# Patient Record
Sex: MALE | Race: OTHER | NOT HISPANIC OR LATINO | URBAN - METROPOLITAN AREA
[De-identification: names, ages, dates, MRNs, and addresses within clinical notes are randomized per-mention and may not be internally consistent; named-entity substitution may affect disease eponyms.]

---

## 2021-10-05 ENCOUNTER — INPATIENT (INPATIENT)
Facility: HOSPITAL | Age: 61
LOS: 33 days | Discharge: NURSING, REHAB OR LONG-TERM CARE | DRG: 216 | End: 2021-11-08
Attending: THORACIC SURGERY (CARDIOTHORACIC VASCULAR SURGERY) | Admitting: STUDENT IN AN ORGANIZED HEALTH CARE EDUCATION/TRAINING PROGRAM
Payer: MEDICAID

## 2021-10-05 VITALS
SYSTOLIC BLOOD PRESSURE: 200 MMHG | HEART RATE: 86 BPM | RESPIRATION RATE: 16 BRPM | OXYGEN SATURATION: 100 % | DIASTOLIC BLOOD PRESSURE: 105 MMHG

## 2021-10-05 DIAGNOSIS — R07.9 CHEST PAIN, UNSPECIFIED: ICD-10-CM

## 2021-10-05 LAB
ALBUMIN SERPL ELPH-MCNC: 2.9 G/DL — LOW (ref 3.3–5)
ALP SERPL-CCNC: 140 U/L — HIGH (ref 40–120)
ALT FLD-CCNC: 34 U/L — SIGNIFICANT CHANGE UP (ref 10–45)
ANION GAP SERPL CALC-SCNC: 13 MMOL/L — SIGNIFICANT CHANGE UP (ref 5–17)
ANION GAP SERPL CALC-SCNC: 17 MMOL/L — SIGNIFICANT CHANGE UP (ref 5–17)
APTT BLD: 28.5 SEC — SIGNIFICANT CHANGE UP (ref 27.5–35.5)
AST SERPL-CCNC: 159 U/L — HIGH (ref 10–40)
BASE EXCESS BLDV CALC-SCNC: -9.4 MMOL/L — LOW (ref -2–2)
BASE EXCESS BLDV CALC-SCNC: -9.7 MMOL/L — LOW (ref -2–2)
BASOPHILS # BLD AUTO: 0.06 K/UL — SIGNIFICANT CHANGE UP (ref 0–0.2)
BASOPHILS NFR BLD AUTO: 0.5 % — SIGNIFICANT CHANGE UP (ref 0–2)
BILIRUB SERPL-MCNC: 0.3 MG/DL — SIGNIFICANT CHANGE UP (ref 0.2–1.2)
BUN SERPL-MCNC: 66 MG/DL — HIGH (ref 7–23)
BUN SERPL-MCNC: 67 MG/DL — HIGH (ref 7–23)
CA-I SERPL-SCNC: 1.16 MMOL/L — SIGNIFICANT CHANGE UP (ref 1.15–1.33)
CA-I SERPL-SCNC: 1.23 MMOL/L — SIGNIFICANT CHANGE UP (ref 1.15–1.33)
CALCIUM SERPL-MCNC: 9.3 MG/DL — SIGNIFICANT CHANGE UP (ref 8.4–10.5)
CALCIUM SERPL-MCNC: 9.9 MG/DL — SIGNIFICANT CHANGE UP (ref 8.4–10.5)
CHLORIDE BLDV-SCNC: 110 MMOL/L — HIGH (ref 96–108)
CHLORIDE BLDV-SCNC: 110 MMOL/L — HIGH (ref 96–108)
CHLORIDE SERPL-SCNC: 107 MMOL/L — SIGNIFICANT CHANGE UP (ref 96–108)
CHLORIDE SERPL-SCNC: 108 MMOL/L — SIGNIFICANT CHANGE UP (ref 96–108)
CK MB BLD-MCNC: 10.9 % — HIGH (ref 0–3.5)
CK MB CFR SERPL CALC: 144.1 NG/ML — HIGH (ref 0–6.7)
CK SERPL-CCNC: 1326 U/L — HIGH (ref 30–200)
CO2 BLDV-SCNC: 18 MMOL/L — LOW (ref 22–26)
CO2 BLDV-SCNC: 20 MMOL/L — LOW (ref 22–26)
CO2 SERPL-SCNC: 14 MMOL/L — LOW (ref 22–31)
CO2 SERPL-SCNC: 15 MMOL/L — LOW (ref 22–31)
CREAT SERPL-MCNC: 4.05 MG/DL — HIGH (ref 0.5–1.3)
CREAT SERPL-MCNC: 4.08 MG/DL — HIGH (ref 0.5–1.3)
EOSINOPHIL # BLD AUTO: 0.07 K/UL — SIGNIFICANT CHANGE UP (ref 0–0.5)
EOSINOPHIL NFR BLD AUTO: 0.6 % — SIGNIFICANT CHANGE UP (ref 0–6)
GAS PNL BLDV: 133 MMOL/L — LOW (ref 136–145)
GAS PNL BLDV: 137 MMOL/L — SIGNIFICANT CHANGE UP (ref 136–145)
GAS PNL BLDV: SIGNIFICANT CHANGE UP
GLUCOSE BLDV-MCNC: 196 MG/DL — HIGH (ref 70–99)
GLUCOSE BLDV-MCNC: 203 MG/DL — HIGH (ref 70–99)
GLUCOSE SERPL-MCNC: 201 MG/DL — HIGH (ref 70–99)
GLUCOSE SERPL-MCNC: 251 MG/DL — HIGH (ref 70–99)
HCO3 BLDV-SCNC: 17 MMOL/L — LOW (ref 22–29)
HCO3 BLDV-SCNC: 18 MMOL/L — LOW (ref 22–29)
HCT VFR BLD CALC: 26 % — LOW (ref 39–50)
HCT VFR BLDA CALC: 26 % — LOW (ref 39–51)
HCT VFR BLDA CALC: 26 % — LOW (ref 39–51)
HGB BLD CALC-MCNC: 8.6 G/DL — LOW (ref 12.6–17.4)
HGB BLD CALC-MCNC: 8.8 G/DL — LOW (ref 12.6–17.4)
HGB BLD-MCNC: 8.3 G/DL — LOW (ref 13–17)
IMM GRANULOCYTES NFR BLD AUTO: 0.3 % — SIGNIFICANT CHANGE UP (ref 0–1.5)
INR BLD: 1.06 RATIO — SIGNIFICANT CHANGE UP (ref 0.88–1.16)
LACTATE BLDV-MCNC: 0.9 MMOL/L — SIGNIFICANT CHANGE UP (ref 0.7–2)
LACTATE BLDV-MCNC: 1.2 MMOL/L — SIGNIFICANT CHANGE UP (ref 0.7–2)
LIDOCAIN IGE QN: 53 U/L — SIGNIFICANT CHANGE UP (ref 7–60)
LYMPHOCYTES # BLD AUTO: 0.85 K/UL — LOW (ref 1–3.3)
LYMPHOCYTES # BLD AUTO: 7.7 % — LOW (ref 13–44)
MAGNESIUM SERPL-MCNC: 1.7 MG/DL — SIGNIFICANT CHANGE UP (ref 1.6–2.6)
MCHC RBC-ENTMCNC: 31.9 GM/DL — LOW (ref 32–36)
MCHC RBC-ENTMCNC: 32 PG — SIGNIFICANT CHANGE UP (ref 27–34)
MCV RBC AUTO: 100.4 FL — HIGH (ref 80–100)
MONOCYTES # BLD AUTO: 0.29 K/UL — SIGNIFICANT CHANGE UP (ref 0–0.9)
MONOCYTES NFR BLD AUTO: 2.6 % — SIGNIFICANT CHANGE UP (ref 2–14)
NEUTROPHILS # BLD AUTO: 9.75 K/UL — HIGH (ref 1.8–7.4)
NEUTROPHILS NFR BLD AUTO: 88.3 % — HIGH (ref 43–77)
NRBC # BLD: 0 /100 WBCS — SIGNIFICANT CHANGE UP (ref 0–0)
NT-PROBNP SERPL-SCNC: HIGH PG/ML (ref 0–300)
PCO2 BLDV: 41 MMHG — LOW (ref 42–55)
PCO2 BLDV: 48 MMHG — SIGNIFICANT CHANGE UP (ref 42–55)
PH BLDV: 7.19 — CRITICAL LOW (ref 7.32–7.43)
PH BLDV: 7.23 — LOW (ref 7.32–7.43)
PLATELET # BLD AUTO: 231 K/UL — SIGNIFICANT CHANGE UP (ref 150–400)
PO2 BLDV: 22 MMHG — LOW (ref 25–45)
PO2 BLDV: 23 MMHG — LOW (ref 25–45)
POTASSIUM BLDV-SCNC: 6.8 MMOL/L — CRITICAL HIGH (ref 3.5–5.1)
POTASSIUM BLDV-SCNC: 9.7 MMOL/L — CRITICAL HIGH (ref 3.5–5.1)
POTASSIUM SERPL-MCNC: 5.9 MMOL/L — HIGH (ref 3.5–5.3)
POTASSIUM SERPL-MCNC: 6.2 MMOL/L — CRITICAL HIGH (ref 3.5–5.3)
POTASSIUM SERPL-SCNC: 5.9 MMOL/L — HIGH (ref 3.5–5.3)
POTASSIUM SERPL-SCNC: 6.2 MMOL/L — CRITICAL HIGH (ref 3.5–5.3)
PROT SERPL-MCNC: 7 G/DL — SIGNIFICANT CHANGE UP (ref 6–8.3)
PROTHROM AB SERPL-ACNC: 12.7 SEC — SIGNIFICANT CHANGE UP (ref 10.6–13.6)
RBC # BLD: 2.59 M/UL — LOW (ref 4.2–5.8)
RBC # FLD: 13.2 % — SIGNIFICANT CHANGE UP (ref 10.3–14.5)
SAO2 % BLDV: 32.2 % — LOW (ref 67–88)
SAO2 % BLDV: 35 % — LOW (ref 67–88)
SARS-COV-2 RNA SPEC QL NAA+PROBE: SIGNIFICANT CHANGE UP
SODIUM SERPL-SCNC: 136 MMOL/L — SIGNIFICANT CHANGE UP (ref 135–145)
SODIUM SERPL-SCNC: 138 MMOL/L — SIGNIFICANT CHANGE UP (ref 135–145)
TROPONIN T, HIGH SENSITIVITY RESULT: 8021 NG/L — HIGH (ref 0–51)
WBC # BLD: 11.05 K/UL — HIGH (ref 3.8–10.5)
WBC # FLD AUTO: 11.05 K/UL — HIGH (ref 3.8–10.5)

## 2021-10-05 PROCEDURE — 99291 CRITICAL CARE FIRST HOUR: CPT | Mod: CS

## 2021-10-05 PROCEDURE — 93010 ELECTROCARDIOGRAM REPORT: CPT

## 2021-10-05 PROCEDURE — 93308 TTE F-UP OR LMTD: CPT | Mod: 26

## 2021-10-05 PROCEDURE — 71045 X-RAY EXAM CHEST 1 VIEW: CPT | Mod: 26

## 2021-10-05 PROCEDURE — 99223 1ST HOSP IP/OBS HIGH 75: CPT | Mod: GC

## 2021-10-05 RX ORDER — HEPARIN SODIUM 5000 [USP'U]/ML
5300 INJECTION INTRAVENOUS; SUBCUTANEOUS EVERY 6 HOURS
Refills: 0 | Status: DISCONTINUED | OUTPATIENT
Start: 2021-10-05 | End: 2021-10-07

## 2021-10-05 RX ORDER — SODIUM ZIRCONIUM CYCLOSILICATE 10 G/10G
5 POWDER, FOR SUSPENSION ORAL ONCE
Refills: 0 | Status: COMPLETED | OUTPATIENT
Start: 2021-10-05 | End: 2021-10-05

## 2021-10-05 RX ORDER — HEPARIN SODIUM 5000 [USP'U]/ML
INJECTION INTRAVENOUS; SUBCUTANEOUS
Qty: 25000 | Refills: 0 | Status: DISCONTINUED | OUTPATIENT
Start: 2021-10-05 | End: 2021-10-07

## 2021-10-05 RX ORDER — NITROGLYCERIN 6.5 MG
0.4 CAPSULE, EXTENDED RELEASE ORAL ONCE
Refills: 0 | Status: COMPLETED | OUTPATIENT
Start: 2021-10-05 | End: 2021-10-05

## 2021-10-05 RX ORDER — INSULIN HUMAN 100 [IU]/ML
5 INJECTION, SOLUTION SUBCUTANEOUS ONCE
Refills: 0 | Status: COMPLETED | OUTPATIENT
Start: 2021-10-05 | End: 2021-10-06

## 2021-10-05 RX ORDER — CALCIUM GLUCONATE 100 MG/ML
1 VIAL (ML) INTRAVENOUS ONCE
Refills: 0 | Status: COMPLETED | OUTPATIENT
Start: 2021-10-05 | End: 2021-10-05

## 2021-10-05 RX ORDER — HEPARIN SODIUM 5000 [USP'U]/ML
3500 INJECTION INTRAVENOUS; SUBCUTANEOUS EVERY 6 HOURS
Refills: 0 | Status: DISCONTINUED | OUTPATIENT
Start: 2021-10-05 | End: 2021-10-05

## 2021-10-05 RX ORDER — NIFEDIPINE 30 MG
90 TABLET, EXTENDED RELEASE 24 HR ORAL ONCE
Refills: 0 | Status: COMPLETED | OUTPATIENT
Start: 2021-10-05 | End: 2021-10-05

## 2021-10-05 RX ORDER — LOSARTAN POTASSIUM 100 MG/1
25 TABLET, FILM COATED ORAL DAILY
Refills: 0 | Status: DISCONTINUED | OUTPATIENT
Start: 2021-10-05 | End: 2021-10-06

## 2021-10-05 RX ORDER — FUROSEMIDE 40 MG
40 TABLET ORAL ONCE
Refills: 0 | Status: COMPLETED | OUTPATIENT
Start: 2021-10-05 | End: 2021-10-05

## 2021-10-05 RX ORDER — HEPARIN SODIUM 5000 [USP'U]/ML
7000 INJECTION INTRAVENOUS; SUBCUTANEOUS EVERY 6 HOURS
Refills: 0 | Status: DISCONTINUED | OUTPATIENT
Start: 2021-10-05 | End: 2021-10-05

## 2021-10-05 RX ORDER — TICAGRELOR 90 MG/1
180 TABLET ORAL ONCE
Refills: 0 | Status: COMPLETED | OUTPATIENT
Start: 2021-10-05 | End: 2021-10-05

## 2021-10-05 RX ORDER — LABETALOL HCL 100 MG
200 TABLET ORAL ONCE
Refills: 0 | Status: COMPLETED | OUTPATIENT
Start: 2021-10-05 | End: 2021-10-05

## 2021-10-05 RX ORDER — HEPARIN SODIUM 5000 [USP'U]/ML
INJECTION INTRAVENOUS; SUBCUTANEOUS
Qty: 25000 | Refills: 0 | Status: DISCONTINUED | OUTPATIENT
Start: 2021-10-05 | End: 2021-10-05

## 2021-10-05 RX ORDER — LABETALOL HCL 100 MG
10 TABLET ORAL ONCE
Refills: 0 | Status: COMPLETED | OUTPATIENT
Start: 2021-10-05 | End: 2021-10-05

## 2021-10-05 RX ADMIN — Medication 100 GRAM(S): at 19:27

## 2021-10-05 RX ADMIN — Medication 90 MILLIGRAM(S): at 20:19

## 2021-10-05 RX ADMIN — LOSARTAN POTASSIUM 25 MILLIGRAM(S): 100 TABLET, FILM COATED ORAL at 20:19

## 2021-10-05 RX ADMIN — TICAGRELOR 180 MILLIGRAM(S): 90 TABLET ORAL at 22:18

## 2021-10-05 RX ADMIN — SODIUM ZIRCONIUM CYCLOSILICATE 5 GRAM(S): 10 POWDER, FOR SUSPENSION ORAL at 20:18

## 2021-10-05 RX ADMIN — Medication 200 MILLIGRAM(S): at 20:19

## 2021-10-05 RX ADMIN — Medication 40 MILLIGRAM(S): at 19:27

## 2021-10-05 RX ADMIN — HEPARIN SODIUM 1000 UNIT(S)/HR: 5000 INJECTION INTRAVENOUS; SUBCUTANEOUS at 22:21

## 2021-10-05 RX ADMIN — Medication 0.4 MILLIGRAM(S): at 19:45

## 2021-10-05 NOTE — CONSULT NOTE ADULT - ASSESSMENT
59 yo M with PMH of HTN & DM.  Presented to [facility] with CP and dyspnea;  transferred for NSTEMI/CHF  On arrival, found to be in sherie nonoliguric renal failure with increased work of breathing and evidence of volume overload c/f ADHF vs. renal failure. Elevated cardiac enzymes at OSH c/f NSTEMI, however patient chest pain free after SL NTG x 1.  Appears HDS, electrically quiescent.   Will benefit from possible coronary evaluation in future once optimized from volume and renal standpoint.       REC:    1.  Patient with CP/dyspnea and elevated troponin concerning for N-STEMI, in setting of renal failure and significant HTN; EKG unremarkable.   NALLELY 4 (Asa use, chest pain, CAD risk factors, + Cardiac marker); HANK 111  --Will benefit from possible coronary evaluation in future once optimized from volume and renal standpoint.  --Diuresis for volume overload  --Please control blood pressure to target <140/90  --Please ensure patient loaded with  mg.  --Please ensure patient loaded with 180 mg. of Brilinta, then 90 mg BID to start 24 hours later (Can do 600 mg. of Plavix alternatively)  --Please start heparin gtt, please bolus to achieve ptt per ACS protcol  --Please trend troponin until downtrending, please repeat EKG with every troponin  --Please order a transthoracic echo stat  --Please monitor on telemetry  --K>4, Mg>2  --If patient develops hemodynamic instability, please call    2.  Renal failure  --Renal consult    3.  HTN  - diuresis as above  - renal consult    Please call with questions or concerns    Júnior Torres MD  Cardiology Fellow  [phone]    Plan discussed with cardiology fellow; patient seen and examined.       I was physically present for the key portions of the evaluation and management (E/M) service provided.    I agree with the above history, physical, and plan which I have reviewed and edited where appropriate.   Oc Davis M.D.  Cardiology Attending, Consult Service    For Cardiology consults and questions, all Cardiology service information can be found 24/7 on [email or web address], password: [email or web address]  61 yo M with PMH of HTN & DM.  Presented to [facility] with CP and dyspnea and marked HTN.  Transferred for NSTEMI/CHF and ARF.  On arrival here , found to be in sherie nonoliguric renal failure with increased work of breathing and evidence of volume overload due to ?ADHF vs. renal failure. Elevated cardiac enzymes at OSH c/f N-STEMI, however patient chest pain free after NTG.  Appears HDS, electrically quiescent.       REC:    1.  Patient with CP/dyspnea and elevated troponin concerning for N-STEMI, in setting of renal failure and significant HTN; EKG unremarkable.   NALLELY 4 (Asa use, chest pain, CAD risk factors, + Cardiac marker); HANK 111  --Will benefit from possible coronary evaluation in future once optimized from volume and renal standpoint.  --Please ensure patient loaded with  mg.  --Please ensure patient loaded with 180 mg. of Brilinta, then 90 mg BID to start 24 hours later (Can do 600 mg. of Plavix alternatively)  --Please start heparin gtt, please bolus to achieve ptt per ACS protcol  --Please trend troponin until downtrending, please repeat EKG with every troponin  --Please order a transthoracic echo stat  --Please monitor on telemetry  --If patient develops hemodynamic instability, please call    2.  HTN emergency  - continue diuresis for volume overload  --renal consult  --Please control blood pressure to target <140/90  --K>4, Mg>2  --continue nifedipine and labetalol    3.  Volume overload; CHF  -- continue diuresis  -- TTE to assess cardiac structure infection    4.  Renal failure, apparently acute  --Renal consult    Please call with questions or concerns    Júnior Torres MD  Cardiology Fellow  [phone]    Plan discussed with cardiology fellow; patient seen and examined.       I was physically present for the key portions of the evaluation and management (E/M) service provided.    I agree with the above history, physical, and plan which I have reviewed and edited where appropriate.   Oc Davis M.D.  Cardiology Attending, Consult Service    For Cardiology consults and questions, all Cardiology service information can be found 24/7 on [email or web address], password: [email or web address]

## 2021-10-05 NOTE — ED PROVIDER NOTE - CLINICAL SUMMARY MEDICAL DECISION MAKING FREE TEXT BOX
Joseph Frankel PGY3: 61 yo M with DM and HTN presenting for cp and sob from [facility] on BIPAP and nitro drip. Hypertensive, otherwise VSS. Patient looks well and is non toxic appearing. PE as above. ECG NSR. Patient taken off of bipap and nitro drip and is is breathing easy with no SOB. No current chest pain. Probable HTN emergency. Possible CHF but patient POCUS with grossly well appearing heart. Will get labs, cxr, and reassess.

## 2021-10-05 NOTE — ED PROVIDER NOTE - PROGRESS NOTE DETAILS
angela attending- patient stable off nitro and bipap, 100% ra nad, feeling well, pending remaining results, admission angela attending- patient with mild increased wob, elevated bps now 200/110, dosing home meds, giving 1 sublingual nitro with good response now 177 sbp, will cont to reassess Joseph Frankel PGY3: Troponin back and 8000s. Spoke with cards again and as patient chest pain free still recommending floor. Patient was loaded with Brilinta and heparin GTT started. Will admit on teli. Patient stable at this time.

## 2021-10-05 NOTE — CONSULT NOTE ADULT - SUBJECTIVE AND OBJECTIVE BOX
Patient seen and evaluated at bedside    Chief Complaint:    HPI:  59 yo M PMH HTN, DM, presenting to [facility] transferred for NSTEMI/CHF.      At baseline,    History of the present illness begins earlier this morning when the patient had the onset of substernal burning type chest pain nonradiating.    ED course: Arrived VS Afebrile HR 86 /105 RR 16 satting 100% on Bipap. Initial labs notable for Creatinine of 4.05 (unclear baseline) and K of 5.9. ProBNP 18k.    HPI:      PMHx:       PSHx:       FAMILY HISTORY  FAMILY HISTORY:      Allergies:  No Known Allergies      Home Meds:    Current Medications:       FAMILY HISTORY:        REVIEW OF SYSTEMS Negative unless otherwise mentioned      Physical Exam:  T(F): --  HR: 90 (10-05) (86 - 93)  BP: 171/67 (10-05) (171/67 - 203/126)  RR: 20 (10-05)  SpO2: 99% (10-05)  GENERAL: No acute distress, well-developed  HEAD:  Atraumatic, Normocephalic  ENT: EOMI, PERRLA, conjunctiva and sclera clear, Neck supple, No JVD, moist mucosa  CHEST/LUNG: Clear to auscultation bilaterally; No wheeze, equal breath sounds bilaterally   BACK: No spinal tenderness  HEART: Regular rate and rhythm; No murmurs, rubs, or gallops  ABDOMEN: Soft, Nontender, Nondistended; Bowel sounds present  EXTREMITIES:  No clubbing, cyanosis, or edema  PSYCH: Nl behavior, nl affect  NEUROLOGY: AAOx3, non-focal, cranial nerves intact  SKIN: Normal color, No rashes or lesions  LINES:    Cardiovascular Diagnostic Testing:    ECG: Personally reviewed:    Echo: Personally reviewed:    Stress Testing:    Cath:    Imaging:    CXR: Personally reviewed    Labs: Personally reviewed                        8.3    11.05 )-----------( 231      ( 05 Oct 2021 17:02 )             26.0     10-05    136  |  108  |  67<H>  ----------------------------<  201<H>  5.9<H>   |  15<L>  |  4.05<H>    Ca    9.3      05 Oct 2021 17:02  Mg     1.7     10-05    TPro  7.0  /  Alb  2.9<L>  /  TBili  0.3  /  DBili  x   /  AST  159<H>  /  ALT  34  /  AlkPhos  140<H>  10-05    PT/INR - ( 05 Oct 2021 17:02 )   PT: 12.7 sec;   INR: 1.06 ratio         PTT - ( 05 Oct 2021 17:02 )  PTT:28.5 sec  Serum Pro-Brain Natriuretic Peptide: 31547 pg/mL (10-05 @ 17:02)            Assessment & Plan:    Júnior Torres PGY4  Cardiology Fellow Patient seen and evaluated at bedside    Chief Complaint:    HPI:  61 yo M PMH HTN, DM, presenting to [facility] transferred for NSTEMI/CHF.      At baseline, the patient is fully ambulatory,     History of the present illness begins earlier this morning when the patient had the onset of substernal burning type chest pain nonradiating.    ED course: Arrived VS Afebrile HR 86 /105 RR 16 satting 100% on Bipap. Initial labs notable for Creatinine of 4.05 (unclear baseline) and K of 5.9. ProBNP 18k.    HPI:      PMHx:       PSHx:       FAMILY HISTORY  FAMILY HISTORY:      Allergies:  No Known Allergies      Home Meds:    Current Medications:       FAMILY HISTORY:        REVIEW OF SYSTEMS Negative unless otherwise mentioned      Physical Exam:  T(F): --  HR: 90 (10-05) (86 - 93)  BP: 171/67 (10-05) (171/67 - 203/126)  RR: 20 (10-05)  SpO2: 99% (10-05)  GENERAL: No acute distress, well-developed  HEAD:  Atraumatic, Normocephalic  ENT: EOMI, PERRLA, conjunctiva and sclera clear, Neck supple, No JVD, moist mucosa  CHEST/LUNG: Clear to auscultation bilaterally; No wheeze, equal breath sounds bilaterally   BACK: No spinal tenderness  HEART: Regular rate and rhythm; No murmurs, rubs, or gallops  ABDOMEN: Soft, Nontender, Nondistended; Bowel sounds present  EXTREMITIES:  No clubbing, cyanosis, or edema  PSYCH: Nl behavior, nl affect  NEUROLOGY: AAOx3, non-focal, cranial nerves intact  SKIN: Normal color, No rashes or lesions  LINES:    Cardiovascular Diagnostic Testing:    ECG: Personally reviewed:    Echo: Personally reviewed:    Stress Testing:    Cath:    Imaging:    CXR: Personally reviewed    Labs: Personally reviewed                        8.3    11.05 )-----------( 231      ( 05 Oct 2021 17:02 )             26.0     10-05    136  |  108  |  67<H>  ----------------------------<  201<H>  5.9<H>   |  15<L>  |  4.05<H>    Ca    9.3      05 Oct 2021 17:02  Mg     1.7     10-05    TPro  7.0  /  Alb  2.9<L>  /  TBili  0.3  /  DBili  x   /  AST  159<H>  /  ALT  34  /  AlkPhos  140<H>  10-05    PT/INR - ( 05 Oct 2021 17:02 )   PT: 12.7 sec;   INR: 1.06 ratio         PTT - ( 05 Oct 2021 17:02 )  PTT:28.5 sec  Serum Pro-Brain Natriuretic Peptide: 76601 pg/mL (10-05 @ 17:02)            Assessment & Plan:    Júnior Torres PGY4  Cardiology Fellow CARDIOLOGY CONSULT NOTE    Patient seen and evaluated at bedside    Chief Complaint: SOB    HPI:  59 yo M PMH HTN, DM, presenting to [facility] transferred for NSTEMI/CHF.    At baseline, the patient is fully ambulatory, has a medically unlimited exercise tolerance. He lives with his wife at home and is independent. Originally from [facility]. He is a remote smoker. He does not follow with a cardiologist and never has. He denies any cardiac history in the past. He reports that he takes blood pressure medications and a "water pill" but cannot tell me the name.    History of the present illness begins earlier this morning when the patient woke from sleep and started ambulating to the bathroom. After brushing his teeth he started to have the onset of substernal burning type chest pain and acute shortness of breath. He had never had chest pain before, and had never felt like this before. He took an aspirin at this time and presented to [facility] where he was found to have an elevated troponin and was started on bipap for work of breathing per ED staff. Per patient he was given additional aspirin and sub lingual nitroglycerin which relieved his chest pain. He would remain chest pain free. He was transferred to [facility] at this time. He denies orthopnea, palpitations, weakness, nausea, vomiting, fever, cough. Of note, the patient has baseline lower extremity edema for which he takes a water pill -> he does not feel that this lower extremity weakness has worsened on my interview.    ED course: Arrived VS Afebrile HR 86 /105 RR 16 satting 100% on room air. Initial labs notable for Creatinine of 4.05 (unclear baseline) and K of 5.9. ProBNP 18k. CKMB 144. Troponin pending. EKG obtained which was unremarkable for ischemia. CXR showing bibasilar opacities c/f parenchymal edema vs. effusion. Cephalization present. A bedside echocardiogram was performed by the ED which showed no segmental wall motion abnormalities. He was given 40 of IV lasix, labetalol, losartan, nitroglycerin SL, and hyperK cocktail. On my interview, the patients blood pressure had decreased to 176/82. He reported being chest pain free and was making urine.    PMHx:   As stated    PSHx:   Nopne    FAMILY HISTORY:  Denies coronary events    Allergies:  No Known Allergies    Home Meds:  Unsure - states water pill, antiglycemic pills, antihypertensives    Current Medications:     REVIEW OF SYSTEMS Negative unless otherwise mentioned      Physical Exam:  T(F): --  HR: 90 (10-05) (86 - 93)  BP: 171/67 (10-05) (171/67 - 203/126)  RR: 20 (10-05)  SpO2: 99% (10-05)    Uncomfortable, accessory muscle use  JVD unassessable 2/2 to habitus  Bibasilar decreased breath sounds, gravity dependent rales at bases, expiratory wheezes diffusely  Distant heart sounds, RRR  Soft, NTND  2+ pitting edema of legs, warm on exam  AOx3    Cardiovascular Diagnostic Testing:    ECG: Personally reviewed: Normal sinus rhythm    Echo: Pending    Imaging:    CXR: Personally reviewed    Labs: Personally reviewed                        8.3    11.05 )-----------( 231      ( 05 Oct 2021 17:02 )             26.0     10-05    136  |  108  |  67<H>  ----------------------------<  201<H>  5.9<H>   |  15<L>  |  4.05<H>    Ca    9.3      05 Oct 2021 17:02  Mg     1.7     10-05    TPro  7.0  /  Alb  2.9<L>  /  TBili  0.3  /  DBili  x   /  AST  159<H>  /  ALT  34  /  AlkPhos  140<H>  10-05    PT/INR - ( 05 Oct 2021 17:02 )   PT: 12.7 sec;   INR: 1.06 ratio         PTT - ( 05 Oct 2021 17:02 )  PTT:28.5 sec  Serum Pro-Brain Natriuretic Peptide: 02407 pg/mL (10-05 @ 17:02)            Assessment & Plan:  59 yo M PMH HTN, DM, presenting to [facility] transferred for NSTEMI/CHF, on arrival found to be in sherie nonoliguric renal failure and increased work of breathing with VOL c/f ADHF vs. Renal failure. Elevated cardiac enzymes at OSH c/f NSTEMI, however patient chest pain free, HDS, electrically quiescent. Will benefit from possible coronary evaluation in future once optimized from volume and renal standpoint.     Impression: Patient with elevated troponin concerning for NSTEMI  NALLELY 4 (Asa use, chest pain, CAD risk factors, + Cardiac marker)  HANK 111    --Will benefit from possible coronary evaluation in future once optimized from volume and renal standpoint. Would consider renal consult  --Diuresis for VOL  --Please control blood pressure to target <140/90  --Please ensure patient loaded with   --Please ensure patient loaded with 180 of Brillinta, then 90 BID to start 24 hours later (Can do 600 of Plavix alternatively)  --Please start heparin gtt, please bolus to achieve ptt per ACS protcol  --Please trend troponin until downtrending, please repeat EKG with every troponin  --Please order a transthoracic echo stat  --Please monitor on telemetry  --K>4, Mg>2  --If patient develops hemodynamic instability, please call      Please call with questions or concerns    Thank you for this interesting consult    Júnior Torres MD  Cardiology Fellow  [phone] CARDIOLOGY CONSULT NOTE    61 yo M PMH HTN & DM, presented to [facility] and then transferred to [facility] for NSTEMI/CHF.    At baseline, the patient is fully ambulatory and has had a medically unlimited exercise tolerance; originally from [facility]. He has a remote hx. of cigarette use.  He has never needed to see a cardiologist in the past and denies any cardiac history in the past. He reports that he takes blood pressure medications and a "water pill," but cannot tell me the name.    This morning, patient aoke from sleep and started ambulating to the bathroom. After brushing his teeth, he noted the onset of substernal burning-type chest pain and acute shortness of breath. He had never had chest pain before. He took an aspirin at that time and presented to [facility] where he was found to have an elevated troponin and was started on bipap for work of breathing, per ED staff. Per patient, he was given additional aspirin and sub-lingual nitroglycerin, which relieved his chest pain; he remained free of chest pain thereafter.     He was transferred to [facility]. He denies orthopnea, palpitations, weakness, nausea, vomiting, fever, cough.  Patient reports baseline lower extremity edema, for which he takes a water pill -> he does not feel that this lower extremity weakness has worsened on my interview.    ED course:   Arrived VS Afebrile HR 86 /105 RR 16 O2 sat 100% on room air.   Initial labs notable for Creatinine of 4.05 (unclear baseline) and K of 5.9. ProBNP 18k. CKMB 144. Troponin pending.   EKG obtained which was unremarkable for ischemia.   CXR showed bibasilar opacities c/f parenchymal edema vs. effusion; cephalization present.   Bedside echocardiogram/POCUS performed by the ED which showed no segmental wall motion abnormalities.   He was given 40 of IV Lasix, labetalol, losartan, nitroglycerin SL, and hyperK cocktail.   On my evaluation, the patients blood pressure had decreased to 176/82. He reported being chest pain free and was making urine.    PMHx:   As stated above    PSHx:   None    FAMILY HISTORY:  Denies family hx. of heart dz.    Allergies:  No Known Allergies    Home Meds:  Unsure - states water pill, anti-glycemic pills, antihypertensives        ROS:  General: no fatigue/malaise, weight loss/gain.  Skin: no rashes.  Eyes: no blurred vision, no loss of vision. 	  ENT: no sore throat, rhinorrhea, sinus congestion.  Gastrointestinal:  no N/V/D, no melena/hematemesis/hematochezia.  Genitourinary: no dysuria/hesitancy or hematuria.  Musculoskeletal: no myalgias or arthralgias.  Neurological: no changes in vision or hearing, no lightheadedness/dizziness, no syncope/near syncope	  Psychiatric: no unusual stress/anxiety.   Hematology/Lymphatics: no unusual bleeding, bruising and no lymphadenopathy.  All others negative except as stated above and in HPI.       Physical Exam:  T(F): Afeb  HR: 90 (10-05) (86 - 93)  BP: 171/67 (10-05) (171/67 - 203/126)  RR: 20 (10-05)  SpO2: 99% (10-05)    GEN:  appears uncomfortable, using accessory muscle of breathing  NECK:  JVD unassessable 2/2 to habitus, normal carotid without bruit.  CHEST:  Bibasilar decreased breath sounds, gravity dependent rales at bases, expiratory wheezes diffusely  HEART:  Distant heart sounds, RRR; no murmur or gallop apprecaited  ABD:  Soft, NTND  EXT:  2+ pitting edema of legs, warm on exam  NEURO:  A&Ox3, non -focal  SKIN:  No rash      ECG:  Normal sinus rhythm, normal tracing    Echo: Pending    Xray Chest 1 View- PORTABLE-Urgent (10.05.21 @ 17:18)   COMPARISON: None available.    FINDINGS:  The heart size is magnified in the projection.  Low lung volumes. Otherwise, the lungs are clear. No pleural effusions or pneumothorax.  Mild degenerative changes of the bones.    IMPRESSION:  Clear lungs.    LAWSON SANDOVAL MD; Resident Radiology  This document has been electronically signed.  PHYLLIS NEWMAN MD; Attending Radiologist  This document has been electronically signed. Oct  6 2021 10:13AM      Labs:                      8.3    11.05 )-----------( 231      ( 05 Oct 2021 17:02 )             26.0     10-05  136  |  108  |  67<H>  ----------------------------<  201<H>  5.9<H>   |  15<L>  |  4.05<H>    Ca    9.3      05 Oct 2021 17:02  Mg     1.7     10-05    TPro  7.0  /  Alb  2.9<L>  /  TBili  0.3  /  DBili  x   /  AST  159<H>  /  ALT  34  /  AlkPhos  140<H>  10-05    PT/INR - ( 05 Oct 2021 17:02 )   PT: 12.7 sec;   INR: 1.06 ratio    PTT - ( 05 Oct 2021 17:02 )  PTT:28.5 sec    Serum Pro-Brain Natriuretic Peptide: 85589 pg/mL (10-05 @ 17:02) CARDIOLOGY CONSULT NOTE    61 yo M PMH HTN & DM, presented to [facility] and then transferred to [facility] for NSTEMI/CHF.    At baseline, the patient is fully ambulatory and has had a medically unlimited exercise tolerance; originally from [facility]. He has a remote hx. of cigarette use.  He has never needed to see a cardiologist in the past and denies any cardiac history in the past. He reports that he takes blood pressure medications and a "water pill," but cannot tell me the name.    This morning, patient aoke from sleep and started ambulating to the bathroom. After brushing his teeth, he noted the onset of substernal burning-type chest pain and acute shortness of breath. He had never had chest pain before. He took an aspirin at that time and presented to [facility] where he was found to have an elevated troponin and was started on bipap for work of breathing, per ED staff. Per patient, he was given additional aspirin and sub-lingual nitroglycerin, which relieved his chest pain; he remained free of chest pain thereafter.     He was transferred to [facility]. He denies orthopnea, palpitations, weakness, nausea, vomiting, fever, cough.  Patient reports baseline lower extremity edema, for which he takes a water pill -> he does not feel that this lower extremity weakness has worsened on my interview.    ED course:   Arrived VS Afebrile HR 86 /105 RR 16 O2 sat 100% on room air.   Initial labs notable for Creatinine of 4.05 (unclear baseline) and K of 5.9. ProBNP 18k. CKMB 144. Troponin pending.   EKG obtained which was unremarkable for ischemia.   CXR showed bibasilar opacities c/f parenchymal edema vs. effusion; cephalization present.   Bedside echocardiogram/POCUS performed by the ED which showed no segmental wall motion abnormalities.   He was given 40 of IV Lasix, labetalol, losartan, nitroglycerin SL, and hyperK cocktail.   On my evaluation, the patients blood pressure had decreased to 176/82. He reported being chest pain free and was making urine.    PMHx:   As stated above    PSHx:   None    FAMILY HISTORY:  Denies family hx. of heart dz.    Allergies:  No Known Allergies    Home Meds:  Unsure - states water pill, anti-glycemic pills, antihypertensives        ROS:  General: no fatigue/malaise, weight loss/gain.  Skin: no rashes.  Eyes: no blurred vision, no loss of vision. 	  ENT: no sore throat, rhinorrhea, sinus congestion.  Gastrointestinal:  no N/V/D, no melena/hematemesis/hematochezia.  Genitourinary: no dysuria/hesitancy or hematuria.  Musculoskeletal: no myalgias or arthralgias.  Neurological: no changes in vision or hearing, no lightheadedness/dizziness, no syncope/near syncope	  Psychiatric: no unusual stress/anxiety.   Hematology/Lymphatics: no unusual bleeding, bruising and no lymphadenopathy.  All others negative except as stated above and in HPI.     Social hx.  Stopped cig. 2 mos. ago; smoked 1 pack per 3d since age 20    No ETOH      Physical Exam:  T(F): Afeb  HR: 90 (10-05) (86 - 93)  BP: 171/67 (10-05) (171/67 - 203/126)  RR: 20 (10-05)  SpO2: 99% (10-05)    GEN:  appears uncomfortable, using accessory muscle of breathing  NECK:  JVD cannot be assessed 2/2 to habitus, normal carotid without bruit.  CHEST:  Bibasilar decreased breath sounds, gravity dependent rales at bases, expiratory wheezes diffusely  HEART:  Distant heart sounds, RRR; no murmur or gallop apprecaited  ABD:  Soft, NTND  EXT:  2+ pitting edema of legs, warm on exam  NEURO:  A&Ox3, non -focal  SKIN:  No rash      ECG:  Normal sinus rhythm, normal tracing    Echo: Pending    Xray Chest 1 View- PORTABLE-Urgent (10.05.21 @ 17:18)   COMPARISON: None available.    FINDINGS:  The heart size is magnified in the projection.  Low lung volumes. Otherwise, the lungs are clear. No pleural effusions or pneumothorax.  Mild degenerative changes of the bones.    IMPRESSION:  Clear lungs.    LAWSON SANDOVAL MD; Resident Radiology  This document has been electronically signed.  PHYLLIS NEWMAN MD; Attending Radiologist  This document has been electronically signed. Oct  6 2021 10:13AM      Labs:                      8.3    11.05 )-----------( 231      ( 05 Oct 2021 17:02 )             26.0     10-05  136  |  108  |  67<H>  ----------------------------<  201<H>  5.9<H>   |  15<L>  |  4.05<H>    Ca    9.3      05 Oct 2021 17:02  Mg     1.7     10-05    TPro  7.0  /  Alb  2.9<L>  /  TBili  0.3  /  DBili  x   /  AST  159<H>  /  ALT  34  /  AlkPhos  140<H>  10-05    PT/INR - ( 05 Oct 2021 17:02 )   PT: 12.7 sec;   INR: 1.06 ratio    PTT - ( 05 Oct 2021 17:02 )  PTT:28.5 sec    Serum Pro-Brain Natriuretic Peptide: 91390 pg/mL (10-05 @ 17:02)

## 2021-10-05 NOTE — ED PROVIDER NOTE - ATTENDING CONTRIBUTION TO CARE
I, Fran Arnold, performed a history and physical exam of the patient and discussed their management with the resident and/or advanced care provider. I reviewed the resident and/or advanced care provider's note and agree with the documented findings and plan of care. I was present and available for all procedures.    61 yo M with pmh of HTN, DM presenting as transfer from [facility] for possible CHF vs NSTEMI. Patient had chest pain this morning. Substernal. Burning. Non radiating. No alleviating factors. Then began to have some sob so presented to [facility]. At [facility] troponin and bnp elevated. Patient placed on bipap, placed on nitro drip, sp lasix, aspirin. Patient currently with no chest pain and is not SOB off of BIPAP.    Well appearing and in NAD, head normal appearing atraumatic, trachea midline, mild respiratory distress, lungs coarse bs bilaterally, rrr no murmurs, soft NT ND abdomen, no visible extremity deformities, Alert and oriented, non focal neuro exam, skin warm and dry, normal affect and mood, mild bilateral pitting edema to pretibial regions bilateral no calf ttp     mild resp distress on nitro bipap, pocus without evidence of chf or adhf will dc nitro and bipap likely hypertensive emergency bp trending in 200's sbp now 170s will need rpt labs, ekg without acute ischemic findings, will need tele admit for further mgmt

## 2021-10-05 NOTE — ED PROVIDER NOTE - OBJECTIVE STATEMENT
61 yo M with pmh of HTN, DM presenting as transfer from [facility] for possible CHF vs NSTEMI. Patient had chest pain this morning. Substernal. Burning. Non radiating. No alleviating factors. Then began to have some sob so presented to [facility]. At [facility] troponin and bnp elevated. Patient placed on bipap, placed on nitro drip, sp lasix, aspirin. Patient currently with no chest pain and is not SOB off of BIPAP.

## 2021-10-05 NOTE — ED ADULT NURSE NOTE - OBJECTIVE STATEMENT
60y Male PMHx HTN, DM and HLD presenting from [facility] for elevated trop levels. Pt states he felt chest pain this morning, burning, non-radiating, and unrelieved by rest and tums, presented to [facility] ED where he began to be SOB. Initially they were evaluating for CHF but trop and BNP levels were elevated so sent here for r/o CHF vs NSTEMI. At [place], Pt placed on BIPAP, given Solumedrol 120, Nitro SL x1, duoneb x2, ASA 81mg, Lasix 40cc w/ 200 cc output, and started at 10mL/hr Nitro drip then increased to 30mL/hr nitro drip was d/c'd upon arrival to ED. 60y Male PMHx HTN, DM and HLD presenting from [facility] for elevated trop levels. Pt states he felt chest pain this morning, burning, non-radiating, and unrelieved by rest and tums, presented to [facility] ED where he began to be SOB. Initially they were evaluating for CHF but trop and BNP levels were elevated so sent here for r/o CHF vs NSTEMI. At [place], Pt placed on BIPAP, given Solumedrol 120, Nitro SL x1, duoneb x2, ASA 81mg, Lasix 40cc w/ 200 cc output, and started at 10mL/hr Nitro drip then increased to 30mL/hr nitro drip was d/c'd upon arrival to ED. US guided #18g placed in R AC, EKG done, labs drawn and sent, seen and evaluated by MD. 60y Male PMHx HTN, DM and HLD presenting from [facility] for elevated trop levels. Pt states he felt chest pain this morning, burning, non-radiating, and unrelieved by rest and tums, presented to [facility] ED where he began to be SOB. Initially they were evaluating for CHF but trop and BNP levels were elevated so sent here for r/o CHF vs NSTEMI. At [place], Pt placed on BIPAP, given Solumedrol 120, Nitro SL x1, duoneb x2, ASA 81mg, Lasix 40cc w/ 200 cc output, and started at 10mL/hr Nitro drip then increased to 30mL/hr nitro drip was d/c'd upon arrival to ED. Pt not on BIPAP now, denies SOB and chest pain. US guided #18g placed in R AC, EKG done, labs drawn and sent, seen and evaluated by MD. 60y Male PMHx HTN, DM and HLD presenting from [facility] for elevated trop levels. Pt states he felt chest pain this morning, burning, non-radiating, and unrelieved by rest and tums, presented to [facility] ED where he began to be SOB. Initially they were evaluating for CHF but trop and BNP levels were elevated so sent here for r/o CHF vs NSTEMI. At [place], Pt placed on BIPAP, given Solumedrol 120, Nitro SL x1, duoneb x2, ASA 81mg, Lasix 40cc w/ 200 cc output, and started at 10mL/hr Nitro drip then increased to 30mL/hr, initial /126 retake was 171/67 nitro drip was d/c'd upon arrival to ED. Pt not on BIPAP now, denies SOB and chest pain. US guided #18g placed in R AC, EKG done, labs drawn and sent, seen and evaluated by MD.

## 2021-10-05 NOTE — ED PROVIDER NOTE - PHYSICAL EXAMINATION
Gen: Alert and oriented. Lying comfortably in bed. Answering questions appropriately  HEENT: extra occular movements intact, no nasal discharge, mucous membranes moist  CV: Regular rate and rhythm, +S1/S2, no murmurs/rubs/gallops, pulses equal and intact in LE/UE bl.  Resp: no increased work of breathing, rhonchi bl at bases  GI: Abdomen soft non-distended, non tender to palpation, no masses  MSK: No open wounds, no bruising, 2+ LE edema bl, Homans sign negative bl  Neuro: A&Ox4, following commands, moving all four extremities spontaneously  Psych: appropriate mood

## 2021-10-06 DIAGNOSIS — D64.9 ANEMIA, UNSPECIFIED: ICD-10-CM

## 2021-10-06 DIAGNOSIS — Z78.9 OTHER SPECIFIED HEALTH STATUS: Chronic | ICD-10-CM

## 2021-10-06 DIAGNOSIS — I21.4 NON-ST ELEVATION (NSTEMI) MYOCARDIAL INFARCTION: ICD-10-CM

## 2021-10-06 DIAGNOSIS — E87.5 HYPERKALEMIA: ICD-10-CM

## 2021-10-06 DIAGNOSIS — I16.1 HYPERTENSIVE EMERGENCY: ICD-10-CM

## 2021-10-06 DIAGNOSIS — Z29.9 ENCOUNTER FOR PROPHYLACTIC MEASURES, UNSPECIFIED: ICD-10-CM

## 2021-10-06 DIAGNOSIS — I50.9 HEART FAILURE, UNSPECIFIED: ICD-10-CM

## 2021-10-06 DIAGNOSIS — E78.5 HYPERLIPIDEMIA, UNSPECIFIED: ICD-10-CM

## 2021-10-06 DIAGNOSIS — E11.9 TYPE 2 DIABETES MELLITUS WITHOUT COMPLICATIONS: ICD-10-CM

## 2021-10-06 DIAGNOSIS — N17.9 ACUTE KIDNEY FAILURE, UNSPECIFIED: ICD-10-CM

## 2021-10-06 LAB
ANION GAP SERPL CALC-SCNC: 16 MMOL/L — SIGNIFICANT CHANGE UP (ref 5–17)
ANION GAP SERPL CALC-SCNC: 16 MMOL/L — SIGNIFICANT CHANGE UP (ref 5–17)
APPEARANCE UR: CLEAR — SIGNIFICANT CHANGE UP
APTT BLD: 36.9 SEC — HIGH (ref 27.5–35.5)
APTT BLD: 48.3 SEC — HIGH (ref 27.5–35.5)
APTT BLD: 50.2 SEC — HIGH (ref 27.5–35.5)
BACTERIA # UR AUTO: NEGATIVE — SIGNIFICANT CHANGE UP
BASE EXCESS BLDV CALC-SCNC: -8.6 MMOL/L — LOW (ref -2–2)
BILIRUB UR-MCNC: NEGATIVE — SIGNIFICANT CHANGE UP
BUN SERPL-MCNC: 68 MG/DL — HIGH (ref 7–23)
BUN SERPL-MCNC: 73 MG/DL — HIGH (ref 7–23)
CA-I SERPL-SCNC: 1.29 MMOL/L — SIGNIFICANT CHANGE UP (ref 1.15–1.33)
CALCIUM SERPL-MCNC: 9.1 MG/DL — SIGNIFICANT CHANGE UP (ref 8.4–10.5)
CALCIUM SERPL-MCNC: 9.8 MG/DL — SIGNIFICANT CHANGE UP (ref 8.4–10.5)
CHLORIDE BLDV-SCNC: 109 MMOL/L — HIGH (ref 96–108)
CHLORIDE SERPL-SCNC: 106 MMOL/L — SIGNIFICANT CHANGE UP (ref 96–108)
CHLORIDE SERPL-SCNC: 108 MMOL/L — SIGNIFICANT CHANGE UP (ref 96–108)
CK MB BLD-MCNC: 10.3 % — HIGH (ref 0–3.5)
CK MB CFR SERPL CALC: 92 NG/ML — HIGH (ref 0–6.7)
CK SERPL-CCNC: 893 U/L — HIGH (ref 30–200)
CO2 BLDV-SCNC: 19 MMOL/L — LOW (ref 22–26)
CO2 SERPL-SCNC: 13 MMOL/L — LOW (ref 22–31)
CO2 SERPL-SCNC: 16 MMOL/L — LOW (ref 22–31)
COLOR SPEC: SIGNIFICANT CHANGE UP
CREAT SERPL-MCNC: 4.15 MG/DL — HIGH (ref 0.5–1.3)
CREAT SERPL-MCNC: 4.15 MG/DL — HIGH (ref 0.5–1.3)
DIFF PNL FLD: ABNORMAL
EPI CELLS # UR: 3 /HPF — SIGNIFICANT CHANGE UP
GAS PNL BLDV: 138 MMOL/L — SIGNIFICANT CHANGE UP (ref 136–145)
GAS PNL BLDV: SIGNIFICANT CHANGE UP
GAS PNL BLDV: SIGNIFICANT CHANGE UP
GLUCOSE BLDC GLUCOMTR-MCNC: 255 MG/DL — HIGH (ref 70–99)
GLUCOSE BLDC GLUCOMTR-MCNC: 258 MG/DL — HIGH (ref 70–99)
GLUCOSE BLDC GLUCOMTR-MCNC: 271 MG/DL — HIGH (ref 70–99)
GLUCOSE BLDC GLUCOMTR-MCNC: 279 MG/DL — HIGH (ref 70–99)
GLUCOSE BLDC GLUCOMTR-MCNC: 279 MG/DL — HIGH (ref 70–99)
GLUCOSE BLDC GLUCOMTR-MCNC: 329 MG/DL — HIGH (ref 70–99)
GLUCOSE BLDV-MCNC: 359 MG/DL — HIGH (ref 70–99)
GLUCOSE SERPL-MCNC: 250 MG/DL — HIGH (ref 70–99)
GLUCOSE SERPL-MCNC: 422 MG/DL — HIGH (ref 70–99)
GLUCOSE UR QL: ABNORMAL
GRAN CASTS # UR COMP ASSIST: ABNORMAL /LPF
HCO3 BLDV-SCNC: 18 MMOL/L — LOW (ref 22–29)
HCT VFR BLD CALC: 24.6 % — LOW (ref 39–50)
HCT VFR BLDA CALC: 26 % — LOW (ref 39–51)
HGB BLD CALC-MCNC: 8.6 G/DL — LOW (ref 12.6–17.4)
HGB BLD-MCNC: 8 G/DL — LOW (ref 13–17)
HYALINE CASTS # UR AUTO: 7 /LPF — HIGH (ref 0–2)
KETONES UR-MCNC: NEGATIVE — SIGNIFICANT CHANGE UP
LACTATE BLDV-MCNC: 2.7 MMOL/L — HIGH (ref 0.7–2)
LEUKOCYTE ESTERASE UR-ACNC: NEGATIVE — SIGNIFICANT CHANGE UP
MAGNESIUM SERPL-MCNC: 1.6 MG/DL — SIGNIFICANT CHANGE UP (ref 1.6–2.6)
MCHC RBC-ENTMCNC: 32 PG — SIGNIFICANT CHANGE UP (ref 27–34)
MCHC RBC-ENTMCNC: 32.5 GM/DL — SIGNIFICANT CHANGE UP (ref 32–36)
MCV RBC AUTO: 98.4 FL — SIGNIFICANT CHANGE UP (ref 80–100)
NITRITE UR-MCNC: NEGATIVE — SIGNIFICANT CHANGE UP
NRBC # BLD: 0 /100 WBCS — SIGNIFICANT CHANGE UP (ref 0–0)
PCO2 BLDV: 41 MMHG — LOW (ref 42–55)
PH BLDV: 7.25 — LOW (ref 7.32–7.43)
PH UR: 6 — SIGNIFICANT CHANGE UP (ref 5–8)
PLATELET # BLD AUTO: 209 K/UL — SIGNIFICANT CHANGE UP (ref 150–400)
PO2 BLDV: 22 MMHG — LOW (ref 25–45)
POTASSIUM BLDV-SCNC: 5.5 MMOL/L — HIGH (ref 3.5–5.1)
POTASSIUM SERPL-MCNC: 5 MMOL/L — SIGNIFICANT CHANGE UP (ref 3.5–5.3)
POTASSIUM SERPL-MCNC: 5 MMOL/L — SIGNIFICANT CHANGE UP (ref 3.5–5.3)
POTASSIUM SERPL-SCNC: 5 MMOL/L — SIGNIFICANT CHANGE UP (ref 3.5–5.3)
POTASSIUM SERPL-SCNC: 5 MMOL/L — SIGNIFICANT CHANGE UP (ref 3.5–5.3)
PROT UR-MCNC: ABNORMAL
RBC # BLD: 2.5 M/UL — LOW (ref 4.2–5.8)
RBC # FLD: 13.1 % — SIGNIFICANT CHANGE UP (ref 10.3–14.5)
RBC CASTS # UR COMP ASSIST: 1 /HPF — SIGNIFICANT CHANGE UP (ref 0–4)
SAO2 % BLDV: 34.5 % — LOW (ref 67–88)
SODIUM SERPL-SCNC: 135 MMOL/L — SIGNIFICANT CHANGE UP (ref 135–145)
SODIUM SERPL-SCNC: 140 MMOL/L — SIGNIFICANT CHANGE UP (ref 135–145)
SODIUM UR-SCNC: 68 MMOL/L — SIGNIFICANT CHANGE UP
SP GR SPEC: 1.02 — SIGNIFICANT CHANGE UP (ref 1.01–1.02)
TROPONIN T, HIGH SENSITIVITY RESULT: 6029 NG/L — HIGH (ref 0–51)
TROPONIN T, HIGH SENSITIVITY RESULT: 7177 NG/L — HIGH (ref 0–51)
TROPONIN T, HIGH SENSITIVITY RESULT: HIGH NG/L (ref 0–51)
UROBILINOGEN FLD QL: NEGATIVE — SIGNIFICANT CHANGE UP
WBC # BLD: 9.02 K/UL — SIGNIFICANT CHANGE UP (ref 3.8–10.5)
WBC # FLD AUTO: 9.02 K/UL — SIGNIFICANT CHANGE UP (ref 3.8–10.5)
WBC UR QL: 5 /HPF — SIGNIFICANT CHANGE UP (ref 0–5)

## 2021-10-06 PROCEDURE — 99223 1ST HOSP IP/OBS HIGH 75: CPT

## 2021-10-06 PROCEDURE — 93306 TTE W/DOPPLER COMPLETE: CPT | Mod: 26

## 2021-10-06 PROCEDURE — 76770 US EXAM ABDO BACK WALL COMP: CPT | Mod: 26

## 2021-10-06 PROCEDURE — 99233 SBSQ HOSP IP/OBS HIGH 50: CPT

## 2021-10-06 PROCEDURE — 99233 SBSQ HOSP IP/OBS HIGH 50: CPT | Mod: GC

## 2021-10-06 PROCEDURE — 93010 ELECTROCARDIOGRAM REPORT: CPT

## 2021-10-06 RX ORDER — SODIUM CHLORIDE 9 MG/ML
1000 INJECTION, SOLUTION INTRAVENOUS
Refills: 0 | Status: DISCONTINUED | OUTPATIENT
Start: 2021-10-06 | End: 2021-10-21

## 2021-10-06 RX ORDER — INSULIN LISPRO 100/ML
VIAL (ML) SUBCUTANEOUS AT BEDTIME
Refills: 0 | Status: DISCONTINUED | OUTPATIENT
Start: 2021-10-06 | End: 2021-10-21

## 2021-10-06 RX ORDER — GLUCAGON INJECTION, SOLUTION 0.5 MG/.1ML
1 INJECTION, SOLUTION SUBCUTANEOUS ONCE
Refills: 0 | Status: DISCONTINUED | OUTPATIENT
Start: 2021-10-06 | End: 2021-10-21

## 2021-10-06 RX ORDER — INSULIN GLARGINE 100 [IU]/ML
25 INJECTION, SOLUTION SUBCUTANEOUS AT BEDTIME
Refills: 0 | Status: DISCONTINUED | OUTPATIENT
Start: 2021-10-06 | End: 2021-10-08

## 2021-10-06 RX ORDER — SODIUM BICARBONATE 1 MEQ/ML
650 SYRINGE (ML) INTRAVENOUS
Refills: 0 | Status: DISCONTINUED | OUTPATIENT
Start: 2021-10-06 | End: 2021-10-06

## 2021-10-06 RX ORDER — DEXTROSE 50 % IN WATER 50 %
25 SYRINGE (ML) INTRAVENOUS ONCE
Refills: 0 | Status: DISCONTINUED | OUTPATIENT
Start: 2021-10-06 | End: 2021-10-21

## 2021-10-06 RX ORDER — INSULIN LISPRO 100/ML
1 VIAL (ML) SUBCUTANEOUS ONCE
Refills: 0 | Status: COMPLETED | OUTPATIENT
Start: 2021-10-06 | End: 2021-10-06

## 2021-10-06 RX ORDER — INSULIN LISPRO 100/ML
VIAL (ML) SUBCUTANEOUS
Refills: 0 | Status: DISCONTINUED | OUTPATIENT
Start: 2021-10-06 | End: 2021-10-21

## 2021-10-06 RX ORDER — NIFEDIPINE 30 MG
90 TABLET, EXTENDED RELEASE 24 HR ORAL DAILY
Refills: 0 | Status: DISCONTINUED | OUTPATIENT
Start: 2021-10-06 | End: 2021-10-07

## 2021-10-06 RX ORDER — LABETALOL HCL 100 MG
200 TABLET ORAL
Refills: 0 | Status: DISCONTINUED | OUTPATIENT
Start: 2021-10-06 | End: 2021-10-07

## 2021-10-06 RX ORDER — INSULIN GLARGINE 100 [IU]/ML
25 INJECTION, SOLUTION SUBCUTANEOUS ONCE
Refills: 0 | Status: COMPLETED | OUTPATIENT
Start: 2021-10-06 | End: 2021-10-06

## 2021-10-06 RX ORDER — TICAGRELOR 90 MG/1
90 TABLET ORAL EVERY 12 HOURS
Refills: 0 | Status: DISCONTINUED | OUTPATIENT
Start: 2021-10-06 | End: 2021-10-14

## 2021-10-06 RX ORDER — DEXTROSE 50 % IN WATER 50 %
12.5 SYRINGE (ML) INTRAVENOUS ONCE
Refills: 0 | Status: DISCONTINUED | OUTPATIENT
Start: 2021-10-06 | End: 2021-10-21

## 2021-10-06 RX ORDER — BUMETANIDE 0.25 MG/ML
1 INJECTION INTRAMUSCULAR; INTRAVENOUS DAILY
Refills: 0 | Status: DISCONTINUED | OUTPATIENT
Start: 2021-10-06 | End: 2021-10-07

## 2021-10-06 RX ORDER — DEXTROSE 50 % IN WATER 50 %
15 SYRINGE (ML) INTRAVENOUS ONCE
Refills: 0 | Status: DISCONTINUED | OUTPATIENT
Start: 2021-10-06 | End: 2021-10-21

## 2021-10-06 RX ORDER — SODIUM BICARBONATE 1 MEQ/ML
1300 SYRINGE (ML) INTRAVENOUS THREE TIMES A DAY
Refills: 0 | Status: DISCONTINUED | OUTPATIENT
Start: 2021-10-06 | End: 2021-10-21

## 2021-10-06 RX ORDER — FUROSEMIDE 40 MG
40 TABLET ORAL DAILY
Refills: 0 | Status: DISCONTINUED | OUTPATIENT
Start: 2021-10-06 | End: 2021-10-06

## 2021-10-06 RX ORDER — INSULIN LISPRO 100/ML
3 VIAL (ML) SUBCUTANEOUS
Refills: 0 | Status: DISCONTINUED | OUTPATIENT
Start: 2021-10-06 | End: 2021-10-08

## 2021-10-06 RX ORDER — ATORVASTATIN CALCIUM 80 MG/1
80 TABLET, FILM COATED ORAL AT BEDTIME
Refills: 0 | Status: DISCONTINUED | OUTPATIENT
Start: 2021-10-06 | End: 2021-10-21

## 2021-10-06 RX ORDER — ASPIRIN/CALCIUM CARB/MAGNESIUM 324 MG
81 TABLET ORAL DAILY
Refills: 0 | Status: DISCONTINUED | OUTPATIENT
Start: 2021-10-06 | End: 2021-10-21

## 2021-10-06 RX ADMIN — Medication 3 UNIT(S): at 16:55

## 2021-10-06 RX ADMIN — Medication 90 MILLIGRAM(S): at 05:59

## 2021-10-06 RX ADMIN — Medication 81 MILLIGRAM(S): at 12:22

## 2021-10-06 RX ADMIN — Medication 3: at 12:21

## 2021-10-06 RX ADMIN — Medication 1300 MILLIGRAM(S): at 21:08

## 2021-10-06 RX ADMIN — BUMETANIDE 1 MILLIGRAM(S): 0.25 INJECTION INTRAMUSCULAR; INTRAVENOUS at 14:17

## 2021-10-06 RX ADMIN — ATORVASTATIN CALCIUM 80 MILLIGRAM(S): 80 TABLET, FILM COATED ORAL at 22:03

## 2021-10-06 RX ADMIN — INSULIN HUMAN 5 UNIT(S): 100 INJECTION, SOLUTION SUBCUTANEOUS at 00:12

## 2021-10-06 RX ADMIN — Medication 650 MILLIGRAM(S): at 02:12

## 2021-10-06 RX ADMIN — HEPARIN SODIUM 1250 UNIT(S)/HR: 5000 INJECTION INTRAVENOUS; SUBCUTANEOUS at 05:58

## 2021-10-06 RX ADMIN — Medication 3: at 16:54

## 2021-10-06 RX ADMIN — Medication 1300 MILLIGRAM(S): at 14:18

## 2021-10-06 RX ADMIN — INSULIN GLARGINE 25 UNIT(S): 100 INJECTION, SOLUTION SUBCUTANEOUS at 01:53

## 2021-10-06 RX ADMIN — TICAGRELOR 90 MILLIGRAM(S): 90 TABLET ORAL at 22:03

## 2021-10-06 RX ADMIN — Medication 4: at 07:54

## 2021-10-06 RX ADMIN — Medication 200 MILLIGRAM(S): at 17:06

## 2021-10-06 RX ADMIN — Medication 200 MILLIGRAM(S): at 05:59

## 2021-10-06 RX ADMIN — HEPARIN SODIUM 1450 UNIT(S)/HR: 5000 INJECTION INTRAVENOUS; SUBCUTANEOUS at 12:41

## 2021-10-06 RX ADMIN — Medication 3 UNIT(S): at 12:22

## 2021-10-06 RX ADMIN — INSULIN GLARGINE 25 UNIT(S): 100 INJECTION, SOLUTION SUBCUTANEOUS at 22:01

## 2021-10-06 RX ADMIN — HEPARIN SODIUM 1650 UNIT(S)/HR: 5000 INJECTION INTRAVENOUS; SUBCUTANEOUS at 21:06

## 2021-10-06 RX ADMIN — Medication 1 UNIT(S): at 01:53

## 2021-10-06 RX ADMIN — Medication 40 MILLIGRAM(S): at 05:59

## 2021-10-06 RX ADMIN — Medication 1: at 22:02

## 2021-10-06 NOTE — H&P ADULT - PROBLEM SELECTOR PLAN 1
Trops elevated to >1000 --> 8000; EKG nonischemic; currently w/o active chest pain or dyspnea. s/p brilinta load and heparin bolus; received aspirin at outside hospital  - c/w heparin gtt  - c/w brilinta 90 mg BID and ASA 81 mg qd  - f/up lipid panel and A1C  - c/w atorvastatin 80 mg  - will need cardiac cath but patient was/ acute renal failure; nephro to be consulted  - cardio following, appreciate recs  - trending cardiac enzymes  - monitor on ekg  - if repeat chest pain, obtain cardiac enzymes, ekg and can try SL nitro

## 2021-10-06 NOTE — H&P ADULT - HISTORY OF PRESENT ILLNESS
Wife can be reached at [phone]. 60 yr old M w/ hx of DM2, HTN and HLD presents as transfer from [facility] for chest pain concerning for NSTEMI and possible CHF.  Pt states that after he woke up this morning he felt midsternal chest pain this morning, that felt like a burning sensation. Pain was non radiating. Associated with shortness of breath. He initially took TUMS and drank some milk which alleviated some of the pain but not completely. Later he also started feeling very dizzy so he went to the ED. Denies associated palpitations, diaphoresis, N/V.  When he presented to [facility] he was noted to be SOB. Initially, they evaluated patient for CHF but Troponin and BNP levels were elevated so he was transferred here for NSTEMI and r/o CHF.    Per nursing notes, while at [facility], he was placed on BIPAP, given Solumedrol 120, Nitro SL x1, duoneb x2, ASA 81mg, Lasix 40mg w/ 200 cc output, and started at 10mL/hr Nitro drip then increased to 30mL/hr. There, initial /126 retake was 171/67. Nitro drip was d/c'd upon arrival to [facility] ED.     During my visit, patient was sitting up eating a sandwich. Appears comfortable on room air. Denies repeat of chest pain after this morning. Denies shortness of breath. Denies lower extremity edema, orthopnea or PND.     Labs also remarkable for JOSHUA, metabolic acidosis and hyperkalemia. Patient denies prior history of renal injury.       Wife can be reached at [phone]. Medication list confirmed w/ wife. Of note, patient w/ elevated BP to SBP 200s often at home; nifedipine used on a "as needed" basis for SBP > 200.

## 2021-10-06 NOTE — PROGRESS NOTE ADULT - PROBLEM SELECTOR PLAN 1
-no further chest pain  -troponin downtrending 6000K from 72270R, no need to further trend unless CP or change in HD status, etc  - c/w heparin gtt  - c/w brilinta 90 mg BID and ASA 81 mg qd  - c/w atorvastatin 80 mg  - eventual ischemic eval pending improvement in kidney function  - cardio following, appreciate recs  - if repeat chest pain, obtain cardiac enzymes, ekg and can try SL nitro  - monitor on tele  - TTE pending

## 2021-10-06 NOTE — H&P ADULT - NSHPSOCIALHISTORY_GEN_ALL_CORE
Former smoker, quit 2 months ago. Previously, was smoking about 1 pack / 3 days since age ~20.  Denies etoh  lives w/ wife

## 2021-10-06 NOTE — H&P ADULT - NSHPLABSRESULTS_GEN_ALL_CORE
Personally reviewed labs.   Personally reviewed imaging.   Personally reviewed EKG.                           8.3    11.05 )-----------( 231      ( 05 Oct 2021 17:02 )             26.0       10-06    140  |  108  |  68<H>  ----------------------------<  250<H>  5.0   |  16<L>  |  4.15<H>    Ca    9.8      06 Oct 2021 01:05  Mg     1.6     10-06    TPro  7.0  /  Alb  2.9<L>  /  TBili  0.3  /  DBili  x   /  AST  159<H>  /  ALT  34  /  AlkPhos  140<H>  10-05      CARDIAC MARKERS ( 06 Oct 2021 01:05 )  x     / x     / 893 U/L / x     / x      CARDIAC MARKERS ( 05 Oct 2021 17:02 )  x     / x     / 1326 U/L / x     / 144.1 ng/mL        LIVER FUNCTIONS - ( 05 Oct 2021 17:02 )  Alb: 2.9 g/dL / Pro: 7.0 g/dL / ALK PHOS: 140 U/L / ALT: 34 U/L / AST: 159 U/L / GGT: x             PT/INR - ( 05 Oct 2021 17:02 )   PT: 12.7 sec;   INR: 1.06 ratio    PTT - ( 05 Oct 2021 17:02 )  PTT:28.5 sec    EKG non ischemic, no acute ST changes  Bedside cardiac echo:   No pericardial effusion was present.  No global wall motion abnormality was identified  Bilateral pleural effusions seen  IVC with respiratory variation  Scattered anterior B lines seen bilaterally    IMPRESSION:  No Pericardial Effusion.  Preserved cardiac contractility  CXR w/ some pulm venous congestion on my read

## 2021-10-06 NOTE — CONSULT NOTE ADULT - ATTENDING COMMENTS
transferred from outside hospital for further cardiac evaluation, noncompliant with dm, htn  #vicki vs vicki on ckd vs ckd stage V  thus far cr stable  suspect ckd  check  ua, prot/cr, serologic workup   check renal sono for kidney size  #understands risk of CARY   #volume overload/edema- cont diuretics  #metabolic acidosis- cont bicarb tabs; increase to 1300mg po tid  #hyperkalemia- low k renal diet; monitor trend  #anemia- workup; check iron studies/ferritin  #check serum phos  #check hbsag transferred from outside hospital for further cardiac evaluation, noncompliant with dm, htn  #vicki vs vicki on ckd vs ckd stage V  thus far cr stable  suspect ckd  check  ua, prot/cr, serologic workup   check renal sono for kidney size  #understands risk of CARY with cardiac cath  #volume overload/edema- cont diuretics  #metabolic acidosis- cont bicarb tabs; increase to 1300mg po tid  #hyperkalemia- low k renal diet; monitor trend  #anemia- workup; check iron studies/ferritin  #BP control  #check serum phos  #check hbsag

## 2021-10-06 NOTE — PROGRESS NOTE ADULT - ASSESSMENT
· Assessment	  61 yo M with PMH of HTN & DM.  Presented to [facility] with CP and dyspnea and marked HTN.  Transferred for NSTEMI/CHF and ARF.  On arrival here , found to be in sherie nonoliguric renal failure with increased work of breathing and evidence of volume overload due to ?ADHF vs. renal failure. Elevated cardiac enzymes at OSH c/f N-STEMI, however patient chest pain free after NTG.  Appears HDS, electrically quiescent.       REC:    1.  Patient with CP/dyspnea and elevated troponin concerning for N-STEMI, in setting of renal failure and significant HTN; EKG unremarkable.   NALLELY 4 (Asa use, chest pain, CAD risk factors, + Cardiac marker); HANK 111  --Will benefit from possible coronary evaluation in future once optimized from volume and renal standpoint.  --Please ensure patient loaded with  mg.  --Please ensure patient loaded with 180 mg. of Brilinta, then 90 mg BID to start 24 hours later (Can do 600 mg. of Plavix alternatively)  --Please start heparin gtt, please bolus to achieve ptt per ACS protcol  --Please trend troponin until downtrending, please repeat EKG with every troponin  --Please order a transthoracic echo stat  --Please monitor on telemetry  --If patient develops hemodynamic instability, please call    2.  HTN emergency  - continue diuresis for volume overload  --renal consult  --Please control blood pressure to target <140/90  --K>4, Mg>2  --continue nifedipine and labetalol    3.  Volume overload; CHF  -- continue diuresis  -- TTE to assess cardiac structure infection    4.  Renal failure, apparently acute  --Renal consult 61 yo M with PMH of HTN & DM.  Presented to [facility] with CP and dyspnea and marked HTN.  Transferred for N-STEMI/CHF, hypertensive emergency and ARF.  On arrival here , found to be in sherie nonoliguric renal failure with increased work of breathing and evidence of volume overload due to ?ADHF vs. renal failure. Elevated cardiac enzymes at OSH c/f N-STEMI, however patient chest pain free after NTG.    Clinically Improved at present with resolution of CP and dyspnea.      REC:    1.  Elevated troponin and CK/MB, N-STEMI , in setting of renal failure and significant HTN emergency; EKG was unremarkable on admission.  - continue ASA & Brilinta  - continue IV heparin  - repeat EKG in AM  - await formal TTE in cardiology dept. (POCUS showed preserved LV function0  - continue telemetry  - Will benefit from ischemic evaluation in future when optimized from volume and renal standpoint.    2.  HTN emergency  - appreciate renal input  - continue nifedipine and labetalol  - continue diuresis    3.  Volume overload  - continue IV Lasix  - strict I and O    4.  HLD  - continue statin      Plan discussed with cardiology fellow; patient seen and examined.       I was physically present for the key portions of the evaluation and management (E/M) service provided.    I agree with the above history, physical, and plan which I have reviewed and edited where appropriate.   Oc Davis M.D.  Cardiology Attending, Consult Service    For Cardiology consults and questions, all Cardiology service information can be found 24/7 on [email or web address], password: [email or web address]

## 2021-10-06 NOTE — H&P ADULT - NSHPPHYSICALEXAM_GEN_ALL_CORE
PHYSICAL EXAM:   GENERAL: Alert. Not confused. No acute distress. Not thin. Not cachectic. Not obese.  HEAD:  Atraumatic. Normocephalic.  EYES: EOMI. Normal conjunctiva/sclera.  ENT: Neck supple. No JVD. Moist oral mucosa. Not edentulous. No thrush.  LYMPH: Normal supraclavicular/cervical lymph nodes.   CARDIAC: No tachy, Not blaine. Regular rhythm. Not irregularly irregular. S1. S2. No murmur. No rub. No distant heart sounds.  LUNG/CHEST: CTAB. BS equal bilaterally. No wheezes. No rales. No rhonchi.  ABDOMEN: Soft. No tenderness. No distension. No fluid wave. Normal bowel sounds.  BACK: No midline/vertebral tenderness. No flank tenderness.  VASCULAR: +2 b/l radial  pulses. Palpable DP pulses.  EXTREMITIES:  No clubbing. No cyanosis. No edema. Moving all 4.  NEUROLOGY: A&Ox3. Non-focal exam. Cranial nerves intact. Normal speech. Sensation intact.  PSYCH: Normal behavior. Normal affect.  SKIN: No jaundice. No erythema. No rash/lesion.  Vascular Access:     ICU Vital Signs Last 24 Hrs  T(C): 36.7 (06 Oct 2021 01:00), Max: 36.9 (05 Oct 2021 18:22)  T(F): 98.1 (06 Oct 2021 01:00), Max: 98.5 (05 Oct 2021 18:22)  HR: 91 (06 Oct 2021 01:00) (84 - 107)  BP: 152/98 (06 Oct 2021 01:00) (135/73 - 206/108)  BP(mean): 116 (05 Oct 2021 19:54) (116 - 131)  ABP: --  ABP(mean): --  RR: 18 (06 Oct 2021 01:00) (16 - 24)  SpO2: 100% (06 Oct 2021 01:00) (88% - 100%)      I&O's Summary

## 2021-10-06 NOTE — CONSULT NOTE ADULT - ASSESSMENT
Patient is a 60 year old male with PMH od DM and HTN who presented to the hospital as a transfer from OSH for NSTEMI. The nephrology team was consulted due to elevated creatinine of 4.05 upon presentation. The patient denies any history of kidney disease and denied noticing any changes in his urination.     JOSHUA on CKD?  Hyperkalemia   - patient presenting to hospital with creatinine of 4.05 mg/dL; denied any history of kidney disease   --- difficult to determine if there is any chronic kidney disease present; creatinine in 9/2019 noted to be 1.11   - there could be underlying chronic disease secondary to DM and HTN   -- as per the chart the patient is nonadherent with medications and SBP has been known to be >200 at home   - agree with ordering renal ultrasound   - would also recommend UA, urine lytes including Cr, Na,; also send urine protein/creatinine ratio   - trend with daily BMP  - please monitor urine output   - avoid any nephrotoxic agents at this time   - please adjust medication doses as per eGFR     *** Patient is a 60 year old male with PMH od DM and HTN who presented to the hospital as a transfer from [facility] for NSTEMI. The nephrology team was consulted due to elevated creatinine of 4.05 upon presentation. The patient denies any history of kidney disease and denied noticing any changes in his urination.     JOSHUA on CKD?  Hyperkalemia   - patient presenting to hospital with creatinine of 4.05 mg/dL; denied any history of kidney disease   --- difficult to determine if there is any chronic kidney disease present; creatinine in 9/2019 noted to be 1.11   - there could be underlying chronic disease secondary to uncontrolled DM and HTN   -- as per the chart the patient is nonadherent with medications and SBP has been known to be >200 at home   - agree with ordering renal ultrasound   - would also recommend UA, urine lytes including Cr, Na,; also send urine protein/creatinine ratio   - please also send for serological work up including BEULAH, C-ANCA, P-ANCA, C3, C4, SPEP, UPEP, serum free light chains, serum immunofixation, Hep B+C, HIV, Anti-GBM  - Patient is in need of Cardiac catheterization  -- as per the Shaggy score it places a patient at a 26.1% chance of developing CARY and a 1.09% chance of developing CARY requiring RRT   - trend with daily BMP  - please monitor urine output   - please adjust medication doses as per eGFR     If any questions, please feel free to contact me     Saul Crews  Nephrology Fellow  [facility] Pager: [phone]   Patient is a 60 year old male with PMH od DM and HTN who presented to the hospital as a transfer from [facility] for NSTEMI. The nephrology team was consulted due to elevated creatinine of 4.05 upon presentation. The patient denies any history of kidney disease and denied noticing any changes in his urination.     JOSHUA on CKD?  Hyperkalemia   - patient presenting to hospital with creatinine of 4.05 mg/dL; denied any history of kidney disease   --- difficult to determine if there is any chronic kidney disease present; creatinine in 9/2019 noted to be 1.11   - there could be underlying chronic disease secondary to uncontrolled DM and HTN   -- as per the chart the patient is nonadherent with medications and SBP has been known to be >200 at home   - agree with ordering renal ultrasound   - would also recommend UA, urine lytes including Cr, Na,; also send urine protein/creatinine ratio   - please also send for serological work up including BEULAH, C-ANCA, P-ANCA, C3, C4, SPEP,   serum free light chains, serum immunofixation, Hep B+C, HIV, Anti-GBM, PLA2r, rpr, a1c  - Patient is in need of Cardiac catheterization  -- as per the Shaggy score it places a patient at a 26.1% chance of developing CARY and a 1.09% chance of developing CARY requiring RRT   - trend with daily BMP  - please monitor urine output   - please adjust medication doses as per eGFR     If any questions, please feel free to contact me     Saul Crews  Nephrology Fellow  [facility] Pager: [phone]

## 2021-10-06 NOTE — PROGRESS NOTE ADULT - SUBJECTIVE AND OBJECTIVE BOX
Medications:  aspirin  chewable 81 milliGRAM(s) Oral daily  atorvastatin 80 milliGRAM(s) Oral at bedtime  dextrose 40% Gel 15 Gram(s) Oral once  dextrose 5%. 1000 milliLiter(s) IV Continuous <Continuous>  dextrose 5%. 1000 milliLiter(s) IV Continuous <Continuous>  dextrose 50% Injectable 25 Gram(s) IV Push once  dextrose 50% Injectable 12.5 Gram(s) IV Push once  dextrose 50% Injectable 25 Gram(s) IV Push once  furosemide   Injectable 40 milliGRAM(s) IV Push daily  glucagon  Injectable 1 milliGRAM(s) IntraMuscular once  heparin   Injectable 5300 Unit(s) IV Push every 6 hours PRN  heparin  Infusion.  Unit(s)/Hr IV Continuous <Continuous>  insulin glargine Injectable (LANTUS) 25 Unit(s) SubCutaneous at bedtime  insulin lispro (ADMELOG) corrective regimen sliding scale   SubCutaneous three times a day before meals  insulin lispro (ADMELOG) corrective regimen sliding scale   SubCutaneous at bedtime  insulin lispro Injectable (ADMELOG) 3 Unit(s) SubCutaneous three times a day before meals  labetalol 200 milliGRAM(s) Oral two times a day  NIFEdipine XL 90 milliGRAM(s) Oral daily  sodium bicarbonate 650 milliGRAM(s) Oral two times a day  ticagrelor 90 milliGRAM(s) Oral every 12 hours    PMH/PSH/FH/SH:  Unchanged    ROS:  Unchanged    Vitals:  T(C): 36.7 (10-06-21 @ 08:30), Max: 36.9 (10-05-21 @ 18:22)  HR: 95 (10-06-21 @ 08:30) (84 - 107)  BP: 154/74 (10-06-21 @ 08:30) (135/73 - 206/108)  BP(mean): 116 (10-05-21 @ 19:54) (116 - 131)  RR: 18 (10-06-21 @ 08:30) (16 - 24)  SpO2: 98% (10-06-21 @ 08:30) (88% - 100%)  Daily Weight in k.6 (06 Oct 2021 08:30)      I&O's Summary  05 Oct 2021 07:01  -  06 Oct 2021 07:00  --------------------------------------------------------  IN: 0 mL / OUT: 200 mL / NET: -200 mL    06 Oct 2021 07:01  -  06 Oct 2021 10:57  --------------------------------------------------------  IN: 310 mL / OUT: 0 mL / NET: 310 mL      LABS:                        8.0    9.02  )-----------( 209      ( 06 Oct 2021 04:48 )             24.6     10  135  |  106  |  73<H>  ----------------------------<  422<H>  5.0   |  13<L>  |  4.15<H>    Ca    9.1      06 Oct 2021 04:48  Mg     1.6     10    TPro  7.0  /  Alb  2.9<L>  /  TBili  0.3  /  DBili  x   /  AST  159<H>  /  ALT  34  /  AlkPhos  140<H>  1005    PT/INR - ( 05 Oct 2021 17:02 )   PT: 12.7 sec;   INR: 1.06 ratio    PTT - ( 06 Oct 2021 04:48 )  PTT:36.9 sec    CARDIAC MARKERS ( 06 Oct 2021 01:05 )  x     / x     / 893 U/L / x     / 92.0 ng/mL  CARDIAC MARKERS ( 05 Oct 2021 17:02 )  x     / x     / 1326 U/L / x     / 144.1 ng/mL    Serum Pro-Brain Natriuretic Peptide: 60222 pg/mL (10-05-21 @ 17:02)      US TTE 2D F/U, Limited w/o Contrast (ED) (10.05.21 @ 16:43)   Procedure was performed in the Emergency Department by a credentialed Emergency Medicine Attending Physician    EXAM:  ER TTE LIMITED      ORDER COMMENTS:      PROCEDURE DATE:  10/05/2021    FOCUSED ED ULTRASOUND REPORT    INTERPRETATION:  A focused transthoracic cardiac ultrasound examination was performed.  No pericardial effusion was present.  No global wall motion abnormality was identified  Bilateral pleural effusions seen  IVC with respiratory variation  Scattered anterior B lines seen bilaterally    IMPRESSION:  No Pericardial Effusion.  Preserved cardiac contractility    MARIELLA LAURA MD; Attending Emergency Medicine  This document has been electronically signed. Oct  5 2021  4:49PM Alert, no distress.  No cardiac sxs; no CP, palps or dyspnea     Medications:  aspirin  chewable 81 milliGRAM(s) Oral daily  atorvastatin 80 milliGRAM(s) Oral at bedtime  dextrose 40% Gel 15 Gram(s) Oral once  dextrose 5%. 1000 milliLiter(s) IV Continuous <Continuous>  dextrose 5%. 1000 milliLiter(s) IV Continuous <Continuous>  dextrose 50% Injectable 25 Gram(s) IV Push once  dextrose 50% Injectable 12.5 Gram(s) IV Push once  dextrose 50% Injectable 25 Gram(s) IV Push once  furosemide   Injectable 40 milliGRAM(s) IV Push daily  glucagon  Injectable 1 milliGRAM(s) IntraMuscular once  heparin   Injectable 5300 Unit(s) IV Push every 6 hours PRN  heparin  Infusion.  Unit(s)/Hr IV Continuous <Continuous>  insulin glargine Injectable (LANTUS) 25 Unit(s) SubCutaneous at bedtime  insulin lispro (ADMELOG) corrective regimen sliding scale   SubCutaneous three times a day before meals  insulin lispro (ADMELOG) corrective regimen sliding scale   SubCutaneous at bedtime  insulin lispro Injectable (ADMELOG) 3 Unit(s) SubCutaneous three times a day before meals  labetalol 200 milliGRAM(s) Oral two times a day  NIFEdipine XL 90 milliGRAM(s) Oral daily  sodium bicarbonate 650 milliGRAM(s) Oral two times a day  ticagrelor 90 milliGRAM(s) Oral every 12 hours    PMH/PSH/FH/SH:  Unchanged    ROS:  Unchanged    Vitals:  T(C): 36.7 (10-06-21 @ 08:30), Max: 36.9 (10-05-21 @ 18:22)  HR: 95 (10-06-21 @ 08:30) (84 - 107)  BP: 154/74 (10-06-21 @ 08:30) (135/73 - 206/108)  BP(mean): 116 (10-05-21 @ 19:54) (116 - 131)  RR: 18 (10-06-21 @ 08:30) (16 - 24)  SpO2: 98% (10-06-21 @ 08:30) (88% - 100%)  Daily Weight in k.6 (06 Oct 2021 08:30)    EXAM:  GENERAL:  Alert, no distress  HEENT: Normocephalic, EOM intact, PERRLA  NECK: Normal carotid upstrokes, no bruit; No JVD  CHEST:  Clear to auscultation  HEART: PMI not displaced. Soft S1 and S2.  No murmur, rub or gallop appreciated.  ABDOMEN: Normal bowel sounds, no bruit. Soft, non-tender.  Liver and spleen not palpable; aorta not palpable.  EXT:  Trace - 1+ pre-tib and pedal edema.  PSYCH:  A&Ox3,   NEURO: Non-focal  SKIN:  No rash       TELE:  SR, 80s-90s      I&O's Summary  05 Oct 2021 07:01  -  06 Oct 2021 07:00  --------------------------------------------------------  IN: 0 mL / OUT: 200 mL / NET: -200 mL    06 Oct 2021 07:01  -  06 Oct 2021 10:57  --------------------------------------------------------  IN: 310 mL / OUT: 0 mL / NET: 310 mL      LABS:                        8.0    9.02  )-----------( 209      ( 06 Oct 2021 04:48 )             24.6     10-06  135  |  106  |  73<H>  ----------------------------<  422<H>  5.0   |  13<L>  |  4.15<H>    Ca    9.1      06 Oct 2021 04:48  Mg     1.6     10-06    TPro  7.0  /  Alb  2.9<L>  /  TBili  0.3  /  DBili  x   /  AST  159<H>  /  ALT  34  /  AlkPhos  140<H>  10-05    PT/INR - ( 05 Oct 2021 17:02 )   PT: 12.7 sec;   INR: 1.06 ratio    PTT - ( 06 Oct 2021 04:48 )  PTT:36.9 sec    CARDIAC MARKERS ( 06 Oct 2021 01:05 )  x     / x     / 893 U/L / x     / 92.0 ng/mL  CARDIAC MARKERS ( 05 Oct 2021 17:02 )  x     / x     / 1326 U/L / x     / 144.1 ng/mL    Serum Pro-Brain Natriuretic Peptide: 10097 pg/mL (10-05-21 @ 17:02)    CARDIAC MARKERS ( 06 Oct 2021 01:05 )  x     / x     / 893 U/L / x     / 92.0 ng/mL  CARDIAC MARKERS ( 05 Oct 2021 17:02 )  x     / x     / 1326 U/L / x     / 144.1 ng/mL    Trop >10,000 -> 8021 -> 7177 -> 6029      US TTE 2D F/U, Limited w/o Contrast (ED) (10.05.21 @ 16:43)   Procedure was performed in the Emergency Department by a credentialed Emergency Medicine Attending Physician    EXAM:  ER TTE LIMITED      ORDER COMMENTS:      PROCEDURE DATE:  10/05/2021    FOCUSED ED ULTRASOUND REPORT    INTERPRETATION:  A focused transthoracic cardiac ultrasound examination was performed.  No pericardial effusion was present.  No global wall motion abnormality was identified  Bilateral pleural effusions seen  IVC with respiratory variation  Scattered anterior B lines seen bilaterally    IMPRESSION:  No Pericardial Effusion.  Preserved cardiac contractility    MARIELLA LAURA MD; Attending Emergency Medicine  This document has been electronically signed. Oct  5 2021  4:49PM

## 2021-10-06 NOTE — H&P ADULT - PROBLEM SELECTOR PLAN 3
Concern for acute CHF, setting of dyspnea at initial presentation at OSH and here.  Elevated BNP 25710  - TTE in am  - lasix 40 mg IV QD  - cardio following

## 2021-10-06 NOTE — PROGRESS NOTE ADULT - PROBLEM SELECTOR PLAN 2
Blood pressure initially 200/105 at arrival; s/p nitro ggt at [place], noncompliant with meds ah ome  - c/w home meds: labetalol 200 mg BID and nifedipine 90 mg QD  - hold HCTZ-losartan given Elvi on cKD  - monitor BP

## 2021-10-06 NOTE — H&P ADULT - PROBLEM SELECTOR PLAN 4
sCr 4.05 -> 4.15; unclear etiology but pt w/ underlying longstanding HTN and DM  # also w/ metabolic acidosis  - f/up urine studies  - monitor UOP closely  - start sodium bicarb 650 mg BID sCr 4.05 -> 4.15; unclear etiology but pt w/ underlying longstanding HTN and DM  # also w/ metabolic acidosis  - f/up urine studies  - monitor UOP closely  - obtain Renal US  - start sodium bicarb 650 mg BID  - nephro to be consulted in am sCr 4.05 -> 4.15; unclear etiology but pt w/ underlying longstanding HTN and DM  # also w/ metabolic acidosis, improving on repeat labs  - f/up urine studies  - monitor UOP closely  - obtain Renal US  - start sodium bicarb tabs 650 mg BID  - nephro to be consulted in am sCr 4.05 -> 4.15; unclear etiology but pt w/ underlying longstanding HTN and DM  # also w/ metabolic acidosis, improving on repeat labs  - f/up urine studies  - monitor UOP closely  - obtain Renal US  - w/ concern for acute chf initially requiring bipap support, will hold fluids, start sodium bicarb tabs 650 mg BID  - - Nephro eval in am, consult placed, discussed w/ fellow

## 2021-10-06 NOTE — CONSULT NOTE ADULT - SUBJECTIVE AND OBJECTIVE BOX
[facility] DIVISION OF KIDNEY DISEASES AND HYPERTENSION -- [phone]  -- INITIAL CONSULT NOTE  --------------------------------------------------------------------------------  HPI:    Patient is a 60 year old male with PMH od DM and HTN who presented to the hospital as a transfer from [facility] for NSTEMI. The patient reports yesterday he woke up with some chest pain which he described as a burning sensation. He tried to drink some milk and tea however the pain did not improve much. He then started to feel short of breath and then presented to the hospital. As per charting "while at [facility], he was placed on BIPAP, given Solumedrol 120, Nitro SL x1, duoneb x2, ASA 81mg, Lasix 40mg w/ 200 cc output, and started at 10mL/hr Nitro drip then increased to 30mL/hr. There, initial /126 retake was 171/67. Nitro drip was d/c'd upon arrival to [facility] ED." The nephrology team was consulted due to elevated creatinine of 4.05 upon presentation. The patient denies any history of kidney disease and denied noticing any changes in his urination. He does admit that over the past few days he was feeling like his legs were getting more swollen but did not have any SOB until the episode of chest pain. During my encounter the patient was laying in bed comfortable in no acute distress. He denied any active chest pain or shortness of breath.   PAST HISTORY  --------------------------------------------------------------------------------  PAST MEDICAL & SURGICAL HISTORY:  Hypertension    Hyperlipidemia    Diabetes mellitus    No pertinent past surgical history      FAMILY HISTORY:  Family history of CVA (Father)      PAST SOCIAL HISTORY:    ALLERGIES & MEDICATIONS  --------------------------------------------------------------------------------  Allergies    No Known Allergies    Intolerances      Standing Inpatient Medications  aspirin  chewable 81 milliGRAM(s) Oral daily  atorvastatin 80 milliGRAM(s) Oral at bedtime  dextrose 40% Gel 15 Gram(s) Oral once  dextrose 5%. 1000 milliLiter(s) IV Continuous <Continuous>  dextrose 5%. 1000 milliLiter(s) IV Continuous <Continuous>  dextrose 50% Injectable 25 Gram(s) IV Push once  dextrose 50% Injectable 12.5 Gram(s) IV Push once  dextrose 50% Injectable 25 Gram(s) IV Push once  furosemide   Injectable 40 milliGRAM(s) IV Push daily  glucagon  Injectable 1 milliGRAM(s) IntraMuscular once  heparin  Infusion.  Unit(s)/Hr IV Continuous <Continuous>  insulin glargine Injectable (LANTUS) 25 Unit(s) SubCutaneous at bedtime  insulin lispro (ADMELOG) corrective regimen sliding scale   SubCutaneous three times a day before meals  insulin lispro (ADMELOG) corrective regimen sliding scale   SubCutaneous at bedtime  labetalol 200 milliGRAM(s) Oral two times a day  NIFEdipine XL 90 milliGRAM(s) Oral daily  sodium bicarbonate 650 milliGRAM(s) Oral two times a day  ticagrelor 90 milliGRAM(s) Oral every 12 hours    PRN Inpatient Medications  heparin   Injectable 5300 Unit(s) IV Push every 6 hours PRN      REVIEW OF SYSTEMS  All other systems were reviewed and are negative, except as noted.    VITALS/PHYSICAL EXAM  --------------------------------------------------------------------------------  T(C): 36.7 (10-06-21 @ 04:18), Max: 36.9 (10-05-21 @ 18:22)  HR: 92 (10-06-21 @ 04:18) (84 - 107)  BP: 149/77 (10-06-21 @ 04:18) (135/73 - 206/108)  RR: 18 (10-06-21 @ 04:18) (16 - 24)  SpO2: 96% (10-06-21 @ 04:18) (88% - 100%)  Wt(kg): --    Weight (kg): 89.1 (10-05-21 @ 21:26)      10-05-21 @ 07:01  -  10-06-21 @ 07:00  --------------------------------------------------------  IN: 0 mL / OUT: 200 mL / NET: -200 mL      Physical Exam:  	Gen: NAD  	HEENT: MMM  	Pulm: CTA B/L  	CV: S1S2  	Abd: Soft, +BS   	Ext: + LE edema B/L  	Neuro: Awake and alert  	Skin: Warm and dry  	Vascular access: N/A    LABS/STUDIES  --------------------------------------------------------------------------------              8.0    9.02  >-----------<  209      [10-06-21 @ 04:48]              24.6     135  |  106  |  73  ----------------------------<  422      [10-06-21 @ 04:48]  5.0   |  13  |  4.15        Ca     9.1     [10-06-21 @ 04:48]      Mg     1.6     [10-06-21 @ 01:05]    TPro  7.0  /  Alb  2.9  /  TBili  0.3  /  DBili  x   /  AST  159  /  ALT  34  /  AlkPhos  140  [10-05-21 @ 17:02]    PT/INR: PT 12.7 , INR 1.06       [10-05-21 @ 17:02]  PTT: 36.9       [10-06-21 @ 04:48]          [10-06-21 @ 01:05]    Creatinine Trend:  SCr 4.15 [10-06 @ 04:48]  SCr 4.15 [10-06 @ 01:05]  SCr 4.08 [10-05 @ 19:54]  SCr 4.05 [10-05 @ 17:02]

## 2021-10-06 NOTE — PROGRESS NOTE ADULT - PROBLEM SELECTOR PLAN 3
acute CHF with dyspnea likely related to NSTEMI, Elevated BNP 70911, fluid overloaded on exam  - TTE pending  - discussed with cards, rec changing to IV bumex 1gram daily for now  - strict I/O, daily weights  - monitor bmp, replete lytes as needed

## 2021-10-06 NOTE — H&P ADULT - PROBLEM SELECTOR PLAN 6
Elevated BG at admission  home regimen: Insulin Semglee 30U at bedtime, metformin 1000 mg BID  - hold metformin  - start w/ insulin lantus 25U at bedtime and sliding scale insulin q meals and qHS  - hypoglycemia protocol

## 2021-10-06 NOTE — H&P ADULT - PROBLEM SELECTOR PLAN 2
Blood pressure initially 200/105 at arrival; also noted to be w/ similar BP at [facility] where he was started on  Nitro gtt. Currently off Nitro gtt; S/p lasix 40 mg IV, losartan 25 mg po, labetalol 200 mg po, and labetalol 10 mg x1 in ED, most recent repeat /78  Concern pt runs w/ BP in 200s frequently at home; would reduce BP carefully, goal ~25% first 24hrs     - c/w home meds: labetalol 200 mg BID and nifedipine 90 mg QD  - hold HCTZ-losartan

## 2021-10-06 NOTE — H&P ADULT - NSHPREVIEWOFSYSTEMS_GEN_ALL_CORE
REVIEW OF SYSTEMS:  CONSTITUTIONAL: No weakness. No fevers. No chills. No rigors. No weight loss. No night sweats. No poor appetite.  EYES: No blurry or double vision. No eye pain.  ENT: No hearing difficulty. No vertigo. No dysphagia. No sore throat. No Sinusitis/rhinorrhea.   NECK: No pain. No stiffness/rigidity.  CARDIAC: See HPI + chest pain. No palpitations. No lightheadedness. No syncope.  RESPIRATORY: No cough. No SOB. No hemoptysis.  GASTROINTESTINAL: No abdominal pain. No nausea. No vomiting. No hematemesis. No diarrhea. No constipation. No melena. No hematochezia.  GENITOURINARY: No dysuria. No frequency. No hesitancy. No hematuria. No oliguria.  NEUROLOGICAL: No numbness/tingling. No focal weakness. No urinary or fecal incontinence. No headache. No unsteady gait.  BACK: No back pain. No flank pain.  EXTREMITIES: No lower extremity edema. Full ROM. No joint pain.  SKIN: No rashes. No itching. No other lesions.  PSYCHIATRIC: No depression. No anxiety. No SI/HI.  ALLERGIC: No lip swelling. No hives.  All other review of systems is negative unless indicated above.  Unless indicated above, unable to assess ROS 2/2

## 2021-10-06 NOTE — PROGRESS NOTE ADULT - SUBJECTIVE AND OBJECTIVE BOX
Patient is a 60y old  Male who presents with a chief complaint of NSTEMI (06 Oct 2021 08:35)      SUBJECTIVE / OVERNIGHT EVENTS: No ON events. Denies cp, sob, barrientos, dizziness, n/v. Reports leg swelling. Noncompliant with meds and insulin at home    Tele reviewed: sinus       ADDITIONAL REVIEW OF SYSTEMS: Negative except for above    MEDICATIONS  (STANDING):  aspirin  chewable 81 milliGRAM(s) Oral daily  atorvastatin 80 milliGRAM(s) Oral at bedtime  dextrose 40% Gel 15 Gram(s) Oral once  dextrose 5%. 1000 milliLiter(s) (50 mL/Hr) IV Continuous <Continuous>  dextrose 5%. 1000 milliLiter(s) (100 mL/Hr) IV Continuous <Continuous>  dextrose 50% Injectable 25 Gram(s) IV Push once  dextrose 50% Injectable 12.5 Gram(s) IV Push once  dextrose 50% Injectable 25 Gram(s) IV Push once  furosemide   Injectable 40 milliGRAM(s) IV Push daily  glucagon  Injectable 1 milliGRAM(s) IntraMuscular once  heparin  Infusion.  Unit(s)/Hr (10 mL/Hr) IV Continuous <Continuous>  insulin glargine Injectable (LANTUS) 25 Unit(s) SubCutaneous at bedtime  insulin lispro (ADMELOG) corrective regimen sliding scale   SubCutaneous three times a day before meals  insulin lispro (ADMELOG) corrective regimen sliding scale   SubCutaneous at bedtime  insulin lispro Injectable (ADMELOG) 3 Unit(s) SubCutaneous three times a day before meals  labetalol 200 milliGRAM(s) Oral two times a day  NIFEdipine XL 90 milliGRAM(s) Oral daily  sodium bicarbonate 650 milliGRAM(s) Oral two times a day  ticagrelor 90 milliGRAM(s) Oral every 12 hours    MEDICATIONS  (PRN):  heparin   Injectable 5300 Unit(s) IV Push every 6 hours PRN For aPTT less than 40      CAPILLARY BLOOD GLUCOSE  271 (05 Oct 2021 23:40)      POCT Blood Glucose.: 255 mg/dL (06 Oct 2021 11:27)  POCT Blood Glucose.: 329 mg/dL (06 Oct 2021 07:34)  POCT Blood Glucose.: 258 mg/dL (06 Oct 2021 00:58)  POCT Blood Glucose.: 271 mg/dL (05 Oct 2021 23:38)    I&O's Summary    05 Oct 2021 07:01  -  06 Oct 2021 07:00  --------------------------------------------------------  IN: 0 mL / OUT: 200 mL / NET: -200 mL    06 Oct 2021 07:01  -  06 Oct 2021 12:45  --------------------------------------------------------  IN: 310 mL / OUT: 0 mL / NET: 310 mL        PHYSICAL EXAM:  Vital Signs Last 24 Hrs  T(C): 36.6 (06 Oct 2021 11:15), Max: 36.9 (05 Oct 2021 18:22)  T(F): 97.9 (06 Oct 2021 11:15), Max: 98.5 (05 Oct 2021 18:22)  HR: 74 (06 Oct 2021 11:15) (74 - 107)  BP: 127/76 (06 Oct 2021 11:15) (127/76 - 206/108)  BP(mean): 116 (05 Oct 2021 19:54) (116 - 131)  RR: 18 (06 Oct 2021 11:15) (16 - 24)  SpO2: 97% (06 Oct 2021 11:15) (88% - 100%)    PHYSICAL EXAM:  GENERAL: NAD, well-developed  HEAD:  Atraumatic, Normocephalic  NECK: Supple, No JVD  CHEST/LUNG: Clear to auscultation bilaterally;   HEART: Regular rate and rhythm; No murmurs, rubs, or gallops  ABDOMEN: Soft, Nontender, Nondistended; Bowel sounds present  EXTREMITIES:  2+ Peripheral Pulses, 2+ pitting edema b/l LE  PSYCH: AAOx3  NEUROLOGY: non-focal  SKIN: No rashes or lesions      LABS:                        8.0    9.02  )-----------( 209      ( 06 Oct 2021 04:48 )             24.6     10-06    135  |  106  |  73<H>  ----------------------------<  422<H>  5.0   |  13<L>  |  4.15<H>    Ca    9.1      06 Oct 2021 04:48  Mg     1.6     10-06    TPro  7.0  /  Alb  2.9<L>  /  TBili  0.3  /  DBili  x   /  AST  159<H>  /  ALT  34  /  AlkPhos  140<H>  10-05    PT/INR - ( 05 Oct 2021 17:02 )   PT: 12.7 sec;   INR: 1.06 ratio         PTT - ( 06 Oct 2021 11:55 )  PTT:48.3 sec  CARDIAC MARKERS ( 06 Oct 2021 01:05 )  x     / x     / 893 U/L / x     / 92.0 ng/mL  CARDIAC MARKERS ( 05 Oct 2021 17:02 )  x     / x     / 1326 U/L / x     / 144.1 ng/mL            RADIOLOGY & ADDITIONAL TESTS:    Imaging Personally Reviewed:    Electrocardiogram Personally Reviewed:    COORDINATION OF CARE:  Care Discussed with Consultants/Other Providers [Y/N]:  Prior or Outpatient Records Reviewed [Y/N]:

## 2021-10-07 DIAGNOSIS — R41.82 ALTERED MENTAL STATUS, UNSPECIFIED: ICD-10-CM

## 2021-10-07 LAB
A1C WITH ESTIMATED AVERAGE GLUCOSE RESULT: 7 % — HIGH (ref 4–5.6)
ALBUMIN SERPL ELPH-MCNC: 2.6 G/DL — LOW (ref 3.3–5)
ALP SERPL-CCNC: 109 U/L — SIGNIFICANT CHANGE UP (ref 40–120)
ALT FLD-CCNC: 24 U/L — SIGNIFICANT CHANGE UP (ref 10–45)
ANION GAP SERPL CALC-SCNC: 17 MMOL/L — SIGNIFICANT CHANGE UP (ref 5–17)
ANION GAP SERPL CALC-SCNC: 21 MMOL/L — HIGH (ref 5–17)
ANION GAP SERPL CALC-SCNC: 22 MMOL/L — HIGH (ref 5–17)
APPEARANCE UR: CLEAR — SIGNIFICANT CHANGE UP
APTT BLD: 71.6 SEC — HIGH (ref 27.5–35.5)
APTT BLD: 78.6 SEC — HIGH (ref 27.5–35.5)
AST SERPL-CCNC: 32 U/L — SIGNIFICANT CHANGE UP (ref 10–40)
BACTERIA # UR AUTO: NEGATIVE — SIGNIFICANT CHANGE UP
BASOPHILS # BLD AUTO: 0.07 K/UL — SIGNIFICANT CHANGE UP (ref 0–0.2)
BASOPHILS NFR BLD AUTO: 0.5 % — SIGNIFICANT CHANGE UP (ref 0–2)
BILIRUB SERPL-MCNC: 0.3 MG/DL — SIGNIFICANT CHANGE UP (ref 0.2–1.2)
BILIRUB UR-MCNC: NEGATIVE — SIGNIFICANT CHANGE UP
BLD GP AB SCN SERPL QL: NEGATIVE — SIGNIFICANT CHANGE UP
BUN SERPL-MCNC: 100 MG/DL — HIGH (ref 7–23)
BUN SERPL-MCNC: 102 MG/DL — HIGH (ref 7–23)
BUN SERPL-MCNC: 98 MG/DL — HIGH (ref 7–23)
CALCIUM SERPL-MCNC: 8.5 MG/DL — SIGNIFICANT CHANGE UP (ref 8.4–10.5)
CALCIUM SERPL-MCNC: 8.7 MG/DL — SIGNIFICANT CHANGE UP (ref 8.4–10.5)
CALCIUM SERPL-MCNC: 8.8 MG/DL — SIGNIFICANT CHANGE UP (ref 8.4–10.5)
CHLORIDE SERPL-SCNC: 101 MMOL/L — SIGNIFICANT CHANGE UP (ref 96–108)
CHLORIDE SERPL-SCNC: 103 MMOL/L — SIGNIFICANT CHANGE UP (ref 96–108)
CHLORIDE SERPL-SCNC: 103 MMOL/L — SIGNIFICANT CHANGE UP (ref 96–108)
CK MB BLD-MCNC: 8.7 % — HIGH (ref 0–3.5)
CK MB CFR SERPL CALC: 26.8 NG/ML — HIGH (ref 0–6.7)
CK SERPL-CCNC: 309 U/L — HIGH (ref 30–200)
CO2 SERPL-SCNC: 11 MMOL/L — LOW (ref 22–31)
CO2 SERPL-SCNC: 14 MMOL/L — LOW (ref 22–31)
CO2 SERPL-SCNC: 17 MMOL/L — LOW (ref 22–31)
COLOR SPEC: SIGNIFICANT CHANGE UP
COVID-19 SPIKE DOMAIN AB INTERP: POSITIVE
COVID-19 SPIKE DOMAIN ANTIBODY RESULT: 84.8 U/ML — HIGH
CREAT ?TM UR-MCNC: 63 MG/DL — SIGNIFICANT CHANGE UP
CREAT ?TM UR-MCNC: 65 MG/DL — SIGNIFICANT CHANGE UP
CREAT SERPL-MCNC: 5.18 MG/DL — HIGH (ref 0.5–1.3)
CREAT SERPL-MCNC: 5.2 MG/DL — HIGH (ref 0.5–1.3)
CREAT SERPL-MCNC: 5.46 MG/DL — HIGH (ref 0.5–1.3)
DIFF PNL FLD: ABNORMAL
EOSINOPHIL # BLD AUTO: 0.23 K/UL — SIGNIFICANT CHANGE UP (ref 0–0.5)
EOSINOPHIL NFR BLD AUTO: 1.8 % — SIGNIFICANT CHANGE UP (ref 0–6)
EPI CELLS # UR: 2 /HPF — SIGNIFICANT CHANGE UP
ESTIMATED AVERAGE GLUCOSE: 154 MG/DL — HIGH (ref 68–114)
GLUCOSE BLDC GLUCOMTR-MCNC: 198 MG/DL — HIGH (ref 70–99)
GLUCOSE BLDC GLUCOMTR-MCNC: 201 MG/DL — HIGH (ref 70–99)
GLUCOSE BLDC GLUCOMTR-MCNC: 240 MG/DL — HIGH (ref 70–99)
GLUCOSE BLDC GLUCOMTR-MCNC: 243 MG/DL — HIGH (ref 70–99)
GLUCOSE SERPL-MCNC: 201 MG/DL — HIGH (ref 70–99)
GLUCOSE SERPL-MCNC: 207 MG/DL — HIGH (ref 70–99)
GLUCOSE SERPL-MCNC: 265 MG/DL — HIGH (ref 70–99)
GLUCOSE UR QL: ABNORMAL
HCT VFR BLD CALC: 21.1 % — LOW (ref 39–50)
HCT VFR BLD CALC: 22.6 % — LOW (ref 39–50)
HCT VFR BLD CALC: 27.5 % — LOW (ref 39–50)
HCV AB S/CO SERPL IA: 0.23 S/CO — SIGNIFICANT CHANGE UP (ref 0–0.99)
HCV AB SERPL-IMP: SIGNIFICANT CHANGE UP
HGB BLD-MCNC: 6.7 G/DL — CRITICAL LOW (ref 13–17)
HGB BLD-MCNC: 7.2 G/DL — LOW (ref 13–17)
HGB BLD-MCNC: 8.6 G/DL — LOW (ref 13–17)
HYALINE CASTS # UR AUTO: 6 /LPF — HIGH (ref 0–2)
IMM GRANULOCYTES NFR BLD AUTO: 0.5 % — SIGNIFICANT CHANGE UP (ref 0–1.5)
INR BLD: 1.11 RATIO — SIGNIFICANT CHANGE UP (ref 0.88–1.16)
KETONES UR-MCNC: NEGATIVE — SIGNIFICANT CHANGE UP
LEUKOCYTE ESTERASE UR-ACNC: NEGATIVE — SIGNIFICANT CHANGE UP
LYMPHOCYTES # BLD AUTO: 23.5 % — SIGNIFICANT CHANGE UP (ref 13–44)
LYMPHOCYTES # BLD AUTO: 3.02 K/UL — SIGNIFICANT CHANGE UP (ref 1–3.3)
MAGNESIUM SERPL-MCNC: 1.9 MG/DL — SIGNIFICANT CHANGE UP (ref 1.6–2.6)
MAGNESIUM SERPL-MCNC: 2 MG/DL — SIGNIFICANT CHANGE UP (ref 1.6–2.6)
MCHC RBC-ENTMCNC: 30.9 PG — SIGNIFICANT CHANGE UP (ref 27–34)
MCHC RBC-ENTMCNC: 31.3 GM/DL — LOW (ref 32–36)
MCHC RBC-ENTMCNC: 31.6 PG — SIGNIFICANT CHANGE UP (ref 27–34)
MCHC RBC-ENTMCNC: 31.7 PG — SIGNIFICANT CHANGE UP (ref 27–34)
MCHC RBC-ENTMCNC: 31.8 GM/DL — LOW (ref 32–36)
MCHC RBC-ENTMCNC: 31.9 GM/DL — LOW (ref 32–36)
MCV RBC AUTO: 98.9 FL — SIGNIFICANT CHANGE UP (ref 80–100)
MCV RBC AUTO: 99.5 FL — SIGNIFICANT CHANGE UP (ref 80–100)
MCV RBC AUTO: 99.6 FL — SIGNIFICANT CHANGE UP (ref 80–100)
MONOCYTES # BLD AUTO: 1.14 K/UL — HIGH (ref 0–0.9)
MONOCYTES NFR BLD AUTO: 8.9 % — SIGNIFICANT CHANGE UP (ref 2–14)
NEUTROPHILS # BLD AUTO: 8.31 K/UL — HIGH (ref 1.8–7.4)
NEUTROPHILS NFR BLD AUTO: 64.8 % — SIGNIFICANT CHANGE UP (ref 43–77)
NITRITE UR-MCNC: NEGATIVE — SIGNIFICANT CHANGE UP
NRBC # BLD: 0 /100 WBCS — SIGNIFICANT CHANGE UP (ref 0–0)
OSMOLALITY UR: 379 MOS/KG — SIGNIFICANT CHANGE UP (ref 300–900)
PH UR: 6 — SIGNIFICANT CHANGE UP (ref 5–8)
PHOSPHATE SERPL-MCNC: 6.1 MG/DL — HIGH (ref 2.5–4.5)
PHOSPHATE SERPL-MCNC: 6.6 MG/DL — HIGH (ref 2.5–4.5)
PLATELET # BLD AUTO: 194 K/UL — SIGNIFICANT CHANGE UP (ref 150–400)
PLATELET # BLD AUTO: 223 K/UL — SIGNIFICANT CHANGE UP (ref 150–400)
PLATELET # BLD AUTO: 226 K/UL — SIGNIFICANT CHANGE UP (ref 150–400)
POTASSIUM SERPL-MCNC: 4.5 MMOL/L — SIGNIFICANT CHANGE UP (ref 3.5–5.3)
POTASSIUM SERPL-MCNC: 4.6 MMOL/L — SIGNIFICANT CHANGE UP (ref 3.5–5.3)
POTASSIUM SERPL-MCNC: 5 MMOL/L — SIGNIFICANT CHANGE UP (ref 3.5–5.3)
POTASSIUM SERPL-SCNC: 4.5 MMOL/L — SIGNIFICANT CHANGE UP (ref 3.5–5.3)
POTASSIUM SERPL-SCNC: 4.6 MMOL/L — SIGNIFICANT CHANGE UP (ref 3.5–5.3)
POTASSIUM SERPL-SCNC: 5 MMOL/L — SIGNIFICANT CHANGE UP (ref 3.5–5.3)
PROT ?TM UR-MCNC: 548 MG/DL — HIGH (ref 0–12)
PROT ?TM UR-MCNC: 758 MG/DL — HIGH (ref 0–12)
PROT SERPL-MCNC: 5.9 G/DL — LOW (ref 6–8.3)
PROT UR-MCNC: ABNORMAL
PROT/CREAT UR-RTO: 8.7 RATIO — HIGH (ref 0–0.2)
PROTHROM AB SERPL-ACNC: 13.3 SEC — SIGNIFICANT CHANGE UP (ref 10.6–13.6)
RBC # BLD: 2.12 M/UL — LOW (ref 4.2–5.8)
RBC # BLD: 2.27 M/UL — LOW (ref 4.2–5.8)
RBC # BLD: 2.78 M/UL — LOW (ref 4.2–5.8)
RBC # FLD: 13.2 % — SIGNIFICANT CHANGE UP (ref 10.3–14.5)
RBC # FLD: 13.4 % — SIGNIFICANT CHANGE UP (ref 10.3–14.5)
RBC # FLD: 14.3 % — SIGNIFICANT CHANGE UP (ref 10.3–14.5)
RBC CASTS # UR COMP ASSIST: 2 /HPF — SIGNIFICANT CHANGE UP (ref 0–4)
RH IG SCN BLD-IMP: POSITIVE — SIGNIFICANT CHANGE UP
SARS-COV-2 IGG+IGM SERPL QL IA: 84.8 U/ML — HIGH
SARS-COV-2 IGG+IGM SERPL QL IA: POSITIVE
SODIUM SERPL-SCNC: 134 MMOL/L — LOW (ref 135–145)
SODIUM SERPL-SCNC: 137 MMOL/L — SIGNIFICANT CHANGE UP (ref 135–145)
SODIUM SERPL-SCNC: 138 MMOL/L — SIGNIFICANT CHANGE UP (ref 135–145)
SODIUM UR-SCNC: 71 MMOL/L — SIGNIFICANT CHANGE UP
SP GR SPEC: 1.01 — SIGNIFICANT CHANGE UP (ref 1.01–1.02)
T PALLIDUM AB TITR SER: NEGATIVE — SIGNIFICANT CHANGE UP
TROPONIN T, HIGH SENSITIVITY RESULT: 5983 NG/L — HIGH (ref 0–51)
UROBILINOGEN FLD QL: NEGATIVE — SIGNIFICANT CHANGE UP
WBC # BLD: 12.83 K/UL — HIGH (ref 3.8–10.5)
WBC # BLD: 9.17 K/UL — SIGNIFICANT CHANGE UP (ref 3.8–10.5)
WBC # BLD: 9.43 K/UL — SIGNIFICANT CHANGE UP (ref 3.8–10.5)
WBC # FLD AUTO: 12.83 K/UL — HIGH (ref 3.8–10.5)
WBC # FLD AUTO: 9.17 K/UL — SIGNIFICANT CHANGE UP (ref 3.8–10.5)
WBC # FLD AUTO: 9.43 K/UL — SIGNIFICANT CHANGE UP (ref 3.8–10.5)
WBC UR QL: 7 /HPF — HIGH (ref 0–5)

## 2021-10-07 PROCEDURE — 70450 CT HEAD/BRAIN W/O DYE: CPT | Mod: 26,59

## 2021-10-07 PROCEDURE — 99233 SBSQ HOSP IP/OBS HIGH 50: CPT | Mod: GC

## 2021-10-07 PROCEDURE — 93010 ELECTROCARDIOGRAM REPORT: CPT

## 2021-10-07 PROCEDURE — 70498 CT ANGIOGRAPHY NECK: CPT | Mod: 26

## 2021-10-07 PROCEDURE — 99221 1ST HOSP IP/OBS SF/LOW 40: CPT

## 2021-10-07 PROCEDURE — 99233 SBSQ HOSP IP/OBS HIGH 50: CPT

## 2021-10-07 PROCEDURE — 70496 CT ANGIOGRAPHY HEAD: CPT | Mod: 26

## 2021-10-07 RX ORDER — PANTOPRAZOLE SODIUM 20 MG/1
40 TABLET, DELAYED RELEASE ORAL EVERY 12 HOURS
Refills: 0 | Status: DISCONTINUED | OUTPATIENT
Start: 2021-10-07 | End: 2021-10-18

## 2021-10-07 RX ORDER — BUMETANIDE 0.25 MG/ML
4 INJECTION INTRAMUSCULAR; INTRAVENOUS EVERY 12 HOURS
Refills: 0 | Status: DISCONTINUED | OUTPATIENT
Start: 2021-10-07 | End: 2021-10-07

## 2021-10-07 RX ORDER — SODIUM CHLORIDE 9 MG/ML
250 INJECTION INTRAMUSCULAR; INTRAVENOUS; SUBCUTANEOUS ONCE
Refills: 0 | Status: DISCONTINUED | OUTPATIENT
Start: 2021-10-07 | End: 2021-10-08

## 2021-10-07 RX ADMIN — Medication 2: at 11:57

## 2021-10-07 RX ADMIN — Medication 90 MILLIGRAM(S): at 05:48

## 2021-10-07 RX ADMIN — Medication 2: at 16:46

## 2021-10-07 RX ADMIN — INSULIN GLARGINE 25 UNIT(S): 100 INJECTION, SOLUTION SUBCUTANEOUS at 21:29

## 2021-10-07 RX ADMIN — BUMETANIDE 1 MILLIGRAM(S): 0.25 INJECTION INTRAMUSCULAR; INTRAVENOUS at 05:48

## 2021-10-07 RX ADMIN — TICAGRELOR 90 MILLIGRAM(S): 90 TABLET ORAL at 10:21

## 2021-10-07 RX ADMIN — Medication 81 MILLIGRAM(S): at 12:36

## 2021-10-07 RX ADMIN — Medication 3 UNIT(S): at 11:57

## 2021-10-07 RX ADMIN — HEPARIN SODIUM 1550 UNIT(S)/HR: 5000 INJECTION INTRAVENOUS; SUBCUTANEOUS at 03:45

## 2021-10-07 RX ADMIN — Medication 1: at 07:40

## 2021-10-07 RX ADMIN — Medication 3 UNIT(S): at 16:46

## 2021-10-07 RX ADMIN — Medication 200 MILLIGRAM(S): at 05:23

## 2021-10-07 RX ADMIN — Medication 3 UNIT(S): at 07:40

## 2021-10-07 RX ADMIN — Medication 1300 MILLIGRAM(S): at 05:23

## 2021-10-07 RX ADMIN — PANTOPRAZOLE SODIUM 40 MILLIGRAM(S): 20 TABLET, DELAYED RELEASE ORAL at 16:46

## 2021-10-07 RX ADMIN — ATORVASTATIN CALCIUM 80 MILLIGRAM(S): 80 TABLET, FILM COATED ORAL at 21:29

## 2021-10-07 RX ADMIN — TICAGRELOR 90 MILLIGRAM(S): 90 TABLET ORAL at 21:29

## 2021-10-07 RX ADMIN — Medication 1300 MILLIGRAM(S): at 14:46

## 2021-10-07 RX ADMIN — Medication 1300 MILLIGRAM(S): at 21:29

## 2021-10-07 RX ADMIN — HEPARIN SODIUM 1550 UNIT(S)/HR: 5000 INJECTION INTRAVENOUS; SUBCUTANEOUS at 12:19

## 2021-10-07 NOTE — SWALLOW BEDSIDE ASSESSMENT ADULT - PHARYNGEAL PHASE
pt with c/o shortness of breath after ~3 tsp applesauce; RN Anish informed and came to bedside; SpO2 93%; RN increase O2/NC; pt left in care of RN in NAD

## 2021-10-07 NOTE — RAPID RESPONSE TEAM SUMMARY - NSSITUATIONBACKGROUNDRRT_GEN_ALL_CORE
59 yo M with PMH of HTN & DM.  Presented to [facility] with CP and dyspnea and marked HTN.  Transferred for N-STEMI/CHF, hypertensive emergency and ARF. RRT called for acute AMS was last normal at 11am. Pt assessed at bedside @1110am during rrt, tele notable for episode of bradycardia to 30s prior to RRT.

## 2021-10-07 NOTE — PROGRESS NOTE ADULT - SUBJECTIVE AND OBJECTIVE BOX
[facility] DIVISION OF KIDNEY DISEASES AND HYPERTENSION -- FOLLOW UP NOTE  --------------------------------------------------------------------------------  Chief Complaint:    24 hour events/subjective:    seen and examined this morning   reported feeling some SOB   denied any chest pain   no N/V    PAST HISTORY  --------------------------------------------------------------------------------  No significant changes to PMH, PSH, FHx, SHx, unless otherwise noted    ALLERGIES & MEDICATIONS  --------------------------------------------------------------------------------  Allergies    No Known Allergies    Intolerances      Standing Inpatient Medications  aspirin  chewable 81 milliGRAM(s) Oral daily  atorvastatin 80 milliGRAM(s) Oral at bedtime  buMETAnide Injectable 1 milliGRAM(s) IV Push daily  dextrose 40% Gel 15 Gram(s) Oral once  dextrose 5%. 1000 milliLiter(s) IV Continuous <Continuous>  dextrose 5%. 1000 milliLiter(s) IV Continuous <Continuous>  dextrose 50% Injectable 25 Gram(s) IV Push once  dextrose 50% Injectable 12.5 Gram(s) IV Push once  dextrose 50% Injectable 25 Gram(s) IV Push once  glucagon  Injectable 1 milliGRAM(s) IntraMuscular once  heparin  Infusion.  Unit(s)/Hr IV Continuous <Continuous>  insulin glargine Injectable (LANTUS) 25 Unit(s) SubCutaneous at bedtime  insulin lispro (ADMELOG) corrective regimen sliding scale   SubCutaneous three times a day before meals  insulin lispro (ADMELOG) corrective regimen sliding scale   SubCutaneous at bedtime  insulin lispro Injectable (ADMELOG) 3 Unit(s) SubCutaneous three times a day before meals  labetalol 200 milliGRAM(s) Oral two times a day  NIFEdipine XL 90 milliGRAM(s) Oral daily  sodium bicarbonate 1300 milliGRAM(s) Oral three times a day  ticagrelor 90 milliGRAM(s) Oral every 12 hours    PRN Inpatient Medications  heparin   Injectable 5300 Unit(s) IV Push every 6 hours PRN      REVIEW OF SYSTEMS  All other systems were reviewed and are negative, except as noted.    VITALS/PHYSICAL EXAM  --------------------------------------------------------------------------------  T(C): 36.4 (10-07-21 @ 04:00), Max: 36.7 (10-06-21 @ 16:01)  HR: 72 (10-07-21 @ 04:00) (70 - 77)  BP: 132/79 (10-07-21 @ 04:00) (108/65 - 134/80)  RR: 18 (10-07-21 @ 04:00) (18 - 18)  SpO2: 94% (10-07-21 @ 04:00) (94% - 97%)  Wt(kg): --    Weight (kg): 89.1 (10-05-21 @ 21:26)      10-06-21 @ 07:01  -  10-07-21 @ 07:00  --------------------------------------------------------  IN: 970 mL / OUT: 300 mL / NET: 670 mL        Physical Exam:  	Gen: NAD  	HEENT: MMM  	Pulm: decreased breath sounds  	CV: S1S2  	Abd: Soft, +BS   	Ext: + LE edema B/L  	Neuro: Awake  	Skin: Warm and dry  	Vascular access: N/A      LABS/STUDIES  --------------------------------------------------------------------------------              8.0    9.02  >-----------<  209      [10-06-21 @ 04:48]              24.6     135  |  106  |  73  ----------------------------<  422      [10-06-21 @ 04:48]  5.0   |  13  |  4.15        Ca     9.1     [10-06-21 @ 04:48]      Mg     1.6     [10-06-21 @ 01:05]    TPro  7.0  /  Alb  2.9  /  TBili  0.3  /  DBili  x   /  AST  159  /  ALT  34  /  AlkPhos  140  [10-05-21 @ 17:02]    PT/INR: PT 12.7 , INR 1.06       [10-05-21 @ 17:02]  PTT: 78.6       [10-07-21 @ 03:02]          [10-06-21 @ 01:05]    Creatinine Trend:  SCr 4.15 [10-06 @ 04:48]  SCr 4.15 [10-06 @ 01:05]  SCr 4.08 [10-05 @ 19:54]  SCr 4.05 [10-05 @ 17:02]    Urinalysis - [10-06-21 @ 17:16]      Color Light Yellow / Appearance Clear / SG 1.016 / pH 6.0      Gluc 500 mg/dL / Ketone Negative  / Bili Negative / Urobili Negative       Blood Moderate / Protein 300 mg/dL / Leuk Est Negative / Nitrite Negative      RBC 1 / WBC 5 / Hyaline 7 / Gran 0-2 / Sq Epi  / Non Sq Epi 3 / Bacteria Negative    Urine Sodium 68      [10-06-21 @ 17:16]

## 2021-10-07 NOTE — PROGRESS NOTE ADULT - PROBLEM SELECTOR PLAN 2
Blood pressure initially 200/105 at arrival; s/p nitro ggt at [place], noncompliant with meds  - given dropping hgb and sbp, hold labetalol 2 and nifedipine   - hold HCTZ-losartan given Elvi on cKD  - monitor BP

## 2021-10-07 NOTE — SWALLOW BEDSIDE ASSESSMENT ADULT - SWALLOW EVAL: RECOMMENDED DIET
Unable to make formal dietary recommendation given limited assessment, however, given c/o shortness of breath s/p PO intake of conservative PO texture would consider NPO, with non-oral nutrition/hydration/medications pending formal/complete assessment.

## 2021-10-07 NOTE — STROKE CODE NOTE - DISPOSITION
The management and treatment decisions which include alteplase and mechanical thrombectomy were discussed with stroke fellow Dr. Rosales under the supervision of neurovascular attending Dr. Libman./Other

## 2021-10-07 NOTE — PROGRESS NOTE ADULT - PROBLEM SELECTOR PLAN 10
DVT ppx: stop heparin ggt per given above      Discussed with NP Cheryl, RRT team, wife    prognosis guarded  low threshold for MICU or CCU consult if change in clinical status

## 2021-10-07 NOTE — PROVIDER CONTACT NOTE (CRITICAL VALUE NOTIFICATION) - BACKGROUND
Pt admitted with dx. NSTEMI, Acute CHF, HTN emergency.
Pt admitted with dx. NSTEMI, pending ischemic eval when JOSHUA improves.

## 2021-10-07 NOTE — PROVIDER CONTACT NOTE (CRITICAL VALUE NOTIFICATION) - ASSESSMENT
Pt s/p RRT & code stroke for AMS. Currently in CT scan. Pt's troponin downtrending [phone])
Pt s/p RRT for AMS/bradycardia & code stroke. Currently in CT scan for CT Brain/CTA head & neck.

## 2021-10-07 NOTE — SWALLOW BEDSIDE ASSESSMENT ADULT - SLP PERTINENT HISTORY OF CURRENT PROBLEM
60 yr old M w/ hx of DM2, HTN and HLD presents as transfer from [facility] for chest pain concerning for NSTEMI and possible CHF. Pt states that after he woke up this morning he felt midsternal chest pain this morning, that felt like a burning sensation. Pain was non radiating. Associated with shortness of breath. He initially took TUMS and drank some milk which alleviated some of the pain but not completely. Later he also started feeling very dizzy so he went to the ED. Denies associated palpitations, diaphoresis, N/V.  When he presented to [facility] he was noted to be SOB. Initially, they evaluated patient for CHF but Troponin and BNP levels were elevated so he was transferred here for NSTEMI and r/o CHF.

## 2021-10-07 NOTE — CONSULT NOTE ADULT - ASSESSMENT
Assessment: 61 yo male with a PMHx of DM2, HTN, and HLD who initially presented to [facility] on 10/05 as a transfer from [facility] for NSTEMI and possible CHF. Patient on Heparin drip for NSTEMI. LKW 10:45AM. Patient had episode of bradycardia (HR 30s), then became altered. RRT called. BP during RRT 70s/40s. Code stroke called for L facial droop. On exam, patient alert and oriented, following commands. Mild L nasolabial flattening and dysarthria, ?decreased eye closure strength on the L. CTH negative for acute pathology, old L frontal cortical infarct seen. CTA H unremarkable. CTA N shows high-grade stenosis left internal carotid artery at the carotid bifurcation in the neck.    Impression: Mild L nasolabial flattening and dysarthria, ?decreased eye closure strength on the L. Possibly secondary to R brain dysfunction due to acute stroke of unknown mechanism vs peripheral etiology (isolated L CN7 palsy). Patient with old clinically silent infarct on CTH, also with high-grade stenosis of L ICA at the carotid bifurcation.    Recommendations:  [] No neurologic contraindication to c/w Heparin drip if needed from cardiology standpoint. Patient currently on ASA 81MG PO QD + Brilinta 90MG PO Q12HR.   [] NPO unless passes bedside dysphagia screen. Formal swallow eval if fails.   [] Keep BP permissive up to 220/110 for 48 hours followed by gradual normotension over 2-3 days. Avoid hypotension.  [] MRI brain without contrast.  [x] 10/06 TTE: EF 45%, moderate-severe MR, mild-moderate L ventricular systolic dysfunction.   [] c/w Atorvastatin 80MG PO QHS (titrate to goal LDL < 70).  [] Send Lipid panel, HbA1c, TSH, B12, Folate.  [] PT/OT/ST evaluations.   [] Rest of care per primary team.    Patient discussed with stroke fellow Dr. Rosales. Final recommendations regarding management to be confirmed upon review by stroke attending Dr. Patel.

## 2021-10-07 NOTE — PROGRESS NOTE ADULT - ASSESSMENT
Patient is a 60 year old male with PMH od DM and HTN who presented to the hospital as a transfer from [facility] for NSTEMI. The nephrology team was consulted due to elevated creatinine of 4.05 upon presentation. The patient denies any history of kidney disease and denied noticing any changes in his urination.     JOSHUA on CKD?  Hyperkalemia   - patient presenting to hospital with creatinine of 4.05 mg/dL; denied any history of kidney disease   --- difficult to determine if there is any chronic kidney disease present; creatinine in 9/2019 noted to be 1.11   - there could be underlying chronic disease secondary to uncontrolled DM and HTN   - normal renal ultrasound without hydro  - UA with proteinuria and blood; pending urine protein/creatinine ratio   - pending serological work up including BEULAH, C-ANCA, P-ANCA, C3, C4, SPEP, serum free light chains, serum immunofixation, Hep B+C, HIV, Anti-GBM, PLA2r, rpr, a1c  - Patient is in need of Cardiac catheterization  -- as per the Shaggy score it places a patient at a 26.1% chance of developing CARY and a 1.09% chance of developing CARY requiring RRT   - he is pending ischemic work up given current renal function; pending labs from this morning   - trend with daily BMP  - please monitor urine output   - please adjust medication doses as per eGFR     *** incomplete note pending labs this morning

## 2021-10-07 NOTE — SWALLOW BEDSIDE ASSESSMENT ADULT - SLP GENERAL OBSERVATIONS
Patient encountered awake and alert, upright in bed, +3L/NC, A&Ox3. Able to follow simple commands. +Increased processing time for simple questions with occasional inaccurate responses - appeared somewhat confused. Episode of spilling applesauce on chest during attempt to self feed.

## 2021-10-07 NOTE — PROGRESS NOTE ADULT - PROBLEM SELECTOR PLAN 5
stroke code for AMS with facial droop this AM, unclear if TIA vs relative hypotension  -facial droops and AMS now resolved  -urgent head ct negative for acute CVA: small vessel white matter ischemic changes and old left frontal cortical infarct. High-grade stenosis left internal carotid artery at the carotid bifurcation in the neck.  -neuro consulted:  -PT/OT/speech eval, dysphagia screen  -dysphagia 2 diet until speech eval   -vascular surgery consulted  -c/w asa/brillanta, statin  -IVF prn to keep SBP>90, hold nifedipine and labetalol

## 2021-10-07 NOTE — PROGRESS NOTE ADULT - ASSESSMENT
60 yr old M w/ hx of DM2, HTN and HLD presents as transfer from [facility] for chest pain concerning for NSTEMI and possible new CHF c/b JOSHUA likely on CKD, s/p RRT for CVA/TIA ruled out and worsening anemia.

## 2021-10-07 NOTE — PROGRESS NOTE ADULT - ATTENDING COMMENTS
transferred from outside hospital for further cardiac evaluation, noncompliant with dm, htn  #  vicki on ckd vs ckd stage V  thus far cr stable  suspect ckd   prot/cr 8.7, low alb 2.6, serologic workup pending    renal sono for kidney size radha camara dm nephropathy however need rto rule out etiology of ckd  #understands risk of CARY with cardiac cath, including dialysis  #volume overload/edema- cont diuretics- bumex;   #metabolic acidosis- cont bicarb tabs; increase to 1300mg po tid  #hyperkalemia- low k renal diet; monitor trend  #anemia- workup; check iron studies/ferritin  #BP control  #check serum phos  #check hbsag

## 2021-10-07 NOTE — PROGRESS NOTE ADULT - PROBLEM SELECTOR PLAN 1
-no further chest pain  -troponin downtrending 5000K from 52862W, no need to further trend unless CP or change in HD status, etc  - discussed with cards, given downtrending bp and hgb drop, rec stopping heparin ggt  - c/w brilinta 90 mg BID and ASA 81 mg qd per cards as sign of overt bleeding.   - c/w atorvastatin 80 mg qhs  - eventual ischemic eval pending improvement in kidney function  - cardio following, appreciate recs  - if repeat chest pain, obtain cardiac enzymes, ekg   - monitor on tele  - TTE EF 45%: mild-moderate LVSF

## 2021-10-07 NOTE — CONSULT NOTE ADULT - SUBJECTIVE AND OBJECTIVE BOX
TOM PLEITEZ  Male  MRN-[id number]    HPI: Per primary team-  "60 yr old M w/ hx of DM2, HTN and HLD presents as transfer from [facility] for chest pain concerning for NSTEMI and possible CHF.  Pt states that after he woke up this morning he felt midsternal chest pain this morning, that felt like a burning sensation. Pain was non radiating. Associated with shortness of breath. He initially took TUMS and drank some milk which alleviated some of the pain but not completely. Later he also started feeling very dizzy so he went to the ED. Denies associated palpitations, diaphoresis, N/V.  When he presented to [facility] he was noted to be SOB. Initially, they evaluated patient for CHF but Troponin and BNP levels were elevated so he was transferred here for NSTEMI and r/o CHF.    Per nursing notes, while at [facility], he was placed on BIPAP, given Solumedrol 120, Nitro SL x1, duoneb x2, ASA 81mg, Lasix 40mg w/ 200 cc output, and started at 10mL/hr Nitro drip then increased to 30mL/hr. There, initial /126 retake was 171/67. Nitro drip was d/c'd upon arrival to [facility] ED.     During my visit, patient was sitting up eating a sandwich. Appears comfortable on room air. Denies repeat of chest pain after this morning. Denies shortness of breath. Denies lower extremity edema, orthopnea or PND.     Labs also remarkable for JOSHUA, metabolic acidosis and hyperkalemia. Patient denies prior history of renal injury.     Wife can be reached at [phone]. Medication list confirmed w/ wife. Of note, patient w/ elevated BP to SBP 200s often at home; nifedipine used on a "as needed" basis for SBP > 200."    Neurology consulted for code stroke. Patient transferred for NSTEMI, on Heparin drip. LKW 10:45AM. Patient had episode of bradycardia (HR 30s), then became altered. RRT called. BP during RRT 70s/40s. Code stroke called for L facial droop. On exam, patient alert and oriented, following commands. Mild L nasolabial flattening and dysarthria, ?decreased eye closure strength on the L. CTH negative for acute pathology. CTA H unremarkable. CTA N shows high-grade stenosis left internal carotid artery at the carotid bifurcation in the neck. Not a candidate for tPA as patient is on Heparin drip. Not a candidate for MT as no LVO on imaging.     NIHSS: 2  MRS: 0    ROS: All negative except as mentioned in HPI.    PAST MEDICAL & SURGICAL HISTORY:  Hypertension    Hyperlipidemia    Diabetes mellitus    No pertinent past surgical history    MEDICATIONS  (STANDING):  aspirin  chewable 81 milliGRAM(s) Oral daily  atorvastatin 80 milliGRAM(s) Oral at bedtime  dextrose 40% Gel 15 Gram(s) Oral once  dextrose 5%. 1000 milliLiter(s) (50 mL/Hr) IV Continuous <Continuous>  dextrose 5%. 1000 milliLiter(s) (100 mL/Hr) IV Continuous <Continuous>  dextrose 50% Injectable 25 Gram(s) IV Push once  dextrose 50% Injectable 12.5 Gram(s) IV Push once  dextrose 50% Injectable 25 Gram(s) IV Push once  glucagon  Injectable 1 milliGRAM(s) IntraMuscular once  insulin glargine Injectable (LANTUS) 25 Unit(s) SubCutaneous at bedtime  insulin lispro (ADMELOG) corrective regimen sliding scale   SubCutaneous three times a day before meals  insulin lispro (ADMELOG) corrective regimen sliding scale   SubCutaneous at bedtime  insulin lispro Injectable (ADMELOG) 3 Unit(s) SubCutaneous three times a day before meals  sodium bicarbonate 1300 milliGRAM(s) Oral three times a day  sodium chloride 0.9% Bolus 250 milliLiter(s) IV Bolus once  ticagrelor 90 milliGRAM(s) Oral every 12 hours    Allergies    No Known Allergies    Vital Signs Last 24 Hrs  T(C): 36.6 (07 Oct 2021 11:00), Max: 36.7 (06 Oct 2021 16:01)  T(F): 97.9 (07 Oct 2021 11:00), Max: 98 (06 Oct 2021 16:01)  HR: 67 (07 Oct 2021 12:20) (67 - 77)  BP: 90/59 (07 Oct 2021 12:20) (90/59 - 134/80)  RR: 18 (07 Oct 2021 12:20) (18 - 18)  SpO2: 99% (07 Oct 2021 12:20) (94% - 99%)    General Exam:  Constitutional: Lying in bed. Hypotensive.  Head: Normocephalic, atraumatic.  Extremities: No edema.    Neuro Exam:   MS: Alert, eyes open spontaneously. Oriented to self, age, location, month, year. Speech is fluent, mildly slurred. Able to name objects and repeat. Follows commands.  CN: PERRL. VFF. EOMI. V1-V3 intact. Mild nasolabial flattening. ?decreased eye closure strength on the L. Tongue midline.   Motor: All extremities antigravity without drift.   Sensory: Intact to LT throughout. No extinction.  Coordination: No dysmetria on FNF or on HTS bilaterally.  Gait: Deferred.    LABS:               6.7    9.43  )-----------( 194      ( 07 Oct 2021 11:40 )             21.1     10-07    137  |  103  |  98<H>  ----------------------------<  207<H>  4.5   |  17<L>  |  5.20<H>    Ca    8.8      07 Oct 2021 11:40  Phos  6.1     10-07  Mg     2.0     10-07    TPro  5.9<L>  /  Alb  2.6<L>  /  TBili  0.3  /  DBili  x   /  AST  32  /  ALT  24  /  AlkPhos  109  10-07    PT/INR - ( 07 Oct 2021 11:40 )   PT: 13.3 sec;   INR: 1.11 ratio       PTT - ( 07 Oct 2021 11:38 )  PTT:71.6 sec    RADIOLOGY:  -10/07 CTH: No hemorrhage. 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   -10/07 CTA N: High-grade stenosis left internal carotid artery at the carotid bifurcation in the neck.   -10/07 CTA H: Normal intracranial circulation.

## 2021-10-07 NOTE — CONSULT NOTE ADULT - ATTENDING COMMENTS
code stroke called and neurology emergently assessed patient.   Briefly,  61 yo male with DM2, HTN, and HLD who initially presented to [facility] on 10/05 as a transfer from [facility] for NSTEMI and possible CHF. Patient on Heparin drip for NSTEMI. LKW 10:45AM. Patient had episode of bradycardia (HR 30s), then became altered. RRT called. BP during RRT 70s/40s. Code stroke called for L facial droop.   CTH negative for acute pathology, old L frontal cortical infarct seen.   CTA H unremarkable. CTA N shows high-grade stenosis left internal carotid artery at the carotid bifurcation in the neck.  10/06 TTE: EF 45%, moderate-severe MR, mild-moderate L ventricular systolic dysfunction.   A1c 7    Impression: Mild L nasolabial flattening and dysarthria, ?decreased eye closure strength on the L. Possibly secondary to R brain dysfunction due to acute stroke of unknown mechanism vs peripheral etiology. Patient with old L frontal infarct on CTH, also with high-grade stenosis of L ICA at the carotid bifurcation which likely cause of old stroke     Recommendations:  - No neurologic contraindication to c/w Heparin drip if needed from cardiology standpoint. Patient currently on ASA 81MG PO QD + Brilinta 90MG PO Q12HR.   - NPO unless passes bedside dysphagia screen. Formal swallow eval if fails.   - Keep BP permissive up to 220/110 for 48 hours followed by gradual normotension over 2-3 days. Avoid hypotension.  - MRI brain without contrast.  - High dose statin therapy - atorvastatin 40mg PO daily. LDL goal <70mg/dL.  - consider CD and vascular for ICA revascularization   - lipid panel  - telemetry  - PT/OT/SS/SLP, OOBC  - permissive HTN, -180mmHg.  - check FS, glucose control <180  - GI/DVT ppx  - Counseling on diet, exercise, and medication adherence was done  - Counseling on smoking cessation and alcohol consumption offered when appropriate.  - Pain assessed and judicious use of narcotics when appropriate was discussed.    - Stroke education given when appropriate.  - Importance of fall prevention discussed.   - Differential diagnosis and plan of care discussed with patient and/or family and primary team  - Thank you for allowing me to participate in the care of this patient. Call with questions.   Marcelino Patel MD  Vascular Neurology

## 2021-10-07 NOTE — RAPID RESPONSE TEAM SUMMARY - NSADDTLFINDINGSRRT_GEN_ALL_CORE
Pt HDS, SBP 90s. New L facial droop. New dysarthria and confusion. AOxperson place month (previously AOx4 and conversive per primary team and RN). Neuro exam otherwise nonfocal in extremities. PERRLA. 1+ pitting edema of extremities and +JVD. CV exam notable for apical sys murmur. LCTAB. Satting 90s ORA. ECG shows NSR, no repolarization abnormalities.

## 2021-10-07 NOTE — CONSULT NOTE ADULT - SUBJECTIVE AND OBJECTIVE BOX
HPI:  Chriss Mattson is a very pleasant 60 year old gentleman with history of DM type 2, HTN, HLD presenting as transfer from [facility] for chest pain concerning for NSTEMI and possible CHF. Pt states that he woke up this morning with non radiating midsternal chest pain, burning sensation and shortness of breath. Later he also started feeling dizzy so he went to the ED. Denies associated palpitations, diaphoresis, N/V. While at [facility], he was placed on BIPAP, given Solumedrol 120, Nitro SL x1, duoneb x2, ASA 81mg, Lasix 40mg w/ 200 cc output, and started at 10mL/hr Nitro drip then increased to 30mL/hr. There, initial /126 retake was 171/67. Nitro drip was d/c'd upon arrival to [facility] ED.   Subsequently had an episode of bradycardia (HR 30s), then became altered. RRT and code stroke also called for L facial droop. No new stroke findings on CT but had an old frontal infarct, CTA significant for ipsilateral high-grade stenosis of the left internal carotid artery at the carotid bifurcation for which vascular surgery was consulted.       PMHx: Hypertension    Hyperlipidemia    Diabetes mellitus      PSHx: No pertinent past surgical history      Medications (inpatient): aspirin  chewable 81 milliGRAM(s) Oral daily  atorvastatin 80 milliGRAM(s) Oral at bedtime  dextrose 40% Gel 15 Gram(s) Oral once  dextrose 5%. 1000 milliLiter(s) IV Continuous <Continuous>  dextrose 5%. 1000 milliLiter(s) IV Continuous <Continuous>  dextrose 50% Injectable 25 Gram(s) IV Push once  dextrose 50% Injectable 12.5 Gram(s) IV Push once  dextrose 50% Injectable 25 Gram(s) IV Push once  glucagon  Injectable 1 milliGRAM(s) IntraMuscular once  insulin glargine Injectable (LANTUS) 25 Unit(s) SubCutaneous at bedtime  insulin lispro (ADMELOG) corrective regimen sliding scale   SubCutaneous three times a day before meals  insulin lispro (ADMELOG) corrective regimen sliding scale   SubCutaneous at bedtime  insulin lispro Injectable (ADMELOG) 3 Unit(s) SubCutaneous three times a day before meals  pantoprazole  Injectable 40 milliGRAM(s) IV Push every 12 hours  sodium bicarbonate 1300 milliGRAM(s) Oral three times a day  sodium chloride 0.9% Bolus 250 milliLiter(s) IV Bolus once  ticagrelor 90 milliGRAM(s) Oral every 12 hours    Medications (PRN):  Allergies: No Known Allergies  (Intolerances: )  Social Hx:   Family Hx: Family history of CVA (Father)        T(C): 36.9  HR: 89 (65 - 90)  BP: 156/73 (90/59 - 156/73)  RR: 18 (18 - 18)  SpO2: 100% (95% - 100%)  Tmax: T(C): , Max: 36.9 (10-08-21 @ 04:23)    10-07-21  -  10-08-21  --------------------------------------------------------  IN:    Oral Fluid: 260 mL  Total IN: 260 mL    OUT:    Stool (mL): 1 mL    Voided (mL): 100 mL  Total OUT: 101 mL    Total NET: 159 mL          Physical Exam:  General: Well-developed, in no acute distress   Neurologic: Awake, alert, GCS 15. Mild L facial droop noted  Respiratory: Normal respiratory effort  CVS: RRR, perfusing adequately  Abdomen: Abdomen soft, NT/ND, no rebound or guarding   Ext: Grossly symmetric, Moving all extremities without drift  Vascular: 2+ peripheral pulses bilaterally throughout; no edema appreciated  Skin: Warm, dry, intact, no erythema     Labs:                        8.2    10.73 )-----------( 221      ( 08 Oct 2021 03:43 )             25.9     PT/INR - ( 07 Oct 2021 11:40 )   PT: 13.3 sec;   INR: 1.11 ratio         PTT - ( 07 Oct 2021 11:38 )  PTT:71.6 sec  10-08    138  |  104  |  100<H>  ----------------------------<  109<H>  4.6   |  16<L>  |  5.42<H>    Ca    8.5      08 Oct 2021 05:08  Phos  6.6     10-  Mg     1.9     10-07    TPro  5.9<L>  /  Alb  2.6<L>  /  TBili  0.3  /  DBili  x   /  AST  32  /  ALT  24  /  AlkPhos  109  10-07    Urinalysis Basic - ( 07 Oct 2021 10:09 )    Color: Light Yellow / Appearance: Clear / S.015 / pH: x  Gluc: x / Ketone: Negative  / Bili: Negative / Urobili: Negative   Blood: x / Protein: 300 mg/dL / Nitrite: Negative   Leuk Esterase: Negative / RBC: 2 /hpf / WBC 7 /HPF   Sq Epi: x / Non Sq Epi: 2 /hpf / Bacteria: Negative            Imaging and other studies:  < from: CT Angio Neck Stroke Protocol IV Cont (10.07.21 @ 12:02) >    EXAM:  CT ANGIO BRAIN (W)AW IC                          EXAM:  CT ANGIO NECK STROKE PROTCL IC                          EXAM:  CT BRAIN STROKE PROTOCOL                            PROCEDURE DATE:  10/07/2021            INTERPRETATION:  CLINICAL INDICATION: Facial droop, on heparin drip, NSTEMI    5mm axial sections of the brain were obtained from base to vertex, without the intravenous administration of contrast material.  Coronal and sagittal computer generated reconstructed views are available.    No prior brain imaging is available for comparison.      The fourth, third and lateral ventricles are normal size and position. There is no hemorrhage, mass or shift of the midline structures. Small vessel white matter ischemic changes are noted. There is an old left frontal cortical infarct.      After the intravenous power injection of 70 cc of Omnipaque 300 using a bolus sharon timing run  serial thin sections were obtained through the neck from the thoracic inlet through the intracranial circulation centered at the [id number] on a  multislice CT scanner reformatted with coronal and sagittal 2 D-MIP projections, including 3 D reconstructions using a separate 3D Vit[employer] software workstation. A total of 70 cc of Omnipaque were intravenously injected. 30 cc were discarded.    The origins of the carotid and vertebral arteries are normal. The carotid bifurcations the right vertebral artery is dominant. There is high-grade stenosis of the left internal carotid artery at the carotid bifurcation. There is mild right internal carotid artery stenosis.    The distal vertebral arteries are well identified as are the posterior-inferior cerebellar arteries bilaterally. The region of the vertebral basilar junction is normal. The basilar artery is normal. The posterior cerebral and superior cerebellar arteries are normal.    Evaluation of the carotid arteries demonstrate normal appearance to the distal cervical, petrous cavernous and supraclinoid internal carotid arteries. The anterior cerebral arteries, anterior communicating artery and middle cerebral arteries are normal.    There is no evidence of aneurysm, stenosis, or vessel occlusion.      The normal intracranial venous circulation is identified. The right transverse sinus is dominant. The superior sagittal sinus, internal cerebral veins, vein of Fredo, straight sinus, transverse sinuses, sigmoid sinuses and internal jugular veins are normal. Cortical veins are normal.      IMPRESSION: No hemorrhage. 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 High-grade stenosis left internal carotid artery at the carotid bifurcation in the neck. Normal intracranial circulation.        Dr. Correa discussed these findings with neurology resident on 10/7/2021 12:19 PM with read back.    --- End of Report ---                MICHELLE CORREA MD; Attending Radiologist  This document has been electronically signed. Oct  7 2021 12:20PM    < end of copied text >   HPI:  Chriss Mattson is a very pleasant 60 year old gentleman with history of DM type 2, HTN, HLD presenting as transfer from [facility] for chest pain concerning for NSTEMI and possible CHF. Pt states that he woke up this morning with non radiating midsternal chest pain, burning sensation and shortness of breath. Later he also started feeling dizzy so he went to the ED. Denies associated palpitations, diaphoresis, N/V. While at [facility], he was placed on BIPAP, given Solumedrol 120, Nitro SL x1, duoneb x2, ASA 81mg, Lasix 40mg w/ 200 cc output, and started at 10mL/hr Nitro drip then increased to 30mL/hr. There, initial /126 retake was 171/67. Nitro drip was discontinued upon arrival to [facility] ED.   Subsequently had an episode of bradycardia (HR 30s), then became altered. RRT and code stroke also called for L facial droop. No new stroke findings on CT but had an old frontal infarct, CTA significant for ipsilateral high-grade stenosis of the left internal carotid artery at the carotid bifurcation for which vascular surgery was consulted.       PMHx: Hypertension    Hyperlipidemia    Diabetes mellitus      PSHx: No pertinent past surgical history      Medications (inpatient): aspirin  chewable 81 milliGRAM(s) Oral daily  atorvastatin 80 milliGRAM(s) Oral at bedtime  dextrose 40% Gel 15 Gram(s) Oral once  dextrose 5%. 1000 milliLiter(s) IV Continuous <Continuous>  dextrose 5%. 1000 milliLiter(s) IV Continuous <Continuous>  dextrose 50% Injectable 25 Gram(s) IV Push once  dextrose 50% Injectable 12.5 Gram(s) IV Push once  dextrose 50% Injectable 25 Gram(s) IV Push once  glucagon  Injectable 1 milliGRAM(s) IntraMuscular once  insulin glargine Injectable (LANTUS) 25 Unit(s) SubCutaneous at bedtime  insulin lispro (ADMELOG) corrective regimen sliding scale   SubCutaneous three times a day before meals  insulin lispro (ADMELOG) corrective regimen sliding scale   SubCutaneous at bedtime  insulin lispro Injectable (ADMELOG) 3 Unit(s) SubCutaneous three times a day before meals  pantoprazole  Injectable 40 milliGRAM(s) IV Push every 12 hours  sodium bicarbonate 1300 milliGRAM(s) Oral three times a day  sodium chloride 0.9% Bolus 250 milliLiter(s) IV Bolus once  ticagrelor 90 milliGRAM(s) Oral every 12 hours    Medications (PRN):  Allergies: No Known Allergies  (Intolerances: )  Social Hx:   Family Hx: Family history of CVA (Father)        T(C): 36.9  HR: 89 (65 - 90)  BP: 156/73 (90/59 - 156/73)  RR: 18 (18 - 18)  SpO2: 100% (95% - 100%)  Tmax: T(C): , Max: 36.9 (10-08-21 @ 04:23)    10-07-21  -  10-08-21  --------------------------------------------------------  IN:    Oral Fluid: 260 mL  Total IN: 260 mL    OUT:    Stool (mL): 1 mL    Voided (mL): 100 mL  Total OUT: 101 mL    Total NET: 159 mL          Physical Exam:  General: Well-developed, in no acute distress   Neurologic: Awake, alert, GCS 15. Mild L facial droop noted  Respiratory: Normal respiratory effort  CVS: RRR, perfusing adequately  Abdomen: Abdomen soft, NT/ND, no rebound or guarding   Ext: Grossly symmetric, Moving all extremities without drift  Vascular: 2+ peripheral pulses bilaterally throughout; no edema appreciated  Skin: Warm, dry, intact, no erythema     Labs:                        8.2    10.73 )-----------( 221      ( 08 Oct 2021 03:43 )             25.9     PT/INR - ( 07 Oct 2021 11:40 )   PT: 13.3 sec;   INR: 1.11 ratio         PTT - ( 07 Oct 2021 11:38 )  PTT:71.6 sec  10-08    138  |  104  |  100<H>  ----------------------------<  109<H>  4.6   |  16<L>  |  5.42<H>    Ca    8.5      08 Oct 2021 05:08  Phos  6.6     10-07  Mg     1.9     10-    TPro  5.9<L>  /  Alb  2.6<L>  /  TBili  0.3  /  DBili  x   /  AST  32  /  ALT  24  /  AlkPhos  109  10-07    Urinalysis Basic - ( 07 Oct 2021 10:09 )    Color: Light Yellow / Appearance: Clear / S.015 / pH: x  Gluc: x / Ketone: Negative  / Bili: Negative / Urobili: Negative   Blood: x / Protein: 300 mg/dL / Nitrite: Negative   Leuk Esterase: Negative / RBC: 2 /hpf / WBC 7 /HPF   Sq Epi: x / Non Sq Epi: 2 /hpf / Bacteria: Negative            Imaging and other studies:  < from: CT Angio Neck Stroke Protocol IV Cont (10.07.21 @ 12:02) >    EXAM:  CT ANGIO BRAIN (W)AW IC                          EXAM:  CT ANGIO NECK STROKE PROTCL IC                          EXAM:  CT BRAIN STROKE PROTOCOL                            PROCEDURE DATE:  10/07/2021            INTERPRETATION:  CLINICAL INDICATION: Facial droop, on heparin drip, NSTEMI    5mm axial sections of the brain were obtained from base to vertex, without the intravenous administration of contrast material.  Coronal and sagittal computer generated reconstructed views are available.    No prior brain imaging is available for comparison.      The fourth, third and lateral ventricles are normal size and position. There is no hemorrhage, mass or shift of the midline structures. Small vessel white matter ischemic changes are noted. There is an old left frontal cortical infarct.      After the intravenous power injection of 70 cc of Omnipaque 300 using a bolus sharon timing run  serial thin sections were obtained through the neck from the thoracic inlet through the intracranial circulation centered at the [id number] on a  multislice CT scanner reformatted with coronal and sagittal 2 D-MIP projections, including 3 D reconstructions using a separate 3D Vitrea software workstation. A total of 70 cc of Omnipaque were intravenously injected. 30 cc were discarded.    The origins of the carotid and vertebral arteries are normal. The carotid bifurcations the right vertebral artery is dominant. There is high-grade stenosis of the left internal carotid artery at the carotid bifurcation. There is mild right internal carotid artery stenosis.    The distal vertebral arteries are well identified as are the posterior-inferior cerebellar arteries bilaterally. The region of the vertebral basilar junction is normal. The basilar artery is normal. The posterior cerebral and superior cerebellar arteries are normal.    Evaluation of the carotid arteries demonstrate normal appearance to the distal cervical, petrous cavernous and supraclinoid internal carotid arteries. The anterior cerebral arteries, anterior communicating artery and middle cerebral arteries are normal.    There is no evidence of aneurysm, stenosis, or vessel occlusion.      The normal intracranial venous circulation is identified. The right transverse sinus is dominant. The superior sagittal sinus, internal cerebral veins, vein of Fredo, straight sinus, transverse sinuses, sigmoid sinuses and internal jugular veins are normal. Cortical veins are normal.      IMPRESSION: No hemorrhage. 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 High-grade stenosis left internal carotid artery at the carotid bifurcation in the neck. Normal intracranial circulation.        Dr. Correa discussed these findings with neurology resident on 10/7/2021 12:19 PM with read back.    --- End of Report ---                MICHELLE CORREA MD; Attending Radiologist  This document has been electronically signed. Oct  7 2021 12:20PM    < end of copied text >

## 2021-10-07 NOTE — PROGRESS NOTE ADULT - SUBJECTIVE AND OBJECTIVE BOX
Patient seen and examined at bedside.    Overnight Events: patient in respiratory distress this am    Review Of Systems: No chest pain, shortness of breath, or palpitations            Current Meds:  aspirin  chewable 81 milliGRAM(s) Oral daily  atorvastatin 80 milliGRAM(s) Oral at bedtime  buMETAnide Injectable 1 milliGRAM(s) IV Push daily  dextrose 40% Gel 15 Gram(s) Oral once  dextrose 5%. 1000 milliLiter(s) IV Continuous <Continuous>  dextrose 5%. 1000 milliLiter(s) IV Continuous <Continuous>  dextrose 50% Injectable 25 Gram(s) IV Push once  dextrose 50% Injectable 12.5 Gram(s) IV Push once  dextrose 50% Injectable 25 Gram(s) IV Push once  glucagon  Injectable 1 milliGRAM(s) IntraMuscular once  heparin   Injectable 5300 Unit(s) IV Push every 6 hours PRN  heparin  Infusion.  Unit(s)/Hr IV Continuous <Continuous>  insulin glargine Injectable (LANTUS) 25 Unit(s) SubCutaneous at bedtime  insulin lispro (ADMELOG) corrective regimen sliding scale   SubCutaneous three times a day before meals  insulin lispro (ADMELOG) corrective regimen sliding scale   SubCutaneous at bedtime  insulin lispro Injectable (ADMELOG) 3 Unit(s) SubCutaneous three times a day before meals  labetalol 200 milliGRAM(s) Oral two times a day  NIFEdipine XL 90 milliGRAM(s) Oral daily  sodium bicarbonate 1300 milliGRAM(s) Oral three times a day  ticagrelor 90 milliGRAM(s) Oral every 12 hours      Vitals:  T(F): 97.5 (10-07), Max: 98 (10-06)  HR: 72 (10-07) (70 - 77)  BP: 132/79 (10-07) (108/65 - 134/80)  RR: 18 (10-07)  SpO2: 94% (10-07)  I&O's Summary    06 Oct 2021 07:01  -  07 Oct 2021 07:00  --------------------------------------------------------  IN: 970 mL / OUT: 300 mL / NET: 670 mL        Physical Exam:  GENERAL:  Alert, no distress  HEENT: Normocephalic, EOM intact, PERRLA  NECK: Normal carotid upstrokes, no bruit; No JVD  CHEST:  significant wheezing diffusely  HEART: PMI not displaced. Soft S1 and S2.  No murmur, rub or gallop appreciated.  ABDOMEN: Normal bowel sounds, no bruit. Soft, non-tender.  Liver and spleen not palpable; aorta not palpable.  EXT:  Trace - 1+ pre-tib and pedal edema.  PSYCH:  A&Ox3,   NEURO: Non-focal  SKIN:  No rash                           8.0    9.02  )-----------( 209      ( 06 Oct 2021 04:48 )             24.6     10-06    135  |  106  |  73<H>  ----------------------------<  422<H>  5.0   |  13<L>  |  4.15<H>    Ca    9.1      06 Oct 2021 04:48  Mg     1.6     10-06    TPro  7.0  /  Alb  2.9<L>  /  TBili  0.3  /  DBili  x   /  AST  159<H>  /  ALT  34  /  AlkPhos  140<H>  10-05    PT/INR - ( 05 Oct 2021 17:02 )   PT: 12.7 sec;   INR: 1.06 ratio         PTT - ( 07 Oct 2021 03:02 )  PTT:78.6 sec  CARDIAC MARKERS ( 06 Oct 2021 04:48 )  6029 ng/L / x     / x     / x     / x     / x      CARDIAC MARKERS ( 06 Oct 2021 01:05 )  7177 ng/L / x     / x     / 893 U/L / x     / 92.0 ng/mL  CARDIAC MARKERS ( 05 Oct 2021 19:54 )  8021 ng/L / x     / x     / x     / x     / x      CARDIAC MARKERS ( 05 Oct 2021 17:02 )  >48288 ng/L / x     / x     / 1326 U/L / x     / 144.1 ng/mL      Serum Pro-Brain Natriuretic Peptide: 23329 pg/mL (10-05 @ 17:02)          New ECG(s): Personally reviewed    < from: TTE with Doppler (w/Cont) (10.06.21 @ 09:05) >  1. Mitral annular calcification, otherwise normal mitral  valve. Moderate-severe mitral regurgitation. /77  2. Mild -moderate segmental left ventricular systolic  dysfunction. Endocardial visualization enhanced with  intravenous injection of Ultrasonic Enhancing Agent  (Definity).  Limited evaluation. The inferior wall is  hypokinetic. The apical cap is akinetic.  The anterior wall  was not well seen despite Definity. The anterolateral wal  is also not well seen and may be hypokinetic.  3. The right ventricle is not well visualized; grossly  normal right ventricular systolic function.    < end of copied text >   Patient seen and examined at bedside.    Overnight Events: patient in respiratory distress this am    Review Of Systems: No chest pain, shortness of breath, or palpitations            Current Meds:  aspirin  chewable 81 milliGRAM(s) Oral daily  atorvastatin 80 milliGRAM(s) Oral at bedtime  buMETAnide Injectable 1 milliGRAM(s) IV Push daily  dextrose 40% Gel 15 Gram(s) Oral once  dextrose 5%. 1000 milliLiter(s) IV Continuous <Continuous>  dextrose 5%. 1000 milliLiter(s) IV Continuous <Continuous>  dextrose 50% Injectable 25 Gram(s) IV Push once  dextrose 50% Injectable 12.5 Gram(s) IV Push once  dextrose 50% Injectable 25 Gram(s) IV Push once  glucagon  Injectable 1 milliGRAM(s) IntraMuscular once  heparin   Injectable 5300 Unit(s) IV Push every 6 hours PRN  heparin  Infusion.  Unit(s)/Hr IV Continuous <Continuous>  insulin glargine Injectable (LANTUS) 25 Unit(s) SubCutaneous at bedtime  insulin lispro (ADMELOG) corrective regimen sliding scale   SubCutaneous three times a day before meals  insulin lispro (ADMELOG) corrective regimen sliding scale   SubCutaneous at bedtime  insulin lispro Injectable (ADMELOG) 3 Unit(s) SubCutaneous three times a day before meals  labetalol 200 milliGRAM(s) Oral two times a day  NIFEdipine XL 90 milliGRAM(s) Oral daily  sodium bicarbonate 1300 milliGRAM(s) Oral three times a day  ticagrelor 90 milliGRAM(s) Oral every 12 hours      Vitals:  T(F): 97.5 (10-07), Max: 98 (10-06)  HR: 72 (10-07) (70 - 77)  BP: 132/79 (10-07) (108/65 - 134/80)  RR: 18 (10-07)  SpO2: 94% (10-07)    Physical Exam:  GENERAL:  Alert, no distress  HEENT: Normocephalic, EOM intact, PERRLA  NECK: Normal carotid upstrokes, no bruit; No JVD  CHEST:  significant wheezing diffusely  HEART: PMI not displaced. Soft S1 and S2.  No murmur, rub or gallop appreciated.  ABDOMEN: Normal bowel sounds, no bruit. Soft, non-tender.  Liver and spleen not palpable; aorta not palpable.  EXT:  Trace - 1+ pre-tib and pedal edema.  PSYCH:  A&Ox3,   NEURO: Non-focal  SKIN:  No rash                I&O's Summary  06 Oct 2021 07:01  -  07 Oct 2021 07:00  --------------------------------------------------------  IN: 970 mL / OUT: 300 mL / NET: 670 mL      LABS:             8.0    9.02  )-----------( 209      ( 06 Oct 2021 04:48 )             24.6     10-06  135  |  106  |  73<H>  ----------------------------<  422<H>  5.0   |  13<L>  |  4.15<H>    Ca    9.1      06 Oct 2021 04:48  Mg     1.6     10-06    TPro  7.0  /  Alb  2.9<L>  /  TBili  0.3  /  DBili  x   /  AST  159<H>  /  ALT  34  /  AlkPhos  140<H>  10-05    PT/INR - ( 05 Oct 2021 17:02 )   PT: 12.7 sec;   INR: 1.06 ratio    PTT - ( 07 Oct 2021 03:02 )  PTT:78.6 sec    CARDIAC MARKERS ( 06 Oct 2021 04:48 )  6029 ng/L / x     / x     / x     / x     / x      CARDIAC MARKERS ( 06 Oct 2021 01:05 )  7177 ng/L / x     / x     / 893 U/L / x     / 92.0 ng/mL  CARDIAC MARKERS ( 05 Oct 2021 19:54 )  8021 ng/L / x     / x     / x     / x     / x      CARDIAC MARKERS ( 05 Oct 2021 17:02 )  >16098 ng/L / x     / x     / 1326 U/L / x     / 144.1 ng/mL    Serum Pro-Brain Natriuretic Peptide: 00378 pg/mL (10-05 @ 17:02)      TTE with Doppler (w/Cont) (10.06.21 @ 09:05)   1. Mitral annular calcification, otherwise normal mitral valve. Moderate-severe mitral regurgitation. /77  2. Mild -moderate segmental left ventricular systolic dysfunction. Endocardial visualization enhanced with intravenous injection of Ultrasonic Enhancing Agent (Definity).  Limited evaluation. The inferior wall is hypokinetic. The apical cap is akinetic.  The anterior wall was not well seen despite Definity. The anterolateral wall is also not well seen and may be hypokinetic.   3. The right ventricle is not well visualized; grossly normal right ventricular systolic function.

## 2021-10-07 NOTE — RAPID RESPONSE TEAM SUMMARY - NSOTHERINTERVENTIONSRRT_GEN_ALL_CORE
Assessment:   sx concerning for acute CVA given acuity and new facial droop  Plan:   - Heparin stopped.   - Pt already on ASA and brillanta.  - CTA head and neck performed   - f/u MRI brain if continued AMS  - not TPA candidate given pt on AC   - restart heparin if ok per neuro   - sp Code stroke: f/u neuro recs  - obtain speech and swallow   - aspiration precautions   cdw primary team

## 2021-10-07 NOTE — PROGRESS NOTE ADULT - SUBJECTIVE AND OBJECTIVE BOX
Patient is a 60y old  Male who presents with a chief complaint of NSTEMI (07 Oct 2021 09:45)      SUBJECTIVE / OVERNIGHT EVENTS: No ON events. This AM, was in normal mental state, complained of some WYNNE/leg swelling. Denies cp, palpitations, n/v, dizziness, focal weakness. About 30 min later, stroke code was called for AMS, facial droop, SBP , Later in day patient mental status and facial droop resolved back to normal     No melena, hematochezia, back pain reported.     Tele reviewed: sinus 60-80      ADDITIONAL REVIEW OF SYSTEMS: Negative except for above    MEDICATIONS  (STANDING):  aspirin  chewable 81 milliGRAM(s) Oral daily  atorvastatin 80 milliGRAM(s) Oral at bedtime  dextrose 40% Gel 15 Gram(s) Oral once  dextrose 5%. 1000 milliLiter(s) (50 mL/Hr) IV Continuous <Continuous>  dextrose 5%. 1000 milliLiter(s) (100 mL/Hr) IV Continuous <Continuous>  dextrose 50% Injectable 25 Gram(s) IV Push once  dextrose 50% Injectable 12.5 Gram(s) IV Push once  dextrose 50% Injectable 25 Gram(s) IV Push once  glucagon  Injectable 1 milliGRAM(s) IntraMuscular once  insulin glargine Injectable (LANTUS) 25 Unit(s) SubCutaneous at bedtime  insulin lispro (ADMELOG) corrective regimen sliding scale   SubCutaneous three times a day before meals  insulin lispro (ADMELOG) corrective regimen sliding scale   SubCutaneous at bedtime  insulin lispro Injectable (ADMELOG) 3 Unit(s) SubCutaneous three times a day before meals  sodium bicarbonate 1300 milliGRAM(s) Oral three times a day  sodium chloride 0.9% Bolus 250 milliLiter(s) IV Bolus once  ticagrelor 90 milliGRAM(s) Oral every 12 hours    MEDICATIONS  (PRN):      CAPILLARY BLOOD GLUCOSE  243 (07 Oct 2021 12:08)      POCT Blood Glucose.: 243 mg/dL (07 Oct 2021 11:11)  POCT Blood Glucose.: 198 mg/dL (07 Oct 2021 07:32)  POCT Blood Glucose.: 279 mg/dL (06 Oct 2021 21:22)  POCT Blood Glucose.: 279 mg/dL (06 Oct 2021 15:56)    I&O's Summary    06 Oct 2021 07:01  -  07 Oct 2021 07:00  --------------------------------------------------------  IN: 970 mL / OUT: 300 mL / NET: 670 mL    07 Oct 2021 07:01  -  07 Oct 2021 14:42  --------------------------------------------------------  IN: 260 mL / OUT: 0 mL / NET: 260 mL        PHYSICAL EXAM:  Vital Signs Last 24 Hrs  T(C): 36.6 (07 Oct 2021 11:00), Max: 36.7 (06 Oct 2021 16:01)  T(F): 97.9 (07 Oct 2021 11:00), Max: 98 (06 Oct 2021 16:01)  HR: 67 (07 Oct 2021 12:20) (67 - 77)  BP: 90/59 (07 Oct 2021 12:20) (90/59 - 134/80)  BP(mean): --  RR: 18 (07 Oct 2021 12:20) (18 - 18)  SpO2: 99% (07 Oct 2021 12:20) (94% - 99%)    PHYSICAL EXAM:  GENERAL: NAD, well-developed  NECK: Supple, No JVD  CHEST/LUNG: Clear to auscultation bilaterally;   HEART: Regular rate and rhythm;   ABDOMEN: Soft, Nontender, Nondistended; Bowel sounds present  EXTREMITIES:  2+ Peripheral Pulses, 2+ pitting edema b/l LE  PSYCH: AAOx3  NEUROLOGY: non-focal (facial droop resolved since AM)   SKIN: No rashes or lesions    LABS:                        6.7    9.43  )-----------( 194      ( 07 Oct 2021 11:40 )             21.1     10-07    137  |  103  |  98<H>  ----------------------------<  207<H>  4.5   |  17<L>  |  5.20<H>    Ca    8.8      07 Oct 2021 11:40  Phos  6.1     10-07  Mg     2.0     10-07    TPro  5.9<L>  /  Alb  2.6<L>  /  TBili  0.3  /  DBili  x   /  AST  32  /  ALT  24  /  AlkPhos  109  10-07    PT/INR - ( 07 Oct 2021 11:40 )   PT: 13.3 sec;   INR: 1.11 ratio         PTT - ( 07 Oct 2021 11:38 )  PTT:71.6 sec  CARDIAC MARKERS ( 07 Oct 2021 11:38 )  x     / x     / 309 U/L / x     / 26.8 ng/mL  CARDIAC MARKERS ( 06 Oct 2021 01:05 )  x     / x     / 893 U/L / x     / 92.0 ng/mL  CARDIAC MARKERS ( 05 Oct 2021 17:02 )  x     / x     / 1326 U/L / x     / 144.1 ng/mL      Urinalysis Basic - ( 07 Oct 2021 10:09 )    Color: Light Yellow / Appearance: Clear / S.015 / pH: x  Gluc: x / Ketone: Negative  / Bili: Negative / Urobili: Negative   Blood: x / Protein: 300 mg/dL / Nitrite: Negative   Leuk Esterase: Negative / RBC: 2 /hpf / WBC 7 /HPF   Sq Epi: x / Non Sq Epi: 2 /hpf / Bacteria: Negative          RADIOLOGY & ADDITIONAL TESTS:    Imaging Personally Reviewed: TTE: conclusions:  1. Mitral annular calcification, otherwise normal mitral  valve. Moderate-severe mitral regurgitation. /77  2. Mild -moderate segmental left ventricular systolic  dysfunction. Endocardial visualization enhanced with  intravenous injection of Ultrasonic Enhancing Agent  (Definity).  Limited evaluation. The inferior wall is  hypokinetic. The apical cap is akinetic.  The anterior wall  was not well seen despite Definity. The anterolateral wal  is also not well seen and may be hypokinetic.  3. The right ventricle is not well visualized; grossly  normal right ventricular systolic function.    Electrocardiogram Personally Reviewed:    COORDINATION OF CARE:  Care Discussed with Consultants/Other Providers [Y/N]:  Prior or Outpatient Records Reviewed [Y/N]:

## 2021-10-07 NOTE — PROGRESS NOTE ADULT - ASSESSMENT
59 yo M with PMH of HTN & DM.  Presented to [facility] with CP and dyspnea and marked HTN.  Transferred for N-STEMI/CHF, hypertensive emergency and ARF.  On arrival here , found to be in sherie nonoliguric renal failure with increased work of breathing and evidence of volume overload due to ?ADHF vs. renal failure. Elevated cardiac enzymes at OSH c/f N-STEMI, however patient chest pain free after NTG.    Clinically Improved at present with resolution of CP and dyspnea.      REC:    1.  Elevated troponin and CK/MB, N-STEMI , in setting of renal failure and significant HTN emergency; EKG was unremarkable on admission.  - continue ASA & Brilinta  - continue IV heparin  - continue telemetry  - Will benefit from ischemic evaluation in future when optimized from volume and renal standpoint.    2.  HTN emergency  - appreciate renal input  - continue nifedipine and labetalol  - continue diuresis    3.  Volume overload  - patient in respiratory distress this am  - if patient is making urine, would recommend bumex 4 mg BID with once daily diuril 500 given 30 mins prior to bumex. 1 mg of IV bumex (equivalent to IV lasix 20) will not adequately diurese this patient and neither will IV lasix 40  - However, given his worsening renal function, will defer to nephrology for plans for HD for volume removal    4.  HLD  - continue statin     59 yo M with PMH of HTN & DM.  Presented to [facility] with CP and dyspnea and marked HTN.  Transferred for N-STEMI/CHF, hypertensive emergency and ARF.  On arrival here , found to be in sherie nonoliguric renal failure with increased work of breathing and evidence of volume overload due to ?ADHF vs. renal failure. Elevated cardiac enzymes at OSH c/f N-STEMI, however patient chest pain free after NTG.    Clinically Improved at present with resolution of CP and dyspnea.      REC:    1.  Elevated troponin and CK/MB, N-STEMI , in setting of renal failure and significant HTN emergency; EKG was unremarkable on admission.  TTE shows segmental LV dysfunction with mild to moderate systolic dysfunction.  - continue ASA & Brilinta  - continue IV heparin  - continue diuresis  - continue telemetry  - Will benefit from ischemic evaluation in future when optimized from volume and renal standpoint.    2.  HTN emergency  - appreciate renal input  - continue nifedipine and labetalol  - continue diuresis    3.  Volume overload  - patient in respiratory distress this am  - if patient is making urine, would recommend bumex 4 mg BID with once daily diuril 500 given 30 mins prior to bumex. 1 mg of IV bumex (equivalent to IV lasix 20) will not adequately diurese this patient and neither will IV lasix 40  - However, given his worsening renal function, will defer to nephrology for plans for HD for volume removal    4.  HLD  - continue statin    5.  Moderate to severe MR on TTE  - continue diuresis      Breanna Nelson M.D.  Cardiology fellow    Plan discussed with cardiology fellow; patient seen and examined.       I was physically present for the key portions of the evaluation and management (E/M) service provided.    I agree with the above history, physical, and plan which I have reviewed and edited where appropriate.   Oc Davis M.D.  Cardiology Attending, Consult Service    For Cardiology consults and questions, all Cardiology service information can be found 24/7 on [email or web address], password: [email or web address]

## 2021-10-07 NOTE — CONSULT NOTE ADULT - ATTENDING COMMENTS
Patient seen/examined. Patient with high grade left ICA stenosis and left frontal CVA (old?) presents with new transient left sided facial droop following episode of bradycardia and hypotension. On exam AAOx3, motor/sensation intact. Awaiting MRI brain. Etiology of transient left sided facial asymmetry unclear, though likely related to poor cerebral perfusion in setting of bradycardia/hypotension. Will likely require left CEA in near future once current condition fully evaluated and cardiac and medical status optimized.

## 2021-10-07 NOTE — SWALLOW BEDSIDE ASSESSMENT ADULT - COMMENTS
Per nursing notes, while at [facility], he was placed on BIPAP, given Solumedrol 120, Nitro SL x1, duoneb x2, ASA 81mg, Lasix 40mg w/ 200 cc output, and started at 10mL/hr Nitro drip then increased to 30mL/hr. There, initial /126 retake was 171/67. Nitro drip was d/c'd upon arrival to [facility] ED.  Labs remarkable for JOSHUA, metabolic acidosis and hyperkalemia. Patient denies prior history of renal injury.   Of note, patient w/ elevated BP to SBP 200s often at home; nifedipine used on a "as needed" basis for SBP > 200.     10/7: Code stroke called for L facial droop, dysarthria and confusion.  CTH IMPRESSION: No hemorrhage. 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 High-grade stenosis left internal carotid artery at the carotid bifurcation in the neck. Normal intracranial circulation.

## 2021-10-07 NOTE — SWALLOW BEDSIDE ASSESSMENT ADULT - SWALLOW EVAL: DIAGNOSIS
Patient presented as a transfer from [facility] for NSTEMI and r/o CHF. Found to be in hypertensive emergency - s/p code stroke for AMS, slurred speech, and L facial droop. Patient seen for bedside swallow evaluation and exam was significantly limited 2/2 c/o shortness of breath after ~3 tsp of applesauce. For trials accepted pt presented with anterior loss of bolus, timely oral transit time and trigger of the swallow, complete oral clearance post swallow, and no episodes of coughing/throat clearing. Further PO trials deferred given c/o respiratory discomfort - BLANCHE Oconnell informed and came to pt's bedside to assess.

## 2021-10-07 NOTE — CONSULT NOTE ADULT - ASSESSMENT
ASSESSMENT:  Chriss Mattson is a very pleasant 60 year old gentleman with history of DM type 2, HTN, HLD presenting as transfer from [facility] for chest pain concerning for NSTEMI, code stroke also called for L facial droop, CTA significant for ipsilateral high-grade stenosis of the left internal carotid artery at the carotid bifurcation for which vascular surgery was consulted    PLAN:  - Recommend bilateral carotid duplex US  - ASA/Statin  - F/u neurology for further workup of neuro deficits, unlikely vascular cause since deficits are ipsilateral to side of carotid stenosis  - Will plan for intervention pending duplex results and medical optimization  - Discussed with vascular surgery fellow and attending  - Will follow    Chet Ugalde, PGY 4  Vascular Surgery [phone]

## 2021-10-07 NOTE — PROGRESS NOTE ADULT - PROBLEM SELECTOR PLAN 3
acute CHF with dyspnea likely related to NSTEMI, Elevated BNP 78613, fluid overloaded on exam  - TTE - TTE EF 45%: mild-moderate LVSF  - given dropping bp and hgb, bumex currently on hold but consider giving IV bumex 4mg prn if get sob, currently stable on 3L NC  - strict I/O, daily weights  - monitor bmp, replete lytes as needed

## 2021-10-08 LAB
% ALBUMIN: 44.1 % — SIGNIFICANT CHANGE UP
% ALPHA 1: 7.6 % — SIGNIFICANT CHANGE UP
% ALPHA 2: 21.1 % — SIGNIFICANT CHANGE UP
% BETA: 16.4 % — SIGNIFICANT CHANGE UP
% GAMMA: 10.8 % — SIGNIFICANT CHANGE UP
ALBUMIN SERPL ELPH-MCNC: 2.6 G/DL — LOW (ref 3.6–5.5)
ALBUMIN/GLOB SERPL ELPH: 0.8 RATIO — SIGNIFICANT CHANGE UP
ALPHA1 GLOB SERPL ELPH-MCNC: 0.4 G/DL — SIGNIFICANT CHANGE UP (ref 0.1–0.4)
ALPHA2 GLOB SERPL ELPH-MCNC: 1.2 G/DL — HIGH (ref 0.5–1)
ANA TITR SER: NEGATIVE — SIGNIFICANT CHANGE UP
ANION GAP SERPL CALC-SCNC: 18 MMOL/L — HIGH (ref 5–17)
B-GLOBULIN SERPL ELPH-MCNC: 1 G/DL — SIGNIFICANT CHANGE UP (ref 0.5–1)
BUN SERPL-MCNC: 100 MG/DL — HIGH (ref 7–23)
C3 SERPL-MCNC: 135 MG/DL — SIGNIFICANT CHANGE UP (ref 81–157)
C4 SERPL-MCNC: 71 MG/DL — HIGH (ref 13–39)
CALCIUM SERPL-MCNC: 8.5 MG/DL — SIGNIFICANT CHANGE UP (ref 8.4–10.5)
CHLORIDE SERPL-SCNC: 104 MMOL/L — SIGNIFICANT CHANGE UP (ref 96–108)
CO2 SERPL-SCNC: 16 MMOL/L — LOW (ref 22–31)
CREAT SERPL-MCNC: 5.42 MG/DL — HIGH (ref 0.5–1.3)
GAMMA GLOBULIN: 0.6 G/DL — SIGNIFICANT CHANGE UP (ref 0.6–1.6)
GLUCOSE BLDC GLUCOMTR-MCNC: 106 MG/DL — HIGH (ref 70–99)
GLUCOSE BLDC GLUCOMTR-MCNC: 118 MG/DL — HIGH (ref 70–99)
GLUCOSE BLDC GLUCOMTR-MCNC: 153 MG/DL — HIGH (ref 70–99)
GLUCOSE BLDC GLUCOMTR-MCNC: 226 MG/DL — HIGH (ref 70–99)
GLUCOSE SERPL-MCNC: 109 MG/DL — HIGH (ref 70–99)
HCT VFR BLD CALC: 25.9 % — LOW (ref 39–50)
HCT VFR BLD CALC: 26.2 % — LOW (ref 39–50)
HGB BLD-MCNC: 8.2 G/DL — LOW (ref 13–17)
HGB BLD-MCNC: 8.2 G/DL — LOW (ref 13–17)
INTERPRETATION SERPL IFE-IMP: SIGNIFICANT CHANGE UP
KAPPA LC SER QL IFE: 10.89 MG/DL — HIGH (ref 0.33–1.94)
KAPPA/LAMBDA FREE LIGHT CHAIN RATIO, SERUM: 0.87 RATIO — SIGNIFICANT CHANGE UP (ref 0.26–1.65)
LAMBDA LC SER QL IFE: 12.51 MG/DL — HIGH (ref 0.57–2.63)
MCHC RBC-ENTMCNC: 31.1 PG — SIGNIFICANT CHANGE UP (ref 27–34)
MCHC RBC-ENTMCNC: 31.3 GM/DL — LOW (ref 32–36)
MCHC RBC-ENTMCNC: 31.3 PG — SIGNIFICANT CHANGE UP (ref 27–34)
MCHC RBC-ENTMCNC: 31.7 GM/DL — LOW (ref 32–36)
MCV RBC AUTO: 100 FL — SIGNIFICANT CHANGE UP (ref 80–100)
MCV RBC AUTO: 98.1 FL — SIGNIFICANT CHANGE UP (ref 80–100)
NRBC # BLD: 0 /100 WBCS — SIGNIFICANT CHANGE UP (ref 0–0)
NRBC # BLD: 0 /100 WBCS — SIGNIFICANT CHANGE UP (ref 0–0)
PLATELET # BLD AUTO: 219 K/UL — SIGNIFICANT CHANGE UP (ref 150–400)
PLATELET # BLD AUTO: 221 K/UL — SIGNIFICANT CHANGE UP (ref 150–400)
POTASSIUM SERPL-MCNC: 4.6 MMOL/L — SIGNIFICANT CHANGE UP (ref 3.5–5.3)
POTASSIUM SERPL-SCNC: 4.6 MMOL/L — SIGNIFICANT CHANGE UP (ref 3.5–5.3)
PROT PATTERN SERPL ELPH-IMP: SIGNIFICANT CHANGE UP
PROT SERPL-MCNC: 5.1 G/DL — LOW (ref 6–8.3)
PROT SERPL-MCNC: 5.9 G/DL — LOW (ref 6–8.3)
RBC # BLD: 2.62 M/UL — LOW (ref 4.2–5.8)
RBC # BLD: 2.64 M/UL — LOW (ref 4.2–5.8)
RBC # FLD: 14.8 % — HIGH (ref 10.3–14.5)
RBC # FLD: 15 % — HIGH (ref 10.3–14.5)
SODIUM SERPL-SCNC: 138 MMOL/L — SIGNIFICANT CHANGE UP (ref 135–145)
WBC # BLD: 10.73 K/UL — HIGH (ref 3.8–10.5)
WBC # BLD: 9.22 K/UL — SIGNIFICANT CHANGE UP (ref 3.8–10.5)
WBC # FLD AUTO: 10.73 K/UL — HIGH (ref 3.8–10.5)
WBC # FLD AUTO: 9.22 K/UL — SIGNIFICANT CHANGE UP (ref 3.8–10.5)

## 2021-10-08 PROCEDURE — 99233 SBSQ HOSP IP/OBS HIGH 50: CPT

## 2021-10-08 PROCEDURE — 74230 X-RAY XM SWLNG FUNCJ C+: CPT | Mod: 26

## 2021-10-08 PROCEDURE — 93880 EXTRACRANIAL BILAT STUDY: CPT | Mod: 26

## 2021-10-08 PROCEDURE — 99233 SBSQ HOSP IP/OBS HIGH 50: CPT | Mod: GC

## 2021-10-08 RX ORDER — INSULIN GLARGINE 100 [IU]/ML
18 INJECTION, SOLUTION SUBCUTANEOUS AT BEDTIME
Refills: 0 | Status: DISCONTINUED | OUTPATIENT
Start: 2021-10-08 | End: 2021-10-21

## 2021-10-08 RX ORDER — BUMETANIDE 0.25 MG/ML
4 INJECTION INTRAMUSCULAR; INTRAVENOUS EVERY 12 HOURS
Refills: 0 | Status: DISCONTINUED | OUTPATIENT
Start: 2021-10-08 | End: 2021-10-21

## 2021-10-08 RX ADMIN — Medication 81 MILLIGRAM(S): at 10:59

## 2021-10-08 RX ADMIN — TICAGRELOR 90 MILLIGRAM(S): 90 TABLET ORAL at 21:47

## 2021-10-08 RX ADMIN — Medication 3 UNIT(S): at 07:45

## 2021-10-08 RX ADMIN — Medication 1300 MILLIGRAM(S): at 13:22

## 2021-10-08 RX ADMIN — INSULIN GLARGINE 18 UNIT(S): 100 INJECTION, SOLUTION SUBCUTANEOUS at 21:46

## 2021-10-08 RX ADMIN — Medication 1300 MILLIGRAM(S): at 05:28

## 2021-10-08 RX ADMIN — PANTOPRAZOLE SODIUM 40 MILLIGRAM(S): 20 TABLET, DELAYED RELEASE ORAL at 19:52

## 2021-10-08 RX ADMIN — TICAGRELOR 90 MILLIGRAM(S): 90 TABLET ORAL at 10:59

## 2021-10-08 RX ADMIN — Medication 1300 MILLIGRAM(S): at 21:47

## 2021-10-08 RX ADMIN — PANTOPRAZOLE SODIUM 40 MILLIGRAM(S): 20 TABLET, DELAYED RELEASE ORAL at 05:28

## 2021-10-08 RX ADMIN — ATORVASTATIN CALCIUM 80 MILLIGRAM(S): 80 TABLET, FILM COATED ORAL at 21:47

## 2021-10-08 RX ADMIN — BUMETANIDE 132 MILLIGRAM(S): 0.25 INJECTION INTRAMUSCULAR; INTRAVENOUS at 10:48

## 2021-10-08 RX ADMIN — Medication 1: at 16:24

## 2021-10-08 RX ADMIN — BUMETANIDE 132 MILLIGRAM(S): 0.25 INJECTION INTRAMUSCULAR; INTRAVENOUS at 21:08

## 2021-10-08 NOTE — OCCUPATIONAL THERAPY INITIAL EVALUATION ADULT - ADDITIONAL COMMENTS
He initially took TUMS and drank some milk which alleviated some of the pain but not completely. Later he also started feeling very dizzy so he went to the ED. Denies associated palpitations, diaphoresis, N/V.  When he presented to [facility] he was noted to be SOB. Initially, they evaluated patient for CHF but Troponin and BNP levels were elevated so he was transferred here for NSTEMI and r/o CHF. s/p code stroke 10/7

## 2021-10-08 NOTE — PROGRESS NOTE ADULT - PROBLEM SELECTOR PLAN 1
-no further chest pain  -troponin downtrending 5000K from 62755J, no need to further trend unless CP or change in HD status, etc  - stopped heparin ggt per cards  - c/w brilinta 90 mg BID and ASA 81 mg daily  - c/w atorvastatin 80 mg qhs  - eventual ischemic eval pending improvement in kidney function/fluid status  - cardio following, appreciate recs  - if repeat chest pain, obtain cardiac enzymes, ekg   - monitor on tele  - TTE EF 45%: mild-moderate LVSF

## 2021-10-08 NOTE — SWALLOW VFSS/MBS ASSESSMENT ADULT - DIAGNOSTIC IMPRESSIONS
Pt is a 59 y/o M admitted as transfer for CP concerning for NSTEMI and possible CHF. Pt now presents with a grossly functional oropharyngeal swallow sequence. Pt noted with episodes of trace laryngeal penetration of thin liquids with complete retrieval which is a normal variant.

## 2021-10-08 NOTE — PROGRESS NOTE ADULT - PROBLEM SELECTOR PLAN 5
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   -neuro rec brain MRI and permissive HTN  -speech rec NPO except meds until MBS  -PT/OT  -CTA: High-grade stenosis left internal carotid artery at the carotid bifurcation in the neck.  -vascular surgery consulted; rec carotid duplex and possible intervention this admission  -c/w asa/brillanta, statin

## 2021-10-08 NOTE — PROGRESS NOTE ADULT - PROBLEM SELECTOR PLAN 10
DVT ppx: hsq starting rajendra marrufo for now    Discussed with NP Cheryl,     prognosis guarded  low threshold for MICU or CCU consult if change in clinical status

## 2021-10-08 NOTE — PROGRESS NOTE ADULT - ASSESSMENT
60M with history of DM type 2, HTN, HLD presenting as transfer from [facility] for chest pain concerning for NSTEMI, code stroke also called for L facial droop, CTA significant for ipsilateral high-grade stenosis of the left internal carotid artery at the carotid bifurcation for which vascular surgery was consulted    PLAN:  - Recommend bilateral carotid duplex US  - ASA/Statin  - F/u neurology for further workup of neuro deficits, unlikely vascular cause since deficits are ipsilateral to side of carotid stenosis  - Will plan for intervention pending duplex results and medical optimization  - Will follow    Vascular Surgery   [phone]

## 2021-10-08 NOTE — PROGRESS NOTE ADULT - PROBLEM SELECTOR PLAN 3
acute CHF with dyspnea likely related to NSTEMI, Elevated BNP 22275, fluid overloaded on exam with sob  - cards/renal rec starting IV bumex 4mg q12  - TTE - TTE EF 45%: mild-moderate LVSF  - strict I/O, daily weights  - monitor bmp, replete lytes as needed  -c/w NC, wean as tolerated

## 2021-10-08 NOTE — SWALLOW VFSS/MBS ASSESSMENT ADULT - SLP PERTINENT HISTORY OF CURRENT PROBLEM
60 yr old M w/ hx of DM2, HTN and HLD presents as transfer from [facility] for chest pain concerning for NSTEMI and possible CHF. Pt states that after he woke up this morning he felt midsternal chest pain this morning, that felt like a burning sensation. Pain was non radiating. Associated with shortness of breath. He initially took TUMS and drank some milk which alleviated some of the pain but not completely. Later he also started feeling very dizzy so he went to the ED. Denies associated palpitations, diaphoresis, N/V.  When he presented to [facility] he was noted to be SOB. Initially, they evaluated patient for CHF but Troponin and BNP levels were elevated so he was transferred here for NSTEMI and r/o CHF.

## 2021-10-08 NOTE — PROGRESS NOTE ADULT - SUBJECTIVE AND OBJECTIVE BOX
Patient is a 60y old  Male who presents with a chief complaint of NSTEMI (08 Oct 2021 10:53)      SUBJECTIVE / OVERNIGHT EVENTS: No ON events. Reports some sob/barrientos/leg swelling. Denies cp, dizziness, dysphagia, focal weakness.     Tele reviewed: sinus       ADDITIONAL REVIEW OF SYSTEMS: Negative except for above    MEDICATIONS  (STANDING):  aspirin  chewable 81 milliGRAM(s) Oral daily  atorvastatin 80 milliGRAM(s) Oral at bedtime  buMETAnide IVPB 4 milliGRAM(s) IV Intermittent every 12 hours  dextrose 40% Gel 15 Gram(s) Oral once  dextrose 5%. 1000 milliLiter(s) (50 mL/Hr) IV Continuous <Continuous>  dextrose 5%. 1000 milliLiter(s) (100 mL/Hr) IV Continuous <Continuous>  dextrose 50% Injectable 25 Gram(s) IV Push once  dextrose 50% Injectable 12.5 Gram(s) IV Push once  dextrose 50% Injectable 25 Gram(s) IV Push once  glucagon  Injectable 1 milliGRAM(s) IntraMuscular once  insulin glargine Injectable (LANTUS) 18 Unit(s) SubCutaneous at bedtime  insulin lispro (ADMELOG) corrective regimen sliding scale   SubCutaneous three times a day before meals  insulin lispro (ADMELOG) corrective regimen sliding scale   SubCutaneous at bedtime  pantoprazole  Injectable 40 milliGRAM(s) IV Push every 12 hours  sodium bicarbonate 1300 milliGRAM(s) Oral three times a day  ticagrelor 90 milliGRAM(s) Oral every 12 hours    MEDICATIONS  (PRN):      CAPILLARY BLOOD GLUCOSE  243 (07 Oct 2021 12:08)      POCT Blood Glucose.: 118 mg/dL (08 Oct 2021 11:28)  POCT Blood Glucose.: 106 mg/dL (08 Oct 2021 07:27)  POCT Blood Glucose.: 201 mg/dL (07 Oct 2021 21:11)  POCT Blood Glucose.: 240 mg/dL (07 Oct 2021 15:52)    I&O's Summary    07 Oct 2021 07:01  -  08 Oct 2021 07:00  --------------------------------------------------------  IN: 260 mL / OUT: 101 mL / NET: 159 mL        PHYSICAL EXAM:  Vital Signs Last 24 Hrs  T(C): 36.7 (08 Oct 2021 11:07), Max: 37 (08 Oct 2021 09:02)  T(F): 98.1 (08 Oct 2021 11:07), Max: 98.6 (08 Oct 2021 09:02)  HR: 86 (08 Oct 2021 11:07) (65 - 90)  BP: 163/79 (08 Oct 2021 11:07) (90/59 - 163/81)  BP(mean): --  RR: 20 (08 Oct 2021 11:07) (18 - 20)  SpO2: 96% (08 Oct 2021 11:07) (95% - 100%)      PHYSICAL EXAM:  GENERAL: NAD, well-developed  NECK: Supple, No JVD  CHEST/LUNG: decrease bs b/l bases  HEART: Regular rate and rhythm;   ABDOMEN: Soft, Nontender, Nondistended; Bowel sounds present  EXTREMITIES:  2+ Peripheral Pulses, 2+ pitting edema b/l LE  PSYCH: AAOx3  NEUROLOGY: non-focalSKIN: No rashes or lesions        LABS:                        8.2    9.22  )-----------( 219      ( 08 Oct 2021 09:06 )             26.2     10-08    138  |  104  |  100<H>  ----------------------------<  109<H>  4.6   |  16<L>  |  5.42<H>    Ca    8.5      08 Oct 2021 05:08  Phos  6.6     10-07  Mg     1.9     10-07    TPro  5.9<L>  /  Alb  2.6<L>  /  TBili  0.3  /  DBili  x   /  AST  32  /  ALT  24  /  AlkPhos  109  10-07    PT/INR - ( 07 Oct 2021 11:40 )   PT: 13.3 sec;   INR: 1.11 ratio         PTT - ( 07 Oct 2021 11:38 )  PTT:71.6 sec  CARDIAC MARKERS ( 07 Oct 2021 11:38 )  x     / x     / 309 U/L / x     / 26.8 ng/mL      Urinalysis Basic - ( 07 Oct 2021 10:09 )    Color: Light Yellow / Appearance: Clear / S.015 / pH: x  Gluc: x / Ketone: Negative  / Bili: Negative / Urobili: Negative   Blood: x / Protein: 300 mg/dL / Nitrite: Negative   Leuk Esterase: Negative / RBC: 2 /hpf / WBC 7 /HPF   Sq Epi: x / Non Sq Epi: 2 /hpf / Bacteria: Negative          RADIOLOGY & ADDITIONAL TESTS:    Imaging Personally Reviewed:    Electrocardiogram Personally Reviewed:    COORDINATION OF CARE:  Care Discussed with Consultants/Other Providers [Y/N]:  Prior or Outpatient Records Reviewed [Y/N]:

## 2021-10-08 NOTE — PROGRESS NOTE ADULT - PROBLEM SELECTOR PLAN 2
Blood pressure initially 200/105 at arrival; s/p nitro ggt at [place], noncompliant with meds  -per neuro, Keep BP permissive up to 220/110 for 48 hours followed by gradual normotension over 2-3 days. Avoid hypotension  - labetalol and nifedipine on hold to allow for permissive HTN  - hold HCTZ-losartan given Elvi on cKD  - monitor BP

## 2021-10-08 NOTE — PROGRESS NOTE ADULT - SUBJECTIVE AND OBJECTIVE BOX
Patient seen and examined at bedside.    Overnight Events:     Review Of Systems: No chest pain, shortness of breath, or palpitations            Current Meds:  aspirin  chewable 81 milliGRAM(s) Oral daily  atorvastatin 80 milliGRAM(s) Oral at bedtime  dextrose 40% Gel 15 Gram(s) Oral once  dextrose 5%. 1000 milliLiter(s) IV Continuous <Continuous>  dextrose 5%. 1000 milliLiter(s) IV Continuous <Continuous>  dextrose 50% Injectable 25 Gram(s) IV Push once  dextrose 50% Injectable 12.5 Gram(s) IV Push once  dextrose 50% Injectable 25 Gram(s) IV Push once  glucagon  Injectable 1 milliGRAM(s) IntraMuscular once  insulin glargine Injectable (LANTUS) 25 Unit(s) SubCutaneous at bedtime  insulin lispro (ADMELOG) corrective regimen sliding scale   SubCutaneous three times a day before meals  insulin lispro (ADMELOG) corrective regimen sliding scale   SubCutaneous at bedtime  insulin lispro Injectable (ADMELOG) 3 Unit(s) SubCutaneous three times a day before meals  pantoprazole  Injectable 40 milliGRAM(s) IV Push every 12 hours  sodium bicarbonate 1300 milliGRAM(s) Oral three times a day  sodium chloride 0.9% Bolus 250 milliLiter(s) IV Bolus once  ticagrelor 90 milliGRAM(s) Oral every 12 hours      Vitals:  T(F): 98.6 (10-08), Max: 98.6 (10-08)  HR: 88 (10-08) (65 - 90)  BP: 163/81 (10-08) (90/59 - 163/81)  RR: 18 (10-08)  SpO2: 96% (10-08)  I&O's Summary    07 Oct 2021 07:01  -  08 Oct 2021 07:00  --------------------------------------------------------  IN: 260 mL / OUT: 101 mL / NET: 159 mL        Physical Exam:  GENERAL:  Alert, no distress  HEENT: Normocephalic, EOM intact, PERRLA  NECK: Normal carotid upstrokes, no bruit; No JVD  CHEST:  significant wheezing diffusely  HEART: PMI not displaced. Soft S1 and S2.  No murmur, rub or gallop appreciated.  ABDOMEN: Normal bowel sounds, no bruit. Soft, non-tender.  Liver and spleen not palpable; aorta not palpable.  EXT:  Trace - 1+ pre-tib and pedal edema.  PSYCH:  A&Ox3,   NEURO: Non-focal  SKIN:  No rash                         8.2    9.22  )-----------( 219      ( 08 Oct 2021 09:06 )             26.2     10-08    138  |  104  |  100<H>  ----------------------------<  109<H>  4.6   |  16<L>  |  5.42<H>    Ca    8.5      08 Oct 2021 05:08  Phos  6.6     10-07  Mg     1.9     10-07    TPro  5.9<L>  /  Alb  2.6<L>  /  TBili  0.3  /  DBili  x   /  AST  32  /  ALT  24  /  AlkPhos  109  10-07    PT/INR - ( 07 Oct 2021 11:40 )   PT: 13.3 sec;   INR: 1.11 ratio         PTT - ( 07 Oct 2021 11:38 )  PTT:71.6 sec  CARDIAC MARKERS ( 07 Oct 2021 11:38 )  5983 ng/L / x     / x     / 309 U/L / x     / 26.8 ng/mL  CARDIAC MARKERS ( 06 Oct 2021 04:48 )  6029 ng/L / x     / x     / x     / x     / x      CARDIAC MARKERS ( 06 Oct 2021 01:05 )  7177 ng/L / x     / x     / 893 U/L / x     / 92.0 ng/mL  CARDIAC MARKERS ( 05 Oct 2021 19:54 )  8021 ng/L / x     / x     / x     / x     / x      CARDIAC MARKERS ( 05 Oct 2021 17:02 )  >22879 ng/L / x     / x     / 1326 U/L / x     / 144.1 ng/mL      Serum Pro-Brain Natriuretic Peptide: 32704 pg/mL (10-05 @ 17:02)      New ECG(s): Personally reviewed      TTE with Doppler (w/Cont) (10.06.21 @ 09:05)   1. Mitral annular calcification, otherwise normal mitral valve. Moderate-severe mitral regurgitation. /77  2. Mild -moderate segmental left ventricular systolic dysfunction. Endocardial visualization enhanced with intravenous injection of Ultrasonic Enhancing Agent (Definity).  Limited evaluation. The inferior wall is hypokinetic. The apical cap is akinetic.  The anterior wall was not well seen despite Definity. The anterolateral wall is also not well seen and may be hypokinetic.   3. The right ventricle is not well visualized; grossly normal right ventricular systolic function. Patient seen and examined at bedside.    Overnight Events:   none    Review Of Systems: No chest pain, shortness of breath, or palpitations            Current Meds:  aspirin  chewable 81 milliGRAM(s) Oral daily  atorvastatin 80 milliGRAM(s) Oral at bedtime  dextrose 40% Gel 15 Gram(s) Oral once  dextrose 5%. 1000 milliLiter(s) IV Continuous <Continuous>  dextrose 5%. 1000 milliLiter(s) IV Continuous <Continuous>  dextrose 50% Injectable 25 Gram(s) IV Push once  dextrose 50% Injectable 12.5 Gram(s) IV Push once  dextrose 50% Injectable 25 Gram(s) IV Push once  glucagon  Injectable 1 milliGRAM(s) IntraMuscular once  insulin glargine Injectable (LANTUS) 25 Unit(s) SubCutaneous at bedtime  insulin lispro (ADMELOG) corrective regimen sliding scale   SubCutaneous three times a day before meals  insulin lispro (ADMELOG) corrective regimen sliding scale   SubCutaneous at bedtime  insulin lispro Injectable (ADMELOG) 3 Unit(s) SubCutaneous three times a day before meals  pantoprazole  Injectable 40 milliGRAM(s) IV Push every 12 hours  sodium bicarbonate 1300 milliGRAM(s) Oral three times a day  sodium chloride 0.9% Bolus 250 milliLiter(s) IV Bolus once  ticagrelor 90 milliGRAM(s) Oral every 12 hours      Vitals:  T(F): 98.6 (10-08), Max: 98.6 (10-08)  HR: 88 (10-08) (65 - 90)  BP: 163/81 (10-08) (90/59 - 163/81)  RR: 18 (10-08)  SpO2: 96% (10-08)    Physical Exam:  GENERAL:  Alert, no distress  HEENT: Normocephalic, EOM intact, PERRLA  NECK: Normal carotid upstrokes, no bruit; No JVD  CHEST:  significant wheezing diffusely  HEART: PMI not displaced. Soft S1 and S2.  No murmur, rub or gallop appreciated.  ABDOMEN: Normal bowel sounds, no bruit. Soft, non-tender.  Liver and spleen not palpable; aorta not palpable.  EXT:  Trace - 1+ pre-tib and pedal edema.  PSYCH:  A&Ox3,   NEURO: Non-focal  SKIN:  No rash     I&O's Summary    07 Oct 2021 07:01  -  08 Oct 2021 07:00  --------------------------------------------------------  IN: 260 mL / OUT: 101 mL / NET: 159 mL      TELE:      LABS:                      8.2    9.22  )-----------( 219      ( 08 Oct 2021 09:06 )             26.2     10-08  138  |  104  |  100<H>  ----------------------------<  109<H>  4.6   |  16<L>  |  5.42<H>    Ca    8.5      08 Oct 2021 05:08  Phos  6.6     10-07  Mg     1.9     10-07    TPro  5.9<L>  /  Alb  2.6<L>  /  TBili  0.3  /  DBili  x   /  AST  32  /  ALT  24  /  AlkPhos  109  10-07    PT/INR - ( 07 Oct 2021 11:40 )   PT: 13.3 sec;   INR: 1.11 ratio    PTT - ( 07 Oct 2021 11:38 )  PTT:71.6 sec    CARDIAC MARKERS ( 07 Oct 2021 11:38 )  5983 ng/L / x     / x     / 309 U/L / x     / 26.8 ng/mL  CARDIAC MARKERS ( 06 Oct 2021 04:48 )  6029 ng/L / x     / x     / x     / x     / x      CARDIAC MARKERS ( 06 Oct 2021 01:05 )  7177 ng/L / x     / x     / 893 U/L / x     / 92.0 ng/mL  CARDIAC MARKERS ( 05 Oct 2021 19:54 )  8021 ng/L / x     / x     / x     / x     / x      CARDIAC MARKERS ( 05 Oct 2021 17:02 )  >94797 ng/L / x     / x     / 1326 U/L / x     / 144.1 ng/mL    Serum Pro-Brain Natriuretic Peptide: 77165 pg/mL (10-05 @ 17:02)      TTE with Doppler (w/Cont) (10.06.21 @ 09:05)   1. Mitral annular calcification, otherwise normal mitral valve. Moderate-severe mitral regurgitation. /77  2. Mild -moderate segmental left ventricular systolic dysfunction. Endocardial visualization enhanced with intravenous injection of Ultrasonic Enhancing Agent (Definity).  Limited evaluation. The inferior wall is hypokinetic. The apical cap is akinetic.  The anterior wall was not well seen despite Definity. The anterolateral wall is also not well seen and may be hypokinetic.   3. The right ventricle is not well visualized; grossly normal right ventricular systolic function. Patient seen and examined at bedside.    Overnight Events:   none.  Alert, no distress at present.  Reports no cardiac sxs; no CP, palps or dyspnea     Review Of Systems: No chest pain, shortness of breath, or palpitations          General: no fatigue/malaise  Skin: no rashes.  Eyes: no blurred vision, no loss of vision. 	  ENT: no sore throat, rhinorrhea, sinus congestion.  Respiratory: no cough or wheeze.  Gastrointestinal:  no N/V/D, no melena/hematemesis/hematochezia.  Genitourinary: no dysuria/hesitancy or hematuria.  Musculoskeletal: no myalgias or arthralgias.  Neurological: no changes in vision or hearing, no lightheadedness/dizziness, no syncope/near syncope	  Psychiatric: no unusual stress/anxiety.   Hematology/Lymphatics: no unusual bleeding, bruising and no lymphadenopathy.  All others negative except as stated above and in HPI.        Current Meds:  aspirin  chewable 81 milliGRAM(s) Oral daily  atorvastatin 80 milliGRAM(s) Oral at bedtime  dextrose 40% Gel 15 Gram(s) Oral once  dextrose 5%. 1000 milliLiter(s) IV Continuous <Continuous>  dextrose 5%. 1000 milliLiter(s) IV Continuous <Continuous>  dextrose 50% Injectable 25 Gram(s) IV Push once  dextrose 50% Injectable 12.5 Gram(s) IV Push once  dextrose 50% Injectable 25 Gram(s) IV Push once  glucagon  Injectable 1 milliGRAM(s) IntraMuscular once  insulin glargine Injectable (LANTUS) 25 Unit(s) SubCutaneous at bedtime  insulin lispro (ADMELOG) corrective regimen sliding scale   SubCutaneous three times a day before meals  insulin lispro (ADMELOG) corrective regimen sliding scale   SubCutaneous at bedtime  insulin lispro Injectable (ADMELOG) 3 Unit(s) SubCutaneous three times a day before meals  pantoprazole  Injectable 40 milliGRAM(s) IV Push every 12 hours  sodium bicarbonate 1300 milliGRAM(s) Oral three times a day  sodium chloride 0.9% Bolus 250 milliLiter(s) IV Bolus once  ticagrelor 90 milliGRAM(s) Oral every 12 hours      Vitals:  T(F): 98.6 (10-08), Max: 98.6 (10-08)  HR: 88 (10-08) (65 - 90)  BP: 163/81 (10-08) (90/59 - 163/81)  RR: 18 (10-08)  SpO2: 96% (10-08)    Physical Exam:  GENERAL:  Alert, no distress  HEENT: Normocephalic, EOM intact, PERRLA  NECK: Normal carotid upstrokes, no bruit; No JVD  CHEST:  significant wheezing diffusely  HEART: PMI not displaced. Soft S1 and S2.  No murmur, rub or gallop appreciated.  ABDOMEN: Normal bowel sounds, no bruit. Soft, non-tender.  Liver and spleen not palpable; aorta not palpable.  EXT:  Trace - 1+ pre-tib and pedal edema.  PSYCH:  A&Ox3,   NEURO: Non-focal  SKIN:  No rash     I&O's Summary    07 Oct 2021 07:01  -  08 Oct 2021 07:00  --------------------------------------------------------  IN: 260 mL / OUT: 101 mL / NET: 159 mL      TELE:  NSR  80 - 100      LABS:                      8.2    9.22  )-----------( 219      ( 08 Oct 2021 09:06 )             26.2     10-08  138  |  104  |  100<H>  ----------------------------<  109<H>  4.6   |  16<L>  |  5.42<H>    Ca    8.5      08 Oct 2021 05:08  Phos  6.6     10-07  Mg     1.9     10-07    TPro  5.9<L>  /  Alb  2.6<L>  /  TBili  0.3  /  DBili  x   /  AST  32  /  ALT  24  /  AlkPhos  109  10-07    PT/INR - ( 07 Oct 2021 11:40 )   PT: 13.3 sec;   INR: 1.11 ratio    PTT - ( 07 Oct 2021 11:38 )  PTT:71.6 sec    CARDIAC MARKERS ( 07 Oct 2021 11:38 )  5983 ng/L / x     / x     / 309 U/L / x     / 26.8 ng/mL  CARDIAC MARKERS ( 06 Oct 2021 04:48 )  6029 ng/L / x     / x     / x     / x     / x      CARDIAC MARKERS ( 06 Oct 2021 01:05 )  7177 ng/L / x     / x     / 893 U/L / x     / 92.0 ng/mL  CARDIAC MARKERS ( 05 Oct 2021 19:54 )  8021 ng/L / x     / x     / x     / x     / x      CARDIAC MARKERS ( 05 Oct 2021 17:02 )  >06316 ng/L / x     / x     / 1326 U/L / x     / 144.1 ng/mL    Serum Pro-Brain Natriuretic Peptide: 93871 pg/mL (10-05 @ 17:02)      TTE with Doppler (w/Cont) (10.06.21 @ 09:05)   1. Mitral annular calcification, otherwise normal mitral valve. Moderate-severe mitral regurgitation. /77  2. Mild -moderate segmental left ventricular systolic dysfunction. Endocardial visualization enhanced with intravenous injection of Ultrasonic Enhancing Agent (Definity).  Limited evaluation. The inferior wall is hypokinetic. The apical cap is akinetic.  The anterior wall was not well seen despite Definity. The anterolateral wall is also not well seen and may be hypokinetic.   3. The right ventricle is not well visualized; grossly normal right ventricular systolic function.

## 2021-10-08 NOTE — SWALLOW VFSS/MBS ASSESSMENT ADULT - COMMENTS
Per nursing notes, while at [facility], he was placed on BIPAP, given Solumedrol 120, Nitro SL x1, duoneb x2, ASA 81mg, Lasix 40mg w/ 200 cc output, and started at 10mL/hr Nitro drip then increased to 30mL/hr. There, initial /126 retake was 171/67. Nitro drip was d/c'd upon arrival to [facility] ED.  Labs remarkable for JOSHUA, metabolic acidosis and hyperkalemia. Patient denies prior history of renal injury.   Of note, patient w/ elevated BP to SBP 200s often at home; nifedipine used on a "as needed" basis for SBP > 200.     10/7: Code stroke called for L facial droop, dysarthria and confusion.  CTH IMPRESSION: No hemorrhage. 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 High-grade stenosis left internal carotid artery at the carotid bifurcation in the neck. Normal intracranial circulation.

## 2021-10-08 NOTE — SWALLOW VFSS/MBS ASSESSMENT ADULT - SLP GENERAL OBSERVATIONS
Patient arrived to radiology secured in DAVY chair, able to communicate needs and follow directions. On RA. Pleasant and cooperative.

## 2021-10-08 NOTE — PROGRESS NOTE ADULT - ASSESSMENT
Patient is a 60 year old male with PMH od DM and HTN who presented to the hospital as a transfer from [facility] for NSTEMI. The nephrology team was consulted due to elevated creatinine of 4.05 upon presentation. The patient denies any history of kidney disease and denied noticing any changes in his urination.     JOSHUA on CKD?   - patient presenting to hospital with creatinine of 4.05 mg/dL; denied any history of kidney disease   --- creatinine has continued to uptrend currently steady at ~5.4mg/dL  - BUN noted ot be 100; explained to patient that he will likely require RRT in the near future   --- he would like to discuss with his wife prior to making the decision; no emergent need at this time  - please monitor UOP  - there could be underlying chronic disease secondary to uncontrolled DM and HTN   - normal renal ultrasound without hydro  - UA with proteinuria and blood;  Ur Pr/Cr ratio noted to be 8.7g  - pending serological work up right now  - Patient is in need of Cardiac catheterization  -- as per the Shaggy score it places a patient at a 26.1% chance of developing CARY and a 1.09% chance of developing CARY requiring RRT   - he is pending ischemic work up given current renal function   - please adjust medication doses as per eGFR    Patient is a 60 year old male with PMH od DM and HTN who presented to the hospital as a transfer from [facility] for NSTEMI. The nephrology team was consulted due to elevated creatinine of 4.05 upon presentation. The patient denies any history of kidney disease and denied noticing any changes in his urination.     JOSHUA on CKD?   - patient presenting to hospital with creatinine of 4.05 mg/dL; denied any history of kidney disease   --- creatinine has continued to uptrend currently steady at ~5.4mg/dL  - BUN noted ot be 100; explained to patient that he will likely require RRT in the near future   --- he would like to discuss with his wife prior to making the decision; no emergent need at this time  - please monitor UOP  - there could be underlying chronic disease secondary to uncontrolled DM and HTN   - normal renal ultrasound without hydro  - UA with proteinuria and blood;  Ur Pr/Cr ratio noted to be 8.7g  - pending serological work up right now  - Patient is in need of Cardiac catheterization  -- as per the Shaggy score it places a patient at a 26.1% chance of developing CARY and a 1.09% chance of developing CARY requiring RRT   - he is pending ischemic work up given current renal function   - please adjust medication doses as per eGFR     If any questions, please feel free to contact me     Saul Crews  Nephrology Fellow  [facility] Pager: [phone]

## 2021-10-08 NOTE — PROGRESS NOTE ADULT - SUBJECTIVE AND OBJECTIVE BOX
GENERAL SURGERY PROGRESS NOTE    SUBJECTIVE: Pt was seen and examined this AM. No acute events overnight. No other concerns this AM    Vital Signs Last 24 Hrs  T(C): 37 (08 Oct 2021 09:02), Max: 37 (08 Oct 2021 09:02)  T(F): 98.6 (08 Oct 2021 09:02), Max: 98.6 (08 Oct 2021 09:02)  HR: 88 (08 Oct 2021 09:02) (65 - 90)  BP: 163/81 (08 Oct 2021 09:02) (90/59 - 163/81)  BP(mean): --  RR: 18 (08 Oct 2021 09:02) (18 - 18)  SpO2: 96% (08 Oct 2021 09:02) (95% - 100%)    I&O's Summary    07 Oct 2021 07:01  -  08 Oct 2021 07:00  --------------------------------------------------------  IN: 260 mL / OUT: 101 mL / NET: 159 mL        Physical Exam:  General: Well-developed, in no acute distress   Neurologic: Awake, alert, GCS 15. Mild L facial droop noted  Respiratory: Normal respiratory effort  CVS: RRR, perfusing adequately  Abdomen: Abdomen soft, NT/ND, no rebound or guarding   Ext: Grossly symmetric, Moving all extremities without drift  Vascular: 2+ peripheral pulses bilaterally throughout; no edema appreciated  Skin: Warm, dry, intact, no erythema    LABS:                        8.2    9.22  )-----------( 219      ( 08 Oct 2021 09:06 )             26.2     10-08    138  |  104  |  100<H>  ----------------------------<  109<H>  4.6   |  16<L>  |  5.42<H>    Ca    8.5      08 Oct 2021 05:08  Phos  6.6     10-07  Mg     1.9     10-07    TPro  5.9<L>  /  Alb  2.6<L>  /  TBili  0.3  /  DBili  x   /  AST  32  /  ALT  24  /  AlkPhos  109  10-07    PT/INR - ( 07 Oct 2021 11:40 )   PT: 13.3 sec;   INR: 1.11 ratio         PTT - ( 07 Oct 2021 11:38 )  PTT:71.6 sec  Urinalysis Basic - ( 07 Oct 2021 10:09 )    Color: Light Yellow / Appearance: Clear / S.015 / pH: x  Gluc: x / Ketone: Negative  / Bili: Negative / Urobili: Negative   Blood: x / Protein: 300 mg/dL / Nitrite: Negative   Leuk Esterase: Negative / RBC: 2 /hpf / WBC 7 /HPF   Sq Epi: x / Non Sq Epi: 2 /hpf / Bacteria: Negative        RADIOLOGY & ADDITIONAL STUDIES:

## 2021-10-08 NOTE — PROGRESS NOTE ADULT - ASSESSMENT
59 yo M with PMH of HTN & DM.  Presented to [facility] with CP and dyspnea and marked HTN.  Transferred for N-STEMI/CHF, hypertensive emergency and ARF.  On arrival here , found to be in sherie nonoliguric renal failure with increased work of breathing and evidence of volume overload due to ?ADHF vs. renal failure. Elevated cardiac enzymes at OSH c/f N-STEMI, however patient chest pain free after NTG.    Clinically Improved at present with resolution of CP and dyspnea.      REC:    1.  Elevated troponin and CK/MB, N-STEMI , in setting of renal failure and significant HTN emergency; EKG was unremarkable on admission.  TTE shows segmental LV dysfunction with mild to moderate systolic dysfunction.  - continue ASA & Brilinta  - dc heparin given concern for blood  - continue diuresis  - continue telemetry  - Will benefit from ischemic evaluation in future when optimized from volume and renal standpoint.    2.  Volume overload  - patient in respiratory distress this am  - if patient is making urine, would recommend bumex 4 mg BID with once daily diuril 500 given 30 mins prior to bumex. 1 mg of IV bumex (equivalent to IV lasix 20) will not adequately diurese this patient and neither will IV lasix 40  - However, given his worsening renal function, will defer to nephrology for plans for HD for volume removal    4.  HLD  - continue statin    5.  Moderate to severe MR on TTE  - continue diuresis      Breanna Nelson M.D.  Cardiology fellow 61 yo M with PMH of HTN & DM.  Presented to [facility] with CP and dyspnea and marked HTN.  Transferred for N-STEMI/CHF, hypertensive emergency and ARF.  On arrival here , found to be in sherie nonoliguric renal failure with increased work of breathing and evidence of volume overload due to ?ADHF vs. renal failure. Elevated cardiac enzymes at OSH c/f N-STEMI, however patient chest pain free after NTG.    Clinically Improved at present with resolution of CP and dyspnea.        REC:    1.  Elevated troponin and CK/MB, N-STEMI , in setting of renal failure and significant HTN emergency; EKG was unremarkable on admission.  TTE shows segmental LV dysfunction with mild to moderate systolic dysfunction, c/w CAD.  - continue ASA & Brilinta  - d/c heparin  - continue diuresis  - continue telemetry  - will need diagnostic cath but we are unable to do this at present due to JOSHUA.    2.  Volume overload  - patient comfortable at present.  - I and Os appear incomplete.  - discussed with nephrology service.  Baseline renal function is not know to use, although apparently had seen a nephrologist in the past.  We may be dealing with acute on chronic kidney dysfunction and it is unclear if renal function will improve.  - will defer to nephrology renal for diuresis vs. HD for volume removal    4.  HLD  - continue statin    5.  Moderate to severe MR on TTE  - continue diuresis      Breanna Nelson M.D.  Cardiology fellow    Plan discussed with cardiology fellow; patient seen and examined.       I was physically present for the key portions of the evaluation and management (E/M) service provided.    I agree with the above history, physical, and plan which I have reviewed and edited where appropriate.   Oc Davis M.D.  Cardiology Attending, Consult Service    For Cardiology consults and questions, all Cardiology service information can be found 24/7 on [email or web address], password: [email or web address]

## 2021-10-08 NOTE — PROGRESS NOTE ADULT - SUBJECTIVE AND OBJECTIVE BOX
[facility] DIVISION OF KIDNEY DISEASES AND HYPERTENSION -- FOLLOW UP NOTE  --------------------------------------------------------------------------------  Chief Complaint:    24 hour events/subjective:    seen and examined this morning  reports feeling well   denied any chest pain or SOB     PAST HISTORY  --------------------------------------------------------------------------------  No significant changes to PMH, PSH, FHx, SHx, unless otherwise noted    ALLERGIES & MEDICATIONS  --------------------------------------------------------------------------------  Allergies    No Known Allergies    Intolerances      Standing Inpatient Medications  aspirin  chewable 81 milliGRAM(s) Oral daily  atorvastatin 80 milliGRAM(s) Oral at bedtime  dextrose 40% Gel 15 Gram(s) Oral once  dextrose 5%. 1000 milliLiter(s) IV Continuous <Continuous>  dextrose 5%. 1000 milliLiter(s) IV Continuous <Continuous>  dextrose 50% Injectable 25 Gram(s) IV Push once  dextrose 50% Injectable 12.5 Gram(s) IV Push once  dextrose 50% Injectable 25 Gram(s) IV Push once  glucagon  Injectable 1 milliGRAM(s) IntraMuscular once  insulin glargine Injectable (LANTUS) 25 Unit(s) SubCutaneous at bedtime  insulin lispro (ADMELOG) corrective regimen sliding scale   SubCutaneous three times a day before meals  insulin lispro (ADMELOG) corrective regimen sliding scale   SubCutaneous at bedtime  insulin lispro Injectable (ADMELOG) 3 Unit(s) SubCutaneous three times a day before meals  pantoprazole  Injectable 40 milliGRAM(s) IV Push every 12 hours  sodium bicarbonate 1300 milliGRAM(s) Oral three times a day  sodium chloride 0.9% Bolus 250 milliLiter(s) IV Bolus once  ticagrelor 90 milliGRAM(s) Oral every 12 hours    PRN Inpatient Medications      REVIEW OF SYSTEMS      All other systems were reviewed and are negative, except as noted.    VITALS/PHYSICAL EXAM  --------------------------------------------------------------------------------  T(C): 36.9 (10-08-21 @ 04:23), Max: 36.9 (10-08-21 @ 04:23)  HR: 89 (10-08-21 @ 04:23) (65 - 90)  BP: 156/73 (10-08-21 @ 04:23) (90/59 - 156/73)  RR: 18 (10-08-21 @ 04:23) (18 - 18)  SpO2: 100% (10-08-21 @ 04:23) (95% - 100%)  Wt(kg): --  Height (cm): 165.1 (10-07-21 @ 07:00)      10-07-21 @ 07:01  -  10-08-21 @ 07:00  --------------------------------------------------------  IN: 260 mL / OUT: 101 mL / NET: 159 mL        Physical Exam:  	Gen: NAD  	HEENT: MMM  	Pulm: CTA B/L  	CV: S1S2  	Abd: Soft, +BS   	Ext: 3+ LE edema B/L  	Neuro: Awake and alert   	Skin: Warm and dry  	Vascular access: N/A      LABS/STUDIES  --------------------------------------------------------------------------------              8.2    10.73 >-----------<  221      [10-08-21 @ 03:43]              25.9     138  |  104  |  100  ----------------------------<  109      [10-08-21 @ 05:08]  4.6   |  16  |  5.42        Ca     8.5     [10-08-21 @ 05:08]      Mg     1.9     [10-07-21 @ 21:01]      Phos  6.6     [10-07-21 @ 21:01]    TPro  5.9  /  Alb  2.6  /  TBili  0.3  /  DBili  x   /  AST  32  /  ALT  24  /  AlkPhos  109  [10-07-21 @ 11:38]    PT/INR: PT 13.3 , INR 1.11       [10-07-21 @ 11:40]  PTT: 71.6       [10-07-21 @ 11:38]          [10-07-21 @ 11:38]    Creatinine Trend:  SCr 5.42 [10-08 @ 05:08]  SCr 5.46 [10-07 @ 21:01]  SCr 5.20 [10-07 @ 11:40]  SCr 5.18 [10-07 @ 11:38]  SCr 4.15 [10-06 @ 04:48]    Urinalysis - [10-07-21 @ 10:09]      Color Light Yellow / Appearance Clear / SG 1.015 / pH 6.0      Gluc 200 mg/dL / Ketone Negative  / Bili Negative / Urobili Negative       Blood Small / Protein 300 mg/dL / Leuk Est Negative / Nitrite Negative      RBC 2 / WBC 7 / Hyaline 6 / Gran  / Sq Epi  / Non Sq Epi 2 / Bacteria Negative    Urine Creatinine 63      [10-07-21 @ 10:10]  Urine Protein 548      [10-07-21 @ 10:10]  Urine Sodium 71      [10-07-21 @ 10:10]  Urine Osmolality 379      [10-07-21 @ 10:10]      HCV 0.23, Nonreact      [10-07-21 @ 14:38]    BEULAH: titer Negative, pattern --      [10-07-21 @ 14:37]  Syphilis Screen (Treponema Pallidum Ab) Negative      [10-07-21 @ 14:37]

## 2021-10-08 NOTE — CHART NOTE - NSCHARTNOTEFT_GEN_A_CORE
60 yr old M w/ hx of DM2, HTN and HLD presents as transfer from [facility] for chest pain concerning for NSTEMI and possible CHF.  Pt states that after he woke up this morning he felt midsternal chest pain this morning, that felt like a burning sensation. Pain was non radiating. Associated with shortness of breath. He initially took TUMS and drank some milk which alleviated some of the pain but not completely. Later he also started feeling very dizzy so he went to the ED. Denies associated palpitations, diaphoresis, N/V.  When he presented to [facility] he was noted to be SOB. Initially, they evaluated patient for CHF but Troponin and BNP levels were elevated so he was transferred here for NSTEMI and r/o CHF. Per nursing notes, while at [facility], he was placed on BIPAP, given Solumedrol 120, Nitro SL x1, duoneb x2, ASA 81mg, Lasix 40mg w/ 200 cc output, and started at 10mL/hr Nitro drip then increased to 30mL/hr. There, initial /126 retake was 171/67. Nitro drip was d/c'd upon arrival to [facility] ED.     Chart Review since initial visit by this service  (10/7):  Per Neuro 10/7: Neurology consulted for code stroke. Patient transferred for NSTEMI, on Heparin drip. LKW 10:45AM. Patient had episode of bradycardia (HR 30s), then became altered. RRT called. BP during RRT 70s/40s. Code stroke called for L facial droop. On exam, patient alert and oriented, following commands. Mild L nasolabial flattening and dysarthria, ?decreased eye closure strength on the L. CTH negative for acute pathology. CTA H unremarkable. CTA N shows high-grade stenosis left internal carotid artery at the carotid bifurcation in the neck. Not a candidate for tPA as patient is on Heparin drip. Not a candidate for MT as no LVO on imaging.     The patient is known to this service. The patient was seen for initial bedside swallow eval 10/7, however Patient seen for bedside swallow evaluation and exam was significantly limited 2/2 c/o shortness of breath after ~3 tsp of applesauce. Patient currently on D2/H.     The patient is scheduled to be seen today for follow-up assessment of swallow function.     Impressions:    Recommendations:    D/w:    Concepcion Mccabe MS CCC-SLP, CBIS # [phone] 60 yr old M w/ hx of DM2, HTN and HLD presents as transfer from [facility] for chest pain concerning for NSTEMI and possible CHF.  Pt states that after he woke up this morning he felt midsternal chest pain this morning, that felt like a burning sensation. Pain was non radiating. Associated with shortness of breath. He initially took TUMS and drank some milk which alleviated some of the pain but not completely. Later he also started feeling very dizzy so he went to the ED. Denies associated palpitations, diaphoresis, N/V.  When he presented to [facility] he was noted to be SOB. Initially, they evaluated patient for CHF but Troponin and BNP levels were elevated so he was transferred here for NSTEMI and r/o CHF. Per nursing notes, while at [facility], he was placed on BIPAP, given Solumedrol 120, Nitro SL x1, duoneb x2, ASA 81mg, Lasix 40mg w/ 200 cc output, and started at 10mL/hr Nitro drip then increased to 30mL/hr. There, initial /126 retake was 171/67. Nitro drip was d/c'd upon arrival to [facility] ED.     Chart Review since initial visit by this service  (10/7):  Per Neuro 10/7: Neurology consulted for code stroke. Patient transferred for NSTEMI, on Heparin drip. LKW 10:45AM. Patient had episode of bradycardia (HR 30s), then became altered. RRT called. BP during RRT 70s/40s. Code stroke called for L facial droop. On exam, patient alert and oriented, following commands. Mild L nasolabial flattening and dysarthria, ?decreased eye closure strength on the L. CTH negative for acute pathology. CTA H unremarkable. CTA N shows high-grade stenosis left internal carotid artery at the carotid bifurcation in the neck. Not a candidate for tPA as patient is on Heparin drip. Not a candidate for MT as no LVO on imaging.     The patient is known to this service. The patient was seen for initial bedside swallow eval 10/7, however Patient seen for bedside swallow evaluation and exam was significantly limited 2/2 c/o shortness of breath after ~3 tsp of applesauce. Patient currently on D2/H.     The patient is scheduled to be seen today for follow-up assessment of swallow function. Patient encountered at bedside siting upright in bed. Patient endorses SOB upon exertion. Patient with audible wheezing at rest. Respiratory rate 20. Patient with thin liquids at bedside table. Patient accepted po trials of ice chips x3, thin liquid via tspn x2, and thin puree x1. Oral phase WFL across trials. No complaints of globus sensation. Patient with overt s/s penetration/aspiration as seen by immediate throat clear following 2/3 ice chip trials, 1/2 thin liquid via tspn trials, and 1/1 thin puree trial as well as slight increase in work of breathing following po intake, though patient denied sob. Further trials deferred at this time.     Impressions: Patient presents with a suspected pharyngeal dysphagia.     Recommendations:  1. NPO, with non-oral nutrition/hydration/medications.   2. MBS for objective assessment of swallow function  3. Strict aspiration and reflux precautions!   4. This service to follow up.     D/w: BLANCHE Thakur, KUSHAL Luna, Dr. Louise Mccabe MS CCC-SLP, CBIS # [phone] 60 yr old M w/ hx of DM2, HTN and HLD presents as transfer from [facility] for chest pain concerning for NSTEMI and possible CHF.  Pt states that after he woke up this morning he felt midsternal chest pain this morning, that felt like a burning sensation. Pain was non radiating. Associated with shortness of breath. He initially took TUMS and drank some milk which alleviated some of the pain but not completely. Later he also started feeling very dizzy so he went to the ED. Denies associated palpitations, diaphoresis, N/V.  When he presented to [facility] he was noted to be SOB. Initially, they evaluated patient for CHF but Troponin and BNP levels were elevated so he was transferred here for NSTEMI and r/o CHF. Per nursing notes, while at [facility], he was placed on BIPAP, given Solumedrol 120, Nitro SL x1, duoneb x2, ASA 81mg, Lasix 40mg w/ 200 cc output, and started at 10mL/hr Nitro drip then increased to 30mL/hr. There, initial /126 retake was 171/67. Nitro drip was d/c'd upon arrival to [facility] ED.     Chart Review since initial visit by this service  (10/7):  Per Neuro 10/7: Neurology consulted for code stroke. Patient transferred for NSTEMI, on Heparin drip. LKW 10:45AM. Patient had episode of bradycardia (HR 30s), then became altered. RRT called. BP during RRT 70s/40s. Code stroke called for L facial droop. On exam, patient alert and oriented, following commands. Mild L nasolabial flattening and dysarthria, ?decreased eye closure strength on the L. CTH negative for acute pathology. CTA H unremarkable. CTA N shows high-grade stenosis left internal carotid artery at the carotid bifurcation in the neck. Not a candidate for tPA as patient is on Heparin drip. Not a candidate for MT as no LVO on imaging.     The patient is known to this service. The patient was seen for initial bedside swallow eval 10/7, however Patient seen for bedside swallow evaluation and exam was significantly limited 2/2 c/o shortness of breath after ~3 tsp of applesauce. Patient currently on D2/H.     The patient is scheduled to be seen today for follow-up assessment of swallow function. Patient encountered at bedside siting upright in bed. Patient endorses SOB upon exertion. Patient with audible wheezing at rest, though patient endorses this is seasonal. Respiratory rate 20. Patient with thin liquids at bedside table. Patient accepted po trials of ice chips x3, thin liquid via tspn x2, and thin puree x1. Oral phase WFL across trials. No complaints of globus sensation. Patient with overt s/s penetration/aspiration as seen by immediate throat clear following 2/3 ice chip trials, 1/2 thin liquid via tspn trials, and 1/1 thin puree trial as well as slight increase in work of breathing following po intake improved with rest, though patient denied sob. Further trials deferred at this time.     Impressions: Patient presents with a suspected pharyngeal dysphagia.     Recommendations:  1. NPO, with non-oral nutrition/hydration/medications.   2. MBS for objective assessment of swallow function  3. Strict aspiration and reflux precautions!   4. This service to follow up.     D/w: BLANCHE Thakur, KUSHAL Luna, Dr. Louise Mccabe MS CCC-SLP, CBIS # [phone]

## 2021-10-08 NOTE — PROGRESS NOTE ADULT - ASSESSMENT
60 yr old M w/ hx of DM2, HTN and HLD presents as transfer from [facility] for chest pain concerning for NSTEMI and possible new CHF c/b JOSHUA likely on CKD, s/p RRT for CVA/TIA ruled out and worsening anemia.

## 2021-10-08 NOTE — PROGRESS NOTE ADULT - ATTENDING COMMENTS
transferred from outside hospital for further cardiac evaluation, noncompliant with dm, htn  episode of bradycardia (HR 30s), then became altered. RRT and code stroke 10/7  #  vicki (atn) on ckd  vs ckd stage V  cr was stable, mild uptrend with RRT yesterday, no uremic sx  monitor UO, and daily lytes  may need renal replacement therapy this admission  #CKD- prot/cr 8.7, low alb 2.6, serologic workup pending    renal sono for kidney size ok   likely dm nephropathy however need rto rule out etiology of ckd  #understands risk of CARY with cardiac cath, including dialysis  #volume overload/edema- now stable with mild LE edema- cont diuresis  #metabolic acidosis- cont bicarb tabs; increased to 1300mg po tid; monitor serum bicarb trend  #hyperkalemia- low k renal diet; monitor trend; stable  #anemia- workup; check iron studies/ferritin  #BP control  #check serum phos  #check hbsag

## 2021-10-09 LAB
ANION GAP SERPL CALC-SCNC: 17 MMOL/L — SIGNIFICANT CHANGE UP (ref 5–17)
BUN SERPL-MCNC: 96 MG/DL — HIGH (ref 7–23)
CALCIUM SERPL-MCNC: 8.7 MG/DL — SIGNIFICANT CHANGE UP (ref 8.4–10.5)
CHLORIDE SERPL-SCNC: 105 MMOL/L — SIGNIFICANT CHANGE UP (ref 96–108)
CO2 SERPL-SCNC: 20 MMOL/L — LOW (ref 22–31)
CREAT SERPL-MCNC: 5.64 MG/DL — HIGH (ref 0.5–1.3)
GLUCOSE BLDC GLUCOMTR-MCNC: 103 MG/DL — HIGH (ref 70–99)
GLUCOSE BLDC GLUCOMTR-MCNC: 134 MG/DL — HIGH (ref 70–99)
GLUCOSE BLDC GLUCOMTR-MCNC: 167 MG/DL — HIGH (ref 70–99)
GLUCOSE BLDC GLUCOMTR-MCNC: 178 MG/DL — HIGH (ref 70–99)
GLUCOSE SERPL-MCNC: 93 MG/DL — SIGNIFICANT CHANGE UP (ref 70–99)
HCT VFR BLD CALC: 28.5 % — LOW (ref 39–50)
HGB BLD-MCNC: 8.9 G/DL — LOW (ref 13–17)
MCHC RBC-ENTMCNC: 31.2 GM/DL — LOW (ref 32–36)
MCHC RBC-ENTMCNC: 31.2 PG — SIGNIFICANT CHANGE UP (ref 27–34)
MCV RBC AUTO: 100 FL — SIGNIFICANT CHANGE UP (ref 80–100)
NRBC # BLD: 0 /100 WBCS — SIGNIFICANT CHANGE UP (ref 0–0)
PLATELET # BLD AUTO: 246 K/UL — SIGNIFICANT CHANGE UP (ref 150–400)
POTASSIUM SERPL-MCNC: 4.4 MMOL/L — SIGNIFICANT CHANGE UP (ref 3.5–5.3)
POTASSIUM SERPL-SCNC: 4.4 MMOL/L — SIGNIFICANT CHANGE UP (ref 3.5–5.3)
RBC # BLD: 2.85 M/UL — LOW (ref 4.2–5.8)
RBC # FLD: 14.6 % — HIGH (ref 10.3–14.5)
SODIUM SERPL-SCNC: 142 MMOL/L — SIGNIFICANT CHANGE UP (ref 135–145)
WBC # BLD: 8.53 K/UL — SIGNIFICANT CHANGE UP (ref 3.8–10.5)
WBC # FLD AUTO: 8.53 K/UL — SIGNIFICANT CHANGE UP (ref 3.8–10.5)

## 2021-10-09 PROCEDURE — 70551 MRI BRAIN STEM W/O DYE: CPT | Mod: 26

## 2021-10-09 PROCEDURE — 99233 SBSQ HOSP IP/OBS HIGH 50: CPT | Mod: GC

## 2021-10-09 PROCEDURE — 99233 SBSQ HOSP IP/OBS HIGH 50: CPT

## 2021-10-09 PROCEDURE — 99232 SBSQ HOSP IP/OBS MODERATE 35: CPT | Mod: GC

## 2021-10-09 RX ORDER — CARVEDILOL PHOSPHATE 80 MG/1
6.25 CAPSULE, EXTENDED RELEASE ORAL EVERY 12 HOURS
Refills: 0 | Status: DISCONTINUED | OUTPATIENT
Start: 2021-10-09 | End: 2021-10-21

## 2021-10-09 RX ORDER — HEPARIN SODIUM 5000 [USP'U]/ML
5000 INJECTION INTRAVENOUS; SUBCUTANEOUS EVERY 12 HOURS
Refills: 0 | Status: DISCONTINUED | OUTPATIENT
Start: 2021-10-09 | End: 2021-10-21

## 2021-10-09 RX ADMIN — INSULIN GLARGINE 18 UNIT(S): 100 INJECTION, SOLUTION SUBCUTANEOUS at 22:01

## 2021-10-09 RX ADMIN — BUMETANIDE 132 MILLIGRAM(S): 0.25 INJECTION INTRAMUSCULAR; INTRAVENOUS at 05:37

## 2021-10-09 RX ADMIN — PANTOPRAZOLE SODIUM 40 MILLIGRAM(S): 20 TABLET, DELAYED RELEASE ORAL at 17:03

## 2021-10-09 RX ADMIN — HEPARIN SODIUM 5000 UNIT(S): 5000 INJECTION INTRAVENOUS; SUBCUTANEOUS at 17:04

## 2021-10-09 RX ADMIN — ATORVASTATIN CALCIUM 80 MILLIGRAM(S): 80 TABLET, FILM COATED ORAL at 21:56

## 2021-10-09 RX ADMIN — Medication 81 MILLIGRAM(S): at 11:15

## 2021-10-09 RX ADMIN — Medication 1: at 16:20

## 2021-10-09 RX ADMIN — BUMETANIDE 132 MILLIGRAM(S): 0.25 INJECTION INTRAMUSCULAR; INTRAVENOUS at 17:28

## 2021-10-09 RX ADMIN — Medication 1300 MILLIGRAM(S): at 15:10

## 2021-10-09 RX ADMIN — Medication 1300 MILLIGRAM(S): at 21:56

## 2021-10-09 RX ADMIN — CARVEDILOL PHOSPHATE 6.25 MILLIGRAM(S): 80 CAPSULE, EXTENDED RELEASE ORAL at 11:15

## 2021-10-09 RX ADMIN — TICAGRELOR 90 MILLIGRAM(S): 90 TABLET ORAL at 21:56

## 2021-10-09 RX ADMIN — PANTOPRAZOLE SODIUM 40 MILLIGRAM(S): 20 TABLET, DELAYED RELEASE ORAL at 05:36

## 2021-10-09 RX ADMIN — Medication 1300 MILLIGRAM(S): at 05:37

## 2021-10-09 RX ADMIN — TICAGRELOR 90 MILLIGRAM(S): 90 TABLET ORAL at 09:23

## 2021-10-09 NOTE — PROGRESS NOTE ADULT - ASSESSMENT
61 yo male with DM2, HTN, and HLD who initially presented to [facility] on 10/05 as a transfer from [facility] for NSTEMI and possible CHF. Patient on Heparin drip for NSTEMI. LKW 10:45AM. Patient had episode of bradycardia (HR 30s), then became altered. RRT called. BP during RRT 70s/40s. Code stroke called for L facial droop.   CTH negative for acute pathology, old L frontal cortical infarct seen.   CTA H unremarkable. CTA N shows high-grade stenosis left internal carotid artery at the carotid bifurcation in the neck.  10/06 TTE: EF 45%, moderate-severe MR, mild-moderate L ventricular systolic dysfunction.   A1c 7    Impression: Mild L nasolabial flattening and dysarthria, ?decreased eye closure strength on the L. Possibly secondary to R brain dysfunction due to acute stroke of unknown mechanism vs peripheral etiology. Patient with old L frontal infarct on CTH, also with high-grade stenosis of L ICA at the carotid bifurcation which likely cause of old stroke     Recommendations:  - ASA 81MG PO QD + Brilinta 90MG PO Q12HR.   - Avoid hypotension.  - MRI brain without contrast.  - High dose statin therapy - atorvastatin 40mg PO daily. LDL goal <70mg/dL.  - consider CD and vascular for ICA revascularization   - lipid panel  - telemetry  - PT/OT/SS/SLP, OOBC  - permissive HTN, -180mmHg.  - check FS, glucose control <180  - GI/DVT ppx  - Counseling on diet, exercise, and medication adherence was done  - Counseling on smoking cessation and alcohol consumption offered when appropriate.  - Pain assessed and judicious use of narcotics when appropriate was discussed.    - Stroke education given when appropriate.  - Importance of fall prevention discussed.   - Differential diagnosis and plan of care discussed with patient and/or family and primary team  - Thank you for allowing me to participate in the care of this patient. Call with questions.   Marcelino Patel MD  Vascular Neurology .

## 2021-10-09 NOTE — PROVIDER CONTACT NOTE (CHANGE IN STATUS NOTIFICATION) - BACKGROUND
61 yo male admitted for NSTEMI, Acute CHF, HTN emergency , JOSHUA, AMS code stroke- CTH Neg found to have Left ICA high grade stenosis

## 2021-10-09 NOTE — PROGRESS NOTE ADULT - PROBLEM SELECTOR PLAN 10
DVT ppx: hsq    Discussed with NP Joanne,     prognosis guarded  low threshold for MICU or CCU consult if change in status

## 2021-10-09 NOTE — PROGRESS NOTE ADULT - ASSESSMENT
60 yr old M w/ hx of DM2, HTN and HLD presents as transfer from [facility] for chest pain concerning for NSTEMI and possible new CHF c/b JOSHUA likely on CKD, s/p RRT for CVA/TIA ruled out and worsening anemia.

## 2021-10-09 NOTE — PROGRESS NOTE ADULT - PROBLEM SELECTOR PLAN 1
-no further chest pain  -troponin downtrending 5000K from [id number], no need to further trend unless CP or change in HD status, etc  - c/w brilinta 90 mg BID and ASA 81 mg daily  - c/w atorvastatin 80 mg qhs  - eventual ischemic eval pending improvement in kidney function/fluid status  - cardio following, appreciate recs  - if repeat chest pain, obtain cardiac enzymes, ekg   - monitor on tele  - TTE EF 45%: mild-moderate LVSF

## 2021-10-09 NOTE — PROGRESS NOTE ADULT - SUBJECTIVE AND OBJECTIVE BOX
[facility] DIVISION OF KIDNEY DISEASES AND HYPERTENSION -- FOLLOW UP NOTE  --------------------------------------------------------------------------------  Chief Complaint:    24 hour events/subjective:    seen and examined this morning   reports feeling okay   denied any chest pain or shortness of breath    PAST HISTORY  --------------------------------------------------------------------------------  No significant changes to PMH, PSH, FHx, SHx, unless otherwise noted    ALLERGIES & MEDICATIONS  --------------------------------------------------------------------------------  Allergies    No Known Allergies    Intolerances      Standing Inpatient Medications  aspirin  chewable 81 milliGRAM(s) Oral daily  atorvastatin 80 milliGRAM(s) Oral at bedtime  buMETAnide IVPB 4 milliGRAM(s) IV Intermittent every 12 hours  carvedilol 6.25 milliGRAM(s) Oral every 12 hours  dextrose 40% Gel 15 Gram(s) Oral once  dextrose 5%. 1000 milliLiter(s) IV Continuous <Continuous>  dextrose 5%. 1000 milliLiter(s) IV Continuous <Continuous>  dextrose 50% Injectable 25 Gram(s) IV Push once  dextrose 50% Injectable 12.5 Gram(s) IV Push once  dextrose 50% Injectable 25 Gram(s) IV Push once  glucagon  Injectable 1 milliGRAM(s) IntraMuscular once  insulin glargine Injectable (LANTUS) 18 Unit(s) SubCutaneous at bedtime  insulin lispro (ADMELOG) corrective regimen sliding scale   SubCutaneous three times a day before meals  insulin lispro (ADMELOG) corrective regimen sliding scale   SubCutaneous at bedtime  pantoprazole  Injectable 40 milliGRAM(s) IV Push every 12 hours  sodium bicarbonate 1300 milliGRAM(s) Oral three times a day  ticagrelor 90 milliGRAM(s) Oral every 12 hours    PRN Inpatient Medications      REVIEW OF SYSTEMS      All other systems were reviewed and are negative, except as noted.    VITALS/PHYSICAL EXAM  --------------------------------------------------------------------------------  T(C): 37.1 (10-09-21 @ 08:32), Max: 37.2 (10-08-21 @ 20:22)  HR: 84 (10-09-21 @ 08:32) (84 - 94)  BP: 194/80 (10-09-21 @ 08:32) (163/79 - 194/80)  RR: 18 (10-09-21 @ 08:32) (18 - 20)  SpO2: 98% (10-09-21 @ 08:32) (96% - 99%)  Wt(kg): --        10-08-21 @ 07:01  -  10-09-21 @ 07:00  --------------------------------------------------------  IN: 480 mL / OUT: 900 mL / NET: -420 mL    10-09-21 @ 07:01  -  10-09-21 @ 10:27  --------------------------------------------------------  IN: 0 mL / OUT: 300 mL / NET: -300 mL        Physical Exam:  	Gen: NAD  	HEENT: MMM  	Pulm: CTA B/L  	CV: S1S2  	Abd: Soft, +BS   	Ext: + LE edema B/L  	Neuro: Awake  	Skin: Warm and dry  	Vascular access: N/A      LABS/STUDIES  --------------------------------------------------------------------------------              8.9    8.53  >-----------<  246      [10-09-21 @ 07:16]              28.5     142  |  105  |  96  ----------------------------<  93      [10-09-21 @ 07:16]  4.4   |  20  |  5.64        Ca     8.7     [10-09-21 @ 07:16]      Mg     1.9     [10-07-21 @ 21:01]      Phos  6.6     [10-07-21 @ 21:01]    TPro  5.1  /  Alb  2.6  /  TBili  x   /  DBili  x   /  AST  x   /  ALT  x   /  AlkPhos  x   [10-07-21 @ 14:42]    PT/INR: PT 13.3 , INR 1.11       [10-07-21 @ 11:40]  PTT: 71.6       [10-07-21 @ 11:38]          [10-07-21 @ 11:38]    Creatinine Trend:  SCr 5.64 [10-09 @ 07:16]  SCr 5.42 [10-08 @ 05:08]  SCr 5.46 [10-07 @ 21:01]  SCr 5.20 [10-07 @ 11:40]  SCr 5.18 [10-07 @ 11:38]    Urinalysis - [10-07-21 @ 10:09]      Color Light Yellow / Appearance Clear / SG 1.015 / pH 6.0      Gluc 200 mg/dL / Ketone Negative  / Bili Negative / Urobili Negative       Blood Small / Protein 300 mg/dL / Leuk Est Negative / Nitrite Negative      RBC 2 / WBC 7 / Hyaline 6 / Gran  / Sq Epi  / Non Sq Epi 2 / Bacteria Negative    Urine Creatinine 63      [10-07-21 @ 10:10]  Urine Protein 548      [10-07-21 @ 10:10]  Urine Sodium 71      [10-07-21 @ 10:10]  Urine Osmolality 379      [10-07-21 @ 10:10]      HCV 0.23, Nonreact      [10-07-21 @ 14:38]    BEULAH: titer Negative, pattern --      [10-07-21 @ 14:37]  C3 Complement 135      [10-07-21 @ 14:42]  C4 Complement 71      [10-07-21 @ 14:42]  Syphilis Screen (Treponema Pallidum Ab) Negative      [10-07-21 @ 14:37]  Free Light Chains: kappa 10.89, lambda 12.51, ratio = 0.87      [10-07 @ 14:42]  Immunofixation Serum:   No Monoclonal Band Identified    Reference Range: None Detected      [10-07-21 @ 14:42]  SPEP Interpretation: Normal Electrophoresis Pattern      [10-07-21 @ 14:42]

## 2021-10-09 NOTE — PROGRESS NOTE ADULT - SUBJECTIVE AND OBJECTIVE BOX
Neurology Progress Note    S: Patient seen and examined. No new events overnight. patient denied CP, SOB, HA or pain.     Medication:  aspirin  chewable 81 milliGRAM(s) Oral daily  atorvastatin 80 milliGRAM(s) Oral at bedtime  buMETAnide IVPB 4 milliGRAM(s) IV Intermittent every 12 hours  carvedilol 6.25 milliGRAM(s) Oral every 12 hours  dextrose 40% Gel 15 Gram(s) Oral once  dextrose 5%. 1000 milliLiter(s) IV Continuous <Continuous>  dextrose 5%. 1000 milliLiter(s) IV Continuous <Continuous>  dextrose 50% Injectable 25 Gram(s) IV Push once  dextrose 50% Injectable 12.5 Gram(s) IV Push once  dextrose 50% Injectable 25 Gram(s) IV Push once  glucagon  Injectable 1 milliGRAM(s) IntraMuscular once  insulin glargine Injectable (LANTUS) 18 Unit(s) SubCutaneous at bedtime  insulin lispro (ADMELOG) corrective regimen sliding scale   SubCutaneous three times a day before meals  insulin lispro (ADMELOG) corrective regimen sliding scale   SubCutaneous at bedtime  pantoprazole  Injectable 40 milliGRAM(s) IV Push every 12 hours  sodium bicarbonate 1300 milliGRAM(s) Oral three times a day  ticagrelor 90 milliGRAM(s) Oral every 12 hours      Vitals:  Vital Signs Last 24 Hrs  T(C): 37.1 (09 Oct 2021 11:09), Max: 37.2 (08 Oct 2021 20:22)  T(F): 98.7 (09 Oct 2021 11:09), Max: 98.9 (08 Oct 2021 20:22)  HR: 85 (09 Oct 2021 11:09) (84 - 94)  BP: 206/107 (09 Oct 2021 11:09) (172/83 - 206/107)  BP(mean): --  RR: 18 (09 Oct 2021 11:09) (18 - 18)  SpO2: 99% (09 Oct 2021 11:09) (97% - 99%)    General Exam:   General Appearance: Appropriately dressed and in no acute distress       Head: Normocephalic, atraumatic and no dysmorphic features  Ear, Nose, and Throat: Moist mucous membranes  CVS: S1S2+  Resp: No SOB, no wheeze or rhonchi  Abd: soft NTND  Extremities: No edema, no cyanosis  Skin: No bruises, no rashes     Neurological Exam:  Mental Status: Awake, alert and oriented x 3.  Able to follow simple and complex verbal commands. Able to name and repeat. fluent speech. No obvious aphasia or dysarthria noted.   Cranial Nerves: PERRL, EOMI, VFFC, sensation V1-V3 intact,  mild NLF facial asymmetry , equal elevation of palate, scm/trap 5/5, tongue is midline on protrusion. no obvious papilledema on fundoscopic exam. Hearing is grossly intact.   Motor: Normal bulk, tone and strength throughout. Fine finger movements were intact and symmetric. no tremors or drift noted.    Sensation: Intact to light touch and pinprick throughout. no right/left confusion. no extinction to tactile on DSS.   Reflexes: 1+ throughout at biceps, brachioradialis, triceps, patellars and ankles bilaterally and equal. No clonus. R toe and L toe were both downgoing.  Coordination: No dysmetria on FNF    Gait: deferred     I personally reviewed the below data/images/labs:      CBC Full  -  ( 09 Oct 2021 07:16 )  WBC Count : 8.53 K/uL  RBC Count : 2.85 M/uL  Hemoglobin : 8.9 g/dL  Hematocrit : 28.5 %  Platelet Count - Automated : 246 K/uL  Mean Cell Volume : 100.0 fl  Mean Cell Hemoglobin : 31.2 pg  Mean Cell Hemoglobin Concentration : 31.2 gm/dL  Auto Neutrophil # : x  Auto Lymphocyte # : x  Auto Monocyte # : x  Auto Eosinophil # : x  Auto Basophil # : x  Auto Neutrophil % : x  Auto Lymphocyte % : x  Auto Monocyte % : x  Auto Eosinophil % : x  Auto Basophil % : x    10-09    142  |  105  |  96<H>  ----------------------------<  93  4.4   |  20<L>  |  5.64<H>    Ca    8.7      09 Oct 2021 07:16  Phos  6.6     10-07  Mg     1.9     10-07    TPro  5.1<L>  /  Alb  2.6<L>  /  TBili  x   /  DBili  x   /  AST  x   /  ALT  x   /  AlkPhos  x   10-07    LIVER FUNCTIONS - ( 07 Oct 2021 14:42 )  Alb: 2.6 g/dL / Pro: 5.1 g/dL / ALK PHOS: x     / ALT: x     / AST: x     / GGT: x           -10/07 CTH: No hemorrhage. 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   -10/07 CTA N: High-grade stenosis left internal carotid artery at the carotid bifurcation in the neck.   -10/07 CTA H: Normal intracranial circulation.

## 2021-10-09 NOTE — PROGRESS NOTE ADULT - PROBLEM SELECTOR PLAN 5
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   -neuro rec brain MRI: pending  -speech rec regular diet  -PT/OT  -CTA: High-grade stenosis left internal carotid artery at the carotid bifurcation in the neck.  -carotid duplex, 70-99% L ICA stenosis, 50-70% RCA stenosis, vascular surgery rec inpatient carotid endarterectomy once cleared by cards  -c/w asa/brillanta, statin

## 2021-10-09 NOTE — PROGRESS NOTE ADULT - SUBJECTIVE AND OBJECTIVE BOX
Patient is a 60y old  Male who presents with a chief complaint of NSTEMI (09 Oct 2021 10:43)      SUBJECTIVE / OVERNIGHT EVENTS: No ON events. Feels well. Denies cp, sob, headache, focal weakness.     Tele reviewed: sinus     ADDITIONAL REVIEW OF SYSTEMS: Negative except for above    MEDICATIONS  (STANDING):  aspirin  chewable 81 milliGRAM(s) Oral daily  atorvastatin 80 milliGRAM(s) Oral at bedtime  buMETAnide IVPB 4 milliGRAM(s) IV Intermittent every 12 hours  carvedilol 6.25 milliGRAM(s) Oral every 12 hours  dextrose 40% Gel 15 Gram(s) Oral once  dextrose 5%. 1000 milliLiter(s) (50 mL/Hr) IV Continuous <Continuous>  dextrose 5%. 1000 milliLiter(s) (100 mL/Hr) IV Continuous <Continuous>  dextrose 50% Injectable 25 Gram(s) IV Push once  dextrose 50% Injectable 12.5 Gram(s) IV Push once  dextrose 50% Injectable 25 Gram(s) IV Push once  glucagon  Injectable 1 milliGRAM(s) IntraMuscular once  insulin glargine Injectable (LANTUS) 18 Unit(s) SubCutaneous at bedtime  insulin lispro (ADMELOG) corrective regimen sliding scale   SubCutaneous three times a day before meals  insulin lispro (ADMELOG) corrective regimen sliding scale   SubCutaneous at bedtime  pantoprazole  Injectable 40 milliGRAM(s) IV Push every 12 hours  sodium bicarbonate 1300 milliGRAM(s) Oral three times a day  ticagrelor 90 milliGRAM(s) Oral every 12 hours    MEDICATIONS  (PRN):      CAPILLARY BLOOD GLUCOSE      POCT Blood Glucose.: 134 mg/dL (09 Oct 2021 11:28)  POCT Blood Glucose.: 103 mg/dL (09 Oct 2021 07:15)  POCT Blood Glucose.: 226 mg/dL (08 Oct 2021 21:20)  POCT Blood Glucose.: 153 mg/dL (08 Oct 2021 16:13)    I&O's Summary    08 Oct 2021 07:01  -  09 Oct 2021 07:00  --------------------------------------------------------  IN: 480 mL / OUT: 900 mL / NET: -420 mL    09 Oct 2021 07:01  -  09 Oct 2021 12:54  --------------------------------------------------------  IN: 240 mL / OUT: 300 mL / NET: -60 mL        PHYSICAL EXAM:  Vital Signs Last 24 Hrs  T(C): 37.1 (09 Oct 2021 11:09), Max: 37.2 (08 Oct 2021 20:22)  T(F): 98.7 (09 Oct 2021 11:09), Max: 98.9 (08 Oct 2021 20:22)  HR: 85 (09 Oct 2021 11:09) (84 - 94)  BP: 206/107 (09 Oct 2021 11:09) (172/83 - 206/107)  BP(mean): --  RR: 18 (09 Oct 2021 11:09) (18 - 18)  SpO2: 99% (09 Oct 2021 11:09) (97% - 99%)    PHYSICAL EXAM:  GENERAL: NAD, well-developed  NECK: Supple, No JVD  CHEST/LUNG: decrease bs b/l bases  HEART: Regular rate and rhythm;   ABDOMEN: Soft, Nontender, Nondistended; Bowel sounds present  EXTREMITIES:  2+ Peripheral Pulses, 2+ pitting edema b/l LE  PSYCH: AAOx3  NEUROLOGY: non-focal  SKIN: No rashes or lesions          LABS:                        8.9    8.53  )-----------( 246      ( 09 Oct 2021 07:16 )             28.5     10-09    142  |  105  |  96<H>  ----------------------------<  93  4.4   |  20<L>  |  5.64<H>    Ca    8.7      09 Oct 2021 07:16  Phos  6.6     10-07  Mg     1.9     10-07    TPro  5.1<L>  /  Alb  2.6<L>  /  TBili  x   /  DBili  x   /  AST  x   /  ALT  x   /  AlkPhos  x   10-07                RADIOLOGY & ADDITIONAL TESTS:    Imaging Personally Reviewed:    Electrocardiogram Personally Reviewed:    COORDINATION OF CARE:  Care Discussed with Consultants/Other Providers [Y/N]:  Prior or Outpatient Records Reviewed [Y/N]:

## 2021-10-09 NOTE — PROGRESS NOTE ADULT - PROBLEM SELECTOR PLAN 3
acute CHF with dyspnea likely related to NSTEMI, Elevated BNP 43026, fluid overloaded on exam with sob  - c/w IV bumex 4mg q12  - start coreg 6.125mg bid  - TTE - TTE EF 45%: mild-moderate LVSF  - strict I/O, daily weights  - monitor bmp, replete lytes as needed  -c/w NC, wean as tolerated

## 2021-10-09 NOTE — PROGRESS NOTE ADULT - PROBLEM SELECTOR PLAN 2
Blood pressure initially 200/105 at arrival; s/p nitro ggt at [place], noncompliant with meds  - now 48 hours from suspect TIA/CVA, thus can lower permissive HTN goal to 130-180 per neuro  - BP 200s today, start coreg 6.125mg bid, uptitrate as needed  - bumex as below  - home labetalol and nifedipine on hold  - hold HCTZ-losartan given Elvi on cKD  - monitor BP

## 2021-10-09 NOTE — PROGRESS NOTE ADULT - ATTENDING COMMENTS
59 y/o man with HTN, DM admitted with chest pain and dyspnea and marked HTN with elevated cardiac biomarkers, HF, and elevated creatinine.    --Clinically improved; no chest pain, volume status improved but still volume overloaded.  --Agree with continued diuresis and very close monitoring of accurate urine output, Is and Os.  --Cr 5.6 today slightly trending upwards over the past few days--may require RRT per Nephrology Service-Recs noted.  --Angiography on hold given renal dysfunction.  --Monitor on telemetry.  --Will follow.

## 2021-10-09 NOTE — PROGRESS NOTE ADULT - ASSESSMENT
Patient is a 60 year old male with PMH od DM and HTN who presented to the hospital as a transfer from [facility] for NSTEMI. The nephrology team was consulted due to elevated creatinine of 4.05 upon presentation. The patient denies any history of kidney disease and denied noticing any changes in his urination.     JOSHUA on CKD?   - patient presenting to hospital with creatinine of 4.05 mg/dL; denied any history of kidney disease   --- creatinine has continued to uptrend currently steady at ~5.4mg/dL  - BUN noted ot be 100; explained to patient that he will likely require RRT in the near future   --- he would like to discuss with his wife prior to making the decision; no emergent need at this time  - please monitor UOP  - there could be underlying chronic disease secondary to uncontrolled DM and HTN   - normal renal ultrasound without hydro  - UA with proteinuria and blood;  Ur Pr/Cr ratio noted to be 8.7g  - pending serological work up right now  - volume overloaded; no requiring any oxygen at this time   --- continue with diuresis at this time  - Patient is in need of Cardiac catheterization  -- as per the Shaggy score it places a patient at a 26.1% chance of developing CARY and a 1.09% chance of developing CARY requiring RRT   - he is pending ischemic work up given current renal function   - please adjust medication doses as per eGFR     If any questions, please feel free to contact me     Saul Crews  Nephrology Fellow  [facility] Pager: [phone]

## 2021-10-09 NOTE — PROGRESS NOTE ADULT - SUBJECTIVE AND OBJECTIVE BOX
Patient seen and examined at bedside.    Overnight Events: no events, issues or complaints    Review of Systems:  REVIEW OF SYSTEMS:  CONSTITUTIONAL: No weakness, fevers or chills  EYES/ENT: No visual changes;  No dysphagia  NECK: No pain or stiffness  RESPIRATORY: No cough, wheezing, hemoptysis; No shortness of breath  CARDIOVASCULAR: No chest pain or palpitations; mild lower extremity edema  GASTROINTESTINAL: No abdominal or epigastric pain. No nausea, vomiting, or hematemesis; No diarrhea or constipation. No melena or hematochezia.  BACK: No back pain  GENITOURINARY: No dysuria, frequency or hematuria  NEUROLOGICAL: No numbness or weakness  SKIN: No itching, burning, rashes, or lesions   All other review of systems is negative unless indicated above.    [x ] All other systems negative  [ ] Unable to assess ROS due to    Current Meds:  aspirin  chewable 81 milliGRAM(s) Oral daily  atorvastatin 80 milliGRAM(s) Oral at bedtime  buMETAnide IVPB 4 milliGRAM(s) IV Intermittent every 12 hours  dextrose 40% Gel 15 Gram(s) Oral once  dextrose 5%. 1000 milliLiter(s) IV Continuous <Continuous>  dextrose 5%. 1000 milliLiter(s) IV Continuous <Continuous>  dextrose 50% Injectable 25 Gram(s) IV Push once  dextrose 50% Injectable 12.5 Gram(s) IV Push once  dextrose 50% Injectable 25 Gram(s) IV Push once  glucagon  Injectable 1 milliGRAM(s) IntraMuscular once  insulin glargine Injectable (LANTUS) 18 Unit(s) SubCutaneous at bedtime  insulin lispro (ADMELOG) corrective regimen sliding scale   SubCutaneous three times a day before meals  insulin lispro (ADMELOG) corrective regimen sliding scale   SubCutaneous at bedtime  pantoprazole  Injectable 40 milliGRAM(s) IV Push every 12 hours  sodium bicarbonate 1300 milliGRAM(s) Oral three times a day  ticagrelor 90 milliGRAM(s) Oral every 12 hours      PAST MEDICAL & SURGICAL HISTORY:  Hypertension    Hyperlipidemia    Diabetes mellitus    No pertinent past surgical history        Vitals:  T(F): 98.8 (10-09), Max: 98.9 (10-08)  HR: 84 (10-09) (84 - 94)  BP: 194/80 (10-09) (163/79 - 194/80)  RR: 18 (10-09)  SpO2: 98% (10-09)  I&O's Summary    08 Oct 2021 07:01  -  09 Oct 2021 07:00  --------------------------------------------------------  IN: 480 mL / OUT: 900 mL / NET: -420 mL    09 Oct 2021 07:01  -  09 Oct 2021 08:41  --------------------------------------------------------  IN: 0 mL / OUT: 300 mL / NET: -300 mL        Physical Exam:  Appearance: No acute distress; well appearing  Eyes: PERRL, EOMI, pink conjunctiva  HENT: Normal oral mucosa  Cardiovascular: RRR, S1, S2, no murmurs, rubs, or gallops; 1+ edema b/l; JVD MADDI  Respiratory: crackles at the bases  Gastrointestinal: soft, non-tender, non-distended with normal bowel sounds  Musculoskeletal: No clubbing; no joint deformity   Neurologic: Non-focal  Lymphatic: No lymphadenopathy  Psychiatry: AAOx3, mood & affect appropriate  Skin: No rashes, ecchymoses, or cyanosis                          8.9    8.53  )-----------( 246      ( 09 Oct 2021 07:16 )             28.5     10-09    142  |  105  |  96<H>  ----------------------------<  93  4.4   |  20<L>  |  5.64<H>    Ca    8.7      09 Oct 2021 07:16  Phos  6.6     10-07  Mg     1.9     10-07    TPro  5.1<L>  /  Alb  2.6<L>  /  TBili  x   /  DBili  x   /  AST  x   /  ALT  x   /  AlkPhos  x   10-07    PT/INR - ( 07 Oct 2021 11:40 )   PT: 13.3 sec;   INR: 1.11 ratio         PTT - ( 07 Oct 2021 11:38 )  PTT:71.6 sec  CARDIAC MARKERS ( 07 Oct 2021 11:38 )  x     / x     / 309 U/L / x     / 26.8 ng/mL      Serum Pro-Brain Natriuretic Peptide: 57417 pg/mL (10-05 @ 17:02)    < from: TTE with Doppler (w/Cont) (10.06.21 @ 09:05) >  Conclusions:  1. Mitral annular calcification, otherwise normal mitral  valve. Moderate-severe mitral regurgitation. /77  2. Mild -moderate segmental left ventricular systolic  dysfunction. Endocardial visualization enhanced with  intravenous injection of Ultrasonic Enhancing Agent  (Definity).  Limited evaluation. The inferior wall is  hypokinetic. The apical cap is akinetic.  The anterior wall  was not well seen despite Definity. The anterolateral wal  is also not well seen and may be hypokinetic.  3. The right ventricle is not well visualized; grossly  normal right ventricular systolic function.  ------------------------------------------------------------------------  Confirmed on  10/6/2021 - 17:20:59 by STEPHANE Andrade  ------------------------------------------------------------------------    < end of copied text >

## 2021-10-09 NOTE — PROGRESS NOTE ADULT - ASSESSMENT
59 yo M with PMH of HTN & DM.  Presented to [facility] with CP and dyspnea and marked HTN.  Transferred for N-STEMI/CHF, hypertensive emergency and ARF.  On arrival here , found to be in sherie nonoliguric renal failure with increased work of breathing and evidence of volume overload due to ?ADHF vs. renal failure. Elevated cardiac enzymes at OSH c/f N-STEMI, however patient chest pain free after NTG.    Clinically Improved at present with resolution of CP and dyspnea.        REC:    1.  Elevated troponin and CK/MB, N-STEMI , in setting of renal failure and significant HTN emergency; EKG was unremarkable on admission.  TTE shows segmental LV dysfunction with mild to moderate systolic dysfunction, c/w CAD.  - continue telemetry  - continue ASA, Brilinta, statin;   - off heparin  - continue diuresis  - will need diagnostic cath but we are unable to do this at present due to JOSHUA.    2.  Volume overload, HFrEF  - patient comfortable at present.  - I and Os   - discussed with nephrology service.  Baseline renal function is not know to use, although apparently had seen a nephrologist in the past.  We may be dealing with acute on chronic kidney dysfunction and it is unclear if renal function will improve.  - will defer to nephrology renal for diuresis vs. HD for volume removal; goal negative 1L  - start low dose coreg today  - will need to be on ACE/ARB, but can be initiated once renal function recovers    4.  HLD  - continue statin    5.  Moderate to severe MR on TTE  - continue diuresis and BP control    Valdemar Kaplan MD  Cardiology Fellow PGY-5  [facility]    Notes are not final until signed by attending  For all consults and questions:  [email or web address]   Login: [email or web address]   59 yo M with PMH of HTN & DM.  Presented to [facility] with CP and dyspnea and marked HTN.  Transferred for N-STEMI/CHF, hypertensive emergency and ARF.  On arrival here , found to be in sherie nonoliguric renal failure with increased work of breathing and evidence of volume overload due to ?ADHF vs. renal failure. Elevated cardiac enzymes at OSH c/f N-STEMI, however patient chest pain free after NTG.    Clinically Improved at present with resolution of CP and dyspnea.        REC:    1.  Elevated troponin and CK/MB, N-STEMI , in setting of renal failure and significant HTN emergency; EKG was unremarkable on admission.  TTE shows segmental LV dysfunction with mild to moderate systolic dysfunction, c/w CAD.  - continue telemetry  - continue ASA, Brilinta, statin;   - off heparin  - continue diuresis  - will need diagnostic cath but we are unable to do this at present due to JOSHUA.    2.  Volume overload, HFrEF  - patient comfortable at present.  - I and Os   - discussed with nephrology service.  Baseline renal function is not know to us, although apparently had seen a nephrologist in the past.  We may be dealing with acute on chronic kidney dysfunction and it is unclear if renal function will improve.  - will defer to nephrology renal for diuresis vs. HD for volume removal; goal negative 1L  - start low dose coreg today  - will need to be on ACE/ARB, but can be initiated once renal function recovers    4.  HLD  - continue statin    5.  Moderate to severe MR on TTE  - continue diuresis and BP control    Valdemar Kaplan MD  Cardiology Fellow PGY-5  [facility]    Notes are not final until signed by attending  For all consults and questions:  [email or web address]   Login: [email or web address]

## 2021-10-10 LAB
ANION GAP SERPL CALC-SCNC: 17 MMOL/L — SIGNIFICANT CHANGE UP (ref 5–17)
BUN SERPL-MCNC: 95 MG/DL — HIGH (ref 7–23)
CALCIUM SERPL-MCNC: 8.3 MG/DL — LOW (ref 8.4–10.5)
CHLORIDE SERPL-SCNC: 105 MMOL/L — SIGNIFICANT CHANGE UP (ref 96–108)
CO2 SERPL-SCNC: 21 MMOL/L — LOW (ref 22–31)
CREAT SERPL-MCNC: 5.64 MG/DL — HIGH (ref 0.5–1.3)
GLUCOSE BLDC GLUCOMTR-MCNC: 110 MG/DL — HIGH (ref 70–99)
GLUCOSE BLDC GLUCOMTR-MCNC: 141 MG/DL — HIGH (ref 70–99)
GLUCOSE BLDC GLUCOMTR-MCNC: 188 MG/DL — HIGH (ref 70–99)
GLUCOSE BLDC GLUCOMTR-MCNC: 259 MG/DL — HIGH (ref 70–99)
GLUCOSE SERPL-MCNC: 129 MG/DL — HIGH (ref 70–99)
HCT VFR BLD CALC: 27.1 % — LOW (ref 39–50)
HGB BLD-MCNC: 8.6 G/DL — LOW (ref 13–17)
MCHC RBC-ENTMCNC: 31.6 PG — SIGNIFICANT CHANGE UP (ref 27–34)
MCHC RBC-ENTMCNC: 31.7 GM/DL — LOW (ref 32–36)
MCV RBC AUTO: 99.6 FL — SIGNIFICANT CHANGE UP (ref 80–100)
NRBC # BLD: 0 /100 WBCS — SIGNIFICANT CHANGE UP (ref 0–0)
PLATELET # BLD AUTO: 257 K/UL — SIGNIFICANT CHANGE UP (ref 150–400)
POTASSIUM SERPL-MCNC: 4.2 MMOL/L — SIGNIFICANT CHANGE UP (ref 3.5–5.3)
POTASSIUM SERPL-SCNC: 4.2 MMOL/L — SIGNIFICANT CHANGE UP (ref 3.5–5.3)
RBC # BLD: 2.72 M/UL — LOW (ref 4.2–5.8)
RBC # FLD: 14.2 % — SIGNIFICANT CHANGE UP (ref 10.3–14.5)
SODIUM SERPL-SCNC: 143 MMOL/L — SIGNIFICANT CHANGE UP (ref 135–145)
WBC # BLD: 9.05 K/UL — SIGNIFICANT CHANGE UP (ref 3.8–10.5)
WBC # FLD AUTO: 9.05 K/UL — SIGNIFICANT CHANGE UP (ref 3.8–10.5)

## 2021-10-10 PROCEDURE — 99232 SBSQ HOSP IP/OBS MODERATE 35: CPT | Mod: GC

## 2021-10-10 PROCEDURE — 99233 SBSQ HOSP IP/OBS HIGH 50: CPT

## 2021-10-10 RX ORDER — NIFEDIPINE 30 MG
90 TABLET, EXTENDED RELEASE 24 HR ORAL ONCE
Refills: 0 | Status: COMPLETED | OUTPATIENT
Start: 2021-10-10 | End: 2021-10-10

## 2021-10-10 RX ORDER — NIFEDIPINE 30 MG
90 TABLET, EXTENDED RELEASE 24 HR ORAL DAILY
Refills: 0 | Status: DISCONTINUED | OUTPATIENT
Start: 2021-10-10 | End: 2021-10-21

## 2021-10-10 RX ORDER — HYDRALAZINE HCL 50 MG
10 TABLET ORAL ONCE
Refills: 0 | Status: COMPLETED | OUTPATIENT
Start: 2021-10-10 | End: 2021-10-10

## 2021-10-10 RX ORDER — CARVEDILOL PHOSPHATE 80 MG/1
6.25 CAPSULE, EXTENDED RELEASE ORAL ONCE
Refills: 0 | Status: COMPLETED | OUTPATIENT
Start: 2021-10-10 | End: 2021-10-10

## 2021-10-10 RX ADMIN — INSULIN GLARGINE 18 UNIT(S): 100 INJECTION, SOLUTION SUBCUTANEOUS at 21:22

## 2021-10-10 RX ADMIN — CARVEDILOL PHOSPHATE 6.25 MILLIGRAM(S): 80 CAPSULE, EXTENDED RELEASE ORAL at 08:43

## 2021-10-10 RX ADMIN — Medication 10 MILLIGRAM(S): at 12:36

## 2021-10-10 RX ADMIN — BUMETANIDE 132 MILLIGRAM(S): 0.25 INJECTION INTRAMUSCULAR; INTRAVENOUS at 17:30

## 2021-10-10 RX ADMIN — CARVEDILOL PHOSPHATE 6.25 MILLIGRAM(S): 80 CAPSULE, EXTENDED RELEASE ORAL at 17:27

## 2021-10-10 RX ADMIN — Medication 1: at 21:22

## 2021-10-10 RX ADMIN — TICAGRELOR 90 MILLIGRAM(S): 90 TABLET ORAL at 21:02

## 2021-10-10 RX ADMIN — BUMETANIDE 132 MILLIGRAM(S): 0.25 INJECTION INTRAMUSCULAR; INTRAVENOUS at 05:09

## 2021-10-10 RX ADMIN — PANTOPRAZOLE SODIUM 40 MILLIGRAM(S): 20 TABLET, DELAYED RELEASE ORAL at 17:27

## 2021-10-10 RX ADMIN — Medication 1: at 16:52

## 2021-10-10 RX ADMIN — Medication 1300 MILLIGRAM(S): at 21:02

## 2021-10-10 RX ADMIN — Medication 90 MILLIGRAM(S): at 15:36

## 2021-10-10 RX ADMIN — ATORVASTATIN CALCIUM 80 MILLIGRAM(S): 80 TABLET, FILM COATED ORAL at 21:02

## 2021-10-10 RX ADMIN — Medication 81 MILLIGRAM(S): at 12:37

## 2021-10-10 RX ADMIN — CARVEDILOL PHOSPHATE 6.25 MILLIGRAM(S): 80 CAPSULE, EXTENDED RELEASE ORAL at 00:17

## 2021-10-10 RX ADMIN — HEPARIN SODIUM 5000 UNIT(S): 5000 INJECTION INTRAVENOUS; SUBCUTANEOUS at 17:27

## 2021-10-10 RX ADMIN — TICAGRELOR 90 MILLIGRAM(S): 90 TABLET ORAL at 10:24

## 2021-10-10 RX ADMIN — Medication 1300 MILLIGRAM(S): at 13:03

## 2021-10-10 RX ADMIN — Medication 1300 MILLIGRAM(S): at 05:10

## 2021-10-10 RX ADMIN — PANTOPRAZOLE SODIUM 40 MILLIGRAM(S): 20 TABLET, DELAYED RELEASE ORAL at 05:15

## 2021-10-10 RX ADMIN — HEPARIN SODIUM 5000 UNIT(S): 5000 INJECTION INTRAVENOUS; SUBCUTANEOUS at 05:15

## 2021-10-10 NOTE — PROGRESS NOTE ADULT - PROBLEM SELECTOR PLAN 1
-no further chest pain  -troponin downtrending ; no need to further trend unless CP or change in HD status, etc  - c/w brilinta 90 mg BID and ASA 81 mg daily  - c/w atorvastatin 80 mg qhs  - eventual ischemic eval pending improvement in kidney function/fluid status  - cardio following, appreciate recs  - if repeat chest pain, obtain cardiac enzymes, ekg   - monitor on tele  - TTE EF 45%: mild-moderate LVSF

## 2021-10-10 NOTE — PROGRESS NOTE ADULT - PROBLEM SELECTOR PLAN 2
Blood pressure initially 200/105 at arrival; s/p nitro ggt at [place], noncompliant with meds  - now 48 hours from suspect TIA/CVA, thus can lower permissive HTN goal to 130-180 per neuro  - SBP 200s today ; will give Hydralazine 10mg IVP x 1  - restart home med Nifedipine ER 90mg po daily  - c/w coreg 6.125mg bid, uptitrate as needed  - bumex as below  - home labetalol  on hold  - c/t hold HCTZ-losartan given JOSHUA on CKD  - monitor BP  - would consider adding Hydralazine if BP remains uncontrolled

## 2021-10-10 NOTE — PROGRESS NOTE ADULT - ASSESSMENT
60 yr old M w/ hx of DM2, HTN and HLD presents as transfer from [facility] for chest pain concerning for NSTEMI and possible new CHF c/b JOSHUA likely on CKD, s/p RRT for CVA/TIA ruled out and worsening anemia.

## 2021-10-10 NOTE — PROGRESS NOTE ADULT - ASSESSMENT
61 yo M with PMH of HTN & DM.  Presented to [facility] with CP and dyspnea and marked HTN.  Transferred for N-STEMI/CHF, hypertensive emergency and ARF.  On arrival here , found to be in sherie nonoliguric renal failure with increased work of breathing and evidence of volume overload due to ?ADHF vs. renal failure. Elevated cardiac enzymes at OSH c/f N-STEMI, however patient chest pain free after NTG.    Clinically Improved at present with resolution of CP and dyspnea.        REC:    1.  Elevated troponin and CK/MB, N-STEMI , in setting of renal failure and significant HTN emergency; EKG was unremarkable on admission.  TTE shows segmental LV dysfunction with mild to moderate systolic dysfunction, c/w CAD.  - continue telemetry  - continue ASA, Brilinta, statin;   - off heparin  - continue diuresis  - will need diagnostic cath but we are unable to do this at present due to JOSHUA.    2.  Volume overload, HFrEF  - patient comfortable at present.  - I and Os   - will defer to nephrology renal for diuresis vs. HD for volume removal; goal negative 1L  - start low dose coreg today  - will need to be on ACE/ARB, but can be initiated once renal function recovers    4.  HLD  - continue statin    5.  Moderate to severe MR on TTE  - continue diuresis and BP control   59 yo M with PMH of HTN & DM.  Presented to [facility] with CP and dyspnea and marked HTN.  Transferred for N-STEMI/CHF, hypertensive emergency and ARF.  On arrival here , found to be in sherie nonoliguric renal failure with increased work of breathing and evidence of volume overload due to ?ADHF vs. renal failure. Elevated cardiac enzymes at OSH c/f N-STEMI, however patient chest pain free after NTG.    Clinically Improved at present with resolution of CP and dyspnea.        REC:    1.  Elevated troponin and CK/MB, N-STEMI , in setting of renal failure and significant HTN emergency; EKG was unremarkable on admission.  TTE shows segmental LV dysfunction with mild to moderate systolic dysfunction, c/w CAD.  - continue telemetry  - continue ASA, Brilinta, statin;   - continue diuresis  - will need diagnostic cath but we are unable to do this at present due to JOSHUA.    2.  Volume overload, HFrEF  - patient comfortable at present.  - I and Os   - will defer to nephrology renal for diuresis vs. HD for volume removal; goal negative 1L  - continue coreg  - will need to be on ACE/ARB, but can be initiated once renal function recovers    4.  HLD  - continue statin    5.  Moderate to severe MR on TTE  - continue diuresis and BP control   59 yo M with PMH of HTN & DM.  Presented to [facility] with CP and dyspnea and marked HTN.  Transferred for N-STEMI/CHF, hypertensive emergency and ARF.  On arrival here , found to be in sherie nonoliguric renal failure with increased work of breathing and evidence of volume overload due to ?ADHF vs. renal failure. Elevated cardiac enzymes at OSH c/f N-STEMI, however patient chest pain free after NTG.    Clinically Improved at present with resolution of CP and dyspnea.        REC:    1.  Elevated troponin and CK/MB, N-STEMI , in setting of renal failure and significant HTN emergency; EKG was unremarkable on admission.  TTE shows segmental LV dysfunction with mild to moderate systolic dysfunction, c/w CAD.  - continue telemetry  - continue ASA, Brilinta, statin;   - continue diuresis  - will need diagnostic cath but we are unable to do this at present due to JOSHUA.    2.  Volume overload, HFrEF  - patient comfortable at present.  - I and Os   - will defer to nephrology renal for diuresis vs. HD for volume removal; goal negative 1L  - continue coreg  - will need to be on ACE/ARB, but can be initiated once renal function recovers    4.  HLD  - continue statin    5.  Moderate to severe MR on TTE  - continue diuresis and BP control    6.HTN uncontrolled  -would recommend starting PO hydralazine 25mg TID and increase nifedipine if ok with renal

## 2021-10-10 NOTE — PROGRESS NOTE ADULT - SUBJECTIVE AND OBJECTIVE BOX
Patient seen and examined at bedside.    Overnight Events: no events overnight    Current Meds:  aspirin  chewable 81 milliGRAM(s) Oral daily  atorvastatin 80 milliGRAM(s) Oral at bedtime  buMETAnide IVPB 4 milliGRAM(s) IV Intermittent every 12 hours  carvedilol 6.25 milliGRAM(s) Oral every 12 hours  dextrose 40% Gel 15 Gram(s) Oral once  dextrose 5%. 1000 milliLiter(s) IV Continuous <Continuous>  dextrose 5%. 1000 milliLiter(s) IV Continuous <Continuous>  dextrose 50% Injectable 25 Gram(s) IV Push once  dextrose 50% Injectable 12.5 Gram(s) IV Push once  dextrose 50% Injectable 25 Gram(s) IV Push once  glucagon  Injectable 1 milliGRAM(s) IntraMuscular once  heparin   Injectable 5000 Unit(s) SubCutaneous every 12 hours  insulin glargine Injectable (LANTUS) 18 Unit(s) SubCutaneous at bedtime  insulin lispro (ADMELOG) corrective regimen sliding scale   SubCutaneous three times a day before meals  insulin lispro (ADMELOG) corrective regimen sliding scale   SubCutaneous at bedtime  pantoprazole  Injectable 40 milliGRAM(s) IV Push every 12 hours  sodium bicarbonate 1300 milliGRAM(s) Oral three times a day  ticagrelor 90 milliGRAM(s) Oral every 12 hours      PAST MEDICAL & SURGICAL HISTORY:  Hypertension    Hyperlipidemia    Diabetes mellitus    No pertinent past surgical history        Vitals:  T(F): 98.3 (10-10), Max: 98.8 (10-09)  HR: 88 (10-10) (84 - 96)  BP: 186/99 (10-10) (186/99 - 218/111)  RR: 18 (10-10)  SpO2: 99% (10-10)  I&O's Summary    09 Oct 2021 07:01  -  10 Oct 2021 07:00  --------------------------------------------------------  IN: 720 mL / OUT: 500 mL / NET: 220 mL      Physical Exam:  Appearance: No acute distress; well appearing  Eyes: PERRL, EOMI, pink conjunctiva  HENT: Normal oral mucosa  Cardiovascular: RRR, S1, S2, no murmurs, rubs, or gallops; 1+ edema b/l; JVD MADDI  Respiratory: crackles at the bases  Gastrointestinal: soft, non-tender, non-distended with normal bowel sounds  Musculoskeletal: No clubbing; no joint deformity   Neurologic: Non-focal  Lymphatic: No lymphadenopathy  Psychiatry: AAOx3, mood & affect appropriate  Skin: No rashes, ecchymoses, or cyanosis                            8.6    9.05  )-----------( 257      ( 10 Oct 2021 06:45 )             27.1     10-10    143  |  105  |  95<H>  ----------------------------<  129<H>  4.2   |  21<L>  |  5.64<H>    Ca    8.3<L>      10 Oct 2021 06:45            Serum Pro-Brain Natriuretic Peptide: 94086 pg/mL (10-05 @ 17:02)          New ECG(s): Personally reviewed    Echo:    Stress Testing:     Cath:    Imaging:    Interpretation of Telemetry:

## 2021-10-10 NOTE — PROGRESS NOTE ADULT - SUBJECTIVE AND OBJECTIVE BOX
Patient is a 60y old  Male who presents with a chief complaint of NSTEMI (10 Oct 2021 15:39)      SUBJECTIVE / OVERNIGHT EVENTS:  Pt seen and examined. BP noted to be markedly elevated this AM. He denies CP, headache, focal weakness.    MEDICATIONS  (STANDING):  aspirin  chewable 81 milliGRAM(s) Oral daily  atorvastatin 80 milliGRAM(s) Oral at bedtime  buMETAnide IVPB 4 milliGRAM(s) IV Intermittent every 12 hours  carvedilol 6.25 milliGRAM(s) Oral every 12 hours  dextrose 40% Gel 15 Gram(s) Oral once  dextrose 5%. 1000 milliLiter(s) (50 mL/Hr) IV Continuous <Continuous>  dextrose 5%. 1000 milliLiter(s) (100 mL/Hr) IV Continuous <Continuous>  dextrose 50% Injectable 25 Gram(s) IV Push once  dextrose 50% Injectable 12.5 Gram(s) IV Push once  dextrose 50% Injectable 25 Gram(s) IV Push once  glucagon  Injectable 1 milliGRAM(s) IntraMuscular once  heparin   Injectable 5000 Unit(s) SubCutaneous every 12 hours  insulin glargine Injectable (LANTUS) 18 Unit(s) SubCutaneous at bedtime  insulin lispro (ADMELOG) corrective regimen sliding scale   SubCutaneous three times a day before meals  insulin lispro (ADMELOG) corrective regimen sliding scale   SubCutaneous at bedtime  NIFEdipine XL 90 milliGRAM(s) Oral daily  pantoprazole  Injectable 40 milliGRAM(s) IV Push every 12 hours  sodium bicarbonate 1300 milliGRAM(s) Oral three times a day  ticagrelor 90 milliGRAM(s) Oral every 12 hours    MEDICATIONS  (PRN):      Vital Signs Last 24 Hrs  T(C): 36.7 (10 Oct 2021 19:30), Max: 37.1 (09 Oct 2021 23:57)  T(F): 98.1 (10 Oct 2021 19:30), Max: 98.7 (09 Oct 2021 23:57)  HR: 91 (10 Oct 2021 19:30) (76 - 96)  BP: 153/76 (10 Oct 2021 19:30) (153/76 - 218/111)  BP(mean): --  RR: 18 (10 Oct 2021 19:30) (18 - 18)  SpO2: 98% (10 Oct 2021 19:30) (98% - 99%)  CAPILLARY BLOOD GLUCOSE      POCT Blood Glucose.: 188 mg/dL (10 Oct 2021 16:03)  POCT Blood Glucose.: 141 mg/dL (10 Oct 2021 11:25)  POCT Blood Glucose.: 110 mg/dL (10 Oct 2021 07:27)  POCT Blood Glucose.: 167 mg/dL (09 Oct 2021 21:59)    I&O's Summary    09 Oct 2021 07:01  -  10 Oct 2021 07:00  --------------------------------------------------------  IN: 720 mL / OUT: 500 mL / NET: 220 mL    10 Oct 2021 07:01  -  10 Oct 2021 20:51  --------------------------------------------------------  IN: 720 mL / OUT: 1 mL / NET: 719 mL        PHYSICAL EXAM:  GENERAL: NAD, anicteric, afebrile  HEAD:  Atraumatic, Normocephalic  EYES:  conjunctiva and sclera clear  NECK: Supple, No JVD  CHEST/LUNG: Clear to auscultation bilaterally; No wheeze  HEART: Regular rate and rhythm; No murmurs, rubs, or gallops  ABDOMEN: Soft, Nontender, Nondistended; Bowel sounds present  EXTREMITIES:  2+ Peripheral Pulses, No clubbing, cyanosis, or edema  PSYCH: AAOx3  NEUROLOGY: non-focal  SKIN: No rashes or lesions    LABS:                        8.6    9.05  )-----------( 257      ( 10 Oct 2021 06:45 )             27.1     10-10    143  |  105  |  95<H>  ----------------------------<  129<H>  4.2   |  21<L>  |  5.64<H>    Ca    8.3<L>      10 Oct 2021 06:45

## 2021-10-10 NOTE — PROGRESS NOTE ADULT - ATTENDING COMMENTS
61 y/o man with HTN, DM admitted with chest pain and dyspnea and marked HTN with elevated cardiac biomarkers, HF, and elevated creatinine.    --Clinically improved; no chest pain, volume status improved but still volume overloaded on exam.  --Agree with continued diuresis and very close monitoring of accurate urine output, Is and Os.  --Cr 5.6 today, unchanged from yesterday.  May require RRT per Nephrology Service-Recs noted.  --Angiography on hold given renal dysfunction.  --BP not under control for last few days--consider adding hydralazine and/or increasing nifedipine--renal input.    --Monitor on telemetry.  --Will follow.

## 2021-10-11 LAB
ANION GAP SERPL CALC-SCNC: 16 MMOL/L — SIGNIFICANT CHANGE UP (ref 5–17)
BUN SERPL-MCNC: 95 MG/DL — HIGH (ref 7–23)
CALCIUM SERPL-MCNC: 8.5 MG/DL — SIGNIFICANT CHANGE UP (ref 8.4–10.5)
CHLORIDE SERPL-SCNC: 101 MMOL/L — SIGNIFICANT CHANGE UP (ref 96–108)
CO2 SERPL-SCNC: 21 MMOL/L — LOW (ref 22–31)
CREAT SERPL-MCNC: 6.08 MG/DL — HIGH (ref 0.5–1.3)
GLUCOSE BLDC GLUCOMTR-MCNC: 146 MG/DL — HIGH (ref 70–99)
GLUCOSE BLDC GLUCOMTR-MCNC: 204 MG/DL — HIGH (ref 70–99)
GLUCOSE BLDC GLUCOMTR-MCNC: 224 MG/DL — HIGH (ref 70–99)
GLUCOSE BLDC GLUCOMTR-MCNC: 228 MG/DL — HIGH (ref 70–99)
GLUCOSE SERPL-MCNC: 126 MG/DL — HIGH (ref 70–99)
HCT VFR BLD CALC: 26.8 % — LOW (ref 39–50)
HGB BLD-MCNC: 8.7 G/DL — LOW (ref 13–17)
MAGNESIUM SERPL-MCNC: 1.7 MG/DL — SIGNIFICANT CHANGE UP (ref 1.6–2.6)
MCHC RBC-ENTMCNC: 31.9 PG — SIGNIFICANT CHANGE UP (ref 27–34)
MCHC RBC-ENTMCNC: 32.5 GM/DL — SIGNIFICANT CHANGE UP (ref 32–36)
MCV RBC AUTO: 98.2 FL — SIGNIFICANT CHANGE UP (ref 80–100)
NRBC # BLD: 0 /100 WBCS — SIGNIFICANT CHANGE UP (ref 0–0)
PLATELET # BLD AUTO: 277 K/UL — SIGNIFICANT CHANGE UP (ref 150–400)
POTASSIUM SERPL-MCNC: 4.1 MMOL/L — SIGNIFICANT CHANGE UP (ref 3.5–5.3)
POTASSIUM SERPL-SCNC: 4.1 MMOL/L — SIGNIFICANT CHANGE UP (ref 3.5–5.3)
RBC # BLD: 2.73 M/UL — LOW (ref 4.2–5.8)
RBC # FLD: 14.1 % — SIGNIFICANT CHANGE UP (ref 10.3–14.5)
SODIUM SERPL-SCNC: 138 MMOL/L — SIGNIFICANT CHANGE UP (ref 135–145)
WBC # BLD: 8.79 K/UL — SIGNIFICANT CHANGE UP (ref 3.8–10.5)
WBC # FLD AUTO: 8.79 K/UL — SIGNIFICANT CHANGE UP (ref 3.8–10.5)

## 2021-10-11 PROCEDURE — 99233 SBSQ HOSP IP/OBS HIGH 50: CPT | Mod: GC

## 2021-10-11 PROCEDURE — 99233 SBSQ HOSP IP/OBS HIGH 50: CPT

## 2021-10-11 RX ADMIN — BUMETANIDE 132 MILLIGRAM(S): 0.25 INJECTION INTRAMUSCULAR; INTRAVENOUS at 17:01

## 2021-10-11 RX ADMIN — BUMETANIDE 132 MILLIGRAM(S): 0.25 INJECTION INTRAMUSCULAR; INTRAVENOUS at 05:03

## 2021-10-11 RX ADMIN — Medication 81 MILLIGRAM(S): at 12:03

## 2021-10-11 RX ADMIN — Medication 1300 MILLIGRAM(S): at 05:04

## 2021-10-11 RX ADMIN — HEPARIN SODIUM 5000 UNIT(S): 5000 INJECTION INTRAVENOUS; SUBCUTANEOUS at 17:00

## 2021-10-11 RX ADMIN — TICAGRELOR 90 MILLIGRAM(S): 90 TABLET ORAL at 10:37

## 2021-10-11 RX ADMIN — INSULIN GLARGINE 18 UNIT(S): 100 INJECTION, SOLUTION SUBCUTANEOUS at 21:17

## 2021-10-11 RX ADMIN — ATORVASTATIN CALCIUM 80 MILLIGRAM(S): 80 TABLET, FILM COATED ORAL at 21:17

## 2021-10-11 RX ADMIN — TICAGRELOR 90 MILLIGRAM(S): 90 TABLET ORAL at 21:16

## 2021-10-11 RX ADMIN — Medication 2: at 16:49

## 2021-10-11 RX ADMIN — PANTOPRAZOLE SODIUM 40 MILLIGRAM(S): 20 TABLET, DELAYED RELEASE ORAL at 17:00

## 2021-10-11 RX ADMIN — CARVEDILOL PHOSPHATE 6.25 MILLIGRAM(S): 80 CAPSULE, EXTENDED RELEASE ORAL at 05:05

## 2021-10-11 RX ADMIN — PANTOPRAZOLE SODIUM 40 MILLIGRAM(S): 20 TABLET, DELAYED RELEASE ORAL at 05:04

## 2021-10-11 RX ADMIN — HEPARIN SODIUM 5000 UNIT(S): 5000 INJECTION INTRAVENOUS; SUBCUTANEOUS at 05:04

## 2021-10-11 RX ADMIN — Medication 2: at 12:03

## 2021-10-11 RX ADMIN — Medication 1300 MILLIGRAM(S): at 13:07

## 2021-10-11 RX ADMIN — CARVEDILOL PHOSPHATE 6.25 MILLIGRAM(S): 80 CAPSULE, EXTENDED RELEASE ORAL at 17:01

## 2021-10-11 RX ADMIN — Medication 1300 MILLIGRAM(S): at 21:16

## 2021-10-11 RX ADMIN — Medication 90 MILLIGRAM(S): at 05:04

## 2021-10-11 NOTE — PROGRESS NOTE ADULT - ASSESSMENT
59 yo M with PMH of HTN & DM.  Presented to [facility] with CP and dyspnea and marked HTN.  Transferred for N-STEMI/CHF, hypertensive emergency and ARF.  On arrival here , found to be in sherie nonoliguric renal failure with increased work of breathing and evidence of volume overload due to ?ADHF vs. renal failure. Elevated cardiac enzymes at OSH c/f N-STEMI, however patient chest pain free after NTG.    Clinically Improved at present with resolution of CP and dyspnea.        REC:    1.  Elevated troponin and CK/MB, N-STEMI , in setting of renal failure and significant HTN emergency; EKG was unremarkable on admission.  TTE shows segmental LV dysfunction with mild to moderate systolic dysfunction, c/w CAD.  - continue telemetry  - continue ASA, Brilinta, statin;   - continue diuresis  - will need diagnostic cath but we are unable to do this at present due to JOSHUA.    2.  Volume overload, HFrEF  - patient comfortable at present.  - I and Os   - will defer to nephrology renal for diuresis vs. HD for volume removal; goal negative 1L  - continue coreg  - will need to be on ACE/ARB, but can be initiated once renal function recovers    4.  HLD  - continue statin    5.  Moderate to severe MR on TTE  - continue diuresis and BP control    6.HTN uncontrolled  -would recommend starting PO hydralazine 25mg TID and increase nifedipine if ok with renal 59 yo M with PMH of HTN & DM.  Presented to [facility] with CP and dyspnea and marked HTN.  Transferred for N-STEMI/CHF, hypertensive emergency and ARF.  On arrival here , found to be in sherie nonoliguric renal failure with increased work of breathing and evidence of volume overload due to ?ADHF vs. renal failure. Elevated cardiac enzymes at OSH c/f N-STEMI, however patient chest pain free after NTG.    Clinically Improved at present with resolution of CP and dyspnea.        REC:    1.  Elevated troponin and CK/MB, N-STEMI , in setting of renal failure and significant HTN emergency; EKG was unremarkable on admission.  TTE shows segmental LV dysfunction with mild to moderate systolic dysfunction, c/w CAD.  - continue telemetry  - continue ASA, Brilinta, statin;   - continue diuresis  - will need diagnostic cath but we are unable to do this at present due to JOSHUA.    2.  Volume overload, HFrEF  - patient comfortable at present.  - I and Os   - will defer to nephrology renal for diuresis vs. HD for volume removal; goal negative 1L, can consider starting on bumex gtt at 1 mg/hr   - continue coreg  - will need to be on ACE/ARB, but can be initiated once renal function recovers    4.  HLD  - continue statin    5.  Moderate to severe MR on TTE  - continue diuresis and BP control    6.HTN uncontrolled  -would recommend starting PO hydralazine 25mg TID and increase nifedipine if ok with renal 61 yo M with PMH of HTN & DM.  Presented to [facility] with CP and dyspnea and marked HTN.  Transferred for N-STEMI/CHF, hypertensive emergency and ARF.  On arrival here , found to be in sherie nonoliguric renal failure with increased work of breathing and evidence of volume overload due to ?ADHF vs. renal failure. Elevated cardiac enzymes at OSH c/f N-STEMI, however patient chest pain free after NTG.    Clinically Improved at present with resolution of CP and dyspnea.      REC:    1.  Elevated troponin and CK/MB, N-STEMI , in setting of renal failure and significant HTN emergency; EKG was unremarkable on admission.  TTE shows segmental LV dysfunction with mild to moderate systolic dysfunction, c/w CAD.  - continue telemetry  - continue ASA, Brilinta, statin;   - continue diuresis  - will need diagnostic cath but we are unable to do this at present due to JOSHUA.    2.  Volume overload, HFrEF  - patient comfortable at present.  - I and Os   - will defer to nephrology renal for diuresis vs. HD for volume removal; goal negative 1L, can consider starting on bumex gtt at 1 mg/hr   - continue coreg  - will need to be on ACE/ARB, but can be initiated once renal function recovers    4.  HLD  - continue statin    5.  Moderate to severe MR on TTE  - continue diuresis and BP control    6.HTN uncontrolled  -would recommend starting PO hydralazine 25mg TID and increase nifedipine if ok with renal

## 2021-10-11 NOTE — PROGRESS NOTE ADULT - ATTENDING COMMENTS
Pt. with JOSHUA on CKD, hypervolemia, on IV diuretics. Volume status improved, Scr stable, need documentation on UO. May consider increasing dose of loop diuretics with addition of thiazide diuretic to augment diuresis, if UO still not optimal. No plan for RRT at this time.    Angie Nair MD  Cell : [phone]  Pager : [phone]  Office : [phone]

## 2021-10-11 NOTE — PROGRESS NOTE ADULT - ASSESSMENT
60 yr old M w/ hx of DM2, HTN and HLD presents as transfer from [facility] for chest pain concerning for NSTEMI and possible new CHF c/b JOSHUA likely on CKD, s/p RRT for CVA/TIA ruled out and worsening anemia, pending improvement in JOSHUA for [facility]

## 2021-10-11 NOTE — PROGRESS NOTE ADULT - ASSESSMENT
Patient is a 60 year old male with PMH od DM and HTN who presented to the hospital as a transfer from OSH for NSTEMI. The nephrology team was consulted due to elevated creatinine of 4.05 upon presentation. The patient denies any history of kidney disease and denied noticing any changes in his urination.     JOSHUA on CKD?   - patient presenting to hospital with creatinine of 4.05 mg/dL; denied any history of kidney disease   --- creatinine has continued to uptrend to 6.08mg/dL this morning   - BUN noted ot be 100; explained to patient that he will likely require RRT in the near future   --- he would like to discuss with his wife prior to making the decision; no emergent need at this time  - please monitor UOP  - there could be underlying chronic disease secondary to uncontrolled DM and HTN   - normal renal ultrasound without hydro  - UA with proteinuria and blood;  Ur Pr/Cr ratio noted to be 8.7g  - BEULAH negative; elevated kappa/lambda but normal ratio; other serological work up pending at this time  - volume overloaded with improved swelling   --- continue with diuresis at this time with bumex  - Patient is in need of Cardiac catheterization  -- as per the Shaggy score it places a patient at a 26.1% chance of developing CARY and a 1.09% chance of developing CARY requiring RRT   - he is pending ischemic work up given current renal function   - please adjust medication doses as per eGFR    Patient is a 60 year old male with PMH od DM and HTN who presented to the hospital as a transfer from OSH for NSTEMI. The nephrology team was consulted due to elevated creatinine of 4.05 upon presentation. The patient denies any history of kidney disease and denied noticing any changes in his urination.     JOSHUA on CKD?   - patient presenting to hospital with creatinine of 4.05 mg/dL; denied any history of kidney disease   --- creatinine has continued to uptrend to 6.08mg/dL this morning  - Kidney injury also exacerbated by contrast administration for the CT following the RRT on 10/7    - BUN noted ot be 100; explained to patient that he will likely require RRT in the near future   --- he would like to discuss with his wife prior to making the decision; no emergent need at this time  - please monitor UOP; need to accurately assess efficacy or diuretics given patient being volume overloaded  - there could be underlying chronic disease secondary to uncontrolled HTN   - A1C 7.0%  - normal renal ultrasound without hydro  - UA with proteinuria and blood;  Ur Pr/Cr ratio noted to be 8.7g  - BEULAH negative; elevated kappa/lambda but normal ratio;   - C3 and C4 not low  --- other serological work up pending at this time  - volume overloaded with improved swelling   --- continue with diuresis at this time with bumex  - Patient is in need of Cardiac catheterization  -- as per the Shaggy score it places a patient at a 26.1% chance of developing CARY and a 1.09% chance of developing CARY requiring RRT   - he is pending ischemic work up given current renal function   - please adjust medication doses as per eGFR     If any questions, please feel free to contact me     Saul Crews  Nephrology Fellow  [facility] Pager: [phone]

## 2021-10-11 NOTE — PROGRESS NOTE ADULT - SUBJECTIVE AND OBJECTIVE BOX
Neurology Progress Note    S: Patient seen and examined. No new events overnight. patient denied CP, SOB, HA or pain. resting doing okay     Medication:  MEDICATIONS  (STANDING):  aspirin  chewable 81 milliGRAM(s) Oral daily  atorvastatin 80 milliGRAM(s) Oral at bedtime  buMETAnide IVPB 4 milliGRAM(s) IV Intermittent every 12 hours  carvedilol 6.25 milliGRAM(s) Oral every 12 hours  dextrose 40% Gel 15 Gram(s) Oral once  dextrose 5%. 1000 milliLiter(s) (50 mL/Hr) IV Continuous <Continuous>  dextrose 5%. 1000 milliLiter(s) (100 mL/Hr) IV Continuous <Continuous>  dextrose 50% Injectable 25 Gram(s) IV Push once  dextrose 50% Injectable 12.5 Gram(s) IV Push once  dextrose 50% Injectable 25 Gram(s) IV Push once  glucagon  Injectable 1 milliGRAM(s) IntraMuscular once  heparin   Injectable 5000 Unit(s) SubCutaneous every 12 hours  insulin glargine Injectable (LANTUS) 18 Unit(s) SubCutaneous at bedtime  insulin lispro (ADMELOG) corrective regimen sliding scale   SubCutaneous three times a day before meals  insulin lispro (ADMELOG) corrective regimen sliding scale   SubCutaneous at bedtime  NIFEdipine XL 90 milliGRAM(s) Oral daily  pantoprazole  Injectable 40 milliGRAM(s) IV Push every 12 hours  sodium bicarbonate 1300 milliGRAM(s) Oral three times a day  ticagrelor 90 milliGRAM(s) Oral every 12 hours    MEDICATIONS  (PRN):      Vitals:  Vital Signs Last 24 Hrs  T(C): 36.4 (10-11-21 @ 08:07), Max: 36.7 (10-10-21 @ 11:25)  T(F): 97.5 (10-11-21 @ 08:07), Max: 98.1 (10-10-21 @ 11:25)  HR: 80 (10-11-21 @ 08:07) (76 - 91)  BP: 128/69 (10-11-21 @ 08:07) (128/69 - 182/99)  BP(mean): --  RR: 18 (10-11-21 @ 08:07) (18 - 18)  SpO2: 98% (10-11-21 @ 08:07) (96% - 99%)        General Exam:   General Appearance: Appropriately dressed and in no acute distress       Head: Normocephalic, atraumatic and no dysmorphic features  Ear, Nose, and Throat: Moist mucous membranes  CVS: S1S2+  Resp: No SOB, no wheeze or rhonchi  Abd: soft NTND  Extremities: No edema, no cyanosis  Skin: No bruises, no rashes     Neurological Exam:  Mental Status: Awake, alert and oriented x 3.  Able to follow simple and complex verbal commands. Able to name and repeat. fluent speech. No obvious aphasia or dysarthria noted.   Cranial Nerves: PERRL, EOMI, VFFC, sensation V1-V3 intact,  mild NLF facial asymmetry , equal elevation of palate, scm/trap 5/5, tongue is midline on protrusion. no obvious papilledema on fundoscopic exam. Hearing is grossly intact.   Motor: Normal bulk, tone and strength throughout. Fine finger movements were intact and symmetric. no tremors or drift noted.    Sensation: Intact to light touch and pinprick throughout. no right/left confusion. no extinction to tactile on DSS.   Reflexes: 1+ throughout at biceps, brachioradialis, triceps, patellars and ankles bilaterally and equal. No clonus. R toe and L toe were both downgoing.  Coordination: No dysmetria on FNF    Gait: deferred     I personally reviewed the below data/images/labs:    CBC Full  -  ( 11 Oct 2021 07:13 )  WBC Count : 8.79 K/uL  RBC Count : 2.73 M/uL  Hemoglobin : 8.7 g/dL  Hematocrit : 26.8 %  Platelet Count - Automated : 277 K/uL  Mean Cell Volume : 98.2 fl  Mean Cell Hemoglobin : 31.9 pg  Mean Cell Hemoglobin Concentration : 32.5 gm/dL  Auto Neutrophil # : x  Auto Lymphocyte # : x  Auto Monocyte # : x  Auto Eosinophil # : x  Auto Basophil # : x  Auto Neutrophil % : x  Auto Lymphocyte % : x  Auto Monocyte % : x  Auto Eosinophil % : x  Auto Basophil % : x      10-11    138  |  101  |  95<H>  ----------------------------<  126<H>  4.1   |  21<L>  |  6.08<H>    Ca    8.5      11 Oct 2021 07:13  Mg     1.7     10-11        LIVER FUNCTIONS - ( 07 Oct 2021 14:42 )  Alb: 2.6 g/dL / Pro: 5.1 g/dL / ALK PHOS: x     / ALT: x     / AST: x     / GGT: x           -10/07 CTH: No hemorrhage. 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   -10/07 CTA N: High-grade stenosis left internal carotid artery at the carotid bifurcation in the neck.   -10/07 CTA H: Normal intracranial circulation.    < from: MR Head No Cont (10.09.21 @ 20:22) >    EXAM:  MR BRAIN                            PROCEDURE DATE:  10/09/2021            INTERPRETATION:  CLINICAL HISTORY:Facial droop, on heparin drip, NSTEMI . Severe left ICA stenosis.    TECHNIQUE:  MRI of brain without contrast dated 10/9/2021.  Examination consisted of transaxial T1, T2, FLAIR, gradient echo as well as diffusion-weighted sequences with corresponding ADC maps. Coronal T2 and sagittal T1-weighted images were also acquired through the brain.    COMPARISON: CT head and CTA head andneck 10/7/2021    FINDINGS:  There are no areas abnormal restricted diffusion within the brain parenchyma to suggest acute/subacute infarct. There is no acute intracranial hemorrhage, vasogenic edema or abnormal susceptibility artifact. Chronic lacunar infarcts are seen in the left frontal lobe, right frontal cranial radiata and right cerebellum. Additional nonspecific patchy and confluent areas of T2/FLAIR prolongation in the bihemispheric white matter likely represents mild chronic microvascular changes.    The ventricles, sulci and cisternal spaces are stable in size and configuration. There is no midline shift or abnormal extra-axial fluid collection.    Flow-voids of the major intracranial vessels are maintained, as seen best on the T2-weighted images, indicating their patency.    The visualized paranasal sinuses and mastoid air cells are free of acute disease. The orbital regions are unremarkable.    IMPRESSION:  No acute infarct or intracranial hemorrhage. Chronic infarcts and microvascular changes as above.    --- End of Report ---                NEIL CARDENAS MD; Attending Radiologist  This document has been electronically signed. Oct 10 2021  4:26AM    < end of copied text >  < from: VA Duplex Carotid, Bilat (10.08.21 @ 17:07) >    EXAM:  CAROTID DUPLEX BILATERAL                            PROCEDURE DATE:  10/08/2021            INTERPRETATION:  CLINICAL INFORMATION: Left ICA high-grade stenosis identified on recent CTA neck.    COMPARISON: CTA neck 10/7/2021.    TECHNIQUE: Grayscale, color and spectral Doppler examination of both carotid arteries was performed.    FINDINGS:    There are atheromatous plaques are present bilaterally in the region of the bifurcations of the common carotid arteries into internal and external branches.    Velocity elevation of the proximal right internal carotid artery results in 50-69% flow-limiting stenosis.    Velocity elevation of the proximal left internal carotid artery results in 70-9% flow-limiting stenosis.    Bilateral flow-limiting stenoses noted of the right and left external carotid arteries as well.    Peak systolic velocities are as follows:    RIGHT:  PROX CCA = 126 cm/s  DIST CCA = 99 cm/s  PROX ICA = 137 cm/s  MID ICA = 86 cm/s  DIST ICA = 80 cm/s  ECA = 202 cm/s    LEFT:  PROX CCA = 100 cm/s  DIST CCA = 59 cm/s  PROX ICA = 313 cm/s  MID ICA = 72 cm/s  DIST ICA = 47 cm/s  ECA = 298 cm/s    Antegrade flow is noted within both vertebral arteries.    IMPRESSION:    Left internal carotid artery 70-99% flow-limiting stenosis.  Right internal carotid artery 50-69% flow-limiting stenosis.  Bilateral external carotid artery flow limiting stenoses.  INDIANA Hammond was informed of these findings on 10/8/2021 at 5:06 PM.    Measurement of carotid stenosis is based on velocity parameters that correlate the residual internal carotid diameter with that of the more distal vessel in accordance with a method such as the [employer] Symptomatic Carotid Endarterectomy Trial (NASCET).    --- End of Report ---                FELIX MCMAHON M.D., ATTENDING RADIOLOGIST  This document has been electronically signed. Oct  8 2021  5:21PM    < end of copied text >

## 2021-10-11 NOTE — PROGRESS NOTE ADULT - PROBLEM SELECTOR PLAN 10
DVT ppx: hsq  Dispo: home, no skilled PT needs    above plans discussed with NP Mady Ashford MD  Division of Hospital Medicine  Cell: [phone]  Office: [phone]

## 2021-10-11 NOTE — PROGRESS NOTE ADULT - SUBJECTIVE AND OBJECTIVE BOX
Patient is a 60y old  Male who presents with a chief complaint of NSTEMI (11 Oct 2021 10:55)      SUBJECTIVE / OVERNIGHT EVENTS:  no acute events overnight, vss, afebrile  pt states that he has been ambulating without dyspnea  denies chest pain, dyspnea, LE swelling    tele reviewed: AV paced 60-80s    ROS:  14 point ROS negative in detail except stated as above    MEDICATIONS  (STANDING):  aspirin  chewable 81 milliGRAM(s) Oral daily  atorvastatin 80 milliGRAM(s) Oral at bedtime  buMETAnide IVPB 4 milliGRAM(s) IV Intermittent every 12 hours  carvedilol 6.25 milliGRAM(s) Oral every 12 hours  dextrose 40% Gel 15 Gram(s) Oral once  dextrose 5%. 1000 milliLiter(s) (50 mL/Hr) IV Continuous <Continuous>  dextrose 5%. 1000 milliLiter(s) (100 mL/Hr) IV Continuous <Continuous>  dextrose 50% Injectable 25 Gram(s) IV Push once  dextrose 50% Injectable 12.5 Gram(s) IV Push once  dextrose 50% Injectable 25 Gram(s) IV Push once  glucagon  Injectable 1 milliGRAM(s) IntraMuscular once  heparin   Injectable 5000 Unit(s) SubCutaneous every 12 hours  insulin glargine Injectable (LANTUS) 18 Unit(s) SubCutaneous at bedtime  insulin lispro (ADMELOG) corrective regimen sliding scale   SubCutaneous three times a day before meals  insulin lispro (ADMELOG) corrective regimen sliding scale   SubCutaneous at bedtime  NIFEdipine XL 90 milliGRAM(s) Oral daily  pantoprazole  Injectable 40 milliGRAM(s) IV Push every 12 hours  sodium bicarbonate 1300 milliGRAM(s) Oral three times a day  ticagrelor 90 milliGRAM(s) Oral every 12 hours    MEDICATIONS  (PRN):      CAPILLARY BLOOD GLUCOSE      POCT Blood Glucose.: 146 mg/dL (11 Oct 2021 07:18)  POCT Blood Glucose.: 259 mg/dL (10 Oct 2021 21:03)  POCT Blood Glucose.: 188 mg/dL (10 Oct 2021 16:03)  POCT Blood Glucose.: 141 mg/dL (10 Oct 2021 11:25)    I&O's Summary    10 Oct 2021 07:01  -  11 Oct 2021 07:00  --------------------------------------------------------  IN: 720 mL / OUT: 1 mL / NET: 719 mL        PHYSICAL EXAM:  Vital Signs Last 24 Hrs  T(C): 36.4 (11 Oct 2021 08:07), Max: 36.7 (10 Oct 2021 11:25)  T(F): 97.5 (11 Oct 2021 08:07), Max: 98.1 (10 Oct 2021 11:25)  HR: 80 (11 Oct 2021 08:07) (76 - 91)  BP: 128/69 (11 Oct 2021 08:07) (128/69 - 182/99)  BP(mean): --  RR: 18 (11 Oct 2021 08:07) (18 - 18)  SpO2: 98% (11 Oct 2021 08:07) (96% - 99%)    GENERAL: NAD, well-developed  HEAD:  Atraumatic, Normocephalic  EYES: EOMI, PERRLA, conjunctiva and sclera clear  NECK: Supple, No JVD  CHEST/LUNG: Clear to auscultation bilaterally; No wheeze  HEART: Regular rate and rhythm; No murmurs, rubs, or gallops  ABDOMEN: Soft, Nontender, Nondistended; Bowel sounds present  EXTREMITIES:  2+ Peripheral Pulses, No clubbing, cyanosis, or edema  NEUROLOGY: AAOx3; non-focal  SKIN: No rashes or lesions    LABS:  personally reviewed                        8.7    8.79  )-----------( 277      ( 11 Oct 2021 07:13 )             26.8     10-11    138  |  101  |  95<H>  ----------------------------<  126<H>  4.1   |  21<L>  |  6.08<H>    Ca    8.5      11 Oct 2021 07:13  Mg     1.7     10-11                RADIOLOGY & ADDITIONAL TESTS:    Imaging Personally Reviewed:  < from: MR Head No Cont (10.09.21 @ 20:22) >  There are no areas abnormal restricted diffusion within the brain parenchyma to suggest acute/subacute infarct. There is no acute intracranial hemorrhage, vasogenic edema or abnormal susceptibility artifact. Chronic lacunar infarcts are seen in the left frontal lobe, right frontal cranial radiata and right cerebellum. Additional nonspecific patchy and confluent areas of T2/FLAIR prolongation in the bihemispheric white matter likely represents mild chronic microvascular changes.    The ventricles, sulci and cisternal spaces are stable in size and configuration. There is no midline shift or abnormal extra-axial fluid collection.    Flow-voids of the major intracranial vessels are maintained, as seen best on the T2-weighted images, indicating their patency.    The visualized paranasal sinuses and mastoid air cells are free of acute disease. The orbital regions are unremarkable.    IMPRESSION:  No acute infarct or intracranial hemorrhage. Chronic infarcts and microvascular changes as above.    < end of copied text >      Consultant(s) Notes Reviewed:  cards, nephrology    Care Discussed with Consultants/Other Providers: Dr. Flores (nephro)

## 2021-10-11 NOTE — PROGRESS NOTE ADULT - PROBLEM SELECTOR PLAN 1
JOSHUA on CKD vs JOSHUA with hyperkalemia and meta acidosis on admission, reports hx of kidney disease but does not know baseline cr or nephrologist name  - renal US with normal kidney  - Scr continues to uptrend   - monitor bmp  - c/w sodium bicarb 1300mg tid  - pt informed of risk of CARY requiring HD if get cath/contrast  - appreciate nephrology recs: no urgent indication for HD but may require on this admission   - serologies pending

## 2021-10-11 NOTE — PROGRESS NOTE ADULT - PROBLEM SELECTOR PLAN 3
Blood pressure initially 200/105 at arrival; s/p nitro ggt at [place], noncompliant with meds  - now much better controlled  - continue Nifedipine ER 90mg po daily  - c/w coreg 6.125mg bid, uptitrate as needed  - bumex as below  - home labetalol on hold due to JOSHUA  - c/t hold HCTZ-losartan given JOSHUA on CKD  - monitor BP  - would consider adding Hydralazine if BP remains uncontrolled

## 2021-10-11 NOTE — PROGRESS NOTE ADULT - SUBJECTIVE AND OBJECTIVE BOX
Patient seen and examined at bedside.    Overnight Events: NAEON    Review Of Systems: No chest pain, shortness of breath, or palpitations            Current Meds:  aspirin  chewable 81 milliGRAM(s) Oral daily  atorvastatin 80 milliGRAM(s) Oral at bedtime  buMETAnide IVPB 4 milliGRAM(s) IV Intermittent every 12 hours  carvedilol 6.25 milliGRAM(s) Oral every 12 hours  dextrose 40% Gel 15 Gram(s) Oral once  dextrose 5%. 1000 milliLiter(s) IV Continuous <Continuous>  dextrose 5%. 1000 milliLiter(s) IV Continuous <Continuous>  dextrose 50% Injectable 25 Gram(s) IV Push once  dextrose 50% Injectable 12.5 Gram(s) IV Push once  dextrose 50% Injectable 25 Gram(s) IV Push once  glucagon  Injectable 1 milliGRAM(s) IntraMuscular once  heparin   Injectable 5000 Unit(s) SubCutaneous every 12 hours  insulin glargine Injectable (LANTUS) 18 Unit(s) SubCutaneous at bedtime  insulin lispro (ADMELOG) corrective regimen sliding scale   SubCutaneous three times a day before meals  insulin lispro (ADMELOG) corrective regimen sliding scale   SubCutaneous at bedtime  NIFEdipine XL 90 milliGRAM(s) Oral daily  pantoprazole  Injectable 40 milliGRAM(s) IV Push every 12 hours  sodium bicarbonate 1300 milliGRAM(s) Oral three times a day  ticagrelor 90 milliGRAM(s) Oral every 12 hours      Vitals:  T(F): 98.5 (10-11), Max: 98.5 (10-11)  HR: 75 (10-11) (75 - 91)  BP: 137/72 (10-11) (128/69 - 180/89)  RR: 18 (10-11)  SpO2: 95% (10-11)  I&O's Summary    10 Oct 2021 07:01  -  11 Oct 2021 07:00  --------------------------------------------------------  IN: 720 mL / OUT: 1 mL / NET: 719 mL        Physical Exam:  Appearance: No acute distress; well appearing  Eyes: PERRL, EOMI, pink conjunctiva  HENT: Normal oral mucosa  Cardiovascular: RRR, S1, S2, no murmurs, rubs, or gallops; 1+ edema b/l; JVD MADDI  Respiratory: crackles at the bases  Gastrointestinal: soft, non-tender, non-distended with normal bowel sounds  Musculoskeletal: No clubbing; no joint deformity   Neurologic: Non-focal  Lymphatic: No lymphadenopathy  Psychiatry: AAOx3, mood & affect appropriate  Skin: No rashes, ecchymoses, or cyanosis                          8.7    8.79  )-----------( 277      ( 11 Oct 2021 07:13 )             26.8     10-11    138  |  101  |  95<H>  ----------------------------<  126<H>  4.1   |  21<L>  |  6.08<H>    Ca    8.5      11 Oct 2021 07:13  Mg     1.7     10-11        CARDIAC MARKERS ( 07 Oct 2021 11:38 )  5983 ng/L / x     / x     / 309 U/L / x     / 26.8 ng/mL  CARDIAC MARKERS ( 06 Oct 2021 04:48 )  6029 ng/L / x     / x     / x     / x     / x      CARDIAC MARKERS ( 06 Oct 2021 01:05 )  7177 ng/L / x     / x     / 893 U/L / x     / 92.0 ng/mL  CARDIAC MARKERS ( 05 Oct 2021 19:54 )  8021 ng/L / x     / x     / x     / x     / x      CARDIAC MARKERS ( 05 Oct 2021 17:02 )  >80978 ng/L / x     / x     / 1326 U/L / x     / 144.1 ng/mL      Serum Pro-Brain Natriuretic Peptide: 17084 pg/mL (10-05 @ 17:02)          New ECG(s): Personally reviewed    < from: TTE with Doppler (w/Cont) (10.06.21 @ 09:05) >  1. Mitral annular calcification, otherwise normal mitral  valve. Moderate-severe mitral regurgitation. /77  2. Mild -moderate segmental left ventricular systolic  dysfunction. Endocardial visualization enhanced with  intravenous injection of Ultrasonic Enhancing Agent  (Definity).  Limited evaluation. The inferior wall is  hypokinetic. The apical cap is akinetic.  The anterior wall  was not well seen despite Definity. The anterolateral wal  is also not well seen and may be hypokinetic.  3. The right ventricle is not well visualized; grossly  normal right ventricular systolic function.    < end of copied text >

## 2021-10-11 NOTE — PROGRESS NOTE ADULT - ATTENDING COMMENTS
NSTEMI with acute on chronic renal failure pending initiation of hemodialysis. Cath deferred until renal recovery and/or initiation of dialysis. Renal function continues to worsen. Diuresis as per nephrology.    Lo Blanchard MD, MPH, MARK, RPVI, [facility]  Inpatient Cardiovascular Specialist; [facility]el [facility], [facility] ([facility])  ; [employer] at \[facility] [place] [facility]\""/[facility]  message: [employer] [phone] or Microsoft Teams (text preferred and/or call)  email: [email or web address] Cardiology and Cardiovascular Surgery on-service contact/call information, go to [email or web address] and use "[email or web address]" to login.  Outpatient Cardiology appointments, call  [phone] to arrange with a colleague; I do not have outpatient Cardiology clinic.

## 2021-10-11 NOTE — PROGRESS NOTE ADULT - PROBLEM SELECTOR PLAN 5
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   - neuro rec brain MRI: no acute infarct  - speech rec regular diet  - CTA: High-grade stenosis left internal carotid artery at the carotid bifurcation in the neck.  - carotid duplex, 70-99% L ICA stenosis, 50-70% RCA stenosis, vascular surgery rec inpatient carotid endarterectomy once cleared by cards  - c/w asa/brillanta, statin

## 2021-10-11 NOTE — PROGRESS NOTE ADULT - SUBJECTIVE AND OBJECTIVE BOX
[facility] DIVISION OF KIDNEY DISEASES AND HYPERTENSION -- FOLLOW UP NOTE  --------------------------------------------------------------------------------  Chief Complaint:    24 hour events/subjective:    seen and examined this morning   reports feeling well   no acute issues overnight  creatinine continues to rise    PAST HISTORY  --------------------------------------------------------------------------------  No significant changes to PMH, PSH, FHx, SHx, unless otherwise noted    ALLERGIES & MEDICATIONS  --------------------------------------------------------------------------------  Allergies    No Known Allergies    Intolerances      Standing Inpatient Medications  aspirin  chewable 81 milliGRAM(s) Oral daily  atorvastatin 80 milliGRAM(s) Oral at bedtime  buMETAnide IVPB 4 milliGRAM(s) IV Intermittent every 12 hours  carvedilol 6.25 milliGRAM(s) Oral every 12 hours  dextrose 40% Gel 15 Gram(s) Oral once  dextrose 5%. 1000 milliLiter(s) IV Continuous <Continuous>  dextrose 5%. 1000 milliLiter(s) IV Continuous <Continuous>  dextrose 50% Injectable 25 Gram(s) IV Push once  dextrose 50% Injectable 12.5 Gram(s) IV Push once  dextrose 50% Injectable 25 Gram(s) IV Push once  glucagon  Injectable 1 milliGRAM(s) IntraMuscular once  heparin   Injectable 5000 Unit(s) SubCutaneous every 12 hours  insulin glargine Injectable (LANTUS) 18 Unit(s) SubCutaneous at bedtime  insulin lispro (ADMELOG) corrective regimen sliding scale   SubCutaneous three times a day before meals  insulin lispro (ADMELOG) corrective regimen sliding scale   SubCutaneous at bedtime  NIFEdipine XL 90 milliGRAM(s) Oral daily  pantoprazole  Injectable 40 milliGRAM(s) IV Push every 12 hours  sodium bicarbonate 1300 milliGRAM(s) Oral three times a day  ticagrelor 90 milliGRAM(s) Oral every 12 hours    PRN Inpatient Medications      REVIEW OF SYSTEMS    All other systems were reviewed and are negative, except as noted.    VITALS/PHYSICAL EXAM  --------------------------------------------------------------------------------  T(C): 36.4 (10-11-21 @ 08:07), Max: 36.7 (10-10-21 @ 11:25)  HR: 80 (10-11-21 @ 08:07) (76 - 91)  BP: 128/69 (10-11-21 @ 08:07) (128/69 - 182/99)  RR: 18 (10-11-21 @ 08:07) (18 - 18)  SpO2: 98% (10-11-21 @ 08:07) (96% - 99%)  Wt(kg): --        10-10-21 @ 07:01  -  10-11-21 @ 07:00  --------------------------------------------------------  IN: 720 mL / OUT: 1 mL / NET: 719 mL        Physical Exam:  	Gen: NAD  	HEENT: MMM  	Pulm: CTA B/L  	CV: S1S2  	Abd: Soft, +BS   	Ext: + LE edema B/L  	Neuro: Awake and alert   	Skin: Warm and dry  	Vascular access: N/A      LABS/STUDIES  --------------------------------------------------------------------------------              8.7    8.79  >-----------<  277      [10-11-21 @ 07:13]              26.8     138  |  101  |  95  ----------------------------<  126      [10-11-21 @ 07:13]  4.1   |  21  |  6.08        Ca     8.5     [10-11-21 @ 07:13]      Mg     1.7     [10-11-21 @ 07:13]            Creatinine Trend:  SCr 6.08 [10-11 @ 07:13]  SCr 5.64 [10-10 @ 06:45]  SCr 5.64 [10-09 @ 07:16]  SCr 5.42 [10-08 @ 05:08]  SCr 5.46 [10-07 @ 21:01]    Urinalysis - [10-07-21 @ 10:09]      Color Light Yellow / Appearance Clear / SG 1.015 / pH 6.0      Gluc 200 mg/dL / Ketone Negative  / Bili Negative / Urobili Negative       Blood Small / Protein 300 mg/dL / Leuk Est Negative / Nitrite Negative      RBC 2 / WBC 7 / Hyaline 6 / Gran  / Sq Epi  / Non Sq Epi 2 / Bacteria Negative    Urine Creatinine 63      [10-07-21 @ 10:10]  Urine Protein 548      [10-07-21 @ 10:10]  Urine Sodium 71      [10-07-21 @ 10:10]  Urine Osmolality 379      [10-07-21 @ 10:10]      HCV 0.23, Nonreact      [10-07-21 @ 14:38]    BEULAH: titer Negative, pattern --      [10-07-21 @ 14:37]  C3 Complement 135      [10-07-21 @ 14:42]  C4 Complement 71      [10-07-21 @ 14:42]  Syphilis Screen (Treponema Pallidum Ab) Negative      [10-07-21 @ 14:37]  Free Light Chains: kappa 10.89, lambda 12.51, ratio = 0.87      [10-07 @ 14:42]  Immunofixation Serum:   No Monoclonal Band Identified    Reference Range: None Detected      [10-07-21 @ 14:42]  SPEP Interpretation: Normal Electrophoresis Pattern      [10-07-21 @ 14:42]

## 2021-10-11 NOTE — PROGRESS NOTE ADULT - PROBLEM SELECTOR PLAN 2
- no further chest pain  - troponin downtrending ; no need to further trend unless CP or change in HD status, etc  - c/w brilinta 90 mg BID and ASA 81 mg daily  - c/w atorvastatin 80 mg qhs  - eventual ischemic eval pending improvement in kidney function/fluid status  - cardio following, appreciate recs: [facility] as inpatient  - if repeat chest pain, obtain cardiac enzymes, ekg   - monitor on tele  - TTE EF 45%: mild-moderate LVSF

## 2021-10-12 LAB
ANION GAP SERPL CALC-SCNC: 18 MMOL/L — HIGH (ref 5–17)
APTT BLD: 37.2 SEC — HIGH (ref 27.5–35.5)
AUTO DIFF PNL BLD: NEGATIVE — SIGNIFICANT CHANGE UP
BUN SERPL-MCNC: 98 MG/DL — HIGH (ref 7–23)
C-ANCA SER-ACNC: NEGATIVE — SIGNIFICANT CHANGE UP
CALCIUM SERPL-MCNC: 8.2 MG/DL — LOW (ref 8.4–10.5)
CHLORIDE SERPL-SCNC: 101 MMOL/L — SIGNIFICANT CHANGE UP (ref 96–108)
CO2 SERPL-SCNC: 22 MMOL/L — SIGNIFICANT CHANGE UP (ref 22–31)
CREAT SERPL-MCNC: 6.28 MG/DL — HIGH (ref 0.5–1.3)
GLUCOSE BLDC GLUCOMTR-MCNC: 124 MG/DL — HIGH (ref 70–99)
GLUCOSE BLDC GLUCOMTR-MCNC: 161 MG/DL — HIGH (ref 70–99)
GLUCOSE BLDC GLUCOMTR-MCNC: 211 MG/DL — HIGH (ref 70–99)
GLUCOSE SERPL-MCNC: 106 MG/DL — HIGH (ref 70–99)
HCT VFR BLD CALC: 26.8 % — LOW (ref 39–50)
HGB BLD-MCNC: 8.4 G/DL — LOW (ref 13–17)
INR BLD: 1.04 RATIO — SIGNIFICANT CHANGE UP (ref 0.88–1.16)
MCHC RBC-ENTMCNC: 31 PG — SIGNIFICANT CHANGE UP (ref 27–34)
MCHC RBC-ENTMCNC: 31.3 GM/DL — LOW (ref 32–36)
MCV RBC AUTO: 98.9 FL — SIGNIFICANT CHANGE UP (ref 80–100)
NRBC # BLD: 0 /100 WBCS — SIGNIFICANT CHANGE UP (ref 0–0)
NT-PROBNP SERPL-SCNC: 8030 PG/ML — HIGH (ref 0–300)
P-ANCA SER-ACNC: NEGATIVE — SIGNIFICANT CHANGE UP
PLATELET # BLD AUTO: 279 K/UL — SIGNIFICANT CHANGE UP (ref 150–400)
POTASSIUM SERPL-MCNC: 4.4 MMOL/L — SIGNIFICANT CHANGE UP (ref 3.5–5.3)
POTASSIUM SERPL-SCNC: 4.4 MMOL/L — SIGNIFICANT CHANGE UP (ref 3.5–5.3)
PROTHROM AB SERPL-ACNC: 12.5 SEC — SIGNIFICANT CHANGE UP (ref 10.6–13.6)
RBC # BLD: 2.71 M/UL — LOW (ref 4.2–5.8)
RBC # FLD: 13.7 % — SIGNIFICANT CHANGE UP (ref 10.3–14.5)
SARS-COV-2 RNA SPEC QL NAA+PROBE: SIGNIFICANT CHANGE UP
SARS-COV-2 RNA SPEC QL NAA+PROBE: SIGNIFICANT CHANGE UP
SODIUM SERPL-SCNC: 141 MMOL/L — SIGNIFICANT CHANGE UP (ref 135–145)
WBC # BLD: 10.16 K/UL — SIGNIFICANT CHANGE UP (ref 3.8–10.5)
WBC # FLD AUTO: 10.16 K/UL — SIGNIFICANT CHANGE UP (ref 3.8–10.5)

## 2021-10-12 PROCEDURE — 77001 FLUOROGUIDE FOR VEIN DEVICE: CPT | Mod: 26

## 2021-10-12 PROCEDURE — 36556 INSERT NON-TUNNEL CV CATH: CPT | Mod: RT

## 2021-10-12 PROCEDURE — 99232 SBSQ HOSP IP/OBS MODERATE 35: CPT

## 2021-10-12 PROCEDURE — 99233 SBSQ HOSP IP/OBS HIGH 50: CPT

## 2021-10-12 PROCEDURE — 99233 SBSQ HOSP IP/OBS HIGH 50: CPT | Mod: GC

## 2021-10-12 PROCEDURE — 36556 INSERT NON-TUNNEL CV CATH: CPT

## 2021-10-12 PROCEDURE — 76937 US GUIDE VASCULAR ACCESS: CPT | Mod: 26

## 2021-10-12 RX ORDER — SODIUM CHLORIDE 9 MG/ML
10 INJECTION INTRAMUSCULAR; INTRAVENOUS; SUBCUTANEOUS
Refills: 0 | Status: DISCONTINUED | OUTPATIENT
Start: 2021-10-12 | End: 2021-10-21

## 2021-10-12 RX ORDER — CHLORHEXIDINE GLUCONATE 213 G/1000ML
1 SOLUTION TOPICAL
Refills: 0 | Status: DISCONTINUED | OUTPATIENT
Start: 2021-10-12 | End: 2021-10-21

## 2021-10-12 RX ADMIN — CARVEDILOL PHOSPHATE 6.25 MILLIGRAM(S): 80 CAPSULE, EXTENDED RELEASE ORAL at 05:13

## 2021-10-12 RX ADMIN — Medication 90 MILLIGRAM(S): at 05:13

## 2021-10-12 RX ADMIN — Medication 1300 MILLIGRAM(S): at 13:02

## 2021-10-12 RX ADMIN — PANTOPRAZOLE SODIUM 40 MILLIGRAM(S): 20 TABLET, DELAYED RELEASE ORAL at 17:27

## 2021-10-12 RX ADMIN — CARVEDILOL PHOSPHATE 6.25 MILLIGRAM(S): 80 CAPSULE, EXTENDED RELEASE ORAL at 17:26

## 2021-10-12 RX ADMIN — TICAGRELOR 90 MILLIGRAM(S): 90 TABLET ORAL at 09:11

## 2021-10-12 RX ADMIN — INSULIN GLARGINE 18 UNIT(S): 100 INJECTION, SOLUTION SUBCUTANEOUS at 21:46

## 2021-10-12 RX ADMIN — Medication 1300 MILLIGRAM(S): at 05:13

## 2021-10-12 RX ADMIN — HEPARIN SODIUM 5000 UNIT(S): 5000 INJECTION INTRAVENOUS; SUBCUTANEOUS at 17:27

## 2021-10-12 RX ADMIN — PANTOPRAZOLE SODIUM 40 MILLIGRAM(S): 20 TABLET, DELAYED RELEASE ORAL at 05:13

## 2021-10-12 RX ADMIN — ATORVASTATIN CALCIUM 80 MILLIGRAM(S): 80 TABLET, FILM COATED ORAL at 21:17

## 2021-10-12 RX ADMIN — Medication 1: at 12:01

## 2021-10-12 RX ADMIN — Medication 81 MILLIGRAM(S): at 12:01

## 2021-10-12 RX ADMIN — BUMETANIDE 132 MILLIGRAM(S): 0.25 INJECTION INTRAMUSCULAR; INTRAVENOUS at 17:26

## 2021-10-12 RX ADMIN — TICAGRELOR 90 MILLIGRAM(S): 90 TABLET ORAL at 21:17

## 2021-10-12 RX ADMIN — Medication 1300 MILLIGRAM(S): at 21:17

## 2021-10-12 RX ADMIN — BUMETANIDE 132 MILLIGRAM(S): 0.25 INJECTION INTRAMUSCULAR; INTRAVENOUS at 05:13

## 2021-10-12 RX ADMIN — Medication 1: at 17:27

## 2021-10-12 RX ADMIN — HEPARIN SODIUM 5000 UNIT(S): 5000 INJECTION INTRAVENOUS; SUBCUTANEOUS at 05:13

## 2021-10-12 NOTE — PROGRESS NOTE ADULT - SUBJECTIVE AND OBJECTIVE BOX
Patient is a 60y old  Male who presents with a chief complaint of NSTEMI (12 Oct 2021 10:52)      SUBJECTIVE / OVERNIGHT EVENTS:  no acute events overnight, vss, afebrile  pt mildly confused this morning  when HD brought up, he wanted us to talk to wife for consent  he is agreeable to it if wife okay  denies dyspnea, chest pain    tele reviewed: sinus 80-90s    ROS:  14 point ROS negative in detail except stated as above    MEDICATIONS  (STANDING):  aspirin  chewable 81 milliGRAM(s) Oral daily  atorvastatin 80 milliGRAM(s) Oral at bedtime  buMETAnide IVPB 4 milliGRAM(s) IV Intermittent every 12 hours  carvedilol 6.25 milliGRAM(s) Oral every 12 hours  dextrose 40% Gel 15 Gram(s) Oral once  dextrose 5%. 1000 milliLiter(s) (50 mL/Hr) IV Continuous <Continuous>  dextrose 5%. 1000 milliLiter(s) (100 mL/Hr) IV Continuous <Continuous>  dextrose 50% Injectable 25 Gram(s) IV Push once  dextrose 50% Injectable 12.5 Gram(s) IV Push once  dextrose 50% Injectable 25 Gram(s) IV Push once  glucagon  Injectable 1 milliGRAM(s) IntraMuscular once  heparin   Injectable 5000 Unit(s) SubCutaneous every 12 hours  insulin glargine Injectable (LANTUS) 18 Unit(s) SubCutaneous at bedtime  insulin lispro (ADMELOG) corrective regimen sliding scale   SubCutaneous three times a day before meals  insulin lispro (ADMELOG) corrective regimen sliding scale   SubCutaneous at bedtime  NIFEdipine XL 90 milliGRAM(s) Oral daily  pantoprazole  Injectable 40 milliGRAM(s) IV Push every 12 hours  sodium bicarbonate 1300 milliGRAM(s) Oral three times a day  ticagrelor 90 milliGRAM(s) Oral every 12 hours    MEDICATIONS  (PRN):      CAPILLARY BLOOD GLUCOSE      POCT Blood Glucose.: 161 mg/dL (12 Oct 2021 11:41)  POCT Blood Glucose.: 124 mg/dL (12 Oct 2021 07:35)  POCT Blood Glucose.: 224 mg/dL (11 Oct 2021 21:01)  POCT Blood Glucose.: 204 mg/dL (11 Oct 2021 16:09)    I&O's Summary    11 Oct 2021 07:01  -  12 Oct 2021 07:00  --------------------------------------------------------  IN: 480 mL / OUT: 0 mL / NET: 480 mL    12 Oct 2021 07:01  -  12 Oct 2021 12:07  --------------------------------------------------------  IN: 310 mL / OUT: 0 mL / NET: 310 mL        PHYSICAL EXAM:  Vital Signs Last 24 Hrs  T(C): 36.7 (12 Oct 2021 11:15), Max: 36.9 (11 Oct 2021 20:31)  T(F): 98 (12 Oct 2021 11:15), Max: 98.5 (11 Oct 2021 20:31)  HR: 83 (12 Oct 2021 11:15) (78 - 90)  BP: 161/78 (12 Oct 2021 11:15) (128/77 - 165/77)  BP(mean): --  RR: 18 (12 Oct 2021 11:15) (18 - 18)  SpO2: 98% (12 Oct 2021 11:15) (96% - 99%)    GENERAL: NAD, well-developed  HEAD:  Atraumatic, Normocephalic  EYES: EOMI, PERRLA, conjunctiva and sclera clear  NECK: Supple, No JVD  CHEST/LUNG: Clear to auscultation bilaterally; No wheeze  HEART: Regular rate and rhythm; No murmurs, rubs, or gallops  ABDOMEN: Soft, Nontender, Nondistended; Bowel sounds present  EXTREMITIES:  1-2+ pitting edema bilaterally   NEUROLOGY: AAOx3; non-focal  SKIN: No rashes or lesions    LABS:  personally reviewed                        8.4    10.16 )-----------( 279      ( 12 Oct 2021 07:56 )             26.8     10-12    141  |  101  |  98<H>  ----------------------------<  106<H>  4.4   |  22  |  6.28<H>    Ca    8.2<L>      12 Oct 2021 07:56  Mg     1.7     10-11      PT/INR - ( 12 Oct 2021 11:32 )   PT: 12.5 sec;   INR: 1.04 ratio         PTT - ( 12 Oct 2021 11:32 )  PTT:37.2 sec          RADIOLOGY & ADDITIONAL TESTS:    Imaging Personally Reviewed:    Consultant(s) Notes Reviewed:  nephrology, IR    Care Discussed with Consultants/Other Providers: Dr. Grubbs (nephro)

## 2021-10-12 NOTE — CONSULT NOTE ADULT - ASSESSMENT
Interventional Radiology    Evaluate for Procedure: Non-tunneled HD catheter placement    HPI: 60 yr old M w/ hx of DM2, HTN and HLD presents as transfer from [facility] for chest pain concerning for NSTEMI and possible new CHF c/b JOSHUA likely on CKD, s/p RRT for CVA/TIA ruled out and worsening anemia, pending improvement in JOSHUA for [facility]. IR consulted for non-tunneled HD catheter placement for new initiation of HD.       Allergies: NKDA  Medications (Abx/Cardiac/Anticoagulation/Blood Products)  aspirin  chewable: 81 milliGRAM(s) Oral (10-11 @ 12:03)  buMETAnide IVPB: 132 mL/Hr IV Intermittent (10-12 @ 05:13)  carvedilol: 6.25 milliGRAM(s) Oral (10-12 @ 05:13)  heparin   Injectable: 5000 Unit(s) SubCutaneous (10-12 @ 05:13)  hydrALAZINE Injectable: 10 milliGRAM(s) IV Push (10-10 @ 12:36)  NIFEdipine XL: 90 milliGRAM(s) Oral (10-12 @ 05:13)  NIFEdipine XL: 90 milliGRAM(s) Oral (10-10 @ 15:36)  ticagrelor: 90 milliGRAM(s) Oral (10-12 @ 09:11)    Data:  T(C): 36.8  HR: 85  BP: 128/77  RR: 18  SpO2: 98%    -WBC 10.16 / HgB 8.4 / Hct 26.8 / Plt 279  -Na 141 / Cl 101 / BUN 98 / Glucose 106  -K 4.4 / CO2 22 / Cr 6.28  -ALT -- / Alk Phos -- / T.Bili --  -INR 1.11 / PTT --    Assessment/Plan: 60 yr old M w/ hx of DM2, HTN and HLD presents as transfer from [facility] for chest pain concerning for NSTEMI and possible new CHF c/b JOSHUA likely on CKD, s/p RRT for CVA/TIA ruled out and worsening anemia, pending improvement in JOSHUA for [facility]. IR consulted for non-tunneled HD catheter placement for new initiation of HD.     -case reviewed and approved for today 10/12 PENDING negative covid PCR and STAT coags  -please place IR procedure order under [place] beltre  -d/w primary team    Please contact IR with any questions or concerns.

## 2021-10-12 NOTE — PROGRESS NOTE ADULT - SUBJECTIVE AND OBJECTIVE BOX
Patient seen and examined at bedside.    Overnight Events: NAEON    Review Of Systems: No chest pain, shortness of breath, or palpitations            Current Meds:  aspirin  chewable 81 milliGRAM(s) Oral daily  atorvastatin 80 milliGRAM(s) Oral at bedtime  buMETAnide IVPB 4 milliGRAM(s) IV Intermittent every 12 hours  carvedilol 6.25 milliGRAM(s) Oral every 12 hours  dextrose 40% Gel 15 Gram(s) Oral once  dextrose 5%. 1000 milliLiter(s) IV Continuous <Continuous>  dextrose 5%. 1000 milliLiter(s) IV Continuous <Continuous>  dextrose 50% Injectable 25 Gram(s) IV Push once  dextrose 50% Injectable 12.5 Gram(s) IV Push once  dextrose 50% Injectable 25 Gram(s) IV Push once  glucagon  Injectable 1 milliGRAM(s) IntraMuscular once  heparin   Injectable 5000 Unit(s) SubCutaneous every 12 hours  insulin glargine Injectable (LANTUS) 18 Unit(s) SubCutaneous at bedtime  insulin lispro (ADMELOG) corrective regimen sliding scale   SubCutaneous three times a day before meals  insulin lispro (ADMELOG) corrective regimen sliding scale   SubCutaneous at bedtime  NIFEdipine XL 90 milliGRAM(s) Oral daily  pantoprazole  Injectable 40 milliGRAM(s) IV Push every 12 hours  sodium bicarbonate 1300 milliGRAM(s) Oral three times a day  ticagrelor 90 milliGRAM(s) Oral every 12 hours      Vitals:  T(F): 98.4 (10-12), Max: 98.5 (10-11)  HR: 90 (10-12) (75 - 90)  BP: 155/79 (10-12) (136/72 - 165/77)  RR: 18 (10-12)  SpO2: 99% (10-12)  I&O's Summary    11 Oct 2021 07:01  -  12 Oct 2021 07:00  --------------------------------------------------------  IN: 480 mL / OUT: 0 mL / NET: 480 mL        Physical Exam:  Appearance: No acute distress; well appearing  Eyes: PERRL, EOMI, pink conjunctiva  HENT: Normal oral mucosa  Cardiovascular: RRR, S1, S2, no murmurs, rubs, or gallops; 1+ edema b/l; JVD MADDI  Respiratory: crackles at the bases  Gastrointestinal: soft, non-tender, non-distended with normal bowel sounds  Musculoskeletal: No clubbing; no joint deformity   Neurologic: Non-focal  Lymphatic: No lymphadenopathy  Psychiatry: AAOx3, mood & affect appropriate  Skin: No rashes, ecchymoses, or cyanosis                            8.4    10.16 )-----------( 279      ( 12 Oct 2021 07:56 )             26.8     10-11    138  |  101  |  95<H>  ----------------------------<  126<H>  4.1   |  21<L>  |  6.08<H>    Ca    8.5      11 Oct 2021 07:13  Mg     1.7     10-11        CARDIAC MARKERS ( 07 Oct 2021 11:38 )  5983 ng/L / x     / x     / 309 U/L / x     / 26.8 ng/mL  CARDIAC MARKERS ( 06 Oct 2021 04:48 )  6029 ng/L / x     / x     / x     / x     / x      CARDIAC MARKERS ( 06 Oct 2021 01:05 )  7177 ng/L / x     / x     / 893 U/L / x     / 92.0 ng/mL  CARDIAC MARKERS ( 05 Oct 2021 19:54 )  8021 ng/L / x     / x     / x     / x     / x      CARDIAC MARKERS ( 05 Oct 2021 17:02 )  >64412 ng/L / x     / x     / 1326 U/L / x     / 144.1 ng/mL      Serum Pro-Brain Natriuretic Peptide: 99554 pg/mL (10-05 @ 17:02)          New ECG(s): Personally reviewed  < from: TTE with Doppler (w/Cont) (10.06.21 @ 09:05) >  1. Mitral annular calcification, otherwise normal mitral  valve. Moderate-severe mitral regurgitation. /77  2. Mild -moderate segmental left ventricular systolic  dysfunction. Endocardial visualization enhanced with  intravenous injection of Ultrasonic Enhancing Agent  (Definity).  Limited evaluation. The inferior wall is  hypokinetic. The apical cap is akinetic.  The anterior wall  was not well seen despite Definity. The anterolateral wal  is also not well seen and may be hypokinetic.  3. The right ventricle is not well visualized; grossly  normal right ventricular systolic function.    < end of copied text >   Patient seen and examined at bedside.    Overnight Events: NAEON    Review Of Systems: No chest pain, shortness of breath, or palpitations            Current Meds:  aspirin  chewable 81 milliGRAM(s) Oral daily  atorvastatin 80 milliGRAM(s) Oral at bedtime  buMETAnide IVPB 4 milliGRAM(s) IV Intermittent every 12 hours  carvedilol 6.25 milliGRAM(s) Oral every 12 hours  dextrose 40% Gel 15 Gram(s) Oral once  dextrose 5%. 1000 milliLiter(s) IV Continuous <Continuous>  dextrose 5%. 1000 milliLiter(s) IV Continuous <Continuous>  dextrose 50% Injectable 25 Gram(s) IV Push once  dextrose 50% Injectable 12.5 Gram(s) IV Push once  dextrose 50% Injectable 25 Gram(s) IV Push once  glucagon  Injectable 1 milliGRAM(s) IntraMuscular once  heparin   Injectable 5000 Unit(s) SubCutaneous every 12 hours  insulin glargine Injectable (LANTUS) 18 Unit(s) SubCutaneous at bedtime  insulin lispro (ADMELOG) corrective regimen sliding scale   SubCutaneous three times a day before meals  insulin lispro (ADMELOG) corrective regimen sliding scale   SubCutaneous at bedtime  NIFEdipine XL 90 milliGRAM(s) Oral daily  pantoprazole  Injectable 40 milliGRAM(s) IV Push every 12 hours  sodium bicarbonate 1300 milliGRAM(s) Oral three times a day  ticagrelor 90 milliGRAM(s) Oral every 12 hours      Vitals:  T(F): 98.4 (10-12), Max: 98.5 (10-11)  HR: 90 (10-12) (75 - 90)  BP: 155/79 (10-12) (136/72 - 165/77)  RR: 18 (10-12)  SpO2: 99% (10-12)  I&O's Summary    11 Oct 2021 07:01  -  12 Oct 2021 07:00  --------------------------------------------------------  IN: 480 mL / OUT: 0 mL / NET: 480 mL        Physical Exam:  Appearance: No acute distress; well appearing  Eyes: PERRL, EOMI, pink conjunctiva  HENT: Normal oral mucosa  Cardiovascular: RRR, S1, S2, no murmurs, rubs, or gallops; 1+ edema b/l; JVD MADDI  Respiratory: crackles at the bases  Gastrointestinal: soft, non-tender, non-distended with normal bowel sounds  Musculoskeletal: No clubbing; no joint deformity   Neurologic: Non-focal  Lymphatic: No lymphadenopathy  Psychiatry: AAOx3, mood & affect appropriate  Skin: No rashes, ecchymoses, or cyanosis                            8.4    10.16 )-----------( 279      ( 12 Oct 2021 07:56 )             26.8     10-11    138  |  101  |  95<H>  ----------------------------<  126<H>  4.1   |  21<L>  |  6.08<H>    Ca    8.5      11 Oct 2021 07:13  Mg     1.7     10-11    CARDIAC MARKERS ( 07 Oct 2021 11:38 )  5983 ng/L / x     / x     / 309 U/L / x     / 26.8 ng/mL  CARDIAC MARKERS ( 06 Oct 2021 04:48 )  6029 ng/L / x     / x     / x     / x     / x      CARDIAC MARKERS ( 06 Oct 2021 01:05 )  7177 ng/L / x     / x     / 893 U/L / x     / 92.0 ng/mL  CARDIAC MARKERS ( 05 Oct 2021 19:54 )  8021 ng/L / x     / x     / x     / x     / x      CARDIAC MARKERS ( 05 Oct 2021 17:02 )  >97243 ng/L / x     / x     / 1326 U/L / x     / 144.1 ng/mL    Serum Pro-Brain Natriuretic Peptide: 92746 pg/mL (10-05 @ 17:02)    New ECG(s): Personally reviewed  < from: TTE with Doppler (w/Cont) (10.06.21 @ 09:05) >  1. Mitral annular calcification, otherwise normal mitral  valve. Moderate-severe mitral regurgitation. /77  2. Mild -moderate segmental left ventricular systolic  dysfunction. Endocardial visualization enhanced with  intravenous injection of Ultrasonic Enhancing Agent  (Definity).  Limited evaluation. The inferior wall is  hypokinetic. The apical cap is akinetic.  The anterior wall  was not well seen despite Definity. The anterolateral wal  is also not well seen and may be hypokinetic.  3. The right ventricle is not well visualized; grossly  normal right ventricular systolic function.    < end of copied text >

## 2021-10-12 NOTE — PROGRESS NOTE ADULT - PROBLEM SELECTOR PLAN 2
- no further chest pain  - troponin downtrending ; no need to further trend unless CP or change in HD status, etc  - c/w brilinta 90 mg BID and ASA 81 mg daily  - c/w atorvastatin 80 mg qhs  - eventual ischemic eval pending improvement in kidney function/fluid status  - cardio following, appreciate recs: [facility] as inpatient  - if repeat chest pain, obtain cardiac enzymes, ekg   - monitor on tele  - TTE EF 45%: mild-moderate LVSF

## 2021-10-12 NOTE — PROGRESS NOTE ADULT - PROBLEM SELECTOR PLAN 1
JOSHUA on CKD vs JOSHUA with hyperkalemia and meta acidosis on admission, reports hx of kidney disease but does not know baseline cr or nephrologist name  - renal US with normal kidney  - Scr continues to uptrend   - care discussed with nephrology: plan for RRT given worsening SCr, uremia and hypervolemia  - IR consulted for shiley placement  - wife in agreement with care planning  - monitor bmp  - c/w sodium bicarb 1300mg tid  - serologies pending

## 2021-10-12 NOTE — PROGRESS NOTE ADULT - SUBJECTIVE AND OBJECTIVE BOX
Neurology Progress Note    S: Patient seen and examined. No new events overnight. patient denied CP, SOB, HA or pain. resting doing okay HD cath placement today     Medication:  MEDICATIONS  (STANDING):  aspirin  chewable 81 milliGRAM(s) Oral daily  atorvastatin 80 milliGRAM(s) Oral at bedtime  buMETAnide IVPB 4 milliGRAM(s) IV Intermittent every 12 hours  carvedilol 6.25 milliGRAM(s) Oral every 12 hours  dextrose 40% Gel 15 Gram(s) Oral once  dextrose 5%. 1000 milliLiter(s) (50 mL/Hr) IV Continuous <Continuous>  dextrose 5%. 1000 milliLiter(s) (100 mL/Hr) IV Continuous <Continuous>  dextrose 50% Injectable 25 Gram(s) IV Push once  dextrose 50% Injectable 12.5 Gram(s) IV Push once  dextrose 50% Injectable 25 Gram(s) IV Push once  glucagon  Injectable 1 milliGRAM(s) IntraMuscular once  heparin   Injectable 5000 Unit(s) SubCutaneous every 12 hours  insulin glargine Injectable (LANTUS) 18 Unit(s) SubCutaneous at bedtime  insulin lispro (ADMELOG) corrective regimen sliding scale   SubCutaneous three times a day before meals  insulin lispro (ADMELOG) corrective regimen sliding scale   SubCutaneous at bedtime  NIFEdipine XL 90 milliGRAM(s) Oral daily  pantoprazole  Injectable 40 milliGRAM(s) IV Push every 12 hours  sodium bicarbonate 1300 milliGRAM(s) Oral three times a day  ticagrelor 90 milliGRAM(s) Oral every 12 hours    MEDICATIONS  (PRN):        Vitals:  Vital Signs Last 24 Hrs  T(C): 36.8 (10-12-21 @ 08:35), Max: 36.9 (10-11-21 @ 20:31)  T(F): 98.2 (10-12-21 @ 08:35), Max: 98.5 (10-11-21 @ 20:31)  HR: 85 (10-12-21 @ 08:35) (78 - 90)  BP: 128/77 (10-12-21 @ 08:35) (128/77 - 165/77)  BP(mean): --  RR: 18 (10-12-21 @ 08:35) (18 - 18)  SpO2: 98% (10-12-21 @ 08:35) (96% - 99%)      General Exam:   General Appearance: Appropriately dressed and in no acute distress       Head: Normocephalic, atraumatic and no dysmorphic features  Ear, Nose, and Throat: Moist mucous membranes  CVS: S1S2+  Resp: No SOB, no wheeze or rhonchi  Abd: soft NTND  Extremities: No edema, no cyanosis  Skin: No bruises, no rashes     Neurological Exam:  Mental Status: Awake, alert and oriented x 3.  Able to follow simple and complex verbal commands. Able to name and repeat. fluent speech. No obvious aphasia or dysarthria noted.   Cranial Nerves: PERRL, EOMI, VFFC, sensation V1-V3 intact,  mild NLF facial asymmetry , equal elevation of palate, scm/trap 5/5, tongue is midline on protrusion. no obvious papilledema on fundoscopic exam. Hearing is grossly intact.   Motor: Normal bulk, tone and strength throughout. Fine finger movements were intact and symmetric. no tremors or drift noted.    Sensation: Intact to light touch and pinprick throughout. no right/left confusion. no extinction to tactile on DSS.   Reflexes: 1+ throughout at biceps, brachioradialis, triceps, patellars and ankles bilaterally and equal. No clonus. R toe and L toe were both downgoing.  Coordination: No dysmetria on FNF    Gait: deferred     I personally reviewed the below data/images/labs:    CBC Full  -  ( 12 Oct 2021 07:56 )  WBC Count : 10.16 K/uL  RBC Count : 2.71 M/uL  Hemoglobin : 8.4 g/dL  Hematocrit : 26.8 %  Platelet Count - Automated : 279 K/uL  Mean Cell Volume : 98.9 fl  Mean Cell Hemoglobin : 31.0 pg  Mean Cell Hemoglobin Concentration : 31.3 gm/dL  Auto Neutrophil # : x  Auto Lymphocyte # : x  Auto Monocyte # : x  Auto Eosinophil # : x  Auto Basophil # : x  Auto Neutrophil % : x  Auto Lymphocyte % : x  Auto Monocyte % : x  Auto Eosinophil % : x  Auto Basophil % : x      10-12    141  |  101  |  98<H>  ----------------------------<  106<H>  4.4   |  22  |  6.28<H>    Ca    8.2<L>      12 Oct 2021 07:56  Mg     1.7     10-11      -10/07 CTH: No hemorrhage. 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   -10/07 CTA N: High-grade stenosis left internal carotid artery at the carotid bifurcation in the neck.   -10/07 CTA H: Normal intracranial circulation.    < from: MR Head No Cont (10.09.21 @ 20:22) >    EXAM:  MR BRAIN                            PROCEDURE DATE:  10/09/2021            INTERPRETATION:  CLINICAL HISTORY:Facial droop, on heparin drip, NSTEMI . Severe left ICA stenosis.    TECHNIQUE:  MRI of brain without contrast dated 10/9/2021.  Examination consisted of transaxial T1, T2, FLAIR, gradient echo as well as diffusion-weighted sequences with corresponding ADC maps. Coronal T2 and sagittal T1-weighted images were also acquired through the brain.    COMPARISON: CT head and CTA head andneck 10/7/2021    FINDINGS:  There are no areas abnormal restricted diffusion within the brain parenchyma to suggest acute/subacute infarct. There is no acute intracranial hemorrhage, vasogenic edema or abnormal susceptibility artifact. Chronic lacunar infarcts are seen in the left frontal lobe, right frontal cranial radiata and right cerebellum. Additional nonspecific patchy and confluent areas of T2/FLAIR prolongation in the bihemispheric white matter likely represents mild chronic microvascular changes.    The ventricles, sulci and cisternal spaces are stable in size and configuration. There is no midline shift or abnormal extra-axial fluid collection.    Flow-voids of the major intracranial vessels are maintained, as seen best on the T2-weighted images, indicating their patency.    The visualized paranasal sinuses and mastoid air cells are free of acute disease. The orbital regions are unremarkable.    IMPRESSION:  No acute infarct or intracranial hemorrhage. Chronic infarcts and microvascular changes as above.    --- End of Report ---                NEIL CARDENAS MD; Attending Radiologist  This document has been electronically signed. Oct 10 2021  4:26AM    < end of copied text >  < from: VA Duplex Carotid, Bilat (10.08.21 @ 17:07) >    EXAM:  CAROTID DUPLEX BILATERAL                            PROCEDURE DATE:  10/08/2021            INTERPRETATION:  CLINICAL INFORMATION: Left ICA high-grade stenosis identified on recent CTA neck.    COMPARISON: CTA neck 10/7/2021.    TECHNIQUE: Grayscale, color and spectral Doppler examination of both carotid arteries was performed.    FINDINGS:    There are atheromatous plaques are present bilaterally in the region of the bifurcations of the common carotid arteries into internal and external branches.    Velocity elevation of the proximal right internal carotid artery results in 50-69% flow-limiting stenosis.    Velocity elevation of the proximal left internal carotid artery results in 70-9% flow-limiting stenosis.    Bilateral flow-limiting stenoses noted of the right and left external carotid arteries as well.    Peak systolic velocities are as follows:    RIGHT:  PROX CCA = 126 cm/s  DIST CCA = 99 cm/s  PROX ICA = 137 cm/s  MID ICA = 86 cm/s  DIST ICA = 80 cm/s  ECA = 202 cm/s    LEFT:  PROX CCA = 100 cm/s  DIST CCA = 59 cm/s  PROX ICA = 313 cm/s  MID ICA = 72 cm/s  DIST ICA = 47 cm/s  ECA = 298 cm/s    Antegrade flow is noted within both vertebral arteries.    IMPRESSION:    Left internal carotid artery 70-99% flow-limiting stenosis.  Right internal carotid artery 50-69% flow-limiting stenosis.  Bilateral external carotid artery flow limiting stenoses.  [place] Hammond was informed of these findings on 10/8/2021 at 5:06 PM.    Measurement of carotid stenosis is based on velocity parameters that correlate the residual internal carotid diameter with that of the more distal vessel in accordance with a method such as the [employer] Symptomatic Carotid Endarterectomy Trial (NASCET).    --- End of Report ---                FELIX MCMAHON M.D., ATTENDING RADIOLOGIST  This document has been electronically signed. Oct  8 2021  5:21PM    < end of copied text >

## 2021-10-12 NOTE — PROGRESS NOTE ADULT - ASSESSMENT
59 yo M with PMH of HTN & DM.  Presented to [facility] ([place]) with CP and dyspnea and marked HTN.  Transferred for N-STEMI/CHF, hypertensive emergency and ARF.  On arrival here , found to be in sherie nonoliguric renal failure with increased work of breathing and evidence of volume overload due to ?ADHF vs. renal failure. Elevated cardiac enzymes at OSH c/f N-STEMI, however patient chest pain free after NTG.    Clinically Improved at present with resolution of CP and dyspnea.        REC:    1.  Elevated troponin and CK/MB, N-STEMI , in setting of renal failure and significant HTN emergency; EKG was unremarkable on admission.  TTE shows segmental LV dysfunction with mild to moderate systolic dysfunction, c/w CAD.  - continue telemetry  - continue ASA, Brilinta, statin;   - continue diuresis  - will need diagnostic cath but we are unable to do this at present due to JOSHUA.    2.  Volume overload, HFrEF  - patient comfortable at present.  - I and Os   - will defer to nephrology renal for diuresis vs. HD for volume removal; goal negative 1L, can consider starting on bumex gtt at 1 mg/hr   - continue coreg  - will need to be on ACE/ARB, but can be initiated once renal function recovers    4.  HLD  - continue statin    5.  Moderate to severe MR on TTE  - continue diuresis and BP control    6.HTN uncontrolled  -would recommend starting PO hydralazine 25mg TID and increase nifedipine if ok with renal   59 yo M with PMH of HTN & DM.  Presented to [facility] with CP and dyspnea and marked HTN.  Transferred for N-STEMI/CHF, hypertensive emergency and ARF.  On arrival here , found to be in sherie nonoliguric renal failure with increased work of breathing and evidence of volume overload due to ?ADHF vs. renal failure. Elevated cardiac enzymes at OSH c/f N-STEMI, however patient chest pain free after NTG.    Clinically Improved at present with resolution of CP and dyspnea.      REC:    1.  Elevated troponin and CK/MB, N-STEMI , in setting of renal failure and significant HTN emergency; EKG was unremarkable on admission.  TTE shows segmental LV dysfunction with mild to moderate systolic dysfunction, c/w CAD.  - continue telemetry  - continue ASA, Brilinta, statin;   - continue diuresis  - will need diagnostic cath but we are unable to do this at present due to JOSHUA.    2.  Volume overload, HFrEF  - patient comfortable at present.  - I and Os   - will defer to nephrology renal for diuresis vs. HD for volume removal; goal negative 1L, can consider starting on bumex gtt at 1 mg/hr   - continue coreg  - will need to be on ACE/ARB, but can be initiated once renal function recovers    4.  HLD  - continue statin    5.  Moderate to severe MR on TTE  - continue diuresis and BP control    6.HTN uncontrolled  -would recommend starting PO hydralazine 25mg TID and increase nifedipine if ok with renal

## 2021-10-12 NOTE — PROGRESS NOTE ADULT - ASSESSMENT
Patient is a 60 year old male with PMH od DM and HTN who presented to the hospital as a transfer from OSH for NSTEMI. The nephrology team was consulted due to elevated creatinine of 4.05 upon presentation. The patient denies any history of kidney disease and denied noticing any changes in his urination.     JOSHUA on CKD?   - patient presenting to hospital with creatinine of 4.05 mg/dL; denied any history of kidney disease   --- creatinine has continued to uptrend to 6.08mg/dL this morning  - Kidney injury also exacerbated by contrast administration for the CT following the RRT on 10/7    - patient remains volume overloaded and decreasing urine output despite being on aggressive diuretics   - BUN noted to be 98; explained to patient that he will likely require RRT    --- discussed with the wife over the phone and explained to her the risks and benefits of having the patient initiated on HD   - she was hesitant but agreeable   - recommend IR consult for placement of access and then HD following  - normal renal ultrasound without hydro  - UA with proteinuria and blood;  Ur Pr/Cr ratio noted to be 8.7g  - BEULAH negative; elevated kappa/lambda but normal ratio;   - C3 and C4 not low  --- other serological work up pending at this time  - please adjust medication doses as per eGFR     If any questions, please feel free to contact me     Saul Crews  Nephrology Fellow  [facility] Pager: [phone]

## 2021-10-12 NOTE — PROGRESS NOTE ADULT - SUBJECTIVE AND OBJECTIVE BOX
[facility] DIVISION OF KIDNEY DISEASES AND HYPERTENSION -- FOLLOW UP NOTE  --------------------------------------------------------------------------------  Chief Complaint:    24 hour events/subjective:    seen and examined this morning   denied any acute complaints   he states he feels like he is urinating less and less everyday       PAST HISTORY  --------------------------------------------------------------------------------  No significant changes to PMH, PSH, FHx, SHx, unless otherwise noted    ALLERGIES & MEDICATIONS  --------------------------------------------------------------------------------  Allergies    No Known Allergies    Intolerances      Standing Inpatient Medications  aspirin  chewable 81 milliGRAM(s) Oral daily  atorvastatin 80 milliGRAM(s) Oral at bedtime  buMETAnide IVPB 4 milliGRAM(s) IV Intermittent every 12 hours  carvedilol 6.25 milliGRAM(s) Oral every 12 hours  dextrose 40% Gel 15 Gram(s) Oral once  dextrose 5%. 1000 milliLiter(s) IV Continuous <Continuous>  dextrose 5%. 1000 milliLiter(s) IV Continuous <Continuous>  dextrose 50% Injectable 25 Gram(s) IV Push once  dextrose 50% Injectable 12.5 Gram(s) IV Push once  dextrose 50% Injectable 25 Gram(s) IV Push once  glucagon  Injectable 1 milliGRAM(s) IntraMuscular once  heparin   Injectable 5000 Unit(s) SubCutaneous every 12 hours  insulin glargine Injectable (LANTUS) 18 Unit(s) SubCutaneous at bedtime  insulin lispro (ADMELOG) corrective regimen sliding scale   SubCutaneous three times a day before meals  insulin lispro (ADMELOG) corrective regimen sliding scale   SubCutaneous at bedtime  NIFEdipine XL 90 milliGRAM(s) Oral daily  pantoprazole  Injectable 40 milliGRAM(s) IV Push every 12 hours  sodium bicarbonate 1300 milliGRAM(s) Oral three times a day  ticagrelor 90 milliGRAM(s) Oral every 12 hours    PRN Inpatient Medications      REVIEW OF SYSTEMS      All other systems were reviewed and are negative, except as noted.    VITALS/PHYSICAL EXAM  --------------------------------------------------------------------------------  T(C): 36.7 (10-12-21 @ 11:15), Max: 36.9 (10-11-21 @ 20:31)  HR: 83 (10-12-21 @ 11:15) (78 - 90)  BP: 161/78 (10-12-21 @ 11:15) (128/77 - 165/77)  RR: 18 (10-12-21 @ 11:15) (18 - 18)  SpO2: 98% (10-12-21 @ 11:15) (96% - 99%)  Wt(kg): --        10-11-21 @ 07:01  -  10-12-21 @ 07:00  --------------------------------------------------------  IN: 480 mL / OUT: 0 mL / NET: 480 mL    10-12-21 @ 07:01  -  10-12-21 @ 12:13  --------------------------------------------------------  IN: 310 mL / OUT: 0 mL / NET: 310 mL        Physical Exam:  	Gen: NAD  	HEENT: MMM  	Pulm: CTA B/L  	CV: S1S2  	Abd: Soft, +BS   	Ext: + LE edema B/L  	Neuro: Awake  	Skin: Warm and dry  	Vascular access: N/A      LABS/STUDIES  --------------------------------------------------------------------------------              8.4    10.16 >-----------<  279      [10-12-21 @ 07:56]              26.8     141  |  101  |  98  ----------------------------<  106      [10-12-21 @ 07:56]  4.4   |  22  |  6.28        Ca     8.2     [10-12-21 @ 07:56]      Mg     1.7     [10-11-21 @ 07:13]      PT/INR: PT 12.5 , INR 1.04       [10-12-21 @ 11:32]  PTT: 37.2       [10-12-21 @ 11:32]      Creatinine Trend:  SCr 6.28 [10-12 @ 07:56]  SCr 6.08 [10-11 @ 07:13]  SCr 5.64 [10-10 @ 06:45]  SCr 5.64 [10-09 @ 07:16]  SCr 5.42 [10-08 @ 05:08]    Urinalysis - [10-07-21 @ 10:09]      Color Light Yellow / Appearance Clear / SG 1.015 / pH 6.0      Gluc 200 mg/dL / Ketone Negative  / Bili Negative / Urobili Negative       Blood Small / Protein 300 mg/dL / Leuk Est Negative / Nitrite Negative      RBC 2 / WBC 7 / Hyaline 6 / Gran  / Sq Epi  / Non Sq Epi 2 / Bacteria Negative    Urine Creatinine 63      [10-07-21 @ 10:10]  Urine Protein 548      [10-07-21 @ 10:10]  Urine Sodium 71      [10-07-21 @ 10:10]  Urine Osmolality 379      [10-07-21 @ 10:10]      HCV 0.23, Nonreact      [10-07-21 @ 14:38]    BEULAH: titer Negative, pattern --      [10-07-21 @ 14:37]  C3 Complement 135      [10-07-21 @ 14:42]  C4 Complement 71      [10-07-21 @ 14:42]  Syphilis Screen (Treponema Pallidum Ab) Negative      [10-07-21 @ 14:37]  Free Light Chains: kappa 10.89, lambda 12.51, ratio = 0.87      [10-07 @ 14:42]  Immunofixation Serum:   No Monoclonal Band Identified    Reference Range: None Detected      [10-07-21 @ 14:42]  SPEP Interpretation: Normal Electrophoresis Pattern      [10-07-21 @ 14:42]

## 2021-10-12 NOTE — PROGRESS NOTE ADULT - PROBLEM SELECTOR PLAN 10
DVT ppx: hsq  Dispo: home, no skilled PT needs    above plans discussed with NP Mady  updated wife over the phone ([phone])    Stephanie Ashford MD  Division of Hospital Medicine  Cell: [phone]  Office: [phone]

## 2021-10-12 NOTE — PROGRESS NOTE ADULT - ASSESSMENT
60 yr old M w/ hx of DM2, HTN and HLD presents as transfer from [facility] for chest pain concerning for NSTEMI and possible new CHF c/b JOSHUA likely on CKD, s/p RRT for CVA/TIA ruled out and worsening anemia, pending improvement in JOSHUA for [facility]

## 2021-10-12 NOTE — PROGRESS NOTE ADULT - ASSESSMENT
61 yo male with DM2, HTN, and HLD who initially presented to [facility] on 10/05 as a transfer from [facility] for NSTEMI and possible CHF. Patient on Heparin drip for NSTEMI. LKW 10:45AM. Patient had episode of bradycardia (HR 30s), then became altered. RRT called. BP during RRT 70s/40s. Code stroke called for L facial droop.   CTH negative for acute pathology, old L frontal cortical infarct seen.   CTA H unremarkable. CTA N shows high-grade stenosis left internal carotid artery at the carotid bifurcation in the neck.  10/06 TTE: EF 45%, moderate-severe MR, mild-moderate L ventricular systolic dysfunction.   A1c 7  MRI no AIS but old strokes   CD with severe L ICA and Mod R ICA     Impression: Mild L nasolabial flattening and dysarthria, ?decreased eye closure strength on the L. Possibly secondary to R brain dysfunction due to acute stroke of unknown mechanism vs peripheral etiology. Patient with old L frontal infarct on CTH, also with high-grade stenosis of L ICA at the carotid bifurcation which likely cause of old stroke     Recommendations:  - HD cath placement today 10/12   - ASA 81MG PO QD + Brilinta 90MG PO Q12HR.   - Avoid hypotension.  - High dose statin therapy - atorvastatin 40mg PO daily. LDL goal <70mg/dL.  -  vascular for ICA revascularization can be outpt. not acute   - lipid panel  - telemetry  - PT/OT/SS/SLP, OOBC  - permissive HTN, -180mmHg.  - check FS, glucose control <180  - GI/DVT ppx  - Counseling on diet, exercise, and medication adherence was done  - Counseling on smoking cessation and alcohol consumption offered when appropriate.  - Pain assessed and judicious use of narcotics when appropriate was discussed.    - Stroke education given when appropriate.  - Importance of fall prevention discussed.   - Differential diagnosis and plan of care discussed with patient and/or family and primary team  - Thank you for allowing me to participate in the care of this patient. Call with questions.   Marcelino Patel MD  Vascular Neurology .

## 2021-10-12 NOTE — PROGRESS NOTE ADULT - ATTENDING COMMENTS
NSTEMI pending [facility]. Asymptomatic. TTE shows segmental LV dysfunction with mild to moderate systolic dysfunction. Placement of non-tunneled dialysis catheter with possible initiation of dialysis tomorrow. Cardiac cath plans I the afternoon after HD in the morning. Continue medications as above.    Lo Blanchard MD, MPH, MARK, RPVI, [facility]  Inpatient Cardiovascular Specialist; Chanel Garcia [facility], [facility] ([facility])  ; [employer] [facility]  message: [place] [phone] or Microsoft Teams (text preferred and/or call)  email: [email or web address] Cardiovascular Surgery on-service contact/call information, go to [email or web address] and use "[email or web address]" to login.  Outpatient Cardiology appointments, call  [phone] to arrange with a colleague; I do not have outpatient Cardiology clinic.

## 2021-10-13 LAB
ANION GAP SERPL CALC-SCNC: 14 MMOL/L — SIGNIFICANT CHANGE UP (ref 5–17)
BUN SERPL-MCNC: 94 MG/DL — HIGH (ref 7–23)
CALCIUM SERPL-MCNC: 8 MG/DL — LOW (ref 8.4–10.5)
CHLORIDE SERPL-SCNC: 98 MMOL/L — SIGNIFICANT CHANGE UP (ref 96–108)
CO2 SERPL-SCNC: 23 MMOL/L — SIGNIFICANT CHANGE UP (ref 22–31)
CREAT SERPL-MCNC: 5.95 MG/DL — HIGH (ref 0.5–1.3)
FERRITIN SERPL-MCNC: 780 NG/ML — HIGH (ref 30–400)
GLUCOSE BLDC GLUCOMTR-MCNC: 112 MG/DL — HIGH (ref 70–99)
GLUCOSE BLDC GLUCOMTR-MCNC: 179 MG/DL — HIGH (ref 70–99)
GLUCOSE BLDC GLUCOMTR-MCNC: 183 MG/DL — HIGH (ref 70–99)
GLUCOSE BLDC GLUCOMTR-MCNC: 195 MG/DL — HIGH (ref 70–99)
GLUCOSE SERPL-MCNC: 98 MG/DL — SIGNIFICANT CHANGE UP (ref 70–99)
HBV SURFACE AG SER-ACNC: SIGNIFICANT CHANGE UP
HCT VFR BLD CALC: 24.8 % — LOW (ref 39–50)
HGB BLD-MCNC: 8 G/DL — LOW (ref 13–17)
HIV 1+2 AB+HIV1 P24 AG SERPL QL IA: SIGNIFICANT CHANGE UP
IRON SATN MFR SERPL: 19 % — SIGNIFICANT CHANGE UP (ref 16–55)
IRON SATN MFR SERPL: 47 UG/DL — SIGNIFICANT CHANGE UP (ref 45–165)
MAGNESIUM SERPL-MCNC: 1.5 MG/DL — LOW (ref 1.6–2.6)
MCHC RBC-ENTMCNC: 31.4 PG — SIGNIFICANT CHANGE UP (ref 27–34)
MCHC RBC-ENTMCNC: 32.3 GM/DL — SIGNIFICANT CHANGE UP (ref 32–36)
MCV RBC AUTO: 97.3 FL — SIGNIFICANT CHANGE UP (ref 80–100)
NRBC # BLD: 0 /100 WBCS — SIGNIFICANT CHANGE UP (ref 0–0)
PHOSPHATE SERPL-MCNC: 5.6 MG/DL — HIGH (ref 2.5–4.5)
PLATELET # BLD AUTO: 281 K/UL — SIGNIFICANT CHANGE UP (ref 150–400)
POTASSIUM SERPL-MCNC: 4.2 MMOL/L — SIGNIFICANT CHANGE UP (ref 3.5–5.3)
POTASSIUM SERPL-SCNC: 4.2 MMOL/L — SIGNIFICANT CHANGE UP (ref 3.5–5.3)
RBC # BLD: 2.55 M/UL — LOW (ref 4.2–5.8)
RBC # FLD: 13.4 % — SIGNIFICANT CHANGE UP (ref 10.3–14.5)
SODIUM SERPL-SCNC: 135 MMOL/L — SIGNIFICANT CHANGE UP (ref 135–145)
TIBC SERPL-MCNC: 245 UG/DL — SIGNIFICANT CHANGE UP (ref 220–430)
UIBC SERPL-MCNC: 198 UG/DL — SIGNIFICANT CHANGE UP (ref 110–370)
WBC # BLD: 9.21 K/UL — SIGNIFICANT CHANGE UP (ref 3.8–10.5)
WBC # FLD AUTO: 9.21 K/UL — SIGNIFICANT CHANGE UP (ref 3.8–10.5)

## 2021-10-13 PROCEDURE — 99233 SBSQ HOSP IP/OBS HIGH 50: CPT

## 2021-10-13 PROCEDURE — 99232 SBSQ HOSP IP/OBS MODERATE 35: CPT

## 2021-10-13 PROCEDURE — 99233 SBSQ HOSP IP/OBS HIGH 50: CPT | Mod: GC

## 2021-10-13 RX ADMIN — PANTOPRAZOLE SODIUM 40 MILLIGRAM(S): 20 TABLET, DELAYED RELEASE ORAL at 17:52

## 2021-10-13 RX ADMIN — INSULIN GLARGINE 18 UNIT(S): 100 INJECTION, SOLUTION SUBCUTANEOUS at 21:40

## 2021-10-13 RX ADMIN — TICAGRELOR 90 MILLIGRAM(S): 90 TABLET ORAL at 11:26

## 2021-10-13 RX ADMIN — Medication 81 MILLIGRAM(S): at 17:52

## 2021-10-13 RX ADMIN — CARVEDILOL PHOSPHATE 6.25 MILLIGRAM(S): 80 CAPSULE, EXTENDED RELEASE ORAL at 05:05

## 2021-10-13 RX ADMIN — Medication 1: at 11:26

## 2021-10-13 RX ADMIN — Medication 1300 MILLIGRAM(S): at 15:43

## 2021-10-13 RX ADMIN — CHLORHEXIDINE GLUCONATE 1 APPLICATION(S): 213 SOLUTION TOPICAL at 05:13

## 2021-10-13 RX ADMIN — Medication 0: at 07:27

## 2021-10-13 RX ADMIN — CARVEDILOL PHOSPHATE 6.25 MILLIGRAM(S): 80 CAPSULE, EXTENDED RELEASE ORAL at 17:51

## 2021-10-13 RX ADMIN — BUMETANIDE 132 MILLIGRAM(S): 0.25 INJECTION INTRAMUSCULAR; INTRAVENOUS at 05:05

## 2021-10-13 RX ADMIN — Medication 90 MILLIGRAM(S): at 05:05

## 2021-10-13 RX ADMIN — HEPARIN SODIUM 5000 UNIT(S): 5000 INJECTION INTRAVENOUS; SUBCUTANEOUS at 17:56

## 2021-10-13 RX ADMIN — TICAGRELOR 90 MILLIGRAM(S): 90 TABLET ORAL at 21:40

## 2021-10-13 RX ADMIN — BUMETANIDE 132 MILLIGRAM(S): 0.25 INJECTION INTRAMUSCULAR; INTRAVENOUS at 17:51

## 2021-10-13 RX ADMIN — PANTOPRAZOLE SODIUM 40 MILLIGRAM(S): 20 TABLET, DELAYED RELEASE ORAL at 05:05

## 2021-10-13 RX ADMIN — Medication 1300 MILLIGRAM(S): at 05:05

## 2021-10-13 RX ADMIN — Medication 1: at 16:12

## 2021-10-13 RX ADMIN — Medication 1300 MILLIGRAM(S): at 21:40

## 2021-10-13 RX ADMIN — ATORVASTATIN CALCIUM 80 MILLIGRAM(S): 80 TABLET, FILM COATED ORAL at 21:39

## 2021-10-13 RX ADMIN — HEPARIN SODIUM 5000 UNIT(S): 5000 INJECTION INTRAVENOUS; SUBCUTANEOUS at 05:05

## 2021-10-13 NOTE — PROGRESS NOTE ADULT - ATTENDING COMMENTS
transferred from outside hospital for further cardiac evaluation, noncompliant with dm, htn  episode of bradycardia (HR 30s), then became altered. RRT and code stroke 10/7  #  vicki (atn/cary) on ckd  ?stage V  cr uptrending egfr 9 yesterday  sheyla placed 10/12  cr today slightly lower  monitor bun/cr trend  if stable/uptrend will do hd pre cath tomorrow to optimize  # volume overloaded/edema-cont  bumex; UO1.8L  #CKD- prot/cr 8.7, low alb 2.6, serologic workup pending    renal sono for kidney size ok   likely dm nephropathy however need rto rule out etiology of ckd; #check hbsag, pla2r, hiv, for ckd workup/proteinuria  #wife and patient understands risk of CARY with cardiac cath, including dialysis; cardiac cath tomorrow  #metabolic acidosis- cont bicarb tabs; increased to 1300mg po tid; monitor serum bicarb trend  #hyperkalemia- low k renal diet; monitor trend; stable  #anemia- workup; check iron studies/ferritin  #BP control-cont meds  #check serum phos       d/w Dr Ashford

## 2021-10-13 NOTE — PROGRESS NOTE ADULT - ASSESSMENT
61 yo M with PMH of HTN & DM.  Presented to [facility] with CP and dyspnea and marked HTN.  Transferred for N-STEMI/CHF, hypertensive emergency and ARF.  On arrival here , found to be in sherie nonoliguric renal failure with increased work of breathing and evidence of volume overload due to ?ADHF vs. renal failure. Elevated cardiac enzymes at OSH c/f N-STEMI, however patient chest pain free after NTG.    Clinically Improved at present with resolution of CP and dyspnea.        REC:    1.  Elevated troponin and CK/MB, N-STEMI , in setting of renal failure and significant HTN emergency; EKG was unremarkable on admission.  TTE shows segmental LV dysfunction with mild to moderate systolic dysfunction, c/w CAD.  - continue telemetry  - continue ASA, Brilinta, statin  - plan for [facility] tomorrow pm    2.  Volume overload, HFrEF  - patient comfortable at present.  - I and Os   - will defer to nephrology renal for HD today or tomorrow  - continue coreg  - will need to be on ACE/ARB, but can be initiated once renal function recovers    3.  Moderate to severe MR on TTE  - continue diuresis and BP control    5. HTN uncontrolled  -would recommend starting PO hydralazine 25mg TID and increase nifedipine if ok with renal

## 2021-10-13 NOTE — PROGRESS NOTE ADULT - ASSESSMENT
Patient is a 60 year old male with PMH od DM and HTN who presented to the hospital as a transfer from OSH for NSTEMI. The nephrology team was consulted due to elevated creatinine of 4.05 upon presentation. The patient denies any history of kidney disease and denied noticing any changes in his urination.     JOSHUA on CKD?   - patient presenting to hospital with creatinine of 4.05 mg/dL; denied any history of kidney disease   --- creatinine had continued to uptrend to 6.28mg/dL but slight decrease to 5.95mg/dL this morning  - Kidney injury also exacerbated by contrast administration for the CT following the RRT on 10/7    - patient remains volume overloaded   - BUN noted to be 98; explained to patient that he will likely require RRT    --- discussed with the wife over the phone and explained to her the risks and benefits of having the patient initiated on HD   - she was hesitant but agreeable   - given improvement in creatinine will defer starting HD today; would want to monitor the creatinine to follow for any further improvement  - cardiology planning for cath tomorrow; if creatinine does not improve then will likely dialyze patient in the AM prior to cath for optimization  --- expect the renal function to worsen with contrast use for catheterization   - normal renal ultrasound without hydro  - UA with proteinuria and blood;  Ur Pr/Cr ratio noted to be 8.7g  - BEULAH negative; elevated kappa/lambda but normal ratio;   - C3 and C4 not low  --- other serological work up pending at this time  - please adjust medication doses as per eGFR     If any questions, please feel free to contact me     Saul Crews  Nephrology Fellow  [facility] Pager: [phone]

## 2021-10-13 NOTE — PROGRESS NOTE ADULT - SUBJECTIVE AND OBJECTIVE BOX
Patient seen and examined at bedside.    Overnight Events: SHAINAEON    Review Of Systems: No chest pain, shortness of breath, or palpitations            Current Meds:  aspirin  chewable 81 milliGRAM(s) Oral daily  atorvastatin 80 milliGRAM(s) Oral at bedtime  buMETAnide IVPB 4 milliGRAM(s) IV Intermittent every 12 hours  carvedilol 6.25 milliGRAM(s) Oral every 12 hours  chlorhexidine 4% Liquid 1 Application(s) Topical <User Schedule>  dextrose 40% Gel 15 Gram(s) Oral once  dextrose 5%. 1000 milliLiter(s) IV Continuous <Continuous>  dextrose 5%. 1000 milliLiter(s) IV Continuous <Continuous>  dextrose 50% Injectable 25 Gram(s) IV Push once  dextrose 50% Injectable 12.5 Gram(s) IV Push once  dextrose 50% Injectable 25 Gram(s) IV Push once  glucagon  Injectable 1 milliGRAM(s) IntraMuscular once  heparin   Injectable 5000 Unit(s) SubCutaneous every 12 hours  insulin glargine Injectable (LANTUS) 18 Unit(s) SubCutaneous at bedtime  insulin lispro (ADMELOG) corrective regimen sliding scale   SubCutaneous three times a day before meals  insulin lispro (ADMELOG) corrective regimen sliding scale   SubCutaneous at bedtime  NIFEdipine XL 90 milliGRAM(s) Oral daily  pantoprazole  Injectable 40 milliGRAM(s) IV Push every 12 hours  sodium bicarbonate 1300 milliGRAM(s) Oral three times a day  sodium chloride 0.9% lock flush 10 milliLiter(s) IV Push every 1 hour PRN  ticagrelor 90 milliGRAM(s) Oral every 12 hours      Vitals:  T(F): 98 (10-13), Max: 98.7 (10-13)  HR: 88 (10-13) (81 - 90)  BP: 166/79 (10-13) (152/78 - 180/80)  RR: 18 (10-13)  SpO2: 97% (10-13)  I&O's Summary    12 Oct 2021 07:01  -  13 Oct 2021 07:00  --------------------------------------------------------  IN: 910 mL / OUT: 1850 mL / NET: -940 mL        Physical Exam:  Appearance: No acute distress; well appearing  Eyes: PERRL, EOMI, pink conjunctiva  HENT: Normal oral mucosa  Cardiovascular: RRR, S1, S2, no murmurs, rubs, or gallops; 1+ edema b/l; JVD MADDI  Respiratory: crackles at the bases  Gastrointestinal: soft, non-tender, non-distended with normal bowel sounds  Musculoskeletal: No clubbing; no joint deformity   Neurologic: Non-focal  Lymphatic: No lymphadenopathy  Psychiatry: AAOx3, mood & affect appropriate  Skin: No rashes, ecchymoses, or cyanosis                          8.0    9.21  )-----------( 281      ( 13 Oct 2021 05:18 )             24.8     10-13    135  |  98  |  94<H>  ----------------------------<  98  4.2   |  23  |  5.95<H>    Ca    8.0<L>      13 Oct 2021 05:18  Phos  5.6     10-13  Mg     1.5     10-13      PT/INR - ( 12 Oct 2021 11:32 )   PT: 12.5 sec;   INR: 1.04 ratio         PTT - ( 12 Oct 2021 11:32 )  PTT:37.2 sec  CARDIAC MARKERS ( 07 Oct 2021 11:38 )  5983 ng/L / x     / x     / 309 U/L / x     / 26.8 ng/mL      Serum Pro-Brain Natriuretic Peptide: 8030 pg/mL (10-12 @ 07:56)          New ECG(s): Personally reviewed    < from: TTE with Doppler (w/Cont) (10.06.21 @ 09:05) >  1. Mitral annular calcification, otherwise normal mitral  valve. Moderate-severe mitral regurgitation. /77  2. Mild -moderate segmental left ventricular systolic  dysfunction. Endocardial visualization enhanced with  intravenous injection of Ultrasonic Enhancing Agent  (Definity).  Limited evaluation. The inferior wall is  hypokinetic. The apical cap is akinetic.  The anterior wall  was not well seen despite Definity. The anterolateral wal  is also not well seen and may be hypokinetic.  3. The right ventricle is not well visualized; grossly  normal right ventricular systolic function.    < end of copied text >

## 2021-10-13 NOTE — PROGRESS NOTE ADULT - SUBJECTIVE AND OBJECTIVE BOX
[facility] DIVISION OF KIDNEY DISEASES AND HYPERTENSION -- FOLLOW UP NOTE  --------------------------------------------------------------------------------  Chief Complaint:    24 hour events/subjective:    seen and examined this morning   reports feeling well   denied any significant SOB   still has LE edema     PAST HISTORY  --------------------------------------------------------------------------------  No significant changes to PMH, PSH, FHx, SHx, unless otherwise noted    ALLERGIES & MEDICATIONS  --------------------------------------------------------------------------------  Allergies    No Known Allergies    Intolerances      Standing Inpatient Medications  aspirin  chewable 81 milliGRAM(s) Oral daily  atorvastatin 80 milliGRAM(s) Oral at bedtime  buMETAnide IVPB 4 milliGRAM(s) IV Intermittent every 12 hours  carvedilol 6.25 milliGRAM(s) Oral every 12 hours  chlorhexidine 4% Liquid 1 Application(s) Topical <User Schedule>  dextrose 40% Gel 15 Gram(s) Oral once  dextrose 5%. 1000 milliLiter(s) IV Continuous <Continuous>  dextrose 5%. 1000 milliLiter(s) IV Continuous <Continuous>  dextrose 50% Injectable 25 Gram(s) IV Push once  dextrose 50% Injectable 12.5 Gram(s) IV Push once  dextrose 50% Injectable 25 Gram(s) IV Push once  glucagon  Injectable 1 milliGRAM(s) IntraMuscular once  heparin   Injectable 5000 Unit(s) SubCutaneous every 12 hours  insulin glargine Injectable (LANTUS) 18 Unit(s) SubCutaneous at bedtime  insulin lispro (ADMELOG) corrective regimen sliding scale   SubCutaneous three times a day before meals  insulin lispro (ADMELOG) corrective regimen sliding scale   SubCutaneous at bedtime  NIFEdipine XL 90 milliGRAM(s) Oral daily  pantoprazole  Injectable 40 milliGRAM(s) IV Push every 12 hours  sodium bicarbonate 1300 milliGRAM(s) Oral three times a day  ticagrelor 90 milliGRAM(s) Oral every 12 hours    PRN Inpatient Medications  sodium chloride 0.9% lock flush 10 milliLiter(s) IV Push every 1 hour PRN      REVIEW OF SYSTEMS    All other systems were reviewed and are negative, except as noted.    VITALS/PHYSICAL EXAM  --------------------------------------------------------------------------------  T(C): 36.7 (10-13-21 @ 08:21), Max: 37.1 (10-13-21 @ 00:03)  HR: 88 (10-13-21 @ 08:21) (81 - 90)  BP: 166/79 (10-13-21 @ 08:21) (152/78 - 180/80)  RR: 18 (10-13-21 @ 08:21) (16 - 18)  SpO2: 97% (10-13-21 @ 08:21) (97% - 98%)  Wt(kg): --  Height (cm): 165.1 (10-12-21 @ 16:17)  Weight (kg): 81.6 (10-12-21 @ 16:17)  BMI (kg/m2): 29.9 (10-12-21 @ 16:17)  BSA (m2): 1.89 (10-12-21 @ 16:17)      10-12-21 @ 07:01  -  10-13-21 @ 07:00  --------------------------------------------------------  IN: 910 mL / OUT: 1850 mL / NET: -940 mL        Physical Exam:  	Gen: NAD  	HEENT: MMM  	Pulm: CTA B/L  	CV: S1S2  	Abd: Soft, +BS   	Ext: + LE edema B/L  	Neuro: Awake and alert  	Skin: Warm and dry  	Vascular access: R IJ      LABS/STUDIES  --------------------------------------------------------------------------------              8.0    9.21  >-----------<  281      [10-13-21 @ 05:18]              24.8     135  |  98  |  94  ----------------------------<  98      [10-13-21 @ 05:18]  4.2   |  23  |  5.95        Ca     8.0     [10-13-21 @ 05:18]      Mg     1.5     [10-13-21 @ 05:18]      Phos  5.6     [10-13-21 @ 05:18]      PT/INR: PT 12.5 , INR 1.04       [10-12-21 @ 11:32]  PTT: 37.2       [10-12-21 @ 11:32]      Creatinine Trend:  SCr 5.95 [10-13 @ 05:18]  SCr 6.28 [10-12 @ 07:56]  SCr 6.08 [10-11 @ 07:13]  SCr 5.64 [10-10 @ 06:45]  SCr 5.64 [10-09 @ 07:16]    Urinalysis - [10-07-21 @ 10:09]      Color Light Yellow / Appearance Clear / SG 1.015 / pH 6.0      Gluc 200 mg/dL / Ketone Negative  / Bili Negative / Urobili Negative       Blood Small / Protein 300 mg/dL / Leuk Est Negative / Nitrite Negative      RBC 2 / WBC 7 / Hyaline 6 / Gran  / Sq Epi  / Non Sq Epi 2 / Bacteria Negative    Urine Creatinine 63      [10-07-21 @ 10:10]  Urine Protein 548      [10-07-21 @ 10:10]  Urine Sodium 71      [10-07-21 @ 10:10]  Urine Osmolality 379      [10-07-21 @ 10:10]      HCV 0.23, Nonreact      [10-07-21 @ 14:38]    BEULAH: titer Negative, pattern --      [10-07-21 @ 14:37]  C3 Complement 135      [10-07-21 @ 14:42]  C4 Complement 71      [10-07-21 @ 14:42]  ANCA: cANCA Negative, pANCA Negative, atypical ANCA Negative      [10-07-21 @ 14:37]  Syphilis Screen (Treponema Pallidum Ab) Negative      [10-07-21 @ 14:37]  Free Light Chains: kappa 10.89, lambda 12.51, ratio = 0.87      [10-07 @ 14:42]  Immunofixation Serum:   No Monoclonal Band Identified    Reference Range: None Detected      [10-07-21 @ 14:42]  SPEP Interpretation: Normal Electrophoresis Pattern      [10-07-21 @ 14:42]

## 2021-10-13 NOTE — PROGRESS NOTE ADULT - PROBLEM SELECTOR PLAN 3
Blood pressure initially 200/105 at arrival; s/p nitro ggt at [place], noncompliant with meds  - now much better controlled  - continue Nifedipine ER 90mg po daily  - c/w coreg 6.125mg bid, uptitrate as needed  - bumex as below  - home labetalol on hold due to JOSHUA  - c/t hold HCTZ-losartan given JOSHUA on CKD  - monitor BP  - would consider adding Hydralazine if BP remains uncontrolled

## 2021-10-13 NOTE — PROGRESS NOTE ADULT - PROBLEM SELECTOR PLAN 10
DVT ppx: hsq  Dispo: home, no skilled PT needs    above plans discussed with NP Mady  updated wife over the phone ([phone])    Stephanie Ashford MD  Division of Hospital Medicine  Cell: [phone]  Office: [phone] DVT ppx: hsq  Dispo: home, no skilled PT needs    above plans discussed with NP Cat  updated wife over the phone ([phone])    Stephanie Ashford MD  Division of Hospital Medicine  Cell: [phone]  Office: [phone]

## 2021-10-13 NOTE — PROGRESS NOTE ADULT - ATTENDING COMMENTS
Pending LHC with renal recovery vs HD.  Plans as above.    Lo Blanchard MD, MPH, MARK, RPVI, [facility]  Inpatient Cardiovascular Specialist; Chanel Garcia [facility], [facility]  ; [employer] [facility]  message: mobile [phone] or Microsoft Teams (text preferred and/or call)  email: [email or web address] and Cardiovascular Surgery on-service contact/call information, go to [email or web address] and use "[facility]" to login.  Outpatient Cardiology appointments, call  [phone] to arrange with a colleague; I do not have outpatient Cardiology clinic.

## 2021-10-13 NOTE — PROGRESS NOTE ADULT - SUBJECTIVE AND OBJECTIVE BOX
Neurology Progress Note    S: Patient seen and examined. No new events overnight. patient denied CP, SOB, HA or pain. resting doing okay     Medication:  MEDICATIONS  (STANDING):  aspirin  chewable 81 milliGRAM(s) Oral daily  atorvastatin 80 milliGRAM(s) Oral at bedtime  buMETAnide IVPB 4 milliGRAM(s) IV Intermittent every 12 hours  carvedilol 6.25 milliGRAM(s) Oral every 12 hours  chlorhexidine 4% Liquid 1 Application(s) Topical <User Schedule>  dextrose 40% Gel 15 Gram(s) Oral once  dextrose 5%. 1000 milliLiter(s) (50 mL/Hr) IV Continuous <Continuous>  dextrose 5%. 1000 milliLiter(s) (100 mL/Hr) IV Continuous <Continuous>  dextrose 50% Injectable 25 Gram(s) IV Push once  dextrose 50% Injectable 12.5 Gram(s) IV Push once  dextrose 50% Injectable 25 Gram(s) IV Push once  glucagon  Injectable 1 milliGRAM(s) IntraMuscular once  heparin   Injectable 5000 Unit(s) SubCutaneous every 12 hours  insulin glargine Injectable (LANTUS) 18 Unit(s) SubCutaneous at bedtime  insulin lispro (ADMELOG) corrective regimen sliding scale   SubCutaneous three times a day before meals  insulin lispro (ADMELOG) corrective regimen sliding scale   SubCutaneous at bedtime  NIFEdipine XL 90 milliGRAM(s) Oral daily  pantoprazole  Injectable 40 milliGRAM(s) IV Push every 12 hours  sodium bicarbonate 1300 milliGRAM(s) Oral three times a day  ticagrelor 90 milliGRAM(s) Oral every 12 hours    MEDICATIONS  (PRN):  sodium chloride 0.9% lock flush 10 milliLiter(s) IV Push every 1 hour PRN Pre/post blood products, medications, blood draw, and to maintain line patency          Vitals:  Vital Signs Last 24 Hrs  T(C): 36.7 (10-13-21 @ 08:21), Max: 37.1 (10-13-21 @ 00:03)  T(F): 98 (10-13-21 @ 08:21), Max: 98.7 (10-13-21 @ 00:03)  HR: 88 (10-13-21 @ 08:21) (81 - 90)  BP: 166/79 (10-13-21 @ 08:21) (152/78 - 180/80)  BP(mean): --  RR: 18 (10-13-21 @ 08:21) (16 - 18)  SpO2: 97% (10-13-21 @ 08:21) (97% - 98%)        General Exam:   General Appearance: Appropriately dressed and in no acute distress       Head: Normocephalic, atraumatic and no dysmorphic features  Ear, Nose, and Throat: Moist mucous membranes  CVS: S1S2+  Resp: No SOB, no wheeze or rhonchi  Abd: soft NTND  Extremities: No edema, no cyanosis  Skin: No bruises, no rashes     Neurological Exam:  Mental Status: Awake, alert and oriented x 3.  Able to follow simple and complex verbal commands. Able to name and repeat. fluent speech. No obvious aphasia or dysarthria noted.   Cranial Nerves: PERRL, EOMI, VFFC, sensation V1-V3 intact,  mild NLF facial asymmetry , equal elevation of palate, scm/trap 5/5, tongue is midline on protrusion. no obvious papilledema on fundoscopic exam. Hearing is grossly intact.   Motor: Normal bulk, tone and strength throughout. Fine finger movements were intact and symmetric. no tremors or drift noted.    Sensation: Intact to light touch and pinprick throughout. no right/left confusion. no extinction to tactile on DSS.   Reflexes: 1+ throughout at biceps, brachioradialis, triceps, patellars and ankles bilaterally and equal. No clonus. R toe and L toe were both downgoing.  Coordination: No dysmetria on FNF    Gait: deferred     I personally reviewed the below data/images/labs:  CBC Full  -  ( 13 Oct 2021 05:18 )  WBC Count : 9.21 K/uL  RBC Count : 2.55 M/uL  Hemoglobin : 8.0 g/dL  Hematocrit : 24.8 %  Platelet Count - Automated : 281 K/uL  Mean Cell Volume : 97.3 fl  Mean Cell Hemoglobin : 31.4 pg  Mean Cell Hemoglobin Concentration : 32.3 gm/dL  Auto Neutrophil # : x  Auto Lymphocyte # : x  Auto Monocyte # : x  Auto Eosinophil # : x  Auto Basophil # : x  Auto Neutrophil % : x  Auto Lymphocyte % : x  Auto Monocyte % : x  Auto Eosinophil % : x  Auto Basophil % : x    10-13    135  |  98  |  94<H>  ----------------------------<  98  4.2   |  23  |  5.95<H>    Ca    8.0<L>      13 Oct 2021 05:18  Phos  5.6     10-13  Mg     1.5     10-13        -10/07 CTH: No hemorrhage. 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   -10/07 CTA N: High-grade stenosis left internal carotid artery at the carotid bifurcation in the neck.   -10/07 CTA H: Normal intracranial circulation.    < from: MR Head No Cont (10.09.21 @ 20:22) >    EXAM:  MR BRAIN                            PROCEDURE DATE:  10/09/2021            INTERPRETATION:  CLINICAL HISTORY:Facial droop, on heparin drip, NSTEMI . Severe left ICA stenosis.    TECHNIQUE:  MRI of brain without contrast dated 10/9/2021.  Examination consisted of transaxial T1, T2, FLAIR, gradient echo as well as diffusion-weighted sequences with corresponding ADC maps. Coronal T2 and sagittal T1-weighted images were also acquired through the brain.    COMPARISON: CT head and CTA head andneck 10/7/2021    FINDINGS:  There are no areas abnormal restricted diffusion within the brain parenchyma to suggest acute/subacute infarct. There is no acute intracranial hemorrhage, vasogenic edema or abnormal susceptibility artifact. Chronic lacunar infarcts are seen in the left frontal lobe, right frontal cranial radiata and right cerebellum. Additional nonspecific patchy and confluent areas of T2/FLAIR prolongation in the bihemispheric white matter likely represents mild chronic microvascular changes.    The ventricles, sulci and cisternal spaces are stable in size and configuration. There is no midline shift or abnormal extra-axial fluid collection.    Flow-voids of the major intracranial vessels are maintained, as seen best on the T2-weighted images, indicating their patency.    The visualized paranasal sinuses and mastoid air cells are free of acute disease. The orbital regions are unremarkable.    IMPRESSION:  No acute infarct or intracranial hemorrhage. Chronic infarcts and microvascular changes as above.    --- End of Report ---                NEIL CARDENAS MD; Attending Radiologist  This document has been electronically signed. Oct 10 2021  4:26AM    < end of copied text >  < from: [facility] Duplex Carotid, Bilat (10.08.21 @ 17:07) >    EXAM:  CAROTID DUPLEX BILATERAL                            PROCEDURE DATE:  10/08/2021            INTERPRETATION:  CLINICAL INFORMATION: Left ICA high-grade stenosis identified on recent CTA neck.    COMPARISON: CTA neck 10/7/2021.    TECHNIQUE: Grayscale, color and spectral Doppler examination of both carotid arteries was performed.    FINDINGS:    There are atheromatous plaques are present bilaterally in the region of the bifurcations of the common carotid arteries into internal and external branches.    Velocity elevation of the proximal right internal carotid artery results in 50-69% flow-limiting stenosis.    Velocity elevation of the proximal left internal carotid artery results in 70-9% flow-limiting stenosis.    Bilateral flow-limiting stenoses noted of the right and left external carotid arteries as well.    Peak systolic velocities are as follows:    RIGHT:  PROX CCA = 126 cm/s  DIST CCA = 99 cm/s  PROX ICA = 137 cm/s  MID ICA = 86 cm/s  DIST ICA = 80 cm/s  ECA = 202 cm/s    LEFT:  PROX CCA = 100 cm/s  DIST CCA = 59 cm/s  PROX ICA = 313 cm/s  MID ICA = 72 cm/s  DIST ICA = 47 cm/s  ECA = 298 cm/s    Antegrade flow is noted within both vertebral arteries.    IMPRESSION:    Left internal carotid artery 70-99% flow-limiting stenosis.  Right internal carotid artery 50-69% flow-limiting stenosis.  Bilateral external carotid artery flow limiting stenoses.  INDIANA Hammond was informed of these findings on 10/8/2021 at 5:06 PM.    Measurement of carotid stenosis is based on velocity parameters that correlate the residual internal carotid diameter with that of the more distal vessel in accordance with a method such as the [employer] American Symptomatic Carotid Endarterectomy Trial (NASCET).    --- End of Report ---                FELIX MCMAHON M.D., ATTENDING RADIOLOGIST  This document has been electronically signed. Oct  8 2021  5:21PM    < end of copied text >

## 2021-10-13 NOTE — PROGRESS NOTE ADULT - SUBJECTIVE AND OBJECTIVE BOX
Patient is a 60y old  Male who presents with a chief complaint of NSTEMI (12 Oct 2021 12:13)      SUBJECTIVE / OVERNIGHT EVENTS:  no acute events overnight, vss, afebrile  s/p shiley placement for HD session  pt is agreeable to HD session but hope it is not permanent  denies chest pain, dyspnea        ROS:  14 point ROS negative in detail except stated as above    MEDICATIONS  (STANDING):  aspirin  chewable 81 milliGRAM(s) Oral daily  atorvastatin 80 milliGRAM(s) Oral at bedtime  buMETAnide IVPB 4 milliGRAM(s) IV Intermittent every 12 hours  carvedilol 6.25 milliGRAM(s) Oral every 12 hours  chlorhexidine 4% Liquid 1 Application(s) Topical <User Schedule>  dextrose 40% Gel 15 Gram(s) Oral once  dextrose 5%. 1000 milliLiter(s) (50 mL/Hr) IV Continuous <Continuous>  dextrose 5%. 1000 milliLiter(s) (100 mL/Hr) IV Continuous <Continuous>  dextrose 50% Injectable 25 Gram(s) IV Push once  dextrose 50% Injectable 12.5 Gram(s) IV Push once  dextrose 50% Injectable 25 Gram(s) IV Push once  glucagon  Injectable 1 milliGRAM(s) IntraMuscular once  heparin   Injectable 5000 Unit(s) SubCutaneous every 12 hours  insulin glargine Injectable (LANTUS) 18 Unit(s) SubCutaneous at bedtime  insulin lispro (ADMELOG) corrective regimen sliding scale   SubCutaneous three times a day before meals  insulin lispro (ADMELOG) corrective regimen sliding scale   SubCutaneous at bedtime  NIFEdipine XL 90 milliGRAM(s) Oral daily  pantoprazole  Injectable 40 milliGRAM(s) IV Push every 12 hours  sodium bicarbonate 1300 milliGRAM(s) Oral three times a day  ticagrelor 90 milliGRAM(s) Oral every 12 hours    MEDICATIONS  (PRN):  sodium chloride 0.9% lock flush 10 milliLiter(s) IV Push every 1 hour PRN Pre/post blood products, medications, blood draw, and to maintain line patency      CAPILLARY BLOOD GLUCOSE      POCT Blood Glucose.: 112 mg/dL (13 Oct 2021 07:24)  POCT Blood Glucose.: 211 mg/dL (12 Oct 2021 20:57)  POCT Blood Glucose.: 171 mg/dL (12 Oct 2021 17:24)  POCT Blood Glucose.: 161 mg/dL (12 Oct 2021 11:41)    I&O's Summary    12 Oct 2021 07:01  -  13 Oct 2021 07:00  --------------------------------------------------------  IN: 910 mL / OUT: 1850 mL / NET: -940 mL        PHYSICAL EXAM:  Vital Signs Last 24 Hrs  T(C): 37 (13 Oct 2021 04:01), Max: 37.1 (13 Oct 2021 00:03)  T(F): 98.6 (13 Oct 2021 04:01), Max: 98.7 (13 Oct 2021 00:03)  HR: 86 (13 Oct 2021 06:00) (81 - 90)  BP: 163/88 (13 Oct 2021 06:00) (128/77 - 180/80)  BP(mean): --  RR: 18 (13 Oct 2021 04:01) (16 - 18)  SpO2: 98% (13 Oct 2021 04:01) (97% - 98%)    GENERAL: NAD, well-developed  HEAD:  Atraumatic, Normocephalic  EYES: EOMI, PERRLA, conjunctiva and sclera clear  NECK: shiley in place; Supple, No JVD  CHEST/LUNG: Clear to auscultation bilaterally; No wheeze  HEART: Regular rate and rhythm; No murmurs, rubs, or gallops  ABDOMEN: Soft, Nontender, Nondistended; Bowel sounds present  EXTREMITIES:  2+ Peripheral Pulses, No clubbing, cyanosis, or edema  NEUROLOGY: AAOx3; non-focal  SKIN: No rashes or lesions    LABS:                        8.0    9.21  )-----------( 281      ( 13 Oct 2021 05:18 )             24.8     10-13    135  |  98  |  94<H>  ----------------------------<  98  4.2   |  23  |  5.95<H>    Ca    8.0<L>      13 Oct 2021 05:18  Phos  5.6     10-13  Mg     1.5     10-13      PT/INR - ( 12 Oct 2021 11:32 )   PT: 12.5 sec;   INR: 1.04 ratio         PTT - ( 12 Oct 2021 11:32 )  PTT:37.2 sec          RADIOLOGY & ADDITIONAL TESTS:    Imaging Personally Reviewed:    Consultant(s) Notes Reviewed:  nephrology, IR    Care Discussed with Consultants/Other Providers: Dr. Luis (renal)   Patient is a 60y old  Male who presents with a chief complaint of NSTEMI (12 Oct 2021 12:13)      SUBJECTIVE / OVERNIGHT EVENTS:  no acute events overnight, vss, afebrile  s/p shiley placement for HD session  pt is agreeable to HD session but hope it is not permanent  denies chest pain, dyspnea    tele reviewed: sinus 70-90s    ROS:  14 point ROS negative in detail except stated as above    MEDICATIONS  (STANDING):  aspirin  chewable 81 milliGRAM(s) Oral daily  atorvastatin 80 milliGRAM(s) Oral at bedtime  buMETAnide IVPB 4 milliGRAM(s) IV Intermittent every 12 hours  carvedilol 6.25 milliGRAM(s) Oral every 12 hours  chlorhexidine 4% Liquid 1 Application(s) Topical <User Schedule>  dextrose 40% Gel 15 Gram(s) Oral once  dextrose 5%. 1000 milliLiter(s) (50 mL/Hr) IV Continuous <Continuous>  dextrose 5%. 1000 milliLiter(s) (100 mL/Hr) IV Continuous <Continuous>  dextrose 50% Injectable 25 Gram(s) IV Push once  dextrose 50% Injectable 12.5 Gram(s) IV Push once  dextrose 50% Injectable 25 Gram(s) IV Push once  glucagon  Injectable 1 milliGRAM(s) IntraMuscular once  heparin   Injectable 5000 Unit(s) SubCutaneous every 12 hours  insulin glargine Injectable (LANTUS) 18 Unit(s) SubCutaneous at bedtime  insulin lispro (ADMELOG) corrective regimen sliding scale   SubCutaneous three times a day before meals  insulin lispro (ADMELOG) corrective regimen sliding scale   SubCutaneous at bedtime  NIFEdipine XL 90 milliGRAM(s) Oral daily  pantoprazole  Injectable 40 milliGRAM(s) IV Push every 12 hours  sodium bicarbonate 1300 milliGRAM(s) Oral three times a day  ticagrelor 90 milliGRAM(s) Oral every 12 hours    MEDICATIONS  (PRN):  sodium chloride 0.9% lock flush 10 milliLiter(s) IV Push every 1 hour PRN Pre/post blood products, medications, blood draw, and to maintain line patency      CAPILLARY BLOOD GLUCOSE      POCT Blood Glucose.: 112 mg/dL (13 Oct 2021 07:24)  POCT Blood Glucose.: 211 mg/dL (12 Oct 2021 20:57)  POCT Blood Glucose.: 171 mg/dL (12 Oct 2021 17:24)  POCT Blood Glucose.: 161 mg/dL (12 Oct 2021 11:41)    I&O's Summary    12 Oct 2021 07:01  -  13 Oct 2021 07:00  --------------------------------------------------------  IN: 910 mL / OUT: 1850 mL / NET: -940 mL        PHYSICAL EXAM:  Vital Signs Last 24 Hrs  T(C): 37 (13 Oct 2021 04:01), Max: 37.1 (13 Oct 2021 00:03)  T(F): 98.6 (13 Oct 2021 04:01), Max: 98.7 (13 Oct 2021 00:03)  HR: 86 (13 Oct 2021 06:00) (81 - 90)  BP: 163/88 (13 Oct 2021 06:00) (128/77 - 180/80)  BP(mean): --  RR: 18 (13 Oct 2021 04:01) (16 - 18)  SpO2: 98% (13 Oct 2021 04:01) (97% - 98%)    GENERAL: NAD, well-developed  HEAD:  Atraumatic, Normocephalic  EYES: EOMI, PERRLA, conjunctiva and sclera clear  NECK: shiley in place; Supple, No JVD  CHEST/LUNG: Clear to auscultation bilaterally; No wheeze  HEART: Regular rate and rhythm; No murmurs, rubs, or gallops  ABDOMEN: Soft, Nontender, Nondistended; Bowel sounds present  EXTREMITIES:  2+ Peripheral Pulses, No clubbing, cyanosis, or edema  NEUROLOGY: AAOx3; non-focal  SKIN: No rashes or lesions    LABS:  personally reviewed                        8.0    9.21  )-----------( 281      ( 13 Oct 2021 05:18 )             24.8     10-13    135  |  98  |  94<H>  ----------------------------<  98  4.2   |  23  |  5.95<H>    Ca    8.0<L>      13 Oct 2021 05:18  Phos  5.6     10-13  Mg     1.5     10-13      PT/INR - ( 12 Oct 2021 11:32 )   PT: 12.5 sec;   INR: 1.04 ratio         PTT - ( 12 Oct 2021 11:32 )  PTT:37.2 sec          RADIOLOGY & ADDITIONAL TESTS:    Imaging Personally Reviewed:    Consultant(s) Notes Reviewed:  nephrology, IR, cards    Care Discussed with Consultants/Other Providers: Dr. Luis (renal), Dr. Blanchard (cards)

## 2021-10-13 NOTE — PROGRESS NOTE ADULT - ASSESSMENT
59 yo male with DM2, HTN, and HLD who initially presented to [facility] on 10/05 as a transfer from [facility] for NSTEMI and possible CHF. Patient on Heparin drip for NSTEMI. LKW 10:45AM. Patient had episode of bradycardia (HR 30s), then became altered. RRT called. BP during RRT 70s/40s. Code stroke called for L facial droop.   CTH negative for acute pathology, old L frontal cortical infarct seen.   CTA H unremarkable. CTA N shows high-grade stenosis left internal carotid artery at the carotid bifurcation in the neck.  10/06 TTE: EF 45%, moderate-severe MR, mild-moderate L ventricular systolic dysfunction.   A1c 7  MRI no AIS but old strokes   CD with severe L ICA and Mod R ICA     Impression: Mild L nasolabial flattening and dysarthria, ?decreased eye closure strength on the L. Possibly secondary to R brain dysfunction due to acute stroke of unknown mechanism vs peripheral etiology. Patient with old L frontal infarct on CTH, also with high-grade stenosis of L ICA at the carotid bifurcation which likely cause of old stroke     Recommendations:  - HD cath placement 10/12   - ASA 81MG PO QD + Brilinta 90MG PO Q12HR.   - Avoid hypotension.  - High dose statin therapy - atorvastatin 40mg PO daily. LDL goal <70mg/dL.  -  vascular for ICA revascularization can be outpt. not acute   - lipid panel  - telemetry  - PT/OT/SS/SLP, OOBC  - permissive HTN, -180mmHg.  - check FS, glucose control <180  - GI/DVT ppx  - Counseling on diet, exercise, and medication adherence was done  - Counseling on smoking cessation and alcohol consumption offered when appropriate.  - Pain assessed and judicious use of narcotics when appropriate was discussed.    - Stroke education given when appropriate.  - Importance of fall prevention discussed.   - Differential diagnosis and plan of care discussed with patient and/or family and primary team  - Thank you for allowing me to participate in the care of this patient. Call with questions.   Marcelino Ptael MD  Vascular Neurology .

## 2021-10-13 NOTE — PROGRESS NOTE ADULT - PROBLEM SELECTOR PLAN 1
JOSHUA on CKD vs JOSHUA with hyperkalemia and meta acidosis on admission, reports hx of kidney disease but does not know baseline cr or nephrologist name  - plan for HD today  - renal US with normal kidney  - Scr continues to uptrend   - care discussed with nephrology: plan for RRT given worsening SCr, uremia and hypervolemia  - s/p IR guided shiley placement  - wife in agreement with care planning  - monitor bmp  - c/w sodium bicarb 1300mg tid  - serologies pending JOSHUA on CKD vs JOSHUA with hyperkalemia and meta acidosis on admission, reports hx of kidney disease but does not know baseline cr or nephrologist name  - plan for HD tomorrow if SCr still around 5-6 before C  - renal US with normal kidney  - Scr continues to uptrend   - care discussed with nephrology  - s/p IR guided shiley placement  - wife in agreement with care planning  - monitor bmp  - c/w sodium bicarb 1300mg tid  - serologies pending

## 2021-10-13 NOTE — PROGRESS NOTE ADULT - PROBLEM SELECTOR PLAN 2
- no further chest pain  - troponin downtrending ; no need to further trend unless CP or change in HD status, etc  - c/w brilinta 90 mg BID and ASA 81 mg daily  - c/w atorvastatin 80 mg qhs  - eventual ischemic eval pending improvement in kidney function/fluid status  - cardio following, appreciate recs: [facility] as inpatient  - if repeat chest pain, obtain cardiac enzymes, ekg   - monitor on tele  - TTE EF 45%: mild-moderate LVSF - no further chest pain  - plan for [facility] tomorrow 10/14  - troponin downtrending ; no need to further trend unless CP or change in HD status, etc  - c/w brilinta 90 mg BID and ASA 81 mg daily  - c/w atorvastatin 80 mg qhs  - eventual ischemic eval pending improvement in kidney function/fluid status  - if repeat chest pain, obtain cardiac enzymes, ekg   - monitor on tele  - TTE EF 45%: mild-moderate LVSF

## 2021-10-13 NOTE — PROGRESS NOTE ADULT - ASSESSMENT
60 yr old M w/ hx of DM2, HTN and HLD presents as transfer from [facility] for chest pain concerning for NSTEMI and possible new CHF c/b JOSHUA likely on CKD, s/p RRT for CVA/TIA ruled out and worsening anemia, pending improvement in JOSHUA for [facility], pending RRT given worsening renal function

## 2021-10-14 LAB
ANION GAP SERPL CALC-SCNC: 18 MMOL/L — HIGH (ref 5–17)
BUN SERPL-MCNC: 92 MG/DL — HIGH (ref 7–23)
CALCIUM SERPL-MCNC: 8.1 MG/DL — LOW (ref 8.4–10.5)
CHLORIDE SERPL-SCNC: 98 MMOL/L — SIGNIFICANT CHANGE UP (ref 96–108)
CO2 SERPL-SCNC: 22 MMOL/L — SIGNIFICANT CHANGE UP (ref 22–31)
CREAT SERPL-MCNC: 5.91 MG/DL — HIGH (ref 0.5–1.3)
GLUCOSE BLDC GLUCOMTR-MCNC: 105 MG/DL — HIGH (ref 70–99)
GLUCOSE BLDC GLUCOMTR-MCNC: 122 MG/DL — HIGH (ref 70–99)
GLUCOSE BLDC GLUCOMTR-MCNC: 257 MG/DL — HIGH (ref 70–99)
GLUCOSE BLDC GLUCOMTR-MCNC: 82 MG/DL — SIGNIFICANT CHANGE UP (ref 70–99)
GLUCOSE SERPL-MCNC: 66 MG/DL — LOW (ref 70–99)
HCT VFR BLD CALC: 24 % — LOW (ref 39–50)
HGB BLD-MCNC: 7.8 G/DL — LOW (ref 13–17)
INR BLD: 1.1 RATIO — SIGNIFICANT CHANGE UP (ref 0.88–1.16)
MCHC RBC-ENTMCNC: 32 PG — SIGNIFICANT CHANGE UP (ref 27–34)
MCHC RBC-ENTMCNC: 32.5 GM/DL — SIGNIFICANT CHANGE UP (ref 32–36)
MCV RBC AUTO: 98.4 FL — SIGNIFICANT CHANGE UP (ref 80–100)
NRBC # BLD: 0 /100 WBCS — SIGNIFICANT CHANGE UP (ref 0–0)
PLATELET # BLD AUTO: 305 K/UL — SIGNIFICANT CHANGE UP (ref 150–400)
POTASSIUM SERPL-MCNC: 4 MMOL/L — SIGNIFICANT CHANGE UP (ref 3.5–5.3)
POTASSIUM SERPL-SCNC: 4 MMOL/L — SIGNIFICANT CHANGE UP (ref 3.5–5.3)
PROTHROM AB SERPL-ACNC: 13.1 SEC — SIGNIFICANT CHANGE UP (ref 10.6–13.6)
RBC # BLD: 2.44 M/UL — LOW (ref 4.2–5.8)
RBC # FLD: 13.4 % — SIGNIFICANT CHANGE UP (ref 10.3–14.5)
SODIUM SERPL-SCNC: 138 MMOL/L — SIGNIFICANT CHANGE UP (ref 135–145)
WBC # BLD: 11.69 K/UL — HIGH (ref 3.8–10.5)
WBC # FLD AUTO: 11.69 K/UL — HIGH (ref 3.8–10.5)

## 2021-10-14 PROCEDURE — 99233 SBSQ HOSP IP/OBS HIGH 50: CPT | Mod: GC

## 2021-10-14 PROCEDURE — 93571 IV DOP VEL&/PRESS C FLO 1ST: CPT | Mod: 26,59,LM

## 2021-10-14 PROCEDURE — 99233 SBSQ HOSP IP/OBS HIGH 50: CPT

## 2021-10-14 PROCEDURE — 93458 L HRT ARTERY/VENTRICLE ANGIO: CPT | Mod: 26,59

## 2021-10-14 PROCEDURE — 99221 1ST HOSP IP/OBS SF/LOW 40: CPT

## 2021-10-14 PROCEDURE — 99232 SBSQ HOSP IP/OBS MODERATE 35: CPT

## 2021-10-14 PROCEDURE — 99152 MOD SED SAME PHYS/QHP 5/>YRS: CPT

## 2021-10-14 RX ADMIN — CARVEDILOL PHOSPHATE 6.25 MILLIGRAM(S): 80 CAPSULE, EXTENDED RELEASE ORAL at 17:41

## 2021-10-14 RX ADMIN — PANTOPRAZOLE SODIUM 40 MILLIGRAM(S): 20 TABLET, DELAYED RELEASE ORAL at 18:25

## 2021-10-14 RX ADMIN — CHLORHEXIDINE GLUCONATE 1 APPLICATION(S): 213 SOLUTION TOPICAL at 06:36

## 2021-10-14 RX ADMIN — Medication 1: at 21:44

## 2021-10-14 RX ADMIN — Medication 0: at 07:49

## 2021-10-14 RX ADMIN — BUMETANIDE 132 MILLIGRAM(S): 0.25 INJECTION INTRAMUSCULAR; INTRAVENOUS at 06:37

## 2021-10-14 RX ADMIN — BUMETANIDE 132 MILLIGRAM(S): 0.25 INJECTION INTRAMUSCULAR; INTRAVENOUS at 18:51

## 2021-10-14 RX ADMIN — ATORVASTATIN CALCIUM 80 MILLIGRAM(S): 80 TABLET, FILM COATED ORAL at 21:44

## 2021-10-14 RX ADMIN — Medication 1300 MILLIGRAM(S): at 14:30

## 2021-10-14 RX ADMIN — Medication 1300 MILLIGRAM(S): at 06:33

## 2021-10-14 RX ADMIN — Medication 0: at 12:04

## 2021-10-14 RX ADMIN — Medication 81 MILLIGRAM(S): at 18:25

## 2021-10-14 RX ADMIN — PANTOPRAZOLE SODIUM 40 MILLIGRAM(S): 20 TABLET, DELAYED RELEASE ORAL at 06:35

## 2021-10-14 RX ADMIN — TICAGRELOR 90 MILLIGRAM(S): 90 TABLET ORAL at 10:08

## 2021-10-14 RX ADMIN — INSULIN GLARGINE 18 UNIT(S): 100 INJECTION, SOLUTION SUBCUTANEOUS at 21:43

## 2021-10-14 RX ADMIN — Medication 1300 MILLIGRAM(S): at 21:44

## 2021-10-14 NOTE — PROGRESS NOTE ADULT - PROBLEM SELECTOR PLAN 2
- no further chest pain  - plan for C today 10/14  - troponin downtrending ; no need to further trend unless CP or change in HD status, etc  - c/w brilinta 90 mg BID and ASA 81 mg daily  - c/w atorvastatin 80 mg qhs  - eventual ischemic eval pending improvement in kidney function/fluid status  - if repeat chest pain, obtain cardiac enzymes, ekg   - monitor on tele  - TTE EF 45%: mild-moderate LVSF

## 2021-10-14 NOTE — PROGRESS NOTE ADULT - SUBJECTIVE AND OBJECTIVE BOX
Patient seen and examined at bedside.    Overnight Events:     Review Of Systems: No chest pain, shortness of breath, or palpitations            Current Meds:  aspirin  chewable 81 milliGRAM(s) Oral daily  atorvastatin 80 milliGRAM(s) Oral at bedtime  buMETAnide IVPB 4 milliGRAM(s) IV Intermittent every 12 hours  carvedilol 6.25 milliGRAM(s) Oral every 12 hours  chlorhexidine 4% Liquid 1 Application(s) Topical <User Schedule>  dextrose 40% Gel 15 Gram(s) Oral once  dextrose 5%. 1000 milliLiter(s) IV Continuous <Continuous>  dextrose 5%. 1000 milliLiter(s) IV Continuous <Continuous>  dextrose 50% Injectable 25 Gram(s) IV Push once  dextrose 50% Injectable 12.5 Gram(s) IV Push once  dextrose 50% Injectable 25 Gram(s) IV Push once  glucagon  Injectable 1 milliGRAM(s) IntraMuscular once  heparin   Injectable 5000 Unit(s) SubCutaneous every 12 hours  insulin glargine Injectable (LANTUS) 18 Unit(s) SubCutaneous at bedtime  insulin lispro (ADMELOG) corrective regimen sliding scale   SubCutaneous three times a day before meals  insulin lispro (ADMELOG) corrective regimen sliding scale   SubCutaneous at bedtime  NIFEdipine XL 90 milliGRAM(s) Oral daily  pantoprazole  Injectable 40 milliGRAM(s) IV Push every 12 hours  sodium bicarbonate 1300 milliGRAM(s) Oral three times a day  sodium chloride 0.9% lock flush 10 milliLiter(s) IV Push every 1 hour PRN  ticagrelor 90 milliGRAM(s) Oral every 12 hours      Vitals:  T(F): 97.3 (10-14), Max: 99.2 (10-14)  HR: 86 (10-14) (84 - 93)  BP: 159/84 (10-14) (151/72 - 185/97)  RR: 18 (10-14)  SpO2: 98% (10-14)  I&O's Summary    13 Oct 2021 07:01  -  14 Oct 2021 07:00  --------------------------------------------------------  IN: 1330 mL / OUT: 2000 mL / NET: -670 mL        Physical Exam:  Appearance: No acute distress; well appearing  Eyes: PERRL, EOMI, pink conjunctiva  HENT: Normal oral mucosa  Cardiovascular: RRR, S1, S2, no murmurs, rubs, or gallops; 1+ edema b/l; JVD MADDI  Respiratory: crackles at the bases  Gastrointestinal: soft, non-tender, non-distended with normal bowel sounds  Musculoskeletal: No clubbing; no joint deformity   Neurologic: Non-focal  Lymphatic: No lymphadenopathy  Psychiatry: AAOx3, mood & affect appropriate  Skin: No rashes, ecchymoses, or cyanosis                          7.8    11.69 )-----------( 305      ( 14 Oct 2021 07:18 )             24.0     10-14    138  |  98  |  92<H>  ----------------------------<  66<L>  4.0   |  22  |  5.91<H>    Ca    8.1<L>      14 Oct 2021 07:18  Phos  5.6     10-13  Mg     1.5     10-13      PT/INR - ( 12 Oct 2021 11:32 )   PT: 12.5 sec;   INR: 1.04 ratio         PTT - ( 12 Oct 2021 11:32 )  PTT:37.2 sec  CARDIAC MARKERS ( 07 Oct 2021 11:38 )  5983 ng/L / x     / x     / 309 U/L / x     / 26.8 ng/mL      Serum Pro-Brain Natriuretic Peptide: 8030 pg/mL (10-12 @ 07:56)          New ECG(s): Personally reviewed    < from: TTE with Doppler (w/Cont) (10.06.21 @ 09:05) >  1. Mitral annular calcification, otherwise normal mitral  valve. Moderate-severe mitral regurgitation. /77  2. Mild -moderate segmental left ventricular systolic  dysfunction. Endocardial visualization enhanced with  intravenous injection of Ultrasonic Enhancing Agent  (Definity).  Limited evaluation. The inferior wall is  hypokinetic. The apical cap is akinetic.  The anterior wall  was not well seen despite Definity. The anterolateral wal  is also not well seen and may be hypokinetic.  3. The right ventricle is not well visualized; grossly  normal right ventricular systolic function.    < end of copied text > Patient seen and examined at bedside.    Overnight Events:     Review Of Systems: No chest pain, shortness of breath, or palpitations            Current Meds:  aspirin  chewable 81 milliGRAM(s) Oral daily  atorvastatin 80 milliGRAM(s) Oral at bedtime  buMETAnide IVPB 4 milliGRAM(s) IV Intermittent every 12 hours  carvedilol 6.25 milliGRAM(s) Oral every 12 hours  chlorhexidine 4% Liquid 1 Application(s) Topical <User Schedule>  dextrose 40% Gel 15 Gram(s) Oral once  dextrose 5%. 1000 milliLiter(s) IV Continuous <Continuous>  dextrose 5%. 1000 milliLiter(s) IV Continuous <Continuous>  dextrose 50% Injectable 25 Gram(s) IV Push once  dextrose 50% Injectable 12.5 Gram(s) IV Push once  dextrose 50% Injectable 25 Gram(s) IV Push once  glucagon  Injectable 1 milliGRAM(s) IntraMuscular once  heparin   Injectable 5000 Unit(s) SubCutaneous every 12 hours  insulin glargine Injectable (LANTUS) 18 Unit(s) SubCutaneous at bedtime  insulin lispro (ADMELOG) corrective regimen sliding scale   SubCutaneous three times a day before meals  insulin lispro (ADMELOG) corrective regimen sliding scale   SubCutaneous at bedtime  NIFEdipine XL 90 milliGRAM(s) Oral daily  pantoprazole  Injectable 40 milliGRAM(s) IV Push every 12 hours  sodium bicarbonate 1300 milliGRAM(s) Oral three times a day  sodium chloride 0.9% lock flush 10 milliLiter(s) IV Push every 1 hour PRN  ticagrelor 90 milliGRAM(s) Oral every 12 hours      Vitals:  T(F): 97.3 (10-14), Max: 99.2 (10-14)  HR: 86 (10-14) (84 - 93)  BP: 159/84 (10-14) (151/72 - 185/97)  RR: 18 (10-14)  SpO2: 98% (10-14)  I&O's Summary    13 Oct 2021 07:01  -  14 Oct 2021 07:00  --------------------------------------------------------  IN: 1330 mL / OUT: 2000 mL / NET: -670 mL        Physical Exam:  Appearance: No acute distress; well appearing  Eyes: PERRL, EOMI, pink conjunctiva  HENT: Normal oral mucosa  Cardiovascular: RRR, S1, S2, no murmurs, rubs, or gallops; 1+ edema b/l; JVD MADDI  Respiratory: crackles at the bases  Gastrointestinal: soft, non-tender, non-distended with normal bowel sounds  Musculoskeletal: No clubbing; no joint deformity   Neurologic: Non-focal  Lymphatic: No lymphadenopathy  Psychiatry: AAOx3, mood & affect appropriate  Skin: No rashes, ecchymoses, or cyanosis                          7.8    11.69 )-----------( 305      ( 14 Oct 2021 07:18 )             24.0     10-14    138  |  98  |  92<H>  ----------------------------<  66<L>  4.0   |  22  |  5.91<H>    Ca    8.1<L>      14 Oct 2021 07:18  Phos  5.6     10-13  Mg     1.5     10-13    PT/INR - ( 12 Oct 2021 11:32 )   PT: 12.5 sec;   INR: 1.04 ratio       PTT - ( 12 Oct 2021 11:32 )  PTT:37.2 sec  CARDIAC MARKERS ( 07 Oct 2021 11:38 )  5983 ng/L / x     / x     / 309 U/L / x     / 26.8 ng/mL    Serum Pro-Brain Natriuretic Peptide: 8030 pg/mL (10-12 @ 07:56)    New ECG(s): Personally reviewed    < from: TTE with Doppler (w/Cont) (10.06.21 @ 09:05) >  1. Mitral annular calcification, otherwise normal mitral  valve. Moderate-severe mitral regurgitation. /77  2. Mild -moderate segmental left ventricular systolic  dysfunction. Endocardial visualization enhanced with  intravenous injection of Ultrasonic Enhancing Agent  (Definity).  Limited evaluation. The inferior wall is  hypokinetic. The apical cap is akinetic.  The anterior wall  was not well seen despite Definity. The anterolateral wal  is also not well seen and may be hypokinetic.  3. The right ventricle is not well visualized; grossly  normal right ventricular systolic function.    < end of copied text >

## 2021-10-14 NOTE — PROGRESS NOTE ADULT - ASSESSMENT
Patient is a 60 year old male with PMH od DM and HTN who presented to the hospital as a transfer from [facility] for NSTEMI. The nephrology team was consulted due to elevated creatinine of 4.05 upon presentation. The patient denies any history of kidney disease and denied noticing any changes in his urination.     JOSHUA on CKD?   - patient presenting to hospital with creatinine of 4.05 mg/dL; denied any history of kidney disease   --- creatinine had continued to uptrend to 6.28mg/dL but slight decrease to 5.95mg/dL this morning  - Kidney injury also exacerbated by contrast administration for the CT following the RRT on 10/7    - patient remains volume overloaded; on bumex and optimal UOP   - minor improvement in creatinine this morning; however given need for cardiac cath as well as high BUN will do HD today   --- optimize patient prior to cardiac cath planned for this afternoon   - consent obtained and in the paper chart of patient  - normal renal ultrasound without hydro  - UA with proteinuria and blood;  Ur Pr/Cr ratio noted to be 8.7g  - BEULAH negative; elevated kappa/lambda but normal ratio;   - C3 and C4 not low  --- other serological work up pending at this time  - please adjust medication doses as per eGFR     If any questions, please feel free to contact me     Saul Crews  Nephrology Fellow  [facility] Pager: [phone]

## 2021-10-14 NOTE — PROGRESS NOTE ADULT - ATTENDING COMMENTS
transferred from outside hospital for further cardiac evaluation, noncompliant with dm, htn  episode of bradycardia (HR 30s), then became altered. RRT and code stroke 10/7  #  vicki (atn/cary) on ckd  ?stage V  sheyla placed 10/12  cr today stable  plan for HD #1 today to optimize patient electrolyte status/ BUN prior to cath  # volume overloaded/edema-cont  bumex;    #CKD- prot/cr 8.7, low alb 2.6, serologic workup pending    renal sono for kidney size ok   likely dm nephropathy however need rto rule out etiology of ckd; #check hbsag, pla2r, hiv, for ckd workup/proteinuria  #wife and patient understands risk of CARY with cardiac cath, including dialysis; cardiac cath tomorrow  #metabolic acidosis- cont bicarb tabs; increased to 1300mg po tid; monitor serum bicarb trend  #hyperkalemia- low k renal diet; monitor trend; stable  #anemia- workup; check iron studies/ferritin  #BP control-cont meds  #check serum phos       d/w Dr Ashford

## 2021-10-14 NOTE — CONSULT NOTE ADULT - PROBLEM SELECTOR RECOMMENDATION 9
Continue ASA 81mg, Ator 80mg QHS  Continue with Coreg 6.25BID   continue with BP management as per primary team   Brilinta 90BID stopped today   CABG OR date ?   TTE: Ef 45%, Mod/Sev MR, Mild LV Dysf  Carotids: R ICA 50-70% stenosis, L IC 70-99% Stenosis   Vascular following - Revasc outpatient   CK P2Y12, Baseline PFT's   Monitor for CP Continue ASA 81mg, Ator 80mg QHS  Continue with Coreg 6.25BID   continue with BP management as per primary team   Brilinta 90BID stopped today   CABG OR date ?   TTE: Ef 45%, Mod/Sev MR, Mild LV Dysf  Carotids: R ICA 50-70% stenosis, L IC 70-99% Stenosis   Vascular following - Revasc outpatient   CK P2Y12 in next feww days, Baseline PFT's  Ck Albumin with AM labs   Plan for CT chest Non Con  Monitor for CP

## 2021-10-14 NOTE — PROGRESS NOTE ADULT - ASSESSMENT
61 yo male with DM2, HTN, and HLD who initially presented to [facility] on 10/05 as a transfer from [facility] for NSTEMI and possible CHF. Patient on Heparin drip for NSTEMI. LKW 10:45AM. Patient had episode of bradycardia (HR 30s), then became altered. RRT called. BP during RRT 70s/40s. Code stroke called for L facial droop.   CTH negative for acute pathology, old L frontal cortical infarct seen.   CTA H unremarkable. CTA N shows high-grade stenosis left internal carotid artery at the carotid bifurcation in the neck.  10/06 TTE: EF 45%, moderate-severe MR, mild-moderate L ventricular systolic dysfunction.   A1c 7  MRI no AIS but old strokes   CD with severe L ICA and Mod R ICA     Impression: Mild L nasolabial flattening and dysarthria, ?decreased eye closure strength on the L. Possibly secondary to R brain dysfunction due to acute stroke of unknown mechanism vs peripheral etiology. Patient with old L frontal infarct on CTH, also with high-grade stenosis of L ICA at the carotid bifurcation which likely cause of old stroke     Recommendations:  - possible cardiac cath today   - HD cath placement 10/12   - ASA 81MG PO QD + Brilinta 90MG PO Q12HR.   - Avoid hypotension.  - High dose statin therapy - atorvastatin 40mg PO daily. LDL goal <70mg/dL.  -  vascular for ICA revascularization can be outpt. not acute   - lipid panel  - telemetry  - PT/OT/SS/SLP, OOBC  - permissive HTN, -180mmHg.  - check FS, glucose control <180  - GI/DVT ppx  - Counseling on diet, exercise, and medication adherence was done  - Counseling on smoking cessation and alcohol consumption offered when appropriate.  - Pain assessed and judicious use of narcotics when appropriate was discussed.    - Stroke education given when appropriate.  - Importance of fall prevention discussed.   - Differential diagnosis and plan of care discussed with patient and/or family and primary team  - Thank you for allowing me to participate in the care of this patient. Call with questions.   Marcelino Patel MD  Vascular Neurology .

## 2021-10-14 NOTE — PROGRESS NOTE ADULT - SUBJECTIVE AND OBJECTIVE BOX
Patient is a 60y old  Male who presents with a chief complaint of NSTEMI (14 Oct 2021 08:29)      SUBJECTIVE / OVERNIGHT EVENTS:  no acute events overnight, vss, afebrile  pt scheduled for HD session this morning and then [facility] in the afternoon  pt has no complaints - denies chest pain, dyspnea    tele reviewed: sinus 80-90s    ROS:  14 point ROS negative in detail except stated as above    MEDICATIONS  (STANDING):  aspirin  chewable 81 milliGRAM(s) Oral daily  atorvastatin 80 milliGRAM(s) Oral at bedtime  buMETAnide IVPB 4 milliGRAM(s) IV Intermittent every 12 hours  carvedilol 6.25 milliGRAM(s) Oral every 12 hours  chlorhexidine 4% Liquid 1 Application(s) Topical <User Schedule>  dextrose 40% Gel 15 Gram(s) Oral once  dextrose 5%. 1000 milliLiter(s) (50 mL/Hr) IV Continuous <Continuous>  dextrose 5%. 1000 milliLiter(s) (100 mL/Hr) IV Continuous <Continuous>  dextrose 50% Injectable 25 Gram(s) IV Push once  dextrose 50% Injectable 12.5 Gram(s) IV Push once  dextrose 50% Injectable 25 Gram(s) IV Push once  glucagon  Injectable 1 milliGRAM(s) IntraMuscular once  heparin   Injectable 5000 Unit(s) SubCutaneous every 12 hours  insulin glargine Injectable (LANTUS) 18 Unit(s) SubCutaneous at bedtime  insulin lispro (ADMELOG) corrective regimen sliding scale   SubCutaneous three times a day before meals  insulin lispro (ADMELOG) corrective regimen sliding scale   SubCutaneous at bedtime  NIFEdipine XL 90 milliGRAM(s) Oral daily  pantoprazole  Injectable 40 milliGRAM(s) IV Push every 12 hours  sodium bicarbonate 1300 milliGRAM(s) Oral three times a day  ticagrelor 90 milliGRAM(s) Oral every 12 hours    MEDICATIONS  (PRN):  sodium chloride 0.9% lock flush 10 milliLiter(s) IV Push every 1 hour PRN Pre/post blood products, medications, blood draw, and to maintain line patency      CAPILLARY BLOOD GLUCOSE      POCT Blood Glucose.: 105 mg/dL (14 Oct 2021 07:26)  POCT Blood Glucose.: 179 mg/dL (13 Oct 2021 21:07)  POCT Blood Glucose.: 183 mg/dL (13 Oct 2021 16:04)  POCT Blood Glucose.: 195 mg/dL (13 Oct 2021 11:15)    I&O's Summary    13 Oct 2021 07:01  -  14 Oct 2021 07:00  --------------------------------------------------------  IN: 1330 mL / OUT: 2000 mL / NET: -670 mL        PHYSICAL EXAM:  Vital Signs Last 24 Hrs  T(C): 37 (14 Oct 2021 06:15), Max: 37.3 (14 Oct 2021 00:08)  T(F): 98.6 (14 Oct 2021 06:15), Max: 99.2 (14 Oct 2021 00:08)  HR: 84 (14 Oct 2021 06:15) (84 - 93)  BP: 157/84 (14 Oct 2021 06:15) (151/72 - 185/97)  BP(mean): --  RR: 18 (14 Oct 2021 06:15) (18 - 18)  SpO2: 99% (14 Oct 2021 06:15) (95% - 99%)    GENERAL: NAD, well-developed  HEAD:  Atraumatic, Normocephalic  EYES: EOMI, PERRLA, conjunctiva and sclera clear  NECK: Supple, No JVD  CHEST/LUNG: Clear to auscultation bilaterally; No wheeze  HEART: Regular rate and rhythm; No murmurs, rubs, or gallops  ABDOMEN: Soft, Nontender, Nondistended; Bowel sounds present  EXTREMITIES:  2+ Peripheral Pulses, No clubbing, cyanosis, or edema  NEUROLOGY: AAOx3; non-focal  SKIN: No rashes or lesions    LABS:  personally reviewed                        7.8    11.69 )-----------( 305      ( 14 Oct 2021 07:18 )             24.0     10-14    138  |  98  |  92<H>  ----------------------------<  66<L>  4.0   |  22  |  5.91<H>    Ca    8.1<L>      14 Oct 2021 07:18  Phos  5.6     10-13  Mg     1.5     10-13      PT/INR - ( 12 Oct 2021 11:32 )   PT: 12.5 sec;   INR: 1.04 ratio         PTT - ( 12 Oct 2021 11:32 )  PTT:37.2 sec          RADIOLOGY & ADDITIONAL TESTS:    Imaging Personally Reviewed:    Consultant(s) Notes Reviewed:  nephrology, cards    Care Discussed with Consultants/Other Providers: Dr. Luis (nephro), Dr. Blanchard (cards)

## 2021-10-14 NOTE — PROGRESS NOTE ADULT - SUBJECTIVE AND OBJECTIVE BOX
Neurology Progress Note    S: Patient seen and examined. No new events overnight. patient denied CP, SOB, HA or pain. resting doing okay possible cath today     Medications:  MEDICATIONS  (STANDING):  aspirin  chewable 81 milliGRAM(s) Oral daily  atorvastatin 80 milliGRAM(s) Oral at bedtime  buMETAnide IVPB 4 milliGRAM(s) IV Intermittent every 12 hours  carvedilol 6.25 milliGRAM(s) Oral every 12 hours  chlorhexidine 4% Liquid 1 Application(s) Topical <User Schedule>  dextrose 40% Gel 15 Gram(s) Oral once  dextrose 5%. 1000 milliLiter(s) (50 mL/Hr) IV Continuous <Continuous>  dextrose 5%. 1000 milliLiter(s) (100 mL/Hr) IV Continuous <Continuous>  dextrose 50% Injectable 25 Gram(s) IV Push once  dextrose 50% Injectable 12.5 Gram(s) IV Push once  dextrose 50% Injectable 25 Gram(s) IV Push once  glucagon  Injectable 1 milliGRAM(s) IntraMuscular once  heparin   Injectable 5000 Unit(s) SubCutaneous every 12 hours  insulin glargine Injectable (LANTUS) 18 Unit(s) SubCutaneous at bedtime  insulin lispro (ADMELOG) corrective regimen sliding scale   SubCutaneous three times a day before meals  insulin lispro (ADMELOG) corrective regimen sliding scale   SubCutaneous at bedtime  NIFEdipine XL 90 milliGRAM(s) Oral daily  pantoprazole  Injectable 40 milliGRAM(s) IV Push every 12 hours  sodium bicarbonate 1300 milliGRAM(s) Oral three times a day  ticagrelor 90 milliGRAM(s) Oral every 12 hours    MEDICATIONS  (PRN):  sodium chloride 0.9% lock flush 10 milliLiter(s) IV Push every 1 hour PRN Pre/post blood products, medications, blood draw, and to maintain line patency        Vitals:  Vital Signs Last 24 Hrs  T(C): 36.3 (10-14-21 @ 09:21), Max: 37.3 (10-14-21 @ 00:08)  T(F): 97.3 (10-14-21 @ 09:21), Max: 99.2 (10-14-21 @ 00:08)  HR: 86 (10-14-21 @ 09:21) (84 - 93)  BP: 159/84 (10-14-21 @ 09:21) (151/72 - 185/97)  BP(mean): --  RR: 18 (10-14-21 @ 09:21) (18 - 18)  SpO2: 98% (10-14-21 @ 09:21) (95% - 99%)        General Exam:   General Appearance: Appropriately dressed and in no acute distress       Head: Normocephalic, atraumatic and no dysmorphic features  Ear, Nose, and Throat: Moist mucous membranes  CVS: S1S2+  Resp: No SOB, no wheeze or rhonchi  Abd: soft NTND  Extremities: No edema, no cyanosis  Skin: No bruises, no rashes     Neurological Exam:  Mental Status: Awake, alert and oriented x 3.  Able to follow simple and complex verbal commands. Able to name and repeat. fluent speech. No obvious aphasia or dysarthria noted.   Cranial Nerves: PERRL, EOMI, VFFC, sensation V1-V3 intact,  mild NLF facial asymmetry , equal elevation of palate, scm/trap 5/5, tongue is midline on protrusion. no obvious papilledema on fundoscopic exam. Hearing is grossly intact.   Motor: Normal bulk, tone and strength throughout. Fine finger movements were intact and symmetric. no tremors or drift noted.    Sensation: Intact to light touch and pinprick throughout. no right/left confusion. no extinction to tactile on DSS.   Reflexes: 1+ throughout at biceps, brachioradialis, triceps, patellars and ankles bilaterally and equal. No clonus. R toe and L toe were both downgoing.  Coordination: No dysmetria on FNF    Gait: deferred     I personally reviewed the below data/images/labs:      CBC Full  -  ( 14 Oct 2021 07:18 )  WBC Count : 11.69 K/uL  RBC Count : 2.44 M/uL  Hemoglobin : 7.8 g/dL  Hematocrit : 24.0 %  Platelet Count - Automated : 305 K/uL  Mean Cell Volume : 98.4 fl  Mean Cell Hemoglobin : 32.0 pg  Mean Cell Hemoglobin Concentration : 32.5 gm/dL  Auto Neutrophil # : x  Auto Lymphocyte # : x  Auto Monocyte # : x  Auto Eosinophil # : x  Auto Basophil # : x  Auto Neutrophil % : x  Auto Lymphocyte % : x  Auto Monocyte % : x  Auto Eosinophil % : x  Auto Basophil % : x      10-14    138  |  98  |  92<H>  ----------------------------<  66<L>  4.0   |  22  |  5.91<H>    Ca    8.1<L>      14 Oct 2021 07:18  Phos  5.6     10-13  Mg     1.5     10-13              -10/07 CTH: No hemorrhage. 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   -10/07 CTA N: High-grade stenosis left internal carotid artery at the carotid bifurcation in the neck.   -10/07 CTA H: Normal intracranial circulation.    < from: MR Head No Cont (10.09.21 @ 20:22) >    EXAM:  MR BRAIN                            PROCEDURE DATE:  10/09/2021            INTERPRETATION:  CLINICAL HISTORY:Facial droop, on heparin drip, NSTEMI . Severe left ICA stenosis.    TECHNIQUE:  MRI of brain without contrast dated 10/9/2021.  Examination consisted of transaxial T1, T2, FLAIR, gradient echo as well as diffusion-weighted sequences with corresponding ADC maps. Coronal T2 and sagittal T1-weighted images were also acquired through the brain.    COMPARISON: CT head and CTA head andneck 10/7/2021    FINDINGS:  There are no areas abnormal restricted diffusion within the brain parenchyma to suggest acute/subacute infarct. There is no acute intracranial hemorrhage, vasogenic edema or abnormal susceptibility artifact. Chronic lacunar infarcts are seen in the left frontal lobe, right frontal cranial radiata and right cerebellum. Additional nonspecific patchy and confluent areas of T2/FLAIR prolongation in the bihemispheric white matter likely represents mild chronic microvascular changes.    The ventricles, sulci and cisternal spaces are stable in size and configuration. There is no midline shift or abnormal extra-axial fluid collection.    Flow-voids of the major intracranial vessels are maintained, as seen best on the T2-weighted images, indicating their patency.    The visualized paranasal sinuses and mastoid air cells are free of acute disease. The orbital regions are unremarkable.    IMPRESSION:  No acute infarct or intracranial hemorrhage. Chronic infarcts and microvascular changes as above.    --- End of Report ---                NEIL CARDENAS MD; Attending Radiologist  This document has been electronically signed. Oct 10 2021  4:26AM    < end of copied text >  < from: [facility] Duplex Carotid, Bilat (10.08.21 @ 17:07) >    EXAM:  CAROTID DUPLEX BILATERAL                            PROCEDURE DATE:  10/08/2021            INTERPRETATION:  CLINICAL INFORMATION: Left ICA high-grade stenosis identified on recent CTA neck.    COMPARISON: CTA neck 10/7/2021.    TECHNIQUE: Grayscale, color and spectral Doppler examination of both carotid arteries was performed.    FINDINGS:    There are atheromatous plaques are present bilaterally in the region of the bifurcations of the common carotid arteries into internal and external branches.    Velocity elevation of the proximal right internal carotid artery results in 50-69% flow-limiting stenosis.    Velocity elevation of the proximal left internal carotid artery results in 70-9% flow-limiting stenosis.    Bilateral flow-limiting stenoses noted of the right and left external carotid arteries as well.    Peak systolic velocities are as follows:    RIGHT:  PROX CCA = 126 cm/s  DIST CCA = 99 cm/s  PROX ICA = 137 cm/s  MID ICA = 86 cm/s  DIST ICA = 80 cm/s  ECA = 202 cm/s    LEFT:  PROX CCA = 100 cm/s  DIST CCA = 59 cm/s  PROX ICA = 313 cm/s  MID ICA = 72 cm/s  DIST ICA = 47 cm/s  ECA = 298 cm/s    Antegrade flow is noted within both vertebral arteries.    IMPRESSION:    Left internal carotid artery 70-99% flow-limiting stenosis.  Right internal carotid artery 50-69% flow-limiting stenosis.  Bilateral external carotid artery flow limiting stenoses.  INDIANA Hammond was informed of these findings on 10/8/2021 at 5:06 PM.    Measurement of carotid stenosis is based on velocity parameters that correlate the residual internal carotid diameter with that of the more distal vessel in accordance with a method such as the [employer] Symptomatic Carotid Endarterectomy Trial (NASCET).    --- End of Report ---                FELIX MCMAHON M.D., ATTENDING RADIOLOGIST  This document has been electronically signed. Oct  8 2021  5:21PM    < end of copied text >

## 2021-10-14 NOTE — PROGRESS NOTE ADULT - PROBLEM SELECTOR PLAN 1
JOSHUA on CKD vs JOSHUA with hyperkalemia and meta acidosis on admission, reports hx of kidney disease but does not know baseline cr or nephrologist name  - plan for HD this morning as pre-optimization for [facility]  - renal US with normal kidney  - Scr continues to uptrend   - care discussed with nephrology  - s/p IR guided shiley placement  - wife in agreement with care planning  - monitor bmp  - c/w sodium bicarb 1300mg tid  - serologies pending

## 2021-10-14 NOTE — CONSULT NOTE ADULT - ASSESSMENT
60 yr old M w/ hx of DM2, HTN and HLD presents as transfer from [facility] for chest pain and SOB concerning for NSTEMI and possible new CHF c/b JOSHUA likely on CKD, and hypertensive emergency. Patient hospital course complicated by s/p RRT for CVA/TIA ruled out and worsening anemia requiring 1 PRBC. Pt S/p HD session today and underwent LHC showing Multivessel CAD. CT surgery consulted for CABG evaluation.

## 2021-10-14 NOTE — PROGRESS NOTE ADULT - PROBLEM SELECTOR PLAN 3
Blood pressure initially 200/105 at arrival; s/p nitro ggt at [facility], noncompliant with meds  - now much better controlled  - continue Nifedipine ER 90mg po daily  - c/w coreg 6.125mg bid, uptitrate as needed  - bumex as below  - home labetalol on hold due to JOSHUA  - c/t hold HCTZ-losartan given JOSHUA on CKD  - monitor BP  - would consider adding Hydralazine if BP remains uncontrolled

## 2021-10-14 NOTE — CONSULT NOTE ADULT - SUBJECTIVE AND OBJECTIVE BOX
History of Present Illness:    60 yr old M w/ hx of DM2, HTN and HLD presents as transfer from [facility] for chest pain concerning for NSTEMI and possible CHF.  Pt states that after he woke up this morning he felt midsternal chest pain this morning, that felt like a burning sensation. Pain was non radiating. Associated with shortness of breath. He initially took TUMS and drank some milk which alleviated some of the pain but not completely. Later he also started feeling very dizzy so he went to the ED. Denies associated palpitations, diaphoresis, N/V.  When he presented to [facility] he was noted to be SOB. Initially, they evaluated patient for CHF but Troponin and BNP levels were elevated so he was transferred here for NSTEMI and r/o CHF.    Per nursing notes, while at [facility], he was placed on BIPAP, given Solumedrol 120, Nitro SL x1, duoneb x2, ASA 81mg, Lasix 40mg w/ 200 cc output, and started at 10mL/hr Nitro drip then increased to 30mL/hr. There, initial /126 retake was 171/67. Nitro drip was d/c'd upon arrival to [facility] ED.     During my visit, patient was sitting up eating a sandwich. Appears comfortable on room air. Denies repeat of chest pain after this morning. Denies shortness of breath. Denies lower extremity edema, orthopnea or PND.     Labs also remarkable for JOSHUA, metabolic acidosis and hyperkalemia. Patient denies prior history of renal injury.     Wife can be reached at [phone]. Medication list confirmed w/ wife. Of note, patient w/ elevated BP to SBP 200s often at home; nifedipine used on a "as needed" basis for SBP > 200.     Past Medical History  Hypertension    Hyperlipidemia    Diabetes mellitus      Past Surgical History  No pertinent past surgical history      MEDICATIONS  (STANDING):  aspirin  chewable 81 milliGRAM(s) Oral daily  atorvastatin 80 milliGRAM(s) Oral at bedtime  buMETAnide IVPB 4 milliGRAM(s) IV Intermittent every 12 hours  carvedilol 6.25 milliGRAM(s) Oral every 12 hours  chlorhexidine 4% Liquid 1 Application(s) Topical <User Schedule>  dextrose 40% Gel 15 Gram(s) Oral once  dextrose 5%. 1000 milliLiter(s) (50 mL/Hr) IV Continuous <Continuous>  dextrose 5%. 1000 milliLiter(s) (100 mL/Hr) IV Continuous <Continuous>  dextrose 50% Injectable 25 Gram(s) IV Push once  dextrose 50% Injectable 12.5 Gram(s) IV Push once  dextrose 50% Injectable 25 Gram(s) IV Push once  glucagon  Injectable 1 milliGRAM(s) IntraMuscular once  heparin   Injectable 5000 Unit(s) SubCutaneous every 12 hours  insulin glargine Injectable (LANTUS) 18 Unit(s) SubCutaneous at bedtime  insulin lispro (ADMELOG) corrective regimen sliding scale   SubCutaneous three times a day before meals  insulin lispro (ADMELOG) corrective regimen sliding scale   SubCutaneous at bedtime  NIFEdipine XL 90 milliGRAM(s) Oral daily  pantoprazole  Injectable 40 milliGRAM(s) IV Push every 12 hours  sodium bicarbonate 1300 milliGRAM(s) Oral three times a day    MEDICATIONS  (PRN):  sodium chloride 0.9% lock flush 10 milliLiter(s) IV Push every 1 hour PRN Pre/post blood products, medications, blood draw, and to maintain line patency    Antiplatelet therapy:   Brilinta 90BID                  Last dose/amt: 10/14    Allergies: No Known Allergies      SOCIAL HISTORY:  Smoker: [X ] Yes  [ ] No        PACK YEARS: 40 pack year         WHEN QUIT? 4 months ago   ETOH use: [X ] Yes  [ ] No              FREQUENCY / QUANTITY: 2x/week  Ilicit Drug use:  [ ] Yes  [x ] No  Occupation:    Live with: Family   Assist device use: None     Relevant Family History  FAMILY HISTORY:  Family history of CVA (Father)        Review of Systems  GENERAL:  Fevers[] chills[] sweats[] fatigue[] weight loss[] weight gain []                                        NEURO:  parathesias [] seizures []  syncope []  confusion []                                                                                  EYES: glasses[]  blurry vision[]  discharge[] pain[] glaucoma []                                                                            ENMT:  difficulty hearing []  vertigo[]  dysphagia[] epistaxis[] recent dental work []                                      CV:  chest pain[X] palpitations[] WYNNE [X] diaphoresis [] edema[]                                                                                             RESPIRATORY:  wheezing[] SOB[X] cough [] sputum[] hemoptysis[]                                                                    GI:  nausea[]  vomiting []  diarrhea[] constipation [] melena []                                                                        : hematuria[ ]  dysuria[ ] urgency[] incontinence[]                                                                                              MUSKULOSKELETAL:  arthritis[ ]  joint swelling [ ] muscle weakness [ ]                                                                  SKIN/BREAST:  rash[ ] itching [ ]  hair loss[ ] masses[ ]                                                                                                PSYCH:  dementia [ ] depression [ ] anxiety[ ]                                                                                                                  HEME/LYMPH:  bruises easily[ ] enlarged lymph nodes[ ] tender lymph nodes[ ]                                                 ENDOCRINE:  cold intolerance[ ] heat intolerance[ ] polydipsia[ ]                                                                              PHYSICAL EXAM  Vital Signs Last 24 Hrs  T(C): 36.9 (14 Oct 2021 15:10), Max: 37.3 (14 Oct 2021 00:08)  T(F): 98.4 (14 Oct 2021 15:10), Max: 99.2 (14 Oct 2021 00:08)  HR: 82 (14 Oct 2021 18:57) (82 - 93)  BP: 160/85 (14 Oct 2021 18:57) (157/84 - 202/93)  BP(mean): --  RR: 17 (14 Oct 2021 18:30) (16 - 18)  SpO2: 97% (14 Oct 2021 18:30) (96% - 99%)    General: Well nourished, well developed, no acute distress.                                                         Neuro: Normal exam oriented to person/place & time with no focal motor or sensory  deficits.                    Eyes: Normal exam of conjunctiva & lids, pupils equally reactive.   ENT: Normal exam of nasal/oral mucosa with absence of cyanosis.   Neck: Normal exam of jugular veins, trachea & thyroid.   Chest: Normal lung exam with good air movement absence of wheezes, rales, or rhonchi:                                                                          CV:  Auscultation: normal [ ] S3[ ] S4[ ] Irregular [ ] Rub[ ] Clicks[ ]  Murmurs none:[ ]systolic [ X]  diastolic [ ] holosystolic [ ]  Carotids: No Bruits[ ] Other____________ Abdominal Aorta: normal [ ] nonpalpable[ ]                                                                         GI: Normal exam of abdomen, liver & spleen with no noted masses or tenderness.                                                                                              Extremities: Normal no evidence of cyanosis or deformity Edema: none[ ]trace[ ]1+[ ]2+[ ]3+[ ]4+[ ]  Lower Extremity Pulses: Right[ ] Left[ ]Varicosities[ ]  SKIN : Normal exam to inspection & palpation.                                                           LABS:                        7.8    11.69 )-----------( 305      ( 14 Oct 2021 07:18 )             24.0     10-14    138  |  98  |  92<H>  ----------------------------<  66<L>  4.0   |  22  |  5.91<H>    Ca    8.1<L>      14 Oct 2021 07:18  Phos  5.6     10-13  Mg     1.5     10-13      PT/INR - ( 14 Oct 2021 10:09 )   PT: 13.1 sec;   INR: 1.10 ratio          Cardiac Cath:          TTE / GAL:    < from: TTE with Doppler (w/Cont) (10.06.21 @ 09:05) >  Dimensions:    Normal Values:  LA:     4.1    2.0 - 4.0 cm  Ao:     3.3    2.0 - 3.8 cm  SEPTUM: 1.0    0.6 - 1.2 cm  PWT:    1.3    0.6 - 1.1 cm  LVIDd:  4.8    3.0 - 5.6 cm  LVIDs:  3.0    1.8 - 4.0 cm  Derived variables:  LVMI: 105 g/m2  RWT: 0.54  Fractional short: 38 %  EF (Visual Estimate): 45 %  Doppler Peak Velocity (m/sec): AoV=1.4  ------------------------------------------------------------------------  Observations:  Mitral Valve: Mitral annular calcification, otherwise  normal mitral valve. Moderate-severe mitral regurgitation.  /77  Aortic Valve/Aorta: Calcified trileaflet aortic valve with  normal opening. Peak transaortic valve gradient equals 8 mm  Hg. Peak left ventricular outflow tract gradient equals 3  mm Hg.  Aortic Root: 3.3 cm.  Left Atrium: Mildly dilated left atrium.  LA volume index =  36 cc/m2.  Left Ventricle: Mild -moderate segmental leftventricular  systolic dysfunction. Endocardial visualization enhanced  with intravenous injection of Ultrasonic Enhancing Agent  (Definity).  Limited evaluation. The inferior wall is  hypokinetic. The apical cap is akinetic.  The anterior wall  was not well seen despite Definity. The anterolateral wal  is also not well seen and may be hypokinetic. Normal left  ventricular internal dimensions and wall thicknesses.  Moderate diastolic dysfunction (Stage II).  Right Heart: Normal right atrium. The right ventricle is  not well visualized; grossly normal right ventricular  systolic function. Normal tricuspid valve. Minimal  tricuspid regurgitation. Pulmonic valve not well  visualized, probably normal.  Pericardium/Pleura: Normal pericardium with no pericardial  effusion.  Hemodynamic: Estimated right atrial pressure is 8 mm Hg.  ------------------------------------------------------------------------  Conclusions:  1. Mitral annular calcification, otherwise normal mitral  valve. Moderate-severe mitral regurgitation. /77  2. Mild -moderate segmental left ventricular systolic  dysfunction. Endocardial visualization enhanced with  intravenous injection of Ultrasonic Enhancing Agent  (Definity).  Limited evaluation. The inferior wall is  hypokinetic. The apical cap is akinetic.  The anterior wall  was not well seen despite Definity. The anterolateral wal  is also not well seen and may be hypokinetic.  3. The right ventricle is not well visualized; grossly  normal right ventricular systolic function.  ------------------------------------------------------------------------  Confirmed on  10/6/2021 - 17:20:59 by STEPHANE Andrade    < end of copied text >             History of Present Illness:    60 yr old M w/ hx of DM2, HTN and HLD presents as transfer from [facility] for chest pain concerning for NSTEMI and possible CHF.  Pt states that after he woke up this morning he felt midsternal chest pain this morning, that felt like a burning sensation. Pain was non radiating. Associated with shortness of breath. He initially took TUMS and drank some milk which alleviated some of the pain but not completely. Later he also started feeling very dizzy so he went to the ED. Denies associated palpitations, diaphoresis, N/V.  When he presented to [facility] he was noted to be SOB. Initially, they evaluated patient for CHF but Troponin and BNP levels were elevated so he was transferred here for NSTEMI and r/o CHF.    While at [facility], he was placed on BIPAP, given Solumedrol 120, Nitro SL x1, duoneb x2, ASA 81mg, Lasix 40mg w/ 200 cc output, and started at 10mL/hr Nitro drip then increased to 30mL/hr. There, initial /126 retake was 171/67. Nitro drip was d/c'd upon arrival to [facility] ED.     Labs also remarkable for JOSHUA, metabolic acidosis and hyperkalemia. Patient denies prior history of renal injury.     Wife can be reached at [phone]. Medication list confirmed w/ wife. Of note, patient w/ elevated BP to SBP 200s often at home; nifedipine used on a "as needed" basis for SBP > 200.     Past Medical History  Hypertension    Hyperlipidemia    Diabetes mellitus      Past Surgical History  No pertinent past surgical history      MEDICATIONS  (STANDING):  aspirin  chewable 81 milliGRAM(s) Oral daily  atorvastatin 80 milliGRAM(s) Oral at bedtime  buMETAnide IVPB 4 milliGRAM(s) IV Intermittent every 12 hours  carvedilol 6.25 milliGRAM(s) Oral every 12 hours  chlorhexidine 4% Liquid 1 Application(s) Topical <User Schedule>  dextrose 40% Gel 15 Gram(s) Oral once  dextrose 5%. 1000 milliLiter(s) (50 mL/Hr) IV Continuous <Continuous>  dextrose 5%. 1000 milliLiter(s) (100 mL/Hr) IV Continuous <Continuous>  dextrose 50% Injectable 25 Gram(s) IV Push once  dextrose 50% Injectable 12.5 Gram(s) IV Push once  dextrose 50% Injectable 25 Gram(s) IV Push once  glucagon  Injectable 1 milliGRAM(s) IntraMuscular once  heparin   Injectable 5000 Unit(s) SubCutaneous every 12 hours  insulin glargine Injectable (LANTUS) 18 Unit(s) SubCutaneous at bedtime  insulin lispro (ADMELOG) corrective regimen sliding scale   SubCutaneous three times a day before meals  insulin lispro (ADMELOG) corrective regimen sliding scale   SubCutaneous at bedtime  NIFEdipine XL 90 milliGRAM(s) Oral daily  pantoprazole  Injectable 40 milliGRAM(s) IV Push every 12 hours  sodium bicarbonate 1300 milliGRAM(s) Oral three times a day    MEDICATIONS  (PRN):  sodium chloride 0.9% lock flush 10 milliLiter(s) IV Push every 1 hour PRN Pre/post blood products, medications, blood draw, and to maintain line patency    Antiplatelet therapy:   Brilinta 90BID                  Last dose/amt: 10/14    Allergies: No Known Allergies      SOCIAL HISTORY:  Smoker: [X ] Yes  [ ] No        PACK YEARS: 40 pack year         WHEN QUIT? 4 months ago   ETOH use: [X ] Yes  [ ] No              FREQUENCY / QUANTITY: 2x/week  Ilicit Drug use:  [ ] Yes  [x ] No  Occupation:    Live with: Family   Assist device use: None     Relevant Family History  FAMILY HISTORY:  Family history of CVA (Father)        Review of Systems  GENERAL:  Fevers[] chills[] sweats[] fatigue[] weight loss[] weight gain []                                        NEURO:  parathesias [] seizures []  syncope []  confusion []                                                                                  EYES: glasses[]  blurry vision[]  discharge[] pain[] glaucoma []                                                                            ENMT:  difficulty hearing []  vertigo[]  dysphagia[] epistaxis[] recent dental work []                                      CV:  chest pain[X] palpitations[] WYNNE [X] diaphoresis [] edema[]                                                                                             RESPIRATORY:  wheezing[] SOB[X] cough [] sputum[] hemoptysis[]                                                                    GI:  nausea[]  vomiting []  diarrhea[] constipation [] melena []                                                                        : hematuria[ ]  dysuria[ ] urgency[] incontinence[]                                                                                              MUSKULOSKELETAL:  arthritis[ ]  joint swelling [ ] muscle weakness [ ]                                                                  SKIN/BREAST:  rash[ ] itching [ ]  hair loss[ ] masses[ ]                                                                                                PSYCH:  dementia [ ] depression [ ] anxiety[ ]                                                                                                                  HEME/LYMPH:  bruises easily[ ] enlarged lymph nodes[ ] tender lymph nodes[ ]                                                 ENDOCRINE:  cold intolerance[ ] heat intolerance[ ] polydipsia[ ]                                                                              PHYSICAL EXAM  Vital Signs Last 24 Hrs  T(C): 36.9 (14 Oct 2021 15:10), Max: 37.3 (14 Oct 2021 00:08)  T(F): 98.4 (14 Oct 2021 15:10), Max: 99.2 (14 Oct 2021 00:08)  HR: 82 (14 Oct 2021 18:57) (82 - 93)  BP: 160/85 (14 Oct 2021 18:57) (157/84 - 202/93)  BP(mean): --  RR: 17 (14 Oct 2021 18:30) (16 - 18)  SpO2: 97% (14 Oct 2021 18:30) (96% - 99%)    General: Well nourished, well developed, no acute distress.                                                         Neuro: Normal exam oriented to person/place & time with no focal motor or sensory  deficits.                    Eyes: Normal exam of conjunctiva & lids, pupils equally reactive.   ENT: Normal exam of nasal/oral mucosa with absence of cyanosis.   Neck: Normal exam of jugular veins, trachea & thyroid.   Chest: Normal lung exam with good air movement absence of wheezes, rales, or rhonchi:                                                                          CV:  Auscultation: normal [ ] S3[ ] S4[ ] Irregular [ ] Rub[ ] Clicks[ ]  Murmurs none:[ ]systolic [ X]  diastolic [ ] holosystolic [ ]  Carotids: No Bruits[ ] Other____________ Abdominal Aorta: normal [ ] nonpalpable[ ]                                                                         GI: Normal exam of abdomen, liver & spleen with no noted masses or tenderness.                                                                                              Extremities: Normal no evidence of cyanosis or deformity Edema: none[ ]trace[ ]1+[ ]2+[ ]3+[ ]4+[ ]  Lower Extremity Pulses: Right[ ] Left[ ]Varicosities[ ]  SKIN : Normal exam to inspection & palpation.                                                           LABS:                        7.8    11.69 )-----------( 305      ( 14 Oct 2021 07:18 )             24.0     10-14    138  |  98  |  92<H>  ----------------------------<  66<L>  4.0   |  22  |  5.91<H>    Ca    8.1<L>      14 Oct 2021 07:18  Phos  5.6     10-13  Mg     1.5     10-13      PT/INR - ( 14 Oct 2021 10:09 )   PT: 13.1 sec;   INR: 1.10 ratio          Cardiac Cath:          TTE / GAL:    < from: TTE with Doppler (w/Cont) (10.06.21 @ 09:05) >  Dimensions:    Normal Values:  LA:     4.1    2.0 - 4.0 cm  Ao:     3.3    2.0 - 3.8 cm  SEPTUM: 1.0    0.6 - 1.2 cm  PWT:    1.3    0.6 - 1.1 cm  LVIDd:  4.8    3.0 - 5.6 cm  LVIDs:  3.0    1.8 - 4.0 cm  Derived variables:  LVMI: 105 g/m2  RWT: 0.54  Fractional short: 38 %  EF (Visual Estimate): 45 %  Doppler Peak Velocity (m/sec): AoV=1.4  ------------------------------------------------------------------------  Observations:  Mitral Valve: Mitral annular calcification, otherwise  normal mitral valve. Moderate-severe mitral regurgitation.  /77  Aortic Valve/Aorta: Calcified trileaflet aortic valve with  normal opening. Peak transaortic valve gradient equals 8 mm  Hg. Peak left ventricular outflow tract gradient equals 3  mm Hg.  Aortic Root: 3.3 cm.  Left Atrium: Mildly dilated left atrium.  LA volume index =  36 cc/m2.  Left Ventricle: Mild -moderate segmental leftventricular  systolic dysfunction. Endocardial visualization enhanced  with intravenous injection of Ultrasonic Enhancing Agent  (Definity).  Limited evaluation. The inferior wall is  hypokinetic. The apical cap is akinetic.  The anterior wall  was not well seen despite Definity. The anterolateral wal  is also not well seen and may be hypokinetic. Normal left  ventricular internal dimensions and wall thicknesses.  Moderate diastolic dysfunction (Stage II).  Right Heart: Normal right atrium. The right ventricle is  not well visualized; grossly normal right ventricular  systolic function. Normal tricuspid valve. Minimal  tricuspid regurgitation. Pulmonic valve not well  visualized, probably normal.  Pericardium/Pleura: Normal pericardium with no pericardial  effusion.  Hemodynamic: Estimated right atrial pressure is 8 mm Hg.  ------------------------------------------------------------------------  Conclusions:  1. Mitral annular calcification, otherwise normal mitral  valve. Moderate-severe mitral regurgitation. /77  2. Mild -moderate segmental left ventricular systolic  dysfunction. Endocardial visualization enhanced with  intravenous injection of Ultrasonic Enhancing Agent  (Definity).  Limited evaluation. The inferior wall is  hypokinetic. The apical cap is akinetic.  The anterior wall  was not well seen despite Definity. The anterolateral wal  is also not well seen and may be hypokinetic.  3. The right ventricle is not well visualized; grossly  normal right ventricular systolic function.  ------------------------------------------------------------------------  Confirmed on  10/6/2021 - 17:20:59 by STEPHANE Andrade    < end of copied text >

## 2021-10-14 NOTE — PROGRESS NOTE ADULT - ASSESSMENT
60 yr old M w/ hx of DM2, HTN and HLD presents as transfer from [facility] for chest pain concerning for NSTEMI and possible new CHF c/b JOSHUA likely on CKD, s/p RRT for CVA/TIA ruled out and worsening anemia, pending improvement in JOSHUA for LHC, pending HD session and LHC afterwards today

## 2021-10-14 NOTE — CONSULT NOTE ADULT - PROBLEM SELECTOR RECOMMENDATION 3
Uncontrolled A1c 7%   Home Metformin   Need Endo consult   Carb consistent diet   Continue with ACHS FS Uncontrolled A1c 7%   Home Metformin   Need Endo consult   Need Nutrition Consult   Carb consistent diet   Continue with ACHS FS

## 2021-10-14 NOTE — PROGRESS NOTE ADULT - PROBLEM SELECTOR PLAN 10
DVT ppx: hsq  Dispo: home, no skilled PT needs    above plans discussed with NP Cat  updated wife over the phone ([phone])    Stephanie Ashford MD  Division of Hospital Medicine  Cell: [phone]  Office: [phone]

## 2021-10-14 NOTE — PROGRESS NOTE ADULT - ASSESSMENT
61 yo M with PMH of HTN & DM.  Presented to [facility] with CP and dyspnea and marked HTN.  Transferred for N-STEMI/CHF, hypertensive emergency and ARF.  On arrival here , found to be in sherie nonoliguric renal failure with increased work of breathing and evidence of volume overload due to ?ADHF vs. renal failure. Elevated cardiac enzymes at OSH c/f N-STEMI, however patient chest pain free after NTG.    Clinically Improved at present with resolution of CP and dyspnea.        REC:    1.  Elevated troponin and CK/MB, N-STEMI , in setting of renal failure and significant HTN emergency; EKG was unremarkable on admission.  TTE shows segmental LV dysfunction with mild to moderate systolic dysfunction, c/w CAD.  - continue telemetry  - continue ASA, Brilinta, statin  - plan for LHC either later today or tm pending patient's response to his 1st HD session    2.  Volume overload, HFrEF  - patient comfortable at present.  - I and Os   - continue coreg  - will need to be on ACE/ARB, but can be initiated once renal function recovers    3.  Moderate to severe MR on TTE  - continue diuresis and BP control    5. HTN uncontrolled  -would recommend starting PO hydralazine 25mg TID and increase nifedipine if ok with renal

## 2021-10-14 NOTE — PROGRESS NOTE ADULT - ATTENDING COMMENTS
Tolerated HD in the morning. NSTEMI s/p LHC with dist LM 70%, iFR +, prox LAD 70%, prox Cx 70%, prox RCA 70%, dist RCA-90%, Right radial, CT Surgery Consult. Discontinue ticagrelor. Continue BB, ASA, statin.    Lo Blanchard MD, MPH, MARK, RPVI,FACC  Inpatient Cardiovascular Specialist; Chanel Garcia [facility], [facility] ([facility])  ; [employer] at [facility]  message: Scripped [phone] or Microsoft Teams (text preferred and/or call)  email: [email or web address] and Cardiovascular Surgery on-service contact/call information, go to [email or web address] and use "[email or web address]" to login.  Outpatient Cardiology appointments, call  [phone] to arrange with a colleague; I do not have outpatient Cardiology clinic.

## 2021-10-14 NOTE — PROGRESS NOTE ADULT - SUBJECTIVE AND OBJECTIVE BOX
[facility] DIVISION OF KIDNEY DISEASES AND HYPERTENSION -- FOLLOW UP NOTE  --------------------------------------------------------------------------------  Chief Complaint:    24 hour events/subjective:    seen and examined this moring   reports feeling okay   denied any chest pain   will do HD today      PAST HISTORY  --------------------------------------------------------------------------------  No significant changes to PMH, PSH, FHx, SHx, unless otherwise noted    ALLERGIES & MEDICATIONS  --------------------------------------------------------------------------------  Allergies    No Known Allergies    Intolerances      Standing Inpatient Medications  aspirin  chewable 81 milliGRAM(s) Oral daily  atorvastatin 80 milliGRAM(s) Oral at bedtime  buMETAnide IVPB 4 milliGRAM(s) IV Intermittent every 12 hours  carvedilol 6.25 milliGRAM(s) Oral every 12 hours  chlorhexidine 4% Liquid 1 Application(s) Topical <User Schedule>  dextrose 40% Gel 15 Gram(s) Oral once  dextrose 5%. 1000 milliLiter(s) IV Continuous <Continuous>  dextrose 5%. 1000 milliLiter(s) IV Continuous <Continuous>  dextrose 50% Injectable 25 Gram(s) IV Push once  dextrose 50% Injectable 12.5 Gram(s) IV Push once  dextrose 50% Injectable 25 Gram(s) IV Push once  glucagon  Injectable 1 milliGRAM(s) IntraMuscular once  heparin   Injectable 5000 Unit(s) SubCutaneous every 12 hours  insulin glargine Injectable (LANTUS) 18 Unit(s) SubCutaneous at bedtime  insulin lispro (ADMELOG) corrective regimen sliding scale   SubCutaneous three times a day before meals  insulin lispro (ADMELOG) corrective regimen sliding scale   SubCutaneous at bedtime  NIFEdipine XL 90 milliGRAM(s) Oral daily  pantoprazole  Injectable 40 milliGRAM(s) IV Push every 12 hours  sodium bicarbonate 1300 milliGRAM(s) Oral three times a day  ticagrelor 90 milliGRAM(s) Oral every 12 hours    PRN Inpatient Medications  sodium chloride 0.9% lock flush 10 milliLiter(s) IV Push every 1 hour PRN      REVIEW OF SYSTEMS  --------------------------------------------------------------------------------  Gen: No fevers/chills  Skin: No rashes  Head/Eyes/Ears: Normal hearing,   Respiratory: No dyspnea, cough  CV: No chest pain  GI: No abdominal pain, diarrhea  : No dysuria, hematuria  MSK: No  edema  Heme: No easy bruising or bleeding  Psych: No significant depression      All other systems were reviewed and are negative, except as noted.    VITALS/PHYSICAL EXAM  --------------------------------------------------------------------------------  T(C): 37 (10-14-21 @ 06:15), Max: 37.3 (10-14-21 @ 00:08)  HR: 84 (10-14-21 @ 06:15) (84 - 93)  BP: 157/84 (10-14-21 @ 06:15) (151/72 - 185/97)  RR: 18 (10-14-21 @ 06:15) (18 - 18)  SpO2: 99% (10-14-21 @ 06:15) (95% - 99%)  Wt(kg): --  Height (cm): 165.1 (10-12-21 @ 16:17)  Weight (kg): 81.6 (10-12-21 @ 16:17)  BMI (kg/m2): 29.9 (10-12-21 @ 16:17)  BSA (m2): 1.89 (10-12-21 @ 16:17)      10-13-21 @ 07:01  -  10-14-21 @ 07:00  --------------------------------------------------------  IN: 1330 mL / OUT: 2000 mL / NET: -670 mL        Physical Exam:  	Gen: NAD  	HEENT: MMM  	Pulm: CTA B/L  	CV: S1S2  	Abd: Soft, +BS   	Ext: No LE edema B/L  	Neuro: Awake  	Skin: Warm and dry  	Vascular access:      LABS/STUDIES  --------------------------------------------------------------------------------              7.8    11.69 >-----------<  305      [10-14-21 @ 07:18]              24.0     138  |  98  |  92  ----------------------------<  66      [10-14-21 @ 07:18]  4.0   |  22  |  5.91        Ca     8.1     [10-14-21 @ 07:18]      Mg     1.5     [10-13-21 @ 05:18]      Phos  5.6     [10-13-21 @ 05:18]      PT/INR: PT 12.5 , INR 1.04       [10-12-21 @ 11:32]  PTT: 37.2       [10-12-21 @ 11:32]      Creatinine Trend:  SCr 5.91 [10-14 @ 07:18]  SCr 5.95 [10-13 @ 05:18]  SCr 6.28 [10-12 @ 07:56]  SCr 6.08 [10-11 @ 07:13]  SCr 5.64 [10-10 @ 06:45]    Urinalysis - [10-07-21 @ 10:09]      Color Light Yellow / Appearance Clear / SG 1.015 / pH 6.0      Gluc 200 mg/dL / Ketone Negative  / Bili Negative / Urobili Negative       Blood Small / Protein 300 mg/dL / Leuk Est Negative / Nitrite Negative      RBC 2 / WBC 7 / Hyaline 6 / Gran  / Sq Epi  / Non Sq Epi 2 / Bacteria Negative    Urine Creatinine 63      [10-07-21 @ 10:10]  Urine Protein 548      [10-07-21 @ 10:10]  Urine Sodium 71      [10-07-21 @ 10:10]  Urine Osmolality 379      [10-07-21 @ 10:10]    Iron 47, TIBC 245, %sat 19      [10-13-21 @ 13:10]  Ferritin 780      [10-13-21 @ 08:59]    HBsAg Nonreact      [10-13-21 @ 09:15]  HCV 0.23, Nonreact      [10-07-21 @ 14:38]  HIV Nonreact      [10-13-21 @ 09:16]    BEULAH: titer Negative, pattern --      [10-07-21 @ 14:37]  C3 Complement 135      [10-07-21 @ 14:42]  C4 Complement 71      [10-07-21 @ 14:42]  ANCA: cANCA Negative, pANCA Negative, atypical ANCA Negative      [10-07-21 @ 14:37]  Syphilis Screen (Treponema Pallidum Ab) Negative      [10-07-21 @ 14:37]  Free Light Chains: kappa 10.89, lambda 12.51, ratio = 0.87      [10-07 @ 14:42]  Immunofixation Serum:   No Monoclonal Band Identified    Reference Range: None Detected      [10-07-21 @ 14:42]  SPEP Interpretation: Normal Electrophoresis Pattern      [10-07-21 @ 14:42]

## 2021-10-15 LAB
ANION GAP SERPL CALC-SCNC: 14 MMOL/L — SIGNIFICANT CHANGE UP (ref 5–17)
BUN SERPL-MCNC: 67 MG/DL — HIGH (ref 7–23)
CALCIUM SERPL-MCNC: 8.5 MG/DL — SIGNIFICANT CHANGE UP (ref 8.4–10.5)
CHLORIDE SERPL-SCNC: 97 MMOL/L — SIGNIFICANT CHANGE UP (ref 96–108)
CO2 SERPL-SCNC: 24 MMOL/L — SIGNIFICANT CHANGE UP (ref 22–31)
CREAT SERPL-MCNC: 4.83 MG/DL — HIGH (ref 0.5–1.3)
GLUCOSE BLDC GLUCOMTR-MCNC: 104 MG/DL — HIGH (ref 70–99)
GLUCOSE BLDC GLUCOMTR-MCNC: 144 MG/DL — HIGH (ref 70–99)
GLUCOSE BLDC GLUCOMTR-MCNC: 154 MG/DL — HIGH (ref 70–99)
GLUCOSE BLDC GLUCOMTR-MCNC: 192 MG/DL — HIGH (ref 70–99)
GLUCOSE SERPL-MCNC: 101 MG/DL — HIGH (ref 70–99)
HCT VFR BLD CALC: 25 % — LOW (ref 39–50)
HGB BLD-MCNC: 8.1 G/DL — LOW (ref 13–17)
MCHC RBC-ENTMCNC: 31.4 PG — SIGNIFICANT CHANGE UP (ref 27–34)
MCHC RBC-ENTMCNC: 32.4 GM/DL — SIGNIFICANT CHANGE UP (ref 32–36)
MCV RBC AUTO: 96.9 FL — SIGNIFICANT CHANGE UP (ref 80–100)
NRBC # BLD: 0 /100 WBCS — SIGNIFICANT CHANGE UP (ref 0–0)
OB PNL STL: NEGATIVE — SIGNIFICANT CHANGE UP
PLATELET # BLD AUTO: 245 K/UL — SIGNIFICANT CHANGE UP (ref 150–400)
POTASSIUM SERPL-MCNC: 4.5 MMOL/L — SIGNIFICANT CHANGE UP (ref 3.5–5.3)
POTASSIUM SERPL-SCNC: 4.5 MMOL/L — SIGNIFICANT CHANGE UP (ref 3.5–5.3)
PREALB SERPL-MCNC: 22 MG/DL — SIGNIFICANT CHANGE UP (ref 20–40)
RBC # BLD: 2.58 M/UL — LOW (ref 4.2–5.8)
RBC # FLD: 13.2 % — SIGNIFICANT CHANGE UP (ref 10.3–14.5)
SODIUM SERPL-SCNC: 135 MMOL/L — SIGNIFICANT CHANGE UP (ref 135–145)
WBC # BLD: 11.36 K/UL — HIGH (ref 3.8–10.5)
WBC # FLD AUTO: 11.36 K/UL — HIGH (ref 3.8–10.5)

## 2021-10-15 PROCEDURE — 99233 SBSQ HOSP IP/OBS HIGH 50: CPT

## 2021-10-15 PROCEDURE — 71250 CT THORAX DX C-: CPT | Mod: 26

## 2021-10-15 PROCEDURE — 99233 SBSQ HOSP IP/OBS HIGH 50: CPT | Mod: GC

## 2021-10-15 RX ADMIN — ATORVASTATIN CALCIUM 80 MILLIGRAM(S): 80 TABLET, FILM COATED ORAL at 21:14

## 2021-10-15 RX ADMIN — Medication 1300 MILLIGRAM(S): at 21:14

## 2021-10-15 RX ADMIN — Medication 1300 MILLIGRAM(S): at 16:13

## 2021-10-15 RX ADMIN — Medication 1300 MILLIGRAM(S): at 06:03

## 2021-10-15 RX ADMIN — INSULIN GLARGINE 18 UNIT(S): 100 INJECTION, SOLUTION SUBCUTANEOUS at 21:15

## 2021-10-15 RX ADMIN — HEPARIN SODIUM 5000 UNIT(S): 5000 INJECTION INTRAVENOUS; SUBCUTANEOUS at 17:08

## 2021-10-15 RX ADMIN — CARVEDILOL PHOSPHATE 6.25 MILLIGRAM(S): 80 CAPSULE, EXTENDED RELEASE ORAL at 21:14

## 2021-10-15 RX ADMIN — Medication 81 MILLIGRAM(S): at 11:23

## 2021-10-15 RX ADMIN — BUMETANIDE 132 MILLIGRAM(S): 0.25 INJECTION INTRAMUSCULAR; INTRAVENOUS at 17:08

## 2021-10-15 RX ADMIN — PANTOPRAZOLE SODIUM 40 MILLIGRAM(S): 20 TABLET, DELAYED RELEASE ORAL at 06:02

## 2021-10-15 RX ADMIN — CARVEDILOL PHOSPHATE 6.25 MILLIGRAM(S): 80 CAPSULE, EXTENDED RELEASE ORAL at 08:54

## 2021-10-15 RX ADMIN — PANTOPRAZOLE SODIUM 40 MILLIGRAM(S): 20 TABLET, DELAYED RELEASE ORAL at 17:08

## 2021-10-15 RX ADMIN — BUMETANIDE 132 MILLIGRAM(S): 0.25 INJECTION INTRAMUSCULAR; INTRAVENOUS at 06:07

## 2021-10-15 RX ADMIN — Medication 1: at 16:17

## 2021-10-15 RX ADMIN — CHLORHEXIDINE GLUCONATE 1 APPLICATION(S): 213 SOLUTION TOPICAL at 06:59

## 2021-10-15 RX ADMIN — HEPARIN SODIUM 5000 UNIT(S): 5000 INJECTION INTRAVENOUS; SUBCUTANEOUS at 06:03

## 2021-10-15 NOTE — PROGRESS NOTE ADULT - ASSESSMENT
Patient is a 60 year old male with PMH od DM and HTN who presented to the hospital as a transfer from OSH for NSTEMI. The nephrology team was consulted due to elevated creatinine of 4.05 upon presentation. The patient denies any history of kidney disease and denied noticing any changes in his urination.     JOSHUA on CKD?   - patient presenting to hospital with creatinine of 4.05 mg/dL; denied any history of kidney disease   --- creatinine had continued to uptrend to 6.28mg/dL but slight decrease to 5.95mg/dL this morning  - Kidney injury also exacerbated by contrast administration for the CT following the RRT on 10/7    - patient remains volume overloaded; on bumex and optimal UOP   - initiated on HD on 10/14; planned for HD again today   --- will reassess pateint tomorrow and determine whether or not to do HD again tomorrow   - suspect given his pre-HD creatinine and high suspicion of CKD will likely be HD dependent following  - s/p LHC on 10/14 showing severe multivessel disease   --- patient is currently under evaluation for CABG   - normal renal ultrasound without hydro  - UA with proteinuria and blood;  Ur Pr/Cr ratio noted to be 8.7g  - BEULAH negative; elevated kappa/lambda but normal ratio;   - C3 and C4 not low  --- other serological work up pending at this time  - please adjust medication doses as per eGFR     If any questions, please feel free to contact me     Saul Crews  Nephrology Fellow  [facility] Pager: [phone]

## 2021-10-15 NOTE — PROGRESS NOTE ADULT - PROBLEM SELECTOR PLAN 3
Blood pressure initially 200/105 at arrival; s/p nitro ggt at [place], noncompliant with meds  - now much better controlled  - continue Nifedipine ER 90mg po daily  - c/w coreg 6.125mg bid, uptitrate as needed  - bumex as below  - home labetalol on hold due to JOSHUA  - c/t hold HCTZ-losartan given JOSHUA on CKD  - monitor BP  - would consider adding Hydralazine if BP remains uncontrolled

## 2021-10-15 NOTE — PROGRESS NOTE ADULT - ATTENDING COMMENTS
transferred from outside hospital for further cardiac evaluation, noncompliant with dm, htn  episode of bradycardia (HR 30s), then became altered. RRT and code stroke 10/7  #  vicki (atn/dio) on ckd  ?stage V   plan for HD #1 10/14 to optimize patient electrolyte status/ BUN prior to cath; had cath 10/14  plan HD #2 today then monitor  # volume overloaded/edema-cont  bumex;    #CKD- prot/cr 8.7, low alb 2.6, serologic workup pending    renal sono for kidney size ok   likely dm nephropathy however need rto rule out etiology of ckd; #check hbsag, pla2r, hiv, for ckd workup/proteinuria   #metabolic acidosis- on sodium bicarb 1300mg po tid; monitor serum bicarb trend  #hyperkalemia- low k renal diet; monitor trend; stable  #anemia- workup; check iron studies/ferritin  #BP control-cont meds  #check serum phos

## 2021-10-15 NOTE — PROGRESS NOTE ADULT - ASSESSMENT
61 yo male with DM2, HTN, and HLD who initially presented to [facility] on 10/05 as a transfer from [facility] for NSTEMI and possible CHF. Patient on Heparin drip for NSTEMI. LKW 10:45AM. Patient had episode of bradycardia (HR 30s), then became altered. RRT called. BP during RRT 70s/40s. Code stroke called for L facial droop.   CTH negative for acute pathology, old L frontal cortical infarct seen.   CTA H unremarkable. CTA N shows high-grade stenosis left internal carotid artery at the carotid bifurcation in the neck.  10/06 TTE: EF 45%, moderate-severe MR, mild-moderate L ventricular systolic dysfunction.   A1c 7  MRI no AIS but old strokes  HD cath placement 10/12   CD with severe L ICA and Mod R ICA   s/p LHC 10/14  Impression: Mild L nasolabial flattening and dysarthria, ?decreased eye closure strength on the L. Possibly secondary to R brain dysfunction due to acute stroke of unknown mechanism vs peripheral etiology. Patient with old L frontal infarct on CTH, also with high-grade stenosis of L ICA at the carotid bifurcation which likely cause of old stroke     Recommendations:  - ASA 81MG PO QD + Brilinta 90MG PO Q12HR.   - Avoid hypotension.  - High dose statin therapy - atorvastatin 40mg PO daily. LDL goal <70mg/dL.  -  vascular for ICA revascularization can be outpt. not acute   - lipid panel  - telemetry  - PT/OT/SS/SLP, OOBC  - permissive HTN, -180mmHg.  - check FS, glucose control <180  - GI/DVT ppx  - Counseling on diet, exercise, and medication adherence was done  - Counseling on smoking cessation and alcohol consumption offered when appropriate.  - Pain assessed and judicious use of narcotics when appropriate was discussed.    - Stroke education given when appropriate.  - Importance of fall prevention discussed.   - Differential diagnosis and plan of care discussed with patient and/or family and primary team  - Thank you for allowing me to participate in the care of this patient. Call with questions.   Marcelino Patel MD  Vascular Neurology .

## 2021-10-15 NOTE — PROGRESS NOTE ADULT - PROBLEM SELECTOR PLAN 10
DVT ppx: hsq  Dispo: home, no skilled PT needs    above plans discussed with NP Mady  updated wife over the phone ([phone])    Stephanie Ashford MD  Division of Hospital Medicine  Cell: [phone]  Office: [phone]

## 2021-10-15 NOTE — PROGRESS NOTE ADULT - PROBLEM SELECTOR PLAN 1
s/p LHC (10/14) with multivessel disease  - CTS consulted for CABG: OR timing TBD  - c/w ASA 81 mg daily  - c/w atorvastatin 80 mg qhs  - monitor on tele  - TTE EF 45%: mild-moderate LVSF

## 2021-10-15 NOTE — PROGRESS NOTE ADULT - SUBJECTIVE AND OBJECTIVE BOX
[facility] DIVISION OF KIDNEY DISEASES AND HYPERTENSION -- FOLLOW UP NOTE  --------------------------------------------------------------------------------  Chief Complaint:    24 hour events/subjective:    seen and examined this morning   reports feeling okay and tolerated HD yesterday   LHC done yesterday   no acute issues today    PAST HISTORY  --------------------------------------------------------------------------------  No significant changes to PMH, PSH, FHx, SHx, unless otherwise noted    ALLERGIES & MEDICATIONS  --------------------------------------------------------------------------------  Allergies    No Known Allergies    Intolerances      Standing Inpatient Medications  aspirin  chewable 81 milliGRAM(s) Oral daily  atorvastatin 80 milliGRAM(s) Oral at bedtime  buMETAnide IVPB 4 milliGRAM(s) IV Intermittent every 12 hours  carvedilol 6.25 milliGRAM(s) Oral every 12 hours  chlorhexidine 4% Liquid 1 Application(s) Topical <User Schedule>  dextrose 40% Gel 15 Gram(s) Oral once  dextrose 5%. 1000 milliLiter(s) IV Continuous <Continuous>  dextrose 5%. 1000 milliLiter(s) IV Continuous <Continuous>  dextrose 50% Injectable 25 Gram(s) IV Push once  dextrose 50% Injectable 12.5 Gram(s) IV Push once  dextrose 50% Injectable 25 Gram(s) IV Push once  glucagon  Injectable 1 milliGRAM(s) IntraMuscular once  heparin   Injectable 5000 Unit(s) SubCutaneous every 12 hours  insulin glargine Injectable (LANTUS) 18 Unit(s) SubCutaneous at bedtime  insulin lispro (ADMELOG) corrective regimen sliding scale   SubCutaneous three times a day before meals  insulin lispro (ADMELOG) corrective regimen sliding scale   SubCutaneous at bedtime  NIFEdipine XL 90 milliGRAM(s) Oral daily  pantoprazole  Injectable 40 milliGRAM(s) IV Push every 12 hours  sodium bicarbonate 1300 milliGRAM(s) Oral three times a day    PRN Inpatient Medications  sodium chloride 0.9% lock flush 10 milliLiter(s) IV Push every 1 hour PRN      REVIEW OF SYSTEMS    All other systems were reviewed and are negative, except as noted.    VITALS/PHYSICAL EXAM  --------------------------------------------------------------------------------  T(C): 36.8 (10-15-21 @ 08:15), Max: 37.2 (10-14-21 @ 20:15)  HR: 83 (10-15-21 @ 08:15) (82 - 87)  BP: 171/90 (10-15-21 @ 08:15) (156/93 - 202/93)  RR: 18 (10-15-21 @ 08:15) (16 - 18)  SpO2: 99% (10-15-21 @ 08:15) (96% - 99%)  Wt(kg): --    Weight (kg): 83.7 (10-14-21 @ 09:02)      10-14-21 @ 07:01  -  10-15-21 @ 07:00  --------------------------------------------------------  IN: 790 mL / OUT: 0 mL / NET: 790 mL        Physical Exam:  	Gen: NAD  	HEENT: MMM  	Pulm: CTA B/L  	CV: S1S2  	Abd: Soft, +BS   	Ext: + LE edema B/L  	Neuro: Awake  	Skin: Warm and dry  	Vascular access: R IJ      LABS/STUDIES  --------------------------------------------------------------------------------              8.1    11.36 >-----------<  245      [10-15-21 @ 06:56]              25.0     135  |  97  |  67  ----------------------------<  101      [10-15-21 @ 06:57]  4.5   |  24  |  4.83        Ca     8.5     [10-15-21 @ 06:57]      PT/INR: PT 13.1 , INR 1.10       [10-14-21 @ 10:09]      Creatinine Trend:  SCr 4.83 [10-15 @ 06:57]  SCr 5.91 [10-14 @ 07:18]  SCr 5.95 [10-13 @ 05:18]  SCr 6.28 [10-12 @ 07:56]  SCr 6.08 [10-11 @ 07:13]    Urinalysis - [10-07-21 @ 10:09]      Color Light Yellow / Appearance Clear / SG 1.015 / pH 6.0      Gluc 200 mg/dL / Ketone Negative  / Bili Negative / Urobili Negative       Blood Small / Protein 300 mg/dL / Leuk Est Negative / Nitrite Negative      RBC 2 / WBC 7 / Hyaline 6 / Gran  / Sq Epi  / Non Sq Epi 2 / Bacteria Negative      Iron 47, TIBC 245, %sat 19      [10-13-21 @ 13:10]  Ferritin 780      [10-13-21 @ 08:59]    HBsAg Nonreact      [10-13-21 @ 09:15]  HCV 0.23, Nonreact      [10-07-21 @ 14:38]  HIV Nonreact      [10-13-21 @ 09:16]    BEULAH: titer Negative, pattern --      [10-07-21 @ 14:37]  C3 Complement 135      [10-07-21 @ 14:42]  C4 Complement 71      [10-07-21 @ 14:42]  ANCA: cANCA Negative, pANCA Negative, atypical ANCA Negative      [10-07-21 @ 14:37]  Syphilis Screen (Treponema Pallidum Ab) Negative      [10-07-21 @ 14:37]  Free Light Chains: kappa 10.89, lambda 12.51, ratio = 0.87      [10-07 @ 14:42]  Immunofixation Serum:   No Monoclonal Band Identified    Reference Range: None Detected      [10-07-21 @ 14:42]  SPEP Interpretation: Normal Electrophoresis Pattern      [10-07-21 @ 14:42]

## 2021-10-15 NOTE — PROGRESS NOTE ADULT - PROBLEM SELECTOR PLAN 2
JOSHUA on CKD vs JOSHUA with hyperkalemia and meta acidosis on admission, reports hx of kidney disease but does not know baseline cr or nephrologist name  - plan for HD today and over the weekend   - renal US with normal kidney  - Scr continues to uptrend   - care discussed with nephrology  - s/p IR guided shiley placement  - wife in agreement with care planning  - monitor bmp  - c/w sodium bicarb 1300mg tid  - serologies pending

## 2021-10-15 NOTE — PROGRESS NOTE ADULT - SUBJECTIVE AND OBJECTIVE BOX
Patient is a 60y old  Male who presents with a chief complaint of NSTEMI (14 Oct 2021 19:58)      SUBJECTIVE / OVERNIGHT EVENTS:  no acute events overnight, vss, afebrile  s/p LHC with multivessel disease, pending CTS for CABG timing  pt feels well otherwise    tele reviewed: sinus 80-100s    ROS:  14 point ROS negative in detail except stated as above    MEDICATIONS  (STANDING):  aspirin  chewable 81 milliGRAM(s) Oral daily  atorvastatin 80 milliGRAM(s) Oral at bedtime  buMETAnide IVPB 4 milliGRAM(s) IV Intermittent every 12 hours  carvedilol 6.25 milliGRAM(s) Oral every 12 hours  chlorhexidine 4% Liquid 1 Application(s) Topical <User Schedule>  dextrose 40% Gel 15 Gram(s) Oral once  dextrose 5%. 1000 milliLiter(s) (50 mL/Hr) IV Continuous <Continuous>  dextrose 5%. 1000 milliLiter(s) (100 mL/Hr) IV Continuous <Continuous>  dextrose 50% Injectable 25 Gram(s) IV Push once  dextrose 50% Injectable 12.5 Gram(s) IV Push once  dextrose 50% Injectable 25 Gram(s) IV Push once  glucagon  Injectable 1 milliGRAM(s) IntraMuscular once  heparin   Injectable 5000 Unit(s) SubCutaneous every 12 hours  insulin glargine Injectable (LANTUS) 18 Unit(s) SubCutaneous at bedtime  insulin lispro (ADMELOG) corrective regimen sliding scale   SubCutaneous three times a day before meals  insulin lispro (ADMELOG) corrective regimen sliding scale   SubCutaneous at bedtime  NIFEdipine XL 90 milliGRAM(s) Oral daily  pantoprazole  Injectable 40 milliGRAM(s) IV Push every 12 hours  sodium bicarbonate 1300 milliGRAM(s) Oral three times a day    MEDICATIONS  (PRN):  sodium chloride 0.9% lock flush 10 milliLiter(s) IV Push every 1 hour PRN Pre/post blood products, medications, blood draw, and to maintain line patency      CAPILLARY BLOOD GLUCOSE      POCT Blood Glucose.: 104 mg/dL (15 Oct 2021 07:21)  POCT Blood Glucose.: 257 mg/dL (14 Oct 2021 21:09)  POCT Blood Glucose.: 122 mg/dL (14 Oct 2021 17:18)  POCT Blood Glucose.: 82 mg/dL (14 Oct 2021 11:46)    I&O's Summary    14 Oct 2021 07:01  -  15 Oct 2021 07:00  --------------------------------------------------------  IN: 790 mL / OUT: 0 mL / NET: 790 mL        PHYSICAL EXAM:  Vital Signs Last 24 Hrs  T(C): 36.8 (15 Oct 2021 08:15), Max: 37.2 (14 Oct 2021 20:15)  T(F): 98.3 (15 Oct 2021 08:15), Max: 99 (14 Oct 2021 20:15)  HR: 83 (15 Oct 2021 08:15) (82 - 87)  BP: 171/90 (15 Oct 2021 08:15) (156/93 - 202/93)  BP(mean): --  RR: 18 (15 Oct 2021 08:15) (16 - 18)  SpO2: 99% (15 Oct 2021 08:15) (96% - 99%)    GENERAL: NAD, well-developed  HEAD:  Atraumatic, Normocephalic  EYES: EOMI, PERRLA, conjunctiva and sclera clear  NECK: (+) shiley in place; Supple, No JVD  CHEST/LUNG: Clear to auscultation bilaterally; No wheeze  HEART: Regular rate and rhythm; No murmurs, rubs, or gallops  ABDOMEN: Soft, Nontender, Nondistended; Bowel sounds present  EXTREMITIES:  1+ pitting LE edema  NEUROLOGY: AAOx3; non-focal  SKIN: No rashes or lesions    LABS:  personally reviewed                        8.1    11.36 )-----------( 245      ( 15 Oct 2021 06:56 )             25.0     10-15    135  |  97  |  67<H>  ----------------------------<  101<H>  4.5   |  24  |  4.83<H>    Ca    8.5      15 Oct 2021 06:57      PT/INR - ( 14 Oct 2021 10:09 )   PT: 13.1 sec;   INR: 1.10 ratio                   RADIOLOGY & ADDITIONAL TESTS:    Imaging Personally Reviewed:    Consultant(s) Notes Reviewed:  CTS, nephro    Care Discussed with Consultants/Other Providers: Dr. tubbs (nephro)

## 2021-10-15 NOTE — PROGRESS NOTE ADULT - ASSESSMENT
60 yr old M w/ hx of DM2, HTN and HLD presents as transfer from [facility] for chest pain concerning for NSTEMI and possible new CHF c/b JOSHUA likely on CKD, s/p RRT for CVA/TIA ruled out and worsening anemia, pending improvement in JOSHUA for LHC, pending HD session and LHC afterwards today

## 2021-10-15 NOTE — PROGRESS NOTE ADULT - SUBJECTIVE AND OBJECTIVE BOX
Neurology Progress Note    S: Patient seen and examined. No new events overnight. patient denied CP, SOB, HA or pain. s/p [facility]    Medications:  MEDICATIONS  (STANDING):  aspirin  chewable 81 milliGRAM(s) Oral daily  atorvastatin 80 milliGRAM(s) Oral at bedtime  buMETAnide IVPB 4 milliGRAM(s) IV Intermittent every 12 hours  carvedilol 6.25 milliGRAM(s) Oral every 12 hours  chlorhexidine 4% Liquid 1 Application(s) Topical <User Schedule>  dextrose 40% Gel 15 Gram(s) Oral once  dextrose 5%. 1000 milliLiter(s) (50 mL/Hr) IV Continuous <Continuous>  dextrose 5%. 1000 milliLiter(s) (100 mL/Hr) IV Continuous <Continuous>  dextrose 50% Injectable 25 Gram(s) IV Push once  dextrose 50% Injectable 12.5 Gram(s) IV Push once  dextrose 50% Injectable 25 Gram(s) IV Push once  glucagon  Injectable 1 milliGRAM(s) IntraMuscular once  heparin   Injectable 5000 Unit(s) SubCutaneous every 12 hours  insulin glargine Injectable (LANTUS) 18 Unit(s) SubCutaneous at bedtime  insulin lispro (ADMELOG) corrective regimen sliding scale   SubCutaneous three times a day before meals  insulin lispro (ADMELOG) corrective regimen sliding scale   SubCutaneous at bedtime  NIFEdipine XL 90 milliGRAM(s) Oral daily  pantoprazole  Injectable 40 milliGRAM(s) IV Push every 12 hours  sodium bicarbonate 1300 milliGRAM(s) Oral three times a day    MEDICATIONS  (PRN):  sodium chloride 0.9% lock flush 10 milliLiter(s) IV Push every 1 hour PRN Pre/post blood products, medications, blood draw, and to maintain line patency      Vitals:  Vital Signs Last 24 Hrs  T(C): 36.8 (10-15-21 @ 08:15), Max: 37.2 (10-14-21 @ 20:15)  T(F): 98.3 (10-15-21 @ 08:15), Max: 99 (10-14-21 @ 20:15)  HR: 83 (10-15-21 @ 08:15) (82 - 87)  BP: 171/90 (10-15-21 @ 08:15) (156/93 - 202/93)  BP(mean): --  RR: 18 (10-15-21 @ 08:15) (16 - 18)  SpO2: 99% (10-15-21 @ 08:15) (96% - 99%)      General Exam:   General Appearance: Appropriately dressed and in no acute distress       Head: Normocephalic, atraumatic and no dysmorphic features  Ear, Nose, and Throat: Moist mucous membranes  CVS: S1S2+  Resp: No SOB, no wheeze or rhonchi  Abd: soft NTND  Extremities: No edema, no cyanosis  Skin: No bruises, no rashes     Neurological Exam:  Mental Status: Awake, alert and oriented x 3.  Able to follow simple and complex verbal commands. Able to name and repeat. fluent speech. No obvious aphasia or dysarthria noted.   Cranial Nerves: PERRL, EOMI, VFFC, sensation V1-V3 intact,  mild NLF facial asymmetry , equal elevation of palate, scm/trap 5/5, tongue is midline on protrusion. no obvious papilledema on fundoscopic exam. Hearing is grossly intact.   Motor: Normal bulk, tone and strength throughout. Fine finger movements were intact and symmetric. no tremors or drift noted.    Sensation: Intact to light touch and pinprick throughout. no right/left confusion. no extinction to tactile on DSS.   Reflexes: 1+ throughout at biceps, brachioradialis, triceps, patellars and ankles bilaterally and equal. No clonus. R toe and L toe were both downgoing.  Coordination: No dysmetria on FNF    Gait: deferred     I personally reviewed the below data/images/labs:    CBC Full  -  ( 15 Oct 2021 06:56 )  WBC Count : 11.36 K/uL  RBC Count : 2.58 M/uL  Hemoglobin : 8.1 g/dL  Hematocrit : 25.0 %  Platelet Count - Automated : 245 K/uL  Mean Cell Volume : 96.9 fl  Mean Cell Hemoglobin : 31.4 pg  Mean Cell Hemoglobin Concentration : 32.4 gm/dL  Auto Neutrophil # : x  Auto Lymphocyte # : x  Auto Monocyte # : x  Auto Eosinophil # : x  Auto Basophil # : x  Auto Neutrophil % : x  Auto Lymphocyte % : x  Auto Monocyte % : x  Auto Eosinophil % : x  Auto Basophil % : x      10-15    135  |  97  |  67<H>  ----------------------------<  101<H>  4.5   |  24  |  4.83<H>    Ca    8.5      15 Oct 2021 06:57              -10/07 CTH: No hemorrhage. 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   -10/07 CTA N: High-grade stenosis left internal carotid artery at the carotid bifurcation in the neck.   -10/07 CTA H: Normal intracranial circulation.    < from: MR Head No Cont (10.09.21 @ 20:22) >    EXAM:  MR BRAIN                            PROCEDURE DATE:  10/09/2021            INTERPRETATION:  CLINICAL HISTORY:Facial droop, on heparin drip, NSTEMI . Severe left ICA stenosis.    TECHNIQUE:  MRI of brain without contrast dated 10/9/2021.  Examination consisted of transaxial T1, T2, FLAIR, gradient echo as well as diffusion-weighted sequences with corresponding ADC maps. Coronal T2 and sagittal T1-weighted images were also acquired through the brain.    COMPARISON: CT head and CTA head andneck 10/7/2021    FINDINGS:  There are no areas abnormal restricted diffusion within the brain parenchyma to suggest acute/subacute infarct. There is no acute intracranial hemorrhage, vasogenic edema or abnormal susceptibility artifact. Chronic lacunar infarcts are seen in the left frontal lobe, right frontal cranial radiata and right cerebellum. Additional nonspecific patchy and confluent areas of T2/FLAIR prolongation in the bihemispheric white matter likely represents mild chronic microvascular changes.    The ventricles, sulci and cisternal spaces are stable in size and configuration. There is no midline shift or abnormal extra-axial fluid collection.    Flow-voids of the major intracranial vessels are maintained, as seen best on the T2-weighted images, indicating their patency.    The visualized paranasal sinuses and mastoid air cells are free of acute disease. The orbital regions are unremarkable.    IMPRESSION:  No acute infarct or intracranial hemorrhage. Chronic infarcts and microvascular changes as above.    --- End of Report ---                NEIL CARDENAS MD; Attending Radiologist  This document has been electronically signed. Oct 10 2021  4:26AM    < end of copied text >  < from: [facility] Duplex Carotid, Prashant (10.08.21 @ 17:07) >    EXAM:  CAROTID DUPLEX BILATERAL                            PROCEDURE DATE:  10/08/2021            INTERPRETATION:  CLINICAL INFORMATION: Left ICA high-grade stenosis identified on recent CTA neck.    COMPARISON: CTA neck 10/7/2021.    TECHNIQUE: Grayscale, color and spectral Doppler examination of both carotid arteries was performed.    FINDINGS:    There are atheromatous plaques are present bilaterally in the region of the bifurcations of the common carotid arteries into internal and external branches.    Velocity elevation of the proximal right internal carotid artery results in 50-69% flow-limiting stenosis.    Velocity elevation of the proximal left internal carotid artery results in 70-9% flow-limiting stenosis.    Bilateral flow-limiting stenoses noted of the right and left external carotid arteries as well.    Peak systolic velocities are as follows:    RIGHT:  PROX CCA = 126 cm/s  DIST CCA = 99 cm/s  PROX ICA = 137 cm/s  MID ICA = 86 cm/s  DIST ICA = 80 cm/s  ECA = 202 cm/s    LEFT:  PROX CCA = 100 cm/s  DIST CCA = 59 cm/s  PROX ICA = 313 cm/s  MID ICA = 72 cm/s  DIST ICA = 47 cm/s  ECA = 298 cm/s    Antegrade flow is noted within both vertebral arteries.    IMPRESSION:    Left internal carotid artery 70-99% flow-limiting stenosis.  Right internal carotid artery 50-69% flow-limiting stenosis.  Bilateral external carotid artery flow limiting stenoses.  INDIANA Hammond was informed of these findings on 10/8/2021 at 5:06 PM.    Measurement of carotid stenosis is based on velocity parameters that correlate the residual internal carotid diameter with that of the more distal vessel in accordance with a method such as the [employer] American Symptomatic Carotid Endarterectomy Trial (NASCET).    --- End of Report ---                FELIX MCMAHON M.D., ATTENDING RADIOLOGIST  This document has been electronically signed. Oct  8 2021  5:21PM    < end of copied text >

## 2021-10-16 LAB
ANION GAP SERPL CALC-SCNC: 11 MMOL/L — SIGNIFICANT CHANGE UP (ref 5–17)
BUN SERPL-MCNC: 24 MG/DL — HIGH (ref 7–23)
CALCIUM SERPL-MCNC: 8.9 MG/DL — SIGNIFICANT CHANGE UP (ref 8.4–10.5)
CHLORIDE SERPL-SCNC: 96 MMOL/L — SIGNIFICANT CHANGE UP (ref 96–108)
CO2 SERPL-SCNC: 26 MMOL/L — SIGNIFICANT CHANGE UP (ref 22–31)
CREAT SERPL-MCNC: 2.58 MG/DL — HIGH (ref 0.5–1.3)
GLUCOSE BLDC GLUCOMTR-MCNC: 136 MG/DL — HIGH (ref 70–99)
GLUCOSE BLDC GLUCOMTR-MCNC: 136 MG/DL — HIGH (ref 70–99)
GLUCOSE BLDC GLUCOMTR-MCNC: 201 MG/DL — HIGH (ref 70–99)
GLUCOSE BLDC GLUCOMTR-MCNC: 244 MG/DL — HIGH (ref 70–99)
GLUCOSE SERPL-MCNC: 227 MG/DL — HIGH (ref 70–99)
HCT VFR BLD CALC: 26.3 % — LOW (ref 39–50)
HGB BLD-MCNC: 8.5 G/DL — LOW (ref 13–17)
MCHC RBC-ENTMCNC: 31.7 PG — SIGNIFICANT CHANGE UP (ref 27–34)
MCHC RBC-ENTMCNC: 32.3 GM/DL — SIGNIFICANT CHANGE UP (ref 32–36)
MCV RBC AUTO: 98.1 FL — SIGNIFICANT CHANGE UP (ref 80–100)
NRBC # BLD: 0 /100 WBCS — SIGNIFICANT CHANGE UP (ref 0–0)
PLATELET # BLD AUTO: 269 K/UL — SIGNIFICANT CHANGE UP (ref 150–400)
POTASSIUM SERPL-MCNC: 5.4 MMOL/L — HIGH (ref 3.5–5.3)
POTASSIUM SERPL-SCNC: 5.4 MMOL/L — HIGH (ref 3.5–5.3)
RBC # BLD: 2.68 M/UL — LOW (ref 4.2–5.8)
RBC # FLD: 13.2 % — SIGNIFICANT CHANGE UP (ref 10.3–14.5)
SODIUM SERPL-SCNC: 133 MMOL/L — LOW (ref 135–145)
WBC # BLD: 12.57 K/UL — HIGH (ref 3.8–10.5)
WBC # FLD AUTO: 12.57 K/UL — HIGH (ref 3.8–10.5)

## 2021-10-16 PROCEDURE — 99233 SBSQ HOSP IP/OBS HIGH 50: CPT

## 2021-10-16 PROCEDURE — 99232 SBSQ HOSP IP/OBS MODERATE 35: CPT

## 2021-10-16 RX ORDER — SODIUM ZIRCONIUM CYCLOSILICATE 10 G/10G
10 POWDER, FOR SUSPENSION ORAL ONCE
Refills: 0 | Status: COMPLETED | OUTPATIENT
Start: 2021-10-16 | End: 2021-10-16

## 2021-10-16 RX ADMIN — SODIUM ZIRCONIUM CYCLOSILICATE 10 GRAM(S): 10 POWDER, FOR SUSPENSION ORAL at 23:22

## 2021-10-16 RX ADMIN — BUMETANIDE 132 MILLIGRAM(S): 0.25 INJECTION INTRAMUSCULAR; INTRAVENOUS at 17:19

## 2021-10-16 RX ADMIN — Medication 2: at 16:58

## 2021-10-16 RX ADMIN — HEPARIN SODIUM 5000 UNIT(S): 5000 INJECTION INTRAVENOUS; SUBCUTANEOUS at 17:17

## 2021-10-16 RX ADMIN — CARVEDILOL PHOSPHATE 6.25 MILLIGRAM(S): 80 CAPSULE, EXTENDED RELEASE ORAL at 05:21

## 2021-10-16 RX ADMIN — BUMETANIDE 132 MILLIGRAM(S): 0.25 INJECTION INTRAMUSCULAR; INTRAVENOUS at 05:18

## 2021-10-16 RX ADMIN — PANTOPRAZOLE SODIUM 40 MILLIGRAM(S): 20 TABLET, DELAYED RELEASE ORAL at 17:17

## 2021-10-16 RX ADMIN — INSULIN GLARGINE 18 UNIT(S): 100 INJECTION, SOLUTION SUBCUTANEOUS at 21:31

## 2021-10-16 RX ADMIN — CHLORHEXIDINE GLUCONATE 1 APPLICATION(S): 213 SOLUTION TOPICAL at 07:03

## 2021-10-16 RX ADMIN — Medication 1300 MILLIGRAM(S): at 13:43

## 2021-10-16 RX ADMIN — PANTOPRAZOLE SODIUM 40 MILLIGRAM(S): 20 TABLET, DELAYED RELEASE ORAL at 05:19

## 2021-10-16 RX ADMIN — HEPARIN SODIUM 5000 UNIT(S): 5000 INJECTION INTRAVENOUS; SUBCUTANEOUS at 05:20

## 2021-10-16 RX ADMIN — Medication 81 MILLIGRAM(S): at 13:43

## 2021-10-16 RX ADMIN — Medication 90 MILLIGRAM(S): at 05:21

## 2021-10-16 RX ADMIN — Medication 1300 MILLIGRAM(S): at 21:31

## 2021-10-16 RX ADMIN — ATORVASTATIN CALCIUM 80 MILLIGRAM(S): 80 TABLET, FILM COATED ORAL at 21:31

## 2021-10-16 RX ADMIN — CARVEDILOL PHOSPHATE 6.25 MILLIGRAM(S): 80 CAPSULE, EXTENDED RELEASE ORAL at 17:17

## 2021-10-16 RX ADMIN — Medication 1300 MILLIGRAM(S): at 05:20

## 2021-10-16 NOTE — PROGRESS NOTE ADULT - SUBJECTIVE AND OBJECTIVE BOX
[facility] DIVISION OF KIDNEY DISEASES AND HYPERTENSION -- FOLLOW UP NOTE  --------------------------------------------------------------------------------  Chief Complaint:/subjective: no complaints    24 hour events: had HD today        PAST HISTORY  --------------------------------------------------------------------------------  No significant changes to PMH, PSH, FHx, SHx, unless otherwise noted    ALLERGIES & MEDICATIONS  --------------------------------------------------------------------------------  Allergies    No Known Allergies    Intolerances      Standing Inpatient Medications  aspirin  chewable 81 milliGRAM(s) Oral daily  atorvastatin 80 milliGRAM(s) Oral at bedtime  buMETAnide IVPB 4 milliGRAM(s) IV Intermittent every 12 hours  carvedilol 6.25 milliGRAM(s) Oral every 12 hours  chlorhexidine 4% Liquid 1 Application(s) Topical <User Schedule>  dextrose 40% Gel 15 Gram(s) Oral once  dextrose 5%. 1000 milliLiter(s) IV Continuous <Continuous>  dextrose 5%. 1000 milliLiter(s) IV Continuous <Continuous>  dextrose 50% Injectable 25 Gram(s) IV Push once  dextrose 50% Injectable 12.5 Gram(s) IV Push once  dextrose 50% Injectable 25 Gram(s) IV Push once  glucagon  Injectable 1 milliGRAM(s) IntraMuscular once  heparin   Injectable 5000 Unit(s) SubCutaneous every 12 hours  insulin glargine Injectable (LANTUS) 18 Unit(s) SubCutaneous at bedtime  insulin lispro (ADMELOG) corrective regimen sliding scale   SubCutaneous three times a day before meals  insulin lispro (ADMELOG) corrective regimen sliding scale   SubCutaneous at bedtime  NIFEdipine XL 90 milliGRAM(s) Oral daily  pantoprazole  Injectable 40 milliGRAM(s) IV Push every 12 hours  sodium bicarbonate 1300 milliGRAM(s) Oral three times a day    PRN Inpatient Medications  sodium chloride 0.9% lock flush 10 milliLiter(s) IV Push every 1 hour PRN      REVIEW OF SYSTEMS  --------------------------------------------------------------------------------  Gen: No weight changes, fatigue, fevers/chills, weakness  Skin: No rashes  Head/Eyes/Ears/Mouth: No headache;   Respiratory: No dyspnea, cough  CV: No chest pain, PND, orthopnea  GI: No abdominal pain, diarrhea, constipation, nausea, vomiting  : No increased frequency, dysuria, hematuria, nocturia  MSK: No joint pain/swelling; no back pain; no edema  Neuro: No dizziness/lightheadedness, weakness  Heme: No easy bruising or bleeding  Psych: No significant nervousness, anxiety, stress, depression    All other systems were reviewed and are negative, except as noted.    VITALS/PHYSICAL EXAM  --------------------------------------------------------------------------------  T(C): 36.5 (10-16-21 @ 12:42), Max: 37.2 (10-15-21 @ 19:44)  HR: 74 (10-16-21 @ 12:42) (63 - 87)  BP: 121/61 (10-16-21 @ 12:42) (119/70 - 184/95)  RR: 18 (10-16-21 @ 12:42) (18 - 18)  SpO2: 98% (10-16-21 @ 12:42) (97% - 99%)  Wt(kg): --  Adult Advanced Hemodynamics Last 24 Hrs  ABP: --  ABP(mean): --  CVP(mm Hg): --  CO: --  CI: --  PA: --  PA(mean): --  PCWP: --  SVR: --  SVRI: --        10-15-21 @ 07:01  -  10-16-21 @ 07:00  --------------------------------------------------------  IN: 760 mL / OUT: 500 mL / NET: 260 mL    10-16-21 @ 07:01  -  10-16-21 @ 15:12  --------------------------------------------------------  IN: 240 mL / OUT: 500 mL / NET: -260 mL      Physical Exam:  	Gen: NAD,   	HEENT:  no jvp  	Pulm: CTA B/L  	CV: RRR, S1S2; no rub  	Back:  no sacral edema  	Abd: +BS, soft,    	: No suprapubic tenderness  	Ext: 1+ edema  	Neuro: awake  	Psych: alert  	Skin: Warm,    	Vascular access: abraham carrion    LABS/STUDIES  --------------------------------------------------------------------------------              8.5    12.57 >-----------<  269      [10-16-21 @ 14:23]              26.3     Hemoglobin: 8.5 g/dL (10-16-21 @ 14:23)  Hemoglobin: 8.1 g/dL (10-15-21 @ 06:56)    Platelet Count - Automated: 269 K/uL (10-16-21 @ 14:23)  Platelet Count - Automated: 245 K/uL (10-15-21 @ 06:56)    133  |  96  |  24  ----------------------------<  227      [10-16-21 @ 14:23]  5.4   |  26  |  2.58        Ca     8.9     [10-16-21 @ 14:23]            Creatinine Trend:  SCr 2.58 [10-16 @ 14:23]  SCr 4.83 [10-15 @ 06:57]  SCr 5.91 [10-14 @ 07:18]  SCr 5.95 [10-13 @ 05:18]  SCr 6.28 [10-12 @ 07:56]    Urinalysis - [10-07-21 @ 10:09]      Color Light Yellow / Appearance Clear / SG 1.015 / pH 6.0      Gluc 200 mg/dL / Ketone Negative  / Bili Negative / Urobili Negative       Blood Small / Protein 300 mg/dL / Leuk Est Negative / Nitrite Negative      RBC 2 / WBC 7 / Hyaline 6 / Gran  / Sq Epi  / Non Sq Epi 2 / Bacteria Negative      Iron 47, TIBC 245, %sat 19      [10-13-21 @ 13:10]  Ferritin 780      [10-13-21 @ 08:59]    HBsAg Nonreact      [10-13-21 @ 09:15]  HCV 0.23, Nonreact      [10-07-21 @ 14:38]  HIV Nonreact      [10-13-21 @ 09:16]    BEULAH: titer Negative, pattern --      [10-07-21 @ 14:37]  C3 Complement 135      [10-07-21 @ 14:42]  C4 Complement 71      [10-07-21 @ 14:42]  ANCA: cANCA Negative, pANCA Negative, atypical ANCA Negative      [10-07-21 @ 14:37]  Syphilis Screen (Treponema Pallidum Ab) Negative      [10-07-21 @ 14:37]  Free Light Chains: kappa 10.89, lambda 12.51, ratio = 0.87      [10-07 @ 14:42]  Immunofixation Serum:   No Monoclonal Band Identified    Reference Range: None Detected      [10-07-21 @ 14:42]  SPEP Interpretation: Normal Electrophoresis Pattern      [10-07-21 @ 14:42]

## 2021-10-16 NOTE — PROGRESS NOTE ADULT - ASSESSMENT
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

## 2021-10-16 NOTE — PROGRESS NOTE ADULT - PROBLEM SELECTOR PLAN 3
Blood pressure initially 200/105 at arrival; s/p nitro ggt at [place], noncompliant with meds  - now much better controlled  - continue Nifedipine ER 90mg po daily  - c/w coreg 6.125mg bid, uptitrate as needed  - bumex as below  - home labetalol on hold due to JOSHUA  - c/t hold HCTZ-losartan given JOSHUA on CKD  - monitor BP  - would consider adding Hydralazine if BP remains uncontrolled

## 2021-10-16 NOTE — PROGRESS NOTE ADULT - PROBLEM SELECTOR PLAN 2
Patient currently receiving HD through [facility]    Will repeat TTE when patient euvolemic to evaluate Mitral Regurg    Continue HD per renal

## 2021-10-16 NOTE — DIETITIAN INITIAL EVALUATION ADULT. - OTHER INFO
Reports good appetite & PO intakes during this admission. Per chart, % PO x 19 meals & 40-70% PO x 4 meals noted. Seen with lunch at bedside, ~75% PO intake of meal. Denies chewing/swallowing difficulty. Denies nausea/emesis. Last BM this morning, normal.     Provided education regarding recommendations for diet on HD. Explained that currently pt does not require potassium restriction but that he may eventually based upon lab values, reviewed foods high in K+ as well as alternatives. Explained that some patients also require phosphorous restriction; however, that is typically determined based upon lab findings. Discussed obtaining serum phosphorous with NP. Education included importance of monitoring intake of foods high in sodium, review of foods high in sodium as well as alternatives, monitoring fluid intake in between HD sessions, and importance of adequate protein intake due to increased demand. Encouraged patient to increase intake of good sources of high biological value protein and reviewed dietary sources. Provided Hemodialysis Booklet.

## 2021-10-16 NOTE — DIETITIAN INITIAL EVALUATION ADULT. - DIET TYPE
Recommend addition of DASH restriction in the setting of multivessel CAD/consistent carbohydrate (no snacks)/DASH/TLC (sodium and cholesterol restricted diet)

## 2021-10-16 NOTE — DIETITIAN INITIAL EVALUATION ADULT. - PROBLEM SELECTOR PLAN 4
sCr 4.05 -> 4.15; unclear etiology but pt w/ underlying longstanding HTN and DM  # also w/ metabolic acidosis, improving on repeat labs  - f/up urine studies  - monitor UOP closely  - obtain Renal US  - w/ concern for acute chf initially requiring bipap support, will hold fluids, start sodium bicarb tabs 650 mg BID  - - Nephro eval in am, consult placed, discussed w/ fellow

## 2021-10-16 NOTE — PROGRESS NOTE ADULT - PROBLEM SELECTOR PLAN 10
DVT ppx: hsq  Dispo: home, no skilled PT needs    above plans discussed with KUSHAL Hammond  updated wife over the phone ([phone])    Stephanie Ashford MD  Division of Hospital Medicine  Cell: [phone]  Office: [phone]

## 2021-10-16 NOTE — PROGRESS NOTE ADULT - PROBLEM SELECTOR PLAN 2
JOSHUA on CKD vs JOSHUA with hyperkalemia and meta acidosis on admission, reports hx of kidney disease but does not know baseline cr or nephrologist name  - plan for HD today again given volume status  - renal US with normal kidney  - Scr continues to uptrend   - care discussed with nephrology  - s/p IR guided shiley placement  - wife in agreement with care planning  - monitor bmp  - c/w sodium bicarb 1300mg tid  - serologies pending

## 2021-10-16 NOTE — PROGRESS NOTE ADULT - PROBLEM SELECTOR PLAN 1
CTS Consulted for CABG evaluation for MVD  Will require several more sessions of Hemodialysis, will repeat TTE later in week when patient is euvolemic to evaluate Mitral Regurg   Repeat P2Y12 on Monday 10/18  CT surgery to continue to follow plan for CABG likely later in week pending preop workup/optimization

## 2021-10-16 NOTE — PROGRESS NOTE ADULT - ASSESSMENT
60M with history of DM type 2, HTN, HLD presenting as transfer from [facility] for chest pain concerning for NSTEMI, code stroke also called for L facial droop, CTA significant for ipsilateral high-grade stenosis of the left internal carotid artery at the carotid bifurcation seen on carotid duplex    PLAN:  - Patient determined to require CABG with CT Surgery, however, additional optimization is required in preparation:       1  -   Complete additional HD to achieve euvolemia       2  -   Patient found to have mod-severe Mitral Regurg on ECHO, additional HD and euvolemia would optimize this      3   -  P2Y12 level   - Regarding coordination of CABG and CEA, CTS anticipating a day late in the upcoming week.     - Bilateral Carotid Duplex: complete  - ASA/Statin  - Diet: CC   - PO Home Meds     Vascular Surgery   [phone]

## 2021-10-16 NOTE — PROGRESS NOTE ADULT - SUBJECTIVE AND OBJECTIVE BOX
No VASCULAR SURGERY PROGRESS NOTE  Hospital Day #11d  Patient has no new complaints.     PMHx  ·	Hypertension  ·	Hyperlipidemia  ·	Diabetes mellitus    Vital Signs Last 24 Hrs  T(C): 37.8 (16 Oct 2021 16:12), Max: 37.8 (16 Oct 2021 16:12)  T(F): 100.1 (16 Oct 2021 16:12), Max: 100.1 (16 Oct 2021 16:12)  HR: 76 (16 Oct 2021 16:12) (63 - 87)  BP: 111/64 (16 Oct 2021 16:12) (111/64 - 184/95)  RR: 18 (16 Oct 2021 12:42) (18 - 18)  SpO2: 96% (16 Oct 2021 16:12) (96% - 99%)    I's & O's  10-15-21 @ 07:01  -  10-16-21 @ 07:00  --------------------------------------------------------  IN: 760 mL / OUT: 500 mL / NET: 260 mL    10-16-21 @ 07:01  -  10-16-21 @ 16:21  --------------------------------------------------------  IN: 240 mL / OUT: 500 mL / NET: -260 mL    MEDICATIONS:  DVT PROPHYLAXIS:   ·	heparin Injectable 5000 Unit(s)  GI PROPHYLAXIS:   ·	pantoprazole injectable 40 milliGRAM(s)               8.5    12.57 )-----------( 269      ( 16 Oct 2021 14:23 )             26.3     133<L>  |  96  |  24<H>  ----------------------------<  227<H>  5.4<H>   |  26  |  2.58<H>    Ca    8.9      16 Oct 2021 14:23    CAROTID DUPLEX BILATERAL  IMPRESSION:  Left internal carotid artery 70-99% flow-limiting stenosis.  Right internal carotid artery 50-69% flow-limiting stenosis.  Bilateral external carotid artery flow limiting stenoses.  INDIANA Hammond was informed of these findings on 10/8/2021 at 5:06 PM.    Measurement of carotid stenosis is based on velocity parameters that correlate the residual internal carotid diameter with that of the more distal vessel in accordance with a method such as the [employer] American Symptomatic Carotid Endarterectomy Trial (NASCET)

## 2021-10-16 NOTE — PROGRESS NOTE ADULT - SUBJECTIVE AND OBJECTIVE BOX
Patient is a 60y old  Male who presents with a chief complaint of NSTEMI (16 Oct 2021 07:45)      SUBJECTIVE / OVERNIGHT EVENTS:  no acute events overnight, vss, afebrile  pt has no complaints- waiting for CTS for OR date  denies chest pain, dyspnea    tele reviewed: sinus 70-90s    ROS:  14 point ROS negative in detail except stated as above    MEDICATIONS  (STANDING):  aspirin  chewable 81 milliGRAM(s) Oral daily  atorvastatin 80 milliGRAM(s) Oral at bedtime  buMETAnide IVPB 4 milliGRAM(s) IV Intermittent every 12 hours  carvedilol 6.25 milliGRAM(s) Oral every 12 hours  chlorhexidine 4% Liquid 1 Application(s) Topical <User Schedule>  dextrose 40% Gel 15 Gram(s) Oral once  dextrose 5%. 1000 milliLiter(s) (50 mL/Hr) IV Continuous <Continuous>  dextrose 5%. 1000 milliLiter(s) (100 mL/Hr) IV Continuous <Continuous>  dextrose 50% Injectable 25 Gram(s) IV Push once  dextrose 50% Injectable 12.5 Gram(s) IV Push once  dextrose 50% Injectable 25 Gram(s) IV Push once  glucagon  Injectable 1 milliGRAM(s) IntraMuscular once  heparin   Injectable 5000 Unit(s) SubCutaneous every 12 hours  insulin glargine Injectable (LANTUS) 18 Unit(s) SubCutaneous at bedtime  insulin lispro (ADMELOG) corrective regimen sliding scale   SubCutaneous three times a day before meals  insulin lispro (ADMELOG) corrective regimen sliding scale   SubCutaneous at bedtime  NIFEdipine XL 90 milliGRAM(s) Oral daily  pantoprazole  Injectable 40 milliGRAM(s) IV Push every 12 hours  sodium bicarbonate 1300 milliGRAM(s) Oral three times a day    MEDICATIONS  (PRN):  sodium chloride 0.9% lock flush 10 milliLiter(s) IV Push every 1 hour PRN Pre/post blood products, medications, blood draw, and to maintain line patency      CAPILLARY BLOOD GLUCOSE      POCT Blood Glucose.: 136 mg/dL (16 Oct 2021 07:25)  POCT Blood Glucose.: 192 mg/dL (15 Oct 2021 21:04)  POCT Blood Glucose.: 154 mg/dL (15 Oct 2021 16:10)  POCT Blood Glucose.: 144 mg/dL (15 Oct 2021 11:19)    I&O's Summary    15 Oct 2021 07:01  -  16 Oct 2021 07:00  --------------------------------------------------------  IN: 760 mL / OUT: 500 mL / NET: 260 mL    16 Oct 2021 07:01  -  16 Oct 2021 10:20  --------------------------------------------------------  IN: 0 mL / OUT: 0 mL / NET: 0 mL        PHYSICAL EXAM:  Vital Signs Last 24 Hrs  T(C): 36.8 (16 Oct 2021 08:06), Max: 37.2 (15 Oct 2021 19:44)  T(F): 98.3 (16 Oct 2021 08:06), Max: 98.9 (15 Oct 2021 19:44)  HR: 81 (16 Oct 2021 08:06) (65 - 87)  BP: 119/70 (16 Oct 2021 08:06) (119/70 - 184/95)  BP(mean): --  RR: 18 (16 Oct 2021 08:06) (18 - 18)  SpO2: 97% (16 Oct 2021 08:06) (97% - 99%)    GENERAL: NAD, well-developed  HEAD:  Atraumatic, Normocephalic  EYES: EOMI, PERRLA, conjunctiva and sclera clear  NECK: Supple, No JVD  CHEST/LUNG: Clear to auscultation bilaterally; No wheeze  HEART: Regular rate and rhythm; No murmurs, rubs, or gallops  ABDOMEN: Soft, Nontender, Nondistended; Bowel sounds present  EXTREMITIES:  2+ pitting LE edema  NEUROLOGY: AAOx3; non-focal  SKIN: No rashes or lesions    LABS:  personally reviewed                        8.1    11.36 )-----------( 245      ( 15 Oct 2021 06:56 )             25.0     10-15    135  |  97  |  67<H>  ----------------------------<  101<H>  4.5   |  24  |  4.83<H>    Ca    8.5      15 Oct 2021 06:57                RADIOLOGY & ADDITIONAL TESTS:    Imaging Personally Reviewed:    Consultant(s) Notes Reviewed:  nephro, CTS, cards    Care Discussed with Consultants/Other Providers: Dr. Crews (nephro)

## 2021-10-16 NOTE — DIETITIAN INITIAL EVALUATION ADULT. - CHIEF COMPLAINT
The patient is a 61yo Male complaining with PMH of T2DM, HTN, HLD transferred from OSH 2/2 concern for NSTEMI + CHF. Course complicated by JOSHUA on CKD, now s/p HD cath placement 10/12 & started HD 10/14 (likely to be HD dependent per nephrology note). Of note, admitted with hyperkalemia (now resolved with no current dietary restrictions). Course also notable for RRT due to new L facial droop on 10/7. S/P LHC showing multivessel CAD with CT surgery consulted for possible CABG. Also s/p MBSS on 10/8- recommended for regular diet.

## 2021-10-16 NOTE — DIETITIAN INITIAL EVALUATION ADULT. - ORAL INTAKE PTA/DIET HISTORY
Pt endorsed good appetite @ home. Usual diet recall obtained, revealing high intake of CHO + low protein intakes. Meals typically prepared & consumed @ home. Takes 30units insulin @ bed + metformin daily for BG management. SMBG levels 3x daily; wide range of BG levels reported. Noted HgbA1c 7.0% suggestive of fair management (level obtained prior to initiation of HD). Reports NKFA. Takes Vitamin C + Multivitamin daily @ home.

## 2021-10-16 NOTE — PROGRESS NOTE ADULT - ASSESSMENT
60 yr old M w/ hx of DM2, HTN and HLD presents as transfer from [facility] for chest pain and SOB concerning for NSTEMI and possible new CHF c/b JOSHUA likely on CKD, and hypertensive emergency. Patient hospital course complicated by s/p RRT for CVA/TIA ruled out and worsening anemia requiring 1 PRBC. Pt  underwent LHC showing Multivessel CAD. CT surgery consulted for CABG evaluation.  Case discussed with Dr. Xiong.  Plan is to recheck P2Y12 on Monday and repeat TTE later on in week once patient has undergone several more HD sessions to re-evaluate Mitral Regurg seen on echo done 10/6/2021

## 2021-10-16 NOTE — DIETITIAN INITIAL EVALUATION ADULT. - PERSON TAUGHT/METHOD
Provided education regarding recommendations for diet on HD. Explained that currently pt does not require potassium restriction but that he may eventually based upon lab values, reviewed foods high in K+ as well as alternatives. Explained that some patients also require phosphorous restriction; however, that is typically determined based upon lab findings. Discussed obtaining serum phosphorous with NP. Education included importance of monitoring intake of foods high in sodium, review of foods high in sodium as well as alternatives, monitoring fluid intake in between HD sessions, and importance of adequate protein intake due to increased demand. Encouraged patient to increase intake of good sources of high biological value protein and reviewed dietary sources. Provided Hemodialysis Booklet./patient instructed/spouse instructed

## 2021-10-16 NOTE — PROGRESS NOTE ADULT - SUBJECTIVE AND OBJECTIVE BOX
Subjective: "I feel alright "  Patient denies chest pain/shortness of breath.  Currently receiving HD on dialysis unit.      VITAL SIGNS    Vital Signs Last 24 Hrs  T(C): 36.7 (10-16-21 @ 09:42), Max: 37.2 (10-15-21 @ 19:44)  T(F): 98.1 (10-16-21 @ 09:42), Max: 98.9 (10-15-21 @ 19:44)  HR: 63 (10-16-21 @ 09:42) (63 - 87)  BP: 119/76 (10-16-21 @ 09:42) (119/70 - 184/95)  RR: 18 (10-16-21 @ 09:42) (18 - 18)  SpO2: 97% (10-16-21 @ 09:42) (97% - 99%)            10-15 @ 07:01  -  10-16 @ 07:00  --------------------------------------------------------  IN: 760 mL / OUT: 500 mL / NET: 260 mL    10-16 @ 07:01  -  10-16 @ 11:49  --------------------------------------------------------  IN: 0 mL / OUT: 0 mL / NET: 0 mL       Daily     Daily Weight in k.1 (16 Oct 2021 08:06)  Admit Wt: Drug Dosing Weight  Height (cm): 165.1 (12 Oct 2021 16:17)  Weight (kg): 83.7 (14 Oct 2021 09:02)  BMI (kg/m2): 30.7 (14 Oct 2021 09:02)  BSA (m2): 1.91 (14 Oct 2021 09:02)      CAPILLARY BLOOD GLUCOSE      POCT Blood Glucose.: 136 mg/dL (16 Oct 2021 07:25)  POCT Blood Glucose.: 192 mg/dL (15 Oct 2021 21:04)  POCT Blood Glucose.: 154 mg/dL (15 Oct 2021 16:10)            PHYSICAL EXAM    Neurology: alert and oriented x 3, nonfocal, no gross deficits  CV : RRR +S1/S2  Lungs: CTA BL. RR easy, unlabored   Abdomen: soft, nontender, nondistended, positive bowel sounds, bowel movement   Neg N/V/D   : PT on HD   Extremities: Right radial cath site without s/s hematoma.   COTO; Trace peripheral edema, neg calf tenderness.   PPP bilaterally    Lines- RIJ HD Cath dressing c/d/i. Insertion site without erythema or drainage       aspirin  chewable 81 milliGRAM(s) Oral daily  atorvastatin 80 milliGRAM(s) Oral at bedtime  buMETAnide IVPB 4 milliGRAM(s) IV Intermittent every 12 hours  carvedilol 6.25 milliGRAM(s) Oral every 12 hours  chlorhexidine 4% Liquid 1 Application(s) Topical <User Schedule>  dextrose 40% Gel 15 Gram(s) Oral once  dextrose 5%. 1000 milliLiter(s) IV Continuous <Continuous>  dextrose 5%. 1000 milliLiter(s) IV Continuous <Continuous>  dextrose 50% Injectable 25 Gram(s) IV Push once  dextrose 50% Injectable 12.5 Gram(s) IV Push once  dextrose 50% Injectable 25 Gram(s) IV Push once  glucagon  Injectable 1 milliGRAM(s) IntraMuscular once  heparin   Injectable 5000 Unit(s) SubCutaneous every 12 hours  insulin glargine Injectable (LANTUS) 18 Unit(s) SubCutaneous at bedtime  insulin lispro (ADMELOG) corrective regimen sliding scale   SubCutaneous three times a day before meals  insulin lispro (ADMELOG) corrective regimen sliding scale   SubCutaneous at bedtime  NIFEdipine XL 90 milliGRAM(s) Oral daily  pantoprazole  Injectable 40 milliGRAM(s) IV Push every 12 hours  sodium bicarbonate 1300 milliGRAM(s) Oral three times a day  sodium chloride 0.9% lock flush 10 milliLiter(s) IV Push every 1 hour PRN

## 2021-10-16 NOTE — DIETITIAN INITIAL EVALUATION ADULT. - ADD RECOMMEND
1) Continue to monitor intake, weight, labs, GI tolerance, skin integrity  2) Provide food preferences within dietary restrictions when feasible   3) Encourage good PO intake 4) Reinforce diet education prn

## 2021-10-16 NOTE — PROGRESS NOTE ADULT - ASSESSMENT
61 yo male with DM2, HTN, and HLD who initially presented to [facility] on 10/05 as a transfer from [facility] for NSTEMI and possible CHF. Patient on Heparin drip for NSTEMI. LKW 10:45AM. Patient had episode of bradycardia (HR 30s), then became altered. RRT called. BP during RRT 70s/40s. Code stroke called for L facial droop.   CTH negative for acute pathology, old L frontal cortical infarct seen.   CTA H unremarkable. CTA N shows high-grade stenosis left internal carotid artery at the carotid bifurcation in the neck.  10/06 TTE: EF 45%, moderate-severe MR, mild-moderate L ventricular systolic dysfunction.   A1c 7  MRI no AIS but old strokes  HD cath placement 10/12   CD with severe L ICA and Mod R ICA   s/p LHC 10/14  Impression: Mild L nasolabial flattening and dysarthria, ?decreased eye closure strength on the L. Possibly secondary to R brain dysfunction due to acute stroke of unknown mechanism vs peripheral etiology. Patient with old L frontal infarct on CTH, also with high-grade stenosis of L ICA at the carotid bifurcation which likely cause of old stroke     Recommendations:  - ASA 81MG PO QD + Brilinta 90MG PO Q12HR.   - CTS possible CABG?  - Avoid hypotension.  - High dose statin therapy - atorvastatin 40mg PO daily. LDL goal <70mg/dL.  -  vascular for ICA revascularization can be outpt. not acute   - lipid panel  - telemetry  - PT/OT/SS/SLP, OOBC  - permissive HTN, -180mmHg.  - check FS, glucose control <180  - GI/DVT ppx  - Counseling on diet, exercise, and medication adherence was done  - Counseling on smoking cessation and alcohol consumption offered when appropriate.  - Pain assessed and judicious use of narcotics when appropriate was discussed.    - Stroke education given when appropriate.  - Importance of fall prevention discussed.   - Differential diagnosis and plan of care discussed with patient and/or family and primary team  - Thank you for allowing me to participate in the care of this patient. Call with questions.   Marcelino Patel MD  Vascular Neurology .

## 2021-10-16 NOTE — DIETITIAN INITIAL EVALUATION ADULT. - PROBLEM SELECTOR PLAN 3
Concern for acute CHF, setting of dyspnea at initial presentation at OSH and here.  Elevated BNP 11971  - TTE in am  - lasix 40 mg IV QD  - cardio following

## 2021-10-16 NOTE — DIETITIAN INITIAL EVALUATION ADULT. - ETIOLOGY
increased demand for nutrients due to catabolic process limited previous exposure to nutrition related recommendations

## 2021-10-16 NOTE — PROGRESS NOTE ADULT - SUBJECTIVE AND OBJECTIVE BOX
Neurology Progress Note    S: Patient seen and examined. No new events overnight. patient denied CP, SOB, HA or pain.      Medications:  MEDICATIONS  (STANDING):  aspirin  chewable 81 milliGRAM(s) Oral daily  atorvastatin 80 milliGRAM(s) Oral at bedtime  buMETAnide IVPB 4 milliGRAM(s) IV Intermittent every 12 hours  carvedilol 6.25 milliGRAM(s) Oral every 12 hours  chlorhexidine 4% Liquid 1 Application(s) Topical <User Schedule>  dextrose 40% Gel 15 Gram(s) Oral once  dextrose 5%. 1000 milliLiter(s) (50 mL/Hr) IV Continuous <Continuous>  dextrose 5%. 1000 milliLiter(s) (100 mL/Hr) IV Continuous <Continuous>  dextrose 50% Injectable 25 Gram(s) IV Push once  dextrose 50% Injectable 12.5 Gram(s) IV Push once  dextrose 50% Injectable 25 Gram(s) IV Push once  glucagon  Injectable 1 milliGRAM(s) IntraMuscular once  heparin   Injectable 5000 Unit(s) SubCutaneous every 12 hours  insulin glargine Injectable (LANTUS) 18 Unit(s) SubCutaneous at bedtime  insulin lispro (ADMELOG) corrective regimen sliding scale   SubCutaneous three times a day before meals  insulin lispro (ADMELOG) corrective regimen sliding scale   SubCutaneous at bedtime  NIFEdipine XL 90 milliGRAM(s) Oral daily  pantoprazole  Injectable 40 milliGRAM(s) IV Push every 12 hours  sodium bicarbonate 1300 milliGRAM(s) Oral three times a day    MEDICATIONS  (PRN):  sodium chloride 0.9% lock flush 10 milliLiter(s) IV Push every 1 hour PRN Pre/post blood products, medications, blood draw, and to maintain line patency      Vitals:  Vital Signs Last 24 Hrs  Vital Signs Last 24 Hrs  T(C): 36.8 (10-16-21 @ 08:06), Max: 37.2 (10-15-21 @ 19:44)  T(F): 98.3 (10-16-21 @ 08:06), Max: 98.9 (10-15-21 @ 19:44)  HR: 81 (10-16-21 @ 08:06) (65 - 87)  BP: 119/70 (10-16-21 @ 08:06) (119/70 - 184/95)  BP(mean): --  RR: 18 (10-16-21 @ 08:06) (18 - 18)  SpO2: 97% (10-16-21 @ 08:06) (97% - 99%)        General Exam:   General Appearance: Appropriately dressed and in no acute distress       Head: Normocephalic, atraumatic and no dysmorphic features  Ear, Nose, and Throat: Moist mucous membranes  CVS: S1S2+  Resp: No SOB, no wheeze or rhonchi  Abd: soft NTND  Extremities: No edema, no cyanosis  Skin: No bruises, no rashes     Neurological Exam:  Mental Status: Awake, alert and oriented x 3.  Able to follow simple and complex verbal commands. Able to name and repeat. fluent speech. No obvious aphasia or dysarthria noted.   Cranial Nerves: PERRL, EOMI, VFFC, sensation V1-V3 intact,  mild NLF facial asymmetry , equal elevation of palate, scm/trap 5/5, tongue is midline on protrusion. no obvious papilledema on fundoscopic exam. Hearing is grossly intact.   Motor: Normal bulk, tone and strength throughout. Fine finger movements were intact and symmetric. no tremors or drift noted.    Sensation: Intact to light touch and pinprick throughout. no right/left confusion. no extinction to tactile on DSS.   Reflexes: 1+ throughout at biceps, brachioradialis, triceps, patellars and ankles bilaterally and equal. No clonus. R toe and L toe were both downgoing.  Coordination: No dysmetria on FNF    Gait: deferred     I personally reviewed the below data/images/labs:    CBC Full  -  ( 15 Oct 2021 06:56 )  WBC Count : 11.36 K/uL  RBC Count : 2.58 M/uL  Hemoglobin : 8.1 g/dL  Hematocrit : 25.0 %  Platelet Count - Automated : 245 K/uL  Mean Cell Volume : 96.9 fl  Mean Cell Hemoglobin : 31.4 pg  Mean Cell Hemoglobin Concentration : 32.4 gm/dL  Auto Neutrophil # : x  Auto Lymphocyte # : x  Auto Monocyte # : x  Auto Eosinophil # : x  Auto Basophil # : x  Auto Neutrophil % : x  Auto Lymphocyte % : x  Auto Monocyte % : x  Auto Eosinophil % : x  Auto Basophil % : x      10-15    135  |  97  |  67<H>  ----------------------------<  101<H>  4.5   |  24  |  4.83<H>    Ca    8.5      15 Oct 2021 06:57            -10/07 CTH: No hemorrhage. 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   -10/07 CTA N: High-grade stenosis left internal carotid artery at the carotid bifurcation in the neck.   -10/07 CTA H: Normal intracranial circulation.    < from: MR Head No Cont (10.09.21 @ 20:22) >    EXAM:  MR BRAIN                            PROCEDURE DATE:  10/09/2021            INTERPRETATION:  CLINICAL HISTORY:Facial droop, on heparin drip, NSTEMI . Severe left ICA stenosis.    TECHNIQUE:  MRI of brain without contrast dated 10/9/2021.  Examination consisted of transaxial T1, T2, FLAIR, gradient echo as well as diffusion-weighted sequences with corresponding ADC maps. Coronal T2 and sagittal T1-weighted images were also acquired through the brain.    COMPARISON: CT head and CTA head andneck 10/7/2021    FINDINGS:  There are no areas abnormal restricted diffusion within the brain parenchyma to suggest acute/subacute infarct. There is no acute intracranial hemorrhage, vasogenic edema or abnormal susceptibility artifact. Chronic lacunar infarcts are seen in the left frontal lobe, right frontal cranial radiata and right cerebellum. Additional nonspecific patchy and confluent areas of T2/FLAIR prolongation in the bihemispheric white matter likely represents mild chronic microvascular changes.    The ventricles, sulci and cisternal spaces are stable in size and configuration. There is no midline shift or abnormal extra-axial fluid collection.    Flow-voids of the major intracranial vessels are maintained, as seen best on the T2-weighted images, indicating their patency.    The visualized paranasal sinuses and mastoid air cells are free of acute disease. The orbital regions are unremarkable.    IMPRESSION:  No acute infarct or intracranial hemorrhage. Chronic infarcts and microvascular changes as above.    --- End of Report ---                NEIL CARDENAS MD; Attending Radiologist  This document has been electronically signed. Oct 10 2021  4:26AM    < end of copied text >  < from: [facility] Duplex CarotidPrashant (10.08.21 @ 17:07) >    EXAM:  CAROTID DUPLEX BILATERAL                            PROCEDURE DATE:  10/08/2021            INTERPRETATION:  CLINICAL INFORMATION: Left ICA high-grade stenosis identified on recent CTA neck.    COMPARISON: CTA neck 10/7/2021.    TECHNIQUE: Grayscale, color and spectral Doppler examination of both carotid arteries was performed.    FINDINGS:    There are atheromatous plaques are present bilaterally in the region of the bifurcations of the common carotid arteries into internal and external branches.    Velocity elevation of the proximal right internal carotid artery results in 50-69% flow-limiting stenosis.    Velocity elevation of the proximal left internal carotid artery results in 70-9% flow-limiting stenosis.    Bilateral flow-limiting stenoses noted of the right and left external carotid arteries as well.    Peak systolic velocities are as follows:    RIGHT:  PROX CCA = 126 cm/s  DIST CCA = 99 cm/s  PROX ICA = 137 cm/s  MID ICA = 86 cm/s  DIST ICA = 80 cm/s  ECA = 202 cm/s    LEFT:  PROX CCA = 100 cm/s  DIST CCA = 59 cm/s  PROX ICA = 313 cm/s  MID ICA = 72 cm/s  DIST ICA = 47 cm/s  ECA = 298 cm/s    Antegrade flow is noted within both vertebral arteries.    IMPRESSION:    Left internal carotid artery 70-99% flow-limiting stenosis.  Right internal carotid artery 50-69% flow-limiting stenosis.  Bilateral external carotid artery flow limiting stenoses.  INDIANA Hammond was informed of these findings on 10/8/2021 at 5:06 PM.    Measurement of carotid stenosis is based on velocity parameters that correlate the residual internal carotid diameter with that of the more distal vessel in accordance with a method such as the [employer] Symptomatic Carotid Endarterectomy Trial (NASCET).    --- End of Report ---                FELIX MCMAHON M.D., ATTENDING RADIOLOGIST  This document has been electronically signed. Oct  8 2021  5:21PM    < end of copied text >

## 2021-10-16 NOTE — DIETITIAN INITIAL EVALUATION ADULT. - NS FNS REASON FOR WEIGHT CHANG
Pt reports UBW of 176 pounds. Noted fluctuating weights ranging from 183.2-199 pounds since admit- likely 2/2 fluid shifts between HD sessions & given hx of CHF. Admit weight 185 pounds; current weight (10.16) 183.2 pounds.

## 2021-10-16 NOTE — DIETITIAN INITIAL EVALUATION ADULT. - PROBLEM SELECTOR PLAN 2
Blood pressure initially 200/105 at arrival; also noted to be w/ similar BP at [facility] where he was started on  Nitro gtt. Currently off Nitro gtt; S/p lasix 40 mg IV, losartan 25 mg po, labetalol 200 mg po, and labetalol 10 mg x1 in ED, most recent repeat /78  Concern pt runs w/ BP in 200s frequently at home; would reduce BP carefully, goal ~25% first 24hrs     - c/w home meds: labetalol 200 mg BID and nifedipine 90 mg QD  - hold HCTZ-losartan

## 2021-10-16 NOTE — PROGRESS NOTE ADULT - ASSESSMENT
Patient is a 60 year old male with PMH od DM and HTN who presented to the hospital as a transfer from OSH for NSTEMI. The nephrology team was consulted due to elevated creatinine of 4.05 upon presentation. The patient denies any history of kidney disease and denied noticing any changes in his urination.        transferred from outside hospital for further cardiac evaluation, noncompliant with dm, htn  episode of bradycardia (HR 30s), then became altered. RRT and code stroke 10/7; s/p cardiac cath and awaiting cabg eval  #  vicki (atn/dio) on ckd  ?stage V   HD # 3 today  # volume overloaded/edema-cont bumex;    #CKD- prot/cr 8.7, low alb 2.6, possible component nephrotic syndrom;  serologic workup neg thus far; pla2r pending    renal sono for kidney size ok   likely dm nephropathy    #metabolic acidosis- on sodium bicarb 1300mg po tid; monitor serum bicarb trend; DC bicarb tavs  #hyperkalemia- low k renal diet; hd today  #anemia- start SISSY  #BP control-cont meds  #check serum phos .    wife at bedside  spoke to wife about likely need for long term dialysis; she understands

## 2021-10-17 LAB
ANION GAP SERPL CALC-SCNC: 13 MMOL/L — SIGNIFICANT CHANGE UP (ref 5–17)
BUN SERPL-MCNC: 40 MG/DL — HIGH (ref 7–23)
CALCIUM SERPL-MCNC: 8.7 MG/DL — SIGNIFICANT CHANGE UP (ref 8.4–10.5)
CHLORIDE SERPL-SCNC: 97 MMOL/L — SIGNIFICANT CHANGE UP (ref 96–108)
CO2 SERPL-SCNC: 25 MMOL/L — SIGNIFICANT CHANGE UP (ref 22–31)
CREAT SERPL-MCNC: 3.84 MG/DL — HIGH (ref 0.5–1.3)
GLUCOSE BLDC GLUCOMTR-MCNC: 142 MG/DL — HIGH (ref 70–99)
GLUCOSE BLDC GLUCOMTR-MCNC: 181 MG/DL — HIGH (ref 70–99)
GLUCOSE BLDC GLUCOMTR-MCNC: 223 MG/DL — HIGH (ref 70–99)
GLUCOSE BLDC GLUCOMTR-MCNC: 93 MG/DL — SIGNIFICANT CHANGE UP (ref 70–99)
GLUCOSE SERPL-MCNC: 87 MG/DL — SIGNIFICANT CHANGE UP (ref 70–99)
HCT VFR BLD CALC: 23.6 % — LOW (ref 39–50)
HGB BLD-MCNC: 7.7 G/DL — LOW (ref 13–17)
MCHC RBC-ENTMCNC: 31.8 PG — SIGNIFICANT CHANGE UP (ref 27–34)
MCHC RBC-ENTMCNC: 32.6 GM/DL — SIGNIFICANT CHANGE UP (ref 32–36)
MCV RBC AUTO: 97.5 FL — SIGNIFICANT CHANGE UP (ref 80–100)
NRBC # BLD: 0 /100 WBCS — SIGNIFICANT CHANGE UP (ref 0–0)
PLATELET # BLD AUTO: 269 K/UL — SIGNIFICANT CHANGE UP (ref 150–400)
POTASSIUM SERPL-MCNC: 4.5 MMOL/L — SIGNIFICANT CHANGE UP (ref 3.5–5.3)
POTASSIUM SERPL-SCNC: 4.5 MMOL/L — SIGNIFICANT CHANGE UP (ref 3.5–5.3)
RBC # BLD: 2.42 M/UL — LOW (ref 4.2–5.8)
RBC # FLD: 12.7 % — SIGNIFICANT CHANGE UP (ref 10.3–14.5)
SODIUM SERPL-SCNC: 135 MMOL/L — SIGNIFICANT CHANGE UP (ref 135–145)
WBC # BLD: 11.72 K/UL — HIGH (ref 3.8–10.5)
WBC # FLD AUTO: 11.72 K/UL — HIGH (ref 3.8–10.5)

## 2021-10-17 PROCEDURE — 99233 SBSQ HOSP IP/OBS HIGH 50: CPT

## 2021-10-17 RX ADMIN — PANTOPRAZOLE SODIUM 40 MILLIGRAM(S): 20 TABLET, DELAYED RELEASE ORAL at 17:03

## 2021-10-17 RX ADMIN — BUMETANIDE 132 MILLIGRAM(S): 0.25 INJECTION INTRAMUSCULAR; INTRAVENOUS at 05:00

## 2021-10-17 RX ADMIN — HEPARIN SODIUM 5000 UNIT(S): 5000 INJECTION INTRAVENOUS; SUBCUTANEOUS at 17:03

## 2021-10-17 RX ADMIN — Medication 1300 MILLIGRAM(S): at 21:45

## 2021-10-17 RX ADMIN — Medication 1: at 11:49

## 2021-10-17 RX ADMIN — BUMETANIDE 132 MILLIGRAM(S): 0.25 INJECTION INTRAMUSCULAR; INTRAVENOUS at 17:02

## 2021-10-17 RX ADMIN — ATORVASTATIN CALCIUM 80 MILLIGRAM(S): 80 TABLET, FILM COATED ORAL at 21:45

## 2021-10-17 RX ADMIN — CHLORHEXIDINE GLUCONATE 1 APPLICATION(S): 213 SOLUTION TOPICAL at 05:00

## 2021-10-17 RX ADMIN — HEPARIN SODIUM 5000 UNIT(S): 5000 INJECTION INTRAVENOUS; SUBCUTANEOUS at 05:00

## 2021-10-17 RX ADMIN — PANTOPRAZOLE SODIUM 40 MILLIGRAM(S): 20 TABLET, DELAYED RELEASE ORAL at 05:00

## 2021-10-17 RX ADMIN — Medication 90 MILLIGRAM(S): at 05:00

## 2021-10-17 RX ADMIN — CARVEDILOL PHOSPHATE 6.25 MILLIGRAM(S): 80 CAPSULE, EXTENDED RELEASE ORAL at 17:03

## 2021-10-17 RX ADMIN — INSULIN GLARGINE 18 UNIT(S): 100 INJECTION, SOLUTION SUBCUTANEOUS at 21:53

## 2021-10-17 RX ADMIN — Medication 1300 MILLIGRAM(S): at 05:01

## 2021-10-17 RX ADMIN — CARVEDILOL PHOSPHATE 6.25 MILLIGRAM(S): 80 CAPSULE, EXTENDED RELEASE ORAL at 05:00

## 2021-10-17 RX ADMIN — Medication 81 MILLIGRAM(S): at 11:49

## 2021-10-17 RX ADMIN — Medication 1300 MILLIGRAM(S): at 11:49

## 2021-10-17 NOTE — PROGRESS NOTE ADULT - PROBLEM SELECTOR PLAN 2
JOSHUA on CKD vs JOSHUA with hyperkalemia and meta acidosis on admission  - s/p 3 sessions of HD, SCr stable, volume status much improved  - renal US with normal kidney  - Scr continues to uptrend   - care discussed with nephrology  - s/p IR guided shiley placement  - wife in agreement with care planning  - monitor bmp  - c/w sodium bicarb 1300mg tid  - serologies pending

## 2021-10-17 NOTE — PROGRESS NOTE ADULT - PROBLEM SELECTOR PLAN 1
s/p LHC (10/14) with multivessel disease  - CTS consulted for CABG: OR timing TBD (likely next week)  - check P2Y12 level in AM  - c/w ASA 81 mg daily  - c/w atorvastatin 80 mg qhs  - monitor on tele  - TTE EF 45%: mild-moderate LVSF  - appreciate CTS

## 2021-10-17 NOTE — PROGRESS NOTE ADULT - SUBJECTIVE AND OBJECTIVE BOX
Neurology Progress Note    S: Patient seen and examined. No new events overnight. patient denied CP, SOB, HA or pain.      Medications:  MEDICATIONS  (STANDING):  aspirin  chewable 81 milliGRAM(s) Oral daily  atorvastatin 80 milliGRAM(s) Oral at bedtime  buMETAnide IVPB 4 milliGRAM(s) IV Intermittent every 12 hours  carvedilol 6.25 milliGRAM(s) Oral every 12 hours  chlorhexidine 4% Liquid 1 Application(s) Topical <User Schedule>  dextrose 40% Gel 15 Gram(s) Oral once  dextrose 5%. 1000 milliLiter(s) (50 mL/Hr) IV Continuous <Continuous>  dextrose 5%. 1000 milliLiter(s) (100 mL/Hr) IV Continuous <Continuous>  dextrose 50% Injectable 25 Gram(s) IV Push once  dextrose 50% Injectable 12.5 Gram(s) IV Push once  dextrose 50% Injectable 25 Gram(s) IV Push once  glucagon  Injectable 1 milliGRAM(s) IntraMuscular once  heparin   Injectable 5000 Unit(s) SubCutaneous every 12 hours  insulin glargine Injectable (LANTUS) 18 Unit(s) SubCutaneous at bedtime  insulin lispro (ADMELOG) corrective regimen sliding scale   SubCutaneous three times a day before meals  insulin lispro (ADMELOG) corrective regimen sliding scale   SubCutaneous at bedtime  NIFEdipine XL 90 milliGRAM(s) Oral daily  pantoprazole  Injectable 40 milliGRAM(s) IV Push every 12 hours  sodium bicarbonate 1300 milliGRAM(s) Oral three times a day    MEDICATIONS  (PRN):  sodium chloride 0.9% lock flush 10 milliLiter(s) IV Push every 1 hour PRN Pre/post blood products, medications, blood draw, and to maintain line patency      Vitals:  Vital Signs Last 24 Hrs  T(C): 36.6 (10-17-21 @ 10:55), Max: 37.8 (10-16-21 @ 16:12)  T(F): 97.9 (10-17-21 @ 10:55), Max: 100.1 (10-16-21 @ 16:12)  HR: 76 (10-17-21 @ 10:55) (74 - 86)  BP: 138/72 (10-17-21 @ 10:55) (111/64 - 146/81)  BP(mean): --  RR: 18 (10-17-21 @ 10:55) (18 - 18)  SpO2: 98% (10-17-21 @ 10:55) (96% - 98%)        General Exam:   General Appearance: Appropriately dressed and in no acute distress       Head: Normocephalic, atraumatic and no dysmorphic features  Ear, Nose, and Throat: Moist mucous membranes  CVS: S1S2+  Resp: No SOB, no wheeze or rhonchi  Abd: soft NTND  Extremities: No edema, no cyanosis  Skin: No bruises, no rashes     Neurological Exam:  Mental Status: Awake, alert and oriented x 3.  Able to follow simple and complex verbal commands. Able to name and repeat. fluent speech. No obvious aphasia or dysarthria noted.   Cranial Nerves: PERRL, EOMI, VFFC, sensation V1-V3 intact,  mild NLF facial asymmetry , equal elevation of palate, scm/trap 5/5, tongue is midline on protrusion. no obvious papilledema on fundoscopic exam. Hearing is grossly intact.   Motor: Normal bulk, tone and strength throughout. Fine finger movements were intact and symmetric. no tremors or drift noted.    Sensation: Intact to light touch and pinprick throughout. no right/left confusion. no extinction to tactile on DSS.   Reflexes: 1+ throughout at biceps, brachioradialis, triceps, patellars and ankles bilaterally and equal. No clonus. R toe and L toe were both downgoing.  Coordination: No dysmetria on FNF    Gait: deferred     I personally reviewed the below data/images/labs:  CBC Full  -  ( 17 Oct 2021 06:59 )  WBC Count : 11.72 K/uL  RBC Count : 2.42 M/uL  Hemoglobin : 7.7 g/dL  Hematocrit : 23.6 %  Platelet Count - Automated : 269 K/uL  Mean Cell Volume : 97.5 fl  Mean Cell Hemoglobin : 31.8 pg  Mean Cell Hemoglobin Concentration : 32.6 gm/dL  Auto Neutrophil # : x  Auto Lymphocyte # : x  Auto Monocyte # : x  Auto Eosinophil # : x  Auto Basophil # : x  Auto Neutrophil % : x  Auto Lymphocyte % : x  Auto Monocyte % : x  Auto Eosinophil % : x  Auto Basophil % : x    10-17    135  |  97  |  40<H>  ----------------------------<  87  4.5   |  25  |  3.84<H>    Ca    8.7      17 Oct 2021 06:58            -10/07 CTH: No hemorrhage. 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   -10/07 CTA N: High-grade stenosis left internal carotid artery at the carotid bifurcation in the neck.   -10/07 CTA H: Normal intracranial circulation.    < from: MR Head No Cont (10.09.21 @ 20:22) >    EXAM:  MR BRAIN                            PROCEDURE DATE:  10/09/2021            INTERPRETATION:  CLINICAL HISTORY:Facial droop, on heparin drip, NSTEMI . Severe left ICA stenosis.    TECHNIQUE:  MRI of brain without contrast dated 10/9/2021.  Examination consisted of transaxial T1, T2, FLAIR, gradient echo as well as diffusion-weighted sequences with corresponding ADC maps. Coronal T2 and sagittal T1-weighted images were also acquired through the brain.    COMPARISON: CT head and CTA head andneck 10/7/2021    FINDINGS:  There are no areas abnormal restricted diffusion within the brain parenchyma to suggest acute/subacute infarct. There is no acute intracranial hemorrhage, vasogenic edema or abnormal susceptibility artifact. Chronic lacunar infarcts are seen in the left frontal lobe, right frontal cranial radiata and right cerebellum. Additional nonspecific patchy and confluent areas of T2/FLAIR prolongation in the bihemispheric white matter likely represents mild chronic microvascular changes.    The ventricles, sulci and cisternal spaces are stable in size and configuration. There is no midline shift or abnormal extra-axial fluid collection.    Flow-voids of the major intracranial vessels are maintained, as seen best on the T2-weighted images, indicating their patency.    The visualized paranasal sinuses and mastoid air cells are free of acute disease. The orbital regions are unremarkable.    IMPRESSION:  No acute infarct or intracranial hemorrhage. Chronic infarcts and microvascular changes as above.    --- End of Report ---                NEIL CARDENAS MD; Attending Radiologist  This document has been electronically signed. Oct 10 2021  4:26AM    < end of copied text >  < from: VA Duplex Carotid, Bilat (10.08.21 @ 17:07) >    EXAM:  CAROTID DUPLEX BILATERAL                            PROCEDURE DATE:  10/08/2021            INTERPRETATION:  CLINICAL INFORMATION: Left ICA high-grade stenosis identified on recent CTA neck.    COMPARISON: CTA neck 10/7/2021.    TECHNIQUE: Grayscale, color and spectral Doppler examination of both carotid arteries was performed.    FINDINGS:    There are atheromatous plaques are present bilaterally in the region of the bifurcations of the common carotid arteries into internal and external branches.    Velocity elevation of the proximal right internal carotid artery results in 50-69% flow-limiting stenosis.    Velocity elevation of the proximal left internal carotid artery results in 70-9% flow-limiting stenosis.    Bilateral flow-limiting stenoses noted of the right and left external carotid arteries as well.    Peak systolic velocities are as follows:    RIGHT:  PROX CCA = 126 cm/s  DIST CCA = 99 cm/s  PROX ICA = 137 cm/s  MID ICA = 86 cm/s  DIST ICA = 80 cm/s  ECA = 202 cm/s    LEFT:  PROX CCA = 100 cm/s  DIST CCA = 59 cm/s  PROX ICA = 313 cm/s  MID ICA = 72 cm/s  DIST ICA = 47 cm/s  ECA = 298 cm/s    Antegrade flow is noted within both vertebral arteries.    IMPRESSION:    Left internal carotid artery 70-99% flow-limiting stenosis.  Right internal carotid artery 50-69% flow-limiting stenosis.  Bilateral external carotid artery flow limiting stenoses.  INDIANA Hammond was informed of these findings on 10/8/2021 at 5:06 PM.    Measurement of carotid stenosis is based on velocity parameters that correlate the residual internal carotid diameter with that of the more distal vessel in accordance with a method such as the [employer] Symptomatic Carotid Endarterectomy Trial (NASCET).    --- End of Report ---                FELIX MCMAHON M.D., ATTENDING RADIOLOGIST  This document has been electronically signed. Oct  8 2021  5:21PM    < end of copied text >

## 2021-10-17 NOTE — PROGRESS NOTE ADULT - SUBJECTIVE AND OBJECTIVE BOX
Patient is a 60y old  Male who presents with a chief complaint of NSTEMI (16 Oct 2021 16:21)      SUBJECTIVE / OVERNIGHT EVENTS:  no acute events overnight, vss, afebrile  s/p HD session yesterday  pt feels well  no complaints    tele reviewed: sinus 70-90s    ROS:  14 point ROS negative in detail except stated as above    MEDICATIONS  (STANDING):  aspirin  chewable 81 milliGRAM(s) Oral daily  atorvastatin 80 milliGRAM(s) Oral at bedtime  buMETAnide IVPB 4 milliGRAM(s) IV Intermittent every 12 hours  carvedilol 6.25 milliGRAM(s) Oral every 12 hours  chlorhexidine 4% Liquid 1 Application(s) Topical <User Schedule>  dextrose 40% Gel 15 Gram(s) Oral once  dextrose 5%. 1000 milliLiter(s) (50 mL/Hr) IV Continuous <Continuous>  dextrose 5%. 1000 milliLiter(s) (100 mL/Hr) IV Continuous <Continuous>  dextrose 50% Injectable 25 Gram(s) IV Push once  dextrose 50% Injectable 12.5 Gram(s) IV Push once  dextrose 50% Injectable 25 Gram(s) IV Push once  glucagon  Injectable 1 milliGRAM(s) IntraMuscular once  heparin   Injectable 5000 Unit(s) SubCutaneous every 12 hours  insulin glargine Injectable (LANTUS) 18 Unit(s) SubCutaneous at bedtime  insulin lispro (ADMELOG) corrective regimen sliding scale   SubCutaneous three times a day before meals  insulin lispro (ADMELOG) corrective regimen sliding scale   SubCutaneous at bedtime  NIFEdipine XL 90 milliGRAM(s) Oral daily  pantoprazole  Injectable 40 milliGRAM(s) IV Push every 12 hours  sodium bicarbonate 1300 milliGRAM(s) Oral three times a day    MEDICATIONS  (PRN):  sodium chloride 0.9% lock flush 10 milliLiter(s) IV Push every 1 hour PRN Pre/post blood products, medications, blood draw, and to maintain line patency      CAPILLARY BLOOD GLUCOSE      POCT Blood Glucose.: 93 mg/dL (17 Oct 2021 07:24)  POCT Blood Glucose.: 201 mg/dL (16 Oct 2021 21:16)  POCT Blood Glucose.: 244 mg/dL (16 Oct 2021 16:06)  POCT Blood Glucose.: 136 mg/dL (16 Oct 2021 13:44)    I&O's Summary    16 Oct 2021 07:01  -  17 Oct 2021 07:00  --------------------------------------------------------  IN: 240 mL / OUT: 500 mL / NET: -260 mL    17 Oct 2021 07:01  -  17 Oct 2021 09:57  --------------------------------------------------------  IN: 360 mL / OUT: 0 mL / NET: 360 mL        PHYSICAL EXAM:  Vital Signs Last 24 Hrs  T(C): 36.5 (17 Oct 2021 08:01), Max: 37.8 (16 Oct 2021 16:12)  T(F): 97.7 (17 Oct 2021 08:01), Max: 100.1 (16 Oct 2021 16:12)  HR: 86 (17 Oct 2021 08:01) (74 - 86)  BP: 117/69 (17 Oct 2021 08:01) (111/64 - 146/81)  BP(mean): --  RR: 18 (17 Oct 2021 08:01) (18 - 18)  SpO2: 97% (17 Oct 2021 08:01) (96% - 98%)    GENERAL: NAD, well-developed  HEAD:  Atraumatic, Normocephalic  EYES: EOMI, PERRLA, conjunctiva and sclera clear  NECK: Supple, No JVD  CHEST/LUNG: Clear to auscultation bilaterally; No wheeze  HEART: Regular rate and rhythm; No murmurs, rubs, or gallops  ABDOMEN: Soft, Nontender, Nondistended; Bowel sounds present  EXTREMITIES:  1+ pitting LE edema  NEUROLOGY: AAOx3; non-focal  SKIN: No rashes or lesions    LABS:  personally reviewed                        7.7    11.72 )-----------( 269      ( 17 Oct 2021 06:59 )             23.6     10-17    135  |  97  |  40<H>  ----------------------------<  87  4.5   |  25  |  3.84<H>    Ca    8.7      17 Oct 2021 06:58                RADIOLOGY & ADDITIONAL TESTS:    Imaging Personally Reviewed:    Consultant(s) Notes Reviewed:  nephro, CTS, cards    Care Discussed with Consultants/Other Providers: Dr. Luis (nephro)

## 2021-10-17 NOTE — PROGRESS NOTE ADULT - PROBLEM SELECTOR PLAN 3
Blood pressure initially 200/105 at arrival; s/p nitro ggt at [place], noncompliant with meds  - now much better controlled  - continue Nifedipine ER 90mg po daily  - c/w coreg 6.125mg bid, uptitrate as needed  - bumex as below  - home labetalol on hold due to JOSHUA  - c/t hold HCTZ-losartan given JOSHUA on CKD  - monitor BP  - would consider adding Hydralazine if BP remains uncontrolled

## 2021-10-17 NOTE — PROGRESS NOTE ADULT - ASSESSMENT
59 yo male with DM2, HTN, and HLD who initially presented to [facility] on 10/05 as a transfer from [facility] for NSTEMI and possible CHF. Patient on Heparin drip for NSTEMI. LKW 10:45AM. Patient had episode of bradycardia (HR 30s), then became altered. RRT called. BP during RRT 70s/40s. Code stroke called for L facial droop.   CTH negative for acute pathology, old L frontal cortical infarct seen.   CTA H unremarkable. CTA N shows high-grade stenosis left internal carotid artery at the carotid bifurcation in the neck.  10/06 TTE: EF 45%, moderate-severe MR, mild-moderate L ventricular systolic dysfunction.   A1c 7  MRI no AIS but old strokes  HD cath placement 10/12   CD with severe L ICA and Mod R ICA   s/p LHC 10/14  Impression: Mild L nasolabial flattening and dysarthria, ?decreased eye closure strength on the L. Possibly secondary to R brain dysfunction due to acute stroke of unknown mechanism vs peripheral etiology. Patient with old L frontal infarct on CTH, also with high-grade stenosis of L ICA at the carotid bifurcation which likely cause of old stroke     Recommendations:  - ASA 81MG PO QD + Brilinta 90MG PO Q12HR.   - CTS possible CABG this week   - Avoid hypotension.  - High dose statin therapy - atorvastatin 40mg PO daily. LDL goal <70mg/dL.  -  vascular for ICA revascularization can be outpt. not acute   - lipid panel  - telemetry  - PT/OT/SS/SLP, OOBC  - permissive HTN, -180mmHg.  - check FS, glucose control <180  - GI/DVT ppx  - Counseling on diet, exercise, and medication adherence was done  - Counseling on smoking cessation and alcohol consumption offered when appropriate.  - Pain assessed and judicious use of narcotics when appropriate was discussed.    - Stroke education given when appropriate.  - Importance of fall prevention discussed.   - Differential diagnosis and plan of care discussed with patient and/or family and primary team  - Thank you for allowing me to participate in the care of this patient. Call with questions.   Marcelino Patel MD  Vascular Neurology .

## 2021-10-17 NOTE — PROGRESS NOTE ADULT - ASSESSMENT
60 yr old M w/ hx of DM2, HTN and HLD presents as transfer from [facility] for chest pain concerning for NSTEMI and possible new CHF c/b JOSHUA likely on CKD, s/p RRT for CVA/TIA ruled out and worsening anemia, pending improvement in JOSHUA for [facility], found to have multivessel disease pending CABG

## 2021-10-17 NOTE — PROGRESS NOTE ADULT - PROBLEM SELECTOR PLAN 10
Abdominal Pain, Women  Abdominal (stomach, pelvic, or belly) pain can be caused by many things. It is important to tell your doctor:  · The location of the pain.  · Does it come and go or is it present all the time?  · Are there things that start the pain (eating certain foods, exercise)?  · Are there other symptoms associated with the pain (fever, nausea, vomiting, diarrhea)?  All of this is helpful to know when trying to find the cause of the pain.  CAUSES   · Stomach: virus or bacteria infection, or ulcer.  · Intestine: appendicitis (inflamed appendix), regional ileitis (Crohn's disease), ulcerative colitis (inflamed colon), irritable bowel syndrome, diverticulitis (inflamed diverticulum of the colon), or cancer of the stomach or intestine.  · Gallbladder disease or stones in the gallbladder.  · Kidney disease, kidney stones, or infection.  · Pancreas infection or cancer.  · Fibromyalgia (pain disorder).  · Diseases of the female organs:  · Uterus: fibroid (non-cancerous) tumors or infection.  · Fallopian tubes: infection or tubal pregnancy.  · Ovary: cysts or tumors.  · Pelvic adhesions (scar tissue).  · Endometriosis (uterus lining tissue growing in the pelvis and on the pelvic organs).  · Pelvic congestion syndrome (female organs filling up with blood just before the menstrual period).  · Pain with the menstrual period.  · Pain with ovulation (producing an egg).  · Pain with an IUD (intrauterine device, birth control) in the uterus.  · Cancer of the female organs.  · Functional pain (pain not caused by a disease, may improve without treatment).  · Psychological pain.  · Depression.  DIAGNOSIS   Your doctor will decide the seriousness of your pain by doing an examination.  · Blood tests.  · X-rays.  · Ultrasound.  · CT scan (computed tomography, special type of X-ray).  · MRI (magnetic resonance imaging).  · Cultures, for infection.  · Barium enema (dye inserted in the large intestine, to better view it with  X-rays).  · Colonoscopy (looking in intestine with a lighted tube).  · Laparoscopy (minor surgery, looking in abdomen with a lighted tube).  · Major abdominal exploratory surgery (looking in abdomen with a large incision).  TREATMENT   The treatment will depend on the cause of the pain.   · Many cases can be observed and treated at home.  · Over-the-counter medicines recommended by your caregiver.  · Prescription medicine.  · Antibiotics, for infection.  · Birth control pills, for painful periods or for ovulation pain.  · Hormone treatment, for endometriosis.  · Nerve blocking injections.  · Physical therapy.  · Antidepressants.  · Counseling with a psychologist or psychiatrist.  · Minor or major surgery.  HOME CARE INSTRUCTIONS   · Do not take laxatives, unless directed by your caregiver.  · Take over-the-counter pain medicine only if ordered by your caregiver. Do not take aspirin because it can cause an upset stomach or bleeding.  · Try a clear liquid diet (broth or water) as ordered by your caregiver. Slowly move to a bland diet, as tolerated, if the pain is related to the stomach or intestine.  · Have a thermometer and take your temperature several times a day, and record it.  · Bed rest and sleep, if it helps the pain.  · Avoid sexual intercourse, if it causes pain.  · Avoid stressful situations.  · Keep your follow-up appointments and tests, as your caregiver orders.  · If the pain does not go away with medicine or surgery, you may try:  · Acupuncture.  · Relaxation exercises (yoga, meditation).  · Group therapy.  · Counseling.  SEEK MEDICAL CARE IF:   · You notice certain foods cause stomach pain.  · Your home care treatment is not helping your pain.  · You need stronger pain medicine.  · You want your IUD removed.  · You feel faint or lightheaded.  · You develop nausea and vomiting.  · You develop a rash.  · You are having side effects or an allergy to your medicine.  SEEK IMMEDIATE MEDICAL CARE IF:   · Your  pain does not go away or gets worse.  · You have a fever.  · Your pain is felt only in portions of the abdomen. The right side could possibly be appendicitis. The left lower portion of the abdomen could be colitis or diverticulitis.  · You are passing blood in your stools (bright red or black tarry stools, with or without vomiting).  · You have blood in your urine.  · You develop chills, with or without a fever.  · You pass out.  MAKE SURE YOU:   · Understand these instructions.  · Will watch your condition.  · Will get help right away if you are not doing well or get worse.  Document Released: 10/14/2008 Document Revised: 03/11/2013 Document Reviewed: 11/04/2010  [employer]® Patient Information ©2014 [employer].    Vaginitis  Vaginitis is an inflammation of the vagina. It can happen when the normal bacteria and yeast in the vagina grow too much. There are different types. Treatment will depend on the type you have.  Follow these instructions at home:  · Take all medicines as told by your doctor.  · Keep your vagina area clean and dry. Avoid soap. Rinse the area with water.  · Avoid washing and cleaning out the vagina (douching).  · Do not use tampons or have sex (intercourse) until your treatment is done.  · Wipe from front to back after going to the restroom.  · Wear cotton underwear.  · Avoid wearing underwear while you sleep until your vaginitis is gone.  · Avoid tight pants. Avoid underwear or nylons without a cotton panel.  · Take off wet clothing (such as a bathing suit) as soon as you can.  · Use mild, unscented products. Avoid fabric softeners and scented:  ¨ Feminine sprays.  ¨ Laundry detergents.  ¨ Tampons.  ¨ Soaps or bubble baths.  · Practice safe sex and use condoms.  Get help right away if:  · You have belly (abdominal) pain.  · You have a fever or lasting symptoms for more than 2-3 days.  · You have a fever and your symptoms suddenly get worse.  This information is not intended to replace advice  given to you by your health care provider. Make sure you discuss any questions you have with your health care provider.  Document Released: 03/16/2010 Document Revised: 05/25/2017 Document Reviewed: 05/30/2013  Elsevier Interactive Patient Education © 2017 [employer]     DVT ppx: hsq  Dispo: home, no skilled PT needs    above plans discussed with KUSHAL Hammond  updated wife over the phone ([phone])    Stephanie Ashford MD  Division of Hospital Medicine  Cell: [phone]  Office: [phone]

## 2021-10-18 LAB
ANION GAP SERPL CALC-SCNC: 16 MMOL/L — SIGNIFICANT CHANGE UP (ref 5–17)
BLD GP AB SCN SERPL QL: NEGATIVE — SIGNIFICANT CHANGE UP
BUN SERPL-MCNC: 58 MG/DL — HIGH (ref 7–23)
CALCIUM SERPL-MCNC: 8.8 MG/DL — SIGNIFICANT CHANGE UP (ref 8.4–10.5)
CHLORIDE SERPL-SCNC: 96 MMOL/L — SIGNIFICANT CHANGE UP (ref 96–108)
CO2 SERPL-SCNC: 23 MMOL/L — SIGNIFICANT CHANGE UP (ref 22–31)
CREAT SERPL-MCNC: 5.04 MG/DL — HIGH (ref 0.5–1.3)
GLUCOSE BLDC GLUCOMTR-MCNC: 141 MG/DL — HIGH (ref 70–99)
GLUCOSE BLDC GLUCOMTR-MCNC: 222 MG/DL — HIGH (ref 70–99)
GLUCOSE BLDC GLUCOMTR-MCNC: 250 MG/DL — HIGH (ref 70–99)
GLUCOSE BLDC GLUCOMTR-MCNC: 258 MG/DL — HIGH (ref 70–99)
GLUCOSE SERPL-MCNC: 160 MG/DL — HIGH (ref 70–99)
HCT VFR BLD CALC: 23.6 % — LOW (ref 39–50)
HGB BLD-MCNC: 7.5 G/DL — LOW (ref 13–17)
MCHC RBC-ENTMCNC: 30.9 PG — SIGNIFICANT CHANGE UP (ref 27–34)
MCHC RBC-ENTMCNC: 31.8 GM/DL — LOW (ref 32–36)
MCV RBC AUTO: 97.1 FL — SIGNIFICANT CHANGE UP (ref 80–100)
NRBC # BLD: 0 /100 WBCS — SIGNIFICANT CHANGE UP (ref 0–0)
PA ADP PRP-ACNC: 361 PRU — SIGNIFICANT CHANGE UP (ref 194–417)
PLATELET # BLD AUTO: 298 K/UL — SIGNIFICANT CHANGE UP (ref 150–400)
POTASSIUM SERPL-MCNC: 4.4 MMOL/L — SIGNIFICANT CHANGE UP (ref 3.5–5.3)
POTASSIUM SERPL-SCNC: 4.4 MMOL/L — SIGNIFICANT CHANGE UP (ref 3.5–5.3)
RBC # BLD: 2.43 M/UL — LOW (ref 4.2–5.8)
RBC # FLD: 12.6 % — SIGNIFICANT CHANGE UP (ref 10.3–14.5)
RH IG SCN BLD-IMP: POSITIVE — SIGNIFICANT CHANGE UP
SARS-COV-2 RNA SPEC QL NAA+PROBE: SIGNIFICANT CHANGE UP
SODIUM SERPL-SCNC: 135 MMOL/L — SIGNIFICANT CHANGE UP (ref 135–145)
WBC # BLD: 9.58 K/UL — SIGNIFICANT CHANGE UP (ref 3.8–10.5)
WBC # FLD AUTO: 9.58 K/UL — SIGNIFICANT CHANGE UP (ref 3.8–10.5)

## 2021-10-18 PROCEDURE — 99233 SBSQ HOSP IP/OBS HIGH 50: CPT | Mod: GC

## 2021-10-18 PROCEDURE — 99233 SBSQ HOSP IP/OBS HIGH 50: CPT

## 2021-10-18 RX ORDER — PANTOPRAZOLE SODIUM 20 MG/1
40 TABLET, DELAYED RELEASE ORAL
Refills: 0 | Status: DISCONTINUED | OUTPATIENT
Start: 2021-10-18 | End: 2021-10-21

## 2021-10-18 RX ADMIN — CARVEDILOL PHOSPHATE 6.25 MILLIGRAM(S): 80 CAPSULE, EXTENDED RELEASE ORAL at 06:04

## 2021-10-18 RX ADMIN — HEPARIN SODIUM 5000 UNIT(S): 5000 INJECTION INTRAVENOUS; SUBCUTANEOUS at 17:01

## 2021-10-18 RX ADMIN — CHLORHEXIDINE GLUCONATE 1 APPLICATION(S): 213 SOLUTION TOPICAL at 11:44

## 2021-10-18 RX ADMIN — Medication 3: at 11:37

## 2021-10-18 RX ADMIN — Medication 81 MILLIGRAM(S): at 11:36

## 2021-10-18 RX ADMIN — Medication 1300 MILLIGRAM(S): at 06:05

## 2021-10-18 RX ADMIN — INSULIN GLARGINE 18 UNIT(S): 100 INJECTION, SOLUTION SUBCUTANEOUS at 21:08

## 2021-10-18 RX ADMIN — Medication 1300 MILLIGRAM(S): at 21:10

## 2021-10-18 RX ADMIN — PANTOPRAZOLE SODIUM 40 MILLIGRAM(S): 20 TABLET, DELAYED RELEASE ORAL at 17:05

## 2021-10-18 RX ADMIN — CARVEDILOL PHOSPHATE 6.25 MILLIGRAM(S): 80 CAPSULE, EXTENDED RELEASE ORAL at 17:01

## 2021-10-18 RX ADMIN — Medication 90 MILLIGRAM(S): at 06:04

## 2021-10-18 RX ADMIN — HEPARIN SODIUM 5000 UNIT(S): 5000 INJECTION INTRAVENOUS; SUBCUTANEOUS at 06:05

## 2021-10-18 RX ADMIN — Medication 2: at 16:50

## 2021-10-18 RX ADMIN — Medication 1300 MILLIGRAM(S): at 14:31

## 2021-10-18 RX ADMIN — BUMETANIDE 132 MILLIGRAM(S): 0.25 INJECTION INTRAMUSCULAR; INTRAVENOUS at 06:04

## 2021-10-18 RX ADMIN — ATORVASTATIN CALCIUM 80 MILLIGRAM(S): 80 TABLET, FILM COATED ORAL at 21:10

## 2021-10-18 RX ADMIN — BUMETANIDE 132 MILLIGRAM(S): 0.25 INJECTION INTRAMUSCULAR; INTRAVENOUS at 17:01

## 2021-10-18 RX ADMIN — PANTOPRAZOLE SODIUM 40 MILLIGRAM(S): 20 TABLET, DELAYED RELEASE ORAL at 06:05

## 2021-10-18 NOTE — PROGRESS NOTE ADULT - ASSESSMENT
60M with history of DM type 2, HTN, HLD presenting as transfer from [email or web address] for chest pain concerning for NSTEMI, code stroke also called for L facial droop, CTA significant for ipsilateral high-grade stenosis of the left internal carotid artery at the carotid bifurcation seen on carotid duplex    PLAN:  - patient requires CABG for cardiac optimization.   - no surgical inpatient surgical interventions planned by vascular surgery at this time  - plan for CEA scheduling outpatient  - rest of care per primary team.     Vascular Surgery   [phone]     60M with history of DM type 2, HTN, HLD presenting as transfer from [facility] for chest pain concerning for NSTEMI, code stroke also called for L facial droop, CTA significant for ipsilateral high-grade stenosis of the left internal carotid artery at the carotid bifurcation seen on carotid duplex    PLAN:  - patient requires CABG for cardiac optimization.   - no inpatient surgical interventions planned by vascular surgery at this time  - plan for CEA scheduling outpatient  - rest of care per primary team.     Vascular Surgery   [phone]

## 2021-10-18 NOTE — PROGRESS NOTE ADULT - SUBJECTIVE AND OBJECTIVE BOX
[facility] DIVISION OF KIDNEY DISEASES AND HYPERTENSION -- FOLLOW UP NOTE  --------------------------------------------------------------------------------  Chief Complaint:    24 hour events/subjective:    seen and examined this morning   reports feeling well without any acute issues  no chest pain or SOB   swelling improved    PAST HISTORY  --------------------------------------------------------------------------------  No significant changes to PMH, PSH, FHx, SHx, unless otherwise noted    ALLERGIES & MEDICATIONS  --------------------------------------------------------------------------------  Allergies    No Known Allergies    Intolerances      Standing Inpatient Medications  aspirin  chewable 81 milliGRAM(s) Oral daily  atorvastatin 80 milliGRAM(s) Oral at bedtime  buMETAnide IVPB 4 milliGRAM(s) IV Intermittent every 12 hours  carvedilol 6.25 milliGRAM(s) Oral every 12 hours  chlorhexidine 4% Liquid 1 Application(s) Topical <User Schedule>  dextrose 40% Gel 15 Gram(s) Oral once  dextrose 5%. 1000 milliLiter(s) IV Continuous <Continuous>  dextrose 5%. 1000 milliLiter(s) IV Continuous <Continuous>  dextrose 50% Injectable 25 Gram(s) IV Push once  dextrose 50% Injectable 12.5 Gram(s) IV Push once  dextrose 50% Injectable 25 Gram(s) IV Push once  glucagon  Injectable 1 milliGRAM(s) IntraMuscular once  heparin   Injectable 5000 Unit(s) SubCutaneous every 12 hours  insulin glargine Injectable (LANTUS) 18 Unit(s) SubCutaneous at bedtime  insulin lispro (ADMELOG) corrective regimen sliding scale   SubCutaneous three times a day before meals  insulin lispro (ADMELOG) corrective regimen sliding scale   SubCutaneous at bedtime  NIFEdipine XL 90 milliGRAM(s) Oral daily  pantoprazole  Injectable 40 milliGRAM(s) IV Push every 12 hours  sodium bicarbonate 1300 milliGRAM(s) Oral three times a day    PRN Inpatient Medications  sodium chloride 0.9% lock flush 10 milliLiter(s) IV Push every 1 hour PRN      REVIEW OF SYSTEMS    All other systems were reviewed and are negative, except as noted.    VITALS/PHYSICAL EXAM  --------------------------------------------------------------------------------  T(C): 37.3 (10-18-21 @ 11:20), Max: 37.3 (10-18-21 @ 04:52)  HR: 83 (10-18-21 @ 11:20) (72 - 97)  BP: 157/75 (10-18-21 @ 11:20) (132/75 - 172/77)  RR: 18 (10-18-21 @ 11:20) (18 - 20)  SpO2: 96% (10-18-21 @ 11:20) (96% - 98%)  Wt(kg): --        10-17-21 @ 07:01  -  10-18-21 @ 07:00  --------------------------------------------------------  IN: 600 mL / OUT: 100 mL / NET: 500 mL    10-18-21 @ 07:01  -  10-18-21 @ 13:19  --------------------------------------------------------  IN: 240 mL / OUT: 0 mL / NET: 240 mL        Physical Exam:  	Gen: NAD  	HEENT: MMM  	Pulm: CTA B/L  	CV: S1S2  	Abd: Soft, +BS   	Ext: + LE edema B/L  	Neuro: Awake  	Skin: Warm and dry  	Vascular access: R IJ      LABS/STUDIES  --------------------------------------------------------------------------------              7.5    9.58  >-----------<  298      [10-18-21 @ 05:54]              23.6     135  |  96  |  58  ----------------------------<  160      [10-18-21 @ 05:54]  4.4   |  23  |  5.04        Ca     8.8     [10-18-21 @ 05:54]            Creatinine Trend:  SCr 5.04 [10-18 @ 05:54]  SCr 3.84 [10-17 @ 06:58]  SCr 2.58 [10-16 @ 14:23]  SCr 4.83 [10-15 @ 06:57]  SCr 5.91 [10-14 @ 07:18]    Urinalysis - [10-07-21 @ 10:09]      Color Light Yellow / Appearance Clear / SG 1.015 / pH 6.0      Gluc 200 mg/dL / Ketone Negative  / Bili Negative / Urobili Negative       Blood Small / Protein 300 mg/dL / Leuk Est Negative / Nitrite Negative      RBC 2 / WBC 7 / Hyaline 6 / Gran  / Sq Epi  / Non Sq Epi 2 / Bacteria Negative      Iron 47, TIBC 245, %sat 19      [10-13-21 @ 13:10]  Ferritin 780      [10-13-21 @ 08:59]    HBsAg Nonreact      [10-13-21 @ 09:15]  HCV 0.23, Nonreact      [10-07-21 @ 14:38]  HIV Nonreact      [10-13-21 @ 09:16]    BEULAH: titer Negative, pattern --      [10-07-21 @ 14:37]  C3 Complement 135      [10-07-21 @ 14:42]  C4 Complement 71      [10-07-21 @ 14:42]  ANCA: cANCA Negative, pANCA Negative, atypical ANCA Negative      [10-07-21 @ 14:37]  Syphilis Screen (Treponema Pallidum Ab) Negative      [10-07-21 @ 14:37]  Free Light Chains: kappa 10.89, lambda 12.51, ratio = 0.87      [10-07 @ 14:42]  Immunofixation Serum:   No Monoclonal Band Identified    Reference Range: None Detected      [10-07-21 @ 14:42]  SPEP Interpretation: Normal Electrophoresis Pattern      [10-07-21 @ 14:42]

## 2021-10-18 NOTE — PROGRESS NOTE ADULT - PROBLEM SELECTOR PLAN 1
s/p LHC (10/14) with multivessel disease  - CTS consulted for CABG: OR timing TBD   - check P2Y12 level   - c/w ASA 81 mg daily  - c/w atorvastatin 80 mg qhs  - monitor on tele  - TTE EF 45%: mild-moderate LVSF  - appreciate CTS

## 2021-10-18 NOTE — PROGRESS NOTE ADULT - PROBLEM SELECTOR PLAN 3
Blood pressure initially 200/105 at arrival; s/p nitro ggt at [place], noncompliant with meds  - now much better controlled  - continue Nifedipine ER 90mg po daily  - c/w coreg 6.125mg bid, uptitrate as needed  - bumex as below  - home labetalol on hold due to JOSHUA  - c/t hold HCTZ-losartan given JOSHUA on CKD  - monitor BP  - would consider adding Hydralazine if BP remains uncontrolled

## 2021-10-18 NOTE — PROGRESS NOTE ADULT - PROBLEM SELECTOR PLAN 10
DVT ppx: hsq  Dispo: home, no skilled PT needs    above plans discussed with PA   wife phone ([phone])

## 2021-10-18 NOTE — PROGRESS NOTE ADULT - ASSESSMENT
60 yr old M w/ hx of DM2, HTN and HLD presents as transfer from [facility] for chest pain concerning for NSTEMI and possible new CHF c/b JOSHUA likely on CKD, s/p RRT for CVA/TIA ruled out and worsening anemia, pending improvement in JOSHUA for [facility], found to have multivessel disease pending CABG

## 2021-10-18 NOTE — PROGRESS NOTE ADULT - ATTENDING COMMENTS
JOSHUA on CKD (baseline Scr unknown), proteinuria : in the setting of hypervolemia, CAD 2/2 triple vessel disease.   He has significant nephrotic range proteinuria, with negative serological w/up. A1c elevated at 7.0. Renal US with well preserved kidney size and no features of CKD.   Pt. was initiated on HD to optimize him prior to CABG, tolerating HD treatments well.   Plan for HD today.   continue diuretics  Monitor UO    Hypervolemia: improving   will do minimal UF with HD today  monitor UO    Anemia: in the setting of ?CKD  Transfusion per med team today, with HD  Monitor CBC    HTN: BP stable  HD with UF today  monitor BP    Acidosis: serum CO2 level at goal  continue oral bicarbonate therapy.   monitor serum CO2

## 2021-10-18 NOTE — PROGRESS NOTE ADULT - SUBJECTIVE AND OBJECTIVE BOX
Subjective:   Patient seen at bedside this AM. Reports feeling well, without complaints. Denies chest pain, SOB. Tolerating diet without N/V.     24h Events:   - Overnight, no acute events    Objective:  Vital Signs  T(C): 37.3 (10-18 @ 04:52), Max: 37.3 (10-18 @ 04:52)  HR: 97 (10-18 @ 04:52) (72 - 97)  BP: 172/77 (10-18 @ 04:52) (132/75 - 172/77)  RR: 20 (10-18 @ 04:52) (18 - 20)  SpO2: 98% (10-18 @ 04:52) (96% - 98%)  10-17-21 @ 07:01  -  10-18-21 @ 07:00  --------------------------------------------------------  IN:  Total IN: 0 mL    OUT:    Voided (mL): 100 mL  Total OUT: 100 mL    Total NET: -100 mL      Labs:                        7.5    9.58  )-----------( 298      ( 18 Oct 2021 05:54 )             23.6   10-18    135  |  96  |  58<H>  ----------------------------<  160<H>  4.4   |  23  |  5.04<H>    Ca    8.8      18 Oct 2021 05:54      CAPILLARY BLOOD GLUCOSE      POCT Blood Glucose.: 141 mg/dL (18 Oct 2021 07:31)  POCT Blood Glucose.: 223 mg/dL (17 Oct 2021 21:10)  POCT Blood Glucose.: 142 mg/dL (17 Oct 2021 16:03)  POCT Blood Glucose.: 181 mg/dL (17 Oct 2021 11:42)      Medications:   MEDICATIONS  (STANDING):  aspirin  chewable 81 milliGRAM(s) Oral daily  atorvastatin 80 milliGRAM(s) Oral at bedtime  buMETAnide IVPB 4 milliGRAM(s) IV Intermittent every 12 hours  carvedilol 6.25 milliGRAM(s) Oral every 12 hours  chlorhexidine 4% Liquid 1 Application(s) Topical <User Schedule>  dextrose 40% Gel 15 Gram(s) Oral once  dextrose 5%. 1000 milliLiter(s) (50 mL/Hr) IV Continuous <Continuous>  dextrose 5%. 1000 milliLiter(s) (100 mL/Hr) IV Continuous <Continuous>  dextrose 50% Injectable 25 Gram(s) IV Push once  dextrose 50% Injectable 12.5 Gram(s) IV Push once  dextrose 50% Injectable 25 Gram(s) IV Push once  glucagon  Injectable 1 milliGRAM(s) IntraMuscular once  heparin   Injectable 5000 Unit(s) SubCutaneous every 12 hours  insulin glargine Injectable (LANTUS) 18 Unit(s) SubCutaneous at bedtime  insulin lispro (ADMELOG) corrective regimen sliding scale   SubCutaneous three times a day before meals  insulin lispro (ADMELOG) corrective regimen sliding scale   SubCutaneous at bedtime  NIFEdipine XL 90 milliGRAM(s) Oral daily  pantoprazole  Injectable 40 milliGRAM(s) IV Push every 12 hours  sodium bicarbonate 1300 milliGRAM(s) Oral three times a day    MEDICATIONS  (PRN):  sodium chloride 0.9% lock flush 10 milliLiter(s) IV Push every 1 hour PRN Pre/post blood products, medications, blood draw, and to maintain line patency      Imaging:

## 2021-10-18 NOTE — PROGRESS NOTE ADULT - PROBLEM SELECTOR PLAN 5
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   - neuro rec brain MRI: no acute infarct  - speech rec regular diet  - CTA: High-grade stenosis left internal carotid artery at the carotid bifurcation in the neck.  - carotid duplex, 70-99% L ICA stenosis, 50-70% RCA stenosis, vascular surgery rec outpatient carotid endarterectomy given plan for CABG  - c/w asa/brillanta, statin

## 2021-10-18 NOTE — PROGRESS NOTE ADULT - SUBJECTIVE AND OBJECTIVE BOX
Patient is a 60y old  Male who presents with a chief complaint of NSTEMI (18 Oct 2021 13:19)      INTERVAL History of Present Illness/OVERNIGHT EVENTS: feels fine  CABG later this week    MEDICATIONS  (STANDING):  aspirin  chewable 81 milliGRAM(s) Oral daily  atorvastatin 80 milliGRAM(s) Oral at bedtime  buMETAnide IVPB 4 milliGRAM(s) IV Intermittent every 12 hours  carvedilol 6.25 milliGRAM(s) Oral every 12 hours  chlorhexidine 4% Liquid 1 Application(s) Topical <User Schedule>  dextrose 40% Gel 15 Gram(s) Oral once  dextrose 5%. 1000 milliLiter(s) (50 mL/Hr) IV Continuous <Continuous>  dextrose 5%. 1000 milliLiter(s) (100 mL/Hr) IV Continuous <Continuous>  dextrose 50% Injectable 25 Gram(s) IV Push once  dextrose 50% Injectable 12.5 Gram(s) IV Push once  dextrose 50% Injectable 25 Gram(s) IV Push once  glucagon  Injectable 1 milliGRAM(s) IntraMuscular once  heparin   Injectable 5000 Unit(s) SubCutaneous every 12 hours  insulin glargine Injectable (LANTUS) 18 Unit(s) SubCutaneous at bedtime  insulin lispro (ADMELOG) corrective regimen sliding scale   SubCutaneous three times a day before meals  insulin lispro (ADMELOG) corrective regimen sliding scale   SubCutaneous at bedtime  NIFEdipine XL 90 milliGRAM(s) Oral daily  pantoprazole  Injectable 40 milliGRAM(s) IV Push every 12 hours  sodium bicarbonate 1300 milliGRAM(s) Oral three times a day    MEDICATIONS  (PRN):  sodium chloride 0.9% lock flush 10 milliLiter(s) IV Push every 1 hour PRN Pre/post blood products, medications, blood draw, and to maintain line patency      Allergies    No Known Allergies    Intolerances        REVIEW OF SYSTEMS:  Negative unless otherwise specified above.    Vital Signs Last 24 Hrs  T(C): 37.3 (18 Oct 2021 11:20), Max: 37.3 (18 Oct 2021 04:52)  T(F): 99.1 (18 Oct 2021 11:20), Max: 99.1 (18 Oct 2021 04:52)  HR: 83 (18 Oct 2021 11:20) (72 - 97)  BP: 157/75 (18 Oct 2021 11:20) (132/75 - 172/77)  BP(mean): --  RR: 18 (18 Oct 2021 11:20) (18 - 20)  SpO2: 96% (18 Oct 2021 11:20) (96% - 98%)        PHYSICAL EXAM:  GENERAL: No apparent distress, appears stated age  HEAD:  Atraumatic, Normocephalic  EYES: Conjunctiva and sclera clear, no discharge  ENMT: Moist mucous membranes, no nasal discharge  NECK: Supple, no JVD, right IJ HD cath  CHEST/LUNG: Clear to auscultation bilaterally, no wheeze or rales  HEART: Regular rhythm, no rubs or gallops  ABDOMEN: Soft, Nontender, Nondistended; Bowel sounds present  EXTREMITIES:  No clubbing, cyanosis or edema  SKIN: No rash, no new discoloration  NERVOUS SYSTEM:  Alert & Oriented; Bilateral Lower extremity mobile, sensation to light touch intact      LABS:                        7.5    9.58  )-----------( 298      ( 18 Oct 2021 05:54 )             23.6     18 Oct 2021 05:54    135    |  96     |  58     ----------------------------<  160    4.4     |  23     |  5.04     Ca    8.8        18 Oct 2021 05:54          CAPILLARY BLOOD GLUCOSE      POCT Blood Glucose.: 258 mg/dL (18 Oct 2021 11:12)  POCT Blood Glucose.: 141 mg/dL (18 Oct 2021 07:31)  POCT Blood Glucose.: 223 mg/dL (17 Oct 2021 21:10)  POCT Blood Glucose.: 142 mg/dL (17 Oct 2021 16:03)      RADIOLOGY & ADDITIONAL TESTS:      Images reviewed personally    Consultant Notes Reviewed and Care Discussed with relevant Consultants.

## 2021-10-18 NOTE — PROGRESS NOTE ADULT - PROBLEM SELECTOR PLAN 2
JOSHUA on CKD vs JOSHUA with hyperkalemia and meta acidosis on admission  - s/p 3 sessions of HD, SCr stable, volume status much improved  - renal US with normal kidney  - Scr continues to uptrend   - care discussed with nephrology  - s/p IR guided shiley placement  - wife in agreement with care planning  - monitor bmp  - c/w sodium bicarb 1300mg tid  - serologies pending  monitor intake and output on diuretic

## 2021-10-18 NOTE — PROGRESS NOTE ADULT - ASSESSMENT
Patient is a 60 year old male with PMH od DM and HTN who presented to the hospital as a transfer from OSH for NSTEMI. The nephrology team was consulted due to elevated creatinine of 4.05 upon presentation. The patient denies any history of kidney disease and denied noticing any changes in his urination.     JOSHUA on CKD?   - patient presenting to hospital with creatinine of 4.05 mg/dL; denied any history of kidney disease   --- creatinine had continued to uptrend to 6.28mg/dL but slight decrease to 5.95mg/dL this morning  - Kidney injury also exacerbated by contrast administration for the CT following the RRT on 10/7    - patient remains volume overloaded; on bumex and optimal UOP   - initiated on HD on 10/14; planned for HD again today   - suspect given his pre-HD creatinine and high suspicion of CKD will likely be HD dependent following  - s/p LHC on 10/14 showing severe multivessel disease   --- there is a tentative plan for CABG this week and will attempt to dialyze patient to keep him optimized for the procedure  - normal renal ultrasound without hydro  - UA with proteinuria and blood;  Ur Pr/Cr ratio noted to be 8.7g  - BEULAH negative; elevated kappa/lambda but normal ratio;   - C3 and C4 not low  - please adjust medication doses as per eGFR     Anemia:   - suspected in the setting of CKD   - iron studies wnl   - will consider starting the patient on SISSY with HD   - goal Hg of >8 given significant CAD as per cardiology recommendations   - monitor CBC    If any questions, please feel free to contact me     Saul Crews  Nephrology Fellow  [facility] Pager: [phone]

## 2021-10-19 LAB
ALBUMIN SERPL ELPH-MCNC: 2.9 G/DL — LOW (ref 3.3–5)
ALP SERPL-CCNC: 100 U/L — SIGNIFICANT CHANGE UP (ref 40–120)
ALT FLD-CCNC: 22 U/L — SIGNIFICANT CHANGE UP (ref 10–45)
ANION GAP SERPL CALC-SCNC: 14 MMOL/L — SIGNIFICANT CHANGE UP (ref 5–17)
AST SERPL-CCNC: 16 U/L — SIGNIFICANT CHANGE UP (ref 10–40)
BASOPHILS # BLD AUTO: 0.08 K/UL — SIGNIFICANT CHANGE UP (ref 0–0.2)
BASOPHILS NFR BLD AUTO: 0.7 % — SIGNIFICANT CHANGE UP (ref 0–2)
BILIRUB SERPL-MCNC: 0.4 MG/DL — SIGNIFICANT CHANGE UP (ref 0.2–1.2)
BLD GP AB SCN SERPL QL: NEGATIVE — SIGNIFICANT CHANGE UP
BUN SERPL-MCNC: 36 MG/DL — HIGH (ref 7–23)
CALCIUM SERPL-MCNC: 9 MG/DL — SIGNIFICANT CHANGE UP (ref 8.4–10.5)
CHLORIDE SERPL-SCNC: 94 MMOL/L — LOW (ref 96–108)
CO2 SERPL-SCNC: 26 MMOL/L — SIGNIFICANT CHANGE UP (ref 22–31)
CREAT SERPL-MCNC: 3.7 MG/DL — HIGH (ref 0.5–1.3)
EOSINOPHIL # BLD AUTO: 0.99 K/UL — HIGH (ref 0–0.5)
EOSINOPHIL NFR BLD AUTO: 8.8 % — HIGH (ref 0–6)
GLUCOSE BLDC GLUCOMTR-MCNC: 109 MG/DL — HIGH (ref 70–99)
GLUCOSE BLDC GLUCOMTR-MCNC: 116 MG/DL — HIGH (ref 70–99)
GLUCOSE BLDC GLUCOMTR-MCNC: 195 MG/DL — HIGH (ref 70–99)
GLUCOSE BLDC GLUCOMTR-MCNC: 276 MG/DL — HIGH (ref 70–99)
GLUCOSE SERPL-MCNC: 110 MG/DL — HIGH (ref 70–99)
HCT VFR BLD CALC: 26.3 % — LOW (ref 39–50)
HGB BLD-MCNC: 9 G/DL — LOW (ref 13–17)
IMM GRANULOCYTES NFR BLD AUTO: 0.5 % — SIGNIFICANT CHANGE UP (ref 0–1.5)
LYMPHOCYTES # BLD AUTO: 2.62 K/UL — SIGNIFICANT CHANGE UP (ref 1–3.3)
LYMPHOCYTES # BLD AUTO: 23.3 % — SIGNIFICANT CHANGE UP (ref 13–44)
MAGNESIUM SERPL-MCNC: 1.6 MG/DL — SIGNIFICANT CHANGE UP (ref 1.6–2.6)
MCHC RBC-ENTMCNC: 31.5 PG — SIGNIFICANT CHANGE UP (ref 27–34)
MCHC RBC-ENTMCNC: 34.2 GM/DL — SIGNIFICANT CHANGE UP (ref 32–36)
MCV RBC AUTO: 92 FL — SIGNIFICANT CHANGE UP (ref 80–100)
MONOCYTES # BLD AUTO: 1.14 K/UL — HIGH (ref 0–0.9)
MONOCYTES NFR BLD AUTO: 10.1 % — SIGNIFICANT CHANGE UP (ref 2–14)
NEUTROPHILS # BLD AUTO: 6.36 K/UL — SIGNIFICANT CHANGE UP (ref 1.8–7.4)
NEUTROPHILS NFR BLD AUTO: 56.6 % — SIGNIFICANT CHANGE UP (ref 43–77)
NRBC # BLD: 0 /100 WBCS — SIGNIFICANT CHANGE UP (ref 0–0)
PHOSPHATE SERPL-MCNC: 3.8 MG/DL — SIGNIFICANT CHANGE UP (ref 2.5–4.5)
PHOSPHOLIPASE A2 RECEPTOR ELISA: <1.8 RU/ML — SIGNIFICANT CHANGE UP (ref 0–19.9)
PLATELET # BLD AUTO: 277 K/UL — SIGNIFICANT CHANGE UP (ref 150–400)
POTASSIUM SERPL-MCNC: 3.6 MMOL/L — SIGNIFICANT CHANGE UP (ref 3.5–5.3)
POTASSIUM SERPL-SCNC: 3.6 MMOL/L — SIGNIFICANT CHANGE UP (ref 3.5–5.3)
PROT SERPL-MCNC: 6.8 G/DL — SIGNIFICANT CHANGE UP (ref 6–8.3)
RBC # BLD: 2.86 M/UL — LOW (ref 4.2–5.8)
RBC # FLD: 14.4 % — SIGNIFICANT CHANGE UP (ref 10.3–14.5)
RH IG SCN BLD-IMP: POSITIVE — SIGNIFICANT CHANGE UP
SODIUM SERPL-SCNC: 134 MMOL/L — LOW (ref 135–145)
WBC # BLD: 11.25 K/UL — HIGH (ref 3.8–10.5)
WBC # FLD AUTO: 11.25 K/UL — HIGH (ref 3.8–10.5)

## 2021-10-19 PROCEDURE — 93306 TTE W/DOPPLER COMPLETE: CPT | Mod: 26

## 2021-10-19 PROCEDURE — 99233 SBSQ HOSP IP/OBS HIGH 50: CPT | Mod: GC

## 2021-10-19 PROCEDURE — 99233 SBSQ HOSP IP/OBS HIGH 50: CPT

## 2021-10-19 RX ADMIN — CARVEDILOL PHOSPHATE 6.25 MILLIGRAM(S): 80 CAPSULE, EXTENDED RELEASE ORAL at 05:10

## 2021-10-19 RX ADMIN — PANTOPRAZOLE SODIUM 40 MILLIGRAM(S): 20 TABLET, DELAYED RELEASE ORAL at 19:10

## 2021-10-19 RX ADMIN — Medication 1300 MILLIGRAM(S): at 21:50

## 2021-10-19 RX ADMIN — Medication 1: at 16:17

## 2021-10-19 RX ADMIN — INSULIN GLARGINE 18 UNIT(S): 100 INJECTION, SOLUTION SUBCUTANEOUS at 21:50

## 2021-10-19 RX ADMIN — BUMETANIDE 132 MILLIGRAM(S): 0.25 INJECTION INTRAMUSCULAR; INTRAVENOUS at 05:10

## 2021-10-19 RX ADMIN — CHLORHEXIDINE GLUCONATE 1 APPLICATION(S): 213 SOLUTION TOPICAL at 05:15

## 2021-10-19 RX ADMIN — Medication 1300 MILLIGRAM(S): at 05:09

## 2021-10-19 RX ADMIN — BUMETANIDE 132 MILLIGRAM(S): 0.25 INJECTION INTRAMUSCULAR; INTRAVENOUS at 19:09

## 2021-10-19 RX ADMIN — CARVEDILOL PHOSPHATE 6.25 MILLIGRAM(S): 80 CAPSULE, EXTENDED RELEASE ORAL at 19:10

## 2021-10-19 RX ADMIN — Medication 1300 MILLIGRAM(S): at 15:29

## 2021-10-19 RX ADMIN — Medication 81 MILLIGRAM(S): at 12:33

## 2021-10-19 RX ADMIN — HEPARIN SODIUM 5000 UNIT(S): 5000 INJECTION INTRAVENOUS; SUBCUTANEOUS at 19:10

## 2021-10-19 RX ADMIN — Medication 90 MILLIGRAM(S): at 05:10

## 2021-10-19 RX ADMIN — ATORVASTATIN CALCIUM 80 MILLIGRAM(S): 80 TABLET, FILM COATED ORAL at 21:50

## 2021-10-19 RX ADMIN — PANTOPRAZOLE SODIUM 40 MILLIGRAM(S): 20 TABLET, DELAYED RELEASE ORAL at 05:11

## 2021-10-19 RX ADMIN — HEPARIN SODIUM 5000 UNIT(S): 5000 INJECTION INTRAVENOUS; SUBCUTANEOUS at 05:10

## 2021-10-19 RX ADMIN — Medication 1: at 21:51

## 2021-10-19 NOTE — PROGRESS NOTE ADULT - PROBLEM SELECTOR PROBLEM 7
Hyperkalemia
Anemia

## 2021-10-19 NOTE — PROGRESS NOTE ADULT - PROBLEM SELECTOR PLAN 2
JOSHUA on CKD vs JOSHUA with hyperkalemia and meta acidosis on admission  - s/p 3 sessions of HD, SCr stable, volume status much improved  - renal US with normal kidney  - Scr continues to uptrend   - care discussed with nephrology  - s/p IR guided shiley placement  - wife in agreement with care planning  - c/w sodium bicarb 1300mg tid  HD per renal  monitor intake and output on diuretic

## 2021-10-19 NOTE — PROGRESS NOTE ADULT - SUBJECTIVE AND OBJECTIVE BOX
Patient is a 60y old  Male who presents with a chief complaint of NSTEMI (18 Oct 2021 14:04)      INTERVAL History of Present Illness/OVERNIGHT EVENTS: hb improved after PRBCs    MEDICATIONS  (STANDING):  aspirin  chewable 81 milliGRAM(s) Oral daily  atorvastatin 80 milliGRAM(s) Oral at bedtime  buMETAnide IVPB 4 milliGRAM(s) IV Intermittent every 12 hours  carvedilol 6.25 milliGRAM(s) Oral every 12 hours  chlorhexidine 4% Liquid 1 Application(s) Topical <User Schedule>  dextrose 40% Gel 15 Gram(s) Oral once  dextrose 5%. 1000 milliLiter(s) (50 mL/Hr) IV Continuous <Continuous>  dextrose 5%. 1000 milliLiter(s) (100 mL/Hr) IV Continuous <Continuous>  dextrose 50% Injectable 25 Gram(s) IV Push once  dextrose 50% Injectable 12.5 Gram(s) IV Push once  dextrose 50% Injectable 25 Gram(s) IV Push once  glucagon  Injectable 1 milliGRAM(s) IntraMuscular once  heparin   Injectable 5000 Unit(s) SubCutaneous every 12 hours  insulin glargine Injectable (LANTUS) 18 Unit(s) SubCutaneous at bedtime  insulin lispro (ADMELOG) corrective regimen sliding scale   SubCutaneous three times a day before meals  insulin lispro (ADMELOG) corrective regimen sliding scale   SubCutaneous at bedtime  NIFEdipine XL 90 milliGRAM(s) Oral daily  pantoprazole    Tablet 40 milliGRAM(s) Oral two times a day  sodium bicarbonate 1300 milliGRAM(s) Oral three times a day    MEDICATIONS  (PRN):  sodium chloride 0.9% lock flush 10 milliLiter(s) IV Push every 1 hour PRN Pre/post blood products, medications, blood draw, and to maintain line patency      Allergies    No Known Allergies    Intolerances        REVIEW OF SYSTEMS:  Negative unless otherwise specified above.    Vital Signs Last 24 Hrs  T(C): 37.2 (19 Oct 2021 11:00), Max: 37.2 (19 Oct 2021 11:00)  T(F): 98.9 (19 Oct 2021 11:00), Max: 98.9 (19 Oct 2021 11:00)  HR: 73 (19 Oct 2021 11:00) (72 - 84)  BP: 133/70 (19 Oct 2021 11:00) (133/70 - 154/83)  BP(mean): --  RR: 18 (19 Oct 2021 11:00) (18 - 18)  SpO2: 97% (19 Oct 2021 11:00) (95% - 99%)        PHYSICAL EXAM:  GENERAL: No apparent distress, appears stated age  HEAD:  Atraumatic, Normocephalic  EYES: Conjunctiva and sclera clear, no discharge  ENMT: Moist mucous membranes, no nasal discharge  NECK: Supple, no JVD  CHEST/LUNG: Clear to auscultation bilaterally, no wheeze or rales  right IJ HD cath  HEART: Regular rhythm, no rubs or gallops  ABDOMEN: Soft, Nontender, Nondistended; Bowel sounds present  EXTREMITIES:  No clubbing, cyanosis or edema  SKIN: No rash, no new discoloration  NERVOUS SYSTEM:  Alert & Oriented; Bilateral Lower extremity mobile, sensation to light touch intact      LABS:                        9.0    11.25 )-----------( 277      ( 19 Oct 2021 07:20 )             26.3     19 Oct 2021 07:20    134    |  94     |  36     ----------------------------<  110    3.6     |  26     |  3.70     Ca    9.0        19 Oct 2021 07:20  Phos  3.8       19 Oct 2021 07:20  Mg     1.6       19 Oct 2021 07:20    TPro  6.8    /  Alb  2.9    /  TBili  0.4    /  DBili  x      /  AST  16     /  ALT  22     /  AlkPhos  100    19 Oct 2021 07:20        CAPILLARY BLOOD GLUCOSE      POCT Blood Glucose.: 109 mg/dL (19 Oct 2021 11:42)  POCT Blood Glucose.: 116 mg/dL (19 Oct 2021 07:42)  POCT Blood Glucose.: 222 mg/dL (18 Oct 2021 21:05)  POCT Blood Glucose.: 250 mg/dL (18 Oct 2021 16:06)      RADIOLOGY & ADDITIONAL TESTS:      Images reviewed personally    Consultant Notes Reviewed and Care Discussed with relevant Consultants.

## 2021-10-19 NOTE — PROGRESS NOTE ADULT - PROBLEM SELECTOR PLAN 10
DVT ppx: hsq  Dispo: home, no skilled PT needs    above plans discussed with PA   wife phone ([phone]) - attempted unable to reach.

## 2021-10-19 NOTE — PROGRESS NOTE ADULT - PROBLEM SELECTOR PLAN 8
noncompliant with insulin at home, Fs improved, a1c 7.0  home regimen: Insulin Semglee 30U at bedtime, metformin 1000 mg BID  - c/w lantus 18 units qhs  - c/w WINDY  - adjust insulin as needed
noncompliant with insulin at home, Fs improved, a1c 7.0  home regimen: Insulin Semglee 30U at bedtime, metformin 1000 mg BID  adjust insulin dose daily as appropriate based on glucose levels.
noncompliant with insulin at home, Fs improved, a1c 7.0  home regimen: Insulin Semglee 30U at bedtime, metformin 1000 mg BID  - hold metformin  -given npo status, lower lantus to 18U at bedtime   -stop premeal  - c/w WINDY  - adjust insulin as needed
c/w lipitor 80mg qhs  lipid panel  pending
noncompliant with insulin at home, Fs 300s  home regimen: Insulin Semglee 30U at bedtime, metformin 1000 mg BID  - hold metformin  -c/w lantus 25U at bedtime   -c/w premeal 3 units TID  - c/w WINDY  - adjust insulin as needed  - get a1c
noncompliant with insulin at home, Fs improved, a1c 7.0  home regimen: Insulin Semglee 30U at bedtime, metformin 1000 mg BID  - c/w lantus 18 units qhs  - c/w WINDY  - adjust insulin as needed

## 2021-10-19 NOTE — PROGRESS NOTE ADULT - ASSESSMENT
Patient is a 60 year old male with PMH od DM and HTN who presented to the hospital as a transfer from OSH for NSTEMI. The nephrology team was consulted due to elevated creatinine of 4.05 upon presentation. The patient denies any history of kidney disease and denied noticing any changes in his urination.     JOSHUA on CKD?   ESRD?   - patient presenting to hospital with creatinine of 4.05 mg/dL; denied any history of kidney disease   --- creatinine had continued to uptrend to 6.28mg/dL but slight decrease to 5.95mg/dL this morning  - Kidney injury also exacerbated by contrast administration for the CT following the RRT on 10/7    - patient remains volume overloaded; on bumex and optimal UOP   - initiated on HD on 10/14  - suspect given his pre-HD creatinine and high suspicion of CKD will likely be HD dependent following  - s/p LHC on 10/14 showing severe multivessel disease   --- there is a tentative plan for CABG on Thursday   - will perform UF with goal 2L to optimize volume status prior to OR thursday   --- HD planned for tomorrow  - normal renal ultrasound without hydro  - UA with proteinuria and blood;  Ur Pr/Cr ratio noted to be 8.7g  - BEULAH negative; elevated kappa/lambda but normal ratio;   - C3 and C4 not low  - please adjust medication doses as per eGFR     Anemia:   - suspected in the setting of CKD   - iron studies wnl   - will consider starting the patient on SISSY with HD   - goal Hg of >8 given significant CAD as per cardiology recommendations   --- transfuse with HD tomorrow prior to OR thursday  - monitor CBC    If any questions, please feel free to contact me     Saul Crews  Nephrology Fellow  [facility] Pager: [phone]

## 2021-10-19 NOTE — PROGRESS NOTE ADULT - PROBLEM SELECTOR PLAN 7
resolved  monitor bmp
likely aOCD, no s/s of bleeding, unknown baseline  -monitor hgb as on triple therapy
resolved  monitor bmp

## 2021-10-19 NOTE — PROGRESS NOTE ADULT - PROBLEM SELECTOR PROBLEM 8
Diabetes mellitus
Hyperlipidemia
Diabetes mellitus
Diabetes mellitus

## 2021-10-19 NOTE — PROGRESS NOTE ADULT - ATTENDING COMMENTS
JOSHUA on CKD, Hypervolemia, CAD plan for CABG this week.  s/p HD yesterday  Plan for UF today, and HD tomorrow.    Angie Nair MD  Cell : [phone]  Pager : [phone]  Office : [phone]

## 2021-10-19 NOTE — PROGRESS NOTE ADULT - SUBJECTIVE AND OBJECTIVE BOX
Neurology Progress Note    S: Patient seen and examined. No new events overnight. patient denied CP, SOB, HA or pain.  doing there    Medications:  MEDICATIONS  (STANDING):  aspirin  chewable 81 milliGRAM(s) Oral daily  atorvastatin 80 milliGRAM(s) Oral at bedtime  buMETAnide IVPB 4 milliGRAM(s) IV Intermittent every 12 hours  carvedilol 6.25 milliGRAM(s) Oral every 12 hours  chlorhexidine 4% Liquid 1 Application(s) Topical <User Schedule>  dextrose 40% Gel 15 Gram(s) Oral once  dextrose 5%. 1000 milliLiter(s) (50 mL/Hr) IV Continuous <Continuous>  dextrose 5%. 1000 milliLiter(s) (100 mL/Hr) IV Continuous <Continuous>  dextrose 50% Injectable 25 Gram(s) IV Push once  dextrose 50% Injectable 12.5 Gram(s) IV Push once  dextrose 50% Injectable 25 Gram(s) IV Push once  glucagon  Injectable 1 milliGRAM(s) IntraMuscular once  heparin   Injectable 5000 Unit(s) SubCutaneous every 12 hours  insulin glargine Injectable (LANTUS) 18 Unit(s) SubCutaneous at bedtime  insulin lispro (ADMELOG) corrective regimen sliding scale   SubCutaneous three times a day before meals  insulin lispro (ADMELOG) corrective regimen sliding scale   SubCutaneous at bedtime  NIFEdipine XL 90 milliGRAM(s) Oral daily  pantoprazole    Tablet 40 milliGRAM(s) Oral two times a day  sodium bicarbonate 1300 milliGRAM(s) Oral three times a day    MEDICATIONS  (PRN):  sodium chloride 0.9% lock flush 10 milliLiter(s) IV Push every 1 hour PRN Pre/post blood products, medications, blood draw, and to maintain line patency      Vitals:  Vital Signs Last 24 Hrs  T(C): 36.6 (10-19-21 @ 16:30), Max: 37.2 (10-19-21 @ 11:00)  T(F): 97.9 (10-19-21 @ 16:30), Max: 98.9 (10-19-21 @ 11:00)  HR: 73 (10-19-21 @ 16:30) (72 - 83)  BP: 147/74 (10-19-21 @ 16:30) (133/70 - 164/71)  BP(mean): --  RR: 17 (10-19-21 @ 16:30) (17 - 19)  SpO2: 98% (10-19-21 @ 16:30) (96% - 99%)      General Exam:   General Appearance: Appropriately dressed and in no acute distress       Head: Normocephalic, atraumatic and no dysmorphic features  Ear, Nose, and Throat: Moist mucous membranes  CVS: S1S2+  Resp: No SOB, no wheeze or rhonchi  Abd: soft NTND  Extremities: No edema, no cyanosis  Skin: No bruises, no rashes     Neurological Exam:  Mental Status: Awake, alert and oriented x 3.  Able to follow simple and complex verbal commands. Able to name and repeat. fluent speech. No obvious aphasia or dysarthria noted.   Cranial Nerves: PERRL, EOMI, VFFC, sensation V1-V3 intact,  mild NLF facial asymmetry , equal elevation of palate, scm/trap 5/5, tongue is midline on protrusion. no obvious papilledema on fundoscopic exam. Hearing is grossly intact.   Motor: Normal bulk, tone and strength throughout. Fine finger movements were intact and symmetric. no tremors or drift noted.    Sensation: Intact to light touch and pinprick throughout. no right/left confusion. no extinction to tactile on DSS.   Reflexes: 1+ throughout at biceps, brachioradialis, triceps, patellars and ankles bilaterally and equal. No clonus. R toe and L toe were both downgoing.  Coordination: No dysmetria on FNF    Gait: deferred     I personally reviewed the below data/images/labs:    CBC Full  -  ( 19 Oct 2021 07:20 )  WBC Count : 11.25 K/uL  RBC Count : 2.86 M/uL  Hemoglobin : 9.0 g/dL  Hematocrit : 26.3 %  Platelet Count - Automated : 277 K/uL  Mean Cell Volume : 92.0 fl  Mean Cell Hemoglobin : 31.5 pg  Mean Cell Hemoglobin Concentration : 34.2 gm/dL  Auto Neutrophil # : 6.36 K/uL  Auto Lymphocyte # : 2.62 K/uL  Auto Monocyte # : 1.14 K/uL  Auto Eosinophil # : 0.99 K/uL  Auto Basophil # : 0.08 K/uL  Auto Neutrophil % : 56.6 %  Auto Lymphocyte % : 23.3 %  Auto Monocyte % : 10.1 %  Auto Eosinophil % : 8.8 %  Auto Basophil % : 0.7 %    10-19    134<L>  |  94<L>  |  36<H>  ----------------------------<  110<H>  3.6   |  26  |  3.70<H>    Ca    9.0      19 Oct 2021 07:20  Phos  3.8     10-19  Mg     1.6     10-19    TPro  6.8  /  Alb  2.9<L>  /  TBili  0.4  /  DBili  x   /  AST  16  /  ALT  22  /  AlkPhos  100  10-19        -10/07 CTH: No hemorrhage. 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   -10/07 CTA N: High-grade stenosis left internal carotid artery at the carotid bifurcation in the neck.   -10/07 CTA H: Normal intracranial circulation.    < from: MR Head No Cont (10.09.21 @ 20:22) >    EXAM:  MR BRAIN                            PROCEDURE DATE:  10/09/2021            INTERPRETATION:  CLINICAL HISTORY:Facial droop, on heparin drip, NSTEMI . Severe left ICA stenosis.    TECHNIQUE:  MRI of brain without contrast dated 10/9/2021.  Examination consisted of transaxial T1, T2, FLAIR, gradient echo as well as diffusion-weighted sequences with corresponding ADC maps. Coronal T2 and sagittal T1-weighted images were also acquired through the brain.    COMPARISON: CT head and CTA head andneck 10/7/2021    FINDINGS:  There are no areas abnormal restricted diffusion within the brain parenchyma to suggest acute/subacute infarct. There is no acute intracranial hemorrhage, vasogenic edema or abnormal susceptibility artifact. Chronic lacunar infarcts are seen in the left frontal lobe, right frontal cranial radiata and right cerebellum. Additional nonspecific patchy and confluent areas of T2/FLAIR prolongation in the bihemispheric white matter likely represents mild chronic microvascular changes.    The ventricles, sulci and cisternal spaces are stable in size and configuration. There is no midline shift or abnormal extra-axial fluid collection.    Flow-voids of the major intracranial vessels are maintained, as seen best on the T2-weighted images, indicating their patency.    The visualized paranasal sinuses and mastoid air cells are free of acute disease. The orbital regions are unremarkable.    IMPRESSION:  No acute infarct or intracranial hemorrhage. Chronic infarcts and microvascular changes as above.    --- End of Report ---      NEIL CARDENAS MD; Attending Radiologist  This document has been electronically signed. Oct 10 2021  4:26AM    < end of copied text >  < from: [facility] Duplex Prashant Moreno (10.08.21 @ 17:07) >    EXAM:  CAROTID DUPLEX BILATERAL                            PROCEDURE DATE:  10/08/2021      INTERPRETATION:  CLINICAL INFORMATION: Left ICA high-grade stenosis identified on recent CTA neck.    COMPARISON: CTA neck 10/7/2021.    TECHNIQUE: Grayscale, color and spectral Doppler examination of both carotid arteries was performed.    FINDINGS:    There are atheromatous plaques are present bilaterally in the region of the bifurcations of the common carotid arteries into internal and external branches.    Velocity elevation of the proximal right internal carotid artery results in 50-69% flow-limiting stenosis.    Velocity elevation of the proximal left internal carotid artery results in 70-9% flow-limiting stenosis.    Bilateral flow-limiting stenoses noted of the right and left external carotid arteries as well.    Peak systolic velocities are as follows:    RIGHT:  PROX CCA = 126 cm/s  DIST CCA = 99 cm/s  PROX ICA = 137 cm/s  MID ICA = 86 cm/s  DIST ICA = 80 cm/s  ECA = 202 cm/s    LEFT:  PROX CCA = 100 cm/s  DIST CCA = 59 cm/s  PROX ICA = 313 cm/s  MID ICA = 72 cm/s  DIST ICA = 47 cm/s  ECA = 298 cm/s    Antegrade flow is noted within both vertebral arteries.    IMPRESSION:    Left internal carotid artery 70-99% flow-limiting stenosis.  Right internal carotid artery 50-69% flow-limiting stenosis.  Bilateral external carotid artery flow limiting stenoses.  INDIANA Hammond was informed of these findings on 10/8/2021 at 5:06 PM.    Measurement of carotid stenosis is based on velocity parameters that correlate the residual internal carotid diameter with that of the more distal vessel in accordance with a method such as the [employer] Symptomatic Carotid Endarterectomy Trial (NASCET).    --- End of Report ---    FELIX MCMAHON M.D., ATTENDING RADIOLOGIST  This document has been electronically signed. Oct  8 2021  5:21PM    < end of copied text >

## 2021-10-19 NOTE — PROGRESS NOTE ADULT - PROBLEM SELECTOR PLAN 3
Blood pressure initially 200/105 at arrival; s/p nitro ggt at [place], noncompliant with meds  - now much better controlled  - continue Nifedipine ER 90mg po daily  - c/w coreg 6.125mg bid, uptitrate as needed  - bumex as below  - home labetalol on hold due to JOSHUA  - c/t hold HCTZ-losartan given JOSHUA on CKD  Blood pressure meds with hold parameters

## 2021-10-19 NOTE — PROGRESS NOTE ADULT - PROBLEM SELECTOR PLAN 9
c/w lipitor 80mg qhs
c/w lipitor 80mg qhs  lipid panel  pending
c/w lipitor 80mg qhs
DVT ppx: on heparin gtt as above    Discussed with INDIANA Luna and cardiology
c/w lipitor 80mg qhs
c/w lipitor 80mg qhs

## 2021-10-19 NOTE — PROGRESS NOTE ADULT - PROBLEM SELECTOR PROBLEM 9
Hyperlipidemia
Prophylactic measure
Hyperlipidemia

## 2021-10-19 NOTE — PROGRESS NOTE ADULT - SUBJECTIVE AND OBJECTIVE BOX
[facility] DIVISION OF KIDNEY DISEASES AND HYPERTENSION -- FOLLOW UP NOTE  --------------------------------------------------------------------------------  Chief Complaint:    24 hour events/subjective:    seen and examined this afternoon   did not express any acute complaints   no acute issues overnight    PAST HISTORY  --------------------------------------------------------------------------------  No significant changes to PMH, PSH, FHx, SHx, unless otherwise noted    ALLERGIES & MEDICATIONS  --------------------------------------------------------------------------------  Allergies    No Known Allergies    Intolerances      Standing Inpatient Medications  aspirin  chewable 81 milliGRAM(s) Oral daily  atorvastatin 80 milliGRAM(s) Oral at bedtime  buMETAnide IVPB 4 milliGRAM(s) IV Intermittent every 12 hours  carvedilol 6.25 milliGRAM(s) Oral every 12 hours  chlorhexidine 4% Liquid 1 Application(s) Topical <User Schedule>  dextrose 40% Gel 15 Gram(s) Oral once  dextrose 5%. 1000 milliLiter(s) IV Continuous <Continuous>  dextrose 5%. 1000 milliLiter(s) IV Continuous <Continuous>  dextrose 50% Injectable 25 Gram(s) IV Push once  dextrose 50% Injectable 12.5 Gram(s) IV Push once  dextrose 50% Injectable 25 Gram(s) IV Push once  glucagon  Injectable 1 milliGRAM(s) IntraMuscular once  heparin   Injectable 5000 Unit(s) SubCutaneous every 12 hours  insulin glargine Injectable (LANTUS) 18 Unit(s) SubCutaneous at bedtime  insulin lispro (ADMELOG) corrective regimen sliding scale   SubCutaneous three times a day before meals  insulin lispro (ADMELOG) corrective regimen sliding scale   SubCutaneous at bedtime  NIFEdipine XL 90 milliGRAM(s) Oral daily  pantoprazole    Tablet 40 milliGRAM(s) Oral two times a day  sodium bicarbonate 1300 milliGRAM(s) Oral three times a day    PRN Inpatient Medications  sodium chloride 0.9% lock flush 10 milliLiter(s) IV Push every 1 hour PRN      REVIEW OF SYSTEMS  All other systems were reviewed and are negative, except as noted.    VITALS/PHYSICAL EXAM  --------------------------------------------------------------------------------  T(C): 37.2 (10-19-21 @ 11:00), Max: 37.2 (10-19-21 @ 11:00)  HR: 73 (10-19-21 @ 11:00) (72 - 84)  BP: 133/70 (10-19-21 @ 11:00) (133/70 - 154/83)  RR: 18 (10-19-21 @ 11:00) (18 - 18)  SpO2: 97% (10-19-21 @ 11:00) (95% - 99%)  Wt(kg): --        10-18-21 @ 07:01  -  10-19-21 @ 07:00  --------------------------------------------------------  IN: 830 mL / OUT: 500 mL / NET: 330 mL    10-19-21 @ 07:01  -  10-19-21 @ 16:10  --------------------------------------------------------  IN: 480 mL / OUT: 300 mL / NET: 180 mL        Physical Exam:  	Gen: NAD  	HEENT: MMM  	Pulm: CTA B/L  	CV: S1S2  	Abd: Soft, +BS   	Ext: trace LE edema B/L  	Neuro: Awake  	Skin: Warm and dry  	Vascular access: R IJ      LABS/STUDIES  --------------------------------------------------------------------------------              9.0    11.25 >-----------<  277      [10-19-21 @ 07:20]              26.3     134  |  94  |  36  ----------------------------<  110      [10-19-21 @ 07:20]  3.6   |  26  |  3.70        Ca     9.0     [10-19-21 @ 07:20]      Mg     1.6     [10-19-21 @ 07:20]      Phos  3.8     [10-19-21 @ 07:20]    TPro  6.8  /  Alb  2.9  /  TBili  0.4  /  DBili  x   /  AST  16  /  ALT  22  /  AlkPhos  100  [10-19-21 @ 07:20]          Creatinine Trend:  SCr 3.70 [10-19 @ 07:20]  SCr 5.04 [10-18 @ 05:54]  SCr 3.84 [10-17 @ 06:58]  SCr 2.58 [10-16 @ 14:23]  SCr 4.83 [10-15 @ 06:57]    Urinalysis - [10-07-21 @ 10:09]      Color Light Yellow / Appearance Clear / SG 1.015 / pH 6.0      Gluc 200 mg/dL / Ketone Negative  / Bili Negative / Urobili Negative       Blood Small / Protein 300 mg/dL / Leuk Est Negative / Nitrite Negative      RBC 2 / WBC 7 / Hyaline 6 / Gran  / Sq Epi  / Non Sq Epi 2 / Bacteria Negative      Iron 47, TIBC 245, %sat 19      [10-13-21 @ 13:10]  Ferritin 780      [10-13-21 @ 08:59]    HBsAg Nonreact      [10-13-21 @ 09:15]  HCV 0.23, Nonreact      [10-07-21 @ 14:38]  HIV Nonreact      [10-13-21 @ 09:16]    BEULAH: titer Negative, pattern --      [10-07-21 @ 14:37]  C3 Complement 135      [10-07-21 @ 14:42]  C4 Complement 71      [10-07-21 @ 14:42]  ANCA: cANCA Negative, pANCA Negative, atypical ANCA Negative      [10-07-21 @ 14:37]  Syphilis Screen (Treponema Pallidum Ab) Negative      [10-07-21 @ 14:37]  Free Light Chains: kappa 10.89, lambda 12.51, ratio = 0.87      [10-07 @ 14:42]  Immunofixation Serum:   No Monoclonal Band Identified    Reference Range: None Detected      [10-07-21 @ 14:42]  SPEP Interpretation: Normal Electrophoresis Pattern      [10-07-21 @ 14:42]

## 2021-10-19 NOTE — PROGRESS NOTE ADULT - ASSESSMENT
60 yr old M w/ hx of DM2, HTN and HLD presents as transfer from [facility] for chest pain concerning for NSTEMI and possible new CHF c/b JOSHUA likely on CKD, s/p RRT for CVA/TIA ruled out and worsening anemia, pending improvement in JOSHUA for [facility], found to have multivessel disease pending CABG

## 2021-10-19 NOTE — PROGRESS NOTE ADULT - ASSESSMENT
59 yo male with DM2, HTN, and HLD who initially presented to [facility] on 10/05 as a transfer from [facility] for NSTEMI and possible CHF. Patient on Heparin drip for NSTEMI. LKW 10:45AM. Patient had episode of bradycardia (HR 30s), then became altered. RRT called. BP during RRT 70s/40s. Code stroke called for L facial droop.   CTH negative for acute pathology, old L frontal cortical infarct seen.   CTA H unremarkable. CTA N shows high-grade stenosis left internal carotid artery at the carotid bifurcation in the neck.  10/06 TTE: EF 45%, moderate-severe MR, mild-moderate L ventricular systolic dysfunction.   A1c 7  MRI no AIS but old strokes  HD cath placement 10/12   CD with severe L ICA and Mod R ICA   s/p LHC 10/14  Impression: Mild L nasolabial flattening and dysarthria, ?decreased eye closure strength on the L. Possibly secondary to R brain dysfunction due to acute stroke of unknown mechanism vs peripheral etiology. Patient with old L frontal infarct on CTH, also with high-grade stenosis of L ICA at the carotid bifurcation which likely cause of old stroke     Recommendations:  - s/p PRBC  - ASA 81MG PO QD + Brilinta 90MG PO Q12HR.   - CTS possible CABG this week   - Avoid hypotension.  - High dose statin therapy - atorvastatin 40mg PO daily. LDL goal <70mg/dL.  -  vascular for ICA revascularization can be outpt. not acute   - lipid panel  - telemetry  - PT/OT/SS/SLP, OOBC  - permissive HTN, -180mmHg.  - check FS, glucose control <180  - GI/DVT ppx  - Counseling on diet, exercise, and medication adherence was done  - Counseling on smoking cessation and alcohol consumption offered when appropriate.  - Pain assessed and judicious use of narcotics when appropriate was discussed.    - Stroke education given when appropriate.  - Importance of fall prevention discussed.   - Differential diagnosis and plan of care discussed with patient and/or family and primary team  - Thank you for allowing me to participate in the care of this patient. Call with questions.   Marcelino Patel MD  Vascular Neurology .

## 2021-10-20 DIAGNOSIS — I10 ESSENTIAL (PRIMARY) HYPERTENSION: ICD-10-CM

## 2021-10-20 DIAGNOSIS — E66.9 OBESITY, UNSPECIFIED: ICD-10-CM

## 2021-10-20 PROBLEM — Z00.00 ENCOUNTER FOR PREVENTIVE HEALTH EXAMINATION: Status: ACTIVE | Noted: 2021-10-20

## 2021-10-20 PROBLEM — E11.9 TYPE 2 DIABETES MELLITUS WITHOUT COMPLICATIONS: Chronic | Status: ACTIVE | Noted: 2021-10-06

## 2021-10-20 PROBLEM — E78.5 HYPERLIPIDEMIA, UNSPECIFIED: Chronic | Status: ACTIVE | Noted: 2021-10-06

## 2021-10-20 LAB
ALBUMIN SERPL ELPH-MCNC: 3.1 G/DL — LOW (ref 3.3–5)
ALP SERPL-CCNC: 99 U/L — SIGNIFICANT CHANGE UP (ref 40–120)
ALT FLD-CCNC: 20 U/L — SIGNIFICANT CHANGE UP (ref 10–45)
ANION GAP SERPL CALC-SCNC: 15 MMOL/L — SIGNIFICANT CHANGE UP (ref 5–17)
AST SERPL-CCNC: 13 U/L — SIGNIFICANT CHANGE UP (ref 10–40)
BASOPHILS # BLD AUTO: 0.09 K/UL — SIGNIFICANT CHANGE UP (ref 0–0.2)
BASOPHILS NFR BLD AUTO: 0.8 % — SIGNIFICANT CHANGE UP (ref 0–2)
BILIRUB SERPL-MCNC: 0.2 MG/DL — SIGNIFICANT CHANGE UP (ref 0.2–1.2)
BUN SERPL-MCNC: 48 MG/DL — HIGH (ref 7–23)
CALCIUM SERPL-MCNC: 9.1 MG/DL — SIGNIFICANT CHANGE UP (ref 8.4–10.5)
CHLORIDE SERPL-SCNC: 96 MMOL/L — SIGNIFICANT CHANGE UP (ref 96–108)
CO2 SERPL-SCNC: 25 MMOL/L — SIGNIFICANT CHANGE UP (ref 22–31)
CREAT SERPL-MCNC: 4.67 MG/DL — HIGH (ref 0.5–1.3)
EOSINOPHIL # BLD AUTO: 0.92 K/UL — HIGH (ref 0–0.5)
EOSINOPHIL NFR BLD AUTO: 8.3 % — HIGH (ref 0–6)
GLUCOSE BLDC GLUCOMTR-MCNC: 147 MG/DL — HIGH (ref 70–99)
GLUCOSE BLDC GLUCOMTR-MCNC: 169 MG/DL — HIGH (ref 70–99)
GLUCOSE BLDC GLUCOMTR-MCNC: 179 MG/DL — HIGH (ref 70–99)
GLUCOSE BLDC GLUCOMTR-MCNC: 200 MG/DL — HIGH (ref 70–99)
GLUCOSE BLDC GLUCOMTR-MCNC: 99 MG/DL — SIGNIFICANT CHANGE UP (ref 70–99)
GLUCOSE SERPL-MCNC: 81 MG/DL — SIGNIFICANT CHANGE UP (ref 70–99)
HCT VFR BLD CALC: 28.3 % — LOW (ref 39–50)
HGB BLD-MCNC: 9.3 G/DL — LOW (ref 13–17)
IMM GRANULOCYTES NFR BLD AUTO: 0.3 % — SIGNIFICANT CHANGE UP (ref 0–1.5)
LYMPHOCYTES # BLD AUTO: 2.84 K/UL — SIGNIFICANT CHANGE UP (ref 1–3.3)
LYMPHOCYTES # BLD AUTO: 25.7 % — SIGNIFICANT CHANGE UP (ref 13–44)
MAGNESIUM SERPL-MCNC: 1.9 MG/DL — SIGNIFICANT CHANGE UP (ref 1.6–2.6)
MCHC RBC-ENTMCNC: 30.5 PG — SIGNIFICANT CHANGE UP (ref 27–34)
MCHC RBC-ENTMCNC: 32.9 GM/DL — SIGNIFICANT CHANGE UP (ref 32–36)
MCV RBC AUTO: 92.8 FL — SIGNIFICANT CHANGE UP (ref 80–100)
MONOCYTES # BLD AUTO: 1.26 K/UL — HIGH (ref 0–0.9)
MONOCYTES NFR BLD AUTO: 11.4 % — SIGNIFICANT CHANGE UP (ref 2–14)
NEUTROPHILS # BLD AUTO: 5.9 K/UL — SIGNIFICANT CHANGE UP (ref 1.8–7.4)
NEUTROPHILS NFR BLD AUTO: 53.5 % — SIGNIFICANT CHANGE UP (ref 43–77)
NRBC # BLD: 0 /100 WBCS — SIGNIFICANT CHANGE UP (ref 0–0)
PHOSPHATE SERPL-MCNC: 5.2 MG/DL — HIGH (ref 2.5–4.5)
PLATELET # BLD AUTO: 282 K/UL — SIGNIFICANT CHANGE UP (ref 150–400)
POTASSIUM SERPL-MCNC: 4.1 MMOL/L — SIGNIFICANT CHANGE UP (ref 3.5–5.3)
POTASSIUM SERPL-SCNC: 4.1 MMOL/L — SIGNIFICANT CHANGE UP (ref 3.5–5.3)
PROT SERPL-MCNC: 6.5 G/DL — SIGNIFICANT CHANGE UP (ref 6–8.3)
RBC # BLD: 3.05 M/UL — LOW (ref 4.2–5.8)
RBC # FLD: 13.8 % — SIGNIFICANT CHANGE UP (ref 10.3–14.5)
SARS-COV-2 RNA SPEC QL NAA+PROBE: SIGNIFICANT CHANGE UP
SODIUM SERPL-SCNC: 136 MMOL/L — SIGNIFICANT CHANGE UP (ref 135–145)
T3 SERPL-MCNC: 84 NG/DL — SIGNIFICANT CHANGE UP (ref 80–200)
T4 AB SER-ACNC: 7.2 UG/DL — SIGNIFICANT CHANGE UP (ref 4.6–12)
TSH SERPL-MCNC: 6.4 UIU/ML — HIGH (ref 0.27–4.2)
WBC # BLD: 11.04 K/UL — HIGH (ref 3.8–10.5)
WBC # FLD AUTO: 11.04 K/UL — HIGH (ref 3.8–10.5)

## 2021-10-20 PROCEDURE — 99233 SBSQ HOSP IP/OBS HIGH 50: CPT | Mod: GC

## 2021-10-20 RX ORDER — CEFUROXIME AXETIL 250 MG
1500 TABLET ORAL ONCE
Refills: 0 | Status: DISCONTINUED | OUTPATIENT
Start: 2021-10-21 | End: 2021-10-21

## 2021-10-20 RX ORDER — ASCORBIC ACID 60 MG
2000 TABLET,CHEWABLE ORAL ONCE
Refills: 0 | Status: COMPLETED | OUTPATIENT
Start: 2021-10-20 | End: 2021-10-20

## 2021-10-20 RX ORDER — ACETAMINOPHEN 500 MG
1000 TABLET ORAL ONCE
Refills: 0 | Status: COMPLETED | OUTPATIENT
Start: 2021-10-20 | End: 2021-10-21

## 2021-10-20 RX ORDER — CHLORHEXIDINE GLUCONATE 213 G/1000ML
1 SOLUTION TOPICAL ONCE
Refills: 0 | Status: COMPLETED | OUTPATIENT
Start: 2021-10-20 | End: 2021-10-20

## 2021-10-20 RX ORDER — GABAPENTIN 400 MG/1
100 CAPSULE ORAL ONCE
Refills: 0 | Status: COMPLETED | OUTPATIENT
Start: 2021-10-20 | End: 2021-10-21

## 2021-10-20 RX ORDER — CHLORHEXIDINE GLUCONATE 213 G/1000ML
10 SOLUTION TOPICAL ONCE
Refills: 0 | Status: COMPLETED | OUTPATIENT
Start: 2021-10-21 | End: 2021-10-21

## 2021-10-20 RX ORDER — CHLORHEXIDINE GLUCONATE 213 G/1000ML
1 SOLUTION TOPICAL ONCE
Refills: 0 | Status: COMPLETED | OUTPATIENT
Start: 2021-10-21 | End: 2021-10-21

## 2021-10-20 RX ORDER — INSULIN LISPRO 100/ML
6 VIAL (ML) SUBCUTANEOUS
Refills: 0 | Status: DISCONTINUED | OUTPATIENT
Start: 2021-10-20 | End: 2021-10-21

## 2021-10-20 RX ADMIN — Medication 6 UNIT(S): at 19:41

## 2021-10-20 RX ADMIN — Medication 1300 MILLIGRAM(S): at 05:30

## 2021-10-20 RX ADMIN — CHLORHEXIDINE GLUCONATE 1 APPLICATION(S): 213 SOLUTION TOPICAL at 10:21

## 2021-10-20 RX ADMIN — Medication 1: at 11:47

## 2021-10-20 RX ADMIN — Medication 81 MILLIGRAM(S): at 08:39

## 2021-10-20 RX ADMIN — Medication 1300 MILLIGRAM(S): at 19:40

## 2021-10-20 RX ADMIN — PANTOPRAZOLE SODIUM 40 MILLIGRAM(S): 20 TABLET, DELAYED RELEASE ORAL at 19:41

## 2021-10-20 RX ADMIN — INSULIN GLARGINE 18 UNIT(S): 100 INJECTION, SOLUTION SUBCUTANEOUS at 21:39

## 2021-10-20 RX ADMIN — CARVEDILOL PHOSPHATE 6.25 MILLIGRAM(S): 80 CAPSULE, EXTENDED RELEASE ORAL at 05:30

## 2021-10-20 RX ADMIN — BUMETANIDE 132 MILLIGRAM(S): 0.25 INJECTION INTRAMUSCULAR; INTRAVENOUS at 21:40

## 2021-10-20 RX ADMIN — BUMETANIDE 132 MILLIGRAM(S): 0.25 INJECTION INTRAMUSCULAR; INTRAVENOUS at 05:31

## 2021-10-20 RX ADMIN — CARVEDILOL PHOSPHATE 6.25 MILLIGRAM(S): 80 CAPSULE, EXTENDED RELEASE ORAL at 19:41

## 2021-10-20 RX ADMIN — PANTOPRAZOLE SODIUM 40 MILLIGRAM(S): 20 TABLET, DELAYED RELEASE ORAL at 05:31

## 2021-10-20 RX ADMIN — Medication 2000 MILLIGRAM(S): at 21:40

## 2021-10-20 RX ADMIN — ATORVASTATIN CALCIUM 80 MILLIGRAM(S): 80 TABLET, FILM COATED ORAL at 19:40

## 2021-10-20 RX ADMIN — CHLORHEXIDINE GLUCONATE 1 APPLICATION(S): 213 SOLUTION TOPICAL at 19:36

## 2021-10-20 RX ADMIN — HEPARIN SODIUM 5000 UNIT(S): 5000 INJECTION INTRAVENOUS; SUBCUTANEOUS at 19:41

## 2021-10-20 RX ADMIN — Medication 1300 MILLIGRAM(S): at 13:44

## 2021-10-20 RX ADMIN — CHLORHEXIDINE GLUCONATE 1 APPLICATION(S): 213 SOLUTION TOPICAL at 19:37

## 2021-10-20 RX ADMIN — HEPARIN SODIUM 5000 UNIT(S): 5000 INJECTION INTRAVENOUS; SUBCUTANEOUS at 05:30

## 2021-10-20 RX ADMIN — Medication 90 MILLIGRAM(S): at 05:30

## 2021-10-20 NOTE — PROGRESS NOTE ADULT - SUBJECTIVE AND OBJECTIVE BOX
Cardiac Surgery Pre-op Note:    CC: Patient is a 60y old  Male who presents with a chief complaint of NSTEMI (20 Oct 2021 07:04)      Referring Physician:                                                                                                           Surgeon: Dr. Xiong    Procedure: 10/21/21 CABG    Allergies    No Known Allergies    Intolerances        HPI:  60 yr old M w/ hx of DM2, HTN and HLD presents as transfer from [facility] for chest pain concerning for NSTEMI and possible CHF.  Pt states that after he woke up this morning he felt midsternal chest pain this morning, that felt like a burning sensation. Pain was non radiating. Associated with shortness of breath. He initially took TUMS and drank some milk which alleviated some of the pain but not completely. Later he also started feeling very dizzy so he went to the ED. Denies associated palpitations, diaphoresis, N/V.  When he presented to [facility] he was noted to be SOB. Initially, they evaluated patient for CHF but Troponin and BNP levels were elevated so he was transferred here for NSTEMI and r/o CHF.    Per nursing notes, while at [facility], he was placed on BIPAP, given Solumedrol 120, Nitro SL x1, duoneb x2, ASA 81mg, Lasix 40mg w/ 200 cc output, and started at 10mL/hr Nitro drip then increased to 30mL/hr. There, initial /126 retake was 171/67. Nitro drip was d/c'd upon arrival to [facility] ED.     During my visit, patient was sitting up eating a sandwich. Appears comfortable on room air. Denies repeat of chest pain after this morning. Denies shortness of breath. Denies lower extremity edema, orthopnea or PND.     Labs also remarkable for JOSHUA, metabolic acidosis and hyperkalemia. Patient denies prior history of renal injury.       Wife can be reached at [phone]. Medication list confirmed w/ wife. Of note, patient w/ elevated BP to SBP 200s often at home; nifedipine used on a "as needed" basis for SBP > 200.  (06 Oct 2021 01:09)      Subjective Assessment:    PAST MEDICAL & SURGICAL HISTORY:  Hypertension    Hyperlipidemia    Diabetes mellitus    No pertinent past surgical history        MEDICATIONS  (STANDING):  aspirin  chewable 81 milliGRAM(s) Oral daily  atorvastatin 80 milliGRAM(s) Oral at bedtime  buMETAnide IVPB 4 milliGRAM(s) IV Intermittent every 12 hours  carvedilol 6.25 milliGRAM(s) Oral every 12 hours  cefuroxime  IVPB 1500 milliGRAM(s) IV Intermittent once  chlorhexidine 0.12% Liquid 10 milliLiter(s) Swish and Spit once  chlorhexidine 4% Liquid 1 Application(s) Topical once  chlorhexidine 4% Liquid 1 Application(s) Topical once  chlorhexidine 4% Liquid 1 Application(s) Topical <User Schedule>  dextrose 40% Gel 15 Gram(s) Oral once  dextrose 5%. 1000 milliLiter(s) (50 mL/Hr) IV Continuous <Continuous>  dextrose 5%. 1000 milliLiter(s) (100 mL/Hr) IV Continuous <Continuous>  dextrose 50% Injectable 25 Gram(s) IV Push once  dextrose 50% Injectable 12.5 Gram(s) IV Push once  dextrose 50% Injectable 25 Gram(s) IV Push once  glucagon  Injectable 1 milliGRAM(s) IntraMuscular once  heparin   Injectable 5000 Unit(s) SubCutaneous every 12 hours  insulin glargine Injectable (LANTUS) 18 Unit(s) SubCutaneous at bedtime  insulin lispro (ADMELOG) corrective regimen sliding scale   SubCutaneous three times a day before meals  insulin lispro (ADMELOG) corrective regimen sliding scale   SubCutaneous at bedtime  NIFEdipine XL 90 milliGRAM(s) Oral daily  pantoprazole    Tablet 40 milliGRAM(s) Oral two times a day  sodium bicarbonate 1300 milliGRAM(s) Oral three times a day    MEDICATIONS  (PRN):  sodium chloride 0.9% lock flush 10 milliLiter(s) IV Push every 1 hour PRN Pre/post blood products, medications, blood draw, and to maintain line patency        Labs:                        9.3    11.04 )-----------( 282      ( 20 Oct 2021 06:13 )             28.3     10-20    136  |  96  |  48<H>  ----------------------------<  81  4.1   |  25  |  4.67<H>    Ca    9.1      20 Oct 2021 06:13  Phos  5.2     10-20  Mg     1.9     10-20    TPro  6.5  /  Alb  3.1<L>  /  TBili  0.2  /  DBili  x   /  AST  13  /  ALT  20  /  AlkPhos  99  10-20        Blood Type: ABO Interpretation: AB (10-19 @ 07:34)    HGB A1C:  7.0  Prealbumin: 22  Pro-BNP: 8000  Thyroid Panel: PENDING   MRSA:  / MSSA: PENDING       CXR:   < from: Xray Chest 1 View- PORTABLE-Urgent (10.05.21 @ 17:18) >  IMPRESSION:    Clear lungs.      --- End of Report ---    < end of copied text >      EKG:  `< from: 12 Lead ECG (10.07.21 @ 11:30) >  Ventricular Rate 68 BPM    Atrial Rate 68 BPM    P-R Interval 134 ms    QRS Duration 88 ms    Q-T Interval 432 ms    QTC Calculation(Bazett) 459 ms    P Axis -9 degrees    R Axis 72 degrees    T Axis 85 degrees    Diagnosis Line NORMAL SINUS RHYTHM  NONSPECIFIC ST ABNORMALITY  WHEN COMPARED WITH ECG OF 06-OCT-2021 08:07,  NO SIGNIFICANT CHANGE WAS FOUND  Confirmed by MD CUAUHTEMOC, [facility] ([id number]) on 10/9/2021 7:22:21 AM    < end of copied text >      Carotid Duplex:    `c`< from: VA Duplex Carotid, Bilat (10.08.21 @ 17:07) >  IMPRESSION:    Left internal carotid artery 70-99% flow-limiting stenosis.  Right internal carotid artery 50-69% flow-limiting stenosis.  Bilateral external carotid artery flow limiting stenoses.  INDIANA Hammond was informed of these findings on 10/8/2021 at 5:06 PM.    < end of copied text >      Echocardiogram:  `< from: TTE with Doppler (w/Cont) (10.19.21 @ 13:40) >  Conclusions:  1. Mitral annular calcification and calcified mitral  leaflets with normal diastolic opening. Mild-moderate  mitral regurgitation.  2. Calcified trileaflet aortic valve with normal opening.  3. Moderate concentric left ventricular hypertrophy.  4. Endocardial visualization enhanced with intravenous  injection of Ultrasonic Enhancing Agent (Definity). Mild  segmental left ventricular systolic dysfunction. Overall  preserved left ventricular ejection fraction. Mild  hypokinesis of the basal inferolateral wall.  5. Normal right ventricular size and function.  *** Compared with echocardiogram of 10/6/2021, improved LV  function with resolution of WMA.    < end of copied text >      Cardiac catheterization: `< from: Cardiac Catheterization (10.14.21 @ 16:27) >  Diagnostic Conclusions:   Severe multivessel disease with high SYNTAX score given ostial  LAD/ostial Cx/ distal LM involvement aswell as distal RCA  bifurcation lesion. LM/LAD shown to be physiologically significant  with IFR of .54 indivating severely ischemic anterior wall.  Recommend CTS evaluation for CABG. Continue ASA and heparin, hold  P2Y12. Findings relayed to patientand patient's  cardiologist.      < end of copied text >      Vein Mapping:    Gen: WN/WD NAD  Neuro: AAOx3, nonfocal  Pulm: CTA B/L  CV: RRR, S1S2  Abd: Soft, NT, ND +BS  Ext: No edema, + peripheral pulses    CURRENT INR for patients previously on Coumadin:    Pt has AICD/PPM [ ] Yes  [ X] No             Brand Name:  Pre-op Beta Blocker ordered within 24 hrs of surgery (CABG ONLY)?  [X ] Yes  [ ] No  If not, Why?  Type & Cross  [X ] Yes  [ ] No  NPO after Midnight [X ] Yes  [ ] No  Pre-op ABX ordered, to be taped on chart:  [ X] Yes  [ ] No     Hibiclens/Peridex ordered [X ] Yes  [ ] No  Intraop Items Ordered: PRBCs, CXR, GAL [X ]   Consent obtained, or blank in chart  [X ] Yes  [ ] No

## 2021-10-20 NOTE — CONSULT NOTE ADULT - PROBLEM SELECTOR RECOMMENDATION 4
Will continue statin, primary team FU.
10/7 1 PRBC for H&H 6.7/21   H&H stable at 8/24  Please send Occult stool   Start on Epogen

## 2021-10-20 NOTE — PROGRESS NOTE ADULT - ASSESSMENT
Patient is a 60 year old male with PMH od DM and HTN who presented to the hospital as a transfer from OSH for NSTEMI. The nephrology team was consulted due to elevated creatinine of 4.05 upon presentation. The patient denies any history of kidney disease and denied noticing any changes in his urination.     JOSHUA on CKD?   ESRD?   - patient presenting to hospital with creatinine of 4.05 mg/dL; denied any history of kidney disease   --- creatinine had continued to uptrend to 6.28mg/dL but slight decrease to 5.95mg/dL this morning  - Kidney injury also exacerbated by contrast administration for the CT following the RRT on 10/7    - patient remains volume overloaded; on bumex and optimal UOP   - initiated on HD on 10/14  - suspect given his pre-HD creatinine and high suspicion of CKD in setting of DM and HTN will likely be HD dependent following  - s/p LHC on 10/14 showing severe multivessel disease   --- there is a tentative plan for CABG on 10/21  - will perform HD with goal 2L UF to optimize volume status prior to OR 10/21  - normal renal ultrasound without hydro  - UA with proteinuria and blood;  Ur Pr/Cr ratio noted to be 8.7g  - BEULAH negative; elevated kappa/lambda but normal ratio;   - C3 and C4 not low  - other serological work up pending at this time; will follow up   - please adjust medication doses as per eGFR     Anemia:   - suspected in the setting of CKD   - iron studies wnl   - will consider starting the patient on SISSY with HD   - goal Hg of >8 given significant CAD as per cardiology recommendations   --- transfuse with HD tomorrow prior to OR thursday  - monitor CBC   Patient is a 60 year old male with PMH od DM and HTN who presented to the hospital as a transfer from OSH for NSTEMI. The nephrology team was consulted due to elevated creatinine of 4.05 upon presentation. The patient denies any history of kidney disease and denied noticing any changes in his urination.     JOSHUA on CKD?   ESRD?   - patient presenting to hospital with creatinine of 4.05 mg/dL; denied any history of kidney disease   --- creatinine had continued to uptrend to 6.28mg/dL but slight decrease to 5.95mg/dL this morning  - Kidney injury also exacerbated by contrast administration for the CT following the RRT on 10/7    - patient remains volume overloaded; on bumex and optimal UOP   - initiated on HD on 10/14  - suspect given his pre-HD creatinine and high suspicion of CKD in setting of DM and HTN will likely be HD dependent following  - s/p LHC on 10/14 showing severe multivessel disease   --- there is a tentative plan for CABG on 10/21  - will perform HD with goal 2L UF to optimize volume status prior to OR 10/21  - normal renal ultrasound without hydro  - UA with proteinuria and blood;  Ur Pr/Cr ratio noted to be 8.7g  - BEULAH negative; elevated kappa/lambda but normal ratio;   - C3 and C4 not low  - other serological work up pending at this time; will follow up   - please adjust medication doses as per eGFR     Anemia:   - suspected in the setting of CKD   - iron studies wnl   - will consider starting the patient on SISSY with HD   - goal Hg of >8 given significant CAD as per cardiology recommendations   --- transfuse with HD prior to OR thursday  - monitor CBC    If any questions, please feel free to contact me     Saul Crews  Nephrology Fellow  [facility] Pager: [phone]

## 2021-10-20 NOTE — CONSULT NOTE ADULT - SUBJECTIVE AND OBJECTIVE BOX
HPI:  60 yr old M w/ hx of DM2, HTN and HLD presents as transfer from [facility] for chest pain concerning for NSTEMI and possible CHF.  Pt states that after he woke up this morning he felt midsternal chest pain this morning, that felt like a burning sensation. Pain was non radiating. Associated with shortness of breath. He initially took TUMS and drank some milk which alleviated some of the pain but not completely. Later he also started feeling very dizzy so he went to the ED. Denies associated palpitations, diaphoresis, N/V.  When he presented to [facility] he was noted to be SOB. Initially, they evaluated patient for CHF but Troponin and BNP levels were elevated so he was transferred here for NSTEMI and r/o CHF.    Per nursing notes, while at [facility], he was placed on BIPAP, given Solumedrol 120, Nitro SL x1, duoneb x2, ASA 81mg, Lasix 40mg w/ 200 cc output, and started at 10mL/hr Nitro drip then increased to 30mL/hr. There, initial /126 retake was 171/67. Nitro drip was d/c'd upon arrival to [facility] ED.     During my visit, patient was sitting up eating a sandwich. Appears comfortable on room air. Denies repeat of chest pain after this morning. Denies shortness of breath. Denies lower extremity edema, orthopnea or PND.     Labs also remarkable for JOSHUA, metabolic acidosis and hyperkalemia. Patient denies prior history of renal injury.       Wife can be reached at [phone]. Medication list confirmed w/ wife. Of note, patient w/ elevated BP to SBP 200s often at home; nifedipine used on a "as needed" basis for SBP > 200.  (06 Oct 2021 01:09)    Patient has history of diabetes, A1C 7%, on oral meds and insulin at home, no recent hypoglycemic episodes, no polyuria polydipsia. Patient follows up with PCP for diabetes management.  Endo was consulted for perioperative glycemic control.    PAST MEDICAL & SURGICAL HISTORY:  Hypertension    Hyperlipidemia    Diabetes mellitus    No pertinent past surgical history        FAMILY HISTORY:  Family history of CVA (Father)        Social History:    Outpatient Medications:    MEDICATIONS  (STANDING):  aspirin  chewable 81 milliGRAM(s) Oral daily  atorvastatin 80 milliGRAM(s) Oral at bedtime  buMETAnide IVPB 4 milliGRAM(s) IV Intermittent every 12 hours  carvedilol 6.25 milliGRAM(s) Oral every 12 hours  cefuroxime  IVPB 1500 milliGRAM(s) IV Intermittent once  chlorhexidine 0.12% Liquid 10 milliLiter(s) Swish and Spit once  chlorhexidine 4% Liquid 1 Application(s) Topical once  chlorhexidine 4% Liquid 1 Application(s) Topical once  chlorhexidine 4% Liquid 1 Application(s) Topical <User Schedule>  dextrose 40% Gel 15 Gram(s) Oral once  dextrose 5%. 1000 milliLiter(s) (50 mL/Hr) IV Continuous <Continuous>  dextrose 5%. 1000 milliLiter(s) (100 mL/Hr) IV Continuous <Continuous>  dextrose 50% Injectable 25 Gram(s) IV Push once  dextrose 50% Injectable 12.5 Gram(s) IV Push once  dextrose 50% Injectable 25 Gram(s) IV Push once  glucagon  Injectable 1 milliGRAM(s) IntraMuscular once  heparin   Injectable 5000 Unit(s) SubCutaneous every 12 hours  insulin glargine Injectable (LANTUS) 18 Unit(s) SubCutaneous at bedtime  insulin lispro (ADMELOG) corrective regimen sliding scale   SubCutaneous three times a day before meals  insulin lispro (ADMELOG) corrective regimen sliding scale   SubCutaneous at bedtime  insulin lispro Injectable (ADMELOG) 6 Unit(s) SubCutaneous three times a day before meals  NIFEdipine XL 90 milliGRAM(s) Oral daily  pantoprazole    Tablet 40 milliGRAM(s) Oral two times a day  sodium bicarbonate 1300 milliGRAM(s) Oral three times a day    MEDICATIONS  (PRN):  sodium chloride 0.9% lock flush 10 milliLiter(s) IV Push every 1 hour PRN Pre/post blood products, medications, blood draw, and to maintain line patency      Allergies    No Known Allergies    Intolerances      Review of Systems:  Constitutional: No fever, no chills  Eyes: No blurry vision  Neuro: No tremors  HEENT: No pain, no neck swelling  Cardiovascular: No chest pain, no palpitations  Respiratory: Has SOB, no cough  GI: No nausea, vomiting, abdominal pain  : No dysuria  Skin: no rash  MSK: Has leg swelling.  Psych: no depression  Endocrine: no polyuria, polydipsia    ALL OTHER SYSTEMS REVIEWED AND NEGATIVE    UNABLE TO OBTAIN    PHYSICAL EXAM:  VITALS: T(C): 36.7 (10-20-21 @ 12:25)  T(F): 98.1 (10-20-21 @ 12:25), Max: 98.8 (10-19-21 @ 19:08)  HR: 74 (10-20-21 @ 12:25) (73 - 82)  BP: 113/69 (10-20-21 @ 12:25) (113/69 - 164/71)  RR:  (17 - 19)  SpO2:  (94% - 99%)  Wt(kg): --  GENERAL: NAD, well-groomed, well-developed  EYES: No proptosis, no lid lag  HEENT:  Atraumatic, Normocephalic  THYROID: Normal size, no palpable nodules  RESPIRATORY: Clear to auscultation bilaterally; No rales, rhonchi, wheezing  CARDIOVASCULAR: Si S2, No murmurs;  GI: Soft, non distended, normal bowel sounds  SKIN: Dry, intact, No rashes or lesions  MUSCULOSKELETAL: Has BL lower extremity edema.  NEURO:  no tremor, sensation decreased in feet BL,    POCT Blood Glucose.: 200 mg/dL (10-20-21 @ 11:37)  POCT Blood Glucose.: 99 mg/dL (10-20-21 @ 07:25)  POCT Blood Glucose.: 276 mg/dL (10-19-21 @ 21:43)  POCT Blood Glucose.: 195 mg/dL (10-19-21 @ 16:12)  POCT Blood Glucose.: 109 mg/dL (10-19-21 @ 11:42)  POCT Blood Glucose.: 116 mg/dL (10-19-21 @ 07:42)  POCT Blood Glucose.: 222 mg/dL (10-18-21 @ 21:05)  POCT Blood Glucose.: 250 mg/dL (10-18-21 @ 16:06)  POCT Blood Glucose.: 258 mg/dL (10-18-21 @ 11:12)  POCT Blood Glucose.: 141 mg/dL (10-18-21 @ 07:31)  POCT Blood Glucose.: 223 mg/dL (10-17-21 @ 21:10)  POCT Blood Glucose.: 142 mg/dL (10-17-21 @ 16:03)                            9.3    11.04 )-----------( 282      ( 20 Oct 2021 06:13 )             28.3       10-20    136  |  96  |  48<H>  ----------------------------<  81  4.1   |  25  |  4.67<H>    EGFR if : 15<L>  EGFR if non : 13<L>    Ca    9.1      10-20  Mg     1.9     10-20  Phos  5.2     10-20    TPro  6.5  /  Alb  3.1<L>  /  TBili  0.2  /  DBili  x   /  AST  13  /  ALT  20  /  AlkPhos  99  10-20      Thyroid Function Tests:              Radiology:              HPI:  60 yr old M w/ hx of DM2, HTN and HLD presents as transfer from [facility] for chest pain concerning for NSTEMI and possible CHF.  Pt states that after he woke up this morning he felt midsternal chest pain this morning, that felt like a burning sensation. Pain was non radiating. Associated with shortness of breath. He initially took TUMS and drank some milk which alleviated some of the pain but not completely. Later he also started feeling very dizzy so he went to the ED. Denies associated palpitations, diaphoresis, N/V.  When he presented to [facility] he was noted to be SOB. Initially, they evaluated patient for CHF but Troponin and BNP levels were elevated so he was transferred here for NSTEMI and r/o CHF.    Per nursing notes, while at [facility], he was placed on BIPAP, given Solumedrol 120, Nitro SL x1, duoneb x2, ASA 81mg, Lasix 40mg w/ 200 cc output, and started at 10mL/hr Nitro drip then increased to 30mL/hr. There, initial /126 retake was 171/67. Nitro drip was d/c'd upon arrival to [facility] ED.     During my visit, patient was sitting up eating a sandwich. Appears comfortable on room air. Denies repeat of chest pain after this morning. Denies shortness of breath. Denies lower extremity edema, orthopnea or PND.     Labs also remarkable for JOSHUA, metabolic acidosis and hyperkalemia. Patient denies prior history of renal injury.       Wife can be reached at [phone]. Medication list confirmed w/ wife. Of note, patient w/ elevated BP to SBP 200s often at home; nifedipine used on a "as needed" basis for SBP > 200.  (06 Oct 2021 01:09)    Patient has history of diabetes, A1C 7%, on oral meds and insulin at home, no recent hypoglycemic episodes, no polyuria polydipsia. Patient follows up with PCP for diabetes management.  Endo was consulted for perioperative glycemic control.    PAST MEDICAL & SURGICAL HISTORY:  Hypertension    Hyperlipidemia    Diabetes mellitus    No pertinent past surgical history        FAMILY HISTORY:  Family history of CVA (Father)        Social History:    Outpatient Medications:    MEDICATIONS  (STANDING):  aspirin  chewable 81 milliGRAM(s) Oral daily  atorvastatin 80 milliGRAM(s) Oral at bedtime  buMETAnide IVPB 4 milliGRAM(s) IV Intermittent every 12 hours  carvedilol 6.25 milliGRAM(s) Oral every 12 hours  cefuroxime  IVPB 1500 milliGRAM(s) IV Intermittent once  chlorhexidine 0.12% Liquid 10 milliLiter(s) Swish and Spit once  chlorhexidine 4% Liquid 1 Application(s) Topical once  chlorhexidine 4% Liquid 1 Application(s) Topical once  chlorhexidine 4% Liquid 1 Application(s) Topical <User Schedule>  dextrose 40% Gel 15 Gram(s) Oral once  dextrose 5%. 1000 milliLiter(s) (50 mL/Hr) IV Continuous <Continuous>  dextrose 5%. 1000 milliLiter(s) (100 mL/Hr) IV Continuous <Continuous>  dextrose 50% Injectable 25 Gram(s) IV Push once  dextrose 50% Injectable 12.5 Gram(s) IV Push once  dextrose 50% Injectable 25 Gram(s) IV Push once  glucagon  Injectable 1 milliGRAM(s) IntraMuscular once  heparin   Injectable 5000 Unit(s) SubCutaneous every 12 hours  insulin glargine Injectable (LANTUS) 18 Unit(s) SubCutaneous at bedtime  insulin lispro (ADMELOG) corrective regimen sliding scale   SubCutaneous three times a day before meals  insulin lispro (ADMELOG) corrective regimen sliding scale   SubCutaneous at bedtime  insulin lispro Injectable (ADMELOG) 6 Unit(s) SubCutaneous three times a day before meals  NIFEdipine XL 90 milliGRAM(s) Oral daily  pantoprazole    Tablet 40 milliGRAM(s) Oral two times a day  sodium bicarbonate 1300 milliGRAM(s) Oral three times a day    MEDICATIONS  (PRN):  sodium chloride 0.9% lock flush 10 milliLiter(s) IV Push every 1 hour PRN Pre/post blood products, medications, blood draw, and to maintain line patency      Allergies    No Known Allergies    Intolerances      Review of Systems:  Constitutional: No fever, no chills  Eyes: No blurry vision  Neuro: No tremors  HEENT: No pain, no neck swelling  Cardiovascular: No chest pain, no palpitations  Respiratory: Has SOB, no cough  GI: No nausea, vomiting, abdominal pain  : No dysuria  Skin: no rash  MSK: Has leg swelling.  Psych: no depression  Endocrine: no polyuria, polydipsia    ALL OTHER SYSTEMS REVIEWED AND NEGATIVE    UNABLE TO OBTAIN    PHYSICAL EXAM:  VITALS: T(C): 36.7 (10-20-21 @ 12:25)  T(F): 98.1 (10-20-21 @ 12:25), Max: 98.8 (10-19-21 @ 19:08)  HR: 74 (10-20-21 @ 12:25) (73 - 82)  BP: 113/69 (10-20-21 @ 12:25) (113/69 - 164/71)  RR:  (17 - 19)  SpO2:  (94% - 99%)  Wt(kg): --  GENERAL: NAD, well-groomed, well-developed  EYES: No proptosis, no lid lag  HEENT:  Atraumatic, Normocephalic  THYROID: Normal size, no palpable nodules  RESPIRATORY: Clear to auscultation bilaterally; No rales, rhonchi, wheezing  CARDIOVASCULAR: Si S2, No murmurs;  GI: Soft, non distended, normal bowel sounds  SKIN: Dry, intact, No rashes or lesions  MUSCULOSKELETAL: Has BL lower extremity edema.  NEURO:  no tremor, sensation decreased in feet BL,    POCT Blood Glucose.: 200 mg/dL (10-20-21 @ 11:37)  POCT Blood Glucose.: 99 mg/dL (10-20-21 @ 07:25)  POCT Blood Glucose.: 276 mg/dL (10-19-21 @ 21:43)  POCT Blood Glucose.: 195 mg/dL (10-19-21 @ 16:12)  POCT Blood Glucose.: 109 mg/dL (10-19-21 @ 11:42)  POCT Blood Glucose.: 116 mg/dL (10-19-21 @ 07:42)  POCT Blood Glucose.: 222 mg/dL (10-18-21 @ 21:05)  POCT Blood Glucose.: 250 mg/dL (10-18-21 @ 16:06)  POCT Blood Glucose.: 258 mg/dL (10-18-21 @ 11:12)  POCT Blood Glucose.: 141 mg/dL (10-18-21 @ 07:31)  POCT Blood Glucose.: 223 mg/dL (10-17-21 @ 21:10)  POCT Blood Glucose.: 142 mg/dL (10-17-21 @ 16:03)                            9.3    11.04 )-----------( 282      ( 20 Oct 2021 06:13 )             28.3       10-20    136  |  96  |  48<H>  ----------------------------<  81  4.1   |  25  |  4.67<H>    EGFR if : 15<L>  EGFR if non : 13<L>    Ca    9.1      10-20  Mg     1.9     10-20  Phos  5.2     10-20    TPro  6.5  /  Alb  3.1<L>  /  TBili  0.2  /  DBili  x   /  AST  13  /  ALT  20  /  AlkPhos  99  10-20      Thyroid Function Tests:              Radiology:

## 2021-10-20 NOTE — PROGRESS NOTE ADULT - ASSESSMENT
59 yo male with DM2, HTN, and HLD who initially presented to [facility] on 10/05 as a transfer from [facility] for NSTEMI and possible CHF. Patient on Heparin drip for NSTEMI. LKW 10:45AM. Patient had episode of bradycardia (HR 30s), then became altered. RRT called. BP during RRT 70s/40s. Code stroke called for L facial droop.   CTH negative for acute pathology, old L frontal cortical infarct seen.   CTA H unremarkable. CTA N shows high-grade stenosis left internal carotid artery at the carotid bifurcation in the neck.  10/06 TTE: EF 45%, moderate-severe MR, mild-moderate L ventricular systolic dysfunction.   A1c 7  MRI no AIS but old strokes  HD cath placement 10/12   CD with severe L ICA and Mod R ICA   s/p LHC 10/14  Impression: Mild L nasolabial flattening and dysarthria, ?decreased eye closure strength on the L. Possibly secondary to R brain dysfunction due to acute stroke of unknown mechanism vs peripheral etiology. Patient with old L frontal infarct on CTH, also with high-grade stenosis of L ICA at the carotid bifurcation which likely cause of old stroke     Recommendations:  - plan for CABG 10/21 with Dr. Xiong   - ASA 81MG PO QD + Brilinta 90MG PO Q12HR.   - Avoid hypotension.  - High dose statin therapy - atorvastatin 40mg PO daily. LDL goal <70mg/dL.  -  vascular for ICA revascularization can be outpt. not acute   - lipid panel  - telemetry  - PT/OT/SS/SLP, OOBC  - permissive HTN, -180mmHg.  - check FS, glucose control <180  - GI/DVT ppx  - Counseling on diet, exercise, and medication adherence was done  - Counseling on smoking cessation and alcohol consumption offered when appropriate.  - Pain assessed and judicious use of narcotics when appropriate was discussed.    - Stroke education given when appropriate.  - Importance of fall prevention discussed.   - Differential diagnosis and plan of care discussed with patient and/or family and primary team  - Thank you for allowing me to participate in the care of this patient. Call with questions.   Marcelino Patel MD  Vascular Neurology .

## 2021-10-20 NOTE — CONSULT NOTE ADULT - PROBLEM SELECTOR RECOMMENDATION 2
On medications,  no chest pain, stable, monitored and followed up by primary cardiothoracic team/cardiology team.
Non Oliguric Renal Failure   Strict I and O's  Renal following   10/14 HD via [facility] (IR)   Plan for HD Friday   S/P [facility]   MOnitor BUN/Cr   Continue diuresis with Bumex 4mg iVBID

## 2021-10-20 NOTE — PROGRESS NOTE ADULT - ATTENDING COMMENTS
pt. with JOSHUA on CKD, in the setting of hypervolemia, found to have triple vessel disease, going for CABG tomorrow.   Had UF session yesterday, plan for HD with uf today to optimize prior to oR for Cabg tomorrow.   monitor BMP.    Angie Nair MD  Cell : [phone]  Pager : [phone]  Office : [phone]

## 2021-10-20 NOTE — CONSULT NOTE ADULT - ASSESSMENT
Assessment  DMT2: 60y Male with DM T2 with hyperglycemia, A1C 7%, was on oral meds and insulin at home, now on basal insulin and coverage, blood sugars in acceptable range this AM and running high postprandially, not at preop target, no hypoglycemic episodes, eating meals, planning CABG tomorrow.  CAD: Planning CABG, on medications, no chest pain, stable, monitored.  HTN: Controlled,  on antihypertensive medications.  HLD: On statin  Obesity: No strict exercise routines, not on any weight loss program, neither on low calorie diet.        Esperanza Beckett MD  Cell: [phone]  Office: [phone]     Assessment  DMT2: 60y Male with DM T2 with hyperglycemia, A1C 7%, was on oral meds and insulin at home, now on basal insulin and coverage, blood sugars in acceptable range this AM and running high postprandially,  not at preop target, no hypoglycemic episodes, eating meals, planning CABG tomorrow.  CAD: Planning CABG, on medications, no chest pain, stable, monitored.  HTN: Controlled,  on antihypertensive medications.  HLD: On statin  Obesity: No strict exercise routines, not on any weight loss program, neither on low calorie diet.        Esperanza Beckett MD  Cell: [phone]  Office: [phone]

## 2021-10-20 NOTE — PROGRESS NOTE ADULT - SUBJECTIVE AND OBJECTIVE BOX
Neurology Progress Note    S: Patient seen and examined. No new events overnight. patient denied CP, SOB, HA or pain.  plan for CABG tomororw     Medications:  MEDICATIONS  (STANDING):  aspirin  chewable 81 milliGRAM(s) Oral daily  atorvastatin 80 milliGRAM(s) Oral at bedtime  buMETAnide IVPB 4 milliGRAM(s) IV Intermittent every 12 hours  carvedilol 6.25 milliGRAM(s) Oral every 12 hours  cefuroxime  IVPB 1500 milliGRAM(s) IV Intermittent once  chlorhexidine 0.12% Liquid 10 milliLiter(s) Swish and Spit once  chlorhexidine 4% Liquid 1 Application(s) Topical once  chlorhexidine 4% Liquid 1 Application(s) Topical once  chlorhexidine 4% Liquid 1 Application(s) Topical <User Schedule>  dextrose 40% Gel 15 Gram(s) Oral once  dextrose 5%. 1000 milliLiter(s) (50 mL/Hr) IV Continuous <Continuous>  dextrose 5%. 1000 milliLiter(s) (100 mL/Hr) IV Continuous <Continuous>  dextrose 50% Injectable 25 Gram(s) IV Push once  dextrose 50% Injectable 12.5 Gram(s) IV Push once  dextrose 50% Injectable 25 Gram(s) IV Push once  glucagon  Injectable 1 milliGRAM(s) IntraMuscular once  heparin   Injectable 5000 Unit(s) SubCutaneous every 12 hours  insulin glargine Injectable (LANTUS) 18 Unit(s) SubCutaneous at bedtime  insulin lispro (ADMELOG) corrective regimen sliding scale   SubCutaneous three times a day before meals  insulin lispro (ADMELOG) corrective regimen sliding scale   SubCutaneous at bedtime  insulin lispro Injectable (ADMELOG) 6 Unit(s) SubCutaneous three times a day before meals  NIFEdipine XL 90 milliGRAM(s) Oral daily  pantoprazole    Tablet 40 milliGRAM(s) Oral two times a day  sodium bicarbonate 1300 milliGRAM(s) Oral three times a day    MEDICATIONS  (PRN):  sodium chloride 0.9% lock flush 10 milliLiter(s) IV Push every 1 hour PRN Pre/post blood products, medications, blood draw, and to maintain line patency      Vitals:  Vital Signs Last 24 Hrs  T(C): 36.9 (10-20-21 @ 16:02), Max: 37.1 (10-19-21 @ 19:08)  T(F): 98.4 (10-20-21 @ 16:02), Max: 98.8 (10-19-21 @ 19:08)  HR: 71 (10-20-21 @ 16:02) (71 - 80)  BP: 115/64 (10-20-21 @ 16:02) (113/69 - 138/75)  BP(mean): --  RR: 18 (10-20-21 @ 16:02) (18 - 18)  SpO2: 98% (10-20-21 @ 15:40) (94% - 99%)        General Exam:   General Appearance: Appropriately dressed and in no acute distress       Head: Normocephalic, atraumatic and no dysmorphic features  Ear, Nose, and Throat: Moist mucous membranes  CVS: S1S2+  Resp: No SOB, no wheeze or rhonchi  Abd: soft NTND  Extremities: No edema, no cyanosis  Skin: No bruises, no rashes     Neurological Exam:  Mental Status: Awake, alert and oriented x 3.  Able to follow simple and complex verbal commands. Able to name and repeat. fluent speech. No obvious aphasia or dysarthria noted.   Cranial Nerves: PERRL, EOMI, VFFC, sensation V1-V3 intact,  mild NLF facial asymmetry , equal elevation of palate, scm/trap 5/5, tongue is midline on protrusion. no obvious papilledema on fundoscopic exam. Hearing is grossly intact.   Motor: Normal bulk, tone and strength throughout. Fine finger movements were intact and symmetric. no tremors or drift noted.    Sensation: Intact to light touch and pinprick throughout. no right/left confusion. no extinction to tactile on DSS.   Reflexes: 1+ throughout at biceps, brachioradialis, triceps, patellars and ankles bilaterally and equal. No clonus. R toe and L toe were both downgoing.  Coordination: No dysmetria on FNF    Gait: deferred     I personally reviewed the below data/images/labs:    CBC Full  -  ( 20 Oct 2021 06:13 )  WBC Count : 11.04 K/uL  RBC Count : 3.05 M/uL  Hemoglobin : 9.3 g/dL  Hematocrit : 28.3 %  Platelet Count - Automated : 282 K/uL  Mean Cell Volume : 92.8 fl  Mean Cell Hemoglobin : 30.5 pg  Mean Cell Hemoglobin Concentration : 32.9 gm/dL  Auto Neutrophil # : 5.90 K/uL  Auto Lymphocyte # : 2.84 K/uL  Auto Monocyte # : 1.26 K/uL  Auto Eosinophil # : 0.92 K/uL  Auto Basophil # : 0.09 K/uL  Auto Neutrophil % : 53.5 %  Auto Lymphocyte % : 25.7 %  Auto Monocyte % : 11.4 %  Auto Eosinophil % : 8.3 %  Auto Basophil % : 0.8 %        10-20    136  |  96  |  48<H>  ----------------------------<  81  4.1   |  25  |  4.67<H>    Ca    9.1      20 Oct 2021 06:13  Phos  5.2     10-20  Mg     1.9     10-20    TPro  6.5  /  Alb  3.1<L>  /  TBili  0.2  /  DBili  x   /  AST  13  /  ALT  20  /  AlkPhos  99  10-20        -10/07 CTH: No hemorrhage. 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   -10/07 CTA N: High-grade stenosis left internal carotid artery at the carotid bifurcation in the neck.   -10/07 CTA H: Normal intracranial circulation.    < from: MR Head No Cont (10.09.21 @ 20:22) >    EXAM:  MR BRAIN                            PROCEDURE DATE:  10/09/2021            INTERPRETATION:  CLINICAL HISTORY:Facial droop, on heparin drip, NSTEMI . Severe left ICA stenosis.    TECHNIQUE:  MRI of brain without contrast dated 10/9/2021.  Examination consisted of transaxial T1, T2, FLAIR, gradient echo as well as diffusion-weighted sequences with corresponding ADC maps. Coronal T2 and sagittal T1-weighted images were also acquired through the brain.    COMPARISON: CT head and CTA head [facility] 10/7/2021    FINDINGS:  There are no areas abnormal restricted diffusion within the brain parenchyma to suggest acute/subacute infarct. There is no acute intracranial hemorrhage, vasogenic edema or abnormal susceptibility artifact. Chronic lacunar infarcts are seen in the left frontal lobe, right frontal cranial radiata and right cerebellum. Additional nonspecific patchy and confluent areas of T2/FLAIR prolongation in the bihemispheric white matter likely represents mild chronic microvascular changes.    The ventricles, sulci and cisternal spaces are stable in size and configuration. There is no midline shift or abnormal extra-axial fluid collection.    Flow-voids of the major intracranial vessels are maintained, as seen best on the T2-weighted images, indicating their patency.    The visualized paranasal sinuses and mastoid air cells are free of acute disease. The orbital regions are unremarkable.    IMPRESSION:  No acute infarct or intracranial hemorrhage. Chronic infarcts and microvascular changes as above.    --- End of Report ---      NEIL CARDENAS MD; Attending Radiologist  This document has been electronically signed. Oct 10 2021  4:26AM    < end of copied text >  < from: [facility] Duplex Carotid, Prashant (10.08.21 @ 17:07) >    EXAM:  CAROTID DUPLEX BILATERAL                            PROCEDURE DATE:  10/08/2021      INTERPRETATION:  CLINICAL INFORMATION: Left ICA high-grade stenosis identified on recent CTA neck.    COMPARISON: CTA neck 10/7/2021.    TECHNIQUE: Grayscale, color and spectral Doppler examination of both carotid arteries was performed.    FINDINGS:    There are atheromatous plaques are present bilaterally in the region of the bifurcations of the common carotid arteries into internal and external branches.    Velocity elevation of the proximal right internal carotid artery results in 50-69% flow-limiting stenosis.    Velocity elevation of the proximal left internal carotid artery results in 70-9% flow-limiting stenosis.    Bilateral flow-limiting stenoses noted of the right and left external carotid arteries as well.    Peak systolic velocities are as follows:    RIGHT:  PROX CCA = 126 cm/s  DIST CCA = 99 cm/s  PROX ICA = 137 cm/s  MID ICA = 86 cm/s  DIST ICA = 80 cm/s  ECA = 202 cm/s    LEFT:  PROX CCA = 100 cm/s  DIST CCA = 59 cm/s  PROX ICA = 313 cm/s  MID ICA = 72 cm/s  DIST ICA = 47 cm/s  ECA = 298 cm/s    Antegrade flow is noted within both vertebral arteries.    IMPRESSION:    Left internal carotid artery 70-99% flow-limiting stenosis.  Right internal carotid artery 50-69% flow-limiting stenosis.  Bilateral external carotid artery flow limiting stenoses.  INDIANA Hammond was informed of these findings on 10/8/2021 at 5:06 PM.    Measurement of carotid stenosis is based on velocity parameters that correlate the residual internal carotid diameter with that of the more distal vessel in accordance with a method such as the [employer] Symptomatic Carotid Endarterectomy Trial (NASCET).    --- End of Report ---    FELIX MCMAHON M.D., ATTENDING RADIOLOGIST  This document has been electronically signed. Oct  8 2021  5:21PM    < end of copied text >

## 2021-10-20 NOTE — PROGRESS NOTE ADULT - SUBJECTIVE AND OBJECTIVE BOX
[facility] DIVISION OF KIDNEY DISEASES AND HYPERTENSION -- FOLLOW UP NOTE  --------------------------------------------------------------------------------  Chief Complaint:    24 hour events/subjective:    reports feeling well   no acute issues overnight   denied any chest pain or SOB      PAST HISTORY  --------------------------------------------------------------------------------  No significant changes to PMH, PSH, FHx, SHx, unless otherwise noted    ALLERGIES & MEDICATIONS  --------------------------------------------------------------------------------  Allergies    No Known Allergies    Intolerances      Standing Inpatient Medications  aspirin  chewable 81 milliGRAM(s) Oral daily  atorvastatin 80 milliGRAM(s) Oral at bedtime  buMETAnide IVPB 4 milliGRAM(s) IV Intermittent every 12 hours  carvedilol 6.25 milliGRAM(s) Oral every 12 hours  chlorhexidine 4% Liquid 1 Application(s) Topical <User Schedule>  dextrose 40% Gel 15 Gram(s) Oral once  dextrose 5%. 1000 milliLiter(s) IV Continuous <Continuous>  dextrose 5%. 1000 milliLiter(s) IV Continuous <Continuous>  dextrose 50% Injectable 25 Gram(s) IV Push once  dextrose 50% Injectable 12.5 Gram(s) IV Push once  dextrose 50% Injectable 25 Gram(s) IV Push once  glucagon  Injectable 1 milliGRAM(s) IntraMuscular once  heparin   Injectable 5000 Unit(s) SubCutaneous every 12 hours  insulin glargine Injectable (LANTUS) 18 Unit(s) SubCutaneous at bedtime  insulin lispro (ADMELOG) corrective regimen sliding scale   SubCutaneous three times a day before meals  insulin lispro (ADMELOG) corrective regimen sliding scale   SubCutaneous at bedtime  NIFEdipine XL 90 milliGRAM(s) Oral daily  pantoprazole    Tablet 40 milliGRAM(s) Oral two times a day  sodium bicarbonate 1300 milliGRAM(s) Oral three times a day    PRN Inpatient Medications  sodium chloride 0.9% lock flush 10 milliLiter(s) IV Push every 1 hour PRN      REVIEW OF SYSTEMS      All other systems were reviewed and are negative, except as noted.    VITALS/PHYSICAL EXAM  --------------------------------------------------------------------------------  T(C): 37 (10-20-21 @ 04:01), Max: 37.2 (10-19-21 @ 11:00)  HR: 80 (10-20-21 @ 04:01) (73 - 82)  BP: 133/56 (10-20-21 @ 04:01) (121/70 - 164/71)  RR: 18 (10-20-21 @ 04:01) (17 - 19)  SpO2: 97% (10-20-21 @ 04:01) (97% - 99%)  Wt(kg): --        10-19-21 @ 07:01  -  10-20-21 @ 07:00  --------------------------------------------------------  IN: 770 mL / OUT: 2300 mL / NET: -1530 mL        Physical Exam:  	Gen: NAD  	HEENT: MMM  	Pulm: CTA B/L  	CV: S1S2  	Abd: Soft, +BS   	Ext: No LE edema B/L  	Neuro: Awake  	Skin: Warm and dry  	Vascular access: R IJ      LABS/STUDIES  --------------------------------------------------------------------------------              9.3    11.04 >-----------<  282      [10-20-21 @ 06:13]              28.3     136  |  96  |  48  ----------------------------<  81      [10-20-21 @ 06:13]  4.1   |  25  |  4.67        Ca     9.1     [10-20-21 @ 06:13]      Mg     1.9     [10-20-21 @ 06:13]      Phos  5.2     [10-20-21 @ 06:13]    TPro  6.5  /  Alb  3.1  /  TBili  0.2  /  DBili  x   /  AST  13  /  ALT  20  /  AlkPhos  99  [10-20-21 @ 06:13]          Creatinine Trend:  SCr 4.67 [10-20 @ 06:13]  SCr 3.70 [10-19 @ 07:20]  SCr 5.04 [10-18 @ 05:54]  SCr 3.84 [10-17 @ 06:58]  SCr 2.58 [10-16 @ 14:23]    Urinalysis - [10-07-21 @ 10:09]      Color Light Yellow / Appearance Clear / SG 1.015 / pH 6.0      Gluc 200 mg/dL / Ketone Negative  / Bili Negative / Urobili Negative       Blood Small / Protein 300 mg/dL / Leuk Est Negative / Nitrite Negative      RBC 2 / WBC 7 / Hyaline 6 / Gran  / Sq Epi  / Non Sq Epi 2 / Bacteria Negative      Iron 47, TIBC 245, %sat 19      [10-13-21 @ 13:10]  Ferritin 780      [10-13-21 @ 08:59]    HBsAg Nonreact      [10-13-21 @ 09:15]  HCV 0.23, Nonreact      [10-07-21 @ 14:38]  HIV Nonreact      [10-13-21 @ 09:16]    BEULAH: titer Negative, pattern --      [10-07-21 @ 14:37]  C3 Complement 135      [10-07-21 @ 14:42]  C4 Complement 71      [10-07-21 @ 14:42]  ANCA: cANCA Negative, pANCA Negative, atypical ANCA Negative      [10-07-21 @ 14:37]  Syphilis Screen (Treponema Pallidum Ab) Negative      [10-07-21 @ 14:37]  PLA2R: SHANNAN <1.8, IFA --      [10-13-21 @ 13:55]  Free Light Chains: kappa 10.89, lambda 12.51, ratio = 0.87      [10-07 @ 14:42]  Immunofixation Serum:   No Monoclonal Band Identified    Reference Range: None Detected      [10-07-21 @ 14:42]  SPEP Interpretation: Normal Electrophoresis Pattern      [10-07-21 @ 14:42]   [facility] DIVISION OF KIDNEY DISEASES AND HYPERTENSION -- FOLLOW UP NOTE  --------------------------------------------------------------------------------  Chief Complaint: hypervolemia, JOSHUA on CKd    24 hour events/subjective:    reports feeling well   no acute issues overnight   denied any chest pain or SOB      PAST HISTORY  --------------------------------------------------------------------------------  No significant changes to PMH, PSH, FHx, SHx, unless otherwise noted    ALLERGIES & MEDICATIONS  --------------------------------------------------------------------------------  Allergies    No Known Allergies    Intolerances      Standing Inpatient Medications  aspirin  chewable 81 milliGRAM(s) Oral daily  atorvastatin 80 milliGRAM(s) Oral at bedtime  buMETAnide IVPB 4 milliGRAM(s) IV Intermittent every 12 hours  carvedilol 6.25 milliGRAM(s) Oral every 12 hours  chlorhexidine 4% Liquid 1 Application(s) Topical <User Schedule>  dextrose 40% Gel 15 Gram(s) Oral once  dextrose 5%. 1000 milliLiter(s) IV Continuous <Continuous>  dextrose 5%. 1000 milliLiter(s) IV Continuous <Continuous>  dextrose 50% Injectable 25 Gram(s) IV Push once  dextrose 50% Injectable 12.5 Gram(s) IV Push once  dextrose 50% Injectable 25 Gram(s) IV Push once  glucagon  Injectable 1 milliGRAM(s) IntraMuscular once  heparin   Injectable 5000 Unit(s) SubCutaneous every 12 hours  insulin glargine Injectable (LANTUS) 18 Unit(s) SubCutaneous at bedtime  insulin lispro (ADMELOG) corrective regimen sliding scale   SubCutaneous three times a day before meals  insulin lispro (ADMELOG) corrective regimen sliding scale   SubCutaneous at bedtime  NIFEdipine XL 90 milliGRAM(s) Oral daily  pantoprazole    Tablet 40 milliGRAM(s) Oral two times a day  sodium bicarbonate 1300 milliGRAM(s) Oral three times a day    PRN Inpatient Medications  sodium chloride 0.9% lock flush 10 milliLiter(s) IV Push every 1 hour PRN      REVIEW OF SYSTEMS      All other systems were reviewed and are negative, except as noted.    VITALS/PHYSICAL EXAM  --------------------------------------------------------------------------------  T(C): 37 (10-20-21 @ 04:01), Max: 37.2 (10-19-21 @ 11:00)  HR: 80 (10-20-21 @ 04:01) (73 - 82)  BP: 133/56 (10-20-21 @ 04:01) (121/70 - 164/71)  RR: 18 (10-20-21 @ 04:01) (17 - 19)  SpO2: 97% (10-20-21 @ 04:01) (97% - 99%)  Wt(kg): --        10-19-21 @ 07:01  -  10-20-21 @ 07:00  --------------------------------------------------------  IN: 770 mL / OUT: 2300 mL / NET: -1530 mL        Physical Exam:  	Gen: NAD  	HEENT: MMM  	Pulm: CTA B/L  	CV: S1S2  	Abd: Soft, +BS   	Ext: No LE edema B/L  	Neuro: Awake  	Skin: Warm and dry  	Vascular access: Right IJ non tunneled dialysis catheter in situ      LABS/STUDIES  --------------------------------------------------------------------------------              9.3    11.04 >-----------<  282      [10-20-21 @ 06:13]              28.3     136  |  96  |  48  ----------------------------<  81      [10-20-21 @ 06:13]  4.1   |  25  |  4.67        Ca     9.1     [10-20-21 @ 06:13]      Mg     1.9     [10-20-21 @ 06:13]      Phos  5.2     [10-20-21 @ 06:13]    TPro  6.5  /  Alb  3.1  /  TBili  0.2  /  DBili  x   /  AST  13  /  ALT  20  /  AlkPhos  99  [10-20-21 @ 06:13]          Creatinine Trend:  SCr 4.67 [10-20 @ 06:13]  SCr 3.70 [10-19 @ 07:20]  SCr 5.04 [10-18 @ 05:54]  SCr 3.84 [10-17 @ 06:58]  SCr 2.58 [10-16 @ 14:23]    Urinalysis - [10-07-21 @ 10:09]      Color Light Yellow / Appearance Clear / SG 1.015 / pH 6.0      Gluc 200 mg/dL / Ketone Negative  / Bili Negative / Urobili Negative       Blood Small / Protein 300 mg/dL / Leuk Est Negative / Nitrite Negative      RBC 2 / WBC 7 / Hyaline 6 / Gran  / Sq Epi  / Non Sq Epi 2 / Bacteria Negative      Iron 47, TIBC 245, %sat 19      [10-13-21 @ 13:10]  Ferritin 780      [10-13-21 @ 08:59]    HBsAg Nonreact      [10-13-21 @ 09:15]  HCV 0.23, Nonreact      [10-07-21 @ 14:38]  HIV Nonreact      [10-13-21 @ 09:16]    BEULAH: titer Negative, pattern --      [10-07-21 @ 14:37]  C3 Complement 135      [10-07-21 @ 14:42]  C4 Complement 71      [10-07-21 @ 14:42]  ANCA: cANCA Negative, pANCA Negative, atypical ANCA Negative      [10-07-21 @ 14:37]  Syphilis Screen (Treponema Pallidum Ab) Negative      [10-07-21 @ 14:37]  PLA2R: SHANNAN <1.8, IFA --      [10-13-21 @ 13:55]  Free Light Chains: kappa 10.89, lambda 12.51, ratio = 0.87      [10-07 @ 14:42]  Immunofixation Serum:   No Monoclonal Band Identified    Reference Range: None Detected      [10-07-21 @ 14:42]  SPEP Interpretation: Normal Electrophoresis Pattern      [10-07-21 @ 14:42]

## 2021-10-20 NOTE — CONSULT NOTE ADULT - PROBLEM SELECTOR RECOMMENDATION 9
Will continue Lantus 18u at bedtime.  Will start Admelog 6u before each meal and continue Admelog correction scale coverage. Will continue monitoring FS and FU.  Patient counseled for compliance with consistent low carb diet and exercise as tolerated outpatient.

## 2021-10-21 ENCOUNTER — APPOINTMENT (OUTPATIENT)
Dept: CARDIOTHORACIC SURGERY | Facility: HOSPITAL | Age: 61
End: 2021-10-21

## 2021-10-21 DIAGNOSIS — E03.9 HYPOTHYROIDISM, UNSPECIFIED: ICD-10-CM

## 2021-10-21 LAB
ALBUMIN SERPL ELPH-MCNC: 2.1 G/DL — LOW (ref 3.3–5)
ALP SERPL-CCNC: 51 U/L — SIGNIFICANT CHANGE UP (ref 40–120)
ALT FLD-CCNC: 12 U/L — SIGNIFICANT CHANGE UP (ref 10–45)
ANION GAP SERPL CALC-SCNC: 12 MMOL/L — SIGNIFICANT CHANGE UP (ref 5–17)
APTT BLD: 36.9 SEC — HIGH (ref 27.5–35.5)
APTT BLD: 49.6 SEC — HIGH (ref 27.5–35.5)
AST SERPL-CCNC: 63 U/L — HIGH (ref 10–40)
BASE EXCESS BLDMV CALC-SCNC: 0.3 MMOL/L — SIGNIFICANT CHANGE UP (ref -3–3)
BASE EXCESS BLDMV CALC-SCNC: 0.5 MMOL/L — SIGNIFICANT CHANGE UP (ref -3–3)
BASE EXCESS BLDMV CALC-SCNC: 0.9 MMOL/L — SIGNIFICANT CHANGE UP (ref -3–3)
BASE EXCESS BLDMV CALC-SCNC: 0.9 MMOL/L — SIGNIFICANT CHANGE UP (ref -3–3)
BASE EXCESS BLDMV CALC-SCNC: 1.3 MMOL/L — SIGNIFICANT CHANGE UP (ref -3–3)
BASE EXCESS BLDMV CALC-SCNC: 1.6 MMOL/L — SIGNIFICANT CHANGE UP (ref -3–3)
BASE EXCESS BLDV CALC-SCNC: 0 MMOL/L — SIGNIFICANT CHANGE UP (ref -2–2)
BASE EXCESS BLDV CALC-SCNC: 0.3 MMOL/L — SIGNIFICANT CHANGE UP (ref -2–2)
BASE EXCESS BLDV CALC-SCNC: 0.3 MMOL/L — SIGNIFICANT CHANGE UP (ref -2–2)
BASE EXCESS BLDV CALC-SCNC: 0.5 MMOL/L — SIGNIFICANT CHANGE UP (ref -2–2)
BASE EXCESS BLDV CALC-SCNC: 1.3 MMOL/L — SIGNIFICANT CHANGE UP (ref -2–2)
BASE EXCESS BLDV CALC-SCNC: 2.1 MMOL/L — HIGH (ref -2–2)
BASE EXCESS BLDV CALC-SCNC: 2.4 MMOL/L — HIGH (ref -2–2)
BASE EXCESS BLDV CALC-SCNC: 4.6 MMOL/L — HIGH (ref -2–2)
BASOPHILS # BLD AUTO: 0.06 K/UL — SIGNIFICANT CHANGE UP (ref 0–0.2)
BASOPHILS # BLD AUTO: 0.06 K/UL — SIGNIFICANT CHANGE UP (ref 0–0.2)
BASOPHILS NFR BLD AUTO: 0.4 % — SIGNIFICANT CHANGE UP (ref 0–2)
BASOPHILS NFR BLD AUTO: 0.5 % — SIGNIFICANT CHANGE UP (ref 0–2)
BILIRUB SERPL-MCNC: 0.6 MG/DL — SIGNIFICANT CHANGE UP (ref 0.2–1.2)
BLOOD GAS VENOUS - CREATININE: SIGNIFICANT CHANGE UP MG/DL (ref 0.5–1.3)
BUN SERPL-MCNC: 34 MG/DL — HIGH (ref 7–23)
CA-I SERPL-SCNC: 0.76 MMOL/L — LOW (ref 1.15–1.33)
CA-I SERPL-SCNC: 0.77 MMOL/L — LOW (ref 1.15–1.33)
CA-I SERPL-SCNC: 0.8 MMOL/L — LOW (ref 1.15–1.33)
CA-I SERPL-SCNC: 0.85 MMOL/L — LOW (ref 1.15–1.33)
CA-I SERPL-SCNC: 0.9 MMOL/L — LOW (ref 1.15–1.33)
CA-I SERPL-SCNC: 0.98 MMOL/L — LOW (ref 1.15–1.33)
CA-I SERPL-SCNC: 1 MMOL/L — LOW (ref 1.15–1.33)
CA-I SERPL-SCNC: 1.01 MMOL/L — LOW (ref 1.15–1.33)
CALCIUM SERPL-MCNC: 7.2 MG/DL — LOW (ref 8.4–10.5)
CHLORIDE BLDV-SCNC: 100 MMOL/L — SIGNIFICANT CHANGE UP (ref 96–108)
CHLORIDE BLDV-SCNC: 102 MMOL/L — SIGNIFICANT CHANGE UP (ref 96–108)
CHLORIDE BLDV-SCNC: 103 MMOL/L — SIGNIFICANT CHANGE UP (ref 96–108)
CHLORIDE BLDV-SCNC: 108 MMOL/L — SIGNIFICANT CHANGE UP (ref 96–108)
CHLORIDE BLDV-SCNC: 108 MMOL/L — SIGNIFICANT CHANGE UP (ref 96–108)
CHLORIDE BLDV-SCNC: 110 MMOL/L — HIGH (ref 96–108)
CHLORIDE BLDV-SCNC: 112 MMOL/L — HIGH (ref 96–108)
CHLORIDE BLDV-SCNC: 99 MMOL/L — SIGNIFICANT CHANGE UP (ref 96–108)
CHLORIDE SERPL-SCNC: 110 MMOL/L — HIGH (ref 96–108)
CK MB BLD-MCNC: 10.8 % — HIGH (ref 0–3.5)
CK MB CFR SERPL CALC: 93.9 NG/ML — HIGH (ref 0–6.7)
CK SERPL-CCNC: 871 U/L — HIGH (ref 30–200)
CO2 BLDMV-SCNC: 27 MMOL/L — SIGNIFICANT CHANGE UP (ref 21–29)
CO2 BLDMV-SCNC: 27 MMOL/L — SIGNIFICANT CHANGE UP (ref 21–29)
CO2 BLDMV-SCNC: 28 MMOL/L — SIGNIFICANT CHANGE UP (ref 21–29)
CO2 BLDMV-SCNC: 29 MMOL/L — SIGNIFICANT CHANGE UP (ref 21–29)
CO2 BLDV-SCNC: 27 MMOL/L — HIGH (ref 22–26)
CO2 BLDV-SCNC: 28 MMOL/L — HIGH (ref 22–26)
CO2 BLDV-SCNC: 29 MMOL/L — HIGH (ref 22–26)
CO2 BLDV-SCNC: 30 MMOL/L — HIGH (ref 22–26)
CO2 BLDV-SCNC: 30 MMOL/L — HIGH (ref 22–26)
CO2 BLDV-SCNC: 31 MMOL/L — HIGH (ref 22–26)
CO2 SERPL-SCNC: 21 MMOL/L — LOW (ref 22–31)
COHGB MFR BLDMV: 0.8 % — SIGNIFICANT CHANGE UP (ref 0–1.5)
COHGB MFR BLDMV: 0.9 % — SIGNIFICANT CHANGE UP (ref 0–1.5)
COHGB MFR BLDMV: 1 % — SIGNIFICANT CHANGE UP (ref 0–1.5)
CREAT SERPL-MCNC: 3.15 MG/DL — HIGH (ref 0.5–1.3)
EOSINOPHIL # BLD AUTO: 0.16 K/UL — SIGNIFICANT CHANGE UP (ref 0–0.5)
EOSINOPHIL # BLD AUTO: 0.31 K/UL — SIGNIFICANT CHANGE UP (ref 0–0.5)
EOSINOPHIL NFR BLD AUTO: 1.1 % — SIGNIFICANT CHANGE UP (ref 0–6)
EOSINOPHIL NFR BLD AUTO: 2.4 % — SIGNIFICANT CHANGE UP (ref 0–6)
FIBRINOGEN PPP-MCNC: 483 MG/DL — SIGNIFICANT CHANGE UP (ref 290–520)
GAS PNL BLDA: SIGNIFICANT CHANGE UP
GAS PNL BLDMV: SIGNIFICANT CHANGE UP
GAS PNL BLDV: 133 MMOL/L — LOW (ref 136–145)
GAS PNL BLDV: 133 MMOL/L — LOW (ref 136–145)
GAS PNL BLDV: 134 MMOL/L — LOW (ref 136–145)
GAS PNL BLDV: 136 MMOL/L — SIGNIFICANT CHANGE UP (ref 136–145)
GAS PNL BLDV: 138 MMOL/L — SIGNIFICANT CHANGE UP (ref 136–145)
GAS PNL BLDV: 140 MMOL/L — SIGNIFICANT CHANGE UP (ref 136–145)
GAS PNL BLDV: 141 MMOL/L — SIGNIFICANT CHANGE UP (ref 136–145)
GAS PNL BLDV: 145 MMOL/L — SIGNIFICANT CHANGE UP (ref 136–145)
GAS PNL BLDV: SIGNIFICANT CHANGE UP
GLUCOSE BLDC GLUCOMTR-MCNC: 125 MG/DL — HIGH (ref 70–99)
GLUCOSE BLDC GLUCOMTR-MCNC: 146 MG/DL — HIGH (ref 70–99)
GLUCOSE BLDC GLUCOMTR-MCNC: 177 MG/DL — HIGH (ref 70–99)
GLUCOSE BLDV-MCNC: 115 MG/DL — HIGH (ref 70–99)
GLUCOSE BLDV-MCNC: 117 MG/DL — HIGH (ref 70–99)
GLUCOSE BLDV-MCNC: 129 MG/DL — HIGH (ref 70–99)
GLUCOSE BLDV-MCNC: 142 MG/DL — HIGH (ref 70–99)
GLUCOSE BLDV-MCNC: 142 MG/DL — HIGH (ref 70–99)
GLUCOSE BLDV-MCNC: 153 MG/DL — HIGH (ref 70–99)
GLUCOSE BLDV-MCNC: 158 MG/DL — HIGH (ref 70–99)
GLUCOSE BLDV-MCNC: 188 MG/DL — HIGH (ref 70–99)
GLUCOSE SERPL-MCNC: 141 MG/DL — HIGH (ref 70–99)
HCO3 BLDMV-SCNC: 26 MMOL/L — SIGNIFICANT CHANGE UP (ref 20–28)
HCO3 BLDMV-SCNC: 26 MMOL/L — SIGNIFICANT CHANGE UP (ref 20–28)
HCO3 BLDMV-SCNC: 27 MMOL/L — SIGNIFICANT CHANGE UP (ref 20–28)
HCO3 BLDMV-SCNC: 28 MMOL/L — SIGNIFICANT CHANGE UP (ref 20–28)
HCO3 BLDV-SCNC: 26 MMOL/L — SIGNIFICANT CHANGE UP (ref 22–29)
HCO3 BLDV-SCNC: 26 MMOL/L — SIGNIFICANT CHANGE UP (ref 22–29)
HCO3 BLDV-SCNC: 27 MMOL/L — SIGNIFICANT CHANGE UP (ref 22–29)
HCO3 BLDV-SCNC: 27 MMOL/L — SIGNIFICANT CHANGE UP (ref 22–29)
HCO3 BLDV-SCNC: 28 MMOL/L — SIGNIFICANT CHANGE UP (ref 22–29)
HCT VFR BLD CALC: 28.8 % — LOW (ref 39–50)
HCT VFR BLD CALC: 30.3 % — LOW (ref 39–50)
HCT VFR BLDA CALC: 20 % — CRITICAL LOW (ref 39–51)
HCT VFR BLDA CALC: 21 % — CRITICAL LOW (ref 39–51)
HCT VFR BLDA CALC: 22 % — LOW (ref 39–51)
HCT VFR BLDA CALC: 23 % — LOW (ref 39–51)
HCT VFR BLDA CALC: 24 % — LOW (ref 39–51)
HCT VFR BLDA CALC: 24 % — LOW (ref 39–51)
HCT VFR BLDA CALC: 25 % — LOW (ref 39–51)
HCT VFR BLDA CALC: 26 % — LOW (ref 39–51)
HGB BLD CALC-MCNC: 6.7 G/DL — CRITICAL LOW (ref 12.6–17.4)
HGB BLD CALC-MCNC: 7.1 G/DL — LOW (ref 12.6–17.4)
HGB BLD CALC-MCNC: 7.2 G/DL — LOW (ref 12.6–17.4)
HGB BLD CALC-MCNC: 7.7 G/DL — LOW (ref 12.6–17.4)
HGB BLD CALC-MCNC: 8 G/DL — LOW (ref 12.6–17.4)
HGB BLD CALC-MCNC: 8.1 G/DL — LOW (ref 12.6–17.4)
HGB BLD CALC-MCNC: 8.3 G/DL — LOW (ref 12.6–17.4)
HGB BLD CALC-MCNC: 8.7 G/DL — LOW (ref 12.6–17.4)
HGB BLD-MCNC: 10 G/DL — LOW (ref 13–17)
HGB BLD-MCNC: 10.7 G/DL — LOW (ref 13–17)
HGB FLD-MCNC: 11 G/DL — LOW (ref 12.6–17.4)
HGB FLD-MCNC: 11.1 G/DL — LOW (ref 12.6–17.4)
HGB FLD-MCNC: 9.9 G/DL — LOW (ref 12.6–17.4)
HOROWITZ INDEX BLDMV+IHG-RTO: 100 — SIGNIFICANT CHANGE UP
HOROWITZ INDEX BLDMV+IHG-RTO: 40 — SIGNIFICANT CHANGE UP
HOROWITZ INDEX BLDMV+IHG-RTO: 50 — SIGNIFICANT CHANGE UP
HOROWITZ INDEX BLDMV+IHG-RTO: 50 — SIGNIFICANT CHANGE UP
IMM GRANULOCYTES NFR BLD AUTO: 0.6 % — SIGNIFICANT CHANGE UP (ref 0–1.5)
IMM GRANULOCYTES NFR BLD AUTO: 1.3 % — SIGNIFICANT CHANGE UP (ref 0–1.5)
INR BLD: 1.07 RATIO — SIGNIFICANT CHANGE UP (ref 0.88–1.16)
INR BLD: 1.14 RATIO — SIGNIFICANT CHANGE UP (ref 0.88–1.16)
LACTATE BLDV-MCNC: 0.5 MMOL/L — LOW (ref 0.7–2)
LACTATE BLDV-MCNC: 0.6 MMOL/L — LOW (ref 0.7–2)
LACTATE BLDV-MCNC: 0.7 MMOL/L — SIGNIFICANT CHANGE UP (ref 0.7–2)
LACTATE BLDV-MCNC: 0.7 MMOL/L — SIGNIFICANT CHANGE UP (ref 0.7–2)
LACTATE BLDV-MCNC: 0.8 MMOL/L — SIGNIFICANT CHANGE UP (ref 0.7–2)
LACTATE BLDV-MCNC: 0.9 MMOL/L — SIGNIFICANT CHANGE UP (ref 0.7–2)
LYMPHOCYTES # BLD AUTO: 1.46 K/UL — SIGNIFICANT CHANGE UP (ref 1–3.3)
LYMPHOCYTES # BLD AUTO: 1.88 K/UL — SIGNIFICANT CHANGE UP (ref 1–3.3)
LYMPHOCYTES # BLD AUTO: 11.5 % — LOW (ref 13–44)
LYMPHOCYTES # BLD AUTO: 13.2 % — SIGNIFICANT CHANGE UP (ref 13–44)
MAGNESIUM SERPL-MCNC: 3.1 MG/DL — HIGH (ref 1.6–2.6)
MANUAL SMEAR VERIFICATION: SIGNIFICANT CHANGE UP
MCHC RBC-ENTMCNC: 30.5 PG — SIGNIFICANT CHANGE UP (ref 27–34)
MCHC RBC-ENTMCNC: 30.6 PG — SIGNIFICANT CHANGE UP (ref 27–34)
MCHC RBC-ENTMCNC: 34.7 GM/DL — SIGNIFICANT CHANGE UP (ref 32–36)
MCHC RBC-ENTMCNC: 35.3 GM/DL — SIGNIFICANT CHANGE UP (ref 32–36)
MCV RBC AUTO: 86.6 FL — SIGNIFICANT CHANGE UP (ref 80–100)
MCV RBC AUTO: 87.8 FL — SIGNIFICANT CHANGE UP (ref 80–100)
METHGB MFR BLDMV: 0 % — SIGNIFICANT CHANGE UP (ref 0–1.5)
METHGB MFR BLDMV: 0.5 % — SIGNIFICANT CHANGE UP (ref 0–1.5)
METHGB MFR BLDMV: 0.9 % — SIGNIFICANT CHANGE UP (ref 0–1.5)
MONOCYTES # BLD AUTO: 1.88 K/UL — HIGH (ref 0–0.9)
MONOCYTES # BLD AUTO: 1.89 K/UL — HIGH (ref 0–0.9)
MONOCYTES NFR BLD AUTO: 13.3 % — SIGNIFICANT CHANGE UP (ref 2–14)
MONOCYTES NFR BLD AUTO: 14.8 % — HIGH (ref 2–14)
MRSA PCR RESULT.: SIGNIFICANT CHANGE UP
NEUTROPHILS # BLD AUTO: 10.17 K/UL — HIGH (ref 1.8–7.4)
NEUTROPHILS # BLD AUTO: 8.86 K/UL — HIGH (ref 1.8–7.4)
NEUTROPHILS NFR BLD AUTO: 69.5 % — SIGNIFICANT CHANGE UP (ref 43–77)
NEUTROPHILS NFR BLD AUTO: 71.4 % — SIGNIFICANT CHANGE UP (ref 43–77)
NRBC # BLD: 0 /100 WBCS — SIGNIFICANT CHANGE UP (ref 0–0)
NRBC # BLD: 0 /100 WBCS — SIGNIFICANT CHANGE UP (ref 0–0)
O2 CT VFR BLD CALC: 22 MMHG — LOW (ref 30–65)
O2 CT VFR BLD CALC: 28 MMHG — LOW (ref 30–65)
O2 CT VFR BLD CALC: 28 MMHG — LOW (ref 30–65)
O2 CT VFR BLD CALC: 29 MMHG — LOW (ref 30–65)
O2 CT VFR BLD CALC: 29 MMHG — LOW (ref 30–65)
O2 CT VFR BLD CALC: 30 MMHG — SIGNIFICANT CHANGE UP (ref 30–65)
O2 CT VFR BLDMV CALC: 7.2 ML/DL — LOW (ref 18–22)
O2 CT VFR BLDMV CALC: 7.5 ML/DL — LOW (ref 18–22)
O2 CT VFR BLDMV CALC: 8.6 ML/DL — LOW (ref 18–22)
OXYHGB MFR BLDMV: 48.3 % — LOW (ref 90–95)
OXYHGB MFR BLDMV: 51.8 % — LOW (ref 90–95)
OXYHGB MFR BLDMV: 55.5 % — LOW (ref 90–95)
PCO2 BLDMV: 45 MMHG — SIGNIFICANT CHANGE UP (ref 30–65)
PCO2 BLDMV: 45 MMHG — SIGNIFICANT CHANGE UP (ref 30–65)
PCO2 BLDMV: 46 MMHG — SIGNIFICANT CHANGE UP (ref 30–65)
PCO2 BLDMV: 46 MMHG — SIGNIFICANT CHANGE UP (ref 30–65)
PCO2 BLDMV: 47 MMHG — SIGNIFICANT CHANGE UP (ref 30–65)
PCO2 BLDMV: 51 MMHG — SIGNIFICANT CHANGE UP (ref 30–65)
PCO2 BLDV: 35 MMHG — LOW (ref 42–55)
PCO2 BLDV: 40 MMHG — LOW (ref 42–55)
PCO2 BLDV: 49 MMHG — SIGNIFICANT CHANGE UP (ref 42–55)
PCO2 BLDV: 50 MMHG — SIGNIFICANT CHANGE UP (ref 42–55)
PCO2 BLDV: 54 MMHG — SIGNIFICANT CHANGE UP (ref 42–55)
PCO2 BLDV: 56 MMHG — HIGH (ref 42–55)
PCO2 BLDV: 57 MMHG — HIGH (ref 42–55)
PCO2 BLDV: 68 MMHG — HIGH (ref 42–55)
PH BLDMV: 7.34 — SIGNIFICANT CHANGE UP (ref 7.32–7.45)
PH BLDMV: 7.37 — SIGNIFICANT CHANGE UP (ref 7.32–7.45)
PH BLDMV: 7.38 — SIGNIFICANT CHANGE UP (ref 7.32–7.45)
PH BLDV: 7.23 — LOW (ref 7.32–7.43)
PH BLDV: 7.29 — LOW (ref 7.32–7.43)
PH BLDV: 7.29 — LOW (ref 7.32–7.43)
PH BLDV: 7.33 — SIGNIFICANT CHANGE UP (ref 7.32–7.43)
PH BLDV: 7.34 — SIGNIFICANT CHANGE UP (ref 7.32–7.43)
PH BLDV: 7.36 — SIGNIFICANT CHANGE UP (ref 7.32–7.43)
PH BLDV: 7.42 — SIGNIFICANT CHANGE UP (ref 7.32–7.43)
PH BLDV: 7.51 — HIGH (ref 7.32–7.43)
PHOSPHATE SERPL-MCNC: 2.5 MG/DL — SIGNIFICANT CHANGE UP (ref 2.5–4.5)
PLAT MORPH BLD: NORMAL — SIGNIFICANT CHANGE UP
PLATELET # BLD AUTO: 136 K/UL — LOW (ref 150–400)
PLATELET # BLD AUTO: 151 K/UL — SIGNIFICANT CHANGE UP (ref 150–400)
PO2 BLDV: 103 MMHG — HIGH (ref 25–45)
PO2 BLDV: 52 MMHG — HIGH (ref 25–45)
PO2 BLDV: 54 MMHG — HIGH (ref 25–45)
PO2 BLDV: 58 MMHG — HIGH (ref 25–45)
PO2 BLDV: 67 MMHG — HIGH (ref 25–45)
PO2 BLDV: 71 MMHG — HIGH (ref 25–45)
PO2 BLDV: 94 MMHG — HIGH (ref 25–45)
PO2 BLDV: 99 MMHG — HIGH (ref 25–45)
POTASSIUM BLDV-SCNC: 4.6 MMOL/L — SIGNIFICANT CHANGE UP (ref 3.5–5.1)
POTASSIUM BLDV-SCNC: 5.7 MMOL/L — HIGH (ref 3.5–5.1)
POTASSIUM BLDV-SCNC: 5.7 MMOL/L — HIGH (ref 3.5–5.1)
POTASSIUM BLDV-SCNC: 6.2 MMOL/L — CRITICAL HIGH (ref 3.5–5.1)
POTASSIUM BLDV-SCNC: 6.3 MMOL/L — CRITICAL HIGH (ref 3.5–5.1)
POTASSIUM BLDV-SCNC: 6.4 MMOL/L — CRITICAL HIGH (ref 3.5–5.1)
POTASSIUM BLDV-SCNC: 6.5 MMOL/L — CRITICAL HIGH (ref 3.5–5.1)
POTASSIUM BLDV-SCNC: 6.7 MMOL/L — CRITICAL HIGH (ref 3.5–5.1)
POTASSIUM SERPL-MCNC: 5.4 MMOL/L — HIGH (ref 3.5–5.3)
POTASSIUM SERPL-SCNC: 5.4 MMOL/L — HIGH (ref 3.5–5.3)
PROT SERPL-MCNC: 3.9 G/DL — LOW (ref 6–8.3)
PROTHROM AB SERPL-ACNC: 12.8 SEC — SIGNIFICANT CHANGE UP (ref 10.6–13.6)
PROTHROM AB SERPL-ACNC: 13.6 SEC — SIGNIFICANT CHANGE UP (ref 10.6–13.6)
RBC # BLD: 3.28 M/UL — LOW (ref 4.2–5.8)
RBC # BLD: 3.5 M/UL — LOW (ref 4.2–5.8)
RBC # FLD: 14.6 % — HIGH (ref 10.3–14.5)
RBC # FLD: 15.4 % — HIGH (ref 10.3–14.5)
RBC BLD AUTO: NORMAL — SIGNIFICANT CHANGE UP
S AUREUS DNA NOSE QL NAA+PROBE: SIGNIFICANT CHANGE UP
SAO2 % BLDMV: 35.6 — LOW (ref 60–90)
SAO2 % BLDMV: 48.8 % — LOW (ref 60–90)
SAO2 % BLDMV: 51.2 — LOW (ref 60–90)
SAO2 % BLDMV: 52.3 — LOW (ref 60–90)
SAO2 % BLDMV: 52.8 % — LOW (ref 60–90)
SAO2 % BLDMV: 56.2 % — LOW (ref 60–90)
SAO2 % BLDV: 87 % — SIGNIFICANT CHANGE UP (ref 67–88)
SAO2 % BLDV: 90.1 % — HIGH (ref 67–88)
SAO2 % BLDV: 92.8 % — HIGH (ref 67–88)
SAO2 % BLDV: 96.4 % — HIGH (ref 67–88)
SAO2 % BLDV: 97.2 % — HIGH (ref 67–88)
SAO2 % BLDV: 98.3 % — HIGH (ref 67–88)
SAO2 % BLDV: 98.4 % — HIGH (ref 67–88)
SAO2 % BLDV: 98.5 % — HIGH (ref 67–88)
SMUDGE CELLS # BLD: PRESENT — SIGNIFICANT CHANGE UP
SODIUM SERPL-SCNC: 143 MMOL/L — SIGNIFICANT CHANGE UP (ref 135–145)
TROPONIN T, HIGH SENSITIVITY RESULT: 2494 NG/L — HIGH (ref 0–51)
WBC # BLD: 12.74 K/UL — HIGH (ref 3.8–10.5)
WBC # BLD: 14.25 K/UL — HIGH (ref 3.8–10.5)
WBC # FLD AUTO: 12.74 K/UL — HIGH (ref 3.8–10.5)
WBC # FLD AUTO: 14.25 K/UL — HIGH (ref 3.8–10.5)

## 2021-10-21 PROCEDURE — 33517 CABG ARTERY-VEIN SINGLE: CPT

## 2021-10-21 PROCEDURE — 71045 X-RAY EXAM CHEST 1 VIEW: CPT | Mod: 26

## 2021-10-21 PROCEDURE — 33508 ENDOSCOPIC VEIN HARVEST: CPT | Mod: 59

## 2021-10-21 PROCEDURE — 33533 CABG ARTERIAL SINGLE: CPT

## 2021-10-21 PROCEDURE — 99291 CRITICAL CARE FIRST HOUR: CPT

## 2021-10-21 PROCEDURE — 33430 REPLACEMENT OF MITRAL VALVE: CPT

## 2021-10-21 PROCEDURE — 93010 ELECTROCARDIOGRAM REPORT: CPT

## 2021-10-21 RX ORDER — DEXTROSE 50 % IN WATER 50 %
50 SYRINGE (ML) INTRAVENOUS
Refills: 0 | Status: DISCONTINUED | OUTPATIENT
Start: 2021-10-21 | End: 2021-10-23

## 2021-10-21 RX ORDER — MEPERIDINE HYDROCHLORIDE 50 MG/ML
25 INJECTION INTRAMUSCULAR; INTRAVENOUS; SUBCUTANEOUS ONCE
Refills: 0 | Status: DISCONTINUED | OUTPATIENT
Start: 2021-10-21 | End: 2021-10-23

## 2021-10-21 RX ORDER — AMIODARONE HYDROCHLORIDE 400 MG/1
400 TABLET ORAL
Refills: 0 | Status: DISCONTINUED | OUTPATIENT
Start: 2021-10-21 | End: 2021-10-21

## 2021-10-21 RX ORDER — ALBUMIN HUMAN 25 %
250 VIAL (ML) INTRAVENOUS ONCE
Refills: 0 | Status: COMPLETED | OUTPATIENT
Start: 2021-10-21 | End: 2021-10-21

## 2021-10-21 RX ORDER — INSULIN HUMAN 100 [IU]/ML
10 INJECTION, SOLUTION SUBCUTANEOUS ONCE
Refills: 0 | Status: COMPLETED | OUTPATIENT
Start: 2021-10-21 | End: 2021-10-21

## 2021-10-21 RX ORDER — MAGNESIUM SULFATE 500 MG/ML
1 VIAL (ML) INJECTION ONCE
Refills: 0 | Status: COMPLETED | OUTPATIENT
Start: 2021-10-21 | End: 2021-10-21

## 2021-10-21 RX ORDER — NOREPINEPHRINE BITARTRATE/D5W 8 MG/250ML
0.05 PLASTIC BAG, INJECTION (ML) INTRAVENOUS
Qty: 8 | Refills: 0 | Status: DISCONTINUED | OUTPATIENT
Start: 2021-10-21 | End: 2021-10-21

## 2021-10-21 RX ORDER — CEFUROXIME AXETIL 250 MG
1500 TABLET ORAL EVERY 24 HOURS
Refills: 0 | Status: COMPLETED | OUTPATIENT
Start: 2021-10-22 | End: 2021-10-23

## 2021-10-21 RX ORDER — AMINOCAPROIC ACID 500 MG/1
5 TABLET ORAL ONCE
Refills: 0 | Status: COMPLETED | OUTPATIENT
Start: 2021-10-21 | End: 2021-10-21

## 2021-10-21 RX ORDER — VASOPRESSIN 20 [USP'U]/ML
0.03 INJECTION INTRAVENOUS
Qty: 50 | Refills: 0 | Status: DISCONTINUED | OUTPATIENT
Start: 2021-10-21 | End: 2021-10-23

## 2021-10-21 RX ORDER — NOREPINEPHRINE BITARTRATE/D5W 8 MG/250ML
0.01 PLASTIC BAG, INJECTION (ML) INTRAVENOUS
Qty: 8 | Refills: 0 | Status: DISCONTINUED | OUTPATIENT
Start: 2021-10-21 | End: 2021-10-23

## 2021-10-21 RX ORDER — CHLORHEXIDINE GLUCONATE 213 G/1000ML
15 SOLUTION TOPICAL EVERY 12 HOURS
Refills: 0 | Status: DISCONTINUED | OUTPATIENT
Start: 2021-10-21 | End: 2021-10-22

## 2021-10-21 RX ORDER — ACETAMINOPHEN 500 MG
650 TABLET ORAL EVERY 6 HOURS
Refills: 0 | Status: COMPLETED | OUTPATIENT
Start: 2021-10-21 | End: 2021-10-24

## 2021-10-21 RX ORDER — ASPIRIN/CALCIUM CARB/MAGNESIUM 324 MG
300 TABLET ORAL ONCE
Refills: 0 | Status: DISCONTINUED | OUTPATIENT
Start: 2021-10-21 | End: 2021-10-22

## 2021-10-21 RX ORDER — GABAPENTIN 400 MG/1
100 CAPSULE ORAL EVERY 8 HOURS
Refills: 0 | Status: DISCONTINUED | OUTPATIENT
Start: 2021-10-21 | End: 2021-10-22

## 2021-10-21 RX ORDER — DOBUTAMINE HCL 250MG/20ML
2 VIAL (ML) INTRAVENOUS
Qty: 500 | Refills: 0 | Status: DISCONTINUED | OUTPATIENT
Start: 2021-10-21 | End: 2021-10-23

## 2021-10-21 RX ORDER — ACETAMINOPHEN 500 MG
650 TABLET ORAL EVERY 6 HOURS
Refills: 0 | Status: DISCONTINUED | OUTPATIENT
Start: 2021-10-24 | End: 2021-11-02

## 2021-10-21 RX ORDER — SODIUM CHLORIDE 9 MG/ML
1000 INJECTION INTRAMUSCULAR; INTRAVENOUS; SUBCUTANEOUS
Refills: 0 | Status: DISCONTINUED | OUTPATIENT
Start: 2021-10-21 | End: 2021-11-02

## 2021-10-21 RX ORDER — DEXTROSE 50 % IN WATER 50 %
25 SYRINGE (ML) INTRAVENOUS
Refills: 0 | Status: DISCONTINUED | OUTPATIENT
Start: 2021-10-21 | End: 2021-10-23

## 2021-10-21 RX ORDER — POLYETHYLENE GLYCOL 3350 17 G/17G
17 POWDER, FOR SOLUTION ORAL DAILY
Refills: 0 | Status: DISCONTINUED | OUTPATIENT
Start: 2021-10-22 | End: 2021-11-02

## 2021-10-21 RX ORDER — CHLORHEXIDINE GLUCONATE 213 G/1000ML
5 SOLUTION TOPICAL
Refills: 0 | Status: DISCONTINUED | OUTPATIENT
Start: 2021-10-21 | End: 2021-10-21

## 2021-10-21 RX ORDER — EPINEPHRINE 0.3 MG/.3ML
0.04 INJECTION INTRAMUSCULAR; SUBCUTANEOUS
Qty: 4 | Refills: 0 | Status: DISCONTINUED | OUTPATIENT
Start: 2021-10-21 | End: 2021-10-22

## 2021-10-21 RX ORDER — POLYETHYLENE GLYCOL 3350 17 G/17G
17 POWDER, FOR SOLUTION ORAL DAILY
Refills: 0 | Status: DISCONTINUED | OUTPATIENT
Start: 2021-10-21 | End: 2021-10-24

## 2021-10-21 RX ORDER — AMIODARONE HYDROCHLORIDE 400 MG/1
150 TABLET ORAL ONCE
Refills: 0 | Status: COMPLETED | OUTPATIENT
Start: 2021-10-21 | End: 2021-10-21

## 2021-10-21 RX ORDER — INSULIN HUMAN 100 [IU]/ML
3 INJECTION, SOLUTION SUBCUTANEOUS
Qty: 100 | Refills: 0 | Status: DISCONTINUED | OUTPATIENT
Start: 2021-10-21 | End: 2021-10-22

## 2021-10-21 RX ORDER — DESMOPRESSIN ACETATE 0.1 MG/1
24 TABLET ORAL ONCE
Refills: 0 | Status: COMPLETED | OUTPATIENT
Start: 2021-10-21 | End: 2021-10-21

## 2021-10-21 RX ORDER — OXYCODONE HYDROCHLORIDE 5 MG/1
5 TABLET ORAL EVERY 4 HOURS
Refills: 0 | Status: DISCONTINUED | OUTPATIENT
Start: 2021-10-21 | End: 2021-10-27

## 2021-10-21 RX ORDER — DEXTROSE 50 % IN WATER 50 %
25 SYRINGE (ML) INTRAVENOUS ONCE
Refills: 0 | Status: COMPLETED | OUTPATIENT
Start: 2021-10-21 | End: 2021-10-21

## 2021-10-21 RX ORDER — DESMOPRESSIN ACETATE 0.1 MG/1
0.3 TABLET ORAL ONCE
Refills: 0 | Status: DISCONTINUED | OUTPATIENT
Start: 2021-10-21 | End: 2021-10-21

## 2021-10-21 RX ORDER — SENNA PLUS 8.6 MG/1
2 TABLET ORAL AT BEDTIME
Refills: 0 | Status: DISCONTINUED | OUTPATIENT
Start: 2021-10-22 | End: 2021-11-02

## 2021-10-21 RX ORDER — MILRINONE LACTATE 1 MG/ML
0.2 INJECTION, SOLUTION INTRAVENOUS
Qty: 20 | Refills: 0 | Status: DISCONTINUED | OUTPATIENT
Start: 2021-10-21 | End: 2021-10-21

## 2021-10-21 RX ORDER — NICARDIPINE HYDROCHLORIDE 30 MG/1
5 CAPSULE, EXTENDED RELEASE ORAL
Qty: 40 | Refills: 0 | Status: DISCONTINUED | OUTPATIENT
Start: 2021-10-21 | End: 2021-10-21

## 2021-10-21 RX ORDER — CHLORHEXIDINE GLUCONATE 213 G/1000ML
1 SOLUTION TOPICAL DAILY
Refills: 0 | Status: DISCONTINUED | OUTPATIENT
Start: 2021-10-21 | End: 2021-11-02

## 2021-10-21 RX ORDER — METOCLOPRAMIDE HCL 10 MG
5 TABLET ORAL EVERY 8 HOURS
Refills: 0 | Status: COMPLETED | OUTPATIENT
Start: 2021-10-21 | End: 2021-10-23

## 2021-10-21 RX ORDER — ASPIRIN/CALCIUM CARB/MAGNESIUM 324 MG
325 TABLET ORAL DAILY
Refills: 0 | Status: DISCONTINUED | OUTPATIENT
Start: 2021-10-21 | End: 2021-10-26

## 2021-10-21 RX ORDER — CALCIUM GLUCONATE 100 MG/ML
2 VIAL (ML) INTRAVENOUS ONCE
Refills: 0 | Status: COMPLETED | OUTPATIENT
Start: 2021-10-21 | End: 2021-10-21

## 2021-10-21 RX ORDER — OXYCODONE HYDROCHLORIDE 5 MG/1
10 TABLET ORAL EVERY 4 HOURS
Refills: 0 | Status: DISCONTINUED | OUTPATIENT
Start: 2021-10-21 | End: 2021-10-27

## 2021-10-21 RX ORDER — DEXMEDETOMIDINE HYDROCHLORIDE IN 0.9% SODIUM CHLORIDE 4 UG/ML
1 INJECTION INTRAVENOUS
Qty: 200 | Refills: 0 | Status: DISCONTINUED | OUTPATIENT
Start: 2021-10-21 | End: 2021-10-22

## 2021-10-21 RX ORDER — PANTOPRAZOLE SODIUM 20 MG/1
40 TABLET, DELAYED RELEASE ORAL DAILY
Refills: 0 | Status: DISCONTINUED | OUTPATIENT
Start: 2021-10-21 | End: 2021-10-22

## 2021-10-21 RX ORDER — ASCORBIC ACID 60 MG
500 TABLET,CHEWABLE ORAL
Refills: 0 | Status: COMPLETED | OUTPATIENT
Start: 2021-10-21 | End: 2021-10-26

## 2021-10-21 RX ORDER — HYDROMORPHONE HYDROCHLORIDE 2 MG/ML
0.5 INJECTION INTRAMUSCULAR; INTRAVENOUS; SUBCUTANEOUS EVERY 6 HOURS
Refills: 0 | Status: DISCONTINUED | OUTPATIENT
Start: 2021-10-21 | End: 2021-10-21

## 2021-10-21 RX ADMIN — HEPARIN SODIUM 5000 UNIT(S): 5000 INJECTION INTRAVENOUS; SUBCUTANEOUS at 05:08

## 2021-10-21 RX ADMIN — AMINOCAPROIC ACID 250 GRAM(S): 500 TABLET ORAL at 23:05

## 2021-10-21 RX ADMIN — Medication 100 GRAM(S): at 23:00

## 2021-10-21 RX ADMIN — PANTOPRAZOLE SODIUM 40 MILLIGRAM(S): 20 TABLET, DELAYED RELEASE ORAL at 05:07

## 2021-10-21 RX ADMIN — INSULIN HUMAN 3 UNIT(S)/HR: 100 INJECTION, SOLUTION SUBCUTANEOUS at 20:58

## 2021-10-21 RX ADMIN — INSULIN HUMAN 10 UNIT(S): 100 INJECTION, SOLUTION SUBCUTANEOUS at 20:50

## 2021-10-21 RX ADMIN — Medication 5 MILLIGRAM(S): at 21:26

## 2021-10-21 RX ADMIN — Medication 1000 MILLIGRAM(S): at 05:16

## 2021-10-21 RX ADMIN — CHLORHEXIDINE GLUCONATE 5 MILLILITER(S): 213 SOLUTION TOPICAL at 19:52

## 2021-10-21 RX ADMIN — PANTOPRAZOLE SODIUM 40 MILLIGRAM(S): 20 TABLET, DELAYED RELEASE ORAL at 18:30

## 2021-10-21 RX ADMIN — Medication 25 MILLILITER(S): at 20:50

## 2021-10-21 RX ADMIN — MILRINONE LACTATE 9.02 MICROGRAM(S)/KG/MIN: 1 INJECTION, SOLUTION INTRAVENOUS at 18:27

## 2021-10-21 RX ADMIN — Medication 125 MILLILITER(S): at 18:17

## 2021-10-21 RX ADMIN — Medication 4.81 MICROGRAM(S)/KG/MIN: at 17:37

## 2021-10-21 RX ADMIN — CHLORHEXIDINE GLUCONATE 1 APPLICATION(S): 213 SOLUTION TOPICAL at 04:44

## 2021-10-21 RX ADMIN — DEXMEDETOMIDINE HYDROCHLORIDE IN 0.9% SODIUM CHLORIDE 20.1 MICROGRAM(S)/KG/HR: 4 INJECTION INTRAVENOUS at 17:37

## 2021-10-21 RX ADMIN — Medication 5 MILLIGRAM(S): at 18:29

## 2021-10-21 RX ADMIN — Medication 1300 MILLIGRAM(S): at 05:06

## 2021-10-21 RX ADMIN — Medication 7.52 MICROGRAM(S)/KG/MIN: at 17:37

## 2021-10-21 RX ADMIN — Medication 200 GRAM(S): at 17:33

## 2021-10-21 RX ADMIN — DESMOPRESSIN ACETATE 224 MICROGRAM(S): 0.1 TABLET ORAL at 23:45

## 2021-10-21 RX ADMIN — CARVEDILOL PHOSPHATE 6.25 MILLIGRAM(S): 80 CAPSULE, EXTENDED RELEASE ORAL at 05:07

## 2021-10-21 RX ADMIN — CHLORHEXIDINE GLUCONATE 10 MILLILITER(S): 213 SOLUTION TOPICAL at 05:07

## 2021-10-21 RX ADMIN — Medication 90 MILLIGRAM(S): at 05:06

## 2021-10-21 RX ADMIN — GABAPENTIN 100 MILLIGRAM(S): 400 CAPSULE ORAL at 05:17

## 2021-10-21 RX ADMIN — AMIODARONE HYDROCHLORIDE 600 MILLIGRAM(S): 400 TABLET ORAL at 23:05

## 2021-10-21 RX ADMIN — Medication 125 MILLILITER(S): at 19:16

## 2021-10-21 NOTE — PROGRESS NOTE ADULT - SUBJECTIVE AND OBJECTIVE BOX
Chief complaint  Patient is a 60y old  Male who presents with a chief complaint of NSTEMI (20 Oct 2021 13:22)   Review of systems  Patient for OR today, no hypoglycemic episodes.    Labs and Fingersticks  CAPILLARY BLOOD GLUCOSE      POCT Blood Glucose.: 179 mg/dL (20 Oct 2021 21:31)  POCT Blood Glucose.: 147 mg/dL (20 Oct 2021 19:32)  POCT Blood Glucose.: 169 mg/dL (20 Oct 2021 16:46)      Physical Exam  Vital Signs Last 12 Hrs  T(F): 99.2 (10-21-21 @ 04:47), Max: 99.2 (10-21-21 @ 04:28)  HR: 84 (10-21-21 @ 04:47) (84 - 84)  BP: 158/85 (10-21-21 @ 04:47) (158/85 - 158/85)  BP(mean): --  RR: 18 (10-21-21 @ 04:47) (18 - 18)  SpO2: 99% (10-21-21 @ 04:47) (99% - 99%)               Chief complaint  Patient is a 60y old  Male who presents with a chief complaint of NSTEMI (20 Oct 2021 13:22)   Review of systems  Patient for OR today, no hypoglycemic episodes.    Labs and Fingersticks  CAPILLARY BLOOD GLUCOSE      POCT Blood Glucose.: 179 mg/dL (20 Oct 2021 21:31)  POCT Blood Glucose.: 147 mg/dL (20 Oct 2021 19:32)  POCT Blood Glucose.: 169 mg/dL (20 Oct 2021 16:46)      Physical Exam  Vital Signs Last 12 Hrs  T(F): 99.2 (10-21-21 @ 04:47), Max: 99.2 (10-21-21 @ 04:28)  HR: 84 (10-21-21 @ 04:47) (84 - 84)  BP: 158/85 (10-21-21 @ 04:47) (158/85 - 158/85)  BP(mean): --  RR: 18 (10-21-21 @ 04:47) (18 - 18)  SpO2: 99% (10-21-21 @ 04:47) (99% - 99%)

## 2021-10-21 NOTE — BRIEF OPERATIVE NOTE - COMMENTS
Blood loss unable to be accurately calculated due to the use of cell saver and cardiotomy suction  AXC: 182 min

## 2021-10-21 NOTE — PROGRESS NOTE ADULT - SUBJECTIVE AND OBJECTIVE BOX
TOM PLEITEZ  MRN-[id number]  Patient is a 60y old  Male who presents with a chief complaint of NSTEMI (21 Oct 2021 13:02)    HPI:  60 yr old M w/ hx of DM2, HTN and HLD presents as transfer from [facility] for chest pain concerning for NSTEMI and possible CHF.  Pt states that after he woke up this morning he felt midsternal chest pain this morning, that felt like a burning sensation. Pain was non radiating. Associated with shortness of breath. He initially took TUMS and drank some milk which alleviated some of the pain but not completely. Later he also started feeling very dizzy so he went to the ED. Denies associated palpitations, diaphoresis, N/V.  When he presented to [facility] he was noted to be SOB. Initially, they evaluated patient for CHF but Troponin and BNP levels were elevated so he was transferred here for NSTEMI and r/o CHF.    Per nursing notes, while at [facility], he was placed on BIPAP, given Solumedrol 120, Nitro SL x1, duoneb x2, ASA 81mg, Lasix 40mg w/ 200 cc output, and started at 10mL/hr Nitro drip then increased to 30mL/hr. There, initial /126 retake was 171/67. Nitro drip was d/c'd upon arrival to [facility] ED.     During my visit, patient was sitting up eating a sandwich. Appears comfortable on room air. Denies repeat of chest pain after this morning. Denies shortness of breath. Denies lower extremity edema, orthopnea or PND.     Labs also remarkable for JOSHUA, metabolic acidosis and hyperkalemia. Patient denies prior history of renal injury.       Wife can be reached at [phone]. Medication list confirmed w/ wife. Of note, patient w/ elevated BP to SBP 200s often at home; nifedipine used on a "as needed" basis for SBP > 200.  (06 Oct 2021 01:09)      Hospital course:  10/21 CABG and MVR    Today:  Patient underwent a CABG and MVR requiring 4u PRBC, 1PLT, and 1000 FEIBA. Started on IV Nicardipine for Hypertension. When off sedation woke up and followed commands.      REVIEW OF SYSTEMS:  Unable to obtain, pt is intubated and sedated.     Vital Signs Last 24 Hrs  T(C): 36.8 (21 Oct 2021 18:00), Max: 37.3 (21 Oct 2021 04:28)  T(F): 98.2 (21 Oct 2021 18:00), Max: 99.2 (21 Oct 2021 04:28)  HR: 80 (21 Oct 2021 19:30) (80 - 84)  BP: 158/85 (21 Oct 2021 04:47) (133/87 - 158/85)  BP(mean): --  RR: 12 (21 Oct 2021 19:30) (11 - 18)  SpO2: 100% (21 Oct 2021 19:30) (97% - 100%)    Physical Exam:  Gen:  Sedated and intubated  CNS: Sedated	  Neck: + ENT midline, no JVD  RES : clear , no wheezing    Chest:   + chest tubes                     CVS: Regular  rhythm. Normal S1/S2  Abd: Soft, non-distended. Bowel sounds present.  Skin: No rash.  Ext:  no edema, A Line    ============================I/O===========================   I&O's Detail    20 Oct 2021 07:01  -  21 Oct 2021 07:00  --------------------------------------------------------  IN:    IV PiggyBack: 50 mL    Oral Fluid: 610 mL  Total IN: 660 mL    OUT:    Other (mL): 1000 mL    Voided (mL): 550 mL  Total OUT: 1550 mL    Total NET: -890 mL      21 Oct 2021 07:01  -  21 Oct 2021 20:05  --------------------------------------------------------  IN:    Albumin 5%  - 250 mL: 500 mL    Dexmedetomidine: 76.3 mL    DOBUTamine: 20.4 mL    IV PiggyBack: 100 mL    Milrinone: 4.8 mL    Milrinone: 4.8 mL    NiCARdipine: 10 mL    Norepinephrine: 15 mL    sodium chloride 0.9%: 30 mL  Total IN: 761.3 mL    OUT:    Bulb (mL): 39 mL    Chest Tube (mL): 5 mL    Chest Tube (mL): 70 mL    Chest Tube (mL): 60 mL    Indwelling Catheter - Urethral (mL): 125 mL    Insulin: 0 mL  Total OUT: 299 mL    Total NET: 462.3 mL        ============================ LABS =========================                        10.7   12.74 )-----------( 151      ( 21 Oct 2021 17:22 )             30.3     10-21    143  |  110<H>  |  34<H>  ----------------------------<  141<H>  5.4<H>   |  21<L>  |  3.15<H>    Ca    7.2<L>      21 Oct 2021 17:22  Phos  2.5     10-21  Mg     3.1     10-21    TPro  3.9<L>  /  Alb  2.1<L>  /  TBili  0.6  /  DBili  x   /  AST  63<H>  /  ALT  12  /  AlkPhos  51  10-21    LIVER FUNCTIONS - ( 21 Oct 2021 17:22 )  Alb: 2.1 g/dL / Pro: 3.9 g/dL / ALK PHOS: 51 U/L / ALT: 12 U/L / AST: 63 U/L / GGT: x           PT/INR - ( 21 Oct 2021 17:22 )   PT: 12.8 sec;   INR: 1.07 ratio         PTT - ( 21 Oct 2021 17:22 )  PTT:36.9 sec  ABG - ( 21 Oct 2021 19:16 )  pH, Arterial: 7.42  pH, Blood: x     /  pCO2: 36    /  pO2: 205   / HCO3: 23    / Base Excess: -0.8  /  SaO2: 99.4                ======================Micro/Rad/Cardio=================  Culture: Reviewed   CXR: Reviewed   Echo: Reviewed  ======================================================  PAST MEDICAL & SURGICAL HISTORY:  Hypertension    Hyperlipidemia    Diabetes mellitus    No pertinent past surgical history      ====================ASSESSMENT ==============  CAD, mitral valve regurgitation S/P CABG, MVR on 10/21/2021  Hypertension  DMT2  Acute Blood Loss Anemia S/P 4u PRBC, 1 PLT, and 1000 FEIBA  Leukocytosis   Elevated BUN/Cr  Hypercalcemia  hyperkalemia     Plan:  ====================== NEUROLOGY=====================  Sedated with IV Precedex drip for ventilator synchrony.   When off sedation woke up and followed commands.   Tylenol, Gabapentin, Meperidine, PRN Oxycodone, and PRN Dilaudid for pain management.     acetaminophen   Tablet .. 650 milliGRAM(s) Oral every 6 hours  dexMEDEtomidine Infusion 1 MICROgram(s)/kG/Hr (20.1 mL/Hr) IV Continuous <Continuous>  gabapentin 100 milliGRAM(s) Oral every 8 hours  HYDROmorphone  Injectable 0.5 milliGRAM(s) IV Push every 6 hours PRN Severe Pain (7 - 10)  meperidine     Injectable 25 milliGRAM(s) IV Push once  oxyCODONE    IR 5 milliGRAM(s) Oral every 4 hours PRN Moderate Pain (4 - 6)  oxyCODONE    IR 10 milliGRAM(s) Oral every 4 hours PRN Severe Pain (7 - 10)    ==================== RESPIRATORY======================  On full vent support, requiring close monitoring of respiratory rate, breathing pattern, pulse oximetry monitoring, and intermittent blood gas analysis.     Mechanical Ventilation:  Mode: AC/ CMV (Assist Control/ Continuous Mandatory Ventilation)  RR (machine): 14  TV (machine): 600  FiO2: 100  PEEP: 5  ITime: 1  MAP: 10  PIP: 20      ====================CARDIOVASCULAR==================  CAD, mitral valve regurgitation S/P CABG, MVR on 10/21/2021  Invasive hemodynamic monitoring, assess perfusion indices.   Continue with ASA for graft patency.   Continue with IV Dobutamine and IV Primacor for inotropic support   Hx of HTN, continue Nicardipine for blood pressure support.     aspirin enteric coated 325 milliGRAM(s) Oral daily  aspirin Suppository 300 milliGRAM(s) Rectal once  DOBUTamine Infusion 2 MICROgram(s)/kG/Min (4.81 mL/Hr) IV Continuous <Continuous>  milrinone Infusion 0.2 MICROgram(s)/kG/Min (4.81 mL/Hr) IV Continuous <Continuous>  niCARdipine Infusion 5 mG/Hr (25 mL/Hr) IV Continuous <Continuous>    ===================HEMATOLOGIC/ONC ===================  Anemia due to acute blood loss S/P 4u PRBC, 1 PLT, and 1000 FEIBA.  Monitor H/H and PLTs.    ===================== RENAL =========================  Elevated BUN/Cr  Hypercalcemia  hyperkalemia   Continue to monitor I/Os, BUN/Creatinine, and urine output.   Goal net negative fluid balance. Replete lytes PRN. Keep K> 4 and Mg >2.     ==================== GASTROINTESTINAL===================  Tolerating PO consistent carb diet.   Reglan for gut motility   Continue Protonix for stress ulcer prophylaxis.   Bowel regimen with Miralax.     ascorbic acid 500 milliGRAM(s) Oral two times a day  metoclopramide Injectable 5 milliGRAM(s) IV Push every 8 hours  pantoprazole  Injectable 40 milliGRAM(s) IV Push daily  polyethylene glycol 3350 17 Gram(s) Oral daily  sodium chloride 0.9%. 1000 milliLiter(s) (10 mL/Hr) IV Continuous <Continuous>    =======================    ENDOCRINE  =====================  History of Type 2 Diabetes Mellitus, requiring glucose control with insulin gtt, titrate as per insulin drip protocol along with hourly glucose checks.     insulin regular  human recombinant. 10 Unit(s) IV Push once  insulin regular Infusion 3 Unit(s)/Hr (3 mL/Hr) IV Continuous <Continuous>    ========================INFECTIOUS DISEASE================  TMAX: 99.2 w/ Leukocytosis, WBC: 12.74.  Monitor temperature and trend WBC.     Patient requires continuous monitoring with bedside rhythm monitoring, arterial line, pulse oximetry, ventilator monitoring; intermittent blood gas analysis. Care plan discussed with ICU care team. Patient remains critical; required more than usual ICU care.     By signing my name below, I, Fang Oden, attest that this documentation has been prepared under the direction and in the presence of Eileen Gómez MD.  Electronically signed: July Parsons, 10-21-21 @ 20:05    I, Eileen Gómez, personally performed the services described in this documentation. all medical record entries made by the july were at my direction and in my presence. I have reviewed the chart and agree that the record reflects my personal performance and is accurate and complete  Electronically signed: Fang Oden 10-21-21 @ 20:05       TOM PLEITEZ  MRN-[id number]  Patient is a 60y old  Male who presents with a chief complaint of NSTEMI (21 Oct 2021 13:02)    HPI:  60 yr old M w/ hx of DM2, HTN and HLD presents as transfer from [facility] for chest pain concerning for NSTEMI and possible CHF.  Pt states that after he woke up this morning he felt midsternal chest pain this morning, that felt like a burning sensation. Pain was non radiating. Associated with shortness of breath. He initially took TUMS and drank some milk which alleviated some of the pain but not completely. Later he also started feeling very dizzy so he went to the ED. Denies associated palpitations, diaphoresis, N/V.  When he presented to [facility] he was noted to be SOB. Initially, they evaluated patient for CHF but Troponin and BNP levels were elevated so he was transferred here for NSTEMI and r/o CHF.    Per nursing notes, while at [facility], he was placed on BIPAP, given Solumedrol 120, Nitro SL x1, duoneb x2, ASA 81mg, Lasix 40mg w/ 200 cc output, and started at 10mL/hr Nitro drip then increased to 30mL/hr. There, initial /126 retake was 171/67. Nitro drip was d/c'd upon arrival to [facility] ED.     During my visit, patient was sitting up eating a sandwich. Appears comfortable on room air. Denies repeat of chest pain after this morning. Denies shortness of breath. Denies lower extremity edema, orthopnea or PND.     Labs also remarkable for JOSHUA, metabolic acidosis and hyperkalemia. Patient denies prior history of renal injury.       Wife can be reached at [phone]. Medication list confirmed w/ wife. Of note, patient w/ elevated BP to SBP 200s often at home; nifedipine used on a "as needed" basis for SBP > 200.  (06 Oct 2021 01:09)      Hospital course:  10/21 CABG and MVR,  left atrial appendage ligation    Today:  Patient underwent a CABG and MVR requiring 4u PRBC, 1PLT, and 1000 FEIBA. Started on IV Nicardipine for Hypertension. When off sedation woke up and followed commands.    require  inotropic support, IV pressors     REVIEW OF SYSTEMS:  Unable to obtain, pt is intubated and sedated.     Vital Signs Last 24 Hrs  T(C): 36.8 (21 Oct 2021 18:00), Max: 37.3 (21 Oct 2021 04:28)  T(F): 98.2 (21 Oct 2021 18:00), Max: 99.2 (21 Oct 2021 04:28)  HR: 80 (21 Oct 2021 19:30) (80 - 84)  BP: 158/85 (21 Oct 2021 04:47) (133/87 - 158/85)  BP(mean): --  RR: 12 (21 Oct 2021 19:30) (11 - 18)  SpO2: 100% (21 Oct 2021 19:30) (97% - 100%)    Physical Exam:  Gen:  Sedated and intubated  CNS: Sedated	  Neck: no JVD  RES : clear , no wheezing    Chest:   + chest tubes                     CVS:    paced rhythm. Normal S1/S2  Abd: Soft, non-distended. Bowel sounds present.  Skin: No rash.  Ext:  no edema, A Line    ============================I/O===========================   I&O's Detail    20 Oct 2021 07:01  -  21 Oct 2021 07:00  --------------------------------------------------------  IN:    IV PiggyBack: 50 mL    Oral Fluid: 610 mL  Total IN: 660 mL    OUT:    Other (mL): 1000 mL    Voided (mL): 550 mL  Total OUT: 1550 mL    Total NET: -890 mL      21 Oct 2021 07:01  -  21 Oct 2021 20:05  --------------------------------------------------------  IN:    Albumin 5%  - 250 mL: 500 mL    Dexmedetomidine: 76.3 mL    DOBUTamine: 20.4 mL    IV PiggyBack: 100 mL    Milrinone: 4.8 mL    Milrinone: 4.8 mL    NiCARdipine: 10 mL    Norepinephrine: 15 mL    sodium chloride 0.9%: 30 mL  Total IN: 761.3 mL    OUT:    Bulb (mL): 39 mL    Chest Tube (mL): 5 mL    Chest Tube (mL): 70 mL    Chest Tube (mL): 60 mL    Indwelling Catheter - Urethral (mL): 125 mL    Insulin: 0 mL  Total OUT: 299 mL    Total NET: 462.3 mL        ============================ LABS =========================                        10.7   12.74 )-----------( 151      ( 21 Oct 2021 17:22 )             30.3     10-21    143  |  110<H>  |  34<H>  ----------------------------<  141<H>  5.4<H>   |  21<L>  |  3.15<H>    Ca    7.2<L>      21 Oct 2021 17:22  Phos  2.5     10-21  Mg     3.1     10-21    TPro  3.9<L>  /  Alb  2.1<L>  /  TBili  0.6  /  DBili  x   /  AST  63<H>  /  ALT  12  /  AlkPhos  51  10-21    LIVER FUNCTIONS - ( 21 Oct 2021 17:22 )  Alb: 2.1 g/dL / Pro: 3.9 g/dL / ALK PHOS: 51 U/L / ALT: 12 U/L / AST: 63 U/L / GGT: x           PT/INR - ( 21 Oct 2021 17:22 )   PT: 12.8 sec;   INR: 1.07 ratio         PTT - ( 21 Oct 2021 17:22 )  PTT:36.9 sec  ABG - ( 21 Oct 2021 19:16 )  pH, Arterial: 7.42  pH, Blood: x     /  pCO2: 36    /  pO2: 205   / HCO3: 23    / Base Excess: -0.8  /  SaO2: 99.4                ======================Micro/Rad/Cardio=================  Culture: Reviewed   CXR: Reviewed   Echo: Reviewed  ======================================================  PAST MEDICAL & SURGICAL HISTORY:  Hypertension    Hyperlipidemia    Diabetes mellitus    No pertinent past surgical history      ====================ASSESSMENT ==============  CAD, mitral valve regurgitation S/P CABG, MVR on 10/21/2021  Acute systolic congestive heart failure  Hypertension, hemodynamic instability   hyperglycemia  Hyperkalemia required urgent dialysis  DMT2  Acute Blood Loss Anemia S/P 4u PRBC, 1 PLT, and 1000 FEIBA      Plan:  ====================== NEUROLOGY=====================  Sedated with IV Precedex drip for ventilator synchrony.   When off sedation woke up and followed commands.   Tylenol, Gabapentin, Meperidine, PRN Oxycodone, and PRN Dilaudid for pain management.     acetaminophen   Tablet .. 650 milliGRAM(s) Oral every 6 hours  dexMEDEtomidine Infusion 1 MICROgram(s)/kG/Hr (20.1 mL/Hr) IV Continuous <Continuous>  gabapentin 100 milliGRAM(s) Oral every 8 hours  HYDROmorphone  Injectable 0.5 milliGRAM(s) IV Push every 6 hours PRN Severe Pain (7 - 10)  meperidine     Injectable 25 milliGRAM(s) IV Push once  oxyCODONE    IR 5 milliGRAM(s) Oral every 4 hours PRN Moderate Pain (4 - 6)  oxyCODONE    IR 10 milliGRAM(s) Oral every 4 hours PRN Severe Pain (7 - 10)    ==================== RESPIRATORY======================  On full vent support, requiring close monitoring of respiratory rate, breathing pattern, pulse oximetry monitoring, and intermittent blood gas analysis.     Mechanical Ventilation:  Mode: AC/ CMV (Assist Control/ Continuous Mandatory Ventilation)  RR (machine): 14  TV (machine): 600  FiO2: 100  PEEP: 5  ITime: 1  MAP: 10  PIP: 20   wean to extubate when hemodynamically stable    ====================CARDIOVASCULAR==================  CAD, mitral valve regurgitation S/P CABG, MVR on 10/21/2021  Invasive hemodynamic monitoring, assess perfusion indices.   Continue with ASA for graft patency.   Continue with IV Dobutamine and IV Primacor for inotropic support    D/C Primacor due to hypotension and epinephrine drip transfer back support  Hx of HTN, continue Nicardipine for blood pressure support.     aspirin enteric coated 325 milliGRAM(s) Oral daily  aspirin Suppository 300 milliGRAM(s) Rectal once  DOBUTamine Infusion 2 MICROgram(s)/kG/Min (4.81 mL/Hr) IV Continuous <Continuous>  milrinone Infusion 0.2 MICROgram(s)/kG/Min (4.81 mL/Hr) IV Continuous <Continuous>  niCARdipine Infusion 5 mG/Hr (25 mL/Hr) IV Continuous <Continuous>    ===================HEMATOLOGIC/ONC ===================  Anemia due to acute blood loss S/P 4u PRBC, 1 PLT, and 1000 FEIBA.  Monitor H/H and PLTs.    ===================== RENAL =========================  Elevated BUN/Cr  Hypercalcemia  hyperkalemia   Continue to monitor I/Os, BUN/Creatinine, and urine output.   Goal net negative fluid balance. Replete lytes PRN. Keep K> 4 and Mg >2.     ==================== GASTROINTESTINAL===================  Tolerating PO consistent carb diet.   Reglan for gut motility   Continue Protonix for stress ulcer prophylaxis.   Bowel regimen with Miralax.     ascorbic acid 500 milliGRAM(s) Oral two times a day  metoclopramide Injectable 5 milliGRAM(s) IV Push every 8 hours  pantoprazole  Injectable 40 milliGRAM(s) IV Push daily  polyethylene glycol 3350 17 Gram(s) Oral daily  sodium chloride 0.9%. 1000 milliLiter(s) (10 mL/Hr) IV Continuous <Continuous>    =======================    ENDOCRINE  =====================  History of Type 2 Diabetes Mellitus, requiring glucose control with insulin gtt, titrate as per insulin drip protocol along with hourly glucose checks.     insulin regular  human recombinant. 10 Unit(s) IV Push once  insulin regular Infusion 3 Unit(s)/Hr (3 mL/Hr) IV Continuous <Continuous>    ========================INFECTIOUS DISEASE================  TMAX: 99.2 w/ Leukocytosis, WBC: 12.74.  Monitor temperature and trend WBC.     Patient requires continuous monitoring with bedside rhythm monitoring, arterial line, pulse oximetry, ventilator monitoring; intermittent blood gas analysis. Care plan discussed with ICU care team. Patient remains critical; required more than usual  postopICU car time spent 35 minutes    By signing my name below, I, Fang Oden, attest that this documentation has been prepared under the direction and in the presence of Eileen Gómez MD.  Electronically signed: July Parsons, 10-21-21 @ 20:05    I, Eileen Gómez, personally performed the services described in this documentation. all medical record entries made by the july were at my direction and in my presence. I have reviewed the chart and agree that the record reflects my personal performance and is accurate and complete  Electronically signed: Fang Oden 10-21-21 @ 20:05

## 2021-10-21 NOTE — PROGRESS NOTE ADULT - PROBLEM SELECTOR PLAN 1
Tight glycemic control necessary. Patient will be on IV insulin postoperatively.  Will continue monitoring FS and FU; Will transition to basal bolus insulin regimen once blood sugars are stable and patient is eating meals.

## 2021-10-21 NOTE — PROGRESS NOTE ADULT - ASSESSMENT
Assessment  DMT2: 60y Male with DM T2 with hyperglycemia, A1C 7%, was on oral meds and insulin at home, now on basal bolus insulin, increased dose yesterday, blood sugars improving preoperatively, no hypoglycemic episodes, for CABG today.  Hypothyroidism: Patient has no history thyroid disease, was not on any thyroid supplements, TSH elevated.  CAD: Planning CABG, on medications, no chest pain, stable, monitored.  HTN: Controlled,  on antihypertensive medications.  HLD: On statin  Obesity: No strict exercise routines, not on any weight loss program, neither on low calorie diet.        Esperanza Beckett MD  Cell: [phone]  Office: [phone]     Assessment  DMT2: 60y Male with DM T2 with hyperglycemia, A1C 7%, was on oral meds and insulin at home, now on basal bolus insulin, increased dose yesterday, blood sugars improving  preoperatively, no hypoglycemic episodes, for CABG today.  Hypothyroidism: Patient has no history thyroid disease, was not on any thyroid supplements, TSH elevated.  CAD: Planning CABG, on medications, no chest pain, stable, monitored.  HTN: Controlled,  on antihypertensive medications.  HLD: On statin  Obesity: No strict exercise routines, not on any weight loss program, neither on low calorie diet.        Esperanza Beckett MD  Cell: [phone]  Office: [phone]

## 2021-10-22 LAB
ALBUMIN SERPL ELPH-MCNC: 2.7 G/DL — LOW (ref 3.3–5)
ALP SERPL-CCNC: 46 U/L — SIGNIFICANT CHANGE UP (ref 40–120)
ALT FLD-CCNC: 14 U/L — SIGNIFICANT CHANGE UP (ref 10–45)
ANION GAP SERPL CALC-SCNC: 13 MMOL/L — SIGNIFICANT CHANGE UP (ref 5–17)
AST SERPL-CCNC: 71 U/L — HIGH (ref 10–40)
BASE EXCESS BLDMV CALC-SCNC: -2.2 MMOL/L — SIGNIFICANT CHANGE UP (ref -3–3)
BASE EXCESS BLDMV CALC-SCNC: -2.5 MMOL/L — SIGNIFICANT CHANGE UP (ref -3–3)
BASE EXCESS BLDMV CALC-SCNC: -2.6 MMOL/L — SIGNIFICANT CHANGE UP (ref -3–3)
BASE EXCESS BLDMV CALC-SCNC: 0 MMOL/L — SIGNIFICANT CHANGE UP (ref -3–3)
BASE EXCESS BLDMV CALC-SCNC: 2.5 MMOL/L — SIGNIFICANT CHANGE UP (ref -3–3)
BASE EXCESS BLDMV CALC-SCNC: 2.8 MMOL/L — SIGNIFICANT CHANGE UP (ref -3–3)
BASOPHILS # BLD AUTO: 0.07 K/UL — SIGNIFICANT CHANGE UP (ref 0–0.2)
BASOPHILS NFR BLD AUTO: 0.6 % — SIGNIFICANT CHANGE UP (ref 0–2)
BILIRUB SERPL-MCNC: 0.4 MG/DL — SIGNIFICANT CHANGE UP (ref 0.2–1.2)
BUN SERPL-MCNC: 27 MG/DL — HIGH (ref 7–23)
CALCIUM SERPL-MCNC: 7.8 MG/DL — LOW (ref 8.4–10.5)
CHLORIDE SERPL-SCNC: 104 MMOL/L — SIGNIFICANT CHANGE UP (ref 96–108)
CO2 BLDMV-SCNC: 25 MMOL/L — SIGNIFICANT CHANGE UP (ref 21–29)
CO2 BLDMV-SCNC: 26 MMOL/L — SIGNIFICANT CHANGE UP (ref 21–29)
CO2 BLDMV-SCNC: 26 MMOL/L — SIGNIFICANT CHANGE UP (ref 21–29)
CO2 BLDMV-SCNC: 28 MMOL/L — SIGNIFICANT CHANGE UP (ref 21–29)
CO2 BLDMV-SCNC: 30 MMOL/L — HIGH (ref 21–29)
CO2 BLDMV-SCNC: 30 MMOL/L — HIGH (ref 21–29)
CO2 SERPL-SCNC: 22 MMOL/L — SIGNIFICANT CHANGE UP (ref 22–31)
CREAT SERPL-MCNC: 2.73 MG/DL — HIGH (ref 0.5–1.3)
EOSINOPHIL # BLD AUTO: 0.08 K/UL — SIGNIFICANT CHANGE UP (ref 0–0.5)
EOSINOPHIL NFR BLD AUTO: 0.7 % — SIGNIFICANT CHANGE UP (ref 0–6)
GAS PNL BLDA: SIGNIFICANT CHANGE UP
GAS PNL BLDMV: SIGNIFICANT CHANGE UP
GLUCOSE BLDC GLUCOMTR-MCNC: 104 MG/DL — HIGH (ref 70–99)
GLUCOSE BLDC GLUCOMTR-MCNC: 107 MG/DL — HIGH (ref 70–99)
GLUCOSE BLDC GLUCOMTR-MCNC: 107 MG/DL — HIGH (ref 70–99)
GLUCOSE BLDC GLUCOMTR-MCNC: 123 MG/DL — HIGH (ref 70–99)
GLUCOSE BLDC GLUCOMTR-MCNC: 126 MG/DL — HIGH (ref 70–99)
GLUCOSE BLDC GLUCOMTR-MCNC: 132 MG/DL — HIGH (ref 70–99)
GLUCOSE BLDC GLUCOMTR-MCNC: 134 MG/DL — HIGH (ref 70–99)
GLUCOSE BLDC GLUCOMTR-MCNC: 137 MG/DL — HIGH (ref 70–99)
GLUCOSE BLDC GLUCOMTR-MCNC: 139 MG/DL — HIGH (ref 70–99)
GLUCOSE BLDC GLUCOMTR-MCNC: 146 MG/DL — HIGH (ref 70–99)
GLUCOSE BLDC GLUCOMTR-MCNC: 147 MG/DL — HIGH (ref 70–99)
GLUCOSE BLDC GLUCOMTR-MCNC: 152 MG/DL — HIGH (ref 70–99)
GLUCOSE BLDC GLUCOMTR-MCNC: 154 MG/DL — HIGH (ref 70–99)
GLUCOSE BLDC GLUCOMTR-MCNC: 170 MG/DL — HIGH (ref 70–99)
GLUCOSE BLDC GLUCOMTR-MCNC: 202 MG/DL — HIGH (ref 70–99)
GLUCOSE BLDC GLUCOMTR-MCNC: 340 MG/DL — HIGH (ref 70–99)
GLUCOSE BLDC GLUCOMTR-MCNC: 97 MG/DL — SIGNIFICANT CHANGE UP (ref 70–99)
GLUCOSE BLDC GLUCOMTR-MCNC: 98 MG/DL — SIGNIFICANT CHANGE UP (ref 70–99)
GLUCOSE SERPL-MCNC: 128 MG/DL — HIGH (ref 70–99)
HCO3 BLDMV-SCNC: 24 MMOL/L — SIGNIFICANT CHANGE UP (ref 20–28)
HCO3 BLDMV-SCNC: 26 MMOL/L — SIGNIFICANT CHANGE UP (ref 20–28)
HCO3 BLDMV-SCNC: 28 MMOL/L — SIGNIFICANT CHANGE UP (ref 20–28)
HCO3 BLDMV-SCNC: 28 MMOL/L — SIGNIFICANT CHANGE UP (ref 20–28)
HCT VFR BLD CALC: 25.8 % — LOW (ref 39–50)
HGB BLD-MCNC: 8.9 G/DL — LOW (ref 13–17)
HOROWITZ INDEX BLDMV+IHG-RTO: 40 — SIGNIFICANT CHANGE UP
HOROWITZ INDEX BLDMV+IHG-RTO: 44 — SIGNIFICANT CHANGE UP
IMM GRANULOCYTES NFR BLD AUTO: 0.5 % — SIGNIFICANT CHANGE UP (ref 0–1.5)
LYMPHOCYTES # BLD AUTO: 1.15 K/UL — SIGNIFICANT CHANGE UP (ref 1–3.3)
LYMPHOCYTES # BLD AUTO: 10 % — LOW (ref 13–44)
MAGNESIUM SERPL-MCNC: 3 MG/DL — HIGH (ref 1.6–2.6)
MCHC RBC-ENTMCNC: 30.5 PG — SIGNIFICANT CHANGE UP (ref 27–34)
MCHC RBC-ENTMCNC: 34.5 GM/DL — SIGNIFICANT CHANGE UP (ref 32–36)
MCV RBC AUTO: 88.4 FL — SIGNIFICANT CHANGE UP (ref 80–100)
MONOCYTES # BLD AUTO: 1.65 K/UL — HIGH (ref 0–0.9)
MONOCYTES NFR BLD AUTO: 14.4 % — HIGH (ref 2–14)
NEPHROCHECK RESULT: >10 RISK SCORE — SIGNIFICANT CHANGE UP (ref 0–0.3)
NEUTROPHILS # BLD AUTO: 8.47 K/UL — HIGH (ref 1.8–7.4)
NEUTROPHILS NFR BLD AUTO: 73.8 % — SIGNIFICANT CHANGE UP (ref 43–77)
NRBC # BLD: 0 /100 WBCS — SIGNIFICANT CHANGE UP (ref 0–0)
O2 CT VFR BLD CALC: 28 MMHG — LOW (ref 30–65)
O2 CT VFR BLD CALC: 30 MMHG — SIGNIFICANT CHANGE UP (ref 30–65)
O2 CT VFR BLD CALC: 30 MMHG — SIGNIFICANT CHANGE UP (ref 30–65)
O2 CT VFR BLD CALC: 31 MMHG — SIGNIFICANT CHANGE UP (ref 30–65)
O2 CT VFR BLD CALC: 32 MMHG — SIGNIFICANT CHANGE UP (ref 30–65)
O2 CT VFR BLD CALC: 32 MMHG — SIGNIFICANT CHANGE UP (ref 30–65)
PCO2 BLDMV: 46 MMHG — SIGNIFICANT CHANGE UP (ref 30–65)
PCO2 BLDMV: 47 MMHG — SIGNIFICANT CHANGE UP (ref 30–65)
PCO2 BLDMV: 47 MMHG — SIGNIFICANT CHANGE UP (ref 30–65)
PCO2 BLDMV: 48 MMHG — SIGNIFICANT CHANGE UP (ref 30–65)
PCO2 BLDMV: 48 MMHG — SIGNIFICANT CHANGE UP (ref 30–65)
PCO2 BLDMV: 49 MMHG — SIGNIFICANT CHANGE UP (ref 30–65)
PH BLDMV: 7.31 — LOW (ref 7.32–7.45)
PH BLDMV: 7.32 — SIGNIFICANT CHANGE UP (ref 7.32–7.45)
PH BLDMV: 7.32 — SIGNIFICANT CHANGE UP (ref 7.32–7.45)
PH BLDMV: 7.34 — SIGNIFICANT CHANGE UP (ref 7.32–7.45)
PH BLDMV: 7.38 — SIGNIFICANT CHANGE UP (ref 7.32–7.45)
PH BLDMV: 7.39 — SIGNIFICANT CHANGE UP (ref 7.32–7.45)
PHOSPHATE SERPL-MCNC: 3.1 MG/DL — SIGNIFICANT CHANGE UP (ref 2.5–4.5)
PLATELET # BLD AUTO: 111 K/UL — LOW (ref 150–400)
POTASSIUM SERPL-MCNC: 4.4 MMOL/L — SIGNIFICANT CHANGE UP (ref 3.5–5.3)
POTASSIUM SERPL-SCNC: 4.4 MMOL/L — SIGNIFICANT CHANGE UP (ref 3.5–5.3)
PROT SERPL-MCNC: 4.5 G/DL — LOW (ref 6–8.3)
RBC # BLD: 2.92 M/UL — LOW (ref 4.2–5.8)
RBC # FLD: 15.6 % — HIGH (ref 10.3–14.5)
SAO2 % BLDMV: 47.7 — LOW (ref 60–90)
SAO2 % BLDMV: 54.4 — LOW (ref 60–90)
SAO2 % BLDMV: 55.8 — LOW (ref 60–90)
SAO2 % BLDMV: 55.9 — LOW (ref 60–90)
SAO2 % BLDMV: 56.8 — LOW (ref 60–90)
SAO2 % BLDMV: 61.2 — SIGNIFICANT CHANGE UP (ref 60–90)
SODIUM SERPL-SCNC: 139 MMOL/L — SIGNIFICANT CHANGE UP (ref 135–145)
WBC # BLD: 11.48 K/UL — HIGH (ref 3.8–10.5)
WBC # FLD AUTO: 11.48 K/UL — HIGH (ref 3.8–10.5)

## 2021-10-22 PROCEDURE — 99292 CRITICAL CARE ADDL 30 MIN: CPT

## 2021-10-22 PROCEDURE — 71045 X-RAY EXAM CHEST 1 VIEW: CPT | Mod: 26

## 2021-10-22 PROCEDURE — 99291 CRITICAL CARE FIRST HOUR: CPT

## 2021-10-22 PROCEDURE — 99233 SBSQ HOSP IP/OBS HIGH 50: CPT | Mod: GC

## 2021-10-22 PROCEDURE — 36620 INSERTION CATHETER ARTERY: CPT | Mod: 78

## 2021-10-22 RX ORDER — INSULIN LISPRO 100/ML
VIAL (ML) SUBCUTANEOUS AT BEDTIME
Refills: 0 | Status: DISCONTINUED | OUTPATIENT
Start: 2021-10-22 | End: 2021-10-22

## 2021-10-22 RX ORDER — INSULIN LISPRO 100/ML
VIAL (ML) SUBCUTANEOUS
Refills: 0 | Status: DISCONTINUED | OUTPATIENT
Start: 2021-10-22 | End: 2021-10-22

## 2021-10-22 RX ORDER — WARFARIN SODIUM 2.5 MG/1
5 TABLET ORAL ONCE
Refills: 0 | Status: COMPLETED | OUTPATIENT
Start: 2021-10-22 | End: 2021-10-22

## 2021-10-22 RX ORDER — INSULIN HUMAN 100 [IU]/ML
3 INJECTION, SOLUTION SUBCUTANEOUS
Qty: 100 | Refills: 0 | Status: DISCONTINUED | OUTPATIENT
Start: 2021-10-22 | End: 2021-10-23

## 2021-10-22 RX ORDER — ALBUMIN HUMAN 25 %
250 VIAL (ML) INTRAVENOUS ONCE
Refills: 0 | Status: COMPLETED | OUTPATIENT
Start: 2021-10-22 | End: 2021-10-22

## 2021-10-22 RX ORDER — HEPARIN SODIUM 5000 [USP'U]/ML
5000 INJECTION INTRAVENOUS; SUBCUTANEOUS EVERY 8 HOURS
Refills: 0 | Status: DISCONTINUED | OUTPATIENT
Start: 2021-10-22 | End: 2021-11-02

## 2021-10-22 RX ORDER — DEXTROSE 50 % IN WATER 50 %
25 SYRINGE (ML) INTRAVENOUS ONCE
Refills: 0 | Status: COMPLETED | OUTPATIENT
Start: 2021-10-22 | End: 2021-10-22

## 2021-10-22 RX ORDER — INSULIN HUMAN 100 [IU]/ML
10 INJECTION, SOLUTION SUBCUTANEOUS ONCE
Refills: 0 | Status: COMPLETED | OUTPATIENT
Start: 2021-10-22 | End: 2021-10-22

## 2021-10-22 RX ORDER — PANTOPRAZOLE SODIUM 20 MG/1
40 TABLET, DELAYED RELEASE ORAL
Refills: 0 | Status: DISCONTINUED | OUTPATIENT
Start: 2021-10-22 | End: 2021-11-02

## 2021-10-22 RX ORDER — ATORVASTATIN CALCIUM 80 MG/1
80 TABLET, FILM COATED ORAL AT BEDTIME
Refills: 0 | Status: DISCONTINUED | OUTPATIENT
Start: 2021-10-22 | End: 2021-11-02

## 2021-10-22 RX ADMIN — Medication 650 MILLIGRAM(S): at 23:23

## 2021-10-22 RX ADMIN — Medication 650 MILLIGRAM(S): at 12:00

## 2021-10-22 RX ADMIN — CHLORHEXIDINE GLUCONATE 1 APPLICATION(S): 213 SOLUTION TOPICAL at 11:37

## 2021-10-22 RX ADMIN — Medication 5 MILLIGRAM(S): at 21:58

## 2021-10-22 RX ADMIN — GABAPENTIN 100 MILLIGRAM(S): 400 CAPSULE ORAL at 05:31

## 2021-10-22 RX ADMIN — Medication 125 MILLILITER(S): at 10:15

## 2021-10-22 RX ADMIN — Medication 325 MILLIGRAM(S): at 11:28

## 2021-10-22 RX ADMIN — Medication 5 MILLIGRAM(S): at 06:10

## 2021-10-22 RX ADMIN — Medication 650 MILLIGRAM(S): at 05:31

## 2021-10-22 RX ADMIN — PANTOPRAZOLE SODIUM 40 MILLIGRAM(S): 20 TABLET, DELAYED RELEASE ORAL at 07:13

## 2021-10-22 RX ADMIN — Medication 500 MILLIGRAM(S): at 05:32

## 2021-10-22 RX ADMIN — Medication 650 MILLIGRAM(S): at 06:00

## 2021-10-22 RX ADMIN — Medication 25 MILLILITER(S): at 11:09

## 2021-10-22 RX ADMIN — WARFARIN SODIUM 5 MILLIGRAM(S): 2.5 TABLET ORAL at 21:59

## 2021-10-22 RX ADMIN — SENNA PLUS 2 TABLET(S): 8.6 TABLET ORAL at 21:58

## 2021-10-22 RX ADMIN — Medication 650 MILLIGRAM(S): at 18:00

## 2021-10-22 RX ADMIN — Medication 500 MILLIGRAM(S): at 19:45

## 2021-10-22 RX ADMIN — HEPARIN SODIUM 5000 UNIT(S): 5000 INJECTION INTRAVENOUS; SUBCUTANEOUS at 21:59

## 2021-10-22 RX ADMIN — Medication 100 MILLIGRAM(S): at 07:38

## 2021-10-22 RX ADMIN — Medication 650 MILLIGRAM(S): at 19:00

## 2021-10-22 RX ADMIN — HEPARIN SODIUM 5000 UNIT(S): 5000 INJECTION INTRAVENOUS; SUBCUTANEOUS at 14:26

## 2021-10-22 RX ADMIN — POLYETHYLENE GLYCOL 3350 17 GRAM(S): 17 POWDER, FOR SOLUTION ORAL at 11:29

## 2021-10-22 RX ADMIN — INSULIN HUMAN 10 UNIT(S): 100 INJECTION, SOLUTION SUBCUTANEOUS at 11:08

## 2021-10-22 RX ADMIN — Medication 125 MILLILITER(S): at 14:23

## 2021-10-22 RX ADMIN — Medication 650 MILLIGRAM(S): at 11:28

## 2021-10-22 RX ADMIN — Medication 5 MILLIGRAM(S): at 14:26

## 2021-10-22 RX ADMIN — ATORVASTATIN CALCIUM 80 MILLIGRAM(S): 80 TABLET, FILM COATED ORAL at 21:58

## 2021-10-22 NOTE — PROGRESS NOTE ADULT - SUBJECTIVE AND OBJECTIVE BOX
TOM PLEITEZ  MRN-[id number]  Patient is a 60y old  Male who presents with a chief complaint of NSTEMI (21 Oct 2021 20:04)      HPI:  60 yr old M w/ hx of DM2, HTN and HLD presents as transfer from [facility] for chest pain concerning for NSTEMI and possible CHF.  Pt states that after he woke up this morning he felt midsternal chest pain this morning, that felt like a burning sensation. Pain was non radiating. Associated with shortness of breath. He initially took TUMS and drank some milk which alleviated some of the pain but not completely. Later he also started feeling very dizzy so he went to the ED. Denies associated palpitations, diaphoresis, N/V.  When he presented to [facility] he was noted to be SOB. Initially, they evaluated patient for CHF but Troponin and BNP levels were elevated so he was transferred here for NSTEMI and r/o CHF.    Per nursing notes, while at [facility], he was placed on BIPAP, given Solumedrol 120, Nitro SL x1, duoneb x2, ASA 81mg, Lasix 40mg w/ 200 cc output, and started at 10mL/hr Nitro drip then increased to 30mL/hr. There, initial /126 retake was 171/67. Nitro drip was d/c'd upon arrival to [facility] ED.     During my visit, patient was sitting up eating a sandwich. Appears comfortable on room air. Denies repeat of chest pain after this morning. Denies shortness of breath. Denies lower extremity edema, orthopnea or PND.     Labs also remarkable for JOSHUA, metabolic acidosis and hyperkalemia. Patient denies prior history of renal injury.       Wife can be reached at [phone]. Medication list confirmed w/ wife. Of note, patient w/ elevated BP to SBP 200s often at home; nifedipine used on a "as needed" basis for SBP > 200.  (06 Oct 2021 01:09)      Surgery/Hospital Course:  10/21 CABG and MVR    Today:  No acute events     ICU Vital Signs Last 24 Hrs  T(C): 37.4 (22 Oct 2021 04:00), Max: 37.4 (22 Oct 2021 04:00)  T(F): 99.3 (22 Oct 2021 04:00), Max: 99.3 (22 Oct 2021 04:00)  HR: 80 (22 Oct 2021 07:00) (80 - 83)  BP: --  BP(mean): --  ABP: 127/48 (22 Oct 2021 07:00) (95/48 - 178/78)  ABP(mean): 64 (22 Oct 2021 07:00) (61 - 102)  RR: 17 (22 Oct 2021 07:00) (11 - 25)  SpO2: 98% (22 Oct 2021 07:00) (97% - 100%)      Physical Exam:  Gen:  awake   CNS: nonfocal	  Neck: + ENT midline, no JVD  RES : clear , no wheezing    Chest:   + chest tubes                     CVS: Regular  rhythm. Normal S1/S2  Abd: Soft, non-distended. Bowel sounds present.  Skin: No rash.  Ext:  no edema, A Line      ============================I/O===========================   I&O's Detail    21 Oct 2021 07:01  -  22 Oct 2021 07:00  --------------------------------------------------------  IN:    Albumin 5%  - 250 mL: 750 mL    Dexmedetomidine: 266.8 mL    DOBUTamine: 108 mL    DOBUTamine: 20.4 mL    DOBUTamine: 12 mL    EPINEPHrine: 24 mL    EPINEPHrine: 30 mL    EPINEPHrine: 36 mL    Insulin: 24 mL    IV PiggyBack: 600 mL    Milrinone: 4.8 mL    Milrinone: 14.4 mL    NiCARdipine: 10 mL    Norepinephrine: 20 mL    Norepinephrine: 40 mL    Oral Fluid: 60 mL    Other (mL): 400 mL    sodium chloride 0.9%: 140 mL  Total IN: 2560.4 mL    OUT:    Bulb (mL): 84 mL    Chest Tube (mL): 30 mL    Chest Tube (mL): 260 mL    Chest Tube (mL): 350 mL    Indwelling Catheter - Urethral (mL): 310 mL    Other (mL): 250 mL    Vasopressin: 0 mL  Total OUT: 1284 mL    Total NET: 1276.4 mL        ============================ LABS =========================                        8.9    11.48 )-----------( 111      ( 22 Oct 2021 03:38 )             25.8     10-22    139  |  104  |  27<H>  ----------------------------<  128<H>  4.4   |  22  |  2.73<H>    Ca    7.8<L>      22 Oct 2021 03:38  Phos  3.1     10-22  Mg     3.0     10-22    TPro  4.5<L>  /  Alb  2.7<L>  /  TBili  0.4  /  DBili  x   /  AST  71<H>  /  ALT  14  /  AlkPhos  46  10-22    LIVER FUNCTIONS - ( 22 Oct 2021 03:38 )  Alb: 2.7 g/dL / Pro: 4.5 g/dL / ALK PHOS: 46 U/L / ALT: 14 U/L / AST: 71 U/L / GGT: x           PT/INR - ( 21 Oct 2021 22:47 )   PT: 13.6 sec;   INR: 1.14 ratio         PTT - ( 21 Oct 2021 22:47 )  PTT:49.6 sec  ABG - ( 22 Oct 2021 05:22 )  pH, Arterial: 7.39  pH, Blood: x     /  pCO2: 38    /  pO2: 110   / HCO3: 23    / Base Excess: -1.7  /  SaO2: 99.1                ======================Micro/Rad/Cardio=================  CXR: Reviewed  Echo:Reviewed  ======================================================  PAST MEDICAL & SURGICAL HISTORY:  Hypertension    Hyperlipidemia    Diabetes mellitus    No pertinent past surgical history      ====================ASSESSMENT ==============  CAD, mitral valve regurgitation S/P CABG, MVR on 10/21/2021  Hypertension  DMT2  Acute Blood Loss Anemia S/P 4u PRBC, 1 PLT, and 1000 FEIBA  Leukocytosis   Elevated BUN/Cr  Hypercalcemia  hyperkalemia   Hypovolemic shock  Post op respiratory insufficiency       Plan:  ====================== NEUROLOGY=====================  Continue close monitoring of neuro status   Tylenol, Meperidine, PRN Oxycodone, and PRN Dilaudid for pain management.     acetaminophen   Tablet .. 650 milliGRAM(s) Oral every 6 hours  HYDROmorphone  Injectable 0.5 milliGRAM(s) IV Push every 6 hours PRN Severe Pain (7 - 10)  meperidine     Injectable 25 milliGRAM(s) IV Push once  oxyCODONE    IR 5 milliGRAM(s) Oral every 4 hours PRN Moderate Pain (4 - 6)  oxyCODONE    IR 10 milliGRAM(s) Oral every 4 hours PRN Severe Pain (7 - 10)    ==================== RESPIRATORY======================  Supplemental O2 via CPAP  Encourage incentive spirometry, continue pulse ox monitoring, follow ABGs     Mechanical Ventilation:  Mode: CPAP with PS  FiO2: 40  PEEP: 5  PS: 5  MAP: 7  PIP: 11      ====================CARDIOVASCULAR==================  CAD, mitral valve regurgitation S/P CABG, MVR on 10/21/2021  Invasive hemodynamic monitoring, assess perfusion indices.   Continue with ASA/lipator for graft patency.   Continue with IV Dobutamine and IV Primacor and IV Epinephrine for inotropic support   Pressor support with IV vasopressin     DOBUTamine Infusion 5 MICROgram(s)/kG/Min (12 mL/Hr) IV Continuous <Continuous>  EPINEPHrine    Infusion 0.04 MICROgram(s)/kG/Min (12 mL/Hr) IV Continuous <Continuous>  norepinephrine Infusion 0.01 MICROgram(s)/kG/Min (1.5 mL/Hr) IV Continuous <Continuous>  aspirin enteric coated 325 milliGRAM(s) Oral daily  atorvastatin 80 milliGRAM(s) Oral at bedtime  vasopressin Infusion 0.033 Unit(s)/Min (2 mL/Hr) IV Continuous <Continuous>    ===================HEMATOLOGIC/ONC ===================  Thrombocytopenia and  acute blood loss anemia  S/P 4u PRBC, 1 PLT, and 1000 FEIBA.  Monitor H/H and PLTs.    ===================== RENAL =========================  Elevated BUN/Cr  Hypercalcemia  hyperkalemia   Continue to monitor I/Os, BUN/Creatinine, and urine output.   Goal net negative fluid balance. Replete lytes PRN. Keep K> 4 and Mg >2.      ==================== GASTROINTESTINAL===================  Tolerating PO consistent carb diet.   Reglan for gut motility   Bowel regimen with Miralax and Senna     ascorbic acid 500 milliGRAM(s) Oral two times a day  GI prophylaxis, pantoprazole    Tablet 40 milliGRAM(s) Oral before breakfast  polyethylene glycol 3350 17 Gram(s) Oral daily  senna 2 Tablet(s) Oral at bedtime  sodium chloride 0.9%. 1000 milliLiter(s) (10 mL/Hr) IV Continuous <Continuous>  metoclopramide Injectable 5 milliGRAM(s) IV Push every 8 hours    =======================    ENDOCRINE  =====================  History of Type 2 Diabetes Mellitus, continue insulin with admelog sliding scale     dextrose 50% Injectable 50 milliLiter(s) IV Push every 15 minutes  dextrose 50% Injectable 25 milliLiter(s) IV Push every 15 minutes  insulin lispro (ADMELOG) corrective regimen sliding scale   SubCutaneous three times a day before meals  insulin lispro (ADMELOG) corrective regimen sliding scale   SubCutaneous at bedtime    ========================INFECTIOUS DISEASE================  Temp 99.3F, WBC downtrending 14.25->11.48   Continue trending WBC and monitoring fever curve   Perioperative coverage with Cefuroxime     cefuroxime  IVPB 1500 milliGRAM(s) IV Intermittent every 24 hours          I have spent 35 minutes providing acute care for this critically ill patient     Patient requires continuous monitoring with bedside rhythm monitoring, pulse ox monitoring, and intermittent blood gas analysis. Care plan discussed with ICU care team. Patient remained critical and at risk for life threatening decompensation.     By signing my name below, I, Verena Chau, attest that this documentation has been prepared under the direction and in the presence of Geovanni Stone MD   Electronically signed: Jm Goodrich, 10-22-21 @ 07:04    I, Geovanni Stone, personally performed the services described in this documentation. all medical record entries made by the scribe were at my direction and in my presence. I have reviewed the chart and agree that the record reflects my personal performance and is accurate and complete  Electronically signed: Geovanni Stone MD        TOM PLEITEZ  MRN-[id number]  Patient is a 60y old  Male who presents with a chief complaint of NSTEMI (21 Oct 2021 20:04)      HPI:  60 yr old M w/ hx of DM2, HTN and HLD presents as transfer from [facility] for chest pain concerning for NSTEMI and possible CHF.  Pt states that after he woke up this morning he felt midsternal chest pain this morning, that felt like a burning sensation. Pain was non radiating. Associated with shortness of breath. He initially took TUMS and drank some milk which alleviated some of the pain but not completely. Later he also started feeling very dizzy so he went to the ED. Denies associated palpitations, diaphoresis, N/V.  When he presented to [facility] he was noted to be SOB. Initially, they evaluated patient for CHF but Troponin and BNP levels were elevated so he was transferred here for NSTEMI and r/o CHF.    Per nursing notes, while at [facility], he was placed on BIPAP, given Solumedrol 120, Nitro SL x1, duoneb x2, ASA 81mg, Lasix 40mg w/ 200 cc output, and started at 10mL/hr Nitro drip then increased to 30mL/hr. There, initial /126 retake was 171/67. Nitro drip was d/c'd upon arrival to [facility] ED.     During my visit, patient was sitting up eating a sandwich. Appears comfortable on room air. Denies repeat of chest pain after this morning. Denies shortness of breath. Denies lower extremity edema, orthopnea or PND.     Labs also remarkable for JOSHUA, metabolic acidosis and hyperkalemia. Patient denies prior history of renal injury.       Wife can be reached at [phone]. Medication list confirmed w/ wife. Of note, patient w/ elevated BP to SBP 200s often at home; nifedipine used on a "as needed" basis for SBP > 200.  (06 Oct 2021 01:09)      Surgery/Hospital Course:  10/21 CABG and MVR    Today:  - Continue with dual pressor treatments   - Patient is OOBTC   - Continue to wean inotrope in setting of cardiogenic shock     ICU Vital Signs Last 24 Hrs  T(C): 37.4 (22 Oct 2021 04:00), Max: 37.4 (22 Oct 2021 04:00)  T(F): 99.3 (22 Oct 2021 04:00), Max: 99.3 (22 Oct 2021 04:00)  HR: 80 (22 Oct 2021 07:00) (80 - 83)  BP: --  BP(mean): --  ABP: 127/48 (22 Oct 2021 07:00) (95/48 - 178/78)  ABP(mean): 64 (22 Oct 2021 07:00) (61 - 102)  RR: 17 (22 Oct 2021 07:00) (11 - 25)  SpO2: 98% (22 Oct 2021 07:00) (97% - 100%)      Physical Exam:  Gen:  awake   CNS: nonfocal	  Neck: + ENT midline, no JVD  RES : clear , no wheezing    Chest:   + chest tubes                     CVS: Regular  rhythm. Normal S1/S2  Abd: Soft, non-distended. Bowel sounds present.  Skin: No rash.  Ext:  no edema, A Line      ============================I/O===========================   I&O's Detail    21 Oct 2021 07:01  -  22 Oct 2021 07:00  --------------------------------------------------------  IN:    Albumin 5%  - 250 mL: 750 mL    Dexmedetomidine: 266.8 mL    DOBUTamine: 108 mL    DOBUTamine: 20.4 mL    DOBUTamine: 12 mL    EPINEPHrine: 24 mL    EPINEPHrine: 30 mL    EPINEPHrine: 36 mL    Insulin: 24 mL    IV PiggyBack: 600 mL    Milrinone: 4.8 mL    Milrinone: 14.4 mL    NiCARdipine: 10 mL    Norepinephrine: 20 mL    Norepinephrine: 40 mL    Oral Fluid: 60 mL    Other (mL): 400 mL    sodium chloride 0.9%: 140 mL  Total IN: 2560.4 mL    OUT:    Bulb (mL): 84 mL    Chest Tube (mL): 30 mL    Chest Tube (mL): 260 mL    Chest Tube (mL): 350 mL    Indwelling Catheter - Urethral (mL): 310 mL    Other (mL): 250 mL    Vasopressin: 0 mL  Total OUT: 1284 mL    Total NET: 1276.4 mL        ============================ LABS =========================                        8.9    11.48 )-----------( 111      ( 22 Oct 2021 03:38 )             25.8     10-22    139  |  104  |  27<H>  ----------------------------<  128<H>  4.4   |  22  |  2.73<H>    Ca    7.8<L>      22 Oct 2021 03:38  Phos  3.1     10-22  Mg     3.0     10-22    TPro  4.5<L>  /  Alb  2.7<L>  /  TBili  0.4  /  DBili  x   /  AST  71<H>  /  ALT  14  /  AlkPhos  46  10-22    LIVER FUNCTIONS - ( 22 Oct 2021 03:38 )  Alb: 2.7 g/dL / Pro: 4.5 g/dL / ALK PHOS: 46 U/L / ALT: 14 U/L / AST: 71 U/L / GGT: x           PT/INR - ( 21 Oct 2021 22:47 )   PT: 13.6 sec;   INR: 1.14 ratio         PTT - ( 21 Oct 2021 22:47 )  PTT:49.6 sec  ABG - ( 22 Oct 2021 05:22 )  pH, Arterial: 7.39  pH, Blood: x     /  pCO2: 38    /  pO2: 110   / HCO3: 23    / Base Excess: -1.7  /  SaO2: 99.1                ======================Micro/Rad/Cardio=================  CXR: Reviewed  Echo:Reviewed  ======================================================  PAST MEDICAL & SURGICAL HISTORY:  Hypertension    Hyperlipidemia    Diabetes mellitus    No pertinent past surgical history      ====================ASSESSMENT ==============  CAD, mitral valve regurgitation S/P CABG, MVR on 10/21/2021  Hypertension  DMT2  Acute Blood Loss Anemia S/P 4u PRBC, 1 PLT, and 1000 FEIBA  Leukocytosis   Elevated BUN/Cr  Hypercalcemia  hyperkalemia   Hypovolemic shock  Post op respiratory insufficiency       Plan:  ====================== NEUROLOGY=====================  Continue close monitoring of neuro status   Tylenol, Meperidine, PRN Oxycodone, and PRN Dilaudid for pain management.     acetaminophen   Tablet .. 650 milliGRAM(s) Oral every 6 hours  HYDROmorphone  Injectable 0.5 milliGRAM(s) IV Push every 6 hours PRN Severe Pain (7 - 10)  meperidine     Injectable 25 milliGRAM(s) IV Push once  oxyCODONE    IR 5 milliGRAM(s) Oral every 4 hours PRN Moderate Pain (4 - 6)  oxyCODONE    IR 10 milliGRAM(s) Oral every 4 hours PRN Severe Pain (7 - 10)    ==================== RESPIRATORY======================  Supplemental O2 via CPAP  Encourage incentive spirometry, continue pulse ox monitoring, follow ABGs     Mechanical Ventilation:  Mode: CPAP with PS  FiO2: 40  PEEP: 5  PS: 5  MAP: 7  PIP: 11      ====================CARDIOVASCULAR==================  CAD, mitral valve regurgitation S/P CABG, MVR on 10/21/2021  Invasive hemodynamic monitoring, assess perfusion indices.   Continue with ASA/lipator for graft patency   Continue with IV Dobutamine and IV Primacor and IV Epinephrine for inotropic support, Continue to wean inotrope in setting of cardiogenic shock   Pressor support with IV vasopressin     DOBUTamine Infusion 5 MICROgram(s)/kG/Min (12 mL/Hr) IV Continuous <Continuous>  EPINEPHrine    Infusion 0.04 MICROgram(s)/kG/Min (12 mL/Hr) IV Continuous <Continuous>  norepinephrine Infusion 0.01 MICROgram(s)/kG/Min (1.5 mL/Hr) IV Continuous <Continuous>  aspirin enteric coated 325 milliGRAM(s) Oral daily  atorvastatin 80 milliGRAM(s) Oral at bedtime  vasopressin Infusion 0.033 Unit(s)/Min (2 mL/Hr) IV Continuous <Continuous>    ===================HEMATOLOGIC/ONC ===================  Thrombocytopenia and  acute blood loss anemia  S/P 4u PRBC, 1 PLT, and 1000 FEIBA.  Monitor H/H and PLTs.    ===================== RENAL =========================  Elevated BUN/Cr  Hypercalcemia  hyperkalemia   Continue to monitor I/Os, BUN/Creatinine, and urine output.   Goal net negative fluid balance. Replete lytes PRN. Keep K> 4 and Mg >2.      ==================== GASTROINTESTINAL===================  Tolerating PO consistent carb diet.   Reglan for gut motility   Bowel regimen with Miralax and Senna     ascorbic acid 500 milliGRAM(s) Oral two times a day  GI prophylaxis, pantoprazole    Tablet 40 milliGRAM(s) Oral before breakfast  polyethylene glycol 3350 17 Gram(s) Oral daily  senna 2 Tablet(s) Oral at bedtime  sodium chloride 0.9%. 1000 milliLiter(s) (10 mL/Hr) IV Continuous <Continuous>  metoclopramide Injectable 5 milliGRAM(s) IV Push every 8 hours    =======================    ENDOCRINE  =====================  History of Type 2 Diabetes Mellitus, continue insulin with admelog sliding scale     dextrose 50% Injectable 50 milliLiter(s) IV Push every 15 minutes  dextrose 50% Injectable 25 milliLiter(s) IV Push every 15 minutes  insulin lispro (ADMELOG) corrective regimen sliding scale   SubCutaneous three times a day before meals  insulin lispro (ADMELOG) corrective regimen sliding scale   SubCutaneous at bedtime    ========================INFECTIOUS DISEASE================  Temp 99.3F, WBC downtrending 14.25->11.48   Continue trending WBC and monitoring fever curve   Perioperative coverage with Cefuroxime     cefuroxime  IVPB 1500 milliGRAM(s) IV Intermittent every 24 hours          I have spent 35 minutes providing acute care for this critically ill patient     Patient requires continuous monitoring with bedside rhythm monitoring, pulse ox monitoring, and intermittent blood gas analysis. Care plan discussed with ICU care team. Patient remained critical and at risk for life threatening decompensation.     By signing my name below, I, Verena Chau, attest that this documentation has been prepared under the direction and in the presence of Geovanni Stone MD   Electronically signed: Jm Goodrich, 10-22-21 @ 07:04    I, Geovanni Stone, personally performed the services described in this documentation. all medical record entries made by the scribe were at my direction and in my presence. I have reviewed the chart and agree that the record reflects my personal performance and is accurate and complete  Electronically signed: Geovanni Stone MD        TOM PLEITEZ  MRN-[id number]  Patient is a 60y old  Male who presents with a chief complaint of NSTEMI (21 Oct 2021 20:04)      HPI:  60 yr old M w/ hx of DM2, HTN and HLD presents as transfer from [facility] for chest pain concerning for NSTEMI and possible CHF.  Pt states that after he woke up this morning he felt midsternal chest pain this morning, that felt like a burning sensation. Pain was non radiating. Associated with shortness of breath. He initially took TUMS and drank some milk which alleviated some of the pain but not completely. Later he also started feeling very dizzy so he went to the ED. Denies associated palpitations, diaphoresis, N/V.  When he presented to [facility] he was noted to be SOB. Initially, they evaluated patient for CHF but Troponin and BNP levels were elevated so he was transferred here for NSTEMI and r/o CHF.    Per nursing notes, while at [facility], he was placed on BIPAP, given Solumedrol 120, Nitro SL x1, duoneb x2, ASA 81mg, Lasix 40mg w/ 200 cc output, and started at 10mL/hr Nitro drip then increased to 30mL/hr. There, initial /126 retake was 171/67. Nitro drip was d/c'd upon arrival to [facility] ED.     During my visit, patient was sitting up eating a sandwich. Appears comfortable on room air. Denies repeat of chest pain after this morning. Denies shortness of breath. Denies lower extremity edema, orthopnea or PND.     Labs also remarkable for JOSHUA, metabolic acidosis and hyperkalemia. Patient denies prior history of renal injury.       Wife can be reached at [phone]. Medication list confirmed w/ wife. Of note, patient w/ elevated BP to SBP 200s often at home; nifedipine used on a "as needed" basis for SBP > 200.  (06 Oct 2021 01:09)      Surgery/Hospital Course:  10/21 CABG and MVR    Today:  - Continue with dual pressor treatments   - Patient is OOBTC   - Continue to wean inotrope     ICU Vital Signs Last 24 Hrs  T(C): 37.4 (22 Oct 2021 04:00), Max: 37.4 (22 Oct 2021 04:00)  T(F): 99.3 (22 Oct 2021 04:00), Max: 99.3 (22 Oct 2021 04:00)  HR: 80 (22 Oct 2021 07:00) (80 - 83)  BP: --  BP(mean): --  ABP: 127/48 (22 Oct 2021 07:00) (95/48 - 178/78)  ABP(mean): 64 (22 Oct 2021 07:00) (61 - 102)  RR: 17 (22 Oct 2021 07:00) (11 - 25)  SpO2: 98% (22 Oct 2021 07:00) (97% - 100%)      Physical Exam:  Gen:  awake   CNS: nonfocal	  Neck: + ENT midline, no JVD  RES : clear , no wheezing    Chest:   + chest tubes                     CVS: Regular  rhythm. Normal S1/S2  Abd: Soft, non-distended. Bowel sounds present.  Skin: No rash.  Ext:  no edema, A Line      ============================I/O===========================   I&O's Detail    21 Oct 2021 07:01  -  22 Oct 2021 07:00  --------------------------------------------------------  IN:    Albumin 5%  - 250 mL: 750 mL    Dexmedetomidine: 266.8 mL    DOBUTamine: 108 mL    DOBUTamine: 20.4 mL    DOBUTamine: 12 mL    EPINEPHrine: 24 mL    EPINEPHrine: 30 mL    EPINEPHrine: 36 mL    Insulin: 24 mL    IV PiggyBack: 600 mL    Milrinone: 4.8 mL    Milrinone: 14.4 mL    NiCARdipine: 10 mL    Norepinephrine: 20 mL    Norepinephrine: 40 mL    Oral Fluid: 60 mL    Other (mL): 400 mL    sodium chloride 0.9%: 140 mL  Total IN: 2560.4 mL    OUT:    Bulb (mL): 84 mL    Chest Tube (mL): 30 mL    Chest Tube (mL): 260 mL    Chest Tube (mL): 350 mL    Indwelling Catheter - Urethral (mL): 310 mL    Other (mL): 250 mL    Vasopressin: 0 mL  Total OUT: 1284 mL    Total NET: 1276.4 mL        ============================ LABS =========================                        8.9    11.48 )-----------( 111      ( 22 Oct 2021 03:38 )             25.8     10-22    139  |  104  |  27<H>  ----------------------------<  128<H>  4.4   |  22  |  2.73<H>    Ca    7.8<L>      22 Oct 2021 03:38  Phos  3.1     10-22  Mg     3.0     10-22    TPro  4.5<L>  /  Alb  2.7<L>  /  TBili  0.4  /  DBili  x   /  AST  71<H>  /  ALT  14  /  AlkPhos  46  10-22    LIVER FUNCTIONS - ( 22 Oct 2021 03:38 )  Alb: 2.7 g/dL / Pro: 4.5 g/dL / ALK PHOS: 46 U/L / ALT: 14 U/L / AST: 71 U/L / GGT: x           PT/INR - ( 21 Oct 2021 22:47 )   PT: 13.6 sec;   INR: 1.14 ratio         PTT - ( 21 Oct 2021 22:47 )  PTT:49.6 sec  ABG - ( 22 Oct 2021 05:22 )  pH, Arterial: 7.39  pH, Blood: x     /  pCO2: 38    /  pO2: 110   / HCO3: 23    / Base Excess: -1.7  /  SaO2: 99.1                ======================Micro/Rad/Cardio=================  CXR: Reviewed  Echo:Reviewed  ======================================================  PAST MEDICAL & SURGICAL HISTORY:  Hypertension    Hyperlipidemia    Diabetes mellitus    No pertinent past surgical history      ====================ASSESSMENT ==============  CAD, mitral valve regurgitation S/P CABG, MVR on 10/21/2021  Hypertension  DMT2  Acute Blood Loss Anemia S/P 4u PRBC, 1 PLT, and 1000 FEIBA  Leukocytosis   Elevated BUN/Cr  Hypercalcemia  hyperkalemia   Hypovolemic shock  Post op respiratory insufficiency       Plan:  ====================== NEUROLOGY=====================  Continue close monitoring of neuro status   Tylenol, Meperidine, PRN Oxycodone, and PRN Dilaudid for pain management.     acetaminophen   Tablet .. 650 milliGRAM(s) Oral every 6 hours  HYDROmorphone  Injectable 0.5 milliGRAM(s) IV Push every 6 hours PRN Severe Pain (7 - 10)  meperidine     Injectable 25 milliGRAM(s) IV Push once  oxyCODONE    IR 5 milliGRAM(s) Oral every 4 hours PRN Moderate Pain (4 - 6)  oxyCODONE    IR 10 milliGRAM(s) Oral every 4 hours PRN Severe Pain (7 - 10)    ==================== RESPIRATORY======================  Supplemental O2 via CPAP  Encourage incentive spirometry, continue pulse ox monitoring, follow ABGs     Mechanical Ventilation:  Mode: CPAP with PS  FiO2: 40  PEEP: 5  PS: 5  MAP: 7  PIP: 11      ====================CARDIOVASCULAR==================  CAD, mitral valve regurgitation S/P CABG, MVR on 10/21/2021  Invasive hemodynamic monitoring, assess perfusion indices.   Continue with ASA/lipator for graft patency   Continue with IV Dobutamine and IV Primacor and IV Epinephrine for inotropic support, Continue to wean inotrope   Pressor support with IV vasopressin     DOBUTamine Infusion 5 MICROgram(s)/kG/Min (12 mL/Hr) IV Continuous <Continuous>  EPINEPHrine    Infusion 0.04 MICROgram(s)/kG/Min (12 mL/Hr) IV Continuous <Continuous>  norepinephrine Infusion 0.01 MICROgram(s)/kG/Min (1.5 mL/Hr) IV Continuous <Continuous>  aspirin enteric coated 325 milliGRAM(s) Oral daily  atorvastatin 80 milliGRAM(s) Oral at bedtime  vasopressin Infusion 0.033 Unit(s)/Min (2 mL/Hr) IV Continuous <Continuous>    ===================HEMATOLOGIC/ONC ===================  Thrombocytopenia and  acute blood loss anemia  S/P 4u PRBC, 1 PLT, and 1000 FEIBA.  Monitor H/H and PLTs.    ===================== RENAL =========================  Elevated BUN/Cr  Hypercalcemia  hyperkalemia   Continue to monitor I/Os, BUN/Creatinine, and urine output.   Goal net negative fluid balance. Replete lytes PRN. Keep K> 4 and Mg >2.      ==================== GASTROINTESTINAL===================  Tolerating PO consistent carb diet.   Reglan for gut motility   Bowel regimen with Miralax and Senna     ascorbic acid 500 milliGRAM(s) Oral two times a day  GI prophylaxis, pantoprazole    Tablet 40 milliGRAM(s) Oral before breakfast  polyethylene glycol 3350 17 Gram(s) Oral daily  senna 2 Tablet(s) Oral at bedtime  sodium chloride 0.9%. 1000 milliLiter(s) (10 mL/Hr) IV Continuous <Continuous>  metoclopramide Injectable 5 milliGRAM(s) IV Push every 8 hours    =======================    ENDOCRINE  =====================  History of Type 2 Diabetes Mellitus, continue insulin with admelog sliding scale     dextrose 50% Injectable 50 milliLiter(s) IV Push every 15 minutes  dextrose 50% Injectable 25 milliLiter(s) IV Push every 15 minutes  insulin lispro (ADMELOG) corrective regimen sliding scale   SubCutaneous three times a day before meals  insulin lispro (ADMELOG) corrective regimen sliding scale   SubCutaneous at bedtime    ========================INFECTIOUS DISEASE================  Temp 99.3F, WBC downtrending 14.25->11.48   Continue trending WBC and monitoring fever curve   Perioperative coverage with Cefuroxime     cefuroxime  IVPB 1500 milliGRAM(s) IV Intermittent every 24 hours          I have spent 35 minutes providing acute care for this critically ill patient     Patient requires continuous monitoring with bedside rhythm monitoring, pulse ox monitoring, and intermittent blood gas analysis. Care plan discussed with ICU care team. Patient remained critical and at risk for life threatening decompensation.     By signing my name below, I, Verena Chau, attest that this documentation has been prepared under the direction and in the presence of Geovanni Stone MD   Electronically signed: Jm Goodrich, 10-22-21 @ 07:04    I, Geovanni Stone, personally performed the services described in this documentation. all medical record entries made by the sarojibbrittni were at my direction and in my presence. I have reviewed the chart and agree that the record reflects my personal performance and is accurate and complete  Electronically signed: Geovanni Stone MD

## 2021-10-22 NOTE — PROGRESS NOTE ADULT - SUBJECTIVE AND OBJECTIVE BOX
[facility] DIVISION OF KIDNEY DISEASES AND HYPERTENSION -- FOLLOW UP NOTE  --------------------------------------------------------------------------------  Chief Complaint: new start HD; JOSHUA on CKD now likely ESRD     24 hour events/subjective:    seen and examined this morning   patient reports feeling well and denied any acute complaints   no issues noted overnight  now in CTU following CABG on 10/21      PAST HISTORY  --------------------------------------------------------------------------------  No significant changes to PMH, PSH, FHx, SHx, unless otherwise noted    ALLERGIES & MEDICATIONS  --------------------------------------------------------------------------------  Allergies    No Known Allergies    Intolerances      Standing Inpatient Medications  acetaminophen   Tablet .. 650 milliGRAM(s) Oral every 6 hours  ascorbic acid 500 milliGRAM(s) Oral two times a day  aspirin enteric coated 325 milliGRAM(s) Oral daily  atorvastatin 80 milliGRAM(s) Oral at bedtime  cefuroxime  IVPB 1500 milliGRAM(s) IV Intermittent every 24 hours  chlorhexidine 2% Cloths 1 Application(s) Topical daily  dextrose 50% Injectable 50 milliLiter(s) IV Push every 15 minutes  dextrose 50% Injectable 25 milliLiter(s) IV Push every 15 minutes  DOBUTamine Infusion 5 MICROgram(s)/kG/Min IV Continuous <Continuous>  EPINEPHrine    Infusion 0.04 MICROgram(s)/kG/Min IV Continuous <Continuous>  insulin lispro (ADMELOG) corrective regimen sliding scale   SubCutaneous three times a day before meals  insulin lispro (ADMELOG) corrective regimen sliding scale   SubCutaneous at bedtime  meperidine     Injectable 25 milliGRAM(s) IV Push once  metoclopramide Injectable 5 milliGRAM(s) IV Push every 8 hours  norepinephrine Infusion 0.01 MICROgram(s)/kG/Min IV Continuous <Continuous>  pantoprazole    Tablet 40 milliGRAM(s) Oral before breakfast  polyethylene glycol 3350 17 Gram(s) Oral daily  polyethylene glycol 3350 17 Gram(s) Oral daily  senna 2 Tablet(s) Oral at bedtime  sodium chloride 0.9%. 1000 milliLiter(s) IV Continuous <Continuous>  vasopressin Infusion 0.033 Unit(s)/Min IV Continuous <Continuous>    PRN Inpatient Medications  HYDROmorphone  Injectable 0.5 milliGRAM(s) IV Push every 6 hours PRN  oxyCODONE    IR 5 milliGRAM(s) Oral every 4 hours PRN  oxyCODONE    IR 10 milliGRAM(s) Oral every 4 hours PRN      REVIEW OF SYSTEMS    All other systems were reviewed and are negative, except as noted.    VITALS/PHYSICAL EXAM  --------------------------------------------------------------------------------  T(C): 37.4 (10-22-21 @ 04:00), Max: 37.4 (10-22-21 @ 04:00)  HR: 80 (10-22-21 @ 07:00) (80 - 83)  BP: --  RR: 17 (10-22-21 @ 07:00) (11 - 25)  SpO2: 98% (10-22-21 @ 07:00) (97% - 100%)  Wt(kg): --  Height (cm): 165.1 (10-21-21 @ 10:07)  Weight (kg): 80.2 (10-21-21 @ 10:07)  BMI (kg/m2): 29.4 (10-21-21 @ 10:07)  BSA (m2): 1.88 (10-21-21 @ 10:07)      10-21-21 @ 07:01  -  10-22-21 @ 07:00  --------------------------------------------------------  IN: 2560.4 mL / OUT: 1284 mL / NET: 1276.4 mL        Physical Exam:  	Gen: NAD  	HEENT: MMM  	Pulm: CTA B/L, midsternal incision with dressing in place   	CV: S1S2  	Abd: Soft, +BS   	Ext: + LE edema B/L  	Neuro: Awake  	Skin: Warm and dry  	Vascular access: R IJ non-tunneled       LABS/STUDIES  --------------------------------------------------------------------------------              8.9    11.48 >-----------<  111      [10-22-21 @ 03:38]              25.8     139  |  104  |  27  ----------------------------<  128      [10-22-21 @ 03:38]  4.4   |  22  |  2.73        Ca     7.8     [10-22-21 @ 03:38]      Mg     3.0     [10-22-21 @ 03:38]      Phos  3.1     [10-22-21 @ 03:38]    TPro  4.5  /  Alb  2.7  /  TBili  0.4  /  DBili  x   /  AST  71  /  ALT  14  /  AlkPhos  46  [10-22-21 @ 03:38]    PT/INR: PT 13.6 , INR 1.14       [10-21-21 @ 22:47]  PTT: 49.6       [10-21-21 @ 22:47]          [10-21-21 @ 17:22]    Creatinine Trend:  SCr 2.73 [10-22 @ 03:38]  SCr 3.15 [10-21 @ 17:22]  SCr 4.67 [10-20 @ 06:13]  SCr 3.70 [10-19 @ 07:20]  SCr 5.04 [10-18 @ 05:54]    Urinalysis - [10-07-21 @ 10:09]      Color Light Yellow / Appearance Clear / SG 1.015 / pH 6.0      Gluc 200 mg/dL / Ketone Negative  / Bili Negative / Urobili Negative       Blood Small / Protein 300 mg/dL / Leuk Est Negative / Nitrite Negative      RBC 2 / WBC 7 / Hyaline 6 / Gran  / Sq Epi  / Non Sq Epi 2 / Bacteria Negative      Iron 47, TIBC 245, %sat 19      [10-13-21 @ 13:10]  Ferritin 780      [10-13-21 @ 08:59]  TSH 6.40      [10-20-21 @ 17:03]    HBsAg Nonreact      [10-13-21 @ 09:15]  HCV 0.23, Nonreact      [10-07-21 @ 14:38]  HIV Nonreact      [10-13-21 @ 09:16]    BEULAH: titer Negative, pattern --      [10-07-21 @ 14:37]  C3 Complement 135      [10-07-21 @ 14:42]  C4 Complement 71      [10-07-21 @ 14:42]  ANCA: cANCA Negative, pANCA Negative, atypical ANCA Negative      [10-07-21 @ 14:37]  Syphilis Screen (Treponema Pallidum Ab) Negative      [10-07-21 @ 14:37]  PLA2R: SHANNAN <1.8, IFA --      [10-13-21 @ 13:55]  Free Light Chains: kappa 10.89, lambda 12.51, ratio = 0.87      [10-07 @ 14:42]  Immunofixation Serum:   No Monoclonal Band Identified    Reference Range: None Detected      [10-07-21 @ 14:42]  SPEP Interpretation: Normal Electrophoresis Pattern      [10-07-21 @ 14:42]

## 2021-10-22 NOTE — PROGRESS NOTE ADULT - ASSESSMENT
59 yo male with DM2, HTN, and HLD who initially presented to [facility] on 10/05 as a transfer from [facility] for NSTEMI and possible CHF. Patient on Heparin drip for NSTEMI. LKW 10:45AM. Patient had episode of bradycardia (HR 30s), then became altered. RRT called. BP during RRT 70s/40s. Code stroke called for L facial droop.   CTH negative for acute pathology, old L frontal cortical infarct seen.   CTA H unremarkable. CTA N shows high-grade stenosis left internal carotid artery at the carotid bifurcation in the neck.  10/06 TTE: EF 45%, moderate-severe MR, mild-moderate L ventricular systolic dysfunction.   A1c 7  MRI no AIS but old strokes  HD cath placement 10/12   CD with severe L ICA and Mod R ICA   s/p LHC 10/14  CABG 10/21  10/22 o/e COTO aaoX 2   Impression: Mild L nasolabial flattening and dysarthria, ?decreased eye closure strength on the L. Possibly secondary to R brain dysfunction due to acute stroke of unknown mechanism vs peripheral etiology. Patient with old L frontal infarct on CTH, also with high-grade stenosis of L ICA at the carotid bifurcation which likely cause of old stroke     Recommendations:  - wean of pressors as tolerated. on vaso//epi.    - ASA 81MG PO QD + Brilinta 90MG PO Q12HR.   - Avoid hypotension.  - High dose statin therapy - atorvastatin 40mg PO daily. LDL goal <70mg/dL.  -  vascular for ICA revascularization can be outpt. not acute   - lipid panel  - telemetry  - PT/OT/SS/SLP, OOBC  - permissive HTN, -180mmHg.  - check FS, glucose control <180  - GI/DVT ppx  - Counseling on diet, exercise, and medication adherence was done  - Counseling on smoking cessation and alcohol consumption offered when appropriate.  - Pain assessed and judicious use of narcotics when appropriate was discussed.    - Stroke education given when appropriate.  - Importance of fall prevention discussed.   - Differential diagnosis and plan of care discussed with patient and/or family and primary team  - Thank you for allowing me to participate in the care of this patient. Call with questions.   Marcelino Patel MD  Vascular Neurology .

## 2021-10-22 NOTE — PROGRESS NOTE ADULT - SUBJECTIVE AND OBJECTIVE BOX
TOM PLEITEZ  MRN-[id number]  Patient is a 60y old  Male who presents with a chief complaint of NSTEMI (22 Oct 2021 13:07)    HPI:  60 yr old M w/ hx of DM2, HTN and HLD presents as transfer from [facility] for chest pain concerning for NSTEMI and possible CHF.  Pt states that after he woke up this morning he felt midsternal chest pain this morning, that felt like a burning sensation. Pain was non radiating. Associated with shortness of breath. He initially took TUMS and drank some milk which alleviated some of the pain but not completely. Later he also started feeling very dizzy so he went to the ED. Denies associated palpitations, diaphoresis, N/V.  When he presented to [facility] he was noted to be SOB. Initially, they evaluated patient for CHF but Troponin and BNP levels were elevated so he was transferred here for NSTEMI and r/o CHF.    Per nursing notes, while at [facility], he was placed on BIPAP, given Solumedrol 120, Nitro SL x1, duoneb x2, ASA 81mg, Lasix 40mg w/ 200 cc output, and started at 10mL/hr Nitro drip then increased to 30mL/hr. There, initial /126 retake was 171/67. Nitro drip was d/c'd upon arrival to [facility] ED.     During my visit, patient was sitting up eating a sandwich. Appears comfortable on room air. Denies repeat of chest pain after this morning. Denies shortness of breath. Denies lower extremity edema, orthopnea or PND.     Labs also remarkable for JOSHUA, metabolic acidosis and hyperkalemia. Patient denies prior history of renal injury.       Wife can be reached at [phone]. Medication list confirmed w/ wife. Of note, patient w/ elevated BP to SBP 200s often at home; nifedipine used on a "as needed" basis for SBP > 200.  (06 Oct 2021 01:09)      Surgery/Hospital course:  10/21 CABG and MVR    Vital Signs Last 24 Hrs  T(C): 35.9 (22 Oct 2021 17:55), Max: 37.9 (22 Oct 2021 08:00)  T(F): 96.6 (22 Oct 2021 17:55), Max: 100.2 (22 Oct 2021 08:00)  HR: 89 (22 Oct 2021 19:30) (80 - 90)  BP: 123/77 (22 Oct 2021 18:30) (123/77 - 134/72)  BP(mean): 89 (22 Oct 2021 18:30) (89 - 98)  RR: 19 (22 Oct 2021 19:30) (12 - 35)  SpO2: 96% (22 Oct 2021 19:30) (92% - 100%)  ============================I/O===========================  I&O's Summary    21 Oct 2021 07:01  -  22 Oct 2021 07:00  --------------------------------------------------------  IN: 2560.4 mL / OUT: 1284 mL / NET: 1276.4 mL    22 Oct 2021 07:01  -  22 Oct 2021 19:48  --------------------------------------------------------  IN: 1060 mL / OUT: 519 mL / NET: 541 mL      ============================ LABS =========================                        8.9    11.48 )-----------( 111      ( 22 Oct 2021 03:38 )             25.8     10-22    139  |  104  |  27<H>  ----------------------------<  128<H>  4.4   |  22  |  2.73<H>    Ca    7.8<L>      22 Oct 2021 03:38  Phos  3.1     10-22  Mg     3.0     10-22    TPro  4.5<L>  /  Alb  2.7<L>  /  TBili  0.4  /  DBili  x   /  AST  71<H>  /  ALT  14  /  AlkPhos  46  10-22    LIVER FUNCTIONS - ( 22 Oct 2021 03:38 )  Alb: 2.7 g/dL / Pro: 4.5 g/dL / ALK PHOS: 46 U/L / ALT: 14 U/L / AST: 71 U/L / GGT: x           PT/INR - ( 21 Oct 2021 22:47 )   PT: 13.6 sec;   INR: 1.14 ratio         PTT - ( 21 Oct 2021 22:47 )  PTT:49.6 sec  ABG - ( 22 Oct 2021 19:03 )  pH, Arterial: 7.41  pH, Blood: x     /  pCO2: 40    /  pO2: 68    / HCO3: 25    / Base Excess: 0.7   /  SaO2: 95.9                ======================Micro/Rad/Cardio=================  CXR: Reviewed   Echo: Reviewed  ======================================================  PAST MEDICAL & SURGICAL HISTORY:  Hypertension    Hyperlipidemia    Diabetes mellitus    No pertinent past surgical history      ========================ASSESSMENT ================  CAD, mitral valve regurgitation S/P CABG, MVR on 10/21/2021  Hypertension  DMT2  Thrombocytopenia  Acute Blood Loss Anemia S/P 4u PRBC, 1 PLT, and 1000 FEIBA  Leukocytosis   End stage renal disease  Elevated BUN/Cr  Hypovolemic shock  Post op respiratory insufficiency     Plan:  ====================== NEUROLOGY=====================  Addressed analgesic regimen to optimize function. Out of bed to chair today, continue ambulation as tolerated.    ==================== RESPIRATORY======================  Respiratory status required supplemental oxygen nasal cannula LPM: 5, close monitoring of breathing pattern and respiratory rate & the following of continuous pulse oximetry for support & to prevent decompensation      Mechanical Ventilation:  Mode: CPAP with PS  FiO2: 40  PEEP: 5  PS: 5  MAP: 7  PIP: 11      ====================CARDIOVASCULAR==================  Patient with history of hypertension, coronary artery disease, and mitral valve regurgitation subsequently underwent coronary artery bypass grafting x2 procedure, mitral valve replacement, and left appendage ligation on 10/21/21. In addition patient is requiring IV Levophed and IV Vasopressin infusions for vasogenic shock. Continue with IV Dobutamine for inotropic management, Continue to wean inotrope. Invasive hemodynamic monitoring with a PA catheter & an A-line were required for the following of serial CI's/MV02's and continuous central venous, pulmonary artery pressure and MAP/BP monitoring to ensure adequate cardiovascular support. ASA continued for graft occlusion-thromboembolism prophylaxis. Lipitor was also continued for long term graft patency. Volume resuscitation ordered for hypovolemic shock. Lactate peaked at 2.7 on 10/6/21 now is 0.9, continue trending and monitor while ensuring adequate perfusion.     ===================HEMATOLOGIC/ONC ===================  Thrombocytopenia and  acute blood loss anemia status post 4 units packed red blood cells, 1 platelet, and 1000 FEIBA. Heparin continued for venous thromboembolism prophylaxis in addition to sequential compression devices. Coumadin dosed for today for INR of 1.14, continue monitoring INR daily. Monitor hemoglobin and hematocrit levels.    ===================== RENAL =========================  Patient with end stage renal disease and elevated BUN/creatinine is receiving hemodialysis. Continue to maintain fluid balance as tolerated. Optimize renal perfusion with adequate volume resuscitation and continued monitoring of urine output, fluid balance, BUN/Creatinine.     ==================== GASTROINTESTINAL===================  Tolerating consistent carb diet. Reglan for gut motility. Continue Protonix for stress ulcer prophylaxis. Bowel regimen with Miralax and Senna.     =======================    ENDOCRINE  =====================  Metabolic stability, history of diabetes mellitus required an IV regular Insulin drip while following serial glucose levels to help achieve and maintain euglycemia.        ========================INFECTIOUS DISEASE================  TMAX: 100.2F with elevated white blood cells at 11.43. Monitor temperature and trend white blood cells. Continue Cefuroxime for perioperative antibiotic coverage.      Patient requires continuous monitoring with bedside rhythm monitoring, pulse oximetry monitoring, pulmonary artery pressures, and continuous central venous and arterial pressure monitoring; and intermittent blood gas analysis.  Care plan discussed with ICU care team.    Patient remained critical, at risk for life threatening decompensation.   I have spent 35 minutes providing acute care with multiple re-evaluations throughout the evening.     By signing my name below, I, Fang Oden , attest that this documentation has been prepared under the direction and in the presence of Mayi Escalante MD   Electronically signed: Jm Parsons, 10-22-21 @ 19:48    I, Mayi Escalante, personally performed the services described in this documentation. All medical record entries made by the scribe were at my direction and in my presence. I have reviewed the chart and agree that the record reflects my personal performance and is accurate and complete  Electronically signed: Mayi Escalante MD 10-22-21 @ 19:48       TOM PLEITEZ  MRN-[id number]  Patient is a 60y old  Male who presents with a chief complaint of NSTEMI (22 Oct 2021 13:07)    HPI:  60 yr old M w/ hx of DM2, HTN and HLD presents as transfer from [facility] for chest pain concerning for NSTEMI and possible CHF.  Pt states that after he woke up this morning he felt midsternal chest pain this morning, that felt like a burning sensation. Pain was non radiating. Associated with shortness of breath. He initially took TUMS and drank some milk which alleviated some of the pain but not completely. Later he also started feeling very dizzy so he went to the ED. Denies associated palpitations, diaphoresis, N/V.  When he presented to [facility] he was noted to be SOB. Initially, they evaluated patient for CHF but Troponin and BNP levels were elevated so he was transferred here for NSTEMI and r/o CHF.    Per nursing notes, while at [facility], he was placed on BIPAP, given Solumedrol 120, Nitro SL x1, duoneb x2, ASA 81mg, Lasix 40mg w/ 200 cc output, and started at 10mL/hr Nitro drip then increased to 30mL/hr. There, initial /126 retake was 171/67. Nitro drip was d/c'd upon arrival to [facility] ED.     During my visit, patient was sitting up eating a sandwich. Appears comfortable on room air. Denies repeat of chest pain after this morning. Denies shortness of breath. Denies lower extremity edema, orthopnea or PND.     Labs also remarkable for JOSHUA, metabolic acidosis and hyperkalemia. Patient denies prior history of renal injury.       Wife can be reached at [phone]. Medication list confirmed w/ wife. Of note, patient w/ elevated BP to SBP 200s often at home; nifedipine used on a "as needed" basis for SBP > 200.  (06 Oct 2021 01:09)      Surgery/Hospital course:  10/07 Code stroke called for left facial droop, CT showed old frontal infarct, found to have high grade left carotid stenosis, but unclear cause for acute neurologic deficit  10/13 initiated HD, patient admitted with a creatinine of 4.05, but did not know of a history of renal disease  10/21 CABG and MVR    Vital Signs Last 24 Hrs  T(C): 35.9 (22 Oct 2021 17:55), Max: 37.9 (22 Oct 2021 08:00)  T(F): 96.6 (22 Oct 2021 17:55), Max: 100.2 (22 Oct 2021 08:00)  HR: 89 (22 Oct 2021 19:30) (80 - 90)  BP: 123/77 (22 Oct 2021 18:30) (123/77 - 134/72)  BP(mean): 89 (22 Oct 2021 18:30) (89 - 98)  RR: 19 (22 Oct 2021 19:30) (12 - 35)  SpO2: 96% (22 Oct 2021 19:30) (92% - 100%)  ============================I/O===========================  I&O's Summary    21 Oct 2021 07:01  -  22 Oct 2021 07:00  --------------------------------------------------------  IN: 2560.4 mL / OUT: 1284 mL / NET: 1276.4 mL    22 Oct 2021 07:01  -  22 Oct 2021 19:48  --------------------------------------------------------  IN: 1060 mL / OUT: 519 mL / NET: 541 mL      ============================ LABS =========================                        8.9    11.48 )-----------( 111      ( 22 Oct 2021 03:38 )             25.8     10-22    139  |  104  |  27<H>  ----------------------------<  128<H>  4.4   |  22  |  2.73<H>    Ca    7.8<L>      22 Oct 2021 03:38  Phos  3.1     10-22  Mg     3.0     10-22    TPro  4.5<L>  /  Alb  2.7<L>  /  TBili  0.4  /  DBili  x   /  AST  71<H>  /  ALT  14  /  AlkPhos  46  10-22    LIVER FUNCTIONS - ( 22 Oct 2021 03:38 )  Alb: 2.7 g/dL / Pro: 4.5 g/dL / ALK PHOS: 46 U/L / ALT: 14 U/L / AST: 71 U/L / GGT: x           PT/INR - ( 21 Oct 2021 22:47 )   PT: 13.6 sec;   INR: 1.14 ratio    PTT - ( 21 Oct 2021 22:47 )  PTT:49.6 sec    ABG - ( 22 Oct 2021 19:03 )  pH, Arterial: 7.41  pH, Blood: x     /  pCO2: 40    /  pO2: 68    / HCO3: 25    / Base Excess: 0.7   /  SaO2: 95.9      ======================Micro/Rad/Cardio=================  CXR: Reviewed   Echo: Reviewed  ======================================================  PAST MEDICAL & SURGICAL HISTORY:  Hypertension    Hyperlipidemia    Diabetes mellitus    No pertinent past surgical history      ========================ASSESSMENT ================  CAD, mitral valve regurgitation S/P CABG, MVR on 10/21/2021  Hypertension  DMT2  Thrombocytopenia  Acute Blood Loss Anemia S/P 4u PRBC, 1 PLT, and 1000 FEIBA  Leukocytosis   End stage renal disease  Elevated BUN/Cr  Hypovolemic shock  Post op respiratory insufficiency     Plan:  ====================== NEUROLOGY=====================  Addressed analgesic regimen to optimize function. History of old stroke, no new focal deficits at present.Out of bed to chair today, continue ambulation as tolerated.    ==================== RESPIRATORY======================  Respiratory status required supplemental oxygen nasal cannula 5 LPM after weaning to extubation, close monitoring of breathing pattern and respiratory rate & the following of continuous pulse oximetry for support & to prevent decompensation.    ====================CARDIOVASCULAR==================  Patient with history of hypertension, admitted with NSTEMI and found to have multivessel coronary artery disease as well as mitral regurgitation, now recovering s/p coronary artery bypass grafting x2 procedure, mitral valve replacement, and left appendage ligation on 10/21/21. Patient has acute systolic heart failure with post operative cardiogenic shock requiring an IV Dobutamine infusion.  IV Epinephrine infusion, IV Levophed and IV Vasopressin infusions weaned to off. Invasive hemodynamic monitoring with a PA catheter & an A-line were required for the following of serial CI's/MV02's and continuous central venous, pulmonary artery pressure and MAP/BP monitoring to ensure adequate cardiovascular support. ASA continued for graft occlusion-thromboembolism prophylaxis. Lipitor was also continued for long term graft patency. Volume resuscitation ordered earlier today for hypotension today.    ===================HEMATOLOGIC/ONC ===================  Thrombocytopenia and  acute blood loss anemia status post 4 units packed red blood cells, 1 platelet, and 1000 FEIBA intraoperatively. Heparin continued for venous thromboembolism prophylaxis in addition to sequential compression devices. Coumadin dosed today for INR of 1.14, continue monitoring INR daily. Monitor hemoglobin and hematocrit levels.    ===================== RENAL =========================  Patient with newly diagnosed end stage renal disease and elevated BUN/creatinine and mild hyperkalemia earlier today, underwent hemodialysis today.  Continued monitoring of electrolytes, pH,  fluid balance, and BUN/Creatinine.     ==================== GASTROINTESTINAL===================  Tolerating consistent carb diet. Reglan for gut motility. Continue Protonix for stress ulcer prophylaxis. Bowel regimen with Miralax and Senna.     =======================    ENDOCRINE  =====================  Metabolic stability, history of diabetes mellitus required an IV regular Insulin drip while following serial glucose levels to help achieve and maintain euglycemia.      ========================INFECTIOUS DISEASE================  TMAX: 100.2F with elevated white blood cells at 11.43. Monitor temperature and trend white blood cells. Continue Cefuroxime for perioperative antibiotic coverage.      Patient requires continuous monitoring with bedside rhythm monitoring, pulse oximetry monitoring, pulmonary artery pressures, and continuous central venous and arterial pressure monitoring; and intermittent blood gas analysis.  Care plan discussed with ICU care team.    Patient remained critical, at risk for life threatening decompensation.   I have spent 40 minutes providing acute care with multiple re-evaluations throughout the evening.     By signing my name below, I, Fang Oden , attest that this documentation has been prepared under the direction and in the presence of Mayi Escalante MD   Electronically signed: Jm Parsons, 10-22-21 @ 19:48    I, Mayi Escalante, personally performed the services described in this documentation. All medical record entries made by the scribe were at my direction and in my presence. I have reviewed the chart and agree that the record reflects my personal performance and is accurate and complete  Electronically signed: Mayi Escalante MD 10-22-21 @ 19:48

## 2021-10-22 NOTE — PROGRESS NOTE ADULT - SUBJECTIVE AND OBJECTIVE BOX
Chief complaint  Patient is a 60y old  Male who presents with a chief complaint of NSTEMI (22 Oct 2021 09:13)   Review of systems  Patient is postop CABG, no hypoglycemic episodes.    Labs and Fingersticks  CAPILLARY BLOOD GLUCOSE      POCT Blood Glucose.: 202 mg/dL (22 Oct 2021 12:20)  POCT Blood Glucose.: 147 mg/dL (22 Oct 2021 07:00)  POCT Blood Glucose.: 146 mg/dL (22 Oct 2021 05:08)  POCT Blood Glucose.: 126 mg/dL (22 Oct 2021 04:18)  POCT Blood Glucose.: 97 mg/dL (22 Oct 2021 02:55)  POCT Blood Glucose.: 104 mg/dL (22 Oct 2021 01:55)  POCT Blood Glucose.: 134 mg/dL (22 Oct 2021 00:55)  POCT Blood Glucose.: 154 mg/dL (22 Oct 2021 00:16)  POCT Blood Glucose.: 177 mg/dL (21 Oct 2021 23:21)  POCT Blood Glucose.: 125 mg/dL (21 Oct 2021 20:53)  POCT Blood Glucose.: 146 mg/dL (21 Oct 2021 20:12)      Anion Gap, Serum: 13 (10-22 @ 03:38)  Anion Gap, Serum: 12 (10-21 @ 17:22)      Calcium, Total Serum: 7.8 *L* (10-22 @ 03:38)  Calcium, Total Serum: 7.2 *L* (10-21 @ 17:22)  Albumin, Serum: 2.7 *L* (10-22 @ 03:38)  Albumin, Serum: 2.1 *L* (10-21 @ 17:22)    Alanine Aminotransferase (ALT/SGPT): 14 (10-22 @ 03:38)  Alanine Aminotransferase (ALT/SGPT): 12 (10-21 @ 17:22)  Alkaline Phosphatase, Serum: 46 (10-22 @ 03:38)  Alkaline Phosphatase, Serum: 51 (10-21 @ 17:22)  Aspartate Aminotransferase (AST/SGOT): 71 *H* (10-22 @ 03:38)  Aspartate Aminotransferase (AST/SGOT): 63 *H* (10-21 @ 17:22)        10-22    139  |  104  |  27<H>  ----------------------------<  128<H>  4.4   |  22  |  2.73<H>    Ca    7.8<L>      22 Oct 2021 03:38  Phos  3.1     10-22  Mg     3.0     10-22    TPro  4.5<L>  /  Alb  2.7<L>  /  TBili  0.4  /  DBili  x   /  AST  71<H>  /  ALT  14  /  AlkPhos  46  10-22                        8.9    11.48 )-----------( 111      ( 22 Oct 2021 03:38 )             25.8     Medications  MEDICATIONS  (STANDING):  acetaminophen   Tablet .. 650 milliGRAM(s) Oral every 6 hours  ascorbic acid 500 milliGRAM(s) Oral two times a day  aspirin enteric coated 325 milliGRAM(s) Oral daily  atorvastatin 80 milliGRAM(s) Oral at bedtime  cefuroxime  IVPB 1500 milliGRAM(s) IV Intermittent every 24 hours  chlorhexidine 2% Cloths 1 Application(s) Topical daily  dextrose 50% Injectable 50 milliLiter(s) IV Push every 15 minutes  dextrose 50% Injectable 25 milliLiter(s) IV Push every 15 minutes  DOBUTamine Infusion 5 MICROgram(s)/kG/Min (12 mL/Hr) IV Continuous <Continuous>  EPINEPHrine    Infusion 0.04 MICROgram(s)/kG/Min (12 mL/Hr) IV Continuous <Continuous>  heparin   Injectable 5000 Unit(s) SubCutaneous every 8 hours  insulin regular Infusion 3 Unit(s)/Hr (3 mL/Hr) IV Continuous <Continuous>  meperidine     Injectable 25 milliGRAM(s) IV Push once  metoclopramide Injectable 5 milliGRAM(s) IV Push every 8 hours  norepinephrine Infusion 0.01 MICROgram(s)/kG/Min (1.5 mL/Hr) IV Continuous <Continuous>  pantoprazole    Tablet 40 milliGRAM(s) Oral before breakfast  polyethylene glycol 3350 17 Gram(s) Oral daily  polyethylene glycol 3350 17 Gram(s) Oral daily  senna 2 Tablet(s) Oral at bedtime  sodium chloride 0.9%. 1000 milliLiter(s) (10 mL/Hr) IV Continuous <Continuous>  vasopressin Infusion 0.033 Unit(s)/Min (2 mL/Hr) IV Continuous <Continuous>      Physical Exam  General: Patient comfortable in bed  Vital Signs Last 12 Hrs  T(F): 98.1 (10-22-21 @ 12:00), Max: 100.2 (10-22-21 @ 08:00)  HR: 89 (10-22-21 @ 12:15) (80 - 90)  BP: --  BP(mean): --  RR: 19 (10-22-21 @ 12:15) (12 - 35)  SpO2: 99% (10-22-21 @ 12:15) (97% - 100%)           Chief complaint  Patient is a 60y old  Male who presents with a chief complaint of NSTEMI (22 Oct 2021 09:13)   Review of systems  Patient is postop CABG, no hypoglycemic episodes.    Labs and Fingersticks  CAPILLARY BLOOD GLUCOSE      POCT Blood Glucose.: 202 mg/dL (22 Oct 2021 12:20)  POCT Blood Glucose.: 147 mg/dL (22 Oct 2021 07:00)  POCT Blood Glucose.: 146 mg/dL (22 Oct 2021 05:08)  POCT Blood Glucose.: 126 mg/dL (22 Oct 2021 04:18)  POCT Blood Glucose.: 97 mg/dL (22 Oct 2021 02:55)  POCT Blood Glucose.: 104 mg/dL (22 Oct 2021 01:55)  POCT Blood Glucose.: 134 mg/dL (22 Oct 2021 00:55)  POCT Blood Glucose.: 154 mg/dL (22 Oct 2021 00:16)  POCT Blood Glucose.: 177 mg/dL (21 Oct 2021 23:21)  POCT Blood Glucose.: 125 mg/dL (21 Oct 2021 20:53)  POCT Blood Glucose.: 146 mg/dL (21 Oct 2021 20:12)      Anion Gap, Serum: 13 (10-22 @ 03:38)  Anion Gap, Serum: 12 (10-21 @ 17:22)      Calcium, Total Serum: 7.8 *L* (10-22 @ 03:38)  Calcium, Total Serum: 7.2 *L* (10-21 @ 17:22)  Albumin, Serum: 2.7 *L* (10-22 @ 03:38)  Albumin, Serum: 2.1 *L* (10-21 @ 17:22)    Alanine Aminotransferase (ALT/SGPT): 14 (10-22 @ 03:38)  Alanine Aminotransferase (ALT/SGPT): 12 (10-21 @ 17:22)  Alkaline Phosphatase, Serum: 46 (10-22 @ 03:38)  Alkaline Phosphatase, Serum: 51 (10-21 @ 17:22)  Aspartate Aminotransferase (AST/SGOT): 71 *H* (10-22 @ 03:38)  Aspartate Aminotransferase (AST/SGOT): 63 *H* (10-21 @ 17:22)        10-22    139  |  104  |  27<H>  ----------------------------<  128<H>  4.4   |  22  |  2.73<H>    Ca    7.8<L>      22 Oct 2021 03:38  Phos  3.1     10-22  Mg     3.0     10-22    TPro  4.5<L>  /  Alb  2.7<L>  /  TBili  0.4  /  DBili  x   /  AST  71<H>  /  ALT  14  /  AlkPhos  46  10-22                        8.9    11.48 )-----------( 111      ( 22 Oct 2021 03:38 )             25.8     Medications  MEDICATIONS  (STANDING):  acetaminophen   Tablet .. 650 milliGRAM(s) Oral every 6 hours  ascorbic acid 500 milliGRAM(s) Oral two times a day  aspirin enteric coated 325 milliGRAM(s) Oral daily  atorvastatin 80 milliGRAM(s) Oral at bedtime  cefuroxime  IVPB 1500 milliGRAM(s) IV Intermittent every 24 hours  chlorhexidine 2% Cloths 1 Application(s) Topical daily  dextrose 50% Injectable 50 milliLiter(s) IV Push every 15 minutes  dextrose 50% Injectable 25 milliLiter(s) IV Push every 15 minutes  DOBUTamine Infusion 5 MICROgram(s)/kG/Min (12 mL/Hr) IV Continuous <Continuous>  EPINEPHrine    Infusion 0.04 MICROgram(s)/kG/Min (12 mL/Hr) IV Continuous <Continuous>  heparin   Injectable 5000 Unit(s) SubCutaneous every 8 hours  insulin regular Infusion 3 Unit(s)/Hr (3 mL/Hr) IV Continuous <Continuous>  meperidine     Injectable 25 milliGRAM(s) IV Push once  metoclopramide Injectable 5 milliGRAM(s) IV Push every 8 hours  norepinephrine Infusion 0.01 MICROgram(s)/kG/Min (1.5 mL/Hr) IV Continuous <Continuous>  pantoprazole    Tablet 40 milliGRAM(s) Oral before breakfast  polyethylene glycol 3350 17 Gram(s) Oral daily  polyethylene glycol 3350 17 Gram(s) Oral daily  senna 2 Tablet(s) Oral at bedtime  sodium chloride 0.9%. 1000 milliLiter(s) (10 mL/Hr) IV Continuous <Continuous>  vasopressin Infusion 0.033 Unit(s)/Min (2 mL/Hr) IV Continuous <Continuous>      Physical Exam  General: Patient comfortable in bed  Vital Signs Last 12 Hrs  T(F): 98.1 (10-22-21 @ 12:00), Max: 100.2 (10-22-21 @ 08:00)  HR: 89 (10-22-21 @ 12:15) (80 - 90)  BP: --  BP(mean): --  RR: 19 (10-22-21 @ 12:15) (12 - 35)  SpO2: 99% (10-22-21 @ 12:15) (97% - 100%)

## 2021-10-22 NOTE — AIRWAY REMOVAL NOTE  ADULT & PEDS - ARTIFICAL AIRWAY REMOVAL COMMENTS
Written order for extubation verified. The patient was identified by full name and birth date compared to the identification band. Present during the procedure was BLANCHE Hamlin

## 2021-10-22 NOTE — PROGRESS NOTE ADULT - PROBLEM SELECTOR PLAN 1
Suggest to continue IV insulin for now.  Will continue monitoring FS and FU; Will transition to basal bolus insulin regimen once blood sugars are stable and patient is eating meals.

## 2021-10-22 NOTE — PROGRESS NOTE ADULT - ASSESSMENT
Assessment  DMT2: 60y Male with DM T2 with hyperglycemia, A1C 7%, was on oral meds and insulin at home, now postop CABG, received Humulin doses for hyperkalemia, being started on IV insulin, blood sugars trending within overall acceptable range, no hypoglycemic episodes, not yet eating meals.  Hypothyroidism: Patient has no history thyroid disease, was not on any thyroid supplements, TSH elevated, FT4 pending.  CAD: s/p CABG, on medications, no chest pain, stable, monitored.  HTN: Controlled,  on antihypertensive medications.  HLD: On statin  Obesity: No strict exercise routines, not on any weight loss program, neither on low calorie diet.        Esperanza Beckett MD  Cell: [phone]  Office: [phone]     Assessment  DMT2: 60y Male with DM T2 with hyperglycemia, A1C 7%, was on oral meds and insulin at home, now postop CABG, received Humulin doses for hyperkalemia, being started on IV insulin, blood sugars  trending within overall acceptable range, no hypoglycemic episodes, not yet eating meals.  Hypothyroidism: Patient has no history thyroid disease, was not on any thyroid supplements, TSH elevated, FT4 pending.  CAD: s/p CABG, on medications, no chest pain, stable, monitored.  HTN: Controlled,  on antihypertensive medications.  HLD: On statin  Obesity: No strict exercise routines, not on any weight loss program, neither on low calorie diet.        Esperanza Beckett MD  Cell: [phone]  Office: [phone]

## 2021-10-22 NOTE — PROGRESS NOTE ADULT - ASSESSMENT
60M with history of DM type 2, HTN, HLD presenting as transfer from [facility] for chest pain concerning for NSTEMI, code stroke also called for L facial droop, CTA significant for ipsilateral high-grade stenosis of the left internal carotid artery at the carotid bifurcation seen on carotid duplex, now s/p cabg    PLAN:  - doing well after cabg  - no inpatient surgical interventions planned by vascular surgery at this time  - plan for CEA scheduling outpatient  - rest of care per primary team.     Vascular Surgery   [phone]     60M with history of DM type 2, HTN, HLD presenting as transfer from [facility] for chest pain concerning for NSTEMI, code stroke also called for L facial droop, CTA significant for ipsilateral high-grade stenosis of the left internal carotid artery at the carotid bifurcation seen on carotid duplex, now s/p cabg    PLAN:  - doing well after cabg  - no inpatient surgical interventions planned by vascular surgery at this time  - plan for CEA scheduling outpatient. Follow up with Dr. Pinedo in 1 month  - rest of care per primary team.   - Vascular surgery will sign off at this time    Vascular Surgery   [phone]

## 2021-10-22 NOTE — PROGRESS NOTE ADULT - ATTENDING COMMENTS
Pt. with JOSHUA on advanced CKD, initiated on HD during this admission.   s/p CABG  does not appear volume overloaded, however with hyperkalemia  Plan for HD today without UF  Monitor BMP

## 2021-10-22 NOTE — PROGRESS NOTE ADULT - SUBJECTIVE AND OBJECTIVE BOX
Neurology Progress Note    S: Patient seen and examined in CTICU. No new events overnight. patient denied CP, SOB, HA or pain. now in CTICU doing okay.     Medications:  MEDICATIONS  (STANDING):  acetaminophen   Tablet .. 650 milliGRAM(s) Oral every 6 hours  ascorbic acid 500 milliGRAM(s) Oral two times a day  aspirin enteric coated 325 milliGRAM(s) Oral daily  atorvastatin 80 milliGRAM(s) Oral at bedtime  cefuroxime  IVPB 1500 milliGRAM(s) IV Intermittent every 24 hours  chlorhexidine 2% Cloths 1 Application(s) Topical daily  dextrose 50% Injectable 50 milliLiter(s) IV Push every 15 minutes  dextrose 50% Injectable 25 milliLiter(s) IV Push every 15 minutes  DOBUTamine Infusion 5 MICROgram(s)/kG/Min (12 mL/Hr) IV Continuous <Continuous>  EPINEPHrine    Infusion 0.04 MICROgram(s)/kG/Min (12 mL/Hr) IV Continuous <Continuous>  heparin   Injectable 5000 Unit(s) SubCutaneous every 8 hours  insulin regular Infusion 3 Unit(s)/Hr (3 mL/Hr) IV Continuous <Continuous>  meperidine     Injectable 25 milliGRAM(s) IV Push once  metoclopramide Injectable 5 milliGRAM(s) IV Push every 8 hours  norepinephrine Infusion 0.01 MICROgram(s)/kG/Min (1.5 mL/Hr) IV Continuous <Continuous>  pantoprazole    Tablet 40 milliGRAM(s) Oral before breakfast  polyethylene glycol 3350 17 Gram(s) Oral daily  polyethylene glycol 3350 17 Gram(s) Oral daily  senna 2 Tablet(s) Oral at bedtime  sodium chloride 0.9%. 1000 milliLiter(s) (10 mL/Hr) IV Continuous <Continuous>  vasopressin Infusion 0.033 Unit(s)/Min (2 mL/Hr) IV Continuous <Continuous>    MEDICATIONS  (PRN):  HYDROmorphone  Injectable 0.5 milliGRAM(s) IV Push every 6 hours PRN Severe Pain (7 - 10)  oxyCODONE    IR 5 milliGRAM(s) Oral every 4 hours PRN Moderate Pain (4 - 6)  oxyCODONE    IR 10 milliGRAM(s) Oral every 4 hours PRN Severe Pain (7 - 10)      Vitals:  Vital Signs Last 24 Hrs  T(C): 36.5 (10-22-21 @ 14:25), Max: 37.9 (10-22-21 @ 08:00)  T(F): 97.7 (10-22-21 @ 14:25), Max: 100.2 (10-22-21 @ 08:00)  HR: 89 (10-22-21 @ 15:15) (80 - 90)  BP: --  BP(mean): --  RR: 18 (10-22-21 @ 15:15) (11 - 35)  SpO2: 99% (10-22-21 @ 15:15) (96% - 100%)            General Exam:   General Appearance: Appropriately dressed and in no acute distress       Head: Normocephalic, atraumatic and no dysmorphic features  Ear, Nose, and Throat: Moist mucous membranes  CVS: S1S2+  Resp: No SOB, no wheeze or rhonchi  Abd: soft NTND  Extremities: No edema, no cyanosis  Skin: No bruises, no rashes     Neurological Exam:  Mental Status: Awake, alert and oriented x2.  Able to follow simple and complex verbal commands. Able to name and repeat. fluent speech. No obvious aphasia or dysarthria noted.   Cranial Nerves: PERRL, EOMI, VFFC, sensation V1-V3 intact,  mild NLF facial asymmetry , equal elevation of palate, scm/trap 5/5, tongue is midline on protrusion. no obvious papilledema on fundoscopic exam. Hearing is grossly intact.   Motor: Normal bulk, tone and strength throughout. Fine finger movements were intact and symmetric. no tremors or drift noted.    Sensation: Intact to light touch and pinprick throughout. no right/left confusion. no extinction to tactile on DSS.   Reflexes: 1+ throughout at biceps, brachioradialis, triceps, patellars and ankles bilaterally and equal. No clonus. R toe and L toe were both downgoing.  Coordination: No dysmetria on FNF    Gait: deferred     I personally reviewed the below data/images/labs:    CBC Full  -  ( 22 Oct 2021 03:38 )  WBC Count : 11.48 K/uL  RBC Count : 2.92 M/uL  Hemoglobin : 8.9 g/dL  Hematocrit : 25.8 %  Platelet Count - Automated : 111 K/uL  Mean Cell Volume : 88.4 fl  Mean Cell Hemoglobin : 30.5 pg  Mean Cell Hemoglobin Concentration : 34.5 gm/dL  Auto Neutrophil # : 8.47 K/uL  Auto Lymphocyte # : 1.15 K/uL  Auto Monocyte # : 1.65 K/uL  Auto Eosinophil # : 0.08 K/uL  Auto Basophil # : 0.07 K/uL  Auto Neutrophil % : 73.8 %  Auto Lymphocyte % : 10.0 %  Auto Monocyte % : 14.4 %  Auto Eosinophil % : 0.7 %  Auto Basophil % : 0.6 %    10-22    139  |  104  |  27<H>  ----------------------------<  128<H>  4.4   |  22  |  2.73<H>    Ca    7.8<L>      22 Oct 2021 03:38  Phos  3.1     10-22  Mg     3.0     10-22    TPro  4.5<L>  /  Alb  2.7<L>  /  TBili  0.4  /  DBili  x   /  AST  71<H>  /  ALT  14  /  AlkPhos  46  10-22        -10/07 CTH: No hemorrhage. 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   -10/07 CTA N: High-grade stenosis left internal carotid artery at the carotid bifurcation in the neck.   -10/07 CTA H: Normal intracranial circulation.    < from: MR Head No Cont (10.09.21 @ 20:22) >    EXAM:  MR BRAIN                            PROCEDURE DATE:  10/09/2021            INTERPRETATION:  CLINICAL HISTORY:Facial droop, on heparin drip, NSTEMI . Severe left ICA stenosis.    TECHNIQUE:  MRI of brain without contrast dated 10/9/2021.  Examination consisted of transaxial T1, T2, FLAIR, gradient echo as well as diffusion-weighted sequences with corresponding ADC maps. Coronal T2 and sagittal T1-weighted images were also acquired through the brain.    COMPARISON: CT head and CTA head andneck 10/7/2021    FINDINGS:  There are no areas abnormal restricted diffusion within the brain parenchyma to suggest acute/subacute infarct. There is no acute intracranial hemorrhage, vasogenic edema or abnormal susceptibility artifact. Chronic lacunar infarcts are seen in the left frontal lobe, right frontal cranial radiata and right cerebellum. Additional nonspecific patchy and confluent areas of T2/FLAIR prolongation in the bihemispheric white matter likely represents mild chronic microvascular changes.    The ventricles, sulci and cisternal spaces are stable in size and configuration. There is no midline shift or abnormal extra-axial fluid collection.    Flow-voids of the major intracranial vessels are maintained, as seen best on the T2-weighted images, indicating their patency.    The visualized paranasal sinuses and mastoid air cells are free of acute disease. The orbital regions are unremarkable.    IMPRESSION:  No acute infarct or intracranial hemorrhage. Chronic infarcts and microvascular changes as above.    --- End of Report ---      NEIL CARDENAS MD; Attending Radiologist  This document has been electronically signed. Oct 10 2021  4:26AM    < end of copied text >  < from: [facility] Duplex Carotid, Bilat (10.08.21 @ 17:07) >    EXAM:  CAROTID DUPLEX BILATERAL                            PROCEDURE DATE:  10/08/2021      INTERPRETATION:  CLINICAL INFORMATION: Left ICA high-grade stenosis identified on recent CTA neck.    COMPARISON: CTA neck 10/7/2021.    TECHNIQUE: Grayscale, color and spectral Doppler examination of both carotid arteries was performed.    FINDINGS:    There are atheromatous plaques are present bilaterally in the region of the bifurcations of the common carotid arteries into internal and external branches.    Velocity elevation of the proximal right internal carotid artery results in 50-69% flow-limiting stenosis.    Velocity elevation of the proximal left internal carotid artery results in 70-9% flow-limiting stenosis.    Bilateral flow-limiting stenoses noted of the right and left external carotid arteries as well.    Peak systolic velocities are as follows:    RIGHT:  PROX CCA = 126 cm/s  DIST CCA = 99 cm/s  PROX ICA = 137 cm/s  MID ICA = 86 cm/s  DIST ICA = 80 cm/s  ECA = 202 cm/s    LEFT:  PROX CCA = 100 cm/s  DIST CCA = 59 cm/s  PROX ICA = 313 cm/s  MID ICA = 72 cm/s  DIST ICA = 47 cm/s  ECA = 298 cm/s    Antegrade flow is noted within both vertebral arteries.    IMPRESSION:    Left internal carotid artery 70-99% flow-limiting stenosis.  Right internal carotid artery 50-69% flow-limiting stenosis.  Bilateral external carotid artery flow limiting stenoses.  INDIANA Hammond was informed of these findings on 10/8/2021 at 5:06 PM.    Measurement of carotid stenosis is based on velocity parameters that correlate the residual internal carotid diameter with that of the more distal vessel in accordance with a method such as the [employer] Symptomatic Carotid Endarterectomy Trial (NASCET).    --- End of Report ---    FELIX MCMAHON M.D., ATTENDING RADIOLOGIST  This document has been electronically signed. Oct  8 2021  5:21PM    < end of copied text >

## 2021-10-22 NOTE — PROGRESS NOTE ADULT - SUBJECTIVE AND OBJECTIVE BOX
Vascular Surgery    SUBJECTIVE: Pt seen and examined on rounds with team. No acute events overnight.        OBJECTIVE    PHYSICAL EXAM:   General: NAD, Lying in bed   Neuro: Awake and alert  HEENT: normocephalic, atraumatic  Chest: dressing in place clean, dry, intact  Resp: equal chest rise  Vasc/ext: WWP, 2+ radial/dp    VITALS  T(C): 37.9 (10-22-21 @ 08:00), Max: 37.9 (10-22-21 @ 08:00)  HR: 88 (10-22-21 @ 09:00) (80 - 90)  BP: --  RR: 27 (10-22-21 @ 09:00) (11 - 27)  SpO2: 99% (10-22-21 @ 09:00) (97% - 100%)  CAPILLARY BLOOD GLUCOSE      POCT Blood Glucose.: 147 mg/dL (22 Oct 2021 07:00)  POCT Blood Glucose.: 146 mg/dL (22 Oct 2021 05:08)  POCT Blood Glucose.: 126 mg/dL (22 Oct 2021 04:18)  POCT Blood Glucose.: 97 mg/dL (22 Oct 2021 02:55)  POCT Blood Glucose.: 104 mg/dL (22 Oct 2021 01:55)  POCT Blood Glucose.: 134 mg/dL (22 Oct 2021 00:55)  POCT Blood Glucose.: 154 mg/dL (22 Oct 2021 00:16)  POCT Blood Glucose.: 177 mg/dL (21 Oct 2021 23:21)  POCT Blood Glucose.: 125 mg/dL (21 Oct 2021 20:53)  POCT Blood Glucose.: 146 mg/dL (21 Oct 2021 20:12)      Is/Os    10-21 @ 07:01  -  10-22 @ 07:00  --------------------------------------------------------  IN:    Albumin 5%  - 250 mL: 750 mL    Dexmedetomidine: 266.8 mL    DOBUTamine: 108 mL    DOBUTamine: 20.4 mL    DOBUTamine: 12 mL    EPINEPHrine: 30 mL    EPINEPHrine: 36 mL    EPINEPHrine: 24 mL    Insulin: 24 mL    IV PiggyBack: 600 mL    Milrinone: 4.8 mL    Milrinone: 14.4 mL    NiCARdipine: 10 mL    Norepinephrine: 20 mL    Norepinephrine: 40 mL    Oral Fluid: 60 mL    Other (mL): 400 mL    sodium chloride 0.9%: 140 mL  Total IN: 2560.4 mL    OUT:    Bulb (mL): 84 mL    Chest Tube (mL): 350 mL    Chest Tube (mL): 30 mL    Chest Tube (mL): 260 mL    Indwelling Catheter - Urethral (mL): 310 mL    Other (mL): 250 mL    Vasopressin: 0 mL  Total OUT: 1284 mL    Total NET: 1276.4 mL      10-22 @ 07:01  -  10-22 @ 09:13  --------------------------------------------------------  IN:    DOBUTamine: 12 mL    EPINEPHrine: 12 mL    Norepinephrine: 10 mL    sodium chloride 0.9%: 10 mL    Vasopressin: 4 mL  Total IN: 48 mL    OUT:    Bulb (mL): 46 mL    Chest Tube (mL): 0 mL    Chest Tube (mL): 20 mL    Chest Tube (mL): 10 mL    Indwelling Catheter - Urethral (mL): 55 mL  Total OUT: 131 mL    Total NET: -83 mL          MEDICATIONS (STANDING): acetaminophen   Tablet .. 650 milliGRAM(s) Oral every 6 hours  ascorbic acid 500 milliGRAM(s) Oral two times a day  aspirin enteric coated 325 milliGRAM(s) Oral daily  atorvastatin 80 milliGRAM(s) Oral at bedtime  cefuroxime  IVPB 1500 milliGRAM(s) IV Intermittent every 24 hours  dextrose 50% Injectable 50 milliLiter(s) IV Push every 15 minutes  dextrose 50% Injectable 25 milliLiter(s) IV Push every 15 minutes  DOBUTamine Infusion 5 MICROgram(s)/kG/Min IV Continuous <Continuous>  EPINEPHrine    Infusion 0.04 MICROgram(s)/kG/Min IV Continuous <Continuous>  heparin   Injectable 5000 Unit(s) SubCutaneous every 8 hours  insulin lispro (ADMELOG) corrective regimen sliding scale   SubCutaneous three times a day before meals  insulin lispro (ADMELOG) corrective regimen sliding scale   SubCutaneous at bedtime  meperidine     Injectable 25 milliGRAM(s) IV Push once  metoclopramide Injectable 5 milliGRAM(s) IV Push every 8 hours  norepinephrine Infusion 0.01 MICROgram(s)/kG/Min IV Continuous <Continuous>  pantoprazole    Tablet 40 milliGRAM(s) Oral before breakfast  polyethylene glycol 3350 17 Gram(s) Oral daily  polyethylene glycol 3350 17 Gram(s) Oral daily  senna 2 Tablet(s) Oral at bedtime  sodium chloride 0.9%. 1000 milliLiter(s) IV Continuous <Continuous>  vasopressin Infusion 0.033 Unit(s)/Min IV Continuous <Continuous>    MEDICATIONS (PRN):HYDROmorphone  Injectable 0.5 milliGRAM(s) IV Push every 6 hours PRN Severe Pain (7 - 10)  oxyCODONE    IR 5 milliGRAM(s) Oral every 4 hours PRN Moderate Pain (4 - 6)  oxyCODONE    IR 10 milliGRAM(s) Oral every 4 hours PRN Severe Pain (7 - 10)      LABS  CBC (10-22 @ 03:38)                              8.9<L>                         11.48<H>  )----------------(  111<L>     73.8  % Neutrophils, 10.0<L>% Lymphocytes, ANC: 8.47<H>                              25.8<L>  CBC (10-21 @ 22:47)                              10.0<L>                         14.25<H>  )----------------(  136<L>     71.4  % Neutrophils, 13.2  % Lymphocytes, ANC: 10.17<H>                              28.8<L>    BMP (10-22 @ 03:38)             139     |  104     |  27<H> 		Ca++ --      Ca 7.8<L>             ---------------------------------( 128<H>		Mg 3.0<H>             4.4     |  22      |  2.73<H>			Ph 3.1     BMP (10-21 @ 17:22)             143     |  110<H>  |  34<H> 		Ca++ --      Ca 7.2<L>             ---------------------------------( 141<H>		Mg 3.1<H>             5.4<H>  |  21<L>   |  3.15<H>			Ph 2.5       LFTs (10-22 @ 03:38)      TPro 4.5<L> / Alb 2.7<L> / TBili 0.4 / DBili -- / AST 71<H> / ALT 14 / AlkPhos 46  LFTs (10-21 @ 17:22)      TPro 3.9<L> / Alb 2.1<L> / TBili 0.6 / DBili -- / AST 63<H> / ALT 12 / AlkPhos 51    Coags (10-21 @ 22:47)  aPTT 49.6<H> / INR 1.14 / PT 13.6  Coags (10-21 @ 17:22)  aPTT 36.9<H> / INR 1.07 / PT 12.8    Cardiac Markers (10-21 @ 17:22)     HSTrop: -- / CKMB: -- / CK: 871    ABG (10-22 @ 08:31)     7.38 / 37 / 102 / 22 / -2.9<L> / 99.2<H>%     Lactate:    ABG (10-22 @ 05:22)     7.39 / 38 / 110<H> / 23 / -1.7 / 99.1<H>%     Lactate:      VBG (10-21 @ 14:17)     7.51<H> / 35<L> / 67<H> / 28 / 4.6<H> / 97.2<H>%     Lactate: 0.9  VBG (10-21 @ 13:54)     7.34 / 49 / 52<H> / 26 / 0.3 / 87.0%     Lactate: 0.8      IMAGING STUDIES

## 2021-10-22 NOTE — PROGRESS NOTE ADULT - ASSESSMENT
Patient is a 60 year old male with PMH od DM and HTN who presented to the hospital as a transfer from OSH for NSTEMI. The nephrology team was consulted due to elevated creatinine of 4.05 upon presentation. The patient denies any history of kidney disease and denied noticing any changes in his urination.     JOSHUA on CKD?   ESRD?   - patient presenting to hospital with creatinine of 4.05 mg/dL; denied any history of kidney disease   --- creatinine had continued to uptrend to 6.28mg/dL but slight decrease to 5.95mg/dL this morning  - Kidney injury also exacerbated by contrast administration for the CT following the RRT on 10/7    - patient remains volume overloaded; on bumex and optimal UOP   - initiated on HD on 10/14 through R IJ non-tunneled catheter placed on 10/13  - suspect given his pre-HD creatinine and high suspicion of CKD in setting of DM and HTN will likely be HD dependent following  - s/p LHC on 10/14 showing severe multivessel disease  - now s/p CABG on 10/21 and then transferred to the CTU   - will do HD today; noted to be hyperkalemic yesterday evening following OR   --- noted to be on pressor support so will be cautious with fluid removal as discussed with the CTU team  - normal renal ultrasound without hydro  - UA with proteinuria and blood;  Ur Pr/Cr ratio noted to be 8.7g  - BEULAH negative; elevated kappa/lambda but normal ratio;   - C3 and C4 not low  - other serological work up pending at this time; will follow up   - please adjust medication doses as per eGFR     Anemia:   - suspected in the setting of CKD   - iron studies wnl   - will consider starting the patient on SISSY with HD   - goal Hg of >8   - monitor CBC Patient is a 60 year old male with PMH od DM and HTN who presented to the hospital as a transfer from OSH for NSTEMI. The nephrology team was consulted due to elevated creatinine of 4.05 upon presentation. The patient denies any history of kidney disease and denied noticing any changes in his urination.     JOSHUA on CKD?   ESRD?   - patient presenting to hospital with creatinine of 4.05 mg/dL; denied any history of kidney disease   --- creatinine had continued to uptrend to 6.28mg/dL but slight decrease to 5.95mg/dL this morning  - Kidney injury also exacerbated by contrast administration for the CT following the RRT on 10/7    - patient remains volume overloaded; on bumex and optimal UOP   - initiated on HD on 10/14 through R IJ non-tunneled catheter placed on 10/13  - suspect given his pre-HD creatinine and high suspicion of CKD in setting of DM and HTN will likely be HD dependent following  - s/p LHC on 10/14 showing severe multivessel disease  - now s/p CABG on 10/21 and then transferred to the CTU   - will do HD today; noted to be hyperkalemic yesterday evening following OR   --- noted to be on pressor support so will 0L UF as discussed with the CTU team  - normal renal ultrasound without hydro  - UA with proteinuria and blood;  Ur Pr/Cr ratio noted to be 8.7g  - BEULAH negative; elevated kappa/lambda but normal ratio;   - C3 and C4 not low  - other serological work up pending at this time; will follow up   - please adjust medication doses as per eGFR     Anemia:   - suspected in the setting of CKD   - iron studies wnl   - will consider starting the patient on SISSY with HD   - goal Hg of >8   - monitor CBC    If any questions, please feel free to contact me     Saul Crews  Nephrology Fellow  [facility] Pager: [phone]

## 2021-10-23 LAB
ALBUMIN SERPL ELPH-MCNC: 3.2 G/DL — LOW (ref 3.3–5)
ALP SERPL-CCNC: 60 U/L — SIGNIFICANT CHANGE UP (ref 40–120)
ALT FLD-CCNC: 22 U/L — SIGNIFICANT CHANGE UP (ref 10–45)
ANION GAP SERPL CALC-SCNC: 13 MMOL/L — SIGNIFICANT CHANGE UP (ref 5–17)
AST SERPL-CCNC: 88 U/L — HIGH (ref 10–40)
BASE EXCESS BLDMV CALC-SCNC: -0.6 MMOL/L — SIGNIFICANT CHANGE UP (ref -3–3)
BASE EXCESS BLDMV CALC-SCNC: 0.5 MMOL/L — SIGNIFICANT CHANGE UP (ref -3–3)
BASE EXCESS BLDMV CALC-SCNC: 0.6 MMOL/L — SIGNIFICANT CHANGE UP (ref -3–3)
BILIRUB SERPL-MCNC: 0.4 MG/DL — SIGNIFICANT CHANGE UP (ref 0.2–1.2)
BLD GP AB SCN SERPL QL: NEGATIVE — SIGNIFICANT CHANGE UP
BUN SERPL-MCNC: 24 MG/DL — HIGH (ref 7–23)
CALCIUM SERPL-MCNC: 8.7 MG/DL — SIGNIFICANT CHANGE UP (ref 8.4–10.5)
CHLORIDE SERPL-SCNC: 100 MMOL/L — SIGNIFICANT CHANGE UP (ref 96–108)
CO2 BLDMV-SCNC: 27 MMOL/L — SIGNIFICANT CHANGE UP (ref 21–29)
CO2 BLDMV-SCNC: 27 MMOL/L — SIGNIFICANT CHANGE UP (ref 21–29)
CO2 BLDMV-SCNC: 28 MMOL/L — SIGNIFICANT CHANGE UP (ref 21–29)
CO2 SERPL-SCNC: 23 MMOL/L — SIGNIFICANT CHANGE UP (ref 22–31)
CREAT SERPL-MCNC: 2.68 MG/DL — HIGH (ref 0.5–1.3)
GAS PNL BLDA: SIGNIFICANT CHANGE UP
GAS PNL BLDMV: SIGNIFICANT CHANGE UP
GLUCOSE BLDC GLUCOMTR-MCNC: 108 MG/DL — HIGH (ref 70–99)
GLUCOSE BLDC GLUCOMTR-MCNC: 111 MG/DL — HIGH (ref 70–99)
GLUCOSE BLDC GLUCOMTR-MCNC: 114 MG/DL — HIGH (ref 70–99)
GLUCOSE BLDC GLUCOMTR-MCNC: 125 MG/DL — HIGH (ref 70–99)
GLUCOSE BLDC GLUCOMTR-MCNC: 127 MG/DL — HIGH (ref 70–99)
GLUCOSE BLDC GLUCOMTR-MCNC: 147 MG/DL — HIGH (ref 70–99)
GLUCOSE BLDC GLUCOMTR-MCNC: 147 MG/DL — HIGH (ref 70–99)
GLUCOSE BLDC GLUCOMTR-MCNC: 150 MG/DL — HIGH (ref 70–99)
GLUCOSE BLDC GLUCOMTR-MCNC: 157 MG/DL — HIGH (ref 70–99)
GLUCOSE BLDC GLUCOMTR-MCNC: 179 MG/DL — HIGH (ref 70–99)
GLUCOSE BLDC GLUCOMTR-MCNC: 255 MG/DL — HIGH (ref 70–99)
GLUCOSE BLDC GLUCOMTR-MCNC: 94 MG/DL — SIGNIFICANT CHANGE UP (ref 70–99)
GLUCOSE BLDC GLUCOMTR-MCNC: 96 MG/DL — SIGNIFICANT CHANGE UP (ref 70–99)
GLUCOSE SERPL-MCNC: 126 MG/DL — HIGH (ref 70–99)
GLUCOSE SERPL-MCNC: 158 MG/DL — HIGH (ref 70–99)
HCO3 BLDMV-SCNC: 25 MMOL/L — SIGNIFICANT CHANGE UP (ref 20–28)
HCO3 BLDMV-SCNC: 26 MMOL/L — SIGNIFICANT CHANGE UP (ref 20–28)
HCO3 BLDMV-SCNC: 27 MMOL/L — SIGNIFICANT CHANGE UP (ref 20–28)
HCT VFR BLD CALC: 27.5 % — LOW (ref 39–50)
HGB BLD-MCNC: 9 G/DL — LOW (ref 13–17)
HOROWITZ INDEX BLDMV+IHG-RTO: 44 — SIGNIFICANT CHANGE UP
INR BLD: 1.32 RATIO — HIGH (ref 0.88–1.16)
MAGNESIUM SERPL-MCNC: 2.3 MG/DL — SIGNIFICANT CHANGE UP (ref 1.6–2.6)
MCHC RBC-ENTMCNC: 29 PG — SIGNIFICANT CHANGE UP (ref 27–34)
MCHC RBC-ENTMCNC: 32.7 GM/DL — SIGNIFICANT CHANGE UP (ref 32–36)
MCV RBC AUTO: 88.7 FL — SIGNIFICANT CHANGE UP (ref 80–100)
NRBC # BLD: 0 /100 WBCS — SIGNIFICANT CHANGE UP (ref 0–0)
O2 CT VFR BLD CALC: 33 MMHG — SIGNIFICANT CHANGE UP (ref 30–65)
O2 CT VFR BLD CALC: 33 MMHG — SIGNIFICANT CHANGE UP (ref 30–65)
O2 CT VFR BLD CALC: 35 MMHG — SIGNIFICANT CHANGE UP (ref 30–65)
PCO2 BLDMV: 44 MMHG — SIGNIFICANT CHANGE UP (ref 30–65)
PCO2 BLDMV: 46 MMHG — SIGNIFICANT CHANGE UP (ref 30–65)
PCO2 BLDMV: 47 MMHG — SIGNIFICANT CHANGE UP (ref 30–65)
PH BLDMV: 7.35 — SIGNIFICANT CHANGE UP (ref 7.32–7.45)
PH BLDMV: 7.36 — SIGNIFICANT CHANGE UP (ref 7.32–7.45)
PH BLDMV: 7.38 — SIGNIFICANT CHANGE UP (ref 7.32–7.45)
PHOSPHATE SERPL-MCNC: 4.4 MG/DL — SIGNIFICANT CHANGE UP (ref 2.5–4.5)
PLATELET # BLD AUTO: 92 K/UL — LOW (ref 150–400)
POTASSIUM SERPL-MCNC: 4.2 MMOL/L — SIGNIFICANT CHANGE UP (ref 3.5–5.3)
POTASSIUM SERPL-SCNC: 4.2 MMOL/L — SIGNIFICANT CHANGE UP (ref 3.5–5.3)
PROT SERPL-MCNC: 5.2 G/DL — LOW (ref 6–8.3)
PROTHROM AB SERPL-ACNC: 15.6 SEC — HIGH (ref 10.6–13.6)
RBC # BLD: 3.1 M/UL — LOW (ref 4.2–5.8)
RBC # FLD: 16.1 % — HIGH (ref 10.3–14.5)
RH IG SCN BLD-IMP: POSITIVE — SIGNIFICANT CHANGE UP
SAO2 % BLDMV: 55.6 — LOW (ref 60–90)
SAO2 % BLDMV: 61.4 — SIGNIFICANT CHANGE UP (ref 60–90)
SAO2 % BLDMV: 64.6 — SIGNIFICANT CHANGE UP (ref 60–90)
SODIUM SERPL-SCNC: 136 MMOL/L — SIGNIFICANT CHANGE UP (ref 135–145)
WBC # BLD: 13.07 K/UL — HIGH (ref 3.8–10.5)
WBC # FLD AUTO: 13.07 K/UL — HIGH (ref 3.8–10.5)

## 2021-10-23 PROCEDURE — 99291 CRITICAL CARE FIRST HOUR: CPT

## 2021-10-23 PROCEDURE — 99233 SBSQ HOSP IP/OBS HIGH 50: CPT | Mod: GC

## 2021-10-23 PROCEDURE — 71045 X-RAY EXAM CHEST 1 VIEW: CPT | Mod: 26

## 2021-10-23 PROCEDURE — 93010 ELECTROCARDIOGRAM REPORT: CPT

## 2021-10-23 RX ORDER — MILRINONE LACTATE 1 MG/ML
0.2 INJECTION, SOLUTION INTRAVENOUS
Qty: 20 | Refills: 0 | Status: DISCONTINUED | OUTPATIENT
Start: 2021-10-23 | End: 2021-10-24

## 2021-10-23 RX ORDER — WARFARIN SODIUM 2.5 MG/1
5 TABLET ORAL ONCE
Refills: 0 | Status: COMPLETED | OUTPATIENT
Start: 2021-10-23 | End: 2021-10-23

## 2021-10-23 RX ORDER — DEXTROSE 50 % IN WATER 50 %
12.5 SYRINGE (ML) INTRAVENOUS ONCE
Refills: 0 | Status: DISCONTINUED | OUTPATIENT
Start: 2021-10-23 | End: 2021-10-23

## 2021-10-23 RX ORDER — HYDRALAZINE HCL 50 MG
10 TABLET ORAL ONCE
Refills: 0 | Status: COMPLETED | OUTPATIENT
Start: 2021-10-23 | End: 2021-10-23

## 2021-10-23 RX ORDER — DEXTROSE 50 % IN WATER 50 %
25 SYRINGE (ML) INTRAVENOUS ONCE
Refills: 0 | Status: DISCONTINUED | OUTPATIENT
Start: 2021-10-23 | End: 2021-10-23

## 2021-10-23 RX ORDER — DEXTROSE 50 % IN WATER 50 %
15 SYRINGE (ML) INTRAVENOUS ONCE
Refills: 0 | Status: DISCONTINUED | OUTPATIENT
Start: 2021-10-23 | End: 2021-10-23

## 2021-10-23 RX ORDER — AMIODARONE HYDROCHLORIDE 400 MG/1
150 TABLET ORAL ONCE
Refills: 0 | Status: COMPLETED | OUTPATIENT
Start: 2021-10-23 | End: 2021-10-23

## 2021-10-23 RX ORDER — INSULIN GLARGINE 100 [IU]/ML
6 INJECTION, SOLUTION SUBCUTANEOUS AT BEDTIME
Refills: 0 | Status: DISCONTINUED | OUTPATIENT
Start: 2021-10-23 | End: 2021-10-24

## 2021-10-23 RX ORDER — GLUCAGON INJECTION, SOLUTION 0.5 MG/.1ML
1 INJECTION, SOLUTION SUBCUTANEOUS ONCE
Refills: 0 | Status: DISCONTINUED | OUTPATIENT
Start: 2021-10-23 | End: 2021-10-23

## 2021-10-23 RX ORDER — INSULIN LISPRO 100/ML
VIAL (ML) SUBCUTANEOUS
Refills: 0 | Status: DISCONTINUED | OUTPATIENT
Start: 2021-10-23 | End: 2021-10-24

## 2021-10-23 RX ADMIN — Medication 10 MILLIGRAM(S): at 23:30

## 2021-10-23 RX ADMIN — HEPARIN SODIUM 5000 UNIT(S): 5000 INJECTION INTRAVENOUS; SUBCUTANEOUS at 22:19

## 2021-10-23 RX ADMIN — Medication 650 MILLIGRAM(S): at 17:18

## 2021-10-23 RX ADMIN — WARFARIN SODIUM 5 MILLIGRAM(S): 2.5 TABLET ORAL at 22:20

## 2021-10-23 RX ADMIN — Medication 650 MILLIGRAM(S): at 13:31

## 2021-10-23 RX ADMIN — HEPARIN SODIUM 5000 UNIT(S): 5000 INJECTION INTRAVENOUS; SUBCUTANEOUS at 13:12

## 2021-10-23 RX ADMIN — INSULIN GLARGINE 6 UNIT(S): 100 INJECTION, SOLUTION SUBCUTANEOUS at 22:19

## 2021-10-23 RX ADMIN — Medication 650 MILLIGRAM(S): at 05:26

## 2021-10-23 RX ADMIN — Medication 5 MILLIGRAM(S): at 05:26

## 2021-10-23 RX ADMIN — Medication 500 MILLIGRAM(S): at 17:19

## 2021-10-23 RX ADMIN — AMIODARONE HYDROCHLORIDE 600 MILLIGRAM(S): 400 TABLET ORAL at 04:45

## 2021-10-23 RX ADMIN — Medication 650 MILLIGRAM(S): at 13:12

## 2021-10-23 RX ADMIN — PANTOPRAZOLE SODIUM 40 MILLIGRAM(S): 20 TABLET, DELAYED RELEASE ORAL at 07:22

## 2021-10-23 RX ADMIN — ATORVASTATIN CALCIUM 80 MILLIGRAM(S): 80 TABLET, FILM COATED ORAL at 22:19

## 2021-10-23 RX ADMIN — Medication 500 MILLIGRAM(S): at 05:26

## 2021-10-23 RX ADMIN — Medication 2: at 22:20

## 2021-10-23 RX ADMIN — HEPARIN SODIUM 5000 UNIT(S): 5000 INJECTION INTRAVENOUS; SUBCUTANEOUS at 05:26

## 2021-10-23 RX ADMIN — Medication 650 MILLIGRAM(S): at 17:42

## 2021-10-23 RX ADMIN — CHLORHEXIDINE GLUCONATE 1 APPLICATION(S): 213 SOLUTION TOPICAL at 13:18

## 2021-10-23 RX ADMIN — Medication 4.81 MICROGRAM(S)/KG/MIN: at 17:43

## 2021-10-23 RX ADMIN — POLYETHYLENE GLYCOL 3350 17 GRAM(S): 17 POWDER, FOR SOLUTION ORAL at 13:09

## 2021-10-23 RX ADMIN — Medication 325 MILLIGRAM(S): at 13:11

## 2021-10-23 RX ADMIN — OXYCODONE HYDROCHLORIDE 5 MILLIGRAM(S): 5 TABLET ORAL at 01:40

## 2021-10-23 RX ADMIN — Medication 2: at 13:10

## 2021-10-23 RX ADMIN — OXYCODONE HYDROCHLORIDE 5 MILLIGRAM(S): 5 TABLET ORAL at 01:07

## 2021-10-23 RX ADMIN — SENNA PLUS 2 TABLET(S): 8.6 TABLET ORAL at 22:33

## 2021-10-23 RX ADMIN — Medication 650 MILLIGRAM(S): at 00:00

## 2021-10-23 RX ADMIN — Medication 100 MILLIGRAM(S): at 07:58

## 2021-10-23 NOTE — PROGRESS NOTE ADULT - ASSESSMENT
Patient is a 60 year old male with PMH od DM and HTN who presented to the hospital as a transfer from OSH for NSTEMI. The nephrology team was consulted due to elevated creatinine of 4.05 upon presentation. The patient denies any history of kidney disease and denied noticing any changes in his urination.     JOSHUA on CKD, now on HD, may need long term HD  - likely with CKD from diabetic nephropathy, admitted with JOSHUA on CKD and hypervolemia, was initiated on HD prior to CABG for optimization.   - had last HD yesterday w/o UF  - clinically stable  - no plan for HD today  - monitor BMP    Anemia:   - in the setting of CKD   - iron studies wnl   - will consider starting the patient on SISSY with next HD session   - monitor CBC    Hyperkalemia:   resolved post HD yesterday  monitor electrolytes

## 2021-10-23 NOTE — PROGRESS NOTE ADULT - SUBJECTIVE AND OBJECTIVE BOX
Neurology Progress Note    S: Patient seen and examined in CTICU. No new events overnight. patient denied CP, SOB, HA or pain. now in CTICU doing okay. in chair now on /insulin aaox3     Medications:  MEDICATIONS  (STANDING):  acetaminophen   Tablet .. 650 milliGRAM(s) Oral every 6 hours  ascorbic acid 500 milliGRAM(s) Oral two times a day  aspirin enteric coated 325 milliGRAM(s) Oral daily  atorvastatin 80 milliGRAM(s) Oral at bedtime  bisacodyl Suppository 10 milliGRAM(s) Rectal once  chlorhexidine 2% Cloths 1 Application(s) Topical daily  dextrose 50% Injectable 50 milliLiter(s) IV Push every 15 minutes  dextrose 50% Injectable 25 milliLiter(s) IV Push every 15 minutes  DOBUTamine Infusion 2 MICROgram(s)/kG/Min (4.81 mL/Hr) IV Continuous <Continuous>  heparin   Injectable 5000 Unit(s) SubCutaneous every 8 hours  insulin regular Infusion 3 Unit(s)/Hr (3 mL/Hr) IV Continuous <Continuous>  pantoprazole    Tablet 40 milliGRAM(s) Oral before breakfast  polyethylene glycol 3350 17 Gram(s) Oral daily  polyethylene glycol 3350 17 Gram(s) Oral daily  senna 2 Tablet(s) Oral at bedtime  sodium chloride 0.9%. 1000 milliLiter(s) (10 mL/Hr) IV Continuous <Continuous>    MEDICATIONS  (PRN):  HYDROmorphone  Injectable 0.5 milliGRAM(s) IV Push every 6 hours PRN Severe Pain (7 - 10)  oxyCODONE    IR 5 milliGRAM(s) Oral every 4 hours PRN Moderate Pain (4 - 6)  oxyCODONE    IR 10 milliGRAM(s) Oral every 4 hours PRN Severe Pain (7 - 10)      Vitals:  ICU Vital Signs Last 24 Hrs  T(C): 36.8 (23 Oct 2021 08:00), Max: 37.2 (23 Oct 2021 00:00)  T(F): 98.2 (23 Oct 2021 08:00), Max: 99 (23 Oct 2021 00:00)  HR: 88 (23 Oct 2021 08:00) (65 - 90)  BP: 124/64 (23 Oct 2021 05:00) (116/62 - 148/70)  BP(mean): 87 (23 Oct 2021 05:00) (84 - 109)  RR: 20 (23 Oct 2021 08:00) (16 - 35)  SpO2: 95% (23 Oct 2021 08:00) (92% - 100%)            General Exam:   General Appearance: Appropriately dressed and in no acute distress       Head: Normocephalic, atraumatic and no dysmorphic features  Ear, Nose, and Throat: Moist mucous membranes  CVS: S1S2+  Resp: No SOB, no wheeze or rhonchi  Abd: soft NTND  Extremities: No edema, no cyanosis  Skin: No bruises, no rashes     Neurological Exam:  Mental Status: Awake, alert and oriented x2-3.  Able to follow simple and complex verbal commands. Able to name and repeat. fluent speech. No obvious aphasia or dysarthria noted.   Cranial Nerves: PERRL, EOMI, VFFC, sensation V1-V3 intact,  mild NLF facial asymmetry , equal elevation of palate, scm/trap 5/5, tongue is midline on protrusion. no obvious papilledema on fundoscopic exam. Hearing is grossly intact.   Motor: Normal bulk, tone and strength throughout. Fine finger movements were intact and symmetric. no tremors or drift noted.    Sensation: Intact to light touch and pinprick throughout. no right/left confusion. no extinction to tactile on DSS.   Reflexes: 1+ throughout at biceps, brachioradialis, triceps, patellars and ankles bilaterally and equal. No clonus. R toe and L toe were both downgoing.  Coordination: No dysmetria on FNF    Gait: deferred     I personally reviewed the below data/images/labs:    CBC Full  -  ( 23 Oct 2021 00:21 )  WBC Count : 13.07 K/uL  RBC Count : 3.10 M/uL  Hemoglobin : 9.0 g/dL  Hematocrit : 27.5 %  Platelet Count - Automated : 92 K/uL  Mean Cell Volume : 88.7 fl  Mean Cell Hemoglobin : 29.0 pg  Mean Cell Hemoglobin Concentration : 32.7 gm/dL  Auto Neutrophil # : x  Auto Lymphocyte # : x  Auto Monocyte # : x  Auto Eosinophil # : x  Auto Basophil # : x  Auto Neutrophil % : x  Auto Lymphocyte % : x  Auto Monocyte % : x  Auto Eosinophil % : x  Auto Basophil % : x      10-23    136  |  100  |  24<H>  ----------------------------<  126<H>  4.2   |  23  |  2.68<H>    Ca    8.7      23 Oct 2021 00:21  Phos  4.4     10-23  Mg     2.3     10-23    TPro  5.2<L>  /  Alb  3.2<L>  /  TBili  0.4  /  DBili  x   /  AST  88<H>  /  ALT  22  /  AlkPhos  60  10-23    -10/07 CTH: No hemorrhage. 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   -10/07 CTA N: High-grade stenosis left internal carotid artery at the carotid bifurcation in the neck.   -10/07 CTA H: Normal intracranial circulation.    < from: MR Head No Cont (10.09.21 @ 20:22) >    EXAM:  MR BRAIN                            PROCEDURE DATE:  10/09/2021            INTERPRETATION:  CLINICAL HISTORY:Facial droop, on heparin drip, NSTEMI . Severe left ICA stenosis.    TECHNIQUE:  MRI of brain without contrast dated 10/9/2021.  Examination consisted of transaxial T1, T2, FLAIR, gradient echo as well as diffusion-weighted sequences with corresponding ADC maps. Coronal T2 and sagittal T1-weighted images were also acquired through the brain.    COMPARISON: CT head and CTA head andneck 10/7/2021    FINDINGS:  There are no areas abnormal restricted diffusion within the brain parenchyma to suggest acute/subacute infarct. There is no acute intracranial hemorrhage, vasogenic edema or abnormal susceptibility artifact. Chronic lacunar infarcts are seen in the left frontal lobe, right frontal cranial radiata and right cerebellum. Additional nonspecific patchy and confluent areas of T2/FLAIR prolongation in the bihemispheric white matter likely represents mild chronic microvascular changes.    The ventricles, sulci and cisternal spaces are stable in size and configuration. There is no midline shift or abnormal extra-axial fluid collection.    Flow-voids of the major intracranial vessels are maintained, as seen best on the T2-weighted images, indicating their patency.    The visualized paranasal sinuses and mastoid air cells are free of acute disease. The orbital regions are unremarkable.    IMPRESSION:  No acute infarct or intracranial hemorrhage. Chronic infarcts and microvascular changes as above.    --- End of Report ---      NEIL CARDENAS MD; Attending Radiologist  This document has been electronically signed. Oct 10 2021  4:26AM    < end of copied text >  < from: [facility] Duplex Carotid, Bilat (10.08.21 @ 17:07) >    EXAM:  CAROTID DUPLEX BILATERAL                            PROCEDURE DATE:  10/08/2021      INTERPRETATION:  CLINICAL INFORMATION: Left ICA high-grade stenosis identified on recent CTA neck.    COMPARISON: CTA neck 10/7/2021.    TECHNIQUE: Grayscale, color and spectral Doppler examination of both carotid arteries was performed.    FINDINGS:    There are atheromatous plaques are present bilaterally in the region of the bifurcations of the common carotid arteries into internal and external branches.    Velocity elevation of the proximal right internal carotid artery results in 50-69% flow-limiting stenosis.    Velocity elevation of the proximal left internal carotid artery results in 70-9% flow-limiting stenosis.    Bilateral flow-limiting stenoses noted of the right and left external carotid arteries as well.    Peak systolic velocities are as follows:    RIGHT:  PROX CCA = 126 cm/s  DIST CCA = 99 cm/s  PROX ICA = 137 cm/s  MID ICA = 86 cm/s  DIST ICA = 80 cm/s  ECA = 202 cm/s    LEFT:  PROX CCA = 100 cm/s  DIST CCA = 59 cm/s  PROX ICA = 313 cm/s  MID ICA = 72 cm/s  DIST ICA = 47 cm/s  ECA = 298 cm/s    Antegrade flow is noted within both vertebral arteries.    IMPRESSION:    Left internal carotid artery 70-99% flow-limiting stenosis.  Right internal carotid artery 50-69% flow-limiting stenosis.  Bilateral external carotid artery flow limiting stenoses.  INDIANA Hammond was informed of these findings on 10/8/2021 at 5:06 PM.    Measurement of carotid stenosis is based on velocity parameters that correlate the residual internal carotid diameter with that of the more distal vessel in accordance with a method such as the [place] [employer] Symptomatic Carotid Endarterectomy Trial (NASCET).    --- End of Report ---    FELIX MCMAHON M.D., ATTENDING RADIOLOGIST  This document has been electronically signed. Oct  8 2021  5:21PM    < end of copied text >

## 2021-10-23 NOTE — PROGRESS NOTE ADULT - SUBJECTIVE AND OBJECTIVE BOX
TOM PLEITEZ  MRN-[id number]  Patient is a 60y old  Male who presents with a chief complaint of NSTEMI (22 Oct 2021 19:47)      HPI:  60 yr old M w/ hx of DM2, HTN and HLD presents as transfer from [facility] for chest pain concerning for NSTEMI and possible CHF.  Pt states that after he woke up this morning he felt midsternal chest pain this morning, that felt like a burning sensation. Pain was non radiating. Associated with shortness of breath. He initially took TUMS and drank some milk which alleviated some of the pain but not completely. Later he also started feeling very dizzy so he went to the ED. Denies associated palpitations, diaphoresis, N/V.  When he presented to [facility] he was noted to be SOB. Initially, they evaluated patient for CHF but Troponin and BNP levels were elevated so he was transferred here for NSTEMI and r/o CHF.    Per nursing notes, while at [facility], he was placed on BIPAP, given Solumedrol 120, Nitro SL x1, duoneb x2, ASA 81mg, Lasix 40mg w/ 200 cc output, and started at 10mL/hr Nitro drip then increased to 30mL/hr. There, initial /126 retake was 171/67. Nitro drip was d/c'd upon arrival to [facility] ED.     During my visit, patient was sitting up eating a sandwich. Appears comfortable on room air. Denies repeat of chest pain after this morning. Denies shortness of breath. Denies lower extremity edema, orthopnea or PND.     Labs also remarkable for JOSHUA, metabolic acidosis and hyperkalemia. Patient denies prior history of renal injury.       Wife can be reached at [phone]. Medication list confirmed w/ wife. Of note, patient w/ elevated BP to SBP 200s often at home; nifedipine used on a "as needed" basis for SBP > 200.  (06 Oct 2021 01:09)      Surgery/Hospital Course:  10/21 CABG and MVR    Today:  No acute events     ICU Vital Signs Last 24 Hrs  T(C): 37.2 (23 Oct 2021 04:00), Max: 37.9 (22 Oct 2021 08:00)  T(F): 99 (23 Oct 2021 04:00), Max: 100.2 (22 Oct 2021 08:00)  HR: 88 (23 Oct 2021 06:00) (65 - 90)  BP: 124/64 (23 Oct 2021 05:00) (116/62 - 148/70)  BP(mean): 87 (23 Oct 2021 05:00) (84 - 109)  ABP: 130/58 (23 Oct 2021 06:00) (91/62 - 171/61)  ABP(mean): 80 (23 Oct 2021 06:00) (61 - 108)  RR: 24 (23 Oct 2021 06:00) (16 - 35)  SpO2: 98% (23 Oct 2021 06:00) (92% - 100%)      Physical Exam:  Gen:  awake   CNS: nonfocal	  Neck: + ENT midline, no JVD  RES : clear , no wheezing    Chest:   + chest tubes                     CVS: Regular  rhythm. Normal S1/S2  Abd: Soft, non-distended. Bowel sounds present.  Skin: No rash.  Ext:  no edema, A Line    ============================I/O===========================   I&O's Detail    21 Oct 2021 07:01  -  22 Oct 2021 07:00  --------------------------------------------------------  IN:    Albumin 5%  - 250 mL: 750 mL    Dexmedetomidine: 266.8 mL    DOBUTamine: 108 mL    DOBUTamine: 20.4 mL    DOBUTamine: 12 mL    EPINEPHrine: 30 mL    EPINEPHrine: 36 mL    EPINEPHrine: 24 mL    Insulin: 24 mL    IV PiggyBack: 600 mL    Milrinone: 4.8 mL    Milrinone: 14.4 mL    NiCARdipine: 10 mL    Norepinephrine: 20 mL    Norepinephrine: 40 mL    Oral Fluid: 60 mL    Other (mL): 400 mL    sodium chloride 0.9%: 140 mL  Total IN: 2560.4 mL    OUT:    Bulb (mL): 84 mL    Chest Tube (mL): 350 mL    Chest Tube (mL): 30 mL    Chest Tube (mL): 260 mL    Indwelling Catheter - Urethral (mL): 310 mL    Other (mL): 250 mL    Vasopressin: 0 mL  Total OUT: 1284 mL    Total NET: 1276.4 mL      22 Oct 2021 07:01  -  23 Oct 2021 06:47  --------------------------------------------------------  IN:    Albumin 5%  - 250 mL: 250 mL    DOBUTamine: 9.6 mL    DOBUTamine: 237.6 mL    EPINEPHrine: 68 mL    Insulin: 26 mL    IV PiggyBack: 50 mL    Norepinephrine: 10 mL    Oral Fluid: 90 mL    PRBCs (Packed Red Blood Cells): 300 mL    sodium chloride 0.9%: 180 mL    Vasopressin: 16 mL  Total IN: 1237.2 mL    OUT:    Bulb (mL): 106 mL    Chest Tube (mL): 205 mL    Chest Tube (mL): 40 mL    Chest Tube (mL): 150 mL    Indwelling Catheter - Urethral (mL): 390 mL    Other (mL): 0 mL  Total OUT: 891 mL    Total NET: 346.2 mL        ============================ LABS =========================                        9.0    13.07 )-----------( 92       ( 23 Oct 2021 00:21 )             27.5     10-23    136  |  100  |  24<H>  ----------------------------<  126<H>  4.2   |  23  |  2.68<H>    Ca    8.7      23 Oct 2021 00:21  Phos  4.4     10-23  Mg     2.3     10-23    TPro  5.2<L>  /  Alb  3.2<L>  /  TBili  0.4  /  DBili  x   /  AST  88<H>  /  ALT  22  /  AlkPhos  60  10-23    LIVER FUNCTIONS - ( 23 Oct 2021 00:21 )  Alb: 3.2 g/dL / Pro: 5.2 g/dL / ALK PHOS: 60 U/L / ALT: 22 U/L / AST: 88 U/L / GGT: x           PT/INR - ( 21 Oct 2021 22:47 )   PT: 13.6 sec;   INR: 1.14 ratio         PTT - ( 21 Oct 2021 22:47 )  PTT:49.6 sec  ABG - ( 23 Oct 2021 06:26 )  pH, Arterial: 7.40  pH, Blood: x     /  pCO2: 41    /  pO2: 85    / HCO3: 25    / Base Excess: 0.5   /  SaO2: 97.6                ======================Micro/Rad/Cardio=================  CXR: Reviewed  Echo:Reviewed  ======================================================  PAST MEDICAL & SURGICAL HISTORY:  Hypertension    Hyperlipidemia    Diabetes mellitus    No pertinent past surgical history      ====================ASSESSMENT ==============  CAD, mitral valve regurgitation S/P CABG, MVR on 10/21/2021  Hypertension  DMT2  Acute Blood Loss Anemia S/P 4u PRBC, 1 PLT, and 1000 FEIBA  Leukocytosis   Elevated BUN/Cr  Hypercalcemia  hyperkalemia   Hypovolemic shock  Post op respiratory insufficiency     Plan:  ====================== NEUROLOGY=====================  Continue close monitoring of neuro status   Tylenol, Meperidine, PRN Oxycodone, and PRN Dilaudid for pain management.     acetaminophen   Tablet .. 650 milliGRAM(s) Oral every 6 hours  HYDROmorphone  Injectable 0.5 milliGRAM(s) IV Push every 6 hours PRN Severe Pain (7 - 10)  meperidine     Injectable 25 milliGRAM(s) IV Push once  oxyCODONE    IR 5 milliGRAM(s) Oral every 4 hours PRN Moderate Pain (4 - 6)  oxyCODONE    IR 10 milliGRAM(s) Oral every 4 hours PRN Severe Pain (7 - 10)    ==================== RESPIRATORY======================  Stable on RA, SpO2 92% - 100%  Encourage incentive spirometry, continue pulse ox monitoring, follow ABGs     ====================CARDIOVASCULAR==================  CAD, mitral valve regurgitation S/P CABG, MVR on 10/21/2021  Invasive hemodynamic monitoring, assess perfusion indices.   Continue with ASA/lipator for graft patency   Continue with IV Dobutamine and IV Primacor for inotropic support, Continue to wean inotropes   Pressor support with IV vasopressin     DOBUTamine Infusion 2 MICROgram(s)/kG/Min (4.81 mL/Hr) IV Continuous <Continuous>  norepinephrine Infusion 0.01 MICROgram(s)/kG/Min (1.5 mL/Hr) IV Continuous <Continuous>  atorvastatin 80 milliGRAM(s) Oral at bedtime  aspirin enteric coated 325 milliGRAM(s) Oral daily  vasopressin Infusion 0.033 Unit(s)/Min (2 mL/Hr) IV Continuous <Continuous>    ===================HEMATOLOGIC/ONC ===================  Thrombocytopenia and  acute blood loss anemia  S/P 4u PRBC, 1 PLT, and 1000 FEIBA.  Monitor H/H and PLTs.    VTE prophylaxis, heparin   Injectable 5000 Unit(s) SubCutaneous every 8 hours    ===================== RENAL =========================  Elevated BUN/Cr  Hypercalcemia  hyperkalemia   Continue to monitor I/Os, BUN/Creatinine, and urine output.   Goal net negative fluid balance. Replete lytes PRN. Keep K> 4 and Mg >2.      ==================== GASTROINTESTINAL===================  Tolerating PO consistent carb diet.   Reglan for gut motility   Bowel regimen with Miralax and Senna     ascorbic acid 500 milliGRAM(s) Oral two times a day  bisacodyl Suppository 10 milliGRAM(s) Rectal once  GI prophylaxis, pantoprazole    Tablet 40 milliGRAM(s) Oral before breakfast  polyethylene glycol 3350 17 Gram(s) Oral daily  polyethylene glycol 3350 17 Gram(s) Oral daily  senna 2 Tablet(s) Oral at bedtime  sodium chloride 0.9%. 1000 milliLiter(s) (10 mL/Hr) IV Continuous <Continuous>    =======================    ENDOCRINE  =====================  History of Type 2 Diabetes Mellitus, continue with insulin gtt    dextrose 50% Injectable 50 milliLiter(s) IV Push every 15 minutes  dextrose 50% Injectable 25 milliLiter(s) IV Push every 15 minutes  insulin regular Infusion 3 Unit(s)/Hr (3 mL/Hr) IV Continuous <Continuous>    ========================INFECTIOUS DISEASE================  Temp 99F, WBC rising 11.48->13.07  Continue trending WBC and monitoring fever curve   Perioperative coverage with Cefuroxime     cefuroxime  IVPB 1500 milliGRAM(s) IV Intermittent every 24 hours          I have spent 35 minutes providing acute care for this critically ill patient     Patient requires continuous monitoring with bedside rhythm monitoring, pulse ox monitoring, and intermittent blood gas analysis. Care plan discussed with ICU care team. Patient remained critical and at risk for life threatening decompensation.     By signing my name below, I, Verena Chau, attest that this documentation has been prepared under the direction and in the presence of Geovanni Stone MD   Electronically signed: Jm Goodrich, 10-23-21 @ 06:47    I, Geovanni Stone, personally performed the services described in this documentation. all medical record entries made by the scribe were at my direction and in my presence. I have reviewed the chart and agree that the record reflects my personal performance and is accurate and complete  Electronically signed: Geovanni Stone MD        TOM PLEITEZ  MRN-[id number]  Patient is a 60y old  Male who presents with a chief complaint of NSTEMI (22 Oct 2021 19:47)      HPI:  60 yr old M w/ hx of DM2, HTN and HLD presents as transfer from [facility] for chest pain concerning for NSTEMI and possible CHF.  Pt states that after he woke up this morning he felt midsternal chest pain this morning, that felt like a burning sensation. Pain was non radiating. Associated with shortness of breath. He initially took TUMS and drank some milk which alleviated some of the pain but not completely. Later he also started feeling very dizzy so he went to the ED. Denies associated palpitations, diaphoresis, N/V.  When he presented to [facility] he was noted to be SOB. Initially, they evaluated patient for CHF but Troponin and BNP levels were elevated so he was transferred here for NSTEMI and r/o CHF.    Per nursing notes, while at [facility], he was placed on BIPAP, given Solumedrol 120, Nitro SL x1, duoneb x2, ASA 81mg, Lasix 40mg w/ 200 cc output, and started at 10mL/hr Nitro drip then increased to 30mL/hr. There, initial /126 retake was 171/67. Nitro drip was d/c'd upon arrival to [facility] ED.     During my visit, patient was sitting up eating a sandwich. Appears comfortable on room air. Denies repeat of chest pain after this morning. Denies shortness of breath. Denies lower extremity edema, orthopnea or PND.     Labs also remarkable for JOSHUA, metabolic acidosis and hyperkalemia. Patient denies prior history of renal injury.       Wife can be reached at [phone]. Medication list confirmed w/ wife. Of note, patient w/ elevated BP to SBP 200s often at home; nifedipine used on a "as needed" basis for SBP > 200.  (06 Oct 2021 01:09)      Surgery/Hospital Course:  10/21 CABG and MVR    Today:  - Plan to remove swan this AM   - Continue to ambulate as tolerated     ICU Vital Signs Last 24 Hrs  T(C): 37.2 (23 Oct 2021 04:00), Max: 37.9 (22 Oct 2021 08:00)  T(F): 99 (23 Oct 2021 04:00), Max: 100.2 (22 Oct 2021 08:00)  HR: 88 (23 Oct 2021 06:00) (65 - 90)  BP: 124/64 (23 Oct 2021 05:00) (116/62 - 148/70)  BP(mean): 87 (23 Oct 2021 05:00) (84 - 109)  ABP: 130/58 (23 Oct 2021 06:00) (91/62 - 171/61)  ABP(mean): 80 (23 Oct 2021 06:00) (61 - 108)  RR: 24 (23 Oct 2021 06:00) (16 - 35)  SpO2: 98% (23 Oct 2021 06:00) (92% - 100%)      Physical Exam:  Gen:  awake   CNS: nonfocal	  Neck: + ENT midline, no JVD  RES : clear , no wheezing    Chest:   + chest tubes                     CVS: Regular  rhythm. Normal S1/S2  Abd: Soft, non-distended. Bowel sounds present.  Skin: No rash.  Ext:  no edema, A Line    ============================I/O===========================   I&O's Detail    21 Oct 2021 07:01  -  22 Oct 2021 07:00  --------------------------------------------------------  IN:    Albumin 5%  - 250 mL: 750 mL    Dexmedetomidine: 266.8 mL    DOBUTamine: 108 mL    DOBUTamine: 20.4 mL    DOBUTamine: 12 mL    EPINEPHrine: 30 mL    EPINEPHrine: 36 mL    EPINEPHrine: 24 mL    Insulin: 24 mL    IV PiggyBack: 600 mL    Milrinone: 4.8 mL    Milrinone: 14.4 mL    NiCARdipine: 10 mL    Norepinephrine: 20 mL    Norepinephrine: 40 mL    Oral Fluid: 60 mL    Other (mL): 400 mL    sodium chloride 0.9%: 140 mL  Total IN: 2560.4 mL    OUT:    Bulb (mL): 84 mL    Chest Tube (mL): 350 mL    Chest Tube (mL): 30 mL    Chest Tube (mL): 260 mL    Indwelling Catheter - Urethral (mL): 310 mL    Other (mL): 250 mL    Vasopressin: 0 mL  Total OUT: 1284 mL    Total NET: 1276.4 mL      22 Oct 2021 07:01  -  23 Oct 2021 06:47  --------------------------------------------------------  IN:    Albumin 5%  - 250 mL: 250 mL    DOBUTamine: 9.6 mL    DOBUTamine: 237.6 mL    EPINEPHrine: 68 mL    Insulin: 26 mL    IV PiggyBack: 50 mL    Norepinephrine: 10 mL    Oral Fluid: 90 mL    PRBCs (Packed Red Blood Cells): 300 mL    sodium chloride 0.9%: 180 mL    Vasopressin: 16 mL  Total IN: 1237.2 mL    OUT:    Bulb (mL): 106 mL    Chest Tube (mL): 205 mL    Chest Tube (mL): 40 mL    Chest Tube (mL): 150 mL    Indwelling Catheter - Urethral (mL): 390 mL    Other (mL): 0 mL  Total OUT: 891 mL    Total NET: 346.2 mL        ============================ LABS =========================                        9.0    13.07 )-----------( 92       ( 23 Oct 2021 00:21 )             27.5     10-23    136  |  100  |  24<H>  ----------------------------<  126<H>  4.2   |  23  |  2.68<H>    Ca    8.7      23 Oct 2021 00:21  Phos  4.4     10-23  Mg     2.3     10-23    TPro  5.2<L>  /  Alb  3.2<L>  /  TBili  0.4  /  DBili  x   /  AST  88<H>  /  ALT  22  /  AlkPhos  60  10-23    LIVER FUNCTIONS - ( 23 Oct 2021 00:21 )  Alb: 3.2 g/dL / Pro: 5.2 g/dL / ALK PHOS: 60 U/L / ALT: 22 U/L / AST: 88 U/L / GGT: x           PT/INR - ( 21 Oct 2021 22:47 )   PT: 13.6 sec;   INR: 1.14 ratio         PTT - ( 21 Oct 2021 22:47 )  PTT:49.6 sec  ABG - ( 23 Oct 2021 06:26 )  pH, Arterial: 7.40  pH, Blood: x     /  pCO2: 41    /  pO2: 85    / HCO3: 25    / Base Excess: 0.5   /  SaO2: 97.6                ======================Micro/Rad/Cardio=================  CXR: Reviewed  Echo:Reviewed  ======================================================  PAST MEDICAL & SURGICAL HISTORY:  Hypertension    Hyperlipidemia    Diabetes mellitus    No pertinent past surgical history      ====================ASSESSMENT ==============  CAD, mitral valve regurgitation S/P CABG, MVR on 10/21/2021  Hypertension  DMT2  Acute Blood Loss Anemia S/P 4u PRBC, 1 PLT, and 1000 FEIBA  Leukocytosis   Elevated BUN/Cr  Hypercalcemia  hyperkalemia   Hypovolemic shock  Post op respiratory insufficiency     Plan:  ====================== NEUROLOGY=====================  Continue close monitoring of neuro status   Tylenol, Meperidine, PRN Oxycodone, and PRN Dilaudid for pain management.     acetaminophen   Tablet .. 650 milliGRAM(s) Oral every 6 hours  HYDROmorphone  Injectable 0.5 milliGRAM(s) IV Push every 6 hours PRN Severe Pain (7 - 10)  meperidine     Injectable 25 milliGRAM(s) IV Push once  oxyCODONE    IR 5 milliGRAM(s) Oral every 4 hours PRN Moderate Pain (4 - 6)  oxyCODONE    IR 10 milliGRAM(s) Oral every 4 hours PRN Severe Pain (7 - 10)    ==================== RESPIRATORY======================  Stable on RA, SpO2 92% - 100%  Encourage incentive spirometry, continue pulse ox monitoring, follow ABGs     ====================CARDIOVASCULAR==================  CAD, mitral valve regurgitation S/P CABG, MVR on 10/21/2021  Invasive hemodynamic monitoring, assess perfusion indices.   Continue with ASA/lipator for graft patency   Continue with IV Dobutamine for inotropic support, Continue to wean inotropes    Pressor support with IV vasopressin and IV levophed     DOBUTamine Infusion 2 MICROgram(s)/kG/Min (4.81 mL/Hr) IV Continuous <Continuous>  norepinephrine Infusion 0.01 MICROgram(s)/kG/Min (1.5 mL/Hr) IV Continuous <Continuous>  atorvastatin 80 milliGRAM(s) Oral at bedtime  aspirin enteric coated 325 milliGRAM(s) Oral daily  vasopressin Infusion 0.033 Unit(s)/Min (2 mL/Hr) IV Continuous <Continuous>    ===================HEMATOLOGIC/ONC ===================  Thrombocytopenia and  acute blood loss anemia  S/P 4u PRBC, 1 PLT, and 1000 FEIBA.  Monitor H/H and PLTs.    VTE prophylaxis, heparin   Injectable 5000 Unit(s) SubCutaneous every 8 hours    ===================== RENAL =========================  Elevated BUN/Cr  Hypercalcemia  hyperkalemia   Continue to monitor I/Os, BUN/Creatinine, and urine output.   Goal net negative fluid balance. Replete lytes PRN. Keep K> 4 and Mg >2.      ==================== GASTROINTESTINAL===================  Tolerating PO consistent carb diet.   Reglan for gut motility   Bowel regimen with Miralax and Senna     ascorbic acid 500 milliGRAM(s) Oral two times a day  bisacodyl Suppository 10 milliGRAM(s) Rectal once  GI prophylaxis, pantoprazole    Tablet 40 milliGRAM(s) Oral before breakfast  polyethylene glycol 3350 17 Gram(s) Oral daily  polyethylene glycol 3350 17 Gram(s) Oral daily  senna 2 Tablet(s) Oral at bedtime  sodium chloride 0.9%. 1000 milliLiter(s) (10 mL/Hr) IV Continuous <Continuous>    =======================    ENDOCRINE  =====================  History of Type 2 Diabetes Mellitus, continue with insulin gtt    dextrose 50% Injectable 50 milliLiter(s) IV Push every 15 minutes  dextrose 50% Injectable 25 milliLiter(s) IV Push every 15 minutes  insulin regular Infusion 3 Unit(s)/Hr (3 mL/Hr) IV Continuous <Continuous>    ========================INFECTIOUS DISEASE================  Temp 99F, WBC rising 11.48->13.07  Continue trending WBC and monitoring fever curve   Perioperative coverage with Cefuroxime     cefuroxime  IVPB 1500 milliGRAM(s) IV Intermittent every 24 hours          I have spent 35 minutes providing acute care for this critically ill patient     Patient requires continuous monitoring with bedside rhythm monitoring, pulse ox monitoring, and intermittent blood gas analysis. Care plan discussed with ICU care team. Patient remained critical and at risk for life threatening decompensation.     By signing my name below, I, Verena Chau, attest that this documentation has been prepared under the direction and in the presence of Geovanni Stone MD   Electronically signed: Jm Goodrich, 10-23-21 @ 06:47    I, Geovanni Stone, personally performed the services described in this documentation. all medical record entries made by the scribe were at my direction and in my presence. I have reviewed the chart and agree that the record reflects my personal performance and is accurate and complete  Electronically signed: Geovanni Stone MD

## 2021-10-23 NOTE — PROGRESS NOTE ADULT - SUBJECTIVE AND OBJECTIVE BOX
[facility] DIVISION OF KIDNEY DISEASES AND HYPERTENSION -- PROGRESS NOTE    Chief complaint: JOSHUA, hypervolemia    24 hour events/subjective:  had HD w/o UF yesterday      PAST HISTORY  --------------------------------------------------------------------------------  No significant changes to PMH, PSH, FHx, SHx, unless otherwise noted    ALLERGIES & MEDICATIONS  --------------------------------------------------------------------------------  Allergies    No Known Allergies    Intolerances      Standing Inpatient Medications  acetaminophen   Tablet .. 650 milliGRAM(s) Oral every 6 hours  ascorbic acid 500 milliGRAM(s) Oral two times a day  aspirin enteric coated 325 milliGRAM(s) Oral daily  atorvastatin 80 milliGRAM(s) Oral at bedtime  bisacodyl Suppository 10 milliGRAM(s) Rectal once  chlorhexidine 2% Cloths 1 Application(s) Topical daily  dextrose 50% Injectable 50 milliLiter(s) IV Push every 15 minutes  dextrose 50% Injectable 25 milliLiter(s) IV Push every 15 minutes  DOBUTamine Infusion 2 MICROgram(s)/kG/Min IV Continuous <Continuous>  heparin   Injectable 5000 Unit(s) SubCutaneous every 8 hours  pantoprazole    Tablet 40 milliGRAM(s) Oral before breakfast  polyethylene glycol 3350 17 Gram(s) Oral daily  polyethylene glycol 3350 17 Gram(s) Oral daily  senna 2 Tablet(s) Oral at bedtime  sodium chloride 0.9%. 1000 milliLiter(s) IV Continuous <Continuous>    PRN Inpatient Medications  HYDROmorphone  Injectable 0.5 milliGRAM(s) IV Push every 6 hours PRN  oxyCODONE    IR 5 milliGRAM(s) Oral every 4 hours PRN  oxyCODONE    IR 10 milliGRAM(s) Oral every 4 hours PRN      REVIEW OF SYSTEMS  --------------------------------------------------------------------------------  Constitutional: [ ] Fever [ ] Chills [ ] Fatigue [ ] Weight change   HEENT: [ ] Blurred vision [ ] Eye Pain [ ] Headache [ ] Runny nose [ ] Sore Throat   Respiratory: [ ] Cough [ ] Wheezing [ ] Shortness of breath  Cardiovascular: [ ] Chest Pain [ ] Palpitations [ ] WYNNE [ ] PND [ ] Orthopnea  Gastrointestinal: [ ] Abdominal Pain [ ] Diarrhea [ ] Constipation [ ] Hemorrhoids [ ] Nausea [ ] Vomiting  Genitourinary: [ ] Nocturia [ ] Dysuria [ ] Incontinence  Extremities: [ ] Swelling [ ] Joint Pain  Neurologic: [ ] Focal deficit [ ] Paresthesias [ ] Syncope  Lymphatic: [ ] Swelling [ ] Lymphadenopathy   Skin: [ ] Rash [ ] Ecchymoses [ ] Wounds [ ] Lesions  Psychiatry: [ ] Depression [ ] Suicidal/Homicidal Ideation [ ] Anxiety [ ] Sleep Disturbances  [x ] 10 point review of systems is otherwise negative except as mentioned above              [ ]Unable to obtain due to   All other systems were reviewed and are negative, except as noted.    VITALS/PHYSICAL EXAM  --------------------------------------------------------------------------------  T(C): 36.8 (10-23-21 @ 08:00), Max: 37.2 (10-23-21 @ 00:00)  HR: 80 (10-23-21 @ 11:00) (65 - 90)  BP: 128/73 (10-23-21 @ 11:00) (116/62 - 148/70)  RR: 26 (10-23-21 @ 11:00) (16 - 30)  SpO2: 94% (10-23-21 @ 11:00) (92% - 100%)  Wt(kg): --        10-22-21 @ 07:01  -  10-23-21 @ 07:00  --------------------------------------------------------  IN: 1247 mL / OUT: 926 mL / NET: 321 mL    10-23-21 @ 07:01  -  10-23-21 @ 11:17  --------------------------------------------------------  IN: 239.2 mL / OUT: 100 mL / NET: 139.2 mL      Physical Exam:  	Gen: NAD, well-appearing  	HEENT: on room air  	Pulm: CTA B/L  	CV: normal S1S2; no rub  	Abd: soft                      Back : No sacral edema  	: + martínez  	LE: improved LE edema  	Skin: Warm, without rashes      LABS/STUDIES  --------------------------------------------------------------------------------              9.0    13.07 >-----------<  92       [10-23-21 @ 00:21]              27.5     136  |  100  |  24  ----------------------------<  126      [10-23-21 @ 00:21]  4.2   |  23  |  2.68        Ca     8.7     [10-23-21 @ 00:21]      Mg     2.3     [10-23-21 @ 00:21]      Phos  4.4     [10-23-21 @ 00:21]    TPro  5.2  /  Alb  3.2  /  TBili  0.4  /  DBili  x   /  AST  88  /  ALT  22  /  AlkPhos  60  [10-23-21 @ 00:21]    PT/INR: PT 13.6 , INR 1.14       [10-21-21 @ 22:47]  PTT: 49.6       [10-21-21 @ 22:47]          [10-21-21 @ 17:22]    Creatinine Trend:  SCr 2.68 [10-23 @ 00:21]  SCr 2.73 [10-22 @ 03:38]  SCr 3.15 [10-21 @ 17:22]  SCr 4.67 [10-20 @ 06:13]  SCr 3.70 [10-19 @ 07:20]    Urinalysis - [10-07-21 @ 10:09]      Color Light Yellow / Appearance Clear / SG 1.015 / pH 6.0      Gluc 200 mg/dL / Ketone Negative  / Bili Negative / Urobili Negative       Blood Small / Protein 300 mg/dL / Leuk Est Negative / Nitrite Negative      RBC 2 / WBC 7 / Hyaline 6 / Gran  / Sq Epi  / Non Sq Epi 2 / Bacteria Negative      Iron 47, TIBC 245, %sat 19      [10-13-21 @ 13:10]  Ferritin 780      [10-13-21 @ 08:59]  TSH 6.40      [10-20-21 @ 17:03]    HBsAg Nonreact      [10-13-21 @ 09:15]  HCV 0.23, Nonreact      [10-07-21 @ 14:38]  HIV Nonreact      [10-13-21 @ 09:16]    BEULAH: titer Negative, pattern --      [10-07-21 @ 14:37]  C3 Complement 135      [10-07-21 @ 14:42]  C4 Complement 71      [10-07-21 @ 14:42]  ANCA: cANCA Negative, pANCA Negative, atypical ANCA Negative      [10-07-21 @ 14:37]  Syphilis Screen (Treponema Pallidum Ab) Negative      [10-07-21 @ 14:37]  PLA2R: SHANNAN <1.8, IFA --      [10-13-21 @ 13:55]  Free Light Chains: kappa 10.89, lambda 12.51, ratio = 0.87      [10-07 @ 14:42]  Immunofixation Serum:   No Monoclonal Band Identified    Reference Range: None Detected      [10-07-21 @ 14:42]  SPEP Interpretation: Normal Electrophoresis Pattern      [10-07-21 @ 14:42]

## 2021-10-23 NOTE — PROGRESS NOTE ADULT - ASSESSMENT
59 yo male with DM2, HTN, and HLD who initially presented to [facility] on 10/05 as a transfer from [facility] for NSTEMI and possible CHF. Patient on Heparin drip for NSTEMI. LKW 10:45AM. Patient had episode of bradycardia (HR 30s), then became altered. RRT called. BP during RRT 70s/40s. Code stroke called for L facial droop.   CTH negative for acute pathology, old L frontal cortical infarct seen.   CTA H unremarkable. CTA N shows high-grade stenosis left internal carotid artery at the carotid bifurcation in the neck.  10/06 TTE: EF 45%, moderate-severe MR, mild-moderate L ventricular systolic dysfunction.   A1c 7  MRI no AIS but old strokes  HD cath placement 10/12   CD with severe L ICA and Mod R ICA   s/p LHC 10/14  CABG 10/21  10/22 o/e COTO aaoX 2   10/23 now AAOx3 on    Impression: Mild L nasolabial flattening and dysarthria, ?decreased eye closure strength on the L. Possibly secondary to R brain dysfunction due to acute stroke of unknown mechanism vs peripheral etiology. Patient with old L frontal infarct on CTH, also with high-grade stenosis of L ICA at the carotid bifurcation which likely cause of old stroke     Recommendations:  - wean of pressors as tolerated. on vaso//epi., now only on    - ASA 81MG PO daily  - Avoid hypotension.  - High dose statin therapy - atorvastatin 40mg PO daily. LDL goal <70mg/dL.  -  vascular for ICA revascularization can be outpt. not acute   - lipid panel  - telemetry  - PT/OT/SS/SLP, OOBC  - permissive HTN, -180mmHg.  - check FS, glucose control <180  - GI/DVT ppx  - Counseling on diet, exercise, and medication adherence was done  - Counseling on smoking cessation and alcohol consumption offered when appropriate.  - Pain assessed and judicious use of narcotics when appropriate was discussed.    - Stroke education given when appropriate.  - Importance of fall prevention discussed.   - Differential diagnosis and plan of care discussed with patient and/or family and primary team  - Thank you for allowing me to participate in the care of this patient. Call with questions.   - remainin per [facility]ICU  Marcelino Patel MD  Vascular Neurology .

## 2021-10-23 NOTE — PROGRESS NOTE ADULT - ASSESSMENT
Assessment  DMT2: 60y Male with DM T2 with hyperglycemia, A1C 7%, was on oral meds and insulin at home, now postop CABG, received Humulin doses for hyperkalemia, off IV insulin, blood sugars  trending within overall acceptable range, no hypoglycemic episodes, started eating meals.  Hypothyroidism: Patient has no history thyroid disease, was not on any thyroid supplements, TSH elevated, FT4 pending.  CAD: s/p CABG, on medications, no chest pain, stable, monitored.  HTN: Controlled,  on antihypertensive medications.  HLD: On statin  Obesity: No strict exercise routines, not on any weight loss program, neither on low calorie diet.        Esperanza Beckett MD  Cell: [phone]  Office: [phone]

## 2021-10-23 NOTE — PROGRESS NOTE ADULT - PROBLEM SELECTOR PLAN 1
Will start Lantus 5 units at bed time.  Patient counseled for compliance with consistent low carb diet.

## 2021-10-24 LAB
ALBUMIN SERPL ELPH-MCNC: 3.2 G/DL — LOW (ref 3.3–5)
ALP SERPL-CCNC: 78 U/L — SIGNIFICANT CHANGE UP (ref 40–120)
ALT FLD-CCNC: 26 U/L — SIGNIFICANT CHANGE UP (ref 10–45)
ANION GAP SERPL CALC-SCNC: 17 MMOL/L — SIGNIFICANT CHANGE UP (ref 5–17)
AST SERPL-CCNC: 60 U/L — HIGH (ref 10–40)
BASE EXCESS BLDV CALC-SCNC: -0.6 MMOL/L — SIGNIFICANT CHANGE UP (ref -2–2)
BASE EXCESS BLDV CALC-SCNC: -1.7 MMOL/L — SIGNIFICANT CHANGE UP (ref -2–2)
BASE EXCESS BLDV CALC-SCNC: -2.1 MMOL/L — LOW (ref -2–2)
BILIRUB SERPL-MCNC: 0.3 MG/DL — SIGNIFICANT CHANGE UP (ref 0.2–1.2)
BUN SERPL-MCNC: 42 MG/DL — HIGH (ref 7–23)
CA-I SERPL-SCNC: 1.18 MMOL/L — SIGNIFICANT CHANGE UP (ref 1.15–1.33)
CA-I SERPL-SCNC: 1.19 MMOL/L — SIGNIFICANT CHANGE UP (ref 1.15–1.33)
CALCIUM SERPL-MCNC: 8.8 MG/DL — SIGNIFICANT CHANGE UP (ref 8.4–10.5)
CHLORIDE BLDV-SCNC: 102 MMOL/L — SIGNIFICANT CHANGE UP (ref 96–108)
CHLORIDE BLDV-SCNC: 98 MMOL/L — SIGNIFICANT CHANGE UP (ref 96–108)
CHLORIDE SERPL-SCNC: 97 MMOL/L — SIGNIFICANT CHANGE UP (ref 96–108)
CO2 BLDV-SCNC: 24 MMOL/L — SIGNIFICANT CHANGE UP (ref 22–26)
CO2 BLDV-SCNC: 26 MMOL/L — SIGNIFICANT CHANGE UP (ref 22–26)
CO2 BLDV-SCNC: 26 MMOL/L — SIGNIFICANT CHANGE UP (ref 22–26)
CO2 SERPL-SCNC: 20 MMOL/L — LOW (ref 22–31)
CREAT SERPL-MCNC: 3.92 MG/DL — HIGH (ref 0.5–1.3)
GAS PNL BLDA: SIGNIFICANT CHANGE UP
GAS PNL BLDV: 132 MMOL/L — LOW (ref 136–145)
GAS PNL BLDV: 133 MMOL/L — LOW (ref 136–145)
GAS PNL BLDV: SIGNIFICANT CHANGE UP
GLUCOSE BLDC GLUCOMTR-MCNC: 158 MG/DL — HIGH (ref 70–99)
GLUCOSE BLDC GLUCOMTR-MCNC: 158 MG/DL — HIGH (ref 70–99)
GLUCOSE BLDC GLUCOMTR-MCNC: 162 MG/DL — HIGH (ref 70–99)
GLUCOSE BLDC GLUCOMTR-MCNC: 184 MG/DL — HIGH (ref 70–99)
GLUCOSE BLDC GLUCOMTR-MCNC: 206 MG/DL — HIGH (ref 70–99)
GLUCOSE BLDV-MCNC: 180 MG/DL — HIGH (ref 70–99)
GLUCOSE BLDV-MCNC: 185 MG/DL — HIGH (ref 70–99)
GLUCOSE SERPL-MCNC: 185 MG/DL — HIGH (ref 70–99)
HCO3 BLDV-SCNC: 23 MMOL/L — SIGNIFICANT CHANGE UP (ref 22–29)
HCO3 BLDV-SCNC: 24 MMOL/L — SIGNIFICANT CHANGE UP (ref 22–29)
HCO3 BLDV-SCNC: 25 MMOL/L — SIGNIFICANT CHANGE UP (ref 22–29)
HCT VFR BLD CALC: 27.4 % — LOW (ref 39–50)
HCT VFR BLDA CALC: 26 % — LOW (ref 39–51)
HCT VFR BLDA CALC: 29 % — LOW (ref 39–51)
HGB BLD CALC-MCNC: 8.6 G/DL — LOW (ref 12.6–17.4)
HGB BLD CALC-MCNC: 9.5 G/DL — LOW (ref 12.6–17.4)
HGB BLD-MCNC: 9.1 G/DL — LOW (ref 13–17)
HOROWITZ INDEX BLDV+IHG-RTO: 40 — SIGNIFICANT CHANGE UP
HOROWITZ INDEX BLDV+IHG-RTO: 60 — SIGNIFICANT CHANGE UP
HOROWITZ INDEX BLDV+IHG-RTO: 80 — SIGNIFICANT CHANGE UP
INR BLD: 1.35 RATIO — HIGH (ref 0.88–1.16)
LACTATE BLDV-MCNC: 1.2 MMOL/L — SIGNIFICANT CHANGE UP (ref 0.7–2)
LACTATE BLDV-MCNC: 1.2 MMOL/L — SIGNIFICANT CHANGE UP (ref 0.7–2)
MAGNESIUM SERPL-MCNC: 2.3 MG/DL — SIGNIFICANT CHANGE UP (ref 1.6–2.6)
MCHC RBC-ENTMCNC: 29.9 PG — SIGNIFICANT CHANGE UP (ref 27–34)
MCHC RBC-ENTMCNC: 33.2 GM/DL — SIGNIFICANT CHANGE UP (ref 32–36)
MCV RBC AUTO: 90.1 FL — SIGNIFICANT CHANGE UP (ref 80–100)
NRBC # BLD: 0 /100 WBCS — SIGNIFICANT CHANGE UP (ref 0–0)
PCO2 BLDV: 41 MMHG — LOW (ref 42–55)
PCO2 BLDV: 44 MMHG — SIGNIFICANT CHANGE UP (ref 42–55)
PCO2 BLDV: 45 MMHG — SIGNIFICANT CHANGE UP (ref 42–55)
PH BLDV: 7.34 — SIGNIFICANT CHANGE UP (ref 7.32–7.43)
PH BLDV: 7.36 — SIGNIFICANT CHANGE UP (ref 7.32–7.43)
PH BLDV: 7.36 — SIGNIFICANT CHANGE UP (ref 7.32–7.43)
PHOSPHATE SERPL-MCNC: 6.5 MG/DL — HIGH (ref 2.5–4.5)
PLATELET # BLD AUTO: 98 K/UL — LOW (ref 150–400)
PO2 BLDV: 35 MMHG — SIGNIFICANT CHANGE UP (ref 25–45)
PO2 BLDV: 40 MMHG — SIGNIFICANT CHANGE UP (ref 25–45)
PO2 BLDV: 42 MMHG — SIGNIFICANT CHANGE UP (ref 25–45)
POTASSIUM BLDV-SCNC: 4.5 MMOL/L — SIGNIFICANT CHANGE UP (ref 3.5–5.1)
POTASSIUM BLDV-SCNC: 4.5 MMOL/L — SIGNIFICANT CHANGE UP (ref 3.5–5.1)
POTASSIUM SERPL-MCNC: 4.8 MMOL/L — SIGNIFICANT CHANGE UP (ref 3.5–5.3)
POTASSIUM SERPL-SCNC: 4.8 MMOL/L — SIGNIFICANT CHANGE UP (ref 3.5–5.3)
PROT SERPL-MCNC: 5.7 G/DL — LOW (ref 6–8.3)
PROTHROM AB SERPL-ACNC: 16 SEC — HIGH (ref 10.6–13.6)
RBC # BLD: 3.04 M/UL — LOW (ref 4.2–5.8)
RBC # FLD: 15.9 % — HIGH (ref 10.3–14.5)
SAO2 % BLDV: 63 % — LOW (ref 67–88)
SAO2 % BLDV: 67.9 % — SIGNIFICANT CHANGE UP (ref 67–88)
SAO2 % BLDV: 74.3 % — SIGNIFICANT CHANGE UP (ref 67–88)
SODIUM SERPL-SCNC: 134 MMOL/L — LOW (ref 135–145)
WBC # BLD: 13.93 K/UL — HIGH (ref 3.8–10.5)
WBC # FLD AUTO: 13.93 K/UL — HIGH (ref 3.8–10.5)

## 2021-10-24 PROCEDURE — 71045 X-RAY EXAM CHEST 1 VIEW: CPT | Mod: 26

## 2021-10-24 PROCEDURE — 99291 CRITICAL CARE FIRST HOUR: CPT

## 2021-10-24 PROCEDURE — 93010 ELECTROCARDIOGRAM REPORT: CPT

## 2021-10-24 PROCEDURE — 99233 SBSQ HOSP IP/OBS HIGH 50: CPT | Mod: GC

## 2021-10-24 RX ORDER — DEXTROSE 50 % IN WATER 50 %
25 SYRINGE (ML) INTRAVENOUS ONCE
Refills: 0 | Status: DISCONTINUED | OUTPATIENT
Start: 2021-10-24 | End: 2021-11-02

## 2021-10-24 RX ORDER — GLUCAGON INJECTION, SOLUTION 0.5 MG/.1ML
1 INJECTION, SOLUTION SUBCUTANEOUS ONCE
Refills: 0 | Status: DISCONTINUED | OUTPATIENT
Start: 2021-10-24 | End: 2021-11-02

## 2021-10-24 RX ORDER — NICARDIPINE HYDROCHLORIDE 30 MG/1
5 CAPSULE, EXTENDED RELEASE ORAL
Qty: 40 | Refills: 0 | Status: DISCONTINUED | OUTPATIENT
Start: 2021-10-24 | End: 2021-10-24

## 2021-10-24 RX ORDER — HYDRALAZINE HCL 50 MG
10 TABLET ORAL ONCE
Refills: 0 | Status: COMPLETED | OUTPATIENT
Start: 2021-10-24 | End: 2021-10-24

## 2021-10-24 RX ORDER — DEXTROSE 50 % IN WATER 50 %
12.5 SYRINGE (ML) INTRAVENOUS ONCE
Refills: 0 | Status: DISCONTINUED | OUTPATIENT
Start: 2021-10-24 | End: 2021-11-02

## 2021-10-24 RX ORDER — INSULIN LISPRO 100/ML
VIAL (ML) SUBCUTANEOUS
Refills: 0 | Status: DISCONTINUED | OUTPATIENT
Start: 2021-10-24 | End: 2021-11-02

## 2021-10-24 RX ORDER — INSULIN LISPRO 100/ML
7 VIAL (ML) SUBCUTANEOUS
Refills: 0 | Status: DISCONTINUED | OUTPATIENT
Start: 2021-10-24 | End: 2021-10-25

## 2021-10-24 RX ORDER — SEVELAMER CARBONATE 2400 MG/1
800 POWDER, FOR SUSPENSION ORAL THREE TIMES A DAY
Refills: 0 | Status: DISCONTINUED | OUTPATIENT
Start: 2021-10-24 | End: 2021-11-02

## 2021-10-24 RX ORDER — DEXTROSE 50 % IN WATER 50 %
15 SYRINGE (ML) INTRAVENOUS ONCE
Refills: 0 | Status: DISCONTINUED | OUTPATIENT
Start: 2021-10-24 | End: 2021-11-02

## 2021-10-24 RX ORDER — NIFEDIPINE 30 MG
30 TABLET, EXTENDED RELEASE 24 HR ORAL DAILY
Refills: 0 | Status: DISCONTINUED | OUTPATIENT
Start: 2021-10-24 | End: 2021-10-27

## 2021-10-24 RX ORDER — INSULIN LISPRO 100/ML
5 VIAL (ML) SUBCUTANEOUS
Refills: 0 | Status: DISCONTINUED | OUTPATIENT
Start: 2021-10-24 | End: 2021-10-24

## 2021-10-24 RX ORDER — INSULIN GLARGINE 100 [IU]/ML
11 INJECTION, SOLUTION SUBCUTANEOUS AT BEDTIME
Refills: 0 | Status: DISCONTINUED | OUTPATIENT
Start: 2021-10-24 | End: 2021-10-25

## 2021-10-24 RX ORDER — INSULIN LISPRO 100/ML
VIAL (ML) SUBCUTANEOUS AT BEDTIME
Refills: 0 | Status: DISCONTINUED | OUTPATIENT
Start: 2021-10-24 | End: 2021-11-02

## 2021-10-24 RX ADMIN — HEPARIN SODIUM 5000 UNIT(S): 5000 INJECTION INTRAVENOUS; SUBCUTANEOUS at 21:33

## 2021-10-24 RX ADMIN — Medication 650 MILLIGRAM(S): at 00:30

## 2021-10-24 RX ADMIN — Medication 7 UNIT(S): at 18:20

## 2021-10-24 RX ADMIN — SEVELAMER CARBONATE 800 MILLIGRAM(S): 2400 POWDER, FOR SUSPENSION ORAL at 21:32

## 2021-10-24 RX ADMIN — Medication 500 MILLIGRAM(S): at 18:19

## 2021-10-24 RX ADMIN — Medication 10 MILLIGRAM(S): at 05:00

## 2021-10-24 RX ADMIN — HEPARIN SODIUM 5000 UNIT(S): 5000 INJECTION INTRAVENOUS; SUBCUTANEOUS at 14:06

## 2021-10-24 RX ADMIN — Medication 650 MILLIGRAM(S): at 00:00

## 2021-10-24 RX ADMIN — SENNA PLUS 2 TABLET(S): 8.6 TABLET ORAL at 21:33

## 2021-10-24 RX ADMIN — Medication 2: at 11:54

## 2021-10-24 RX ADMIN — INSULIN GLARGINE 11 UNIT(S): 100 INJECTION, SOLUTION SUBCUTANEOUS at 22:31

## 2021-10-24 RX ADMIN — Medication 30 MILLIGRAM(S): at 21:03

## 2021-10-24 RX ADMIN — HEPARIN SODIUM 5000 UNIT(S): 5000 INJECTION INTRAVENOUS; SUBCUTANEOUS at 05:57

## 2021-10-24 RX ADMIN — Medication 650 MILLIGRAM(S): at 12:39

## 2021-10-24 RX ADMIN — SEVELAMER CARBONATE 800 MILLIGRAM(S): 2400 POWDER, FOR SUSPENSION ORAL at 10:40

## 2021-10-24 RX ADMIN — ATORVASTATIN CALCIUM 80 MILLIGRAM(S): 80 TABLET, FILM COATED ORAL at 21:32

## 2021-10-24 RX ADMIN — Medication 5 UNIT(S): at 11:53

## 2021-10-24 RX ADMIN — POLYETHYLENE GLYCOL 3350 17 GRAM(S): 17 POWDER, FOR SOLUTION ORAL at 12:39

## 2021-10-24 RX ADMIN — Medication 650 MILLIGRAM(S): at 13:00

## 2021-10-24 RX ADMIN — Medication 325 MILLIGRAM(S): at 12:39

## 2021-10-24 RX ADMIN — CHLORHEXIDINE GLUCONATE 1 APPLICATION(S): 213 SOLUTION TOPICAL at 12:50

## 2021-10-24 RX ADMIN — Medication 1: at 18:19

## 2021-10-24 NOTE — PROGRESS NOTE ADULT - SUBJECTIVE AND OBJECTIVE BOX
TOM PLEITEZ  MRN-[id number]  Patient is a 60y old  Male who presents with a chief complaint of NSTEMI (23 Oct 2021 12:00)      HPI:  60 yr old M w/ hx of DM2, HTN and HLD presents as transfer from [facility] for chest pain concerning for NSTEMI and possible CHF.  Pt states that after he woke up this morning he felt midsternal chest pain this morning, that felt like a burning sensation. Pain was non radiating. Associated with shortness of breath. He initially took TUMS and drank some milk which alleviated some of the pain but not completely. Later he also started feeling very dizzy so he went to the ED. Denies associated palpitations, diaphoresis, N/V.  When he presented to [facility] he was noted to be SOB. Initially, they evaluated patient for CHF but Troponin and BNP levels were elevated so he was transferred here for NSTEMI and r/o CHF.    Per nursing notes, while at [facility], he was placed on BIPAP, given Solumedrol 120, Nitro SL x1, duoneb x2, ASA 81mg, Lasix 40mg w/ 200 cc output, and started at 10mL/hr Nitro drip then increased to 30mL/hr. There, initial /126 retake was 171/67. Nitro drip was d/c'd upon arrival to [facility] ED.     During my visit, patient was sitting up eating a sandwich. Appears comfortable on room air. Denies repeat of chest pain after this morning. Denies shortness of breath. Denies lower extremity edema, orthopnea or PND.     Labs also remarkable for JOSHUA, metabolic acidosis and hyperkalemia. Patient denies prior history of renal injury.       Wife can be reached at [phone]. Medication list confirmed w/ wife. Of note, patient w/ elevated BP to SBP 200s often at home; nifedipine used on a "as needed" basis for SBP > 200.  (06 Oct 2021 01:09)      Surgery/Hospital Course:  10/21 CABG and MVR  10/24 SOB, urgent HD overnight, bipap     Today:  No acute events     ICU Vital Signs Last 24 Hrs  T(C): 37.5 (24 Oct 2021 04:10), Max: 37.5 (24 Oct 2021 04:10)  T(F): 99.5 (24 Oct 2021 04:10), Max: 99.5 (24 Oct 2021 04:10)  HR: 75 (24 Oct 2021 06:30) (68 - 89)  BP: 141/63 (24 Oct 2021 06:30) (128/73 - 173/67)  BP(mean): 90 (24 Oct 2021 06:30) (83 - 109)  ABP: 158/52 (24 Oct 2021 06:30) (121/48 - 201/60)  ABP(mean): 73 (24 Oct 2021 06:30) (67 - 100)  RR: 24 (24 Oct 2021 06:30) (16 - 45)  SpO2: 99% (24 Oct 2021 06:30) (91% - 100%)      Physical Exam:  Gen:  Awake, alert   CNS: non focal 	  Neck: no JVD  RES : clear , no wheezing        Chest: + chest tubes        CVS: Regular  rhythm. Normal S1/S2  Abd: Soft, non-distended. Bowel sounds present.  Skin: No rash.  Ext:  no edema, A line    ============================I/O===========================   I&O's Detail    22 Oct 2021 07:01  -  23 Oct 2021 07:00  --------------------------------------------------------  IN:    Albumin 5%  - 250 mL: 250 mL    DOBUTamine: 237.6 mL    DOBUTamine: 14.4 mL    EPINEPHrine: 68 mL    Insulin: 26 mL    IV PiggyBack: 50 mL    Norepinephrine: 10 mL    Oral Fluid: 90 mL    PRBCs (Packed Red Blood Cells): 600 mL    sodium chloride 0.9%: 185 mL    Vasopressin: 16 mL  Total IN: 1547 mL    OUT:    Bulb (mL): 106 mL    Chest Tube (mL): 225 mL    Chest Tube (mL): 40 mL    Chest Tube (mL): 160 mL    Indwelling Catheter - Urethral (mL): 395 mL    Other (mL): 0 mL  Total OUT: 926 mL    Total NET: 621 mL      23 Oct 2021 07:01  -  24 Oct 2021 06:48  --------------------------------------------------------  IN:    DOBUTamine: 72 mL    IV PiggyBack: 50 mL    Milrinone: 28.8 mL    NiCARdipine: 40 mL    Oral Fluid: 500 mL    Other (mL): 400 mL    sodium chloride 0.9%: 120 mL  Total IN: 1210.8 mL    OUT:    Bulb (mL): 45 mL    Chest Tube (mL): 30 mL    Chest Tube (mL): 220 mL    Chest Tube (mL): 0 mL    Indwelling Catheter - Urethral (mL): 395 mL    Insulin: 0 mL    Norepinephrine: 0 mL    Other (mL): 2400 mL    Vasopressin: 0 mL  Total OUT: 3090 mL    Total NET: -1879.2 mL        ============================ LABS =========================                        9.1    13.93 )-----------( 98       ( 24 Oct 2021 01:10 )             27.4     10-24    134<L>  |  97  |  42<H>  ----------------------------<  185<H>  4.8   |  20<L>  |  3.92<H>    Ca    8.8      24 Oct 2021 01:10  Phos  6.5     10-24  Mg     2.3     10-24    TPro  5.7<L>  /  Alb  3.2<L>  /  TBili  0.3  /  DBili  x   /  AST  60<H>  /  ALT  26  /  AlkPhos  78  10-24    LIVER FUNCTIONS - ( 24 Oct 2021 01:10 )  Alb: 3.2 g/dL / Pro: 5.7 g/dL / ALK PHOS: 78 U/L / ALT: 26 U/L / AST: 60 U/L / GGT: x           PT/INR - ( 23 Oct 2021 13:33 )   PT: 15.6 sec;   INR: 1.32 ratio           ABG - ( 24 Oct 2021 04:59 )  pH, Arterial: 7.41  pH, Blood: x     /  pCO2: 37    /  pO2: 116   / HCO3: 24    / Base Excess: -0.9  /  SaO2: 99.1                ======================Micro/Rad/Cardio=================  CXR: Reviewed  Echo:Reviewed  ======================================================  PAST MEDICAL & SURGICAL HISTORY:  Hypertension    Hyperlipidemia    Diabetes mellitus    No pertinent past surgical history      ====================ASSESSMENT ==============  CAD, mitral valve regurgitation S/P CABG, MVR on 10/21/2021  Hypertension  DMT2  Acute Blood Loss Anemia S/P 4u PRBC, 1 PLT, and 1000 FEIBA  Leukocytosis   Elevated BUN/Cr  Hypercalcemia  hyperkalemia   Hypovolemic shock  Post op respiratory insufficiency     Plan:  ====================== NEUROLOGY=====================  Continue close monitoring of neuro status   Tylenol and Oxycodone, for pain management.     acetaminophen   Tablet .. 650 milliGRAM(s) Oral every 6 hours  acetaminophen   Tablet .. 650 milliGRAM(s) Oral every 6 hours PRN Mild Pain (1 - 3)  oxyCODONE    IR 5 milliGRAM(s) Oral every 4 hours PRN Moderate Pain (4 - 6)  oxyCODONE    IR 10 milliGRAM(s) Oral every 4 hours PRN Severe Pain (7 - 10)    ==================== RESPIRATORY======================  Supplemental O2 via 5L NC  Encourage incentive spirometry, continue pulse ox monitoring, follow ABGs     ====================CARDIOVASCULAR==================  CAD, mitral valve regurgitation S/P CABG, MVR on 10/21/2021  Invasive hemodynamic monitoring, assess perfusion indices.   Continue with ASA/lipator for graft patency   Continue with vasodilator IV Cardene   Pressor support with IV levophed     milrinone Infusion 0.2 MICROgram(s)/kG/Min (4.81 mL/Hr) IV Continuous <Continuous>  niCARdipine Infusion 5 mG/Hr (25 mL/Hr) IV Continuous <Continuous>  atorvastatin 80 milliGRAM(s) Oral at bedtime  aspirin enteric coated 325 milliGRAM(s) Oral daily    ===================HEMATOLOGIC/ONC ===================  Thrombocytopenia and  acute blood loss anemia  S/P 4u PRBC, 1 PLT, and 1000 FEIBA.  Monitor H/H and PLTs.    VTE prophylaxis, heparin   Injectable 5000 Unit(s) SubCutaneous every 8 hours    ===================== RENAL =========================  Elevated BUN/Cr  Hypercalcemia  hyperkalemia   Continue to monitor I/Os, BUN/Creatinine, and urine output.   Goal net negative fluid balance. Replete lytes PRN. Keep K> 4 and Mg >2.      ==================== GASTROINTESTINAL===================  Tolerating PO consistent carb diet.   Bowel regimen with Miralax and Senna     ascorbic acid 500 milliGRAM(s) Oral two times a day  bisacodyl Suppository 10 milliGRAM(s) Rectal once  GI prophylaxis, pantoprazole    Tablet 40 milliGRAM(s) Oral before breakfast  polyethylene glycol 3350 17 Gram(s) Oral daily  senna 2 Tablet(s) Oral at bedtime  sodium chloride 0.9%. 1000 milliLiter(s) (10 mL/Hr) IV Continuous <Continuous>    =======================    ENDOCRINE  =====================  History of Type 2 Diabetes Mellitus, continue with admelog sliding scale    dextrose 40% Gel 15 Gram(s) Oral once  dextrose 50% Injectable 12.5 Gram(s) IV Push once  dextrose 50% Injectable 25 Gram(s) IV Push once  dextrose 50% Injectable 25 Gram(s) IV Push once  glucagon  Injectable 1 milliGRAM(s) IntraMuscular once  insulin glargine Injectable (LANTUS) 11 Unit(s) SubCutaneous at bedtime  insulin lispro (ADMELOG) corrective regimen sliding scale   SubCutaneous three times a day before meals  insulin lispro (ADMELOG) corrective regimen sliding scale   SubCutaneous at bedtime  insulin lispro Injectable (ADMELOG) 5 Unit(s) SubCutaneous three times a day before meals    ========================INFECTIOUS DISEASE================  Temp 99F, WBC rising 13.07->13.93  Continue trending WBC and monitoring fever curve         I have spent 35 minutes providing acute care for this critically ill patient     Patient requires continuous monitoring with bedside rhythm monitoring, pulse ox monitoring, and intermittent blood gas analysis. Care plan discussed with ICU care team. Patient remained critical and at risk for life threatening decompensation.     By signing my name below, I, Verena Chau, attest that this documentation has been prepared under the direction and in the presence of Geovanni Stone MD   Electronically signed: Jm Goodrich, 10-24-21 @ 06:48    I, Geovanni Stone, personally performed the services described in this documentation. all medical record entries made by the scribe were at my direction and in my presence. I have reviewed the chart and agree that the record reflects my personal performance and is accurate and complete  Electronically signed: Geovanni Stone MD        TOM PLEITEZ  MRN-[id number]  Patient is a 60y old  Male who presents with a chief complaint of NSTEMI (23 Oct 2021 12:00)      HPI:  60 yr old M w/ hx of DM2, HTN and HLD presents as transfer from [facility] for chest pain concerning for NSTEMI and possible CHF.  Pt states that after he woke up this morning he felt midsternal chest pain this morning, that felt like a burning sensation. Pain was non radiating. Associated with shortness of breath. He initially took TUMS and drank some milk which alleviated some of the pain but not completely. Later he also started feeling very dizzy so he went to the ED. Denies associated palpitations, diaphoresis, N/V.  When he presented to [facility] he was noted to be SOB. Initially, they evaluated patient for CHF but Troponin and BNP levels were elevated so he was transferred here for NSTEMI and r/o CHF.    Per nursing notes, while at [facility], he was placed on BIPAP, given Solumedrol 120, Nitro SL x1, duoneb x2, ASA 81mg, Lasix 40mg w/ 200 cc output, and started at 10mL/hr Nitro drip then increased to 30mL/hr. There, initial /126 retake was 171/67. Nitro drip was d/c'd upon arrival to [facility] ED.     During my visit, patient was sitting up eating a sandwich. Appears comfortable on room air. Denies repeat of chest pain after this morning. Denies shortness of breath. Denies lower extremity edema, orthopnea or PND.     Labs also remarkable for JOSHUA, metabolic acidosis and hyperkalemia. Patient denies prior history of renal injury.       Wife can be reached at [phone]. Medication list confirmed w/ wife. Of note, patient w/ elevated BP to SBP 200s often at home; nifedipine used on a "as needed" basis for SBP > 200.  (06 Oct 2021 01:09)      Surgery/Hospital Course:  10/21 CABG and MVR  10/24 SOB, urgent HD overnight, bipap     Today:  - Transitioned patient from BIPAP to NC this AM   - Plan for a repeat EKG  - Continue to diurese     ICU Vital Signs Last 24 Hrs  T(C): 37.5 (24 Oct 2021 04:10), Max: 37.5 (24 Oct 2021 04:10)  T(F): 99.5 (24 Oct 2021 04:10), Max: 99.5 (24 Oct 2021 04:10)  HR: 75 (24 Oct 2021 06:30) (68 - 89)  BP: 141/63 (24 Oct 2021 06:30) (128/73 - 173/67)  BP(mean): 90 (24 Oct 2021 06:30) (83 - 109)  ABP: 158/52 (24 Oct 2021 06:30) (121/48 - 201/60)  ABP(mean): 73 (24 Oct 2021 06:30) (67 - 100)  RR: 24 (24 Oct 2021 06:30) (16 - 45)  SpO2: 99% (24 Oct 2021 06:30) (91% - 100%)      Physical Exam:  Gen:  Awake, alert   CNS: non focal 	  Neck: no JVD  RES : clear , no wheezing        Chest: + chest tubes        CVS: Regular  rhythm. Normal S1/S2  Abd: Soft, non-distended. Bowel sounds present.  Skin: No rash.  Ext:  no edema, A line    ============================I/O===========================   I&O's Detail    22 Oct 2021 07:01  -  23 Oct 2021 07:00  --------------------------------------------------------  IN:    Albumin 5%  - 250 mL: 250 mL    DOBUTamine: 237.6 mL    DOBUTamine: 14.4 mL    EPINEPHrine: 68 mL    Insulin: 26 mL    IV PiggyBack: 50 mL    Norepinephrine: 10 mL    Oral Fluid: 90 mL    PRBCs (Packed Red Blood Cells): 600 mL    sodium chloride 0.9%: 185 mL    Vasopressin: 16 mL  Total IN: 1547 mL    OUT:    Bulb (mL): 106 mL    Chest Tube (mL): 225 mL    Chest Tube (mL): 40 mL    Chest Tube (mL): 160 mL    Indwelling Catheter - Urethral (mL): 395 mL    Other (mL): 0 mL  Total OUT: 926 mL    Total NET: 621 mL      23 Oct 2021 07:01  -  24 Oct 2021 06:48  --------------------------------------------------------  IN:    DOBUTamine: 72 mL    IV PiggyBack: 50 mL    Milrinone: 28.8 mL    NiCARdipine: 40 mL    Oral Fluid: 500 mL    Other (mL): 400 mL    sodium chloride 0.9%: 120 mL  Total IN: 1210.8 mL    OUT:    Bulb (mL): 45 mL    Chest Tube (mL): 30 mL    Chest Tube (mL): 220 mL    Chest Tube (mL): 0 mL    Indwelling Catheter - Urethral (mL): 395 mL    Insulin: 0 mL    Norepinephrine: 0 mL    Other (mL): 2400 mL    Vasopressin: 0 mL  Total OUT: 3090 mL    Total NET: -1879.2 mL        ============================ LABS =========================                        9.1    13.93 )-----------( 98       ( 24 Oct 2021 01:10 )             27.4     10-24    134<L>  |  97  |  42<H>  ----------------------------<  185<H>  4.8   |  20<L>  |  3.92<H>    Ca    8.8      24 Oct 2021 01:10  Phos  6.5     10-24  Mg     2.3     10-24    TPro  5.7<L>  /  Alb  3.2<L>  /  TBili  0.3  /  DBili  x   /  AST  60<H>  /  ALT  26  /  AlkPhos  78  10-24    LIVER FUNCTIONS - ( 24 Oct 2021 01:10 )  Alb: 3.2 g/dL / Pro: 5.7 g/dL / ALK PHOS: 78 U/L / ALT: 26 U/L / AST: 60 U/L / GGT: x           PT/INR - ( 23 Oct 2021 13:33 )   PT: 15.6 sec;   INR: 1.32 ratio           ABG - ( 24 Oct 2021 04:59 )  pH, Arterial: 7.41  pH, Blood: x     /  pCO2: 37    /  pO2: 116   / HCO3: 24    / Base Excess: -0.9  /  SaO2: 99.1                ======================Micro/Rad/Cardio=================  CXR: Reviewed  Echo:Reviewed  ======================================================  PAST MEDICAL & SURGICAL HISTORY:  Hypertension    Hyperlipidemia    Diabetes mellitus    No pertinent past surgical history      ====================ASSESSMENT ==============  CAD, mitral valve regurgitation S/P CABG, MVR on 10/21/2021  Hypertension  DMT2  Acute Blood Loss Anemia S/P 4u PRBC, 1 PLT, and 1000 FEIBA  Leukocytosis   Elevated BUN/Cr  Hypercalcemia  hyperkalemia   Hypovolemic shock  Post op respiratory insufficiency     Plan:  ====================== NEUROLOGY=====================  Continue close monitoring of neuro status   Tylenol and Oxycodone, for pain management.     acetaminophen   Tablet .. 650 milliGRAM(s) Oral every 6 hours  acetaminophen   Tablet .. 650 milliGRAM(s) Oral every 6 hours PRN Mild Pain (1 - 3)  oxyCODONE    IR 5 milliGRAM(s) Oral every 4 hours PRN Moderate Pain (4 - 6)  oxyCODONE    IR 10 milliGRAM(s) Oral every 4 hours PRN Severe Pain (7 - 10)    ==================== RESPIRATORY======================  Supplemental O2 via 5L NC  Encourage incentive spirometry, continue pulse ox monitoring, follow ABGs     ====================CARDIOVASCULAR==================  CAD, mitral valve regurgitation S/P CABG, MVR on 10/21/2021  Invasive hemodynamic monitoring, assess perfusion indices.   Continue with ASA/lipator for graft patency   Continue with vasodilator IV Cardene   Pressor support with IV levophed     milrinone Infusion 0.2 MICROgram(s)/kG/Min (4.81 mL/Hr) IV Continuous <Continuous>  niCARdipine Infusion 5 mG/Hr (25 mL/Hr) IV Continuous <Continuous>  atorvastatin 80 milliGRAM(s) Oral at bedtime  aspirin enteric coated 325 milliGRAM(s) Oral daily    ===================HEMATOLOGIC/ONC ===================  Thrombocytopenia and  acute blood loss anemia  S/P 4u PRBC, 1 PLT, and 1000 FEIBA.  Monitor H/H and PLTs.    VTE prophylaxis, heparin   Injectable 5000 Unit(s) SubCutaneous every 8 hours    ===================== RENAL =========================  Elevated BUN/Cr  Hypercalcemia  hyperkalemia   Continue to monitor I/Os, BUN/Creatinine, and urine output.   Goal net negative fluid balance. Replete lytes PRN. Keep K> 4 and Mg >2.      ==================== GASTROINTESTINAL===================  Tolerating PO consistent carb diet.   Bowel regimen with Miralax and Senna     ascorbic acid 500 milliGRAM(s) Oral two times a day  bisacodyl Suppository 10 milliGRAM(s) Rectal once  GI prophylaxis, pantoprazole    Tablet 40 milliGRAM(s) Oral before breakfast  polyethylene glycol 3350 17 Gram(s) Oral daily  senna 2 Tablet(s) Oral at bedtime  sodium chloride 0.9%. 1000 milliLiter(s) (10 mL/Hr) IV Continuous <Continuous>    =======================    ENDOCRINE  =====================  History of Type 2 Diabetes Mellitus, continue with admelog sliding scale    dextrose 40% Gel 15 Gram(s) Oral once  dextrose 50% Injectable 12.5 Gram(s) IV Push once  dextrose 50% Injectable 25 Gram(s) IV Push once  dextrose 50% Injectable 25 Gram(s) IV Push once  glucagon  Injectable 1 milliGRAM(s) IntraMuscular once  insulin glargine Injectable (LANTUS) 11 Unit(s) SubCutaneous at bedtime  insulin lispro (ADMELOG) corrective regimen sliding scale   SubCutaneous three times a day before meals  insulin lispro (ADMELOG) corrective regimen sliding scale   SubCutaneous at bedtime  insulin lispro Injectable (ADMELOG) 5 Unit(s) SubCutaneous three times a day before meals    ========================INFECTIOUS DISEASE================  Temp 99F, WBC rising 13.07->13.93  Continue trending WBC and monitoring fever curve         I have spent 35 minutes providing acute care for this critically ill patient     Patient requires continuous monitoring with bedside rhythm monitoring, pulse ox monitoring, and intermittent blood gas analysis. Care plan discussed with ICU care team. Patient remained critical and at risk for life threatening decompensation.     By signing my name below, I, Verena Chau, attest that this documentation has been prepared under the direction and in the presence of Geovanni Stone MD   Electronically signed: Jm Goodrich, 10-24-21 @ 06:48    I, Geovanni Stone, personally performed the services described in this documentation. all medical record entries made by the scribbrittni were at my direction and in my presence. I have reviewed the chart and agree that the record reflects my personal performance and is accurate and complete  Electronically signed: Geovanni Stone MD

## 2021-10-24 NOTE — PROGRESS NOTE ADULT - SUBJECTIVE AND OBJECTIVE BOX
Neurology Progress Note    S: Patient seen and examined in CTICU. had increased dyspnea overnight urgent HD    Medications:  MEDICATIONS  (STANDING):  ascorbic acid 500 milliGRAM(s) Oral two times a day  aspirin enteric coated 325 milliGRAM(s) Oral daily  atorvastatin 80 milliGRAM(s) Oral at bedtime  bisacodyl Suppository 10 milliGRAM(s) Rectal once  chlorhexidine 2% Cloths 1 Application(s) Topical daily  dextrose 40% Gel 15 Gram(s) Oral once  dextrose 50% Injectable 25 Gram(s) IV Push once  dextrose 50% Injectable 12.5 Gram(s) IV Push once  dextrose 50% Injectable 25 Gram(s) IV Push once  glucagon  Injectable 1 milliGRAM(s) IntraMuscular once  heparin   Injectable 5000 Unit(s) SubCutaneous every 8 hours  insulin glargine Injectable (LANTUS) 11 Unit(s) SubCutaneous at bedtime  insulin lispro (ADMELOG) corrective regimen sliding scale   SubCutaneous three times a day before meals  insulin lispro (ADMELOG) corrective regimen sliding scale   SubCutaneous at bedtime  insulin lispro Injectable (ADMELOG) 5 Unit(s) SubCutaneous three times a day before meals  milrinone Infusion 0.2 MICROgram(s)/kG/Min (4.81 mL/Hr) IV Continuous <Continuous>  niCARdipine Infusion 5 mG/Hr (25 mL/Hr) IV Continuous <Continuous>  pantoprazole    Tablet 40 milliGRAM(s) Oral before breakfast  polyethylene glycol 3350 17 Gram(s) Oral daily  senna 2 Tablet(s) Oral at bedtime  sevelamer carbonate 800 milliGRAM(s) Oral three times a day  sodium chloride 0.9%. 1000 milliLiter(s) (10 mL/Hr) IV Continuous <Continuous>    MEDICATIONS  (PRN):  acetaminophen   Tablet .. 650 milliGRAM(s) Oral every 6 hours PRN Mild Pain (1 - 3)  oxyCODONE    IR 5 milliGRAM(s) Oral every 4 hours PRN Moderate Pain (4 - 6)  oxyCODONE    IR 10 milliGRAM(s) Oral every 4 hours PRN Severe Pain (7 - 10)      Vitals:  ICU Vital Signs Last 24 Hrs  T(C): 36.9 (24 Oct 2021 12:00), Max: 37.5 (24 Oct 2021 04:10)  T(F): 98.4 (24 Oct 2021 12:00), Max: 99.5 (24 Oct 2021 04:10)  HR: 67 (24 Oct 2021 12:00) (66 - 80)  BP: 125/60 (24 Oct 2021 11:00) (122/58 - 173/67)  BP(mean): 86 (24 Oct 2021 11:00) (82 - 109)  ABP: 133/50 (24 Oct 2021 12:00) (121/48 - 201/60)  ABP(mean): 68 (24 Oct 2021 12:00) (65 - 98)  RR: 26 (24 Oct 2021 12:00) (10 - 45)  SpO2: 96% (24 Oct 2021 12:00) (91% - 100%)          General Exam:   General Appearance: Appropriately dressed and in no acute distress       Head: Normocephalic, atraumatic and no dysmorphic features  Ear, Nose, and Throat: Moist mucous membranes  CVS: S1S2+  Resp: No SOB, no wheeze or rhonchi  Abd: soft NTND  Extremities: No edema, no cyanosis  Skin: No bruises, no rashes     Neurological Exam:  Mental Status: Awake, alert and oriented x2-3.  Able to follow simple and complex verbal commands. Able to name and repeat. fluent speech. No obvious aphasia or dysarthria noted.   Cranial Nerves: PERRL, EOMI, VFFC, sensation V1-V3 intact,  mild NLF facial asymmetry , equal elevation of palate, scm/trap 5/5, tongue is midline on protrusion. no obvious papilledema on fundoscopic exam. Hearing is grossly intact.   Motor: Normal bulk, tone and strength throughout. Fine finger movements were intact and symmetric. no tremors or drift noted.    Sensation: Intact to light touch and pinprick throughout. no right/left confusion. no extinction to tactile on DSS.   Reflexes: 1+ throughout at biceps, brachioradialis, triceps, patellars and ankles bilaterally and equal. No clonus. R toe and L toe were both downgoing.  Coordination: No dysmetria on FNF    Gait: deferred     I personally reviewed the below data/images/labs:  CBC Full  -  ( 24 Oct 2021 01:10 )  WBC Count : 13.93 K/uL  RBC Count : 3.04 M/uL  Hemoglobin : 9.1 g/dL  Hematocrit : 27.4 %  Platelet Count - Automated : 98 K/uL  Mean Cell Volume : 90.1 fl  Mean Cell Hemoglobin : 29.9 pg  Mean Cell Hemoglobin Concentration : 33.2 gm/dL  Auto Neutrophil # : x  Auto Lymphocyte # : x  Auto Monocyte # : x  Auto Eosinophil # : x  Auto Basophil # : x  Auto Neutrophil % : x  Auto Lymphocyte % : x  Auto Monocyte % : x  Auto Eosinophil % : x  Auto Basophil % : x    10-24    134<L>  |  97  |  42<H>  ----------------------------<  185<H>  4.8   |  20<L>  |  3.92<H>    Ca    8.8      24 Oct 2021 01:10  Phos  6.5     10-24  Mg     2.3     10-24    TPro  5.7<L>  /  Alb  3.2<L>  /  TBili  0.3  /  DBili  x   /  AST  60<H>  /  ALT  26  /  AlkPhos  78  10-24      -10/07 CTH: No hemorrhage. 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   -10/07 CTA N: High-grade stenosis left internal carotid artery at the carotid bifurcation in the neck.   -10/07 CTA H: Normal intracranial circulation.    < from: MR Head No Cont (10.09.21 @ 20:22) >    EXAM:  MR BRAIN                            PROCEDURE DATE:  10/09/2021            INTERPRETATION:  CLINICAL HISTORY:Facial droop, on heparin drip, NSTEMI . Severe left ICA stenosis.    TECHNIQUE:  MRI of brain without contrast dated 10/9/2021.  Examination consisted of transaxial T1, T2, FLAIR, gradient echo as well as diffusion-weighted sequences with corresponding ADC maps. Coronal T2 and sagittal T1-weighted images were also acquired through the brain.    COMPARISON: CT head and CTA head andneck 10/7/2021    FINDINGS:  There are no areas abnormal restricted diffusion within the brain parenchyma to suggest acute/subacute infarct. There is no acute intracranial hemorrhage, vasogenic edema or abnormal susceptibility artifact. Chronic lacunar infarcts are seen in the left frontal lobe, right frontal cranial radiata and right cerebellum. Additional nonspecific patchy and confluent areas of T2/FLAIR prolongation in the bihemispheric white matter likely represents mild chronic microvascular changes.    The ventricles, sulci and cisternal spaces are stable in size and configuration. There is no midline shift or abnormal extra-axial fluid collection.    Flow-voids of the major intracranial vessels are maintained, as seen best on the T2-weighted images, indicating their patency.    The visualized paranasal sinuses and mastoid air cells are free of acute disease. The orbital regions are unremarkable.    IMPRESSION:  No acute infarct or intracranial hemorrhage. Chronic infarcts and microvascular changes as above.    --- End of Report ---      NEIL CARDENAS MD; Attending Radiologist  This document has been electronically signed. Oct 10 2021  4:26AM    < end of copied text >  < from: VA Duplex Carotid, Bilat (10.08.21 @ 17:07) >    EXAM:  CAROTID DUPLEX BILATERAL                            PROCEDURE DATE:  10/08/2021      INTERPRETATION:  CLINICAL INFORMATION: Left ICA high-grade stenosis identified on recent CTA neck.    COMPARISON: CTA neck 10/7/2021.    TECHNIQUE: Grayscale, color and spectral Doppler examination of both carotid arteries was performed.    FINDINGS:    There are atheromatous plaques are present bilaterally in the region of the bifurcations of the common carotid arteries into internal and external branches.    Velocity elevation of the proximal right internal carotid artery results in 50-69% flow-limiting stenosis.    Velocity elevation of the proximal left internal carotid artery results in 70-9% flow-limiting stenosis.    Bilateral flow-limiting stenoses noted of the right and left external carotid arteries as well.    Peak systolic velocities are as follows:    RIGHT:  PROX CCA = 126 cm/s  DIST CCA = 99 cm/s  PROX ICA = 137 cm/s  MID ICA = 86 cm/s  DIST ICA = 80 cm/s  ECA = 202 cm/s    LEFT:  PROX CCA = 100 cm/s  DIST CCA = 59 cm/s  PROX ICA = 313 cm/s  MID ICA = 72 cm/s  DIST ICA = 47 cm/s  ECA = 298 cm/s    Antegrade flow is noted within both vertebral arteries.    IMPRESSION:    Left internal carotid artery 70-99% flow-limiting stenosis.  Right internal carotid artery 50-69% flow-limiting stenosis.  Bilateral external carotid artery flow limiting stenoses.  INDIANA Hammond was informed of these findings on 10/8/2021 at 5:06 PM.    Measurement of carotid stenosis is based on velocity parameters that correlate the residual internal carotid diameter with that of the more distal vessel in accordance with a method such as the [employer] Symptomatic Carotid Endarterectomy Trial (NASCET).    --- End of Report ---    FELIX MCMAHON M.D., ATTENDING RADIOLOGIST  This document has been electronically signed. Oct  8 2021  5:21PM    < end of copied text >

## 2021-10-24 NOTE — PROGRESS NOTE ADULT - ASSESSMENT
61 yo male with DM2, HTN, and HLD who initially presented to [facility] on 10/05 as a transfer from [facility] for NSTEMI and possible CHF. Patient on Heparin drip for NSTEMI. LKW 10:45AM. Patient had episode of bradycardia (HR 30s), then became altered. RRT called. BP during RRT 70s/40s. Code stroke called for L facial droop.   CTH negative for acute pathology, old L frontal cortical infarct seen.   CTA H unremarkable. CTA N shows high-grade stenosis left internal carotid artery at the carotid bifurcation in the neck.  10/06 TTE: EF 45%, moderate-severe MR, mild-moderate L ventricular systolic dysfunction.   A1c 7  MRI no AIS but old strokes  HD cath placement 10/12   CD with severe L ICA and Mod R ICA   s/p LHC 10/14  CABG 10/21  10/22 o/e COTO aaoX 2   10/23 now AAOx3 on    10/24 increased dyspneia overnight   Impression: Mild L nasolabial flattening and dysarthria, ?decreased eye closure strength on the L. Possibly secondary to R brain dysfunction due to acute stroke of unknown mechanism vs peripheral etiology. Patient with old L frontal infarct on CTH, also with high-grade stenosis of L ICA at the carotid bifurcation which likely cause of old stroke     Recommendations:  - on milrinone and nicardipine   - ASA 81MG PO daily  - Avoid hypotension.  - High dose statin therapy - atorvastatin 40mg PO daily. LDL goal <70mg/dL.  -  vascular for ICA revascularization can be outpt. not acute   - lipid panel  - telemetry  - PT/OT/SS/SLP, OOBC  - permissive HTN, -180mmHg.  - check FS, glucose control <180  - GI/DVT ppx  - Counseling on diet, exercise, and medication adherence was done  - Counseling on smoking cessation and alcohol consumption offered when appropriate.  - Pain assessed and judicious use of narcotics when appropriate was discussed.    - Stroke education given when appropriate.  - Importance of fall prevention discussed.   - Differential diagnosis and plan of care discussed with patient and/or family and primary team  - Thank you for allowing me to participate in the care of this patient. Call with questions.   - remainin per CTICU  Marcelino Patel MD  Vascular Neurology .

## 2021-10-24 NOTE — PROGRESS NOTE ADULT - SUBJECTIVE AND OBJECTIVE BOX
[facility] DIVISION OF KIDNEY DISEASES AND HYPERTENSION -- FOLLOW UP NOTE  --------------------------------------------------------------------------------  If any questions, please feel free to contact me  NS pager: [phone], [facility]: [phone]  Matthew Ha M.D.  Nephrology Fellow    (After 5 pm or on weekends please page the on-call fellow)  --------------------------------------------------------------------------------    24 hour events/subjective:  Patient seen and examined at bedside, currently on BiPAP. Overnight with increase dyspnea and increased CVP. had urgent HD with 2L UF overnight. Vitals/labs/imaging reviewed          PAST HISTORY  --------------------------------------------------------------------------------  No significant changes to PMH, PSH, FHx, SHx, unless otherwise noted    ALLERGIES & MEDICATIONS  --------------------------------------------------------------------------------  Allergies    No Known Allergies    Intolerances      Standing Inpatient Medications  acetaminophen   Tablet .. 650 milliGRAM(s) Oral every 6 hours  ascorbic acid 500 milliGRAM(s) Oral two times a day  aspirin enteric coated 325 milliGRAM(s) Oral daily  atorvastatin 80 milliGRAM(s) Oral at bedtime  bisacodyl Suppository 10 milliGRAM(s) Rectal once  chlorhexidine 2% Cloths 1 Application(s) Topical daily  dextrose 40% Gel 15 Gram(s) Oral once  dextrose 50% Injectable 25 Gram(s) IV Push once  dextrose 50% Injectable 12.5 Gram(s) IV Push once  dextrose 50% Injectable 25 Gram(s) IV Push once  glucagon  Injectable 1 milliGRAM(s) IntraMuscular once  heparin   Injectable 5000 Unit(s) SubCutaneous every 8 hours  insulin glargine Injectable (LANTUS) 11 Unit(s) SubCutaneous at bedtime  insulin lispro (ADMELOG) corrective regimen sliding scale   SubCutaneous three times a day before meals  insulin lispro (ADMELOG) corrective regimen sliding scale   SubCutaneous at bedtime  insulin lispro Injectable (ADMELOG) 5 Unit(s) SubCutaneous three times a day before meals  milrinone Infusion 0.2 MICROgram(s)/kG/Min IV Continuous <Continuous>  niCARdipine Infusion 5 mG/Hr IV Continuous <Continuous>  pantoprazole    Tablet 40 milliGRAM(s) Oral before breakfast  polyethylene glycol 3350 17 Gram(s) Oral daily  senna 2 Tablet(s) Oral at bedtime  sodium chloride 0.9%. 1000 milliLiter(s) IV Continuous <Continuous>    PRN Inpatient Medications  acetaminophen   Tablet .. 650 milliGRAM(s) Oral every 6 hours PRN  oxyCODONE    IR 5 milliGRAM(s) Oral every 4 hours PRN  oxyCODONE    IR 10 milliGRAM(s) Oral every 4 hours PRN      REVIEW OF SYSTEMS  --------------------------------------------------------------------------------  Gen: No fevers/chills  Skin: No rashes  Respiratory: + dyspnea, +cough  CV: No chest pain  : No dysuria, hematuria  MSK: trace edema    All other systems were reviewed and are negative, except as noted.    VITALS/PHYSICAL EXAM  --------------------------------------------------------------------------------  T(C): 36.6 (10-24-21 @ 08:00), Max: 37.5 (10-24-21 @ 04:10)  HR: 70 (10-24-21 @ 08:00) (68 - 80)  BP: 131/58 (10-24-21 @ 08:00) (122/58 - 173/67)  RR: 11 (10-24-21 @ 08:00) (11 - 45)  SpO2: 100% (10-24-21 @ 08:00) (91% - 100%)  Wt(kg): --        10-23-21 @ 07:01  -  10-24-21 @ 07:00  --------------------------------------------------------  IN: 1210.8 mL / OUT: 3090 mL / NET: -1879.2 mL    10-24-21 @ 07:01  -  10-24-21 @ 08:25  --------------------------------------------------------  IN: 4.8 mL / OUT: 45 mL / NET: -40.2 mL        Physical Exam:  	Gen: NAD, well-appearing  	HEENT: on room air  	Pulm: CTA B/L  	CV: normal S1S2; no rub  	Abd: soft                      Back : No sacral edema  	: + martínez  	LE: improved LE edema  	Skin: Warm, without rashes    LABS/STUDIES  --------------------------------------------------------------------------------              9.1    13.93 >-----------<  98       [10-24-21 @ 01:10]              27.4     134  |  97  |  42  ----------------------------<  185      [10-24-21 @ 01:10]  4.8   |  20  |  3.92        Ca     8.8     [10-24-21 @ 01:10]      Mg     2.3     [10-24-21 @ 01:10]      Phos  6.5     [10-24-21 @ 01:10]    TPro  5.7  /  Alb  3.2  /  TBili  0.3  /  DBili  x   /  AST  60  /  ALT  26  /  AlkPhos  78  [10-24-21 @ 01:10]    PT/INR: PT 15.6 , INR 1.32       [10-23-21 @ 13:33]      Creatinine Trend:  SCr 3.92 [10-24 @ 01:10]  SCr 2.68 [10-23 @ 00:21]  SCr 2.73 [10-22 @ 03:38]  SCr 3.15 [10-21 @ 17:22]  SCr 4.67 [10-20 @ 06:13]    Urinalysis - [10-07-21 @ 10:09]      Color Light Yellow / Appearance Clear / SG 1.015 / pH 6.0      Gluc 200 mg/dL / Ketone Negative  / Bili Negative / Urobili Negative       Blood Small / Protein 300 mg/dL / Leuk Est Negative / Nitrite Negative      RBC 2 / WBC 7 / Hyaline 6 / Gran  / Sq Epi  / Non Sq Epi 2 / Bacteria Negative      Iron 47, TIBC 245, %sat 19      [10-13-21 @ 13:10]  Ferritin 780      [10-13-21 @ 08:59]  TSH 6.40      [10-20-21 @ 17:03]    HBsAg Nonreact      [10-13-21 @ 09:15]  HCV 0.23, Nonreact      [10-07-21 @ 14:38]  HIV Nonreact      [10-13-21 @ 09:16]    BEULAH: titer Negative, pattern --      [10-07-21 @ 14:37]  C3 Complement 135      [10-07-21 @ 14:42]  C4 Complement 71      [10-07-21 @ 14:42]  ANCA: cANCA Negative, pANCA Negative, atypical ANCA Negative      [10-07-21 @ 14:37]  Syphilis Screen (Treponema Pallidum Ab) Negative      [10-07-21 @ 14:37]  PLA2R: SHANNAN <1.8, IFA --      [10-13-21 @ 13:55]  Free Light Chains: kappa 10.89, lambda 12.51, ratio = 0.87      [10-07 @ 14:42]  Immunofixation Serum:   No Monoclonal Band Identified    Reference Range: None Detected      [10-07-21 @ 14:42]  SPEP Interpretation: Normal Electrophoresis Pattern      [10-07-21 @ 14:42]

## 2021-10-24 NOTE — PROGRESS NOTE ADULT - ASSESSMENT
60 year old male with PMHx of DM and HTN who presented to the hospital as a transfer from [facility] for NSTEMI. The nephrology team was consulted due to elevated creatinine of 4.05 upon presentation.    JOSHUA on CKD, now on HD, may need long term HD  - Likely with CKD from diabetic nephropathy, admitted with JOSHUA on CKD and hypervolemia, was initiated on HD prior to CABG for optimization.   - Overnight 10/24 morning  with increase dyspnea and work of breathing, required BiPAP and increased CVP  - had urgent HD with 2L UF this morning.   - monitor BMP  - adjust meds to HD    Anemia:   - in the setting of CKD   - iron studies wnl   - will consider starting the patient on SISSY.   - monitor CBC

## 2021-10-25 LAB
ALBUMIN SERPL ELPH-MCNC: 3 G/DL — LOW (ref 3.3–5)
ALP SERPL-CCNC: 76 U/L — SIGNIFICANT CHANGE UP (ref 40–120)
ALT FLD-CCNC: 22 U/L — SIGNIFICANT CHANGE UP (ref 10–45)
ANION GAP SERPL CALC-SCNC: 18 MMOL/L — HIGH (ref 5–17)
APTT BLD: 28.7 SEC — SIGNIFICANT CHANGE UP (ref 27.5–35.5)
AST SERPL-CCNC: 35 U/L — SIGNIFICANT CHANGE UP (ref 10–40)
BASOPHILS # BLD AUTO: 0.05 K/UL — SIGNIFICANT CHANGE UP (ref 0–0.2)
BASOPHILS NFR BLD AUTO: 0.5 % — SIGNIFICANT CHANGE UP (ref 0–2)
BILIRUB SERPL-MCNC: 0.3 MG/DL — SIGNIFICANT CHANGE UP (ref 0.2–1.2)
BUN SERPL-MCNC: 55 MG/DL — HIGH (ref 7–23)
CALCIUM SERPL-MCNC: 8.6 MG/DL — SIGNIFICANT CHANGE UP (ref 8.4–10.5)
CHLORIDE SERPL-SCNC: 98 MMOL/L — SIGNIFICANT CHANGE UP (ref 96–108)
CO2 SERPL-SCNC: 19 MMOL/L — LOW (ref 22–31)
CREAT SERPL-MCNC: 4.72 MG/DL — HIGH (ref 0.5–1.3)
EOSINOPHIL # BLD AUTO: 0.37 K/UL — SIGNIFICANT CHANGE UP (ref 0–0.5)
EOSINOPHIL NFR BLD AUTO: 3.4 % — SIGNIFICANT CHANGE UP (ref 0–6)
GAS PNL BLDA: SIGNIFICANT CHANGE UP
GAS PNL BLDA: SIGNIFICANT CHANGE UP
GLUCOSE BLDC GLUCOMTR-MCNC: 112 MG/DL — HIGH (ref 70–99)
GLUCOSE BLDC GLUCOMTR-MCNC: 131 MG/DL — HIGH (ref 70–99)
GLUCOSE BLDC GLUCOMTR-MCNC: 154 MG/DL — HIGH (ref 70–99)
GLUCOSE BLDC GLUCOMTR-MCNC: 158 MG/DL — HIGH (ref 70–99)
GLUCOSE BLDC GLUCOMTR-MCNC: 175 MG/DL — HIGH (ref 70–99)
GLUCOSE SERPL-MCNC: 142 MG/DL — HIGH (ref 70–99)
HCT VFR BLD CALC: 25.8 % — LOW (ref 39–50)
HGB BLD-MCNC: 8.4 G/DL — LOW (ref 13–17)
IMM GRANULOCYTES NFR BLD AUTO: 0.5 % — SIGNIFICANT CHANGE UP (ref 0–1.5)
INR BLD: 1.5 RATIO — HIGH (ref 0.88–1.16)
LYMPHOCYTES # BLD AUTO: 1.43 K/UL — SIGNIFICANT CHANGE UP (ref 1–3.3)
LYMPHOCYTES # BLD AUTO: 13.1 % — SIGNIFICANT CHANGE UP (ref 13–44)
MAGNESIUM SERPL-MCNC: 2.4 MG/DL — SIGNIFICANT CHANGE UP (ref 1.6–2.6)
MCHC RBC-ENTMCNC: 29.4 PG — SIGNIFICANT CHANGE UP (ref 27–34)
MCHC RBC-ENTMCNC: 32.6 GM/DL — SIGNIFICANT CHANGE UP (ref 32–36)
MCV RBC AUTO: 90.2 FL — SIGNIFICANT CHANGE UP (ref 80–100)
MONOCYTES # BLD AUTO: 1.25 K/UL — HIGH (ref 0–0.9)
MONOCYTES NFR BLD AUTO: 11.4 % — SIGNIFICANT CHANGE UP (ref 2–14)
NEUTROPHILS # BLD AUTO: 7.79 K/UL — HIGH (ref 1.8–7.4)
NEUTROPHILS NFR BLD AUTO: 71.1 % — SIGNIFICANT CHANGE UP (ref 43–77)
NRBC # BLD: 0 /100 WBCS — SIGNIFICANT CHANGE UP (ref 0–0)
PHOSPHATE SERPL-MCNC: 6.4 MG/DL — HIGH (ref 2.5–4.5)
PLATELET # BLD AUTO: 111 K/UL — LOW (ref 150–400)
POTASSIUM SERPL-MCNC: 4.4 MMOL/L — SIGNIFICANT CHANGE UP (ref 3.5–5.3)
POTASSIUM SERPL-SCNC: 4.4 MMOL/L — SIGNIFICANT CHANGE UP (ref 3.5–5.3)
PROT SERPL-MCNC: 5.7 G/DL — LOW (ref 6–8.3)
PROTHROM AB SERPL-ACNC: 17.6 SEC — HIGH (ref 10.6–13.6)
RBC # BLD: 2.86 M/UL — LOW (ref 4.2–5.8)
RBC # FLD: 15.3 % — HIGH (ref 10.3–14.5)
SODIUM SERPL-SCNC: 135 MMOL/L — SIGNIFICANT CHANGE UP (ref 135–145)
WBC # BLD: 10.95 K/UL — HIGH (ref 3.8–10.5)
WBC # FLD AUTO: 10.95 K/UL — HIGH (ref 3.8–10.5)

## 2021-10-25 PROCEDURE — 99291 CRITICAL CARE FIRST HOUR: CPT

## 2021-10-25 PROCEDURE — 71045 X-RAY EXAM CHEST 1 VIEW: CPT | Mod: 26

## 2021-10-25 PROCEDURE — 99233 SBSQ HOSP IP/OBS HIGH 50: CPT | Mod: GC

## 2021-10-25 PROCEDURE — 93010 ELECTROCARDIOGRAM REPORT: CPT

## 2021-10-25 RX ORDER — HYDROMORPHONE HYDROCHLORIDE 2 MG/ML
0.5 INJECTION INTRAMUSCULAR; INTRAVENOUS; SUBCUTANEOUS ONCE
Refills: 0 | Status: DISCONTINUED | OUTPATIENT
Start: 2021-10-25 | End: 2021-10-25

## 2021-10-25 RX ORDER — INSULIN GLARGINE 100 [IU]/ML
16 INJECTION, SOLUTION SUBCUTANEOUS AT BEDTIME
Refills: 0 | Status: DISCONTINUED | OUTPATIENT
Start: 2021-10-25 | End: 2021-11-02

## 2021-10-25 RX ORDER — ERYTHROPOIETIN 10000 [IU]/ML
10000 INJECTION, SOLUTION INTRAVENOUS; SUBCUTANEOUS ONCE
Refills: 0 | Status: COMPLETED | OUTPATIENT
Start: 2021-10-25 | End: 2021-10-25

## 2021-10-25 RX ORDER — WARFARIN SODIUM 2.5 MG/1
5 TABLET ORAL ONCE
Refills: 0 | Status: COMPLETED | OUTPATIENT
Start: 2021-10-25 | End: 2021-10-25

## 2021-10-25 RX ORDER — INSULIN LISPRO 100/ML
8 VIAL (ML) SUBCUTANEOUS
Refills: 0 | Status: DISCONTINUED | OUTPATIENT
Start: 2021-10-25 | End: 2021-10-26

## 2021-10-25 RX ADMIN — HEPARIN SODIUM 5000 UNIT(S): 5000 INJECTION INTRAVENOUS; SUBCUTANEOUS at 21:21

## 2021-10-25 RX ADMIN — HEPARIN SODIUM 5000 UNIT(S): 5000 INJECTION INTRAVENOUS; SUBCUTANEOUS at 13:22

## 2021-10-25 RX ADMIN — OXYCODONE HYDROCHLORIDE 5 MILLIGRAM(S): 5 TABLET ORAL at 02:12

## 2021-10-25 RX ADMIN — WARFARIN SODIUM 5 MILLIGRAM(S): 2.5 TABLET ORAL at 21:22

## 2021-10-25 RX ADMIN — Medication 8 UNIT(S): at 11:27

## 2021-10-25 RX ADMIN — PANTOPRAZOLE SODIUM 40 MILLIGRAM(S): 20 TABLET, DELAYED RELEASE ORAL at 06:34

## 2021-10-25 RX ADMIN — Medication 8 UNIT(S): at 17:03

## 2021-10-25 RX ADMIN — Medication 325 MILLIGRAM(S): at 11:26

## 2021-10-25 RX ADMIN — CHLORHEXIDINE GLUCONATE 1 APPLICATION(S): 213 SOLUTION TOPICAL at 11:26

## 2021-10-25 RX ADMIN — HEPARIN SODIUM 5000 UNIT(S): 5000 INJECTION INTRAVENOUS; SUBCUTANEOUS at 05:05

## 2021-10-25 RX ADMIN — SEVELAMER CARBONATE 800 MILLIGRAM(S): 2400 POWDER, FOR SUSPENSION ORAL at 21:22

## 2021-10-25 RX ADMIN — SEVELAMER CARBONATE 800 MILLIGRAM(S): 2400 POWDER, FOR SUSPENSION ORAL at 13:22

## 2021-10-25 RX ADMIN — HYDROMORPHONE HYDROCHLORIDE 0.5 MILLIGRAM(S): 2 INJECTION INTRAMUSCULAR; INTRAVENOUS; SUBCUTANEOUS at 04:30

## 2021-10-25 RX ADMIN — ERYTHROPOIETIN 10000 UNIT(S): 10000 INJECTION, SOLUTION INTRAVENOUS; SUBCUTANEOUS at 13:36

## 2021-10-25 RX ADMIN — Medication 500 MILLIGRAM(S): at 05:04

## 2021-10-25 RX ADMIN — OXYCODONE HYDROCHLORIDE 5 MILLIGRAM(S): 5 TABLET ORAL at 02:30

## 2021-10-25 RX ADMIN — SODIUM CHLORIDE 10 MILLILITER(S): 9 INJECTION INTRAMUSCULAR; INTRAVENOUS; SUBCUTANEOUS at 02:12

## 2021-10-25 RX ADMIN — Medication 1: at 11:26

## 2021-10-25 RX ADMIN — HYDROMORPHONE HYDROCHLORIDE 0.5 MILLIGRAM(S): 2 INJECTION INTRAMUSCULAR; INTRAVENOUS; SUBCUTANEOUS at 04:15

## 2021-10-25 RX ADMIN — HYDROMORPHONE HYDROCHLORIDE 0.5 MILLIGRAM(S): 2 INJECTION INTRAMUSCULAR; INTRAVENOUS; SUBCUTANEOUS at 17:30

## 2021-10-25 RX ADMIN — HYDROMORPHONE HYDROCHLORIDE 0.5 MILLIGRAM(S): 2 INJECTION INTRAMUSCULAR; INTRAVENOUS; SUBCUTANEOUS at 17:33

## 2021-10-25 RX ADMIN — SEVELAMER CARBONATE 800 MILLIGRAM(S): 2400 POWDER, FOR SUSPENSION ORAL at 05:05

## 2021-10-25 RX ADMIN — POLYETHYLENE GLYCOL 3350 17 GRAM(S): 17 POWDER, FOR SOLUTION ORAL at 11:26

## 2021-10-25 RX ADMIN — Medication 500 MILLIGRAM(S): at 17:02

## 2021-10-25 RX ADMIN — SENNA PLUS 2 TABLET(S): 8.6 TABLET ORAL at 21:22

## 2021-10-25 RX ADMIN — INSULIN GLARGINE 16 UNIT(S): 100 INJECTION, SOLUTION SUBCUTANEOUS at 21:21

## 2021-10-25 RX ADMIN — ATORVASTATIN CALCIUM 80 MILLIGRAM(S): 80 TABLET, FILM COATED ORAL at 21:22

## 2021-10-25 NOTE — PROGRESS NOTE ADULT - PROBLEM SELECTOR PLAN 1
Will increase Lantus to 16u at bedtime.  Will increase Admelog to 8u before each meal and continue Admelog correction scale coverage. Will continue monitoring FS and FU.   for compliance with consistent low carb diet.

## 2021-10-25 NOTE — PHYSICAL THERAPY INITIAL EVALUATION ADULT - PHYSICAL ASSIST/NONPHYSICAL ASSIST: SIT/STAND, REHAB EVAL
CG from elevated bed, minAx2 from low chair/verbal cues/nonverbal cues (demo/gestures)/1 person assist/2 person assist

## 2021-10-25 NOTE — PROGRESS NOTE ADULT - ASSESSMENT
Assessment  DMT2: 60y Male with DM T2 with hyperglycemia, A1C 7%, was on oral meds and insulin at home, now postop CABG on basal bolus insulin, refused bolus dose this morning, blood sugars are running slightly high and not at postop target, no hypoglycemic episodes, eating meals, on HD.  Hypothyroidism: Patient has no history thyroid disease, was not on any thyroid supplements, TSH elevated, FT4 still pending.  CAD: s/p CABG, on medications, no chest pain, stable, monitored.  HTN: Controlled,  on antihypertensive medications.  HLD: On statin  Obesity: No strict exercise routines, not on any weight loss program, neither on low calorie diet.        Esperanza Beckett MD  Cell: [phone]  Office: [phone]     Assessment  DMT2: 60y Male with DM T2 with hyperglycemia, A1C 7%, was on oral meds and insulin at home, now postop CABG on basal bolus insulin, refused bolus dose this morning, blood sugars are running slightly high and not at postop target,  no hypoglycemic episodes, eating meals, on HD.  Hypothyroidism: Patient has no history thyroid disease, was not on any thyroid supplements, TSH elevated, FT4 still pending.  CAD: s/p CABG, on medications, no chest pain, stable, monitored.  HTN: Controlled,  on antihypertensive medications.  HLD: On statin  Obesity: No strict exercise routines, not on any weight loss program, neither on low calorie diet.        Esperanza Beckett MD  Cell: [phone]  Office: [phone]

## 2021-10-25 NOTE — PROGRESS NOTE ADULT - SUBJECTIVE AND OBJECTIVE BOX
Chief complaint  Patient is a 60y old  Male who presents with a chief complaint of NSTEMI (25 Oct 2021 09:09)   Review of systems  Patient in bed on HD, looks comfortable, no hypoglycemic episodes.    Labs and Fingersticks  CAPILLARY BLOOD GLUCOSE  154 (25 Oct 2021 08:00)      POCT Blood Glucose.: 175 mg/dL (25 Oct 2021 11:21)  POCT Blood Glucose.: 154 mg/dL (25 Oct 2021 08:31)  POCT Blood Glucose.: 158 mg/dL (25 Oct 2021 06:41)  POCT Blood Glucose.: 158 mg/dL (24 Oct 2021 22:33)  POCT Blood Glucose.: 158 mg/dL (24 Oct 2021 21:31)  POCT Blood Glucose.: 162 mg/dL (24 Oct 2021 18:17)    Medications  MEDICATIONS  (STANDING):  ascorbic acid 500 milliGRAM(s) Oral two times a day  aspirin enteric coated 325 milliGRAM(s) Oral daily  atorvastatin 80 milliGRAM(s) Oral at bedtime  bisacodyl Suppository 10 milliGRAM(s) Rectal once  chlorhexidine 2% Cloths 1 Application(s) Topical daily  dextrose 40% Gel 15 Gram(s) Oral once  dextrose 50% Injectable 25 Gram(s) IV Push once  dextrose 50% Injectable 12.5 Gram(s) IV Push once  dextrose 50% Injectable 25 Gram(s) IV Push once  glucagon  Injectable 1 milliGRAM(s) IntraMuscular once  heparin   Injectable 5000 Unit(s) SubCutaneous every 8 hours  insulin glargine Injectable (LANTUS) 16 Unit(s) SubCutaneous at bedtime  insulin lispro (ADMELOG) corrective regimen sliding scale   SubCutaneous three times a day before meals  insulin lispro (ADMELOG) corrective regimen sliding scale   SubCutaneous at bedtime  insulin lispro Injectable (ADMELOG) 8 Unit(s) SubCutaneous three times a day before meals  NIFEdipine XL 30 milliGRAM(s) Oral daily  pantoprazole    Tablet 40 milliGRAM(s) Oral before breakfast  polyethylene glycol 3350 17 Gram(s) Oral daily  senna 2 Tablet(s) Oral at bedtime  sevelamer carbonate 800 milliGRAM(s) Oral three times a day  sodium chloride 0.9%. 1000 milliLiter(s) (10 mL/Hr) IV Continuous <Continuous>      Physical Exam  General: Patient comfortable in bed  Vital Signs Last 12 Hrs  T(F): 97 (10-25-21 @ 12:20), Max: 98 (10-25-21 @ 04:00)  HR: 57 (10-25-21 @ 13:00) (54 - 59)  BP: 129/61 (10-25-21 @ 13:00) (129/61 - 146/67)  BP(mean): 88 (10-25-21 @ 13:00) (86 - 96)  RR: 17 (10-25-21 @ 13:00) (16 - 20)  SpO2: 94% (10-25-21 @ 13:00) (93% - 100%)  Neck: No palpable thyroid nodules.  CVS: S1S2, No murmurs  Respiratory: No wheezing, no crepitations  GI: Abdomen soft, bowel sounds positive  Musculoskeletal:  edema lower extremities.   Skin: No skin rashes, no ecchymosis    Diagnostics             Chief complaint  Patient is a 60y old  Male who presents with a chief complaint of NSTEMI (25 Oct 2021 09:09)   Review of systems  Patient in bed on HD, looks comfortable, no hypoglycemic episodes.    Labs and Fingersticks  CAPILLARY BLOOD GLUCOSE  154 (25 Oct 2021 08:00)      POCT Blood Glucose.: 175 mg/dL (25 Oct 2021 11:21)  POCT Blood Glucose.: 154 mg/dL (25 Oct 2021 08:31)  POCT Blood Glucose.: 158 mg/dL (25 Oct 2021 06:41)  POCT Blood Glucose.: 158 mg/dL (24 Oct 2021 22:33)  POCT Blood Glucose.: 158 mg/dL (24 Oct 2021 21:31)  POCT Blood Glucose.: 162 mg/dL (24 Oct 2021 18:17)    Medications  MEDICATIONS  (STANDING):  ascorbic acid 500 milliGRAM(s) Oral two times a day  aspirin enteric coated 325 milliGRAM(s) Oral daily  atorvastatin 80 milliGRAM(s) Oral at bedtime  bisacodyl Suppository 10 milliGRAM(s) Rectal once  chlorhexidine 2% Cloths 1 Application(s) Topical daily  dextrose 40% Gel 15 Gram(s) Oral once  dextrose 50% Injectable 25 Gram(s) IV Push once  dextrose 50% Injectable 12.5 Gram(s) IV Push once  dextrose 50% Injectable 25 Gram(s) IV Push once  glucagon  Injectable 1 milliGRAM(s) IntraMuscular once  heparin   Injectable 5000 Unit(s) SubCutaneous every 8 hours  insulin glargine Injectable (LANTUS) 16 Unit(s) SubCutaneous at bedtime  insulin lispro (ADMELOG) corrective regimen sliding scale   SubCutaneous three times a day before meals  insulin lispro (ADMELOG) corrective regimen sliding scale   SubCutaneous at bedtime  insulin lispro Injectable (ADMELOG) 8 Unit(s) SubCutaneous three times a day before meals  NIFEdipine XL 30 milliGRAM(s) Oral daily  pantoprazole    Tablet 40 milliGRAM(s) Oral before breakfast  polyethylene glycol 3350 17 Gram(s) Oral daily  senna 2 Tablet(s) Oral at bedtime  sevelamer carbonate 800 milliGRAM(s) Oral three times a day  sodium chloride 0.9%. 1000 milliLiter(s) (10 mL/Hr) IV Continuous <Continuous>      Physical Exam  General: Patient comfortable in bed  Vital Signs Last 12 Hrs  T(F): 97 (10-25-21 @ 12:20), Max: 98 (10-25-21 @ 04:00)  HR: 57 (10-25-21 @ 13:00) (54 - 59)  BP: 129/61 (10-25-21 @ 13:00) (129/61 - 146/67)  BP(mean): 88 (10-25-21 @ 13:00) (86 - 96)  RR: 17 (10-25-21 @ 13:00) (16 - 20)  SpO2: 94% (10-25-21 @ 13:00) (93% - 100%)  Neck: No palpable thyroid nodules.  CVS: S1S2, No murmurs  Respiratory: No wheezing, no crepitations  GI: Abdomen soft, bowel sounds positive  Musculoskeletal:  edema lower extremities.   Skin: No skin rashes, no ecchymosis    Diagnostics

## 2021-10-25 NOTE — PHYSICAL THERAPY INITIAL EVALUATION ADULT - PERTINENT HX OF CURRENT PROBLEM, REHAB EVAL
59 yo M presenting as transfer from [facility] for possible CHF vs NSTEMI. Pt had substernal, burning chest pain this morning. Non radiating. He initially took TUMS and drank some milk which alleviated some of the pain but not completely. Later he also started feeling very dizzy and some sob so presented to [facility]. At [facility] troponin and bnp elevated. Code stroke called 10/8
Pt is a 61y/o M with PMH of DM2, HTN and HLD. Pt intially presented to [facility] for non-radiating midsternal chest pain/burning concerning for NSTEMI and possible CHF. When at [facility] he was noted to be SOB. Troponin and BNP levels were elevated so he was transferred here for NSTEMI and r/o CHF. At [facility], code stroke was called 10/7 for L facial droop. CT results significant for high grade stenosis of L ICA.

## 2021-10-25 NOTE — PHYSICAL THERAPY INITIAL EVALUATION ADULT - CRITERIA FOR SKILLED THERAPEUTIC INTERVENTIONS
No skilled needs, pt is functionally I
impairments found/functional limitations in following categories/risk reduction/prevention/rehab potential/therapy frequency/predicted duration of therapy intervention/anticipated discharge recommendation

## 2021-10-25 NOTE — PHYSICAL THERAPY INITIAL EVALUATION ADULT - GAIT DEVIATIONS NOTED, PT EVAL
decreased rohan/increased time in double stance/decreased velocity of limb motion/decreased weight-shifting ability
dec knee extension in stance, dec hip flexion in swing, anterior weight shift/decreased rohan/decreased velocity of limb motion/decreased step length/decreased stride length/decreased weight-shifting ability

## 2021-10-25 NOTE — PROGRESS NOTE ADULT - ASSESSMENT
59 yo male with DM2, HTN, and HLD who initially presented to [facility] on 10/05 as a transfer from [facility] for NSTEMI and possible CHF. Patient on Heparin drip for NSTEMI. LKW 10:45AM. Patient had episode of bradycardia (HR 30s), then became altered. RRT called. BP during RRT 70s/40s. Code stroke called for L facial droop.   CTH negative for acute pathology, old L frontal cortical infarct seen.   CTA H unremarkable. CTA N shows high-grade stenosis left internal carotid artery at the carotid bifurcation in the neck.  10/06 TTE: EF 45%, moderate-severe MR, mild-moderate L ventricular systolic dysfunction.   A1c 7  MRI no AIS but old strokes  HD cath placement 10/12   CD with severe L ICA and Mod R ICA   s/p LHC 10/14  CABG 10/21  10/22 o/e COTO aaoX 2   10/23 now AAOx3 on    10/24 increased dyspneia overnight  10/25 improved  AAOx3, COTO no neuro deficits   Impression: Mild L nasolabial flattening and dysarthria, ?decreased eye closure strength on the L. Possibly secondary to R brain dysfunction due to acute stroke of unknown mechanism vs peripheral etiology. Patient with old L frontal infarct on CTH, also with high-grade stenosis of L ICA at the carotid bifurcation which likely cause of old stroke     Recommendations:   - ASA 81MG PO daily  - Avoid hypotension.  - High dose statin therapy - atorvastatin 40mg PO daily. LDL goal <70mg/dL.  -  vascular for ICA revascularization can be outpt. not acute   - lipid panel  - telemetry  - PT/OT/SS/SLP, OOBC  - permissive HTN, -180mmHg.  - check FS, glucose control <180  - GI/DVT ppx  - Counseling on diet, exercise, and medication adherence was done  - Counseling on smoking cessation and alcohol consumption offered when appropriate.  - Pain assessed and judicious use of narcotics when appropriate was discussed.    - Stroke education given when appropriate.  - Importance of fall prevention discussed.   - Differential diagnosis and plan of care discussed with patient and/or family and primary team  - Thank you for allowing me to participate in the care of this patient. Call with questions.   - remainin per CTICU, possible stepdown   Marcelino Patel MD  Vascular Neurology .

## 2021-10-25 NOTE — PROGRESS NOTE ADULT - SUBJECTIVE AND OBJECTIVE BOX
TOM PLEITEZ  MRN-[id number]  Patient is a 60y old  Male who presents with a chief complaint of NSTEMI (24 Oct 2021 08:55)      HPI:  60 yr old M w/ hx of DM2, HTN and HLD presents as transfer from [facility] for chest pain concerning for NSTEMI and possible CHF.  Pt states that after he woke up this morning he felt midsternal chest pain this morning, that felt like a burning sensation. Pain was non radiating. Associated with shortness of breath. He initially took TUMS and drank some milk which alleviated some of the pain but not completely. Later he also started feeling very dizzy so he went to the ED. Denies associated palpitations, diaphoresis, N/V.  When he presented to [facility] he was noted to be SOB. Initially, they evaluated patient for CHF but Troponin and BNP levels were elevated so he was transferred here for NSTEMI and r/o CHF.    Per nursing notes, while at [facility], he was placed on BIPAP, given Solumedrol 120, Nitro SL x1, duoneb x2, ASA 81mg, Lasix 40mg w/ 200 cc output, and started at 10mL/hr Nitro drip then increased to 30mL/hr. There, initial /126 retake was 171/67. Nitro drip was d/c'd upon arrival to [facility] ED.     During my visit, patient was sitting up eating a sandwich. Appears comfortable on room air. Denies repeat of chest pain after this morning. Denies shortness of breath. Denies lower extremity edema, orthopnea or PND.     Labs also remarkable for JOSHUA, metabolic acidosis and hyperkalemia. Patient denies prior history of renal injury.       Wife can be reached at [phone]. Medication list confirmed w/ wife. Of note, patient w/ elevated BP to SBP 200s often at home; nifedipine used on a "as needed" basis for SBP > 200.  (06 Oct 2021 01:09)      Surgery/Hospital Course:  10/21 CABG and MVR  10/24 SOB, urgent HD overnight, bipap         Today:  No acute events     ICU Vital Signs Last 24 Hrs  T(C): 36.7 (25 Oct 2021 04:00), Max: 36.9 (24 Oct 2021 12:00)  T(F): 98 (25 Oct 2021 04:00), Max: 98.4 (24 Oct 2021 12:00)  HR: 56 (25 Oct 2021 07:00) (55 - 70)  BP: 148/58 (24 Oct 2021 19:00) (125/57 - 148/58)  BP(mean): 91 (24 Oct 2021 19:00) (82 - 92)  ABP: 124/45 (25 Oct 2021 07:00) (116/47 - 174/50)  ABP(mean): 66 (25 Oct 2021 07:00) (64 - 97)  RR: 16 (25 Oct 2021 07:00) (10 - 26)  SpO2: 96% (25 Oct 2021 07:00) (92% - 100%)      Physical Exam:  Gen:  Awake, alert   CNS: non focal 	  Neck: no JVD  RES : clear , no wheezing              CVS: Regular  rhythm. Normal S1/S2  Abd: Soft, non-distended. Bowel sounds present.  Skin: No rash.  Ext:  no edema    ============================I/O===========================   I&O's Detail    24 Oct 2021 07:01  -  25 Oct 2021 07:00  --------------------------------------------------------  IN:    Milrinone: 28.8 mL    Oral Fluid: 820 mL    sodium chloride 0.9%: 60 mL  Total IN: 908.8 mL    OUT:    Bulb (mL): 30 mL    Chest Tube (mL): 140 mL    Chest Tube (mL): 20 mL    Indwelling Catheter - Urethral (mL): 65 mL    NiCARdipine: 0 mL    Voided (mL): 150 mL  Total OUT: 405 mL    Total NET: 503.8 mL        ============================ LABS =========================                        8.4    10.95 )-----------( 111      ( 25 Oct 2021 00:25 )             25.8     10-25    135  |  98  |  55<H>  ----------------------------<  142<H>  4.4   |  19<L>  |  4.72<H>    Ca    8.6      25 Oct 2021 00:25  Phos  6.4     10-25  Mg     2.4     10-25    TPro  5.7<L>  /  Alb  3.0<L>  /  TBili  0.3  /  DBili  x   /  AST  35  /  ALT  22  /  AlkPhos  76  10-25    LIVER FUNCTIONS - ( 25 Oct 2021 00:25 )  Alb: 3.0 g/dL / Pro: 5.7 g/dL / ALK PHOS: 76 U/L / ALT: 22 U/L / AST: 35 U/L / GGT: x           PT/INR - ( 24 Oct 2021 14:02 )   PT: 16.0 sec;   INR: 1.35 ratio           ABG - ( 25 Oct 2021 06:46 )  pH, Arterial: 7.36  pH, Blood: x     /  pCO2: 39    /  pO2: 70    / HCO3: 22    / Base Excess: -3.2  /  SaO2: 95.0                ======================Micro/Rad/Cardio=================  CXR: Reviewed  Echo:Reviewed  ======================================================  PAST MEDICAL & SURGICAL HISTORY:  Hypertension    Hyperlipidemia    Diabetes mellitus    No pertinent past surgical history      ====================ASSESSMENT ==============  CAD, mitral valve regurgitation S/P CABG, MVR on 10/21/2021  Hypertension  DMT2  Acute Blood Loss Anemia S/P 4u PRBC, 1 PLT, and 1000 FEIBA  Leukocytosis   Elevated BUN/Cr  Hypovolemic shock  Post op respiratory insufficiency       Plan:  ====================== NEUROLOGY=====================  Continue close monitoring of neuro status   Tylenol and Oxycodone, for pain management.     acetaminophen   Tablet .. 650 milliGRAM(s) Oral every 6 hours PRN Mild Pain (1 - 3)  oxyCODONE    IR 5 milliGRAM(s) Oral every 4 hours PRN Moderate Pain (4 - 6)  oxyCODONE    IR 10 milliGRAM(s) Oral every 4 hours PRN Severe Pain (7 - 10)    ==================== RESPIRATORY======================  Supplemental O2 via BiPAP Fi02 50%  Encourage incentive spirometry, continue pulse ox monitoring, follow ABGs       ====================CARDIOVASCULAR==================  CAD, mitral valve regurgitation S/P CABG, MVR on 10/21/2021  Invasive hemodynamic monitoring, assess perfusion indices.   Continue with ASA/lipitor for graft patency   Blood pressure management with Nifedipine    aspirin enteric coated 325 milliGRAM(s) Oral daily  atorvastatin 80 milliGRAM(s) Oral at bedtime  NIFEdipine XL 30 milliGRAM(s) Oral daily    ===================HEMATOLOGIC/ONC ===================  Thrombocytopenia and  acute blood loss anemia  S/P 4u PRBC, 1 PLT, and 1000 FEIBA.  Monitor H/H and PLTs.    VTE prophylaxis, heparin   Injectable 5000 Unit(s) SubCutaneous every 8 hours    ===================== RENAL =========================  Elevated BUN/Cr  Continue to monitor I/Os, BUN/Creatinine, and urine output.   Goal net negative fluid balance. Replete lytes PRN. Keep K> 4 and Mg >2.        ==================== GASTROINTESTINAL===================  Tolerating PO consistent carb diet.   Bowel regimen with Miralax and Senna     ascorbic acid 500 milliGRAM(s) Oral two times a day  bisacodyl Suppository 10 milliGRAM(s) Rectal once  GI prophylaxis, pantoprazole    Tablet 40 milliGRAM(s) Oral before breakfast  polyethylene glycol 3350 17 Gram(s) Oral daily  senna 2 Tablet(s) Oral at bedtime  sodium chloride 0.9%. 1000 milliLiter(s) (10 mL/Hr) IV Continuous <Continuous>    =======================    ENDOCRINE  =====================  History of Type 2 Diabetes Mellitus, continue with admelog sliding scale    dextrose 40% Gel 15 Gram(s) Oral once  dextrose 50% Injectable 25 Gram(s) IV Push once  dextrose 50% Injectable 12.5 Gram(s) IV Push once  dextrose 50% Injectable 25 Gram(s) IV Push once  glucagon  Injectable 1 milliGRAM(s) IntraMuscular once  insulin glargine Injectable (LANTUS) 11 Unit(s) SubCutaneous at bedtime  insulin lispro (ADMELOG) corrective regimen sliding scale   SubCutaneous three times a day before meals  insulin lispro (ADMELOG) corrective regimen sliding scale   SubCutaneous at bedtime  insulin lispro Injectable (ADMELOG) 7 Unit(s) SubCutaneous three times a day before meals    ========================INFECTIOUS DISEASE================  Afebrile, WBC downtrending 13.93->10.95  Continue trending WBC and monitoring fever curve       I have spent 35 minutes providing acute care for this critically ill patient     Patient requires continuous monitoring with bedside rhythm monitoring, pulse ox monitoring, and intermittent blood gas analysis. Care plan discussed with ICU care team. Patient remained critical and at risk for life threatening decompensation.     By signing my name below, I, Darlin Beckett, attest that this documentation has been prepared under the direction and in the presence of Geovanni Stone MD   Electronically signed: Darlin Oneal, 10-25-21 @ 07:59    I, Geovanni Stone, personally performed the services described in this documentation. all medical record entries made by the sarojibbrittni were at my direction and in my presence. I have reviewed the chart and agree that the record reflects my personal performance and is accurate and complete  Electronically signed: Geovanni Stone MD        TOM PLEITEZ  MRN-[id number]  Patient is a 60y old  Male who presents with a chief complaint of NSTEMI (24 Oct 2021 08:55)      HPI:  60 yr old M w/ hx of DM2, HTN and HLD presents as transfer from [facility] for chest pain concerning for NSTEMI and possible CHF.  Pt states that after he woke up this morning he felt midsternal chest pain this morning, that felt like a burning sensation. Pain was non radiating. Associated with shortness of breath. He initially took TUMS and drank some milk which alleviated some of the pain but not completely. Later he also started feeling very dizzy so he went to the ED. Denies associated palpitations, diaphoresis, N/V.  When he presented to [facility] he was noted to be SOB. Initially, they evaluated patient for CHF but Troponin and BNP levels were elevated so he was transferred here for NSTEMI and r/o CHF.    Per nursing notes, while at [facility], he was placed on BIPAP, given Solumedrol 120, Nitro SL x1, duoneb x2, ASA 81mg, Lasix 40mg w/ 200 cc output, and started at 10mL/hr Nitro drip then increased to 30mL/hr. There, initial /126 retake was 171/67. Nitro drip was d/c'd upon arrival to [facility] ED.     During my visit, patient was sitting up eating a sandwich. Appears comfortable on room air. Denies repeat of chest pain after this morning. Denies shortness of breath. Denies lower extremity edema, orthopnea or PND.     Labs also remarkable for JOSHUA, metabolic acidosis and hyperkalemia. Patient denies prior history of renal injury.       Wife can be reached at [phone]. Medication list confirmed w/ wife. Of note, patient w/ elevated BP to SBP 200s often at home; nifedipine used on a "as needed" basis for SBP > 200.  (06 Oct 2021 01:09)      Surgery/Hospital Course:  10/21 C2L, MVR, LAAL   10/24 SOB, urgent HD overnight, bipap     Today:  No acute events     ICU Vital Signs Last 24 Hrs  T(C): 36.7 (25 Oct 2021 04:00), Max: 36.9 (24 Oct 2021 12:00)  T(F): 98 (25 Oct 2021 04:00), Max: 98.4 (24 Oct 2021 12:00)  HR: 56 (25 Oct 2021 07:00) (55 - 70)  BP: 148/58 (24 Oct 2021 19:00) (125/57 - 148/58)  BP(mean): 91 (24 Oct 2021 19:00) (82 - 92)  ABP: 124/45 (25 Oct 2021 07:00) (116/47 - 174/50)  ABP(mean): 66 (25 Oct 2021 07:00) (64 - 97)  RR: 16 (25 Oct 2021 07:00) (10 - 26)  SpO2: 96% (25 Oct 2021 07:00) (92% - 100%)      Physical Exam:  Gen:  Awake, alert   CNS: non focal 	  Neck: no JVD  RES : clear , no wheezing              CVS: Regular  rhythm. Normal S1/S2  Chest: +chest tubes   Abd: Soft, non-distended. Bowel sounds present.  Skin: No rash.  Ext:  no edema    ============================I/O===========================   I&O's Detail    24 Oct 2021 07:01  -  25 Oct 2021 07:00  --------------------------------------------------------  IN:    Milrinone: 28.8 mL    Oral Fluid: 820 mL    sodium chloride 0.9%: 60 mL  Total IN: 908.8 mL    OUT:    Bulb (mL): 30 mL    Chest Tube (mL): 140 mL    Chest Tube (mL): 20 mL    Indwelling Catheter - Urethral (mL): 65 mL    NiCARdipine: 0 mL    Voided (mL): 150 mL  Total OUT: 405 mL    Total NET: 503.8 mL        ============================ LABS =========================                        8.4    10.95 )-----------( 111      ( 25 Oct 2021 00:25 )             25.8     10-25    135  |  98  |  55<H>  ----------------------------<  142<H>  4.4   |  19<L>  |  4.72<H>    Ca    8.6      25 Oct 2021 00:25  Phos  6.4     10-25  Mg     2.4     10-25    TPro  5.7<L>  /  Alb  3.0<L>  /  TBili  0.3  /  DBili  x   /  AST  35  /  ALT  22  /  AlkPhos  76  10-25    LIVER FUNCTIONS - ( 25 Oct 2021 00:25 )  Alb: 3.0 g/dL / Pro: 5.7 g/dL / ALK PHOS: 76 U/L / ALT: 22 U/L / AST: 35 U/L / GGT: x           PT/INR - ( 24 Oct 2021 14:02 )   PT: 16.0 sec;   INR: 1.35 ratio           ABG - ( 25 Oct 2021 06:46 )  pH, Arterial: 7.36  pH, Blood: x     /  pCO2: 39    /  pO2: 70    / HCO3: 22    / Base Excess: -3.2  /  SaO2: 95.0                ======================Micro/Rad/Cardio=================  CXR: Reviewed  Echo:Reviewed  ======================================================  PAST MEDICAL & SURGICAL HISTORY:  Hypertension    Hyperlipidemia    Diabetes mellitus    No pertinent past surgical history      ====================ASSESSMENT ==============  CAD, mitral valve regurgitation S/P C2L, MVR (T), LAAL on 10/21   Hypovolemic shock  Hypertension   Post op respiratory insufficiency   Acute Blood Loss Anemia   Thrombocytopenia   JOSHUA on CKD   Leukocytosis   DMT2    Plan:  ====================== NEUROLOGY=====================  Continue close monitoring of neuro status   Tylenol and Oxycodone for pain management.     acetaminophen   Tablet .. 650 milliGRAM(s) Oral every 6 hours PRN Mild Pain (1 - 3)  oxyCODONE    IR 5 milliGRAM(s) Oral every 4 hours PRN Moderate Pain (4 - 6)  oxyCODONE    IR 10 milliGRAM(s) Oral every 4 hours PRN Severe Pain (7 - 10)    ==================== RESPIRATORY======================  Supplemental O2 via HFO2 50%/50L   Encourage incentive spirometry, continue pulse ox monitoring, follow ABGs     ====================CARDIOVASCULAR==================  CAD, mitral valve regurgitation S/P CABG, MVR on 10/21/2021  Invasive hemodynamic monitoring, assess perfusion indices.   Blood pressure management with Nifedipine  Continue with ASA/lipitor for graft patency     aspirin enteric coated 325 milliGRAM(s) Oral daily  atorvastatin 80 milliGRAM(s) Oral at bedtime  NIFEdipine XL 30 milliGRAM(s) Oral daily    ===================HEMATOLOGIC/ONC ===================  Thrombocytopenia and  acute blood loss anemia, monitor H/H and PLTs.  Continue monitoring daily INR for coumadin dosing     VTE prophylaxis, heparin   Injectable 5000 Unit(s) SubCutaneous every 8 hours    ===================== RENAL =========================  Renal following for JOSHUA on CKD, s/p HD yesterday   Continue to monitor I/Os, BUN/Creatinine, and urine output.   Replete lytes PRN. Keep K> 4 and Mg >2.      ==================== GASTROINTESTINAL===================  Tolerating PO consistent carb diet.   Bowel regimen with Miralax and Senna     ascorbic acid 500 milliGRAM(s) Oral two times a day  bisacodyl Suppository 10 milliGRAM(s) Rectal once  GI prophylaxis, pantoprazole    Tablet 40 milliGRAM(s) Oral before breakfast  polyethylene glycol 3350 17 Gram(s) Oral daily  senna 2 Tablet(s) Oral at bedtime  sodium chloride 0.9%. 1000 milliLiter(s) (10 mL/Hr) IV Continuous <Continuous>    =======================    ENDOCRINE  =====================  History of Type 2 Diabetes Mellitus, continue with admelog sliding scale and lantus     dextrose 40% Gel 15 Gram(s) Oral once  dextrose 50% Injectable 25 Gram(s) IV Push once  dextrose 50% Injectable 12.5 Gram(s) IV Push once  dextrose 50% Injectable 25 Gram(s) IV Push once  glucagon  Injectable 1 milliGRAM(s) IntraMuscular once  insulin glargine Injectable (LANTUS) 11 Unit(s) SubCutaneous at bedtime  insulin lispro (ADMELOG) corrective regimen sliding scale   SubCutaneous three times a day before meals  insulin lispro (ADMELOG) corrective regimen sliding scale   SubCutaneous at bedtime  insulin lispro Injectable (ADMELOG) 7 Unit(s) SubCutaneous three times a day before meals    ========================INFECTIOUS DISEASE================  Afebrile, WBC downtrending 13.93->10.95  Continue trending WBC and monitoring fever curve       I have spent 35 minutes providing acute care for this critically ill patient     Patient requires continuous monitoring with bedside rhythm monitoring, pulse ox monitoring, and intermittent blood gas analysis. Care plan discussed with ICU care team. Patient remained critical and at risk for life threatening decompensation.     By signing my name below, I, Darlin Beckett, attest that this documentation has been prepared under the direction and in the presence of Geovanni Stone MD   Electronically signed: Darlin Oneal, 10-25-21 @ 07:59    I, Geovanni Stone, personally performed the services described in this documentation. all medical record entries made by the scribe were at my direction and in my presence. I have reviewed the chart and agree that the record reflects my personal performance and is accurate and complete  Electronically signed: Geovanni Stone MD        TOM PLEITEZ  MRN-[id number]  Patient is a 60y old  Male who presents with a chief complaint of NSTEMI (24 Oct 2021 08:55)      HPI:  60 yr old M w/ hx of DM2, HTN and HLD presents as transfer from [facility] for chest pain concerning for NSTEMI and possible CHF.  Pt states that after he woke up this morning he felt midsternal chest pain this morning, that felt like a burning sensation. Pain was non radiating. Associated with shortness of breath. He initially took TUMS and drank some milk which alleviated some of the pain but not completely. Later he also started feeling very dizzy so he went to the ED. Denies associated palpitations, diaphoresis, N/V.  When he presented to [facility] he was noted to be SOB. Initially, they evaluated patient for CHF but Troponin and BNP levels were elevated so he was transferred here for NSTEMI and r/o CHF.    Per nursing notes, while at [facility], he was placed on BIPAP, given Solumedrol 120, Nitro SL x1, duoneb x2, ASA 81mg, Lasix 40mg w/ 200 cc output, and started at 10mL/hr Nitro drip then increased to 30mL/hr. There, initial /126 retake was 171/67. Nitro drip was d/c'd upon arrival to [facility] ED.     During my visit, patient was sitting up eating a sandwich. Appears comfortable on room air. Denies repeat of chest pain after this morning. Denies shortness of breath. Denies lower extremity edema, orthopnea or PND.     Labs also remarkable for JOSHUA, metabolic acidosis and hyperkalemia. Patient denies prior history of renal injury.       Wife can be reached at [phone]. Medication list confirmed w/ wife. Of note, patient w/ elevated BP to SBP 200s often at home; nifedipine used on a "as needed" basis for SBP > 200.  (06 Oct 2021 01:09)      Surgery/Hospital Course:  10/21 C2L, MVR, LAAL   10/24 SOB, urgent HD overnight, bipap     Today:  No acute events     ICU Vital Signs Last 24 Hrs  T(C): 36.7 (25 Oct 2021 04:00), Max: 36.9 (24 Oct 2021 12:00)  T(F): 98 (25 Oct 2021 04:00), Max: 98.4 (24 Oct 2021 12:00)  HR: 56 (25 Oct 2021 07:00) (55 - 70)  BP: 148/58 (24 Oct 2021 19:00) (125/57 - 148/58)  BP(mean): 91 (24 Oct 2021 19:00) (82 - 92)  ABP: 124/45 (25 Oct 2021 07:00) (116/47 - 174/50)  ABP(mean): 66 (25 Oct 2021 07:00) (64 - 97)  RR: 16 (25 Oct 2021 07:00) (10 - 26)  SpO2: 96% (25 Oct 2021 07:00) (92% - 100%)      Physical Exam:  Gen:  Awake, alert   CNS: non focal 	  Neck: no JVD  RES : clear , no wheezing              CVS: Regular  rhythm. Normal S1/S2  Chest: +chest tubes   Abd: Soft, non-distended. Bowel sounds present.  Skin: No rash.  Ext:  no edema    ============================I/O===========================   I&O's Detail    24 Oct 2021 07:01  -  25 Oct 2021 07:00  --------------------------------------------------------  IN:    Milrinone: 28.8 mL    Oral Fluid: 820 mL    sodium chloride 0.9%: 60 mL  Total IN: 908.8 mL    OUT:    Bulb (mL): 30 mL    Chest Tube (mL): 140 mL    Chest Tube (mL): 20 mL    Indwelling Catheter - Urethral (mL): 65 mL    NiCARdipine: 0 mL    Voided (mL): 150 mL  Total OUT: 405 mL    Total NET: 503.8 mL        ============================ LABS =========================                        8.4    10.95 )-----------( 111      ( 25 Oct 2021 00:25 )             25.8     10-25    135  |  98  |  55<H>  ----------------------------<  142<H>  4.4   |  19<L>  |  4.72<H>    Ca    8.6      25 Oct 2021 00:25  Phos  6.4     10-25  Mg     2.4     10-25    TPro  5.7<L>  /  Alb  3.0<L>  /  TBili  0.3  /  DBili  x   /  AST  35  /  ALT  22  /  AlkPhos  76  10-25    LIVER FUNCTIONS - ( 25 Oct 2021 00:25 )  Alb: 3.0 g/dL / Pro: 5.7 g/dL / ALK PHOS: 76 U/L / ALT: 22 U/L / AST: 35 U/L / GGT: x           PT/INR - ( 24 Oct 2021 14:02 )   PT: 16.0 sec;   INR: 1.35 ratio           ABG - ( 25 Oct 2021 06:46 )  pH, Arterial: 7.36  pH, Blood: x     /  pCO2: 39    /  pO2: 70    / HCO3: 22    / Base Excess: -3.2  /  SaO2: 95.0                ======================Micro/Rad/Cardio=================  CXR: Reviewed  Echo:Reviewed  ======================================================  PAST MEDICAL & SURGICAL HISTORY:  Hypertension    Hyperlipidemia    Diabetes mellitus    No pertinent past surgical history      ====================ASSESSMENT ==============  CAD, mitral valve regurgitation S/P C2L, MVR (T), LAAL on 10/21   Hypovolemic shock  Hypertension   Post op respiratory insufficiency   Acute Blood Loss Anemia   Thrombocytopenia   JOSHUA on CKD   Leukocytosis   DMT2    Plan:  ====================== NEUROLOGY=====================  Continue close monitoring of neuro status   Tylenol and Oxycodone for pain management.     acetaminophen   Tablet .. 650 milliGRAM(s) Oral every 6 hours PRN Mild Pain (1 - 3)  oxyCODONE    IR 5 milliGRAM(s) Oral every 4 hours PRN Moderate Pain (4 - 6)  oxyCODONE    IR 10 milliGRAM(s) Oral every 4 hours PRN Severe Pain (7 - 10)    ==================== RESPIRATORY======================  Supplemental O2 via 5L NC   Encourage incentive spirometry, continue pulse ox monitoring, follow ABGs     ====================CARDIOVASCULAR==================  CAD, mitral valve regurgitation S/P CABG, MVR on 10/21/2021  Invasive hemodynamic monitoring, assess perfusion indices.   Blood pressure management with Nifedipine  Continue with ASA/lipitor for graft patency     aspirin enteric coated 325 milliGRAM(s) Oral daily  atorvastatin 80 milliGRAM(s) Oral at bedtime  NIFEdipine XL 30 milliGRAM(s) Oral daily    ===================HEMATOLOGIC/ONC ===================  Thrombocytopenia and  acute blood loss anemia, monitor H/H and PLTs.  Continue monitoring daily INR for coumadin dosing     VTE prophylaxis, heparin   Injectable 5000 Unit(s) SubCutaneous every 8 hours    ===================== RENAL =========================  Renal following for JOSHUA on CKD, s/p HD yesterday   Continue to monitor I/Os, BUN/Creatinine, and urine output.   Replete lytes PRN. Keep K> 4 and Mg >2.      ==================== GASTROINTESTINAL===================  Tolerating PO consistent carb diet.   Bowel regimen with Miralax and Senna     ascorbic acid 500 milliGRAM(s) Oral two times a day  bisacodyl Suppository 10 milliGRAM(s) Rectal once  GI prophylaxis, pantoprazole    Tablet 40 milliGRAM(s) Oral before breakfast  polyethylene glycol 3350 17 Gram(s) Oral daily  senna 2 Tablet(s) Oral at bedtime  sodium chloride 0.9%. 1000 milliLiter(s) (10 mL/Hr) IV Continuous <Continuous>    =======================    ENDOCRINE  =====================  History of Type 2 Diabetes Mellitus, continue with admelog sliding scale and lantus     dextrose 40% Gel 15 Gram(s) Oral once  dextrose 50% Injectable 25 Gram(s) IV Push once  dextrose 50% Injectable 12.5 Gram(s) IV Push once  dextrose 50% Injectable 25 Gram(s) IV Push once  glucagon  Injectable 1 milliGRAM(s) IntraMuscular once  insulin glargine Injectable (LANTUS) 11 Unit(s) SubCutaneous at bedtime  insulin lispro (ADMELOG) corrective regimen sliding scale   SubCutaneous three times a day before meals  insulin lispro (ADMELOG) corrective regimen sliding scale   SubCutaneous at bedtime  insulin lispro Injectable (ADMELOG) 7 Unit(s) SubCutaneous three times a day before meals    ========================INFECTIOUS DISEASE================  Afebrile, WBC downtrending 13.93->10.95  Continue trending WBC and monitoring fever curve       I have spent 35 minutes providing acute care for this critically ill patient     Patient requires continuous monitoring with bedside rhythm monitoring, pulse ox monitoring, and intermittent blood gas analysis. Care plan discussed with ICU care team. Patient remained critical and at risk for life threatening decompensation.     By signing my name below, Darlin DUENAS, attest that this documentation has been prepared under the direction and in the presence of Geovanni Stone MD   Electronically signed: Darlin Oneal, 10-25-21 @ 07:59    I, Geovanni Stone, personally performed the services described in this documentation. all medical record entries made by the scribe were at my direction and in my presence. I have reviewed the chart and agree that the record reflects my personal performance and is accurate and complete  Electronically signed: Geovanni Stone MD        TOM PLEITEZ  MRN-[id number]  Patient is a 60y old  Male who presents with a chief complaint of NSTEMI (24 Oct 2021 08:55)      HPI:  60 yr old M w/ hx of DM2, HTN and HLD presents as transfer from [facility] for chest pain concerning for NSTEMI and possible CHF.  Pt states that after he woke up this morning he felt midsternal chest pain this morning, that felt like a burning sensation. Pain was non radiating. Associated with shortness of breath. He initially took TUMS and drank some milk which alleviated some of the pain but not completely. Later he also started feeling very dizzy so he went to the ED. Denies associated palpitations, diaphoresis, N/V.  When he presented to [facility] he was noted to be SOB. Initially, they evaluated patient for CHF but Troponin and BNP levels were elevated so he was transferred here for NSTEMI and r/o CHF.    Per nursing notes, while at [facility], he was placed on BIPAP, given Solumedrol 120, Nitro SL x1, duoneb x2, ASA 81mg, Lasix 40mg w/ 200 cc output, and started at 10mL/hr Nitro drip then increased to 30mL/hr. There, initial /126 retake was 171/67. Nitro drip was d/c'd upon arrival to [facility] ED.     During my visit, patient was sitting up eating a sandwich. Appears comfortable on room air. Denies repeat of chest pain after this morning. Denies shortness of breath. Denies lower extremity edema, orthopnea or PND.     Labs also remarkable for JOSHUA, metabolic acidosis and hyperkalemia. Patient denies prior history of renal injury.       Wife can be reached at [phone]. Medication list confirmed w/ wife. Of note, patient w/ elevated BP to SBP 200s often at home; nifedipine used on a "as needed" basis for SBP > 200.  (06 Oct 2021 01:09)      Surgery/Hospital Course:  10/21 C2L, MVR, LAAL   10/24 SOB, urgent HD overnight, bipap     Today:  Supplemental O2 via HFO2 transitioned to 5L NC. Renal following for JOSHUA on CKD, s/p HD yesterday, plan for HD today. Sinus blaine in the 50s, back up VVI paced at 50 via temp epicardial wires. Monitor INR for coumadin dosing for mitral valve. Plan to d/c pleural tube. Ambulate as tolerated.     ICU Vital Signs Last 24 Hrs  T(C): 36.7 (25 Oct 2021 04:00), Max: 36.9 (24 Oct 2021 12:00)  T(F): 98 (25 Oct 2021 04:00), Max: 98.4 (24 Oct 2021 12:00)  HR: 56 (25 Oct 2021 07:00) (55 - 70)  BP: 148/58 (24 Oct 2021 19:00) (125/57 - 148/58)  BP(mean): 91 (24 Oct 2021 19:00) (82 - 92)  ABP: 124/45 (25 Oct 2021 07:00) (116/47 - 174/50)  ABP(mean): 66 (25 Oct 2021 07:00) (64 - 97)  RR: 16 (25 Oct 2021 07:00) (10 - 26)  SpO2: 96% (25 Oct 2021 07:00) (92% - 100%)      Physical Exam:  Gen:  Awake, alert   CNS: non focal 	  Neck: no JVD  RES : clear , no wheezing              CVS: Regular  rhythm. Normal S1/S2  Chest: +chest tubes   Abd: Soft, non-distended. Bowel sounds present.  Skin: No rash.  Ext:  no edema    ============================I/O===========================   I&O's Detail    24 Oct 2021 07:01  -  25 Oct 2021 07:00  --------------------------------------------------------  IN:    Milrinone: 28.8 mL    Oral Fluid: 820 mL    sodium chloride 0.9%: 60 mL  Total IN: 908.8 mL    OUT:    Bulb (mL): 30 mL    Chest Tube (mL): 140 mL    Chest Tube (mL): 20 mL    Indwelling Catheter - Urethral (mL): 65 mL    NiCARdipine: 0 mL    Voided (mL): 150 mL  Total OUT: 405 mL    Total NET: 503.8 mL        ============================ LABS =========================                        8.4    10.95 )-----------( 111      ( 25 Oct 2021 00:25 )             25.8     10-25    135  |  98  |  55<H>  ----------------------------<  142<H>  4.4   |  19<L>  |  4.72<H>    Ca    8.6      25 Oct 2021 00:25  Phos  6.4     10-25  Mg     2.4     10-25    TPro  5.7<L>  /  Alb  3.0<L>  /  TBili  0.3  /  DBili  x   /  AST  35  /  ALT  22  /  AlkPhos  76  10-25    LIVER FUNCTIONS - ( 25 Oct 2021 00:25 )  Alb: 3.0 g/dL / Pro: 5.7 g/dL / ALK PHOS: 76 U/L / ALT: 22 U/L / AST: 35 U/L / GGT: x           PT/INR - ( 24 Oct 2021 14:02 )   PT: 16.0 sec;   INR: 1.35 ratio           ABG - ( 25 Oct 2021 06:46 )  pH, Arterial: 7.36  pH, Blood: x     /  pCO2: 39    /  pO2: 70    / HCO3: 22    / Base Excess: -3.2  /  SaO2: 95.0                ======================Micro/Rad/Cardio=================  CXR: Reviewed  Echo:Reviewed  ======================================================  PAST MEDICAL & SURGICAL HISTORY:  Hypertension    Hyperlipidemia    Diabetes mellitus    No pertinent past surgical history      ====================ASSESSMENT ==============  CAD, mitral valve regurgitation S/P C2L, MVR (T), LAAL on 10/21   Hypovolemic shock  Hypertension   Post op respiratory insufficiency   Acute Blood Loss Anemia   Thrombocytopenia   JOSHUA on CKD   Leukocytosis   DMT2    Plan:  ====================== NEUROLOGY=====================  Continue close monitoring of neuro status   PRN Tylenol and PRN Oxycodone for pain management  Ambulate as tolerated    acetaminophen   Tablet .. 650 milliGRAM(s) Oral every 6 hours PRN Mild Pain (1 - 3)  oxyCODONE    IR 5 milliGRAM(s) Oral every 4 hours PRN Moderate Pain (4 - 6)  oxyCODONE    IR 10 milliGRAM(s) Oral every 4 hours PRN Severe Pain (7 - 10)    ==================== RESPIRATORY======================  Supplemental O2 via HFO2 transitioned to 5L NC.   Encourage incentive spirometry, continue pulse ox monitoring, follow ABGs   Plan to d/c pleural tube    ====================CARDIOVASCULAR==================  CAD, mitral valve regurgitation S/P CABG, MVR on 10/21/2021  Invasive hemodynamic monitoring, assess perfusion indices.   Blood pressure management with Nifedipine  Sinus blaine in the 50s, back up VVI paced at 50 via temp epicardial wires  Continue with ASA/lipitor for graft patency     aspirin enteric coated 325 milliGRAM(s) Oral daily  atorvastatin 80 milliGRAM(s) Oral at bedtime  NIFEdipine XL 30 milliGRAM(s) Oral daily    ===================HEMATOLOGIC/ONC ===================  Thrombocytopenia and  acute blood loss anemia, monitor H/H and PLTs.  Monitor INR for coumadin dosing for mitral valve    VTE prophylaxis, heparin   Injectable 5000 Unit(s) SubCutaneous every 8 hours    ===================== RENAL =========================  Renal following for JOSHUA on CKD, s/p HD yesterday, plan for HD today   Continue to monitor I/Os, BUN/Creatinine, and urine output.   Replete lytes PRN. Keep K> 4 and Mg >2.      ==================== GASTROINTESTINAL===================  Tolerating PO consistent carb diet.   Bowel regimen with Miralax and Senna     ascorbic acid 500 milliGRAM(s) Oral two times a day  bisacodyl Suppository 10 milliGRAM(s) Rectal once  GI prophylaxis, pantoprazole    Tablet 40 milliGRAM(s) Oral before breakfast  polyethylene glycol 3350 17 Gram(s) Oral daily  senna 2 Tablet(s) Oral at bedtime  sodium chloride 0.9%. 1000 milliLiter(s) (10 mL/Hr) IV Continuous <Continuous>    =======================    ENDOCRINE  =====================  History of Type 2 Diabetes Mellitus, continue with admelog sliding scale and lantus     dextrose 40% Gel 15 Gram(s) Oral once  dextrose 50% Injectable 25 Gram(s) IV Push once  dextrose 50% Injectable 12.5 Gram(s) IV Push once  dextrose 50% Injectable 25 Gram(s) IV Push once  glucagon  Injectable 1 milliGRAM(s) IntraMuscular once  insulin glargine Injectable (LANTUS) 11 Unit(s) SubCutaneous at bedtime  insulin lispro (ADMELOG) corrective regimen sliding scale   SubCutaneous three times a day before meals  insulin lispro (ADMELOG) corrective regimen sliding scale   SubCutaneous at bedtime  insulin lispro Injectable (ADMELOG) 7 Unit(s) SubCutaneous three times a day before meals    ========================INFECTIOUS DISEASE================  Afebrile, WBC downtrending 13.93->10.95  Continue trending WBC and monitoring fever curve       I have spent 35 minutes providing acute care for this critically ill patient     Patient requires continuous monitoring with bedside rhythm monitoring, pulse ox monitoring, and intermittent blood gas analysis. Care plan discussed with ICU care team. Patient remained critical and at risk for life threatening decompensation.     By signing my name below, I, Tanneroziel Sujit, attest that this documentation has been prepared under the direction and in the presence of Geovanni Stone MD   Electronically signed: Darlin Oneal, 10-25-21 @ 07:59    I, Geovanni Stone, personally performed the services described in this documentation. all medical record entries made by the sarojibbrittni were at my direction and in my presence. I have reviewed the chart and agree that the record reflects my personal performance and is accurate and complete  Electronically signed: Geovanni Stone MD

## 2021-10-25 NOTE — PHYSICAL THERAPY INITIAL EVALUATION ADULT - ADDITIONAL COMMENTS
Pt lives in a private home with his spouse. There are no steps to enter, one flight of stairs ~7 steps inside. Pt was (I) with all ADLs and functional mobility PTA
Pt lives in private home with wife, no steps to enter, 8 steps inside +HR. Prior to admission, pt was I with all functional mobility and ADLs without AD. (-) .

## 2021-10-25 NOTE — PROGRESS NOTE ADULT - ASSESSMENT
60 year old male with PMHx of DM and HTN who presented to the hospital as a transfer from OSH for NSTEMI. The nephrology team was consulted due to elevated creatinine of 4.05 upon presentation.    JOSHUA on CKD, now on HD, may need long term HD  - Likely with CKD from diabetic nephropathy, admitted with JOSHUA on CKD and hypervolemia, was initiated on HD prior to CABG for optimization.   --- initiated on HD on 10/14; R IJ non-tunneled catheter placed on 10/13  - s/p LHC on 10/14 showing severe multivessel disease; now s/p CABG on 10/21  - 10/24 morning with increase dyspnea and work of breathing, required BiPAP and increased CVP  --- had urgent HD with 2L UF    - will do HD today as he remains volume overloaded and continued SOB   - monitor BMP  - adjust meds as per HD     Anemia:   - in the setting of CKD   - iron studies wnl   - will consider starting the patient on SISSY.   - monitor CBC 60 year old male with PMHx of DM and HTN who presented to the hospital as a transfer from OSH for NSTEMI. The nephrology team was consulted due to elevated creatinine of 4.05 upon presentation.    JOSHUA on CKD, now on HD, may need long term HD  - Likely with CKD from diabetic nephropathy, admitted with JOSHUA on CKD and hypervolemia, was initiated on HD prior to CABG for optimization.   --- initiated on HD on 10/14; R IJ non-tunneled catheter placed on 10/13  - s/p LHC on 10/14 showing severe multivessel disease; now s/p CABG on 10/21  - 10/24 morning with increase dyspnea and work of breathing, required BiPAP and increased CVP  --- had urgent HD with 2L UF    - will do HD today as he remains volume overloaded and continued SOB   - monitor BMP  - adjust meds as per HD     Anemia:   - in the setting of CKD   - iron studies wnl   - will consider starting the patient on SISSY.   - monitor CBC    If any questions, please feel free to contact me     Saul Crews  Nephrology Fellow  [facility] Pager: [phone]   60 year old male with PMHx of DM and HTN who presented to the hospital as a transfer from OSH for NSTEMI. The nephrology team was consulted due to elevated creatinine of 4.05 upon presentation.    JOSHUA on CKD, now on HD, may need long term HD  - Likely with CKD from diabetic nephropathy, admitted with JOSHUA on CKD and hypervolemia, was initiated on HD prior to CABG for optimization.   --- initiated on HD on 10/14; R IJ non-tunneled catheter placed on 10/13  - s/p LHC on 10/14 showing severe multivessel disease; now s/p CABG on 10/21  - 10/24 morning with increase dyspnea and work of breathing, required BiPAP and increased CVP  --- had urgent HD with 2L UF    - will do HD today as he remains volume overloaded and continued SOB   - monitor BMP  - adjust meds as per HD     Anemia:   - in the setting of CKD   - iron studies wnl   - will START the patient on SISSY. Will give 54009 units with HD.   - monitor CBC    If any questions, please feel free to contact me     Saul Crews  Nephrology Fellow  [facility] Pager: [phone]

## 2021-10-25 NOTE — PROGRESS NOTE ADULT - SUBJECTIVE AND OBJECTIVE BOX
Neurology Progress Note    S: Patient seen and examined in CTICU. better. in chair AAOx3 COTO following     Medications:  MEDICATIONS  (STANDING):  ascorbic acid 500 milliGRAM(s) Oral two times a day  aspirin enteric coated 325 milliGRAM(s) Oral daily  atorvastatin 80 milliGRAM(s) Oral at bedtime  bisacodyl Suppository 10 milliGRAM(s) Rectal once  chlorhexidine 2% Cloths 1 Application(s) Topical daily  dextrose 40% Gel 15 Gram(s) Oral once  dextrose 50% Injectable 25 Gram(s) IV Push once  dextrose 50% Injectable 12.5 Gram(s) IV Push once  dextrose 50% Injectable 25 Gram(s) IV Push once  glucagon  Injectable 1 milliGRAM(s) IntraMuscular once  heparin   Injectable 5000 Unit(s) SubCutaneous every 8 hours  insulin glargine Injectable (LANTUS) 11 Unit(s) SubCutaneous at bedtime  insulin lispro (ADMELOG) corrective regimen sliding scale   SubCutaneous three times a day before meals  insulin lispro (ADMELOG) corrective regimen sliding scale   SubCutaneous at bedtime  insulin lispro Injectable (ADMELOG) 7 Unit(s) SubCutaneous three times a day before meals  NIFEdipine XL 30 milliGRAM(s) Oral daily  pantoprazole    Tablet 40 milliGRAM(s) Oral before breakfast  polyethylene glycol 3350 17 Gram(s) Oral daily  senna 2 Tablet(s) Oral at bedtime  sevelamer carbonate 800 milliGRAM(s) Oral three times a day  sodium chloride 0.9%. 1000 milliLiter(s) (10 mL/Hr) IV Continuous <Continuous>    MEDICATIONS  (PRN):  acetaminophen   Tablet .. 650 milliGRAM(s) Oral every 6 hours PRN Mild Pain (1 - 3)  oxyCODONE    IR 5 milliGRAM(s) Oral every 4 hours PRN Moderate Pain (4 - 6)  oxyCODONE    IR 10 milliGRAM(s) Oral every 4 hours PRN Severe Pain (7 - 10)      Vitals:  ICU Vital Signs Last 24 Hrs  T(C): 35.8 (25 Oct 2021 08:00), Max: 36.9 (24 Oct 2021 12:00)  T(F): 96.5 (25 Oct 2021 08:00), Max: 98.4 (24 Oct 2021 12:00)  HR: 55 (25 Oct 2021 08:00) (55 - 70)  BP: 148/58 (24 Oct 2021 19:00) (125/60 - 148/58)  BP(mean): 91 (24 Oct 2021 19:00) (86 - 92)  ABP: 125/48 (25 Oct 2021 08:00) (116/47 - 174/50)  ABP(mean): 67 (25 Oct 2021 08:00) (64 - 97)  RR: 16 (25 Oct 2021 08:00) (15 - 26)  SpO2: 95% (25 Oct 2021 08:00) (92% - 100%)        General Exam:   General Appearance: Appropriately dressed and in no acute distress       Head: Normocephalic, atraumatic and no dysmorphic features  Ear, Nose, and Throat: Moist mucous membranes  CVS: S1S2+  Resp: No SOB, no wheeze or rhonchi  Abd: soft NTND  Extremities: No edema, no cyanosis  Skin: No bruises, no rashes     Neurological Exam:  Mental Status: Awake, alert and oriented x2-3.  Able to follow simple and complex verbal commands. Able to name and repeat. fluent speech. No obvious aphasia or dysarthria noted.   Cranial Nerves: PERRL, EOMI, VFFC, sensation V1-V3 intact,  mild NLF facial asymmetry , equal elevation of palate, scm/trap 5/5, tongue is midline on protrusion. no obvious papilledema on fundoscopic exam. Hearing is grossly intact.   Motor: Normal bulk, tone and strength throughout. Fine finger movements were intact and symmetric. no tremors or drift noted.    Sensation: Intact to light touch and pinprick throughout. no right/left confusion. no extinction to tactile on DSS.   Reflexes: 1+ throughout at biceps, brachioradialis, triceps, patellars and ankles bilaterally and equal. No clonus. R toe and L toe were both downgoing.  Coordination: No dysmetria on FNF    Gait: deferred     I personally reviewed the below data/images/labs:    CBC Full  -  ( 25 Oct 2021 00:25 )  WBC Count : 10.95 K/uL  RBC Count : 2.86 M/uL  Hemoglobin : 8.4 g/dL  Hematocrit : 25.8 %  Platelet Count - Automated : 111 K/uL  Mean Cell Volume : 90.2 fl  Mean Cell Hemoglobin : 29.4 pg  Mean Cell Hemoglobin Concentration : 32.6 gm/dL  Auto Neutrophil # : 7.79 K/uL  Auto Lymphocyte # : 1.43 K/uL  Auto Monocyte # : 1.25 K/uL  Auto Eosinophil # : 0.37 K/uL  Auto Basophil # : 0.05 K/uL  Auto Neutrophil % : 71.1 %  Auto Lymphocyte % : 13.1 %  Auto Monocyte % : 11.4 %  Auto Eosinophil % : 3.4 %  Auto Basophil % : 0.5 %      10-25    135  |  98  |  55<H>  ----------------------------<  142<H>  4.4   |  19<L>  |  4.72<H>    Ca    8.6      25 Oct 2021 00:25  Phos  6.4     10-25  Mg     2.4     10-25    TPro  5.7<L>  /  Alb  3.0<L>  /  TBili  0.3  /  DBili  x   /  AST  35  /  ALT  22  /  AlkPhos  76  10-25        -10/07 CTH: No hemorrhage. 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   -10/07 CTA N: High-grade stenosis left internal carotid artery at the carotid bifurcation in the neck.   -10/07 CTA H: Normal intracranial circulation.    < from: MR Head No Cont (10.09.21 @ 20:22) >    EXAM:  MR BRAIN                            PROCEDURE DATE:  10/09/2021            INTERPRETATION:  CLINICAL HISTORY:Facial droop, on heparin drip, NSTEMI . Severe left ICA stenosis.    TECHNIQUE:  MRI of brain without contrast dated 10/9/2021.  Examination consisted of transaxial T1, T2, FLAIR, gradient echo as well as diffusion-weighted sequences with corresponding ADC maps. Coronal T2 and sagittal T1-weighted images were also acquired through the brain.    COMPARISON: CT head and CTA head andneck 10/7/2021    FINDINGS:  There are no areas abnormal restricted diffusion within the brain parenchyma to suggest acute/subacute infarct. There is no acute intracranial hemorrhage, vasogenic edema or abnormal susceptibility artifact. Chronic lacunar infarcts are seen in the left frontal lobe, right frontal cranial radiata and right cerebellum. Additional nonspecific patchy and confluent areas of T2/FLAIR prolongation in the bihemispheric white matter likely represents mild chronic microvascular changes.    The ventricles, sulci and cisternal spaces are stable in size and configuration. There is no midline shift or abnormal extra-axial fluid collection.    Flow-voids of the major intracranial vessels are maintained, as seen best on the T2-weighted images, indicating their patency.    The visualized paranasal sinuses and mastoid air cells are free of acute disease. The orbital regions are unremarkable.    IMPRESSION:  No acute infarct or intracranial hemorrhage. Chronic infarcts and microvascular changes as above.    --- End of Report ---      NEIL CARDENAS MD; Attending Radiologist  This document has been electronically signed. Oct 10 2021  4:26AM    < end of copied text >  < from: [facility] Duplex Carotid, Bilat (10.08.21 @ 17:07) >    EXAM:  CAROTID DUPLEX BILATERAL                            PROCEDURE DATE:  10/08/2021      INTERPRETATION:  CLINICAL INFORMATION: Left ICA high-grade stenosis identified on recent CTA neck.    COMPARISON: CTA neck 10/7/2021.    TECHNIQUE: Grayscale, color and spectral Doppler examination of both carotid arteries was performed.    FINDINGS:    There are atheromatous plaques are present bilaterally in the region of the bifurcations of the common carotid arteries into internal and external branches.    Velocity elevation of the proximal right internal carotid artery results in 50-69% flow-limiting stenosis.    Velocity elevation of the proximal left internal carotid artery results in 70-9% flow-limiting stenosis.    Bilateral flow-limiting stenoses noted of the right and left external carotid arteries as well.    Peak systolic velocities are as follows:    RIGHT:  PROX CCA = 126 cm/s  DIST CCA = 99 cm/s  PROX ICA = 137 cm/s  MID ICA = 86 cm/s  DIST ICA = 80 cm/s  ECA = 202 cm/s    LEFT:  PROX CCA = 100 cm/s  DIST CCA = 59 cm/s  PROX ICA = 313 cm/s  MID ICA = 72 cm/s  DIST ICA = 47 cm/s  ECA = 298 cm/s    Antegrade flow is noted within both vertebral arteries.    IMPRESSION:    Left internal carotid artery 70-99% flow-limiting stenosis.  Right internal carotid artery 50-69% flow-limiting stenosis.  Bilateral external carotid artery flow limiting stenoses.  INDIANA Hammond was informed of these findings on 10/8/2021 at 5:06 PM.    Measurement of carotid stenosis is based on velocity parameters that correlate the residual internal carotid diameter with that of the more distal vessel in accordance with a method such as the [employer] Symptomatic Carotid Endarterectomy Trial (NASCET).    --- End of Report ---    FELIX MCMAHON M.D., ATTENDING RADIOLOGIST  This document has been electronically signed. Oct  8 2021  5:21PM    < end of copied text >

## 2021-10-25 NOTE — PHYSICAL THERAPY INITIAL EVALUATION ADULT - DIAGNOSIS, PT EVAL
Patient:  Fidencio Coates  :  1973  PCP:  Obed Dickson MD  Date of visit: 2021    Chief Complaint   Patient presents with   • Diabetes Mellitus     HPI: Fidencio Coates is a 48 year old male presents for follow-up of type 1 diabetes with hyperglycemia.     Most recent A1c was 6.4% on 2021. Previously, A1C was 6.3% on 2020.    Hemoglobin A1C POC (%)   Date Value   2020 6.3 (A)     Glucometer Glucose   Date/Time Value Ref Range Status   2017 12:00   Final     Glucose Bedside   Date/Time Value Ref Range Status   2019 12:00   Final     He was diagnosed in the year . He has been on the Medtronic 670 G insulin pump with Humalog insulin and Guardian sensor. Patient is in auto mode since 2019.  Insulin pump dowloaded and reviewed extensively with pt today.    Medtronic 670G downloaded and reviewed from 21-21. Patient is in auto mode 98% in time.    Average total daily insulin dose is 115 units with 36% basal and  64 % bolus.  91% of BS are in target range.  8% of BS are high.  1% of BS are low.  Random blood sugar in clinic 169 mg/dL.       Manual basal rates as follows:  0000 1.40 units/hr  0600   1.45 units/hr  0900   1.40 units/hr  2200   1.45 units/hr    The carbohydrate ratio:   1 unit: 5.5 grams carbohydrate.     His sensitivity:  1 unit for ever 25 mg/dL > 120 mg/dL.     Active insulin time 4.0 hours    2 hour post-prandial blood sugars:   Breakfast 126 mg/dL  Lunch 144 mg/dL  Dinner 154 mg/dL    The patient's insulin treatment regimen requires frequent adjustments by the patient on the basis of therapeutic blood glucose monitoring or continuous glucose monitoring testing results.    Patient has gained approximately 2 lbs in the last 6 months. Overall 15 pounds in 1 yr. frustated about weight.    As far as chronic complications of diabetes are concerned, he denies any numbness or tingling in his feet. His most recent random urine 
microalbumin to creatinine ratio was normal.   MICROALBUMIN/CREATININE (mg/g)   Date Value   03/14/2019     UNABLE TO CALCULATE DUE TO LOW ANALYTE CONCENTRATION.   has no DM nephropathy.  His last dilated eye exam was on 10/07/2020. Patient background retinopathy in the right eye.  He has never had a stroke or coronary event.    He has hypoglycemic awareness. He has Glucagon at home.    Patient does have complaint of some mild erectile dysfunction. He denies any decrease in libido. He is not using sildenafil often.     August 21, 2020 patient had EGD with Dr. Nelson after being in the ED for esophageal obstruction 08/16/2020.     REVIEW OF SYSTEMS: Per history of present illness, all other review of systems reviewed and negative.     HISTORIES:  Unchanged from initial visit.I have reviewed the patient's medications and allergies, past medical, surgical, social and family history, updating these as appropriate.  See Histories section of the EMR for a display of this information.    MEDICATIONS / ALLERGIES:  Current Outpatient Medications   Medication Sig   • pantoprazole (PROTONIX) 40 MG tablet TAKE 1 TABLET BY MOUTH TWICE DAILY BEFORE MEALS   • blood glucose (DONALD CONTOUR NEXT TEST) test strip 6 times daily. Test blood sugar 6 times daily. Diagnosis: E10.65. Meter: Donald Contour Next   • atorvastatin (LIPITOR) 20 MG tablet Take 1 tablet by mouth daily.   • amlodipine-benazepril (LOTREL) 5-20 MG per capsule Take 1 capsule by mouth daily.   • chlorthalidone (HYGROTEN) 50 MG tablet Take 1 tablet by mouth daily.   • doxazosin (CARDURA) 2 MG tablet Take 1 tablet by mouth nightly.   • insulin lispro (HumaLOG) 100 UNIT/ML injectable solution VIA PUMP MAX DAILY DOSE  UNITS current dosing   • Insulin Syringe-Needle U-100 (B-D INSULIN SYRINGE) 30G X 1/2\" 0.5 ML Misc Use to inject insulin in case of insulin pump failure   • sildenafil (REVATIO) 20 MG tablet TAKE 3-5 TABLETS PER 24 HOURS AS NEEDED.   • Blood Glucose 
Monitoring Suppl (ONE TOUCH ULTRA 2) w/Device Kit Use to test blood sugars 5-7 times daily.   • blood glucose lancets Test blood sugar Use 5-7 times daily. Diagnosis: E10.90 Meter: One touch ultra II   • aspirin 81 MG tablet Take 81 mg by mouth daily.   • semaglutide,0.25 or 0.5 mg/DOSE, (Ozempic, 0.25 or 0.5 MG/DOSE,) (1.34 mg/ml) 0.25 or 0.5 MG/DOSE injection Inject 0.25 mg into the skin every 7 days. For 4 weeks, than increase to 0.5 mg once a week.   • Semaglutide-Weight Management (Wegovy) 0.25 MG/0.5ML Solution Auto-injector Inject 0.25 mg into the skin every 7 days.     No current facility-administered medications for this visit.     Allergies as of 08/18/2021 - Reviewed 08/18/2021   Allergen Reaction Noted   • Doxycycline HIVES 02/28/2016   • Penicillins Other (See Comments) 01/26/2015     PHYSICAL EXAM:  Visit Vitals  /82   Pulse (!) 105   Ht 6' (1.829 m)   Wt 125.8 kg   SpO2 97% Comment: on room air at rest   BMI 37.62 kg/m²      HEENT:  Pupils are equal and reactive to light and accommodation.  Extraocular muscles are intact.  There is no lid lag.  There is no stare or proptosis.  Atraumatic.  NECK:  Supple.  Thyroid is not palpable.  There are no neck masses palpable.  There is no cervical or supraclavicular lymphadenopathy.  Trachea is midline.  There is no JVD.  LUNGS:  Bilaterally clear to auscultation.  Unlabored breathing.  HEART:  Regular rate and rhythm.  No S3 or murmur.  Bilateral peripheral pulses are palpable.  ABDOMEN:  Soft.  Bowel sounds present in all quadrants.  No hepatosplenomegaly.  No purple stretch marks or striae.  No tenderness.  EXTREMITIES:  No tremors.  Bilateral peripheral pulses palpable.  No pedal edema.  Ten-gram monofilament exam to be completed at next visit.  Diabetic Foot Exam Documentation     Normal Bilateral Foot Exam:  Skin integrity is normal. Dorsalis pedis and posterior tibial pulses are present.  Pressure sensation using the [place] monofilament is 
present.      NEUROLOGIC:  No focal motor or sensory deficit.  SKIN:  No rash.  Normal.  Moist.  Good turgor.  MUSCULOSKELETAL:  Normal range of motion of all joints.    DATABASE: Pertinent labs/images reviewed in EMR.  Hemoglobin A1C (%)   Date Value   08/18/2021 6.4 (H)   03/14/2019 6.3 (H)     Cholesterol (mg/dL)   Date Value   08/18/2021 152   10/06/2020 145     Triglycerides (mg/dL)   Date Value   08/18/2021 111   10/06/2020 100      LDL (mg/dL)   Date Value   08/18/2021 87   10/06/2020 84       HDL (mg/dL)   Date Value   08/18/2021 43   10/06/2020 41       TSH (mcUnits/mL)   Date Value   08/18/2021 1.346   03/14/2019 0.862       ASSESSMENT:     1. Type I diabetes, controlled: Patient has Mini Med Medtronic 670 G insulin pump.  Patient is an auto mode since February 2019 and has excellent control.  He was diagnosed in the year 2004. Most recent A1c was 6.4% on 08/2021. Well controlled. BS are well controlled with no clear pattern requiring change.  2. Hyperlipidemia for which patient is on atorvastatin 20 mg daily.  3. Hypertension for which patient is on amlodipine/benazepril 5/20 mg daily, chlorthalidone 50 mg daily and Cardura 2 mg daily.   4. Chronic kidney disease/Elevated creatinine: Mild elevation. Last creatinine 1.08.No recent labs.  5. Obesity and a BMI of Body mass index is 37.62 kg/m².: Could not tolerate phentermine due to palpitations and elevated blood pressure.  Wants to lose weight. Weight gain 15 lbs in 1 yr.   6. Erectile dysfunction: not currently needing sildenafil 20 mg 3-5 tablets prior to sexual activity.  7. Health maintenance: S/p covid 19 vaccine.patient up to date on flu vaccine      PLAN:  1. I will continue Medtronic 670G insulin pump with Humalog insulin and Guardian sensor.   2. I will continue basal settings as follows:  0000 1.40 units/hr  0600   1.45 units/hr  0900   1.40 units/hr  2200   1.45 units/hr  3. I will continue carbohydrate ratio as follows:  1 unit: 5.5 grams 
carbohydrate  4. I will send for Wegovy ( semaglutide) 0.25 mg q week and will need to increase dose every 3-4 weeks for weight loss.  5. I explained the side effects of Wegovy in great detail.  6. Treatment and management of hypoglycemia is discussed with the patient in great detail.   7. I recommend the patient exercise 5 days a week for 30 minutes a day.   8. I recommend the patient try Noom or weight watchers for weight loss.  9. I will continue Sildenafil 20 mg 3-5 tablets as needed.  10. Treatment and management of hypoglycemia is dw pt in great detail.  11. I will have the patient return to endocrine clinic in 6 months.   PLAN:  Orders Placed This Encounter   • VENIPUNCTURE FINGER HEEL EAR STICK   • POCT Blood Sugar Hand Held Device   • POCT Glycohemoglobin Analyzer   • Semaglutide-Weight Management (Wegovy) 0.25 MG/0.5ML Solution Auto-injector     An extended discussion of the above diagnoses, workup as well as the risk/benefits of the treatment plan was conducted with the patient, who verbalized understanding. All questions were answered. Patient is to call if there are any problems. Return to clinic: 6 months.    I have spent 45 minutes of face to face time with this patient in which greater than 50% of the time was spent in counseling and coordination of care. The following reflects counseling and education points for today: The following reflects counseling and education points for today:, reviewed glycemic targets, reviewed importance of adherence to medical regimen and reviewed hypoglycemic events, including their prevention and treatment.    FOLLOWUP:  No follow-ups on file.    Latrell Smith MD  08/30/21        
difficulty with functional mobility
Decreased strength and endurance

## 2021-10-25 NOTE — PHYSICAL THERAPY INITIAL EVALUATION ADULT - IMPAIRMENTS FOUND, PT EVAL
gait, locomotion, and balance
aerobic capacity/endurance/gait, locomotion, and balance/muscle strength/poor safety awareness

## 2021-10-25 NOTE — PROGRESS NOTE ADULT - SUBJECTIVE AND OBJECTIVE BOX
[facility] DIVISION OF KIDNEY DISEASES AND HYPERTENSION -- FOLLOW UP NOTE  --------------------------------------------------------------------------------  Chief Complaint: s/p CABG; ESRD    24 hour events/subjective:    seen and examined this morning   reports feeling well but still has SOB   no chest pain       PAST HISTORY  --------------------------------------------------------------------------------  No significant changes to PMH, PSH, FHx, SHx, unless otherwise noted    ALLERGIES & MEDICATIONS  --------------------------------------------------------------------------------  Allergies    No Known Allergies    Intolerances      Standing Inpatient Medications  ascorbic acid 500 milliGRAM(s) Oral two times a day  aspirin enteric coated 325 milliGRAM(s) Oral daily  atorvastatin 80 milliGRAM(s) Oral at bedtime  bisacodyl Suppository 10 milliGRAM(s) Rectal once  chlorhexidine 2% Cloths 1 Application(s) Topical daily  dextrose 40% Gel 15 Gram(s) Oral once  dextrose 50% Injectable 25 Gram(s) IV Push once  dextrose 50% Injectable 12.5 Gram(s) IV Push once  dextrose 50% Injectable 25 Gram(s) IV Push once  glucagon  Injectable 1 milliGRAM(s) IntraMuscular once  heparin   Injectable 5000 Unit(s) SubCutaneous every 8 hours  insulin glargine Injectable (LANTUS) 11 Unit(s) SubCutaneous at bedtime  insulin lispro (ADMELOG) corrective regimen sliding scale   SubCutaneous three times a day before meals  insulin lispro (ADMELOG) corrective regimen sliding scale   SubCutaneous at bedtime  insulin lispro Injectable (ADMELOG) 7 Unit(s) SubCutaneous three times a day before meals  NIFEdipine XL 30 milliGRAM(s) Oral daily  pantoprazole    Tablet 40 milliGRAM(s) Oral before breakfast  polyethylene glycol 3350 17 Gram(s) Oral daily  senna 2 Tablet(s) Oral at bedtime  sevelamer carbonate 800 milliGRAM(s) Oral three times a day  sodium chloride 0.9%. 1000 milliLiter(s) IV Continuous <Continuous>    PRN Inpatient Medications  acetaminophen   Tablet .. 650 milliGRAM(s) Oral every 6 hours PRN  oxyCODONE    IR 5 milliGRAM(s) Oral every 4 hours PRN  oxyCODONE    IR 10 milliGRAM(s) Oral every 4 hours PRN      REVIEW OF SYSTEMS      All other systems were reviewed and are negative, except as noted.    VITALS/PHYSICAL EXAM  --------------------------------------------------------------------------------  T(C): 35.8 (10-25-21 @ 08:00), Max: 36.9 (10-24-21 @ 12:00)  HR: 54 (10-25-21 @ 09:00) (54 - 70)  BP: 148/58 (10-24-21 @ 19:00) (125/60 - 148/58)  RR: 18 (10-25-21 @ 09:00) (15 - 26)  SpO2: 96% (10-25-21 @ 09:00) (92% - 100%)  Wt(kg): --        10-24-21 @ 07:01  -  10-25-21 @ 07:00  --------------------------------------------------------  IN: 908.8 mL / OUT: 405 mL / NET: 503.8 mL    10-25-21 @ 07:01  -  10-25-21 @ 09:09  --------------------------------------------------------  IN: 5 mL / OUT: 0 mL / NET: 5 mL        Physical Exam:  	Gen: NAD  	HEENT: MMM  	Pulm: decreased breath sounds in lung fields  	CV: S1S2  	Abd: Soft, +BS   	Ext: + LE edema B/L  	Neuro: Awake and alert   	Skin: Warm and dry  	Vascular access: R IJ      LABS/STUDIES  --------------------------------------------------------------------------------              8.4    10.95 >-----------<  111      [10-25-21 @ 00:25]              25.8     135  |  98  |  55  ----------------------------<  142      [10-25-21 @ 00:25]  4.4   |  19  |  4.72        Ca     8.6     [10-25-21 @ 00:25]      Mg     2.4     [10-25-21 @ 00:25]      Phos  6.4     [10-25-21 @ 00:25]    TPro  5.7  /  Alb  3.0  /  TBili  0.3  /  DBili  x   /  AST  35  /  ALT  22  /  AlkPhos  76  [10-25-21 @ 00:25]    PT/INR: PT 16.0 , INR 1.35       [10-24-21 @ 14:02]      Creatinine Trend:  SCr 4.72 [10-25 @ 00:25]  SCr 3.92 [10-24 @ 01:10]  SCr 2.68 [10-23 @ 00:21]  SCr 2.73 [10-22 @ 03:38]  SCr 3.15 [10-21 @ 17:22]    Urinalysis - [10-07-21 @ 10:09]      Color Light Yellow / Appearance Clear / SG 1.015 / pH 6.0      Gluc 200 mg/dL / Ketone Negative  / Bili Negative / Urobili Negative       Blood Small / Protein 300 mg/dL / Leuk Est Negative / Nitrite Negative      RBC 2 / WBC 7 / Hyaline 6 / Gran  / Sq Epi  / Non Sq Epi 2 / Bacteria Negative      Iron 47, TIBC 245, %sat 19      [10-13-21 @ 13:10]  Ferritin 780      [10-13-21 @ 08:59]  TSH 6.40      [10-20-21 @ 17:03]    HBsAg Nonreact      [10-13-21 @ 09:15]  HCV 0.23, Nonreact      [10-07-21 @ 14:38]  HIV Nonreact      [10-13-21 @ 09:16]    BEULAH: titer Negative, pattern --      [10-07-21 @ 14:37]  C3 Complement 135      [10-07-21 @ 14:42]  C4 Complement 71      [10-07-21 @ 14:42]  ANCA: cANCA Negative, pANCA Negative, atypical ANCA Negative      [10-07-21 @ 14:37]  Syphilis Screen (Treponema Pallidum Ab) Negative      [10-07-21 @ 14:37]  PLA2R: SHANNAN <1.8, IFA --      [10-13-21 @ 13:55]  Free Light Chains: kappa 10.89, lambda 12.51, ratio = 0.87      [10-07 @ 14:42]  Immunofixation Serum:   No Monoclonal Band Identified    Reference Range: None Detected      [10-07-21 @ 14:42]  SPEP Interpretation: Normal Electrophoresis Pattern      [10-07-21 @ 14:42]

## 2021-10-25 NOTE — PHYSICAL THERAPY INITIAL EVALUATION ADULT - PRECAUTIONS/LIMITATIONS, REHAB EVAL
CONT: CT Head 10/8 showed No hemorrhage. 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 High-grade stenosis left internal carotid artery at the carotid bifurcation in the neck. Normal intracranial circulation.
Hospital course complicated by JOSHUA. Pt s/p HD catheter placement 10/12, LHC 10/14 significant for multivessel disease. Pt also s/p C2L, MVR and LAAL 10/21. CT Head 10/7: IMPRESSION: No hemorrhage. 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 High-grade stenosis left internal carotid artery at the carotid bifurcation in the neck. Normal intracranial circulation./fall precautions/oxygen therapy device and L/min/sternal precautions

## 2021-10-25 NOTE — PHYSICAL THERAPY INITIAL EVALUATION ADULT - GENERAL OBSERVATIONS, REHAB EVAL
pt found semi supine +tele +IVL
Pt found supine in bed, +eren, +central line, +HD catheter, +IV, +5L NC, +BP cuff, +RLE SCD, +tele, +pulse ox, +external pacemaker, VSS

## 2021-10-26 LAB
ALBUMIN SERPL ELPH-MCNC: 2.9 G/DL — LOW (ref 3.3–5)
ALP SERPL-CCNC: 82 U/L — SIGNIFICANT CHANGE UP (ref 40–120)
ALT FLD-CCNC: 20 U/L — SIGNIFICANT CHANGE UP (ref 10–45)
ANION GAP SERPL CALC-SCNC: 17 MMOL/L — SIGNIFICANT CHANGE UP (ref 5–17)
APTT BLD: 22.7 SEC — LOW (ref 27.5–35.5)
AST SERPL-CCNC: 26 U/L — SIGNIFICANT CHANGE UP (ref 10–40)
BASE EXCESS BLDV CALC-SCNC: -1.3 MMOL/L — SIGNIFICANT CHANGE UP (ref -2–2)
BASOPHILS # BLD AUTO: 0.05 K/UL — SIGNIFICANT CHANGE UP (ref 0–0.2)
BASOPHILS NFR BLD AUTO: 0.6 % — SIGNIFICANT CHANGE UP (ref 0–2)
BILIRUB SERPL-MCNC: 0.3 MG/DL — SIGNIFICANT CHANGE UP (ref 0.2–1.2)
BUN SERPL-MCNC: 60 MG/DL — HIGH (ref 7–23)
CALCIUM SERPL-MCNC: 8.8 MG/DL — SIGNIFICANT CHANGE UP (ref 8.4–10.5)
CHLORIDE SERPL-SCNC: 96 MMOL/L — SIGNIFICANT CHANGE UP (ref 96–108)
CO2 BLDV-SCNC: 26 MMOL/L — SIGNIFICANT CHANGE UP (ref 22–26)
CO2 SERPL-SCNC: 22 MMOL/L — SIGNIFICANT CHANGE UP (ref 22–31)
CREAT SERPL-MCNC: 4.65 MG/DL — HIGH (ref 0.5–1.3)
EOSINOPHIL # BLD AUTO: 0.9 K/UL — HIGH (ref 0–0.5)
EOSINOPHIL NFR BLD AUTO: 10.1 % — HIGH (ref 0–6)
GAS PNL BLDV: SIGNIFICANT CHANGE UP
GLUCOSE BLDC GLUCOMTR-MCNC: 135 MG/DL — HIGH (ref 70–99)
GLUCOSE BLDC GLUCOMTR-MCNC: 136 MG/DL — HIGH (ref 70–99)
GLUCOSE BLDC GLUCOMTR-MCNC: 156 MG/DL — HIGH (ref 70–99)
GLUCOSE BLDC GLUCOMTR-MCNC: 168 MG/DL — HIGH (ref 70–99)
GLUCOSE BLDC GLUCOMTR-MCNC: 189 MG/DL — HIGH (ref 70–99)
GLUCOSE SERPL-MCNC: 118 MG/DL — HIGH (ref 70–99)
HCO3 BLDV-SCNC: 24 MMOL/L — SIGNIFICANT CHANGE UP (ref 22–29)
HCT VFR BLD CALC: 24.9 % — LOW (ref 39–50)
HGB BLD-MCNC: 8.2 G/DL — LOW (ref 13–17)
HOROWITZ INDEX BLDV+IHG-RTO: 21 — SIGNIFICANT CHANGE UP
IMM GRANULOCYTES NFR BLD AUTO: 0.4 % — SIGNIFICANT CHANGE UP (ref 0–1.5)
INR BLD: 1.6 RATIO — HIGH (ref 0.88–1.16)
LYMPHOCYTES # BLD AUTO: 1.25 K/UL — SIGNIFICANT CHANGE UP (ref 1–3.3)
LYMPHOCYTES # BLD AUTO: 14 % — SIGNIFICANT CHANGE UP (ref 13–44)
MAGNESIUM SERPL-MCNC: 2.3 MG/DL — SIGNIFICANT CHANGE UP (ref 1.6–2.6)
MCHC RBC-ENTMCNC: 29.9 PG — SIGNIFICANT CHANGE UP (ref 27–34)
MCHC RBC-ENTMCNC: 32.9 GM/DL — SIGNIFICANT CHANGE UP (ref 32–36)
MCV RBC AUTO: 90.9 FL — SIGNIFICANT CHANGE UP (ref 80–100)
MONOCYTES # BLD AUTO: 1.43 K/UL — HIGH (ref 0–0.9)
MONOCYTES NFR BLD AUTO: 16.1 % — HIGH (ref 2–14)
NEUTROPHILS # BLD AUTO: 5.23 K/UL — SIGNIFICANT CHANGE UP (ref 1.8–7.4)
NEUTROPHILS NFR BLD AUTO: 58.8 % — SIGNIFICANT CHANGE UP (ref 43–77)
NRBC # BLD: 0 /100 WBCS — SIGNIFICANT CHANGE UP (ref 0–0)
PCO2 BLDV: 43 MMHG — SIGNIFICANT CHANGE UP (ref 42–55)
PH BLDV: 7.36 — SIGNIFICANT CHANGE UP (ref 7.32–7.43)
PHOSPHATE SERPL-MCNC: 5.5 MG/DL — HIGH (ref 2.5–4.5)
PLATELET # BLD AUTO: 115 K/UL — LOW (ref 150–400)
PO2 BLDV: 38 MMHG — SIGNIFICANT CHANGE UP (ref 25–45)
POTASSIUM SERPL-MCNC: 4.3 MMOL/L — SIGNIFICANT CHANGE UP (ref 3.5–5.3)
POTASSIUM SERPL-SCNC: 4.3 MMOL/L — SIGNIFICANT CHANGE UP (ref 3.5–5.3)
PROT SERPL-MCNC: 5.9 G/DL — LOW (ref 6–8.3)
PROTHROM AB SERPL-ACNC: 18.8 SEC — HIGH (ref 10.6–13.6)
RBC # BLD: 2.74 M/UL — LOW (ref 4.2–5.8)
RBC # FLD: 15 % — HIGH (ref 10.3–14.5)
SAO2 % BLDV: 65.7 % — LOW (ref 67–88)
SODIUM SERPL-SCNC: 135 MMOL/L — SIGNIFICANT CHANGE UP (ref 135–145)
WBC # BLD: 8.9 K/UL — SIGNIFICANT CHANGE UP (ref 3.8–10.5)
WBC # FLD AUTO: 8.9 K/UL — SIGNIFICANT CHANGE UP (ref 3.8–10.5)

## 2021-10-26 PROCEDURE — 99291 CRITICAL CARE FIRST HOUR: CPT

## 2021-10-26 PROCEDURE — 36556 INSERT NON-TUNNEL CV CATH: CPT | Mod: 78

## 2021-10-26 PROCEDURE — 99233 SBSQ HOSP IP/OBS HIGH 50: CPT | Mod: GC

## 2021-10-26 PROCEDURE — 71045 X-RAY EXAM CHEST 1 VIEW: CPT | Mod: 26,76

## 2021-10-26 RX ORDER — CHLORHEXIDINE GLUCONATE 213 G/1000ML
1 SOLUTION TOPICAL
Refills: 0 | Status: DISCONTINUED | OUTPATIENT
Start: 2021-10-26 | End: 2021-11-02

## 2021-10-26 RX ORDER — HYDROMORPHONE HYDROCHLORIDE 2 MG/ML
0.5 INJECTION INTRAMUSCULAR; INTRAVENOUS; SUBCUTANEOUS ONCE
Refills: 0 | Status: DISCONTINUED | OUTPATIENT
Start: 2021-10-26 | End: 2021-10-26

## 2021-10-26 RX ORDER — WARFARIN SODIUM 2.5 MG/1
2.5 TABLET ORAL ONCE
Refills: 0 | Status: COMPLETED | OUTPATIENT
Start: 2021-10-26 | End: 2021-10-26

## 2021-10-26 RX ORDER — INSULIN LISPRO 100/ML
9 VIAL (ML) SUBCUTANEOUS
Refills: 0 | Status: DISCONTINUED | OUTPATIENT
Start: 2021-10-26 | End: 2021-10-31

## 2021-10-26 RX ORDER — ASPIRIN/CALCIUM CARB/MAGNESIUM 324 MG
81 TABLET ORAL DAILY
Refills: 0 | Status: DISCONTINUED | OUTPATIENT
Start: 2021-10-26 | End: 2021-11-02

## 2021-10-26 RX ORDER — SODIUM CHLORIDE 9 MG/ML
10 INJECTION INTRAMUSCULAR; INTRAVENOUS; SUBCUTANEOUS
Refills: 0 | Status: DISCONTINUED | OUTPATIENT
Start: 2021-10-26 | End: 2021-11-02

## 2021-10-26 RX ADMIN — INSULIN GLARGINE 16 UNIT(S): 100 INJECTION, SOLUTION SUBCUTANEOUS at 21:57

## 2021-10-26 RX ADMIN — HYDROMORPHONE HYDROCHLORIDE 0.5 MILLIGRAM(S): 2 INJECTION INTRAMUSCULAR; INTRAVENOUS; SUBCUTANEOUS at 11:20

## 2021-10-26 RX ADMIN — Medication 8 UNIT(S): at 07:28

## 2021-10-26 RX ADMIN — HYDROMORPHONE HYDROCHLORIDE 0.5 MILLIGRAM(S): 2 INJECTION INTRAMUSCULAR; INTRAVENOUS; SUBCUTANEOUS at 11:01

## 2021-10-26 RX ADMIN — HEPARIN SODIUM 5000 UNIT(S): 5000 INJECTION INTRAVENOUS; SUBCUTANEOUS at 21:56

## 2021-10-26 RX ADMIN — Medication 500 MILLIGRAM(S): at 05:02

## 2021-10-26 RX ADMIN — POLYETHYLENE GLYCOL 3350 17 GRAM(S): 17 POWDER, FOR SOLUTION ORAL at 12:06

## 2021-10-26 RX ADMIN — Medication 9 UNIT(S): at 17:14

## 2021-10-26 RX ADMIN — PANTOPRAZOLE SODIUM 40 MILLIGRAM(S): 20 TABLET, DELAYED RELEASE ORAL at 06:01

## 2021-10-26 RX ADMIN — HEPARIN SODIUM 5000 UNIT(S): 5000 INJECTION INTRAVENOUS; SUBCUTANEOUS at 05:02

## 2021-10-26 RX ADMIN — HEPARIN SODIUM 5000 UNIT(S): 5000 INJECTION INTRAVENOUS; SUBCUTANEOUS at 14:51

## 2021-10-26 RX ADMIN — SEVELAMER CARBONATE 800 MILLIGRAM(S): 2400 POWDER, FOR SUSPENSION ORAL at 14:50

## 2021-10-26 RX ADMIN — Medication 30 MILLIGRAM(S): at 05:02

## 2021-10-26 RX ADMIN — SEVELAMER CARBONATE 800 MILLIGRAM(S): 2400 POWDER, FOR SUSPENSION ORAL at 21:56

## 2021-10-26 RX ADMIN — ATORVASTATIN CALCIUM 80 MILLIGRAM(S): 80 TABLET, FILM COATED ORAL at 22:03

## 2021-10-26 RX ADMIN — OXYCODONE HYDROCHLORIDE 5 MILLIGRAM(S): 5 TABLET ORAL at 19:30

## 2021-10-26 RX ADMIN — WARFARIN SODIUM 2.5 MILLIGRAM(S): 2.5 TABLET ORAL at 21:56

## 2021-10-26 RX ADMIN — SENNA PLUS 2 TABLET(S): 8.6 TABLET ORAL at 21:56

## 2021-10-26 RX ADMIN — SEVELAMER CARBONATE 800 MILLIGRAM(S): 2400 POWDER, FOR SUSPENSION ORAL at 05:02

## 2021-10-26 RX ADMIN — Medication 1: at 12:31

## 2021-10-26 RX ADMIN — Medication 81 MILLIGRAM(S): at 12:06

## 2021-10-26 RX ADMIN — OXYCODONE HYDROCHLORIDE 5 MILLIGRAM(S): 5 TABLET ORAL at 20:00

## 2021-10-26 NOTE — CONSULT NOTE ADULT - SUBJECTIVE AND OBJECTIVE BOX
History of Present Illness: 60yMale PMHx of DM and HTN who presented to the hospital as a transfer from OSH for NSTEMI, now on dialysis. IR consulted for tunneled central line placement.   Allergies:   No Known Allergies    Medications (Antibiotics/Cardiovascular/Anticoagulants/Blood Products):     aspirin enteric coated: 325 milliGRAM(s) Oral (10-25-21 @ 11:26)  heparin   Injectable: 5000 Unit(s) SubCutaneous (10-26-21 @ 05:02)  NIFEdipine XL: 30 milliGRAM(s) Oral (10-26-21 @ 05:02)  warfarin: 5 milliGRAM(s) Oral (10-25-21 @ 21:22)    Vital Signs:  T(F): 98 (10-26-21 @ 08:00), Max: 98.8 (10-25-21 @ 15:20)  HR: 60 (10-26-21 @ 10:00) (54 - 62)  BP: 142/61 (10-26-21 @ 10:00) (113/58 - 165/72)  RR: 21 (10-26-21 @ 10:00) (16 - 25)  SpO2: 99% (10-26-21 @ 10:00) (93% - 100%)    Relevant Lab Results:            8.2  8.90)-----(115     (10-26-21 @ 00:22)         24.9     135 | 96 | 60  --------------------< 118     (10-26-21 @ 00:22)  4.3 | 22 | 4.65       PT: 17.6<H> 10-25-21 @ 19:44  aPTT: 28.7 10-25-21 @ 19:44   INR: 1.50<H> 10-25-21 @ 19:44    Imaging:   CXR shows Left IJ catheter tip in left brachiocephalic vein.

## 2021-10-26 NOTE — PROGRESS NOTE ADULT - SUBJECTIVE AND OBJECTIVE BOX
Chief complaint  Patient is a 60y old  Male who presents with a chief complaint of NSTEMI (26 Oct 2021 11:05)   Review of systems  Patient in bed, looks comfortable, no hypoglycemic episodes.    Labs and Fingersticks  CAPILLARY BLOOD GLUCOSE  112 (25 Oct 2021 17:00)      POCT Blood Glucose.: 156 mg/dL (26 Oct 2021 11:47)  POCT Blood Glucose.: 189 mg/dL (26 Oct 2021 11:45)  POCT Blood Glucose.: 135 mg/dL (26 Oct 2021 06:46)  POCT Blood Glucose.: 131 mg/dL (25 Oct 2021 21:20)  POCT Blood Glucose.: 112 mg/dL (25 Oct 2021 16:43)      Anion Gap, Serum: 17 (10-26 @ 00:22)  Anion Gap, Serum: 18 *H* (10-25 @ 00:25)      Calcium, Total Serum: 8.8 (10-26 @ 00:22)  Calcium, Total Serum: 8.6 (10-25 @ 00:25)  Albumin, Serum: 2.9 *L* (10-26 @ 00:22)  Albumin, Serum: 3.0 *L* (10-25 @ 00:25)    Alanine Aminotransferase (ALT/SGPT): 20 (10-26 @ 00:22)  Alanine Aminotransferase (ALT/SGPT): 22 (10-25 @ 00:25)  Alkaline Phosphatase, Serum: 82 (10-26 @ 00:22)  Alkaline Phosphatase, Serum: 76 (10-25 @ 00:25)  Aspartate Aminotransferase (AST/SGOT): 26 (10-26 @ 00:22)  Aspartate Aminotransferase (AST/SGOT): 35 (10-25 @ 00:25)        10-26    135  |  96  |  60<H>  ----------------------------<  118<H>  4.3   |  22  |  4.65<H>    Ca    8.8      26 Oct 2021 00:22  Phos  5.5     10-26  Mg     2.3     10-26    TPro  5.9<L>  /  Alb  2.9<L>  /  TBili  0.3  /  DBili  x   /  AST  26  /  ALT  20  /  AlkPhos  82  10-26                        8.2    8.90  )-----------( 115      ( 26 Oct 2021 00:22 )             24.9     Medications  MEDICATIONS  (STANDING):  aspirin  chewable 81 milliGRAM(s) Oral daily  atorvastatin 80 milliGRAM(s) Oral at bedtime  bisacodyl Suppository 10 milliGRAM(s) Rectal once  chlorhexidine 2% Cloths 1 Application(s) Topical daily  chlorhexidine 4% Liquid 1 Application(s) Topical <User Schedule>  dextrose 40% Gel 15 Gram(s) Oral once  dextrose 50% Injectable 25 Gram(s) IV Push once  dextrose 50% Injectable 12.5 Gram(s) IV Push once  dextrose 50% Injectable 25 Gram(s) IV Push once  glucagon  Injectable 1 milliGRAM(s) IntraMuscular once  heparin   Injectable 5000 Unit(s) SubCutaneous every 8 hours  insulin glargine Injectable (LANTUS) 16 Unit(s) SubCutaneous at bedtime  insulin lispro (ADMELOG) corrective regimen sliding scale   SubCutaneous three times a day before meals  insulin lispro (ADMELOG) corrective regimen sliding scale   SubCutaneous at bedtime  insulin lispro Injectable (ADMELOG) 9 Unit(s) SubCutaneous three times a day before meals  NIFEdipine XL 30 milliGRAM(s) Oral daily  pantoprazole    Tablet 40 milliGRAM(s) Oral before breakfast  polyethylene glycol 3350 17 Gram(s) Oral daily  senna 2 Tablet(s) Oral at bedtime  sevelamer carbonate 800 milliGRAM(s) Oral three times a day  sodium chloride 0.9%. 1000 milliLiter(s) (10 mL/Hr) IV Continuous <Continuous>      Physical Exam  General: Patient comfortable in bed  Vital Signs Last 12 Hrs  T(F): 98.6 (10-26-21 @ 12:00), Max: 98.6 (10-26-21 @ 12:00)  HR: 66 (10-26-21 @ 15:00) (54 - 66)  BP: 136/63 (10-26-21 @ 15:00) (131/58 - 165/72)  BP(mean): 101 (10-26-21 @ 14:30) (82 - 104)  RR: 26 (10-26-21 @ 15:00) (19 - 26)  SpO2: 97% (10-26-21 @ 15:00) (93% - 100%)     Chief complaint  Patient is a 60y old  Male who presents with a chief complaint of NSTEMI (26 Oct 2021 11:05)   Review of systems  Patient in bed, looks comfortable, no hypoglycemic episodes.    Labs and Fingersticks  CAPILLARY BLOOD GLUCOSE  112 (25 Oct 2021 17:00)      POCT Blood Glucose.: 156 mg/dL (26 Oct 2021 11:47)  POCT Blood Glucose.: 189 mg/dL (26 Oct 2021 11:45)  POCT Blood Glucose.: 135 mg/dL (26 Oct 2021 06:46)  POCT Blood Glucose.: 131 mg/dL (25 Oct 2021 21:20)  POCT Blood Glucose.: 112 mg/dL (25 Oct 2021 16:43)      Anion Gap, Serum: 17 (10-26 @ 00:22)  Anion Gap, Serum: 18 *H* (10-25 @ 00:25)      Calcium, Total Serum: 8.8 (10-26 @ 00:22)  Calcium, Total Serum: 8.6 (10-25 @ 00:25)  Albumin, Serum: 2.9 *L* (10-26 @ 00:22)  Albumin, Serum: 3.0 *L* (10-25 @ 00:25)    Alanine Aminotransferase (ALT/SGPT): 20 (10-26 @ 00:22)  Alanine Aminotransferase (ALT/SGPT): 22 (10-25 @ 00:25)  Alkaline Phosphatase, Serum: 82 (10-26 @ 00:22)  Alkaline Phosphatase, Serum: 76 (10-25 @ 00:25)  Aspartate Aminotransferase (AST/SGOT): 26 (10-26 @ 00:22)  Aspartate Aminotransferase (AST/SGOT): 35 (10-25 @ 00:25)        10-26    135  |  96  |  60<H>  ----------------------------<  118<H>  4.3   |  22  |  4.65<H>    Ca    8.8      26 Oct 2021 00:22  Phos  5.5     10-26  Mg     2.3     10-26    TPro  5.9<L>  /  Alb  2.9<L>  /  TBili  0.3  /  DBili  x   /  AST  26  /  ALT  20  /  AlkPhos  82  10-26                        8.2    8.90  )-----------( 115      ( 26 Oct 2021 00:22 )             24.9     Medications  MEDICATIONS  (STANDING):  aspirin  chewable 81 milliGRAM(s) Oral daily  atorvastatin 80 milliGRAM(s) Oral at bedtime  bisacodyl Suppository 10 milliGRAM(s) Rectal once  chlorhexidine 2% Cloths 1 Application(s) Topical daily  chlorhexidine 4% Liquid 1 Application(s) Topical <User Schedule>  dextrose 40% Gel 15 Gram(s) Oral once  dextrose 50% Injectable 25 Gram(s) IV Push once  dextrose 50% Injectable 12.5 Gram(s) IV Push once  dextrose 50% Injectable 25 Gram(s) IV Push once  glucagon  Injectable 1 milliGRAM(s) IntraMuscular once  heparin   Injectable 5000 Unit(s) SubCutaneous every 8 hours  insulin glargine Injectable (LANTUS) 16 Unit(s) SubCutaneous at bedtime  insulin lispro (ADMELOG) corrective regimen sliding scale   SubCutaneous three times a day before meals  insulin lispro (ADMELOG) corrective regimen sliding scale   SubCutaneous at bedtime  insulin lispro Injectable (ADMELOG) 9 Unit(s) SubCutaneous three times a day before meals  NIFEdipine XL 30 milliGRAM(s) Oral daily  pantoprazole    Tablet 40 milliGRAM(s) Oral before breakfast  polyethylene glycol 3350 17 Gram(s) Oral daily  senna 2 Tablet(s) Oral at bedtime  sevelamer carbonate 800 milliGRAM(s) Oral three times a day  sodium chloride 0.9%. 1000 milliLiter(s) (10 mL/Hr) IV Continuous <Continuous>      Physical Exam  General: Patient comfortable in bed  Vital Signs Last 12 Hrs  T(F): 98.6 (10-26-21 @ 12:00), Max: 98.6 (10-26-21 @ 12:00)  HR: 66 (10-26-21 @ 15:00) (54 - 66)  BP: 136/63 (10-26-21 @ 15:00) (131/58 - 165/72)  BP(mean): 101 (10-26-21 @ 14:30) (82 - 104)  RR: 26 (10-26-21 @ 15:00) (19 - 26)  SpO2: 97% (10-26-21 @ 15:00) (93% - 100%)

## 2021-10-26 NOTE — CONSULT NOTE ADULT - ASSESSMENT
Assessment: 60y Male w/ pmhx of HTN and DM who presented with NSTEMI now on dialysis. IR consulted for tunneled central line placement. Patient currently remains in the CTU and recently had an elevated white count. Due to risk of hospital infection, tunneled central lines are not recommended to be placed until patient is close to discharged. IR will defer at this time, and once discharge plan is in progress and patient is stable on the floors/step down, placement can be reconsidered.    Plan:  - No IR tunneled catheter at this time.  - Please contact IR when patient is stable out of the CTU and target discharge date is known.

## 2021-10-26 NOTE — PROGRESS NOTE ADULT - SUBJECTIVE AND OBJECTIVE BOX
Neurology Progress Note    S: Patient seen and examined in CTICU. better. in chair AAOx3 COTO following     Medications:  MEDICATIONS  (STANDING):  aspirin  chewable 81 milliGRAM(s) Oral daily  atorvastatin 80 milliGRAM(s) Oral at bedtime  bisacodyl Suppository 10 milliGRAM(s) Rectal once  chlorhexidine 2% Cloths 1 Application(s) Topical daily  dextrose 40% Gel 15 Gram(s) Oral once  dextrose 50% Injectable 25 Gram(s) IV Push once  dextrose 50% Injectable 12.5 Gram(s) IV Push once  dextrose 50% Injectable 25 Gram(s) IV Push once  glucagon  Injectable 1 milliGRAM(s) IntraMuscular once  heparin   Injectable 5000 Unit(s) SubCutaneous every 8 hours  insulin glargine Injectable (LANTUS) 16 Unit(s) SubCutaneous at bedtime  insulin lispro (ADMELOG) corrective regimen sliding scale   SubCutaneous three times a day before meals  insulin lispro (ADMELOG) corrective regimen sliding scale   SubCutaneous at bedtime  insulin lispro Injectable (ADMELOG) 8 Unit(s) SubCutaneous three times a day before meals  NIFEdipine XL 30 milliGRAM(s) Oral daily  pantoprazole    Tablet 40 milliGRAM(s) Oral before breakfast  polyethylene glycol 3350 17 Gram(s) Oral daily  senna 2 Tablet(s) Oral at bedtime  sevelamer carbonate 800 milliGRAM(s) Oral three times a day  sodium chloride 0.9%. 1000 milliLiter(s) (10 mL/Hr) IV Continuous <Continuous>    MEDICATIONS  (PRN):  acetaminophen   Tablet .. 650 milliGRAM(s) Oral every 6 hours PRN Mild Pain (1 - 3)  oxyCODONE    IR 5 milliGRAM(s) Oral every 4 hours PRN Moderate Pain (4 - 6)  oxyCODONE    IR 10 milliGRAM(s) Oral every 4 hours PRN Severe Pain (7 - 10)        Vitals:  ICU Vital Signs Last 24 Hrs  T(C): 36.7 (26 Oct 2021 08:00), Max: 37.1 (25 Oct 2021 15:20)  T(F): 98 (26 Oct 2021 08:00), Max: 98.8 (25 Oct 2021 15:20)  HR: 62 (26 Oct 2021 09:00) (54 - 62)  BP: 163/63 (26 Oct 2021 09:00) (113/58 - 165/72)  BP(mean): 90 (26 Oct 2021 09:00) (79 - 104)  ABP: --  ABP(mean): --  RR: 25 (26 Oct 2021 09:00) (16 - 25)  SpO2: 97% (26 Oct 2021 09:00) (93% - 100%)        General Exam:   General Appearance: Appropriately dressed and in no acute distress       Head: Normocephalic, atraumatic and no dysmorphic features  Ear, Nose, and Throat: Moist mucous membranes  CVS: S1S2+  Resp: No SOB, no wheeze or rhonchi  Abd: soft NTND  Extremities: No edema, no cyanosis  Skin: No bruises, no rashes     Neurological Exam:  Mental Status: Awake, alert and oriented x2-3.  Able to follow simple and complex verbal commands. Able to name and repeat. fluent speech. No obvious aphasia or dysarthria noted.   Cranial Nerves: PERRL, EOMI, VFFC, sensation V1-V3 intact,  mild NLF facial asymmetry , equal elevation of palate, scm/trap 5/5, tongue is midline on protrusion. no obvious papilledema on fundoscopic exam. Hearing is grossly intact.   Motor: Normal bulk, tone and strength throughout. Fine finger movements were intact and symmetric. no tremors or drift noted.    Sensation: Intact to light touch and pinprick throughout. no right/left confusion. no extinction to tactile on DSS.   Reflexes: 1+ throughout at biceps, brachioradialis, triceps, patellars and ankles bilaterally and equal. No clonus. R toe and L toe were both downgoing.  Coordination: No dysmetria on FNF    Gait: deferred     I personally reviewed the below data/images/labs:    CBC Full  -  ( 26 Oct 2021 00:22 )  WBC Count : 8.90 K/uL  RBC Count : 2.74 M/uL  Hemoglobin : 8.2 g/dL  Hematocrit : 24.9 %  Platelet Count - Automated : 115 K/uL  Mean Cell Volume : 90.9 fl  Mean Cell Hemoglobin : 29.9 pg  Mean Cell Hemoglobin Concentration : 32.9 gm/dL  Auto Neutrophil # : 5.23 K/uL  Auto Lymphocyte # : 1.25 K/uL  Auto Monocyte # : 1.43 K/uL  Auto Eosinophil # : 0.90 K/uL  Auto Basophil # : 0.05 K/uL  Auto Neutrophil % : 58.8 %  Auto Lymphocyte % : 14.0 %  Auto Monocyte % : 16.1 %  Auto Eosinophil % : 10.1 %  Auto Basophil % : 0.6 %    10-26    135  |  96  |  60<H>  ----------------------------<  118<H>  4.3   |  22  |  4.65<H>    Ca    8.8      26 Oct 2021 00:22  Phos  5.5     10-26  Mg     2.3     10-26    TPro  5.9<L>  /  Alb  2.9<L>  /  TBili  0.3  /  DBili  x   /  AST  26  /  ALT  20  /  AlkPhos  82  10-26        -10/07 CTH: No hemorrhage. 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   -10/07 CTA N: High-grade stenosis left internal carotid artery at the carotid bifurcation in the neck.   -10/07 CTA H: Normal intracranial circulation.    < from: MR Head No Cont (10.09.21 @ 20:22) >    EXAM:  MR BRAIN                            PROCEDURE DATE:  10/09/2021            INTERPRETATION:  CLINICAL HISTORY:Facial droop, on heparin drip, NSTEMI . Severe left ICA stenosis.    TECHNIQUE:  MRI of brain without contrast dated 10/9/2021.  Examination consisted of transaxial T1, T2, FLAIR, gradient echo as well as diffusion-weighted sequences with corresponding ADC maps. Coronal T2 and sagittal T1-weighted images were also acquired through the brain.    COMPARISON: CT head and CTA head [facility] 10/7/2021    FINDINGS:  There are no areas abnormal restricted diffusion within the brain parenchyma to suggest acute/subacute infarct. There is no acute intracranial hemorrhage, vasogenic edema or abnormal susceptibility artifact. Chronic lacunar infarcts are seen in the left frontal lobe, right frontal cranial radiata and right cerebellum. Additional nonspecific patchy and confluent areas of T2/FLAIR prolongation in the bihemispheric white matter likely represents mild chronic microvascular changes.    The ventricles, sulci and cisternal spaces are stable in size and configuration. There is no midline shift or abnormal extra-axial fluid collection.    Flow-voids of the major intracranial vessels are maintained, as seen best on the T2-weighted images, indicating their patency.    The visualized paranasal sinuses and mastoid air cells are free of acute disease. The orbital regions are unremarkable.    IMPRESSION:  No acute infarct or intracranial hemorrhage. Chronic infarcts and microvascular changes as above.    --- End of Report ---      NEIL CARDENAS MD; Attending Radiologist  This document has been electronically signed. Oct 10 2021  4:26AM    < end of copied text >  < from: [facility] Duplex Prashant Moreno (10.08.21 @ 17:07) >    EXAM:  CAROTID DUPLEX BILATERAL                            PROCEDURE DATE:  10/08/2021      INTERPRETATION:  CLINICAL INFORMATION: Left ICA high-grade stenosis identified on recent CTA neck.    COMPARISON: CTA neck 10/7/2021.    TECHNIQUE: Grayscale, color and spectral Doppler examination of both carotid arteries was performed.    FINDINGS:    There are atheromatous plaques are present bilaterally in the region of the bifurcations of the common carotid arteries into internal and external branches.    Velocity elevation of the proximal right internal carotid artery results in 50-69% flow-limiting stenosis.    Velocity elevation of the proximal left internal carotid artery results in 70-9% flow-limiting stenosis.    Bilateral flow-limiting stenoses noted of the right and left external carotid arteries as well.    Peak systolic velocities are as follows:    RIGHT:  PROX CCA = 126 cm/s  DIST CCA = 99 cm/s  PROX ICA = 137 cm/s  MID ICA = 86 cm/s  DIST ICA = 80 cm/s  ECA = 202 cm/s    LEFT:  PROX CCA = 100 cm/s  DIST CCA = 59 cm/s  PROX ICA = 313 cm/s  MID ICA = 72 cm/s  DIST ICA = 47 cm/s  ECA = 298 cm/s    Antegrade flow is noted within both vertebral arteries.    IMPRESSION:    Left internal carotid artery 70-99% flow-limiting stenosis.  Right internal carotid artery 50-69% flow-limiting stenosis.  Bilateral external carotid artery flow limiting stenoses.  INDIANA Hammond was informed of these findings on 10/8/2021 at 5:06 PM.    Measurement of carotid stenosis is based on velocity parameters that correlate the residual internal carotid diameter with that of the more distal vessel in accordance with a method such as the [employer] Symptomatic Carotid Endarterectomy Trial (NASCET).    --- End of Report ---    FELIX MCMAHON M.D., ATTENDING RADIOLOGIST  This document has been electronically signed. Oct  8 2021  5:21PM    < end of copied text >

## 2021-10-26 NOTE — PROGRESS NOTE ADULT - SUBJECTIVE AND OBJECTIVE BOX
Vascular Surgery Daily Progress Note   TOM PLEITEZ | [id number]   --------------------------------------------------------------------------------------------------------------------  SUBJECTIVE / 24H EVENTS  Notified by CTICU that patient with continued and prolonged HD requirements now without HD access after removal of non-functioning non-tunneled HD catheter.   Patient seen and examined at [facility].e  Patient in NAD awaiting transfer to cardiac surgery floor.     --------------------------------------------------------------------------------------------------------------------  OBJECTIVE:    VITAL SIGNS:  T(C): 36.7 (10-26-21 @ 08:00), Max: 37.1 (10-25-21 @ 15:20)  HR: 61 (10-26-21 @ 11:00) (54 - 62)  BP: 142/59 (10-26-21 @ 11:00) (113/58 - 165/72)  RR: 21 (10-26-21 @ 11:00) (16 - 25)  SpO2: 97% (10-26-21 @ 11:00) (93% - 100%)    POCT Blood Glucose.: 135 mg/dL (10-26-21 @ 06:46)  POCT Blood Glucose.: 131 mg/dL (10-25-21 @ 21:20)  POCT Blood Glucose.: 112 mg/dL (10-25-21 @ 16:43)    PHYSICAL EXAM:  Gen: NAD  LS: Respirations unlabored.   Chest: Incision is c/d/i with good sternal apposition and stability.     10-25-21 @ 07:01  -  10-26-21 @ 07:00  --------------------------------------------------------  IN:    Oral Fluid: 340 mL    sodium chloride 0.9%: 120 mL  Total IN: 460 mL    OUT:    Bulb (mL): 30 mL    Chest Tube (mL): 0 mL    Other (mL): 2000 mL  Total OUT: 2030 mL    Total NET: -1570 mL    10-26-21 @ 07:01  -  10-26-21 @ 11:06  --------------------------------------------------------  IN:    Oral Fluid: 50 mL    sodium chloride 0.9%: 5 mL  Total IN: 55 mL    OUT:  Total OUT: 0 mL    Total NET: 55 mL    LAB VALUES:  10-26    135  |  96  |  60<H>  ----------------------------<  118<H>  4.3   |  22  |  4.65<H>    Ca    8.8      26 Oct 2021 00:22  Phos  5.5     10-26  Mg     2.3     10-26    TPro  5.9<L>  /  Alb  2.9<L>  /  TBili  0.3  /  DBili  x   /  AST  26  /  ALT  20  /  AlkPhos  82  10-26                               8.2    8.90  )-----------( 115      ( 26 Oct 2021 00:22 )             24.9     LIVER FUNCTIONS - ( 26 Oct 2021 00:22 )  Alb: 2.9 g/dL / Pro: 5.9 g/dL / ALK PHOS: 82 U/L / ALT: 20 U/L / AST: 26 U/L / GGT: x           PT/INR - ( 25 Oct 2021 19:44 )   PT: 17.6 sec;   INR: 1.50 ratio    PTT - ( 25 Oct 2021 19:44 )  PTT:28.7 sec    ABG - ( 25 Oct 2021 06:46 )  pH, Arterial: 7.36  pH, Blood: x     /  pCO2: 39    /  pO2: 70    / HCO3: 22    / Base Excess: -3.2  /  SaO2: 95.0      MICROBIOLOGY:    No new microbiology data for review.     RADIOLOGY:    < from: Xray Chest 1 View- PORTABLE-Urgent (Xray Chest 1 View- PORTABLE-Urgent .) (10.26.21 @ 02:29) >  IMPRESSION:    The heart is enlarged. The lungs appear to be clear on the current study. The right central line was removed. Otherwise all life supporting devices in good position and unchanged when compared to previous study done October 25, 2021 at 10:26 AM. No pleural effusion. No pneumothorax.    < end of copied text >      MEDICATIONS  (STANDING):  aspirin  chewable 81 milliGRAM(s) Oral daily  atorvastatin 80 milliGRAM(s) Oral at bedtime  bisacodyl Suppository 10 milliGRAM(s) Rectal once  chlorhexidine 2% Cloths 1 Application(s) Topical daily  dextrose 40% Gel 15 Gram(s) Oral once  dextrose 50% Injectable 25 Gram(s) IV Push once  dextrose 50% Injectable 12.5 Gram(s) IV Push once  dextrose 50% Injectable 25 Gram(s) IV Push once  glucagon  Injectable 1 milliGRAM(s) IntraMuscular once  heparin   Injectable 5000 Unit(s) SubCutaneous every 8 hours  insulin glargine Injectable (LANTUS) 16 Unit(s) SubCutaneous at bedtime  insulin lispro (ADMELOG) corrective regimen sliding scale   SubCutaneous three times a day before meals  insulin lispro (ADMELOG) corrective regimen sliding scale   SubCutaneous at bedtime  insulin lispro Injectable (ADMELOG) 8 Unit(s) SubCutaneous three times a day before meals  NIFEdipine XL 30 milliGRAM(s) Oral daily  pantoprazole    Tablet 40 milliGRAM(s) Oral before breakfast  polyethylene glycol 3350 17 Gram(s) Oral daily  senna 2 Tablet(s) Oral at bedtime  sevelamer carbonate 800 milliGRAM(s) Oral three times a day  sodium chloride 0.9%. 1000 milliLiter(s) (10 mL/Hr) IV Continuous <Continuous>    MEDICATIONS  (PRN):  acetaminophen   Tablet .. 650 milliGRAM(s) Oral every 6 hours PRN Mild Pain (1 - 3)  oxyCODONE    IR 5 milliGRAM(s) Oral every 4 hours PRN Moderate Pain (4 - 6)  oxyCODONE    IR 10 milliGRAM(s) Oral every 4 hours PRN Severe Pain (7 - 10)

## 2021-10-26 NOTE — PROGRESS NOTE ADULT - ASSESSMENT
Assessment  DMT2: 60y Male with DM T2 with hyperglycemia, A1C 7%, was on oral meds and insulin at home, postop CABG on basal bolus insulin, increased dose yesterday, blood sugars in acceptable range this AM and running slightly high postprandially, no hypoglycemic episodes, eating meals.  Hypothyroidism: Patient has no history thyroid disease, was not on any thyroid supplements, TSH elevated, FT4 still pending.  CAD: s/p CABG, on medications, no chest pain, stable, monitored.  HTN: Controlled,  on antihypertensive medications.  HLD: On statin  Obesity: No strict exercise routines, not on any weight loss program, neither on low calorie diet.        Esperanza Beckett MD  Cell: [phone]  Office: [phone]     Assessment  DMT2: 60y Male with DM T2 with hyperglycemia, A1C 7%, was on oral meds and insulin at home, postop CABG on basal bolus insulin, increased dose yesterday, blood sugars in acceptable range this AM and running slightly high  postprandially, no hypoglycemic episodes, eating meals.  Hypothyroidism: Patient has no history thyroid disease, was not on any thyroid supplements, TSH elevated, FT4 still pending.  CAD: s/p CABG, on medications, no chest pain, stable, monitored.  HTN: Controlled,  on antihypertensive medications.  HLD: On statin  Obesity: No strict exercise routines, not on any weight loss program, neither on low calorie diet.        Esperanza Beckett MD  Cell: [phone]  Office: [phone]

## 2021-10-26 NOTE — PROGRESS NOTE ADULT - ASSESSMENT
59 yo male with DM2, HTN, and HLD who initially presented to [facility] on 10/05 as a transfer from [facility] for NSTEMI and possible CHF. Patient on Heparin drip for NSTEMI. LKW 10:45AM. Patient had episode of bradycardia (HR 30s), then became altered. RRT called. BP during RRT 70s/40s. Code stroke called for L facial droop.   CTH negative for acute pathology, old L frontal cortical infarct seen.   CTA H unremarkable. CTA N shows high-grade stenosis left internal carotid artery at the carotid bifurcation in the neck.  10/06 TTE: EF 45%, moderate-severe MR, mild-moderate L ventricular systolic dysfunction.   A1c 7  MRI no AIS but old strokes  HD cath placement 10/12   CD with severe L ICA and Mod R ICA   s/p LHC 10/14  CABG 10/21  10/22 o/e COTO aaoX 2   10/23 now AAOx3 on    10/24 increased dyspneia overnight  10/26 improved  AAOx3, COTO no neuro deficits   Impression: Mild L nasolabial flattening and dysarthria, ?decreased eye closure strength on the L. Possibly secondary to R brain dysfunction due to acute stroke of unknown mechanism vs peripheral etiology. Patient with old L frontal infarct on CTH, also with high-grade stenosis of L ICA at the carotid bifurcation which likely cause of old stroke     Recommendations:   - ASA 81MG PO daily, coumadin dosing for valve   - Avoid hypotension.  - High dose statin therapy - atorvastatin 40mg PO daily. LDL goal <70mg/dL.  -  vascular for ICA revascularization can be outpt. not acute   - lipid panel  - telemetry  - PT/OT/SS/SLP, OOBC  - permissive HTN, -180mmHg.  - check FS, glucose control <180  - GI/DVT ppx  - Counseling on diet, exercise, and medication adherence was done  - Counseling on smoking cessation and alcohol consumption offered when appropriate.  - Pain assessed and judicious use of narcotics when appropriate was discussed.    - Stroke education given when appropriate.  - Importance of fall prevention discussed.   - Differential diagnosis and plan of care discussed with patient and/or family and primary team  - Thank you for allowing me to participate in the care of this patient. Call with questions.   - remainin per CTICU, possible stepdown   Marcelino Patel MD  Vascular Neurology .

## 2021-10-26 NOTE — PROGRESS NOTE ADULT - ASSESSMENT
60M with history of DM type 2, HTN, HLD presenting as transfer from [facility] for chest pain concerning for NSTEMI, code stroke also called for L facial droop, CTA significant for ipsilateral high-grade stenosis of the left internal carotid artery at the carotid bifurcation seen on carotid duplex, now s/p cabg. Patient with ongoing HD requirements now requiring long term HD access planning.     - patient to be transitioned from coumadin to heparin gtt for perioperative planning / AVF planning this admission  - will plan for AVF later this week / early next week prior to hospital discharge  - patient needs urgent access for HD today - unable to place permacath in OR today, recommend temporary HD access placement with IR placement of tunneled HD catheter ASAP  - left arm precautions.     Plan discussed with vascular surgery fellow BING STOKES.     Please contact Vascular Surgery (P: [phone]) with any questions.    JUVENTINO De La Rosa MD, PGY-IV  Surgery, Vascular Team  Pager: [phone]  [facility]  60M with history of DM type 2, HTN, HLD presenting as transfer from [facility] for chest pain concerning for NSTEMI, code stroke also called for L facial droop, CTA significant for ipsilateral high-grade stenosis of the left internal carotid artery at the carotid bifurcation seen on carotid duplex, now s/p cabg. Patient with ongoing HD requirements now requiring long term HD access planning.     - patient to be transitioned from coumadin to heparin gtt for perioperative planning / AVF planning this admission  - will plan for AVF later this week / early next week prior to hospital discharge  - patient needs urgent access for HD today - unable to place permacath in OR today, recommend temporary HD access placement with IR placement of tunneled HD catheter ASAP  - left arm precautions, will need vein mapping    Plan discussed with vascular surgery fellow BING STOKES.     Please contact Vascular Surgery (P: [phone]) with any questions.    JUVENTINO De La Rosa MD, PGY-IV  Surgery, Vascular Team  Pager: [phone]  [facility]

## 2021-10-26 NOTE — PROGRESS NOTE ADULT - SUBJECTIVE AND OBJECTIVE BOX
[facility] DIVISION OF KIDNEY DISEASES AND HYPERTENSION -- FOLLOW UP NOTE  --------------------------------------------------------------------------------  Chief Complaint: JOSHUA ON CKD; ESRD?    24 hour events/subjective:    seen and examined this morning   no acute issues noted overnight   reports feeling well and shortness of breath stable    PAST HISTORY  --------------------------------------------------------------------------------  No significant changes to PMH, PSH, FHx, SHx, unless otherwise noted    ALLERGIES & MEDICATIONS  --------------------------------------------------------------------------------  Allergies    No Known Allergies    Intolerances      Standing Inpatient Medications  aspirin enteric coated 325 milliGRAM(s) Oral daily  atorvastatin 80 milliGRAM(s) Oral at bedtime  bisacodyl Suppository 10 milliGRAM(s) Rectal once  chlorhexidine 2% Cloths 1 Application(s) Topical daily  dextrose 40% Gel 15 Gram(s) Oral once  dextrose 50% Injectable 25 Gram(s) IV Push once  dextrose 50% Injectable 12.5 Gram(s) IV Push once  dextrose 50% Injectable 25 Gram(s) IV Push once  glucagon  Injectable 1 milliGRAM(s) IntraMuscular once  heparin   Injectable 5000 Unit(s) SubCutaneous every 8 hours  insulin glargine Injectable (LANTUS) 16 Unit(s) SubCutaneous at bedtime  insulin lispro (ADMELOG) corrective regimen sliding scale   SubCutaneous three times a day before meals  insulin lispro (ADMELOG) corrective regimen sliding scale   SubCutaneous at bedtime  insulin lispro Injectable (ADMELOG) 8 Unit(s) SubCutaneous three times a day before meals  NIFEdipine XL 30 milliGRAM(s) Oral daily  pantoprazole    Tablet 40 milliGRAM(s) Oral before breakfast  polyethylene glycol 3350 17 Gram(s) Oral daily  senna 2 Tablet(s) Oral at bedtime  sevelamer carbonate 800 milliGRAM(s) Oral three times a day  sodium chloride 0.9%. 1000 milliLiter(s) IV Continuous <Continuous>    PRN Inpatient Medications  acetaminophen   Tablet .. 650 milliGRAM(s) Oral every 6 hours PRN  oxyCODONE    IR 5 milliGRAM(s) Oral every 4 hours PRN  oxyCODONE    IR 10 milliGRAM(s) Oral every 4 hours PRN      REVIEW OF SYSTEMS    All other systems were reviewed and are negative, except as noted.    VITALS/PHYSICAL EXAM  --------------------------------------------------------------------------------  T(C): 36.4 (10-26-21 @ 04:00), Max: 37.1 (10-25-21 @ 15:20)  HR: 54 (10-26-21 @ 06:00) (54 - 61)  BP: 138/62 (10-26-21 @ 06:00) (113/58 - 159/67)  RR: 20 (10-26-21 @ 06:00) (16 - 23)  SpO2: 100% (10-26-21 @ 06:00) (93% - 100%)  Wt(kg): --        10-25-21 @ 07:01  -  10-26-21 @ 07:00  --------------------------------------------------------  IN: 455 mL / OUT: 2030 mL / NET: -1575 mL        Physical Exam:  	Gen: NAD  	HEENT: MMM  	Pulm: crackles in lung bases  	CV: S1S2  	Abd: Soft, +BS   	Ext: + LE edema B/L  	Neuro: Awake  	Skin: Warm and dry  	Vascular access: N/A, needs access      LABS/STUDIES  --------------------------------------------------------------------------------              8.2    8.90  >-----------<  115      [10-26-21 @ 00:22]              24.9     135  |  96  |  60  ----------------------------<  118      [10-26-21 @ 00:22]  4.3   |  22  |  4.65        Ca     8.8     [10-26-21 @ 00:22]      Mg     2.3     [10-26-21 @ 00:22]      Phos  5.5     [10-26-21 @ 00:22]    TPro  5.9  /  Alb  2.9  /  TBili  0.3  /  DBili  x   /  AST  26  /  ALT  20  /  AlkPhos  82  [10-26-21 @ 00:22]    PT/INR: PT 17.6 , INR 1.50       [10-25-21 @ 19:44]  PTT: 28.7       [10-25-21 @ 19:44]      Creatinine Trend:  SCr 4.65 [10-26 @ 00:22]  SCr 4.72 [10-25 @ 00:25]  SCr 3.92 [10-24 @ 01:10]  SCr 2.68 [10-23 @ 00:21]  SCr 2.73 [10-22 @ 03:38]    Urinalysis - [10-07-21 @ 10:09]      Color Light Yellow / Appearance Clear / SG 1.015 / pH 6.0      Gluc 200 mg/dL / Ketone Negative  / Bili Negative / Urobili Negative       Blood Small / Protein 300 mg/dL / Leuk Est Negative / Nitrite Negative      RBC 2 / WBC 7 / Hyaline 6 / Gran  / Sq Epi  / Non Sq Epi 2 / Bacteria Negative      Iron 47, TIBC 245, %sat 19      [10-13-21 @ 13:10]  Ferritin 780      [10-13-21 @ 08:59]  TSH 6.40      [10-20-21 @ 17:03]    HBsAg Nonreact      [10-13-21 @ 09:15]  HCV 0.23, Nonreact      [10-07-21 @ 14:38]  HIV Nonreact      [10-13-21 @ 09:16]    BEULAH: titer Negative, pattern --      [10-07-21 @ 14:37]  C3 Complement 135      [10-07-21 @ 14:42]  C4 Complement 71      [10-07-21 @ 14:42]  ANCA: cANCA Negative, pANCA Negative, atypical ANCA Negative      [10-07-21 @ 14:37]  Syphilis Screen (Treponema Pallidum Ab) Negative      [10-07-21 @ 14:37]  PLA2R: SHANNAN <1.8, IFA --      [10-13-21 @ 13:55]  Free Light Chains: kappa 10.89, lambda 12.51, ratio = 0.87      [10-07 @ 14:42]  Immunofixation Serum:   No Monoclonal Band Identified    Reference Range: None Detected      [10-07-21 @ 14:42]  SPEP Interpretation: Normal Electrophoresis Pattern      [10-07-21 @ 14:42]

## 2021-10-26 NOTE — PROGRESS NOTE ADULT - PROBLEM SELECTOR PLAN 1
Will continue Lantus 16u at bedtime.  Will increase Admelog to 9u before each meal and continue Admelog correction scale coverage. Will continue monitoring FS and FU.   for compliance with consistent low carb diet.

## 2021-10-26 NOTE — PROGRESS NOTE ADULT - SUBJECTIVE AND OBJECTIVE BOX
TOM PLEITEZ  MRN-[id number]  Patient is a 60y old  Male who presents with a chief complaint of NSTEMI (25 Oct 2021 13:53)      HPI:  60 yr old M w/ hx of DM2, HTN and HLD presents as transfer from [email or web address] for chest pain concerning for NSTEMI and possible CHF.  Pt states that after he woke up this morning he felt midsternal chest pain this morning, that felt like a burning sensation. Pain was non radiating. Associated with shortness of breath. He initially took TUMS and drank some milk which alleviated some of the pain but not completely. Later he also started feeling very dizzy so he went to the ED. Denies associated palpitations, diaphoresis, N/V.  When he presented to [email or web address] he was noted to be SOB. Initially, they evaluated patient for CHF but Troponin and BNP levels were elevated so he was transferred here for NSTEMI and r/o CHF.    Per nursing notes, while at [facility], he was placed on BIPAP, given Solumedrol 120, Nitro SL x1, duoneb x2, ASA 81mg, Lasix 40mg w/ 200 cc output, and started at 10mL/hr Nitro drip then increased to 30mL/hr. There, initial /126 retake was 171/67. Nitro drip was d/c'd upon arrival to [facility] ED.     During my visit, patient was sitting up eating a sandwich. Appears comfortable on room air. Denies repeat of chest pain after this morning. Denies shortness of breath. Denies lower extremity edema, orthopnea or PND.     Labs also remarkable for JOSHUA, metabolic acidosis and hyperkalemia. Patient denies prior history of renal injury.       Wife can be reached at [phone]. Medication list confirmed w/ wife. Of note, patient w/ elevated BP to SBP 200s often at home; nifedipine used on a "as needed" basis for SBP > 200.  (06 Oct 2021 01:09)      Surgery/Hospital Course:  10/21 C2L, MVR, LAAL   10/24 SOB, urgent HD overnight, bipap     Today:  No acute events     ICU Vital Signs Last 24 Hrs  T(C): 36.4 (26 Oct 2021 04:00), Max: 37.1 (25 Oct 2021 15:20)  T(F): 97.5 (26 Oct 2021 04:00), Max: 98.8 (25 Oct 2021 15:20)  HR: 54 (26 Oct 2021 06:00) (54 - 61)  BP: 138/62 (26 Oct 2021 06:00) (113/58 - 159/67)  BP(mean): 89 (26 Oct 2021 06:00) (79 - 99)  ABP: 128/46 (25 Oct 2021 09:00) (124/45 - 128/46)  ABP(mean): 68 (25 Oct 2021 09:00) (66 - 68)  RR: 20 (26 Oct 2021 06:00) (16 - 23)  SpO2: 100% (26 Oct 2021 06:00) (93% - 100%)      Physical Exam:  Gen:  Awake, alert   CNS: non focal 	  Neck: no JVD  RES : clear , no wheezing              CVS: Regular  rhythm. Normal S1/S2  Chest: +chest tube  Abd: Soft, non-distended. Bowel sounds present.  Skin: No rash.  Ext:  no edema    ============================I/O===========================   I&O's Detail    24 Oct 2021 07:01  -  25 Oct 2021 07:00  --------------------------------------------------------  IN:    Milrinone: 28.8 mL    Oral Fluid: 820 mL    sodium chloride 0.9%: 60 mL  Total IN: 908.8 mL    OUT:    Bulb (mL): 30 mL    Chest Tube (mL): 140 mL    Chest Tube (mL): 20 mL    Indwelling Catheter - Urethral (mL): 65 mL    NiCARdipine: 0 mL    Voided (mL): 150 mL  Total OUT: 405 mL    Total NET: 503.8 mL      25 Oct 2021 07:01  -  26 Oct 2021 06:54  --------------------------------------------------------  IN:    Oral Fluid: 340 mL    sodium chloride 0.9%: 115 mL  Total IN: 455 mL    OUT:    Bulb (mL): 30 mL    Chest Tube (mL): 0 mL    Other (mL): 2000 mL  Total OUT: 2030 mL    Total NET: -1575 mL        ============================ LABS =========================                        8.2    8.90  )-----------( 115      ( 26 Oct 2021 00:22 )             24.9     10-26    135  |  96  |  60<H>  ----------------------------<  118<H>  4.3   |  22  |  4.65<H>    Ca    8.8      26 Oct 2021 00:22  Phos  5.5     10-26  Mg     2.3     10-26    TPro  5.9<L>  /  Alb  2.9<L>  /  TBili  0.3  /  DBili  x   /  AST  26  /  ALT  20  /  AlkPhos  82  10-26    LIVER FUNCTIONS - ( 26 Oct 2021 00:22 )  Alb: 2.9 g/dL / Pro: 5.9 g/dL / ALK PHOS: 82 U/L / ALT: 20 U/L / AST: 26 U/L / GGT: x           PT/INR - ( 25 Oct 2021 19:44 )   PT: 17.6 sec;   INR: 1.50 ratio         PTT - ( 25 Oct 2021 19:44 )  PTT:28.7 sec  ABG - ( 25 Oct 2021 06:46 )  pH, Arterial: 7.36  pH, Blood: x     /  pCO2: 39    /  pO2: 70    / HCO3: 22    / Base Excess: -3.2  /  SaO2: 95.0                ======================Micro/Rad/Cardio=================  CXR: Reviewed  Echo:Reviewed  ======================================================  PAST MEDICAL & SURGICAL HISTORY:  Hypertension    Hyperlipidemia    Diabetes mellitus    No pertinent past surgical history      ====================ASSESSMENT ==============  CAD, mitral valve regurgitation S/P C2L, MVR (T), LAAL on 10/21   Hypovolemic shock  Hypertension   Post op respiratory insufficiency   Acute Blood Loss Anemia   Thrombocytopenia   JOSHUA on CKD   DMT2    Plan:  ====================== NEUROLOGY=====================  Continue close monitoring of neuro status   PRN Tylenol and PRN Oxycodone for pain management  Ambulate as tolerated    acetaminophen   Tablet .. 650 milliGRAM(s) Oral every 6 hours PRN Mild Pain (1 - 3)  oxyCODONE    IR 5 milliGRAM(s) Oral every 4 hours PRN Moderate Pain (4 - 6)  oxyCODONE    IR 10 milliGRAM(s) Oral every 4 hours PRN Severe Pain (7 - 10)    ==================== RESPIRATORY======================  Supplemental O2 via 5L NC   Encourage incentive spirometry, continue pulse ox monitoring, follow ABGs     ====================CARDIOVASCULAR==================  CAD, mitral valve regurgitation S/P CABG, MVR, LAAL on 10/21   Continue invasive hemodynamic monitoring   Blood pressure management with Nifedipine  Back up VVI paced at 50 via temp epicardial wires  Continue with ASA/lipitor for graft patency     aspirin enteric coated 325 milliGRAM(s) Oral daily  atorvastatin 80 milliGRAM(s) Oral at bedtime  NIFEdipine XL 30 milliGRAM(s) Oral daily    ===================HEMATOLOGIC/ONC ===================  Thrombocytopenia and  acute blood loss anemia, monitor H/H and PLTs.  Monitor INR for coumadin dosing for mitral valve    VTE prophylaxis, heparin   Injectable 5000 Unit(s) SubCutaneous every 8 hours    ===================== RENAL =========================  Renal following for JOSHUA on CKD, s/p HD yesterday   Continue to monitor I/Os, BUN/Creatinine, and urine output.   Replete lytes PRN. Keep K> 4 and Mg >2.      ==================== GASTROINTESTINAL===================  Tolerating consistent carb diet  Bowel regimen with Miralax and Senna     bisacodyl Suppository 10 milliGRAM(s) Rectal once  GI prophylaxis, pantoprazole    Tablet 40 milliGRAM(s) Oral before breakfast  polyethylene glycol 3350 17 Gram(s) Oral daily  senna 2 Tablet(s) Oral at bedtime  sodium chloride 0.9%. 1000 milliLiter(s) (10 mL/Hr) IV Continuous <Continuous>    =======================    ENDOCRINE  =====================  Hx of DM2, continue glycemic control with admelog sliding scale and lantus     dextrose 40% Gel 15 Gram(s) Oral once  dextrose 50% Injectable 25 Gram(s) IV Push once  dextrose 50% Injectable 12.5 Gram(s) IV Push once  glucagon  Injectable 1 milliGRAM(s) IntraMuscular once  insulin glargine Injectable (LANTUS) 16 Unit(s) SubCutaneous at bedtime  insulin lispro (ADMELOG) corrective regimen sliding scale   SubCutaneous three times a day before meals  insulin lispro (ADMELOG) corrective regimen sliding scale   SubCutaneous at bedtime  insulin lispro Injectable (ADMELOG) 8 Unit(s) SubCutaneous three times a day before meals    ========================INFECTIOUS DISEASE================  Afebrile, WBC rising 10.95->8.90   Continue trending WBC and monitoring fever curve       I have spent 35 minutes providing acute care for this critically ill patient     Patient requires continuous monitoring with bedside rhythm monitoring, pulse ox monitoring, and intermittent blood gas analysis. Care plan discussed with ICU care team. Patient remained critical and at risk for life threatening decompensation.     By signing my name below, I, Anne Lantigua, attest that this documentation has been prepared under the direction and in the presence of Geovanni Stone MD   Electronically signed: Jm Enciso, 10-26-21 @ 06:54    I, Geovanni Stone, personally performed the services described in this documentation. all medical record entries made by the sarojibbrittni were at my direction and in my presence. I have reviewed the chart and agree that the record reflects my personal performance and is accurate and complete  Electronically signed: Geovanni Stone MD        TOM PLEITEZ  MRN-[id number]  Patient is a 60y old  Male who presents with a chief complaint of NSTEMI (25 Oct 2021 13:53)      HPI:  60 yr old M w/ hx of DM2, HTN and HLD presents as transfer from [facility] for chest pain concerning for NSTEMI and possible CHF.  Pt states that after he woke up this morning he felt midsternal chest pain this morning, that felt like a burning sensation. Pain was non radiating. Associated with shortness of breath. He initially took TUMS and drank some milk which alleviated some of the pain but not completely. Later he also started feeling very dizzy so he went to the ED. Denies associated palpitations, diaphoresis, N/V.  When he presented to [facility] he was noted to be SOB. Initially, they evaluated patient for CHF but Troponin and BNP levels were elevated so he was transferred here for NSTEMI and r/o CHF.    Per nursing notes, while at [facility], he was placed on BIPAP, given Solumedrol 120, Nitro SL x1, duoneb x2, ASA 81mg, Lasix 40mg w/ 200 cc output, and started at 10mL/hr Nitro drip then increased to 30mL/hr. There, initial /126 retake was 171/67. Nitro drip was d/c'd upon arrival to [facility] ED.     During my visit, patient was sitting up eating a sandwich. Appears comfortable on room air. Denies repeat of chest pain after this morning. Denies shortness of breath. Denies lower extremity edema, orthopnea or PND.     Labs also remarkable for JOSHUA, metabolic acidosis and hyperkalemia. Patient denies prior history of renal injury.       Wife can be reached at [phone]. Medication list confirmed w/ wife. Of note, patient w/ elevated BP to SBP 200s often at home; nifedipine used on a "as needed" basis for SBP > 200.  (06 Oct 2021 01:09)      Surgery/Hospital Course:  10/21 C2L, MVR, LAAL   10/24 SOB, urgent HD overnight, bipap     Today:  Renal following for JOSHUA on CKD, temporary access for HD removed yesterday as was not functioning, will consult IR for permacath. Plan for HD today. Check afternoon INR for coumadin dosing tonight for mitral valve. Ambulate as tolerated.     ICU Vital Signs Last 24 Hrs  T(C): 36.4 (26 Oct 2021 04:00), Max: 37.1 (25 Oct 2021 15:20)  T(F): 97.5 (26 Oct 2021 04:00), Max: 98.8 (25 Oct 2021 15:20)  HR: 54 (26 Oct 2021 06:00) (54 - 61)  BP: 138/62 (26 Oct 2021 06:00) (113/58 - 159/67)  BP(mean): 89 (26 Oct 2021 06:00) (79 - 99)  ABP: 128/46 (25 Oct 2021 09:00) (124/45 - 128/46)  ABP(mean): 68 (25 Oct 2021 09:00) (66 - 68)  RR: 20 (26 Oct 2021 06:00) (16 - 23)  SpO2: 100% (26 Oct 2021 06:00) (93% - 100%)      Physical Exam:  Gen:  Awake, alert   CNS: non focal 	  Neck: no JVD  RES : clear , no wheezing              CVS: Regular  rhythm. Normal S1/S2  Chest: +chest tube  Abd: Soft, non-distended. Bowel sounds present.  Skin: No rash.  Ext:  no edema    ============================I/O===========================   I&O's Detail    24 Oct 2021 07:01  -  25 Oct 2021 07:00  --------------------------------------------------------  IN:    Milrinone: 28.8 mL    Oral Fluid: 820 mL    sodium chloride 0.9%: 60 mL  Total IN: 908.8 mL    OUT:    Bulb (mL): 30 mL    Chest Tube (mL): 140 mL    Chest Tube (mL): 20 mL    Indwelling Catheter - Urethral (mL): 65 mL    NiCARdipine: 0 mL    Voided (mL): 150 mL  Total OUT: 405 mL    Total NET: 503.8 mL      25 Oct 2021 07:01  -  26 Oct 2021 06:54  --------------------------------------------------------  IN:    Oral Fluid: 340 mL    sodium chloride 0.9%: 115 mL  Total IN: 455 mL    OUT:    Bulb (mL): 30 mL    Chest Tube (mL): 0 mL    Other (mL): 2000 mL  Total OUT: 2030 mL    Total NET: -1575 mL        ============================ LABS =========================                        8.2    8.90  )-----------( 115      ( 26 Oct 2021 00:22 )             24.9     10-26    135  |  96  |  60<H>  ----------------------------<  118<H>  4.3   |  22  |  4.65<H>    Ca    8.8      26 Oct 2021 00:22  Phos  5.5     10-26  Mg     2.3     10-26    TPro  5.9<L>  /  Alb  2.9<L>  /  TBili  0.3  /  DBili  x   /  AST  26  /  ALT  20  /  AlkPhos  82  10-26    LIVER FUNCTIONS - ( 26 Oct 2021 00:22 )  Alb: 2.9 g/dL / Pro: 5.9 g/dL / ALK PHOS: 82 U/L / ALT: 20 U/L / AST: 26 U/L / GGT: x           PT/INR - ( 25 Oct 2021 19:44 )   PT: 17.6 sec;   INR: 1.50 ratio         PTT - ( 25 Oct 2021 19:44 )  PTT:28.7 sec  ABG - ( 25 Oct 2021 06:46 )  pH, Arterial: 7.36  pH, Blood: x     /  pCO2: 39    /  pO2: 70    / HCO3: 22    / Base Excess: -3.2  /  SaO2: 95.0                ======================Micro/Rad/Cardio=================  CXR: Reviewed  Echo:Reviewed  ======================================================  PAST MEDICAL & SURGICAL HISTORY:  Hypertension    Hyperlipidemia    Diabetes mellitus    No pertinent past surgical history      ====================ASSESSMENT ==============  CAD, mitral valve regurgitation S/P C2L, MVR (T), LAAL on 10/21   Hypovolemic shock  Hypertension   Post op respiratory insufficiency   Acute Blood Loss Anemia   Thrombocytopenia   JOSHUA on CKD   DMT2    Plan:  ====================== NEUROLOGY=====================  Continue close monitoring of neuro status   PRN Tylenol and PRN Oxycodone for pain management  Ambulate as tolerated    acetaminophen   Tablet .. 650 milliGRAM(s) Oral every 6 hours PRN Mild Pain (1 - 3)  oxyCODONE    IR 5 milliGRAM(s) Oral every 4 hours PRN Moderate Pain (4 - 6)  oxyCODONE    IR 10 milliGRAM(s) Oral every 4 hours PRN Severe Pain (7 - 10)    ==================== RESPIRATORY======================  Supplemental O2 via 5L NC   Encourage incentive spirometry, continue pulse ox monitoring, follow ABGs     ====================CARDIOVASCULAR==================  CAD, mitral valve regurgitation S/P CABG, MVR, LAAL on 10/21   Continue invasive hemodynamic monitoring   Blood pressure management with Nifedipine  Back up VVI paced at 50 via temp epicardial wires  Continue with ASA/lipitor for graft patency     aspirin enteric coated 325 milliGRAM(s) Oral daily  atorvastatin 80 milliGRAM(s) Oral at bedtime  NIFEdipine XL 30 milliGRAM(s) Oral daily    ===================HEMATOLOGIC/ONC ===================  Thrombocytopenia and  acute blood loss anemia, monitor H/H and PLTs.  Check afternoon INR for coumadin dosing tonight for mitral valve     VTE prophylaxis, heparin   Injectable 5000 Unit(s) SubCutaneous every 8 hours    ===================== RENAL =========================  Renal following for JOSHUA on CKD, temporary access for HD removed yesterday as was not functioning, will consult IR for permacath today   Continue to monitor I/Os, BUN/Creatinine, and urine output.   Replete lytes PRN. Keep K> 4 and Mg >2.      ==================== GASTROINTESTINAL===================  Tolerating consistent carb diet  Bowel regimen with Miralax and Senna     bisacodyl Suppository 10 milliGRAM(s) Rectal once  GI prophylaxis, pantoprazole    Tablet 40 milliGRAM(s) Oral before breakfast  polyethylene glycol 3350 17 Gram(s) Oral daily  senna 2 Tablet(s) Oral at bedtime  sodium chloride 0.9%. 1000 milliLiter(s) (10 mL/Hr) IV Continuous <Continuous>    =======================    ENDOCRINE  =====================  Hx of DM2, continue glycemic control with admelog sliding scale and lantus     dextrose 40% Gel 15 Gram(s) Oral once  dextrose 50% Injectable 25 Gram(s) IV Push once  dextrose 50% Injectable 12.5 Gram(s) IV Push once  glucagon  Injectable 1 milliGRAM(s) IntraMuscular once  insulin glargine Injectable (LANTUS) 16 Unit(s) SubCutaneous at bedtime  insulin lispro (ADMELOG) corrective regimen sliding scale   SubCutaneous three times a day before meals  insulin lispro (ADMELOG) corrective regimen sliding scale   SubCutaneous at bedtime  insulin lispro Injectable (ADMELOG) 8 Unit(s) SubCutaneous three times a day before meals    ========================INFECTIOUS DISEASE================  Afebrile, WBC rising 10.95->8.90   Continue trending WBC and monitoring fever curve       I have spent 35 minutes providing acute care for this critically ill patient     Patient requires continuous monitoring with bedside rhythm monitoring, pulse ox monitoring, and intermittent blood gas analysis. Care plan discussed with ICU care team. Patient remained critical and at risk for life threatening decompensation.     By signing my name below, I, Anne Lantigua, attest that this documentation has been prepared under the direction and in the presence of Geovanni Stone MD   Electronically signed: Jm Enciso, 10-26-21 @ 06:54    I, Geovanni Stone, personally performed the services described in this documentation. all medical record entries made by the sarojibbrittni were at my direction and in my presence. I have reviewed the chart and agree that the record reflects my personal performance and is accurate and complete  Electronically signed: Geovanni Stone MD        TOM PLEITEZ  MRN-[id number]  Patient is a 60y old  Male who presents with a chief complaint of NSTEMI (25 Oct 2021 13:53)      HPI:  60 yr old M w/ hx of DM2, HTN and HLD presents as transfer from [facility] for chest pain concerning for NSTEMI and possible CHF.  Pt states that after he woke up this morning he felt midsternal chest pain this morning, that felt like a burning sensation. Pain was non radiating. Associated with shortness of breath. He initially took TUMS and drank some milk which alleviated some of the pain but not completely. Later he also started feeling very dizzy so he went to the ED. Denies associated palpitations, diaphoresis, N/V.  When he presented to [facility] he was noted to be SOB. Initially, they evaluated patient for CHF but Troponin and BNP levels were elevated so he was transferred here for NSTEMI and r/o CHF.    Per nursing notes, while at [facility], he was placed on BIPAP, given Solumedrol 120, Nitro SL x1, duoneb x2, ASA 81mg, Lasix 40mg w/ 200 cc output, and started at 10mL/hr Nitro drip then increased to 30mL/hr. There, initial /126 retake was 171/67. Nitro drip was d/c'd upon arrival to [facility] ED.     During my visit, patient was sitting up eating a sandwich. Appears comfortable on room air. Denies repeat of chest pain after this morning. Denies shortness of breath. Denies lower extremity edema, orthopnea or PND.     Labs also remarkable for JOSHUA, metabolic acidosis and hyperkalemia. Patient denies prior history of renal injury.       Wife can be reached at [phone]. Medication list confirmed w/ wife. Of note, patient w/ elevated BP to SBP 200s often at home; nifedipine used on a "as needed" basis for SBP > 200.  (06 Oct 2021 01:09)      Surgery/Hospital Course:  10/21 C2L, MVR, LAAL   10/24 SOB, urgent HD overnight, bipap     Today:  Renal following for JOSHUA on CKD, temporary access for HD removed yesterday as was not functioning, will consult IR for permacath. Plan for HD today. Check afternoon INR for coumadin dosing tonight for mitral valve. Ambulate as tolerated. Patient is a candidate for transfer to floor.     ICU Vital Signs Last 24 Hrs  T(C): 36.4 (26 Oct 2021 04:00), Max: 37.1 (25 Oct 2021 15:20)  T(F): 97.5 (26 Oct 2021 04:00), Max: 98.8 (25 Oct 2021 15:20)  HR: 54 (26 Oct 2021 06:00) (54 - 61)  BP: 138/62 (26 Oct 2021 06:00) (113/58 - 159/67)  BP(mean): 89 (26 Oct 2021 06:00) (79 - 99)  ABP: 128/46 (25 Oct 2021 09:00) (124/45 - 128/46)  ABP(mean): 68 (25 Oct 2021 09:00) (66 - 68)  RR: 20 (26 Oct 2021 06:00) (16 - 23)  SpO2: 100% (26 Oct 2021 06:00) (93% - 100%)      Physical Exam:  Gen:  Awake, alert   CNS: non focal 	  Neck: no JVD  RES : clear , no wheezing              CVS: Regular  rhythm. Normal S1/S2  Chest: +chest tube  Abd: Soft, non-distended. Bowel sounds present.  Skin: No rash.  Ext:  no edema    ============================I/O===========================   I&O's Detail    24 Oct 2021 07:01  -  25 Oct 2021 07:00  --------------------------------------------------------  IN:    Milrinone: 28.8 mL    Oral Fluid: 820 mL    sodium chloride 0.9%: 60 mL  Total IN: 908.8 mL    OUT:    Bulb (mL): 30 mL    Chest Tube (mL): 140 mL    Chest Tube (mL): 20 mL    Indwelling Catheter - Urethral (mL): 65 mL    NiCARdipine: 0 mL    Voided (mL): 150 mL  Total OUT: 405 mL    Total NET: 503.8 mL      25 Oct 2021 07:01  -  26 Oct 2021 06:54  --------------------------------------------------------  IN:    Oral Fluid: 340 mL    sodium chloride 0.9%: 115 mL  Total IN: 455 mL    OUT:    Bulb (mL): 30 mL    Chest Tube (mL): 0 mL    Other (mL): 2000 mL  Total OUT: 2030 mL    Total NET: -1575 mL        ============================ LABS =========================                        8.2    8.90  )-----------( 115      ( 26 Oct 2021 00:22 )             24.9     10-26    135  |  96  |  60<H>  ----------------------------<  118<H>  4.3   |  22  |  4.65<H>    Ca    8.8      26 Oct 2021 00:22  Phos  5.5     10-26  Mg     2.3     10-26    TPro  5.9<L>  /  Alb  2.9<L>  /  TBili  0.3  /  DBili  x   /  AST  26  /  ALT  20  /  AlkPhos  82  10-26    LIVER FUNCTIONS - ( 26 Oct 2021 00:22 )  Alb: 2.9 g/dL / Pro: 5.9 g/dL / ALK PHOS: 82 U/L / ALT: 20 U/L / AST: 26 U/L / GGT: x           PT/INR - ( 25 Oct 2021 19:44 )   PT: 17.6 sec;   INR: 1.50 ratio         PTT - ( 25 Oct 2021 19:44 )  PTT:28.7 sec  ABG - ( 25 Oct 2021 06:46 )  pH, Arterial: 7.36  pH, Blood: x     /  pCO2: 39    /  pO2: 70    / HCO3: 22    / Base Excess: -3.2  /  SaO2: 95.0                ======================Micro/Rad/Cardio=================  CXR: Reviewed  Echo:Reviewed  ======================================================  PAST MEDICAL & SURGICAL HISTORY:  Hypertension    Hyperlipidemia    Diabetes mellitus    No pertinent past surgical history      ====================ASSESSMENT ==============  CAD, mitral valve regurgitation S/P C2L, MVR (T), LAAL on 10/21   Hypovolemic shock  Hypertension   Post op respiratory insufficiency   Acute Blood Loss Anemia   Thrombocytopenia   JOSHUA on CKD   DMT2    Plan:  ====================== NEUROLOGY=====================  Continue close monitoring of neuro status   PRN Tylenol and PRN Oxycodone for pain management  Ambulate as tolerated    acetaminophen   Tablet .. 650 milliGRAM(s) Oral every 6 hours PRN Mild Pain (1 - 3)  oxyCODONE    IR 5 milliGRAM(s) Oral every 4 hours PRN Moderate Pain (4 - 6)  oxyCODONE    IR 10 milliGRAM(s) Oral every 4 hours PRN Severe Pain (7 - 10)    ==================== RESPIRATORY======================  Supplemental O2 via 5L NC   Encourage incentive spirometry, continue pulse ox monitoring, follow ABGs     ====================CARDIOVASCULAR==================  CAD, mitral valve regurgitation S/P CABG, MVR, LAAL on 10/21   Continue invasive hemodynamic monitoring   Blood pressure management with Nifedipine  Back up VVI paced at 50 via temp epicardial wires  Continue with ASA/lipitor for graft patency     aspirin enteric coated 325 milliGRAM(s) Oral daily  atorvastatin 80 milliGRAM(s) Oral at bedtime  NIFEdipine XL 30 milliGRAM(s) Oral daily    ===================HEMATOLOGIC/ONC ===================  Thrombocytopenia and  acute blood loss anemia, monitor H/H and PLTs.  Check afternoon INR for coumadin dosing tonight for mitral valve     VTE prophylaxis, heparin   Injectable 5000 Unit(s) SubCutaneous every 8 hours    ===================== RENAL =========================  Renal following for JOSHUA on CKD, temporary access for HD removed yesterday as was not functioning, will consult IR for permacath today   Continue to monitor I/Os, BUN/Creatinine, and urine output.   Replete lytes PRN. Keep K> 4 and Mg >2.      ==================== GASTROINTESTINAL===================  Tolerating consistent carb diet  Bowel regimen with Miralax and Senna     bisacodyl Suppository 10 milliGRAM(s) Rectal once  GI prophylaxis, pantoprazole    Tablet 40 milliGRAM(s) Oral before breakfast  polyethylene glycol 3350 17 Gram(s) Oral daily  senna 2 Tablet(s) Oral at bedtime  sodium chloride 0.9%. 1000 milliLiter(s) (10 mL/Hr) IV Continuous <Continuous>    =======================    ENDOCRINE  =====================  Hx of DM2, continue glycemic control with admelog sliding scale and lantus     dextrose 40% Gel 15 Gram(s) Oral once  dextrose 50% Injectable 25 Gram(s) IV Push once  dextrose 50% Injectable 12.5 Gram(s) IV Push once  glucagon  Injectable 1 milliGRAM(s) IntraMuscular once  insulin glargine Injectable (LANTUS) 16 Unit(s) SubCutaneous at bedtime  insulin lispro (ADMELOG) corrective regimen sliding scale   SubCutaneous three times a day before meals  insulin lispro (ADMELOG) corrective regimen sliding scale   SubCutaneous at bedtime  insulin lispro Injectable (ADMELOG) 8 Unit(s) SubCutaneous three times a day before meals    ========================INFECTIOUS DISEASE================  Afebrile, WBC rising 10.95->8.90   Continue trending WBC and monitoring fever curve       I have spent 35 minutes providing acute care for this critically ill patient     Patient requires continuous monitoring with bedside rhythm monitoring, pulse ox monitoring, and intermittent blood gas analysis. Care plan discussed with ICU care team. Patient remained critical and at risk for life threatening decompensation.     By signing my name below, I, Anne Lantigua, attest that this documentation has been prepared under the direction and in the presence of Geovanni Stone MD   Electronically signed: Jm Enciso, 10-26-21 @ 06:54    I, Geovanni Stone, personally performed the services described in this documentation. all medical record entries made by the sarojibe were at my direction and in my presence. I have reviewed the chart and agree that the record reflects my personal performance and is accurate and complete  Electronically signed: Geovanni Stone MD

## 2021-10-27 DIAGNOSIS — Z95.2 PRESENCE OF PROSTHETIC HEART VALVE: ICD-10-CM

## 2021-10-27 DIAGNOSIS — Z95.1 PRESENCE OF AORTOCORONARY BYPASS GRAFT: ICD-10-CM

## 2021-10-27 LAB
ALBUMIN SERPL ELPH-MCNC: 2.8 G/DL — LOW (ref 3.3–5)
ALP SERPL-CCNC: 81 U/L — SIGNIFICANT CHANGE UP (ref 40–120)
ALT FLD-CCNC: 20 U/L — SIGNIFICANT CHANGE UP (ref 10–45)
ANION GAP SERPL CALC-SCNC: 18 MMOL/L — HIGH (ref 5–17)
AST SERPL-CCNC: 22 U/L — SIGNIFICANT CHANGE UP (ref 10–40)
BILIRUB SERPL-MCNC: 0.3 MG/DL — SIGNIFICANT CHANGE UP (ref 0.2–1.2)
BLD GP AB SCN SERPL QL: NEGATIVE — SIGNIFICANT CHANGE UP
BUN SERPL-MCNC: 56 MG/DL — HIGH (ref 7–23)
CALCIUM SERPL-MCNC: 8.7 MG/DL — SIGNIFICANT CHANGE UP (ref 8.4–10.5)
CHLORIDE SERPL-SCNC: 95 MMOL/L — LOW (ref 96–108)
CO2 SERPL-SCNC: 21 MMOL/L — LOW (ref 22–31)
CREAT SERPL-MCNC: 4.18 MG/DL — HIGH (ref 0.5–1.3)
GLUCOSE BLDC GLUCOMTR-MCNC: 121 MG/DL — HIGH (ref 70–99)
GLUCOSE BLDC GLUCOMTR-MCNC: 148 MG/DL — HIGH (ref 70–99)
GLUCOSE BLDC GLUCOMTR-MCNC: 155 MG/DL — HIGH (ref 70–99)
GLUCOSE SERPL-MCNC: 150 MG/DL — HIGH (ref 70–99)
HCT VFR BLD CALC: 24.4 % — LOW (ref 39–50)
HGB BLD-MCNC: 7.8 G/DL — LOW (ref 13–17)
INR BLD: 1.63 RATIO — HIGH (ref 0.88–1.16)
MAGNESIUM SERPL-MCNC: 2.2 MG/DL — SIGNIFICANT CHANGE UP (ref 1.6–2.6)
MCHC RBC-ENTMCNC: 29.1 PG — SIGNIFICANT CHANGE UP (ref 27–34)
MCHC RBC-ENTMCNC: 32 GM/DL — SIGNIFICANT CHANGE UP (ref 32–36)
MCV RBC AUTO: 91 FL — SIGNIFICANT CHANGE UP (ref 80–100)
NRBC # BLD: 0 /100 WBCS — SIGNIFICANT CHANGE UP (ref 0–0)
PHOSPHATE SERPL-MCNC: 4.7 MG/DL — HIGH (ref 2.5–4.5)
PLATELET # BLD AUTO: 134 K/UL — LOW (ref 150–400)
POTASSIUM SERPL-MCNC: 4.3 MMOL/L — SIGNIFICANT CHANGE UP (ref 3.5–5.3)
POTASSIUM SERPL-SCNC: 4.3 MMOL/L — SIGNIFICANT CHANGE UP (ref 3.5–5.3)
PROT SERPL-MCNC: 5.8 G/DL — LOW (ref 6–8.3)
PROTHROM AB SERPL-ACNC: 19.1 SEC — HIGH (ref 10.6–13.6)
RBC # BLD: 2.68 M/UL — LOW (ref 4.2–5.8)
RBC # FLD: 14.8 % — HIGH (ref 10.3–14.5)
RH IG SCN BLD-IMP: POSITIVE — SIGNIFICANT CHANGE UP
SODIUM SERPL-SCNC: 134 MMOL/L — LOW (ref 135–145)
WBC # BLD: 9.89 K/UL — SIGNIFICANT CHANGE UP (ref 3.8–10.5)
WBC # FLD AUTO: 9.89 K/UL — SIGNIFICANT CHANGE UP (ref 3.8–10.5)

## 2021-10-27 PROCEDURE — 93970 EXTREMITY STUDY: CPT | Mod: 26

## 2021-10-27 PROCEDURE — 93010 ELECTROCARDIOGRAM REPORT: CPT

## 2021-10-27 PROCEDURE — 99291 CRITICAL CARE FIRST HOUR: CPT

## 2021-10-27 PROCEDURE — 99233 SBSQ HOSP IP/OBS HIGH 50: CPT | Mod: GC

## 2021-10-27 PROCEDURE — 71045 X-RAY EXAM CHEST 1 VIEW: CPT | Mod: 26

## 2021-10-27 RX ORDER — OXYCODONE HYDROCHLORIDE 5 MG/1
10 TABLET ORAL EVERY 4 HOURS
Refills: 0 | Status: DISCONTINUED | OUTPATIENT
Start: 2021-10-27 | End: 2021-10-28

## 2021-10-27 RX ORDER — OXYCODONE HYDROCHLORIDE 5 MG/1
5 TABLET ORAL EVERY 4 HOURS
Refills: 0 | Status: DISCONTINUED | OUTPATIENT
Start: 2021-10-27 | End: 2021-10-28

## 2021-10-27 RX ADMIN — SEVELAMER CARBONATE 800 MILLIGRAM(S): 2400 POWDER, FOR SUSPENSION ORAL at 21:57

## 2021-10-27 RX ADMIN — HEPARIN SODIUM 5000 UNIT(S): 5000 INJECTION INTRAVENOUS; SUBCUTANEOUS at 21:57

## 2021-10-27 RX ADMIN — SEVELAMER CARBONATE 800 MILLIGRAM(S): 2400 POWDER, FOR SUSPENSION ORAL at 14:40

## 2021-10-27 RX ADMIN — Medication 81 MILLIGRAM(S): at 12:33

## 2021-10-27 RX ADMIN — Medication 9 UNIT(S): at 18:23

## 2021-10-27 RX ADMIN — PANTOPRAZOLE SODIUM 40 MILLIGRAM(S): 20 TABLET, DELAYED RELEASE ORAL at 05:33

## 2021-10-27 RX ADMIN — SENNA PLUS 2 TABLET(S): 8.6 TABLET ORAL at 21:57

## 2021-10-27 RX ADMIN — Medication 30 MILLIGRAM(S): at 05:32

## 2021-10-27 RX ADMIN — HEPARIN SODIUM 5000 UNIT(S): 5000 INJECTION INTRAVENOUS; SUBCUTANEOUS at 05:32

## 2021-10-27 RX ADMIN — OXYCODONE HYDROCHLORIDE 10 MILLIGRAM(S): 5 TABLET ORAL at 21:00

## 2021-10-27 RX ADMIN — INSULIN GLARGINE 16 UNIT(S): 100 INJECTION, SOLUTION SUBCUTANEOUS at 22:26

## 2021-10-27 RX ADMIN — ATORVASTATIN CALCIUM 80 MILLIGRAM(S): 80 TABLET, FILM COATED ORAL at 21:56

## 2021-10-27 RX ADMIN — CHLORHEXIDINE GLUCONATE 1 APPLICATION(S): 213 SOLUTION TOPICAL at 05:33

## 2021-10-27 RX ADMIN — POLYETHYLENE GLYCOL 3350 17 GRAM(S): 17 POWDER, FOR SOLUTION ORAL at 12:33

## 2021-10-27 RX ADMIN — Medication 9 UNIT(S): at 08:15

## 2021-10-27 RX ADMIN — SEVELAMER CARBONATE 800 MILLIGRAM(S): 2400 POWDER, FOR SUSPENSION ORAL at 05:32

## 2021-10-27 RX ADMIN — Medication 9 UNIT(S): at 13:00

## 2021-10-27 RX ADMIN — OXYCODONE HYDROCHLORIDE 10 MILLIGRAM(S): 5 TABLET ORAL at 20:30

## 2021-10-27 RX ADMIN — HEPARIN SODIUM 5000 UNIT(S): 5000 INJECTION INTRAVENOUS; SUBCUTANEOUS at 14:40

## 2021-10-27 NOTE — PROGRESS NOTE ADULT - SUBJECTIVE AND OBJECTIVE BOX
CRITICAL CARE ATTENDING - CTICU    MEDICATIONS  (STANDING):  aspirin  chewable 81 milliGRAM(s) Oral daily  atorvastatin 80 milliGRAM(s) Oral at bedtime  bisacodyl Suppository 10 milliGRAM(s) Rectal once  chlorhexidine 2% Cloths 1 Application(s) Topical daily  chlorhexidine 4% Liquid 1 Application(s) Topical <User Schedule>  dextrose 40% Gel 15 Gram(s) Oral once  dextrose 50% Injectable 25 Gram(s) IV Push once  dextrose 50% Injectable 12.5 Gram(s) IV Push once  dextrose 50% Injectable 25 Gram(s) IV Push once  glucagon  Injectable 1 milliGRAM(s) IntraMuscular once  heparin   Injectable 5000 Unit(s) SubCutaneous every 8 hours  insulin glargine Injectable (LANTUS) 16 Unit(s) SubCutaneous at bedtime  insulin lispro (ADMELOG) corrective regimen sliding scale   SubCutaneous three times a day before meals  insulin lispro (ADMELOG) corrective regimen sliding scale   SubCutaneous at bedtime  insulin lispro Injectable (ADMELOG) 9 Unit(s) SubCutaneous three times a day before meals  NIFEdipine XL 30 milliGRAM(s) Oral daily  pantoprazole    Tablet 40 milliGRAM(s) Oral before breakfast  polyethylene glycol 3350 17 Gram(s) Oral daily  senna 2 Tablet(s) Oral at bedtime  sevelamer carbonate 800 milliGRAM(s) Oral three times a day  sodium chloride 0.9%. 1000 milliLiter(s) (10 mL/Hr) IV Continuous <Continuous>                                    7.8    9.89  )-----------( 134      ( 27 Oct 2021 01:34 )             24.4       10    134<L>  |  95<L>  |  56<H>  ----------------------------<  150<H>  4.3   |  21<L>  |  4.18<H>    Ca    8.7      27 Oct 2021 01:34  Phos  4.7     10-27  Mg     2.2     10-27    TPro  5.8<L>  /  Alb  2.8<L>  /  TBili  0.3  /  DBili  x   /  AST  22  /  ALT  20  /  AlkPhos  81  10-27      PT/INR - ( 26 Oct 2021 13:17 )   PT: 18.8 sec;   INR: 1.60 ratio         PTT - ( 26 Oct 2021 13:17 )  PTT:22.7 sec        Daily     Daily Weight in k.3 (27 Oct 2021 00:00)      10-26 @ 07:01  -  10-27 @ 07:00  --------------------------------------------------------  IN: 315 mL / OUT: 2019 mL / NET: -1704 mL        Critically Ill patient  : [ ] preoperative ,   [x] post operative    Requires :  [x] Arterial Line   [x] Central Line  [ ] PA catheter  [ ] IABP  [ ] ECMO  [ ] LVAD  [ ] Ventilator  [x] pacemaker [ ] Impella.                      [x] ABG's     [x] Pulse Oxymetry Monitoring  Bedside evaluation , monitoring , treatment of hemodynamics , fluids , IVP/ IVCD meds.        Diagnosis:     10/21- C2L, MVR, LAAL    Hx of CAD    Hx of Mitral valve regurgitation     Permacath planning     Hypoxemia - Requires [ ] BIPAP  [x] NC     Temporary pacemaker (TPM) interrogation and setting.     Requires  [ ]DDD  [x]VVI    [ ]AII  temporary pacing at  50    to maintain HR, MAP, CI, and perfusion.     Chest tube drainage     Renal Failure - Acute Kidney Injury - CKD    Hyponatremia     Hypovolemia     DMT2      ASA/Lipitor     Requires chest PT, pulmonary toilet, ambu bagging, suctioning to maintain SaO2,  patent airway and treat atelectasis.     Requires bedside physical therapy, mobilization and total long term care.     I, Julian Maxwell, personally performed the services described in this documentation. All medical record entries made by the scribe were at my direction and in my presence. I have reviewed the chart and agree that the record reflects my personal performance and is accurate and complete.   Julian Maxwell MD.       By signing my name below, I, Aranza Brown, attest that this documentation has been prepared under the direction and in the presence of Julian Maxwell MD.   Electronically Signed: Aranza Brown Scribe 10-27-21 @ 07:18        Discussed with CT surgeon, Physician Assistant - Nurse Practitioner- Critical care medicine team.   Dicussed at  AM / PM rounds.   Chart, labs , films reviewed.    Cumulative Critical Care Time Given Today:  CRITICAL CARE ATTENDING - CTICU    MEDICATIONS  (STANDING):  aspirin  chewable 81 milliGRAM(s) Oral daily  atorvastatin 80 milliGRAM(s) Oral at bedtime  bisacodyl Suppository 10 milliGRAM(s) Rectal once  chlorhexidine 2% Cloths 1 Application(s) Topical daily  chlorhexidine 4% Liquid 1 Application(s) Topical <User Schedule>  dextrose 40% Gel 15 Gram(s) Oral once  dextrose 50% Injectable 25 Gram(s) IV Push once  dextrose 50% Injectable 12.5 Gram(s) IV Push once  dextrose 50% Injectable 25 Gram(s) IV Push once  glucagon  Injectable 1 milliGRAM(s) IntraMuscular once  heparin   Injectable 5000 Unit(s) SubCutaneous every 8 hours  insulin glargine Injectable (LANTUS) 16 Unit(s) SubCutaneous at bedtime  insulin lispro (ADMELOG) corrective regimen sliding scale   SubCutaneous three times a day before meals  insulin lispro (ADMELOG) corrective regimen sliding scale   SubCutaneous at bedtime  insulin lispro Injectable (ADMELOG) 9 Unit(s) SubCutaneous three times a day before meals  NIFEdipine XL 30 milliGRAM(s) Oral daily  pantoprazole    Tablet 40 milliGRAM(s) Oral before breakfast  polyethylene glycol 3350 17 Gram(s) Oral daily  senna 2 Tablet(s) Oral at bedtime  sevelamer carbonate 800 milliGRAM(s) Oral three times a day  sodium chloride 0.9%. 1000 milliLiter(s) (10 mL/Hr) IV Continuous <Continuous>                                    7.8    9.89  )-----------( 134      ( 27 Oct 2021 01:34 )             24.4       10    134<L>  |  95<L>  |  56<H>  ----------------------------<  150<H>  4.3   |  21<L>  |  4.18<H>    Ca    8.7      27 Oct 2021 01:34  Phos  4.7     10-27  Mg     2.2     10-27    TPro  5.8<L>  /  Alb  2.8<L>  /  TBili  0.3  /  DBili  x   /  AST  22  /  ALT  20  /  AlkPhos  81  10-27      PT/INR - ( 26 Oct 2021 13:17 )   PT: 18.8 sec;   INR: 1.60 ratio         PTT - ( 26 Oct 2021 13:17 )  PTT:22.7 sec        Daily     Daily Weight in k.3 (27 Oct 2021 00:00)      10-26 @ 07:01  -  10-27 @ 07:00  --------------------------------------------------------  IN: 315 mL / OUT: 2019 mL / NET: -1704 mL        Critically Ill patient  : [ ] preoperative ,   [x] post operative    Requires :  [x] Arterial Line   [x] Central Line  [ ] PA catheter  [ ] IABP  [ ] ECMO  [ ] LVAD  [ ] Ventilator  [x] pacemaker - TPM  [ ] Impella.                      [x] ABG's     [x] Pulse Oxymetry Monitoring  Bedside evaluation , monitoring , treatment of hemodynamics , fluids , IVP/ IVCD meds.        Diagnosis:     10/21- C2L, MVR, LAAL    Hypertension     Hx of CAD    Hx of Mitral valve regurgitation     Permicath planning     Hypoxemia     Temporary pacemaker (TPM) interrogation and setting.     Chest tube drainage     Chronic Renal Failure - End Stage Renal Disease  - Dialysis Patient     [ x] Hemodialysis  yesterday [ ] Hemofiltration:    [ ] negative fluid balance [ ] even fluid balance [ ] positive fluid balance, in a hemodynamically unstable patient.     Hyponatremia     Thrombocytopenia     Hypovolemia     DMT2      ASA/Lipitor     Requires chest PT, pulmonary toilet , suctioning to maintain SaO2,  patent airway and treat atelectasis.     Requires bedside physical therapy, mobilization and total [facility] care.     I, Julian Maxwell, personally performed the services described in this documentation. All medical record entries made by the scribe were at my direction and in my presence. I have reviewed the chart and agree that the record reflects my personal performance and is accurate and complete.   Julian Maxwell MD.       By signing my name below, I, Aranza Brown, attest that this documentation has been prepared under the direction and in the presence of Julian Maxwell MD.   Electronically Signed: Aranza Brown Scribe 10-27-21 @ 07:18        Discussed with CT surgeon, Physician Assistant - Nurse Practitioner- Critical care medicine team.   Dicussed at  AM / PM rounds.   Chart, labs , films reviewed.    Cumulative Critical Care Time Given Today:  30 min

## 2021-10-27 NOTE — PROGRESS NOTE ADULT - ASSESSMENT
60 year old male with PMHx of DM and HTN who presented to the hospital as a transfer from OSH for NSTEMI. The nephrology team was consulted due to elevated creatinine of 4.05 upon presentation.    JOSHUA on CKD, now on HD, may need long term HD  - Likely with CKD from diabetic nephropathy, admitted with JOSHUA on CKD and hypervolemia, was initiated on HD prior to CABG for optimization.   --- initiated on HD on 10/14  - s/p LHC on 10/14 showing severe multivessel disease; now s/p CABG on 10/21  - 10/24 morning with increase dyspnea and work of breathing, required BiPAP and increased CVP  --- had urgent HD with 2L UF    - Underwent HD yesterday tolerated well   --- HD through L IJ placed on 10/26  - vascular surgery on board; plan for AVF creation prior to discharge; suspect patient will be dialysis dependent following discharge  - volume status is stable with no increased requirement of O2  - monitor BMP  - adjust meds as per HD     Anemia:   - in the setting of CKD   - iron studies wnl   - continue on SISSY. Will give 10736 units with HD.   - monitor CBC   60 year old male with PMHx of DM and HTN who presented to the hospital as a transfer from OSH for NSTEMI. The nephrology team was consulted due to elevated creatinine of 4.05 upon presentation.    JOSHUA on CKD, now on HD, may need long term HD  - Likely with CKD from diabetic nephropathy, admitted with JOSHAU on CKD and hypervolemia, was initiated on HD prior to CABG for optimization.   --- initiated on HD on 10/14  - s/p LHC on 10/14 showing severe multivessel disease; now s/p CABG on 10/21  - 10/24 morning with increase dyspnea and work of breathing, required BiPAP and increased CVP  --- had urgent HD with 2L UF    - Underwent HD yesterday tolerated well   --- HD through nontunneled L IJ placed on 10/26  - IR ob board for a tunneled cathter placement prior to discharge  - next HD session tomorrow  - vascular surgery on board; plan for AVF creation prior to discharge  - monitor BMP  - adjust meds as per HD     Anemia:   - in the setting of CKD   - iron studies wnl   - continue on SISSY. Will give 11703 units with HD.   - monitor CBC    If any questions, please feel free to contact me     Saul Crews  Nephrology Fellow  [facility] Pager: [phone]

## 2021-10-27 NOTE — PROGRESS NOTE ADULT - PROBLEM SELECTOR PLAN 3
Renal following  Last HD 10/26 for 2L  Coumadin on hold as pt will be going for AV fistula with vascular Friday or Monday   Will consult IR tomorrow for permacath placement prior to AV fistula   Continue Renvela 800 mg q8h  Avoid further nephrotoxic agents   Daily BUN/Cr to trend

## 2021-10-27 NOTE — PROGRESS NOTE ADULT - SUBJECTIVE AND OBJECTIVE BOX
Neurology Progress Note    S: Patient seen and examined in CTICU. better. in chair AAOx3 COTO following on NC    Medications:  MEDICATIONS  (STANDING):  aspirin  chewable 81 milliGRAM(s) Oral daily  atorvastatin 80 milliGRAM(s) Oral at bedtime  bisacodyl Suppository 10 milliGRAM(s) Rectal once  chlorhexidine 2% Cloths 1 Application(s) Topical daily  chlorhexidine 4% Liquid 1 Application(s) Topical <User Schedule>  dextrose 40% Gel 15 Gram(s) Oral once  dextrose 50% Injectable 25 Gram(s) IV Push once  dextrose 50% Injectable 12.5 Gram(s) IV Push once  dextrose 50% Injectable 25 Gram(s) IV Push once  glucagon  Injectable 1 milliGRAM(s) IntraMuscular once  heparin   Injectable 5000 Unit(s) SubCutaneous every 8 hours  insulin glargine Injectable (LANTUS) 16 Unit(s) SubCutaneous at bedtime  insulin lispro (ADMELOG) corrective regimen sliding scale   SubCutaneous three times a day before meals  insulin lispro (ADMELOG) corrective regimen sliding scale   SubCutaneous at bedtime  insulin lispro Injectable (ADMELOG) 9 Unit(s) SubCutaneous three times a day before meals  pantoprazole    Tablet 40 milliGRAM(s) Oral before breakfast  polyethylene glycol 3350 17 Gram(s) Oral daily  senna 2 Tablet(s) Oral at bedtime  sevelamer carbonate 800 milliGRAM(s) Oral three times a day  sodium chloride 0.9%. 1000 milliLiter(s) (10 mL/Hr) IV Continuous <Continuous>    MEDICATIONS  (PRN):  acetaminophen   Tablet .. 650 milliGRAM(s) Oral every 6 hours PRN Mild Pain (1 - 3)  sodium chloride 0.9% lock flush 10 milliLiter(s) IV Push every 1 hour PRN Pre/post blood products, medications, blood draw, and to maintain line patency        Vitals:  ICU Vital Signs Last 24 Hrs  T(C): 36.3 (27 Oct 2021 11:00), Max: 37 (27 Oct 2021 04:00)  T(F): 97.4 (27 Oct 2021 11:00), Max: 98.6 (27 Oct 2021 04:00)  HR: 60 (27 Oct 2021 12:00) (56 - 70)  BP: 115/74 (27 Oct 2021 12:00) (98/58 - 146/66)  BP(mean): 85 (27 Oct 2021 12:00) (73 - 101)  ABP: --  ABP(mean): --  RR: 18 (27 Oct 2021 12:00) (12 - 39)  SpO2: 100% (27 Oct 2021 12:00) (94% - 100%)          General Exam:   General Appearance: Appropriately dressed and in no acute distress       Head: Normocephalic, atraumatic and no dysmorphic features  Ear, Nose, and Throat: Moist mucous membranes  CVS: S1S2+  Resp: No SOB, no wheeze or rhonchi  Abd: soft NTND  Extremities: No edema, no cyanosis  Skin: No bruises, no rashes     Neurological Exam:  Mental Status: Awake, alert and oriented x2-3.  Able to follow simple and complex verbal commands. Able to name and repeat. fluent speech. No obvious aphasia or dysarthria noted.   Cranial Nerves: PERRL, EOMI, VFFC, sensation V1-V3 intact,  mild NLF facial asymmetry , equal elevation of palate, scm/trap 5/5, tongue is midline on protrusion. no obvious papilledema on fundoscopic exam. Hearing is grossly intact.   Motor: Normal bulk, tone and strength throughout. Fine finger movements were intact and symmetric. no tremors or drift noted.    Sensation: Intact to light touch and pinprick throughout. no right/left confusion. no extinction to tactile on DSS.   Reflexes: 1+ throughout at biceps, brachioradialis, triceps, patellars and ankles bilaterally and equal. No clonus. R toe and L toe were both downgoing.  Coordination: No dysmetria on FNF    Gait: deferred     I personally reviewed the below data/images/labs:    CBC Full  -  ( 27 Oct 2021 01:34 )  WBC Count : 9.89 K/uL  RBC Count : 2.68 M/uL  Hemoglobin : 7.8 g/dL  Hematocrit : 24.4 %  Platelet Count - Automated : 134 K/uL  Mean Cell Volume : 91.0 fl  Mean Cell Hemoglobin : 29.1 pg  Mean Cell Hemoglobin Concentration : 32.0 gm/dL  Auto Neutrophil # : x  Auto Lymphocyte # : x  Auto Monocyte # : x  Auto Eosinophil # : x  Auto Basophil # : x  Auto Neutrophil % : x  Auto Lymphocyte % : x  Auto Monocyte % : x  Auto Eosinophil % : x  Auto Basophil % : x      10-27    134<L>  |  95<L>  |  56<H>  ----------------------------<  150<H>  4.3   |  21<L>  |  4.18<H>    Ca    8.7      27 Oct 2021 01:34  Phos  4.7     10-27  Mg     2.2     10-27    TPro  5.8<L>  /  Alb  2.8<L>  /  TBili  0.3  /  DBili  x   /  AST  22  /  ALT  20  /  AlkPhos  81  10-27    -10/07 CTH: No hemorrhage. 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   -10/07 CTA N: High-grade stenosis left internal carotid artery at the carotid bifurcation in the neck.   -10/07 CTA H: Normal intracranial circulation.    < from: MR Head No Cont (10.09.21 @ 20:22) >    EXAM:  MR BRAIN                            PROCEDURE DATE:  10/09/2021            INTERPRETATION:  CLINICAL HISTORY:Facial droop, on heparin drip, NSTEMI . Severe left ICA stenosis.    TECHNIQUE:  MRI of brain without contrast dated 10/9/2021.  Examination consisted of transaxial T1, T2, FLAIR, gradient echo as well as diffusion-weighted sequences with corresponding ADC maps. Coronal T2 and sagittal T1-weighted images were also acquired through the brain.    COMPARISON: CT head and CTA head andneck 10/7/2021    FINDINGS:  There are no areas abnormal restricted diffusion within the brain parenchyma to suggest acute/subacute infarct. There is no acute intracranial hemorrhage, vasogenic edema or abnormal susceptibility artifact. Chronic lacunar infarcts are seen in the left frontal lobe, right frontal cranial radiata and right cerebellum. Additional nonspecific patchy and confluent areas of T2/FLAIR prolongation in the bihemispheric white matter likely represents mild chronic microvascular changes.    The ventricles, sulci and cisternal spaces are stable in size and configuration. There is no midline shift or abnormal extra-axial fluid collection.    Flow-voids of the major intracranial vessels are maintained, as seen best on the T2-weighted images, indicating their patency.    The visualized paranasal sinuses and mastoid air cells are free of acute disease. The orbital regions are unremarkable.    IMPRESSION:  No acute infarct or intracranial hemorrhage. Chronic infarcts and microvascular changes as above.    --- End of Report ---      NEIL CARDENAS MD; Attending Radiologist  This document has been electronically signed. Oct 10 2021  4:26AM    < end of copied text >  < from: [facility] Duplex Prashant Moreno (10.08.21 @ 17:07) >    EXAM:  CAROTID DUPLEX BILATERAL                            PROCEDURE DATE:  10/08/2021      INTERPRETATION:  CLINICAL INFORMATION: Left ICA high-grade stenosis identified on recent CTA neck.    COMPARISON: CTA neck 10/7/2021.    TECHNIQUE: Grayscale, color and spectral Doppler examination of both carotid arteries was performed.    FINDINGS:    There are atheromatous plaques are present bilaterally in the region of the bifurcations of the common carotid arteries into internal and external branches.    Velocity elevation of the proximal right internal carotid artery results in 50-69% flow-limiting stenosis.    Velocity elevation of the proximal left internal carotid artery results in 70-9% flow-limiting stenosis.    Bilateral flow-limiting stenoses noted of the right and left external carotid arteries as well.    Peak systolic velocities are as follows:    RIGHT:  PROX CCA = 126 cm/s  DIST CCA = 99 cm/s  PROX ICA = 137 cm/s  MID ICA = 86 cm/s  DIST ICA = 80 cm/s  ECA = 202 cm/s    LEFT:  PROX CCA = 100 cm/s  DIST CCA = 59 cm/s  PROX ICA = 313 cm/s  MID ICA = 72 cm/s  DIST ICA = 47 cm/s  ECA = 298 cm/s    Antegrade flow is noted within both vertebral arteries.    IMPRESSION:    Left internal carotid artery 70-99% flow-limiting stenosis.  Right internal carotid artery 50-69% flow-limiting stenosis.  Bilateral external carotid artery flow limiting stenoses.  INDIANA Hammond was informed of these findings on 10/8/2021 at 5:06 PM.    Measurement of carotid stenosis is based on velocity parameters that correlate the residual internal carotid diameter with that of the more distal vessel in accordance with a method such as the [employer] Symptomatic Carotid Endarterectomy Trial (NASCET).    --- End of Report ---    FELIX MCMAHON M.D., ATTENDING RADIOLOGIST  This document has been electronically signed. Oct  8 2021  5:21PM    < end of copied text >

## 2021-10-27 NOTE — PROGRESS NOTE ADULT - PROBLEM SELECTOR PLAN 2
Continue post-op care  Coumadin on hold as pt will be going for AV fistula with vascular Friday or Monday   Maintain PW at VVI 50/8  Increase activity as tolerated, ambulate 4x daily and with PT   Chest PT, encourage incentive spirometry   Pain control w/ Tylenol only as pt was became oversedated when on narcotics   GI ppx w/ Protonix, DVT ppx w/ HSQ   Wound care and assessment   Dispo: home w/ PT when medically cleared

## 2021-10-27 NOTE — PROGRESS NOTE ADULT - SUBJECTIVE AND OBJECTIVE BOX
Chief complaint  Patient is a 60y old  Male who presents with a chief complaint of NSTEMI (27 Oct 2021 08:27)   Review of systems  Patient in bed, looks comfortable, no hypoglycemic episodes.    Labs and Fingersticks  CAPILLARY BLOOD GLUCOSE      POCT Blood Glucose.: 148 mg/dL (27 Oct 2021 12:57)  POCT Blood Glucose.: 121 mg/dL (27 Oct 2021 08:12)  POCT Blood Glucose.: 168 mg/dL (26 Oct 2021 21:51)  POCT Blood Glucose.: 136 mg/dL (26 Oct 2021 17:05)      Anion Gap, Serum: 18 *H* (10-27 @ 01:34)  Anion Gap, Serum: 17 (10-26 @ 00:22)      Calcium, Total Serum: 8.7 (10-27 @ 01:34)  Calcium, Total Serum: 8.8 (10-26 @ 00:22)  Albumin, Serum: 2.8 *L* (10-27 @ 01:34)  Albumin, Serum: 2.9 *L* (10-26 @ 00:22)    Alanine Aminotransferase (ALT/SGPT): 20 (10-27 @ 01:34)  Alanine Aminotransferase (ALT/SGPT): 20 (10-26 @ 00:22)  Alkaline Phosphatase, Serum: 81 (10-27 @ 01:34)  Alkaline Phosphatase, Serum: 82 (10-26 @ 00:22)  Aspartate Aminotransferase (AST/SGOT): 22 (10-27 @ 01:34)  Aspartate Aminotransferase (AST/SGOT): 26 (10-26 @ 00:22)        10-27    134<L>  |  95<L>  |  56<H>  ----------------------------<  150<H>  4.3   |  21<L>  |  4.18<H>    Ca    8.7      27 Oct 2021 01:34  Phos  4.7     10-27  Mg     2.2     10-27    TPro  5.8<L>  /  Alb  2.8<L>  /  TBili  0.3  /  DBili  x   /  AST  22  /  ALT  20  /  AlkPhos  81  10-27                        7.8    9.89  )-----------( 134      ( 27 Oct 2021 01:34 )             24.4     Medications  MEDICATIONS  (STANDING):  aspirin  chewable 81 milliGRAM(s) Oral daily  atorvastatin 80 milliGRAM(s) Oral at bedtime  bisacodyl Suppository 10 milliGRAM(s) Rectal once  chlorhexidine 2% Cloths 1 Application(s) Topical daily  chlorhexidine 4% Liquid 1 Application(s) Topical <User Schedule>  dextrose 40% Gel 15 Gram(s) Oral once  dextrose 50% Injectable 25 Gram(s) IV Push once  dextrose 50% Injectable 12.5 Gram(s) IV Push once  dextrose 50% Injectable 25 Gram(s) IV Push once  glucagon  Injectable 1 milliGRAM(s) IntraMuscular once  heparin   Injectable 5000 Unit(s) SubCutaneous every 8 hours  insulin glargine Injectable (LANTUS) 16 Unit(s) SubCutaneous at bedtime  insulin lispro (ADMELOG) corrective regimen sliding scale   SubCutaneous three times a day before meals  insulin lispro (ADMELOG) corrective regimen sliding scale   SubCutaneous at bedtime  insulin lispro Injectable (ADMELOG) 9 Unit(s) SubCutaneous three times a day before meals  pantoprazole    Tablet 40 milliGRAM(s) Oral before breakfast  polyethylene glycol 3350 17 Gram(s) Oral daily  senna 2 Tablet(s) Oral at bedtime  sevelamer carbonate 800 milliGRAM(s) Oral three times a day  sodium chloride 0.9%. 1000 milliLiter(s) (10 mL/Hr) IV Continuous <Continuous>      Physical Exam  General: Patient comfortable in bed  Vital Signs Last 12 Hrs  T(F): 97.4 (10-27-21 @ 11:00), Max: 98.6 (10-27-21 @ 04:00)  HR: 59 (10-27-21 @ 13:30) (56 - 60)  BP: 126/72 (10-27-21 @ 13:30) (102/53 - 146/63)  BP(mean): 85 (10-27-21 @ 12:00) (74 - 95)  RR: 23 (10-27-21 @ 13:30) (12 - 23)  SpO2: 99% (10-27-21 @ 13:30) (99% - 100%)  Neck: No palpable thyroid nodules.  CVS: S1S2, No murmurs  Respiratory: No wheezing, no crepitations  GI: Abdomen soft, bowel sounds positive  Musculoskeletal:  edema lower extremities.   Skin: No skin rashes, no ecchymosis    Diagnostics    Free Thyroxine, Serum: AM Sched. Collection: 26-Oct-2021 06:00 (10-25 @ 13:56)           Chief complaint  Patient is a 60y old  Male who presents with a chief complaint of NSTEMI (27 Oct 2021 08:27)   Review of systems  Patient in bed, looks comfortable, no hypoglycemic episodes.    Labs and Fingersticks  CAPILLARY BLOOD GLUCOSE      POCT Blood Glucose.: 148 mg/dL (27 Oct 2021 12:57)  POCT Blood Glucose.: 121 mg/dL (27 Oct 2021 08:12)  POCT Blood Glucose.: 168 mg/dL (26 Oct 2021 21:51)  POCT Blood Glucose.: 136 mg/dL (26 Oct 2021 17:05)      Anion Gap, Serum: 18 *H* (10-27 @ 01:34)  Anion Gap, Serum: 17 (10-26 @ 00:22)      Calcium, Total Serum: 8.7 (10-27 @ 01:34)  Calcium, Total Serum: 8.8 (10-26 @ 00:22)  Albumin, Serum: 2.8 *L* (10-27 @ 01:34)  Albumin, Serum: 2.9 *L* (10-26 @ 00:22)    Alanine Aminotransferase (ALT/SGPT): 20 (10-27 @ 01:34)  Alanine Aminotransferase (ALT/SGPT): 20 (10-26 @ 00:22)  Alkaline Phosphatase, Serum: 81 (10-27 @ 01:34)  Alkaline Phosphatase, Serum: 82 (10-26 @ 00:22)  Aspartate Aminotransferase (AST/SGOT): 22 (10-27 @ 01:34)  Aspartate Aminotransferase (AST/SGOT): 26 (10-26 @ 00:22)        10-27    134<L>  |  95<L>  |  56<H>  ----------------------------<  150<H>  4.3   |  21<L>  |  4.18<H>    Ca    8.7      27 Oct 2021 01:34  Phos  4.7     10-27  Mg     2.2     10-27    TPro  5.8<L>  /  Alb  2.8<L>  /  TBili  0.3  /  DBili  x   /  AST  22  /  ALT  20  /  AlkPhos  81  10-27                        7.8    9.89  )-----------( 134      ( 27 Oct 2021 01:34 )             24.4     Medications  MEDICATIONS  (STANDING):  aspirin  chewable 81 milliGRAM(s) Oral daily  atorvastatin 80 milliGRAM(s) Oral at bedtime  bisacodyl Suppository 10 milliGRAM(s) Rectal once  chlorhexidine 2% Cloths 1 Application(s) Topical daily  chlorhexidine 4% Liquid 1 Application(s) Topical <User Schedule>  dextrose 40% Gel 15 Gram(s) Oral once  dextrose 50% Injectable 25 Gram(s) IV Push once  dextrose 50% Injectable 12.5 Gram(s) IV Push once  dextrose 50% Injectable 25 Gram(s) IV Push once  glucagon  Injectable 1 milliGRAM(s) IntraMuscular once  heparin   Injectable 5000 Unit(s) SubCutaneous every 8 hours  insulin glargine Injectable (LANTUS) 16 Unit(s) SubCutaneous at bedtime  insulin lispro (ADMELOG) corrective regimen sliding scale   SubCutaneous three times a day before meals  insulin lispro (ADMELOG) corrective regimen sliding scale   SubCutaneous at bedtime  insulin lispro Injectable (ADMELOG) 9 Unit(s) SubCutaneous three times a day before meals  pantoprazole    Tablet 40 milliGRAM(s) Oral before breakfast  polyethylene glycol 3350 17 Gram(s) Oral daily  senna 2 Tablet(s) Oral at bedtime  sevelamer carbonate 800 milliGRAM(s) Oral three times a day  sodium chloride 0.9%. 1000 milliLiter(s) (10 mL/Hr) IV Continuous <Continuous>      Physical Exam  General: Patient comfortable in bed  Vital Signs Last 12 Hrs  T(F): 97.4 (10-27-21 @ 11:00), Max: 98.6 (10-27-21 @ 04:00)  HR: 59 (10-27-21 @ 13:30) (56 - 60)  BP: 126/72 (10-27-21 @ 13:30) (102/53 - 146/63)  BP(mean): 85 (10-27-21 @ 12:00) (74 - 95)  RR: 23 (10-27-21 @ 13:30) (12 - 23)  SpO2: 99% (10-27-21 @ 13:30) (99% - 100%)  Neck: No palpable thyroid nodules.  CVS: S1S2, No murmurs  Respiratory: No wheezing, no crepitations  GI: Abdomen soft, bowel sounds positive  Musculoskeletal:  edema lower extremities.   Skin: No skin rashes, no ecchymosis    Diagnostics    Free Thyroxine, Serum: AM Sched. Collection: 26-Oct-2021 06:00 (10-25 @ 13:56)

## 2021-10-27 NOTE — PROGRESS NOTE ADULT - PROBLEM SELECTOR PLAN 1
Continue post-op care  Continue ASA 81 mg QD and Atorvastatin 80 mg qhs   Holding beta blocker given SB/accelerated junctional   Maintain PW at VVI 50/8  Increase activity as tolerated, ambulate 4x daily and with PT   Chest PT, encourage incentive spirometry   Pain control w/ Tylenol only as pt was became oversedated when on narcotics   GI ppx w/ Protonix, DVT ppx w/ HSQ   Wound care and assessment   Dispo: home w/ PT when medically cleared

## 2021-10-27 NOTE — PROGRESS NOTE ADULT - ATTENDING COMMENTS
JOSHUA on advanced CKD, dialysis dependent, less likely to have renal recovery.   Remains anuric.   had HD yesterday via new non tunneled dialysis catheter that he tolerated well and had net removal of 2L of fluid during hD.   Plan for next HD session tomorrow.   Needs tunneled dialysis catheter and AVF prior to discharge.       Angie Nair MD  Cell : [phone]  Pager : [phone]  Office : [phone]

## 2021-10-27 NOTE — PROGRESS NOTE ADULT - ASSESSMENT
61 yo male with DM2, HTN, and HLD who initially presented to [facility] on 10/05 as a transfer from [facility] for NSTEMI and possible CHF. Patient on Heparin drip for NSTEMI. LKW 10:45AM. Patient had episode of bradycardia (HR 30s), then became altered. RRT called. BP during RRT 70s/40s. Code stroke called for L facial droop.   CTH negative for acute pathology, old L frontal cortical infarct seen.   CTA H unremarkable. CTA N shows high-grade stenosis left internal carotid artery at the carotid bifurcation in the neck.  10/06 TTE: EF 45%, moderate-severe MR, mild-moderate L ventricular systolic dysfunction.   A1c 7  MRI no AIS but old strokes  HD cath placement 10/12   CD with severe L ICA and Mod R ICA   s/p LHC 10/14  CABG 10/21  10/22 o/e COTO aaoX 2   10/23 now AAOx3 on    10/24 increased dyspneia overnight  10/27 AAOx3, COTO no neuro deficits   Impression: Mild L nasolabial flattening and dysarthria, ?decreased eye closure strength on the L. Possibly secondary to R brain dysfunction due to acute stroke of unknown mechanism vs peripheral etiology. Patient with old L frontal infarct on CTH, also with high-grade stenosis of L ICA at the carotid bifurcation which likely cause of old stroke     Recommendations:   - ASA 81MG PO daily, coumadin dosing for valve   - Avoid hypotension.  - High dose statin therapy - atorvastatin 40mg PO daily. LDL goal <70mg/dL.  -  vascular for ICA revascularization can be outpt. not acute   - lipid panel  - telemetry  - PT/OT/SS/SLP, OOBC  - permissive HTN, -180mmHg.  - check FS, glucose control <180  - GI/DVT ppx  - Counseling on diet, exercise, and medication adherence was done  - Counseling on smoking cessation and alcohol consumption offered when appropriate.  - Pain assessed and judicious use of narcotics when appropriate was discussed.    - Stroke education given when appropriate.  - Importance of fall prevention discussed.   - Differential diagnosis and plan of care discussed with patient and/or family and primary team  - Thank you for allowing me to participate in the care of this patient. Call with questions.   - remainin per CTICU, possible stepdown when able   Marcelino Patel MD  Vascular Neurology .

## 2021-10-27 NOTE — PROGRESS NOTE ADULT - ASSESSMENT
61 y/o M w/ hx of DM2, HTN and HLD initially presented as transfer from [facility] for chest pain concerning for NSTEMI and possible new CHF c/b JOSHUA likely on CKD, and hypertensive emergency. Hospital course complicated by s/p RRT for CVA/TIA ruled out and worsening anemia requiring 1 PRBC. Pt S/p HD session today and underwent LHC showing Multivessel CAD. CT surgery consulted for CABG evaluation.       Surgery/Hospital Course:  10/21 C2L, MVR, LAAL   10/22  10/23  10/24 SOB, urgent HD overnight, bipap  10/25  10/26  10/27 61 y/o M w/ hx of DM2, HTN and HLD initially presented as transfer from [facility] for chest pain concerning for NSTEMI and possible new CHF c/b JOSHUA likely on CKD, and hypertensive emergency. Hospital course complicated by s/p RRT for CVA/TIA ruled out and worsening anemia requiring 1 PRBC. Pt S/p HD session today and underwent LHC showing Multivessel CAD. CT surgery consulted for CABG evaluation.       Surgery/Hospital Course:  10/07 Code stroke called for left facial droop, CT showed old frontal infarct, found to have high grade left carotid stenosis, but unclear cause for acute neurologic deficit  10/13 initiated HD, patient admitted with a creatinine of 4.05, but did not know of a history of renal   10/21 s/p C2L, MVR(T), & LAAL. Received 4 PRBC, 1 PLT, 1000 FIEBA intraop. Required dual pressor and inotrope support w/ , Primacor, Levo, and Epi gtts.   10/22 Extubated at 04:55.   10/24 SOB, urgent HD overnight, bipap  10/25 O2 via HFNC transitioned to 5L NC. Plan for HD today. SB in 50s, backup VVI paced at 50 via temporary epicardial PW.  10/26 Temporary access for HD removed yesterday as was not functioning, will consult IR for permacath. Plan for HD today. Check afternoon INR for coumadin dosing tonight   10/27 Pt received to floor on 2 Arnold - VSS, NAD. Coumadin on hold for AVF w/ vascular Friday or Monday - will discuss initiating heparin gtt to bridge w/ Dr. Xiong in AM. WOODROW carrion placed in CTU for HD access will need to be transitioned to permacath prior to AVF - will d/w IR.

## 2021-10-27 NOTE — PROGRESS NOTE ADULT - SUBJECTIVE AND OBJECTIVE BOX
[facility] DIVISION OF KIDNEY DISEASES AND HYPERTENSION -- FOLLOW UP NOTE  --------------------------------------------------------------------------------  Chief Complaint: ESRD     24 hour events/subjective:  seen and examined this morning   reports feeling well   denied any chest pain or SOB   no acute issues noted overnight       PAST HISTORY  --------------------------------------------------------------------------------  No significant changes to PMH, PSH, FHx, SHx, unless otherwise noted    ALLERGIES & MEDICATIONS  --------------------------------------------------------------------------------  Allergies    No Known Allergies    Intolerances      Standing Inpatient Medications  aspirin  chewable 81 milliGRAM(s) Oral daily  atorvastatin 80 milliGRAM(s) Oral at bedtime  bisacodyl Suppository 10 milliGRAM(s) Rectal once  chlorhexidine 2% Cloths 1 Application(s) Topical daily  chlorhexidine 4% Liquid 1 Application(s) Topical <User Schedule>  dextrose 40% Gel 15 Gram(s) Oral once  dextrose 50% Injectable 25 Gram(s) IV Push once  dextrose 50% Injectable 12.5 Gram(s) IV Push once  dextrose 50% Injectable 25 Gram(s) IV Push once  glucagon  Injectable 1 milliGRAM(s) IntraMuscular once  heparin   Injectable 5000 Unit(s) SubCutaneous every 8 hours  insulin glargine Injectable (LANTUS) 16 Unit(s) SubCutaneous at bedtime  insulin lispro (ADMELOG) corrective regimen sliding scale   SubCutaneous three times a day before meals  insulin lispro (ADMELOG) corrective regimen sliding scale   SubCutaneous at bedtime  insulin lispro Injectable (ADMELOG) 9 Unit(s) SubCutaneous three times a day before meals  NIFEdipine XL 30 milliGRAM(s) Oral daily  pantoprazole    Tablet 40 milliGRAM(s) Oral before breakfast  polyethylene glycol 3350 17 Gram(s) Oral daily  senna 2 Tablet(s) Oral at bedtime  sevelamer carbonate 800 milliGRAM(s) Oral three times a day  sodium chloride 0.9%. 1000 milliLiter(s) IV Continuous <Continuous>    PRN Inpatient Medications  acetaminophen   Tablet .. 650 milliGRAM(s) Oral every 6 hours PRN  oxyCODONE    IR 5 milliGRAM(s) Oral every 4 hours PRN  oxyCODONE    IR 10 milliGRAM(s) Oral every 4 hours PRN  sodium chloride 0.9% lock flush 10 milliLiter(s) IV Push every 1 hour PRN      REVIEW OF SYSTEMS    All other systems were reviewed and are negative, except as noted.    VITALS/PHYSICAL EXAM  --------------------------------------------------------------------------------  T(C): 37 (10-27-21 @ 04:00), Max: 37 (10-26-21 @ 12:00)  HR: 58 (10-27-21 @ 06:00) (58 - 70)  BP: 117/59 (10-27-21 @ 06:00) (98/58 - 163/63)  RR: 19 (10-27-21 @ 06:00) (14 - 39)  SpO2: 100% (10-27-21 @ 06:00) (94% - 100%)  Wt(kg): --        10-26-21 @ 07:01  -  10-27-21 @ 07:00  --------------------------------------------------------  IN: 315 mL / OUT: 2019 mL / NET: -1704 mL        Physical Exam:  	Gen: NAD  	HEENT: MMM  	Pulm: CTA B/L  	CV: S1S2  	Abd: Soft, +BS   	Ext: + LE edema B/L  	Neuro: Awake  	Skin: Warm and dry  	Vascular access: LIJ non-tunneled       LABS/STUDIES  --------------------------------------------------------------------------------              7.8    9.89  >-----------<  134      [10-27-21 @ 01:34]              24.4     134  |  95  |  56  ----------------------------<  150      [10-27-21 @ 01:34]  4.3   |  21  |  4.18        Ca     8.7     [10-27-21 @ 01:34]      Mg     2.2     [10-27-21 @ 01:34]      Phos  4.7     [10-27-21 @ 01:34]    TPro  5.8  /  Alb  2.8  /  TBili  0.3  /  DBili  x   /  AST  22  /  ALT  20  /  AlkPhos  81  [10-27-21 @ 01:34]    PT/INR: PT 18.8 , INR 1.60       [10-26-21 @ 13:17]  PTT: 22.7       [10-26-21 @ 13:17]      Creatinine Trend:  SCr 4.18 [10-27 @ 01:34]  SCr 4.65 [10-26 @ 00:22]  SCr 4.72 [10-25 @ 00:25]  SCr 3.92 [10-24 @ 01:10]  SCr 2.68 [10-23 @ 00:21]    Urinalysis - [10-07-21 @ 10:09]      Color Light Yellow / Appearance Clear / SG 1.015 / pH 6.0      Gluc 200 mg/dL / Ketone Negative  / Bili Negative / Urobili Negative       Blood Small / Protein 300 mg/dL / Leuk Est Negative / Nitrite Negative      RBC 2 / WBC 7 / Hyaline 6 / Gran  / Sq Epi  / Non Sq Epi 2 / Bacteria Negative      Iron 47, TIBC 245, %sat 19      [10-13-21 @ 13:10]  Ferritin 780      [10-13-21 @ 08:59]  TSH 6.40      [10-20-21 @ 17:03]    HBsAg Nonreact      [10-13-21 @ 09:15]  HCV 0.23, Nonreact      [10-07-21 @ 14:38]  HIV Nonreact      [10-13-21 @ 09:16]    BEULAH: titer Negative, pattern --      [10-07-21 @ 14:37]  C3 Complement 135      [10-07-21 @ 14:42]  C4 Complement 71      [10-07-21 @ 14:42]  ANCA: cANCA Negative, pANCA Negative, atypical ANCA Negative      [10-07-21 @ 14:37]  Syphilis Screen (Treponema Pallidum Ab) Negative      [10-07-21 @ 14:37]  PLA2R: SHANNAN <1.8, IFA --      [10-13-21 @ 13:55]  Free Light Chains: kappa 10.89, lambda 12.51, ratio = 0.87      [10-07 @ 14:42]  Immunofixation Serum:   No Monoclonal Band Identified    Reference Range: None Detected      [10-07-21 @ 14:42]  SPEP Interpretation: Normal Electrophoresis Pattern      [10-07-21 @ 14:42]

## 2021-10-27 NOTE — PROGRESS NOTE ADULT - SUBJECTIVE AND OBJECTIVE BOX
Subjective: "Hello, I feel okay." Denies chest pain, SOB, palpitations, headache, dizziness, fever, chills, and n/v/d.     Telemetry: accelerated junctional 60s    Vital Signs Last 24 Hrs  T(C): 36.5 (10-27-21 @ 16:00), Max: 37 (10-27-21 @ 04:00)  T(F): 97.7 (10-27-21 @ 16:00), Max: 98.6 (10-27-21 @ 04:00)  HR: 61 (10-27-21 @ 17:00) (56 - 66)  BP: 130/61 (10-27-21 @ 17:00) (98/58 - 146/63)  RR: 22 (10-27-21 @ 17:00) (12 - 39)  SpO2: 99% (10-27-21 @ 17:00) (94% - 100%)             10-26 @ 07:01  -  10-27 @ 07:00  --------------------------------------------------------  IN: 315 mL / OUT: 2019 mL / NET: -1704 mL    10-27 @ 07:01  -  10-27 @ 18:29  --------------------------------------------------------  IN: 200 mL / OUT: 5 mL / NET: 195 mL      Daily Weight in k.3 (27 Oct 2021 00:00)      CAPILLARY BLOOD GLUCOSE      POCT Blood Glucose.: 155 mg/dL (27 Oct 2021 16:34)  POCT Blood Glucose.: 148 mg/dL (27 Oct 2021 12:57)  POCT Blood Glucose.: 121 mg/dL (27 Oct 2021 08:12)  POCT Blood Glucose.: 168 mg/dL (26 Oct 2021 21:51)          Drains:     MS         [  ] Drainage:                 L Pleural  [  ]  Drainage:                R Pleural  [  ]  Drainage:    Pacing Wires        [  ]   Settings:                                  Isolated  [  ]    Coumadin    [ ] YES          [  ]      NO         Reason:                                 7.8    9.89  )-----------( 134      ( 27 Oct 2021 01:34 )             24.4       10    134<L>  |  95<L>  |  56<H>  ----------------------------<  150<H>  4.3   |  21<L>  |  4.18<H>    Ca    8.7      27 Oct 2021 01:34  Phos  4.7     10-27  Mg     2.2     10-27    TPro  5.8<L>  /  Alb  2.8<L>  /  TBili  0.3  /  DBili  x   /  AST  22  /  ALT  20  /  AlkPhos  81  10-27      PT/INR - ( 27 Oct 2021 15:05 )   PT: 19.1 sec;   INR: 1.63 ratio         PTT - ( 26 Oct 2021 13:17 )  PTT:22.7 sec    PHYSICAL EXAM:    Neurology: alert and oriented x 3, nonfocal, no gross deficits    CV:    Sternal Wound: CDI, Stable    Lungs:    Abdomen: soft, nontender, nondistended, (+) bowel sounds, (+) BM    :               Extremities:               acetaminophen   Tablet .. 650 milliGRAM(s) Oral every 6 hours PRN  aspirin  chewable 81 milliGRAM(s) Oral daily  atorvastatin 80 milliGRAM(s) Oral at bedtime  bisacodyl Suppository 10 milliGRAM(s) Rectal once  chlorhexidine 2% Cloths 1 Application(s) Topical daily  chlorhexidine 4% Liquid 1 Application(s) Topical <User Schedule>  dextrose 40% Gel 15 Gram(s) Oral once  dextrose 50% Injectable 25 Gram(s) IV Push once  dextrose 50% Injectable 12.5 Gram(s) IV Push once  dextrose 50% Injectable 25 Gram(s) IV Push once  glucagon  Injectable 1 milliGRAM(s) IntraMuscular once  heparin   Injectable 5000 Unit(s) SubCutaneous every 8 hours  insulin glargine Injectable (LANTUS) 16 Unit(s) SubCutaneous at bedtime  insulin lispro (ADMELOG) corrective regimen sliding scale   SubCutaneous three times a day before meals  insulin lispro (ADMELOG) corrective regimen sliding scale   SubCutaneous at bedtime  insulin lispro Injectable (ADMELOG) 9 Unit(s) SubCutaneous three times a day before meals  pantoprazole    Tablet 40 milliGRAM(s) Oral before breakfast  polyethylene glycol 3350 17 Gram(s) Oral daily  senna 2 Tablet(s) Oral at bedtime  sevelamer carbonate 800 milliGRAM(s) Oral three times a day  sodium chloride 0.9% lock flush 10 milliLiter(s) IV Push every 1 hour PRN  sodium chloride 0.9%. 1000 milliLiter(s) IV Continuous <Continuous>                    Physical Therapy Rec:   Home  [  ]   Home w/ PT  [  ]  Rehab  [  ]    Discussed with Cardiothoracic Team at AM rounds. Subjective: "Hello, I feel okay." Denies chest pain, SOB, palpitations, headache, dizziness, fever, chills, and n/v/d.     Telemetry: accelerated junctional 60s    Vital Signs Last 24 Hrs  T(C): 36.5 (10-27-21 @ 16:00), Max: 37 (10-27-21 @ 04:00)  T(F): 97.7 (10-27-21 @ 16:00), Max: 98.6 (10-27-21 @ 04:00)  HR: 61 (10-27-21 @ 17:00) (56 - 66)  BP: 130/61 (10-27-21 @ 17:00) (98/58 - 146/63)  RR: 22 (10-27-21 @ :00) (12 - 39)  SpO2: 99% (10-27-21 @ 17:00) (94% - 100%)             10-26 @ 07:  -  10-27 @ 07:00  --------------------------------------------------------  IN: 315 mL / OUT: 2019 mL / NET: -1704 mL    10-27 @ 07:01  -  10-27 @ 18:29  --------------------------------------------------------  IN: 200 mL / OUT: 5 mL / NET: 195 mL      Daily Weight in k.3 (27 Oct 2021 00:00)      CAPILLARY BLOOD GLUCOSE      POCT Blood Glucose.: 155 mg/dL (27 Oct 2021 16:34)  POCT Blood Glucose.: 148 mg/dL (27 Oct 2021 12:57)  POCT Blood Glucose.: 121 mg/dL (27 Oct 2021 08:12)  POCT Blood Glucose.: 168 mg/dL (26 Oct 2021 21:51)          Pacing Wires        [ x ]   Settings:  VVI 50/8                                    Coumadin    [ x ] YES - on hold for AVF Friday/Monday         [  ]  NO         Reason: MVR                              7.8    9.89  )-----------( 134      ( 27 Oct 2021 01:34 )             24.4     10-27    134<L>  |  95<L>  |  56<H>  ----------------------------<  150<H>  4.3   |  21<L>  |  4.18<H>    Ca    8.7      27 Oct 2021 01:34  Phos  4.7     10-27  Mg     2.2     10-27    TPro  5.8<L>  /  Alb  2.8<L>  /  TBili  0.3  /  DBili  x   /  AST  22  /  ALT  20  /  AlkPhos  81  10-27    PT/INR - ( 27 Oct 2021 15:05 )   PT: 19.1 sec;   INR: 1.63 ratio       PTT - ( 26 Oct 2021 13:17 )  PTT:22.7 sec      PHYSICAL EXAM:    Neurology: alert and oriented x 3, nonfocal, no gross deficits    CV: (+) L IJ  shiley, (+) EPW in place - VVI 50/8, RRR, (+)S1/S2, no murmur appreciated    Sternal Wound: MSI w/ opsite in place - CDI, Stable    Lungs: (+) med DELFINA to self suction, diminished at b/l bases, otherwise CTA     Abdomen: nontender, softly distended, (+) bowel sounds, (-) BM, (+) flatus     : oliguric on HD              Extremities: L SVG site w/ dressings in place, no LE edema b/l, PPP b/l, no calf tenderness, COTO          acetaminophen  Tablet 650 milliGRAM(s) Oral every 6 hours PRN  aspirin chewable 81 milliGRAM(s) Oral daily  atorvastatin 80 milliGRAM(s) Oral at bedtime  bisacodyl Suppository 10 milliGRAM(s) Rectal once  heparin Injectable 5000 Unit(s) SubCutaneous every 8 hours  insulin glargine Injectable (LANTUS) 16 Unit(s) SubCutaneous at bedtime  insulin lispro (ADMELOG) corrective regimen sliding scale SubCutaneous three times a day before meals  insulin lispro (ADMELOG) corrective regimen sliding scale SubCutaneous at bedtime  insulin lispro Injectable (ADMELOG) 9 Unit(s) SubCutaneous three times a day before meals  pantoprazole Tablet 40 milliGRAM(s) Oral before breakfast  polyethylene glycol 3350 17 Gram(s) Oral daily  senna 2 Tablet(s) Oral at bedtime  sevelamer carbonate 800 milliGRAM(s) Oral three times a day  sodium chloride 0.9% lock flush 10 milliLiter(s) IV Push every 1 hour PRN  sodium chloride 0.9%. 1000 milliLiter(s) IV Continuous <Continuous>      Physical Therapy Rec:   Home  [  ]   Home w/ PT  [ x ]  Rehab  [  ]    Discussed with Cardiothoracic Team at AM rounds.

## 2021-10-27 NOTE — PROGRESS NOTE ADULT - ASSESSMENT
Assessment  DMT2: 60y Male with DM T2 with hyperglycemia, A1C 7%, was on oral meds and insulin at home, postop CABG on basal bolus insulin, increased bolus dose yesterday, blood sugars improving and in overall acceptable range now, no hypoglycemic episodes, eating meals, appears comfortable.  Hypothyroidism: Patient has no history thyroid disease, was not on any thyroid supplements, TSH elevated, Total T4 WNL, FT4 reordered and still pending..  CAD: s/p CABG, on medications, no chest pain, stable, monitored.  HTN: Controlled,  on antihypertensive medications.  HLD: On statin  Obesity: No strict exercise routines, not on any weight loss program, neither on low calorie diet.        Esperanza Beckett MD  Cell: [phone]  Office: [phone]     Assessment  DMT2: 60y Male with DM T2 with hyperglycemia, A1C 7%, was on oral meds and insulin at home, postop CABG on basal bolus insulin, increased bolus dose yesterday, blood sugars improving and in overall acceptable range now, no hypoglycemic episodes, eating  meals, appears comfortable.  Hypothyroidism: Patient has no history thyroid disease, was not on any thyroid supplements, TSH elevated, Total T4 WNL, FT4 reordered and still pending..  CAD: s/p CABG, on medications, no chest pain, stable, monitored.  HTN: Controlled,  on antihypertensive medications.  HLD: On statin  Obesity: No strict exercise routines, not on any weight loss program, neither on low calorie diet.        Esperanza Beckett MD  Cell: [phone]  Office: [phone]

## 2021-10-27 NOTE — PROGRESS NOTE ADULT - PROBLEM SELECTOR PLAN 1
Will continue current insulin regimen for now. Will continue monitoring FS and FU.   for compliance with consistent low carb diet.

## 2021-10-28 LAB
AMMONIA BLD-MCNC: 18 UMOL/L — SIGNIFICANT CHANGE UP (ref 11–55)
ANION GAP SERPL CALC-SCNC: 20 MMOL/L — HIGH (ref 5–17)
BASE EXCESS BLDA CALC-SCNC: 0.6 MMOL/L — SIGNIFICANT CHANGE UP (ref -2–3)
BUN SERPL-MCNC: 81 MG/DL — HIGH (ref 7–23)
CALCIUM SERPL-MCNC: 8.9 MG/DL — SIGNIFICANT CHANGE UP (ref 8.4–10.5)
CHLORIDE SERPL-SCNC: 95 MMOL/L — LOW (ref 96–108)
CO2 BLDA-SCNC: 28 MMOL/L — HIGH (ref 19–24)
CO2 SERPL-SCNC: 20 MMOL/L — LOW (ref 22–31)
CREAT SERPL-MCNC: 5.79 MG/DL — HIGH (ref 0.5–1.3)
GAS PNL BLDA: SIGNIFICANT CHANGE UP
GLUCOSE BLDC GLUCOMTR-MCNC: 113 MG/DL — HIGH (ref 70–99)
GLUCOSE BLDC GLUCOMTR-MCNC: 134 MG/DL — HIGH (ref 70–99)
GLUCOSE BLDC GLUCOMTR-MCNC: 142 MG/DL — HIGH (ref 70–99)
GLUCOSE BLDC GLUCOMTR-MCNC: 148 MG/DL — HIGH (ref 70–99)
GLUCOSE BLDC GLUCOMTR-MCNC: 189 MG/DL — HIGH (ref 70–99)
GLUCOSE BLDC GLUCOMTR-MCNC: 190 MG/DL — HIGH (ref 70–99)
GLUCOSE SERPL-MCNC: 126 MG/DL — HIGH (ref 70–99)
HCO3 BLDA-SCNC: 27 MMOL/L — SIGNIFICANT CHANGE UP (ref 21–28)
HCT VFR BLD CALC: 25.4 % — LOW (ref 39–50)
HGB BLD-MCNC: 8.2 G/DL — LOW (ref 13–17)
LACTATE SERPL-SCNC: 0.8 MMOL/L — SIGNIFICANT CHANGE UP (ref 0.7–2)
MCHC RBC-ENTMCNC: 29.6 PG — SIGNIFICANT CHANGE UP (ref 27–34)
MCHC RBC-ENTMCNC: 32.3 GM/DL — SIGNIFICANT CHANGE UP (ref 32–36)
MCV RBC AUTO: 91.7 FL — SIGNIFICANT CHANGE UP (ref 80–100)
NRBC # BLD: 0 /100 WBCS — SIGNIFICANT CHANGE UP (ref 0–0)
PCO2 BLDA: 47 MMHG — SIGNIFICANT CHANGE UP (ref 35–48)
PH BLDA: 7.36 — SIGNIFICANT CHANGE UP (ref 7.35–7.45)
PLATELET # BLD AUTO: 200 K/UL — SIGNIFICANT CHANGE UP (ref 150–400)
PO2 BLDA: 44 MMHG — CRITICAL LOW (ref 83–108)
POTASSIUM SERPL-MCNC: 4.6 MMOL/L — SIGNIFICANT CHANGE UP (ref 3.5–5.3)
POTASSIUM SERPL-SCNC: 4.6 MMOL/L — SIGNIFICANT CHANGE UP (ref 3.5–5.3)
RBC # BLD: 2.77 M/UL — LOW (ref 4.2–5.8)
RBC # FLD: 14.9 % — HIGH (ref 10.3–14.5)
SAO2 % BLDA: 74.8 % — LOW (ref 94–98)
SODIUM SERPL-SCNC: 135 MMOL/L — SIGNIFICANT CHANGE UP (ref 135–145)
WBC # BLD: 12.3 K/UL — HIGH (ref 3.8–10.5)
WBC # FLD AUTO: 12.3 K/UL — HIGH (ref 3.8–10.5)

## 2021-10-28 PROCEDURE — 93306 TTE W/DOPPLER COMPLETE: CPT | Mod: 26

## 2021-10-28 PROCEDURE — 99233 SBSQ HOSP IP/OBS HIGH 50: CPT | Mod: GC

## 2021-10-28 RX ORDER — ERYTHROPOIETIN 10000 [IU]/ML
10000 INJECTION, SOLUTION INTRAVENOUS; SUBCUTANEOUS ONCE
Refills: 0 | Status: COMPLETED | OUTPATIENT
Start: 2021-10-28 | End: 2021-10-28

## 2021-10-28 RX ADMIN — HEPARIN SODIUM 5000 UNIT(S): 5000 INJECTION INTRAVENOUS; SUBCUTANEOUS at 06:25

## 2021-10-28 RX ADMIN — SEVELAMER CARBONATE 800 MILLIGRAM(S): 2400 POWDER, FOR SUSPENSION ORAL at 14:32

## 2021-10-28 RX ADMIN — ATORVASTATIN CALCIUM 80 MILLIGRAM(S): 80 TABLET, FILM COATED ORAL at 21:41

## 2021-10-28 RX ADMIN — Medication 81 MILLIGRAM(S): at 13:46

## 2021-10-28 RX ADMIN — OXYCODONE HYDROCHLORIDE 10 MILLIGRAM(S): 5 TABLET ORAL at 00:34

## 2021-10-28 RX ADMIN — SEVELAMER CARBONATE 800 MILLIGRAM(S): 2400 POWDER, FOR SUSPENSION ORAL at 21:41

## 2021-10-28 RX ADMIN — INSULIN GLARGINE 16 UNIT(S): 100 INJECTION, SOLUTION SUBCUTANEOUS at 22:20

## 2021-10-28 RX ADMIN — SEVELAMER CARBONATE 800 MILLIGRAM(S): 2400 POWDER, FOR SUSPENSION ORAL at 06:25

## 2021-10-28 RX ADMIN — HEPARIN SODIUM 5000 UNIT(S): 5000 INJECTION INTRAVENOUS; SUBCUTANEOUS at 14:31

## 2021-10-28 RX ADMIN — OXYCODONE HYDROCHLORIDE 10 MILLIGRAM(S): 5 TABLET ORAL at 01:00

## 2021-10-28 RX ADMIN — HEPARIN SODIUM 5000 UNIT(S): 5000 INJECTION INTRAVENOUS; SUBCUTANEOUS at 21:41

## 2021-10-28 RX ADMIN — PANTOPRAZOLE SODIUM 40 MILLIGRAM(S): 20 TABLET, DELAYED RELEASE ORAL at 06:25

## 2021-10-28 RX ADMIN — SENNA PLUS 2 TABLET(S): 8.6 TABLET ORAL at 21:41

## 2021-10-28 RX ADMIN — Medication 9 UNIT(S): at 17:55

## 2021-10-28 RX ADMIN — ERYTHROPOIETIN 10000 UNIT(S): 10000 INJECTION, SOLUTION INTRAVENOUS; SUBCUTANEOUS at 11:39

## 2021-10-28 NOTE — PROGRESS NOTE ADULT - SUBJECTIVE AND OBJECTIVE BOX
VITAL SIGNS    Telemetry: Acc jxn 63-70   Vital Signs Last 24 Hrs  T(C): 36.9 (10-28-21 @ 05:00), Max: 36.9 (10-27-21 @ 19:24)  T(F): 98.4 (10-28-21 @ 05:00), Max: 98.4 (10-27-21 @ 19:24)  HR: 71 (10-28-21 @ 05:00) (57 - 71)  BP: 148/79 (10-28-21 @ 05:00) (115/74 - 148/79)  RR: 18 (10-28-21 @ 05:00) (18 - 23)  SpO2: 92% (10-28-21 @ 05:00) (92% - 100%)            10-27 @ 07:01  -  10-28 @ 07:00  --------------------------------------------------------  IN: 300 mL / OUT: 20 mL / NET: 280 mL     Daily   Admit Wt: Drug Dosing Weight  Height (cm): 165.1 (21 Oct 2021 10:07)  Weight (kg): 80.2 (21 Oct 2021 10:07)  BMI (kg/m2): 29.4 (21 Oct 2021 10:07)  BSA (m2): 1.88 (21 Oct 2021 10:07)      CAPILLARY BLOOD GLUCOSE      POCT Blood Glucose.: 142 mg/dL (28 Oct 2021 07:36)  POCT Blood Glucose.: 189 mg/dL (27 Oct 2021 22:19)  POCT Blood Glucose.: 190 mg/dL (27 Oct 2021 21:32)  POCT Blood Glucose.: 155 mg/dL (27 Oct 2021 16:34)  POCT Blood Glucose.: 148 mg/dL (27 Oct 2021 12:57)          MEDICATIONS  acetaminophen   Tablet .. 650 milliGRAM(s) Oral every 6 hours PRN  aspirin  chewable 81 milliGRAM(s) Oral daily  atorvastatin 80 milliGRAM(s) Oral at bedtime  bisacodyl Suppository 10 milliGRAM(s) Rectal once  chlorhexidine 2% Cloths 1 Application(s) Topical daily  chlorhexidine 4% Liquid 1 Application(s) Topical <User Schedule>  dextrose 40% Gel 15 Gram(s) Oral once  dextrose 50% Injectable 25 Gram(s) IV Push once  dextrose 50% Injectable 12.5 Gram(s) IV Push once  dextrose 50% Injectable 25 Gram(s) IV Push once  epoetin vern-epbx (RETACRIT) Injectable 02876 Unit(s) IV Push once  glucagon  Injectable 1 milliGRAM(s) IntraMuscular once  heparin   Injectable 5000 Unit(s) SubCutaneous every 8 hours  insulin glargine Injectable (LANTUS) 16 Unit(s) SubCutaneous at bedtime  insulin lispro (ADMELOG) corrective regimen sliding scale   SubCutaneous three times a day before meals  insulin lispro (ADMELOG) corrective regimen sliding scale   SubCutaneous at bedtime  insulin lispro Injectable (ADMELOG) 9 Unit(s) SubCutaneous three times a day before meals  oxyCODONE    IR 5 milliGRAM(s) Oral every 4 hours PRN  oxyCODONE    IR 10 milliGRAM(s) Oral every 4 hours PRN  pantoprazole    Tablet 40 milliGRAM(s) Oral before breakfast  polyethylene glycol 3350 17 Gram(s) Oral daily  senna 2 Tablet(s) Oral at bedtime  sevelamer carbonate 800 milliGRAM(s) Oral three times a day  sodium chloride 0.9% lock flush 10 milliLiter(s) IV Push every 1 hour PRN  sodium chloride 0.9%. 1000 milliLiter(s) IV Continuous <Continuous>      >>> <<<  PHYSICAL EXAM  Subjective: NAD  Neurology: alert and oriented x 3, nonfocal, no gross deficits  CV :s1s2  Sternal Wound :  CDI , Stable  Lungs:CTA b/l  Abdomen: soft, NT,ND, ( +)BM  :  voiding  Extremities:   trace edema    LABS  10-28    135  |  95<L>  |  81<H>  ----------------------------<  126<H>  4.6   |  20<L>  |  5.79<H>    Ca    8.9      28 Oct 2021 05:50  Phos  4.7     10-27  Mg     2.2     10-27    TPro  5.8<L>  /  Alb  2.8<L>  /  TBili  0.3  /  DBili  x   /  AST  22  /  ALT  20  /  AlkPhos  81  10-27                                 8.2    12.30 )-----------( 200      ( 28 Oct 2021 05:50 )             25.4          PT/INR - ( 27 Oct 2021 15:05 )   PT: 19.1 sec;   INR: 1.63 ratio         PTT - ( 26 Oct 2021 13:17 )  PTT:22.7 sec       PAST MEDICAL & SURGICAL HISTORY:  Hypertension    Hyperlipidemia    Diabetes mellitus    No pertinent past surgical history

## 2021-10-28 NOTE — CHART NOTE - NSCHARTNOTEFT_GEN_A_CORE
Nutrition Follow Up Note  Patient seen for: length of stay follow up. Chart reviewed and events noted.     Per Chart: Pt is a 59 y/o M w/ Hx of DM2, HTN and HLD initially presented as transfer from [facility] for chest pain concerning for NSTEMI and possible new CHF c/b JOSHUA likely on CKD, and hypertensive emergency. Hospital course complicated by s/p RRT for CVA/TIA ruled out and worsening anemia requiring 1 PRBC. Pt S/p HD session today and underwent LHC showing Multivessel CAD. CT surgery consulted for CABG evaluation.     Source: [x] Patient       [x] Medical Record        [] RN        [] Family at bedside       [] Other:    -If unable to interview patient: [] Trach/Vent/BiPAP  [] Disoriented/confused/inappropriate to interview    Diet Order:   Diet, Consistent Carbohydrate/No Snacks (10-26-21)    - Is current order appropriate/adequate? [x] Yes  []  No:     - PO intake :   [x] >75%  Adequate    [] 50-75%  Fair       [] <50%  Poor    Pt with limited interest in RD visit. Reports appetite and PO intake are good.     - Nutrition-related concerns:      - Hx of type 2 diabetes, continues on Consistent Carbohydrate diet, Lantus, Admelog, and sliding scale of insulin.       - Pt new to HD, however declined education at this time from RD 10/28. Education provided during previous RD assessment.       - High Phos noted, ordered for Renvela         GI: Pt denies recent N/V or diarrhea. Pt constipated - Last BM 10/20.   Bowel Regimen? [x] Yes   [] No    Weights:   Daily Weight in k.6 (10-), 92.9 (10-)  Dosing wt in k.2  wt fluctuations noted and likely 2/2 fluid shits as pt noted on HD.    Nutritionally Pertinent   MEDICATIONS  (STANDING):  aspirin  chewable 81 milliGRAM(s) Oral daily  atorvastatin 80 milliGRAM(s) Oral at bedtime  bisacodyl Suppository 10 milliGRAM(s) Rectal once  chlorhexidine 2% Cloths 1 Application(s) Topical daily  chlorhexidine 4% Liquid 1 Application(s) Topical <User Schedule>  dextrose 40% Gel 15 Gram(s) Oral once  dextrose 50% Injectable 25 Gram(s) IV Push once  dextrose 50% Injectable 12.5 Gram(s) IV Push once  dextrose 50% Injectable 25 Gram(s) IV Push once  glucagon  Injectable 1 milliGRAM(s) IntraMuscular once  heparin   Injectable 5000 Unit(s) SubCutaneous every 8 hours  insulin glargine Injectable (LANTUS) 16 Unit(s) SubCutaneous at bedtime  insulin lispro (ADMELOG) corrective regimen sliding scale   SubCutaneous three times a day before meals  insulin lispro (ADMELOG) corrective regimen sliding scale   SubCutaneous at bedtime  insulin lispro Injectable (ADMELOG) 9 Unit(s) SubCutaneous three times a day before meals  pantoprazole    Tablet 40 milliGRAM(s) Oral before breakfast  polyethylene glycol 3350 17 Gram(s) Oral daily  senna 2 Tablet(s) Oral at bedtime  sevelamer carbonate 800 milliGRAM(s) Oral three times a day  sodium chloride 0.9%. 1000 milliLiter(s) (10 mL/Hr) IV Continuous <Continuous>    Pertinent Labs: 10-28 @ 05:50: Na 135, BUN 81<H>, Cr 5.79<H>, <H>, K+ 4.6  10-27 Phos 4.7 <H>     A1C with Estimated Average Glucose Result: 7.0 % (10-07-21 @ 14:38)    Finger Sticks:  POCT Blood Glucose.: 148 mg/dL (10-28 @ 10:50)  POCT Blood Glucose.: 142 mg/dL (10-28 @ 07:36)  POCT Blood Glucose.: 189 mg/dL (10-27 @ 22:19)  POCT Blood Glucose.: 190 mg/dL (10-27 @ 21:32)  POCT Blood Glucose.: 155 mg/dL (10-27 @ 16:34)    Skin per nursing documentation: No pressure injuries noted. Midsternal incision   Edema per nursing documentation: None noted.     Estimated Needs:   [] no change since previous assessment  [x] Recalculated: s/p midsternal incision   pounds used for calculations. (61.6 kG)   Estimated Energy Needs: (30-35 kcals/kG) 4949-7056 kcals  Estimated Protein Needs: (1.5-1.8 gm/kG)  gm  Defer total fluids on HD    Previous Nutrition Diagnosis: Increased Nutrient Needs protein/energy  Nutrition Diagnosis is: [x] ongoing  [] resolved [] not applicable     Previous Nutrition Diagnosis: Food & Nutrition Related Knowledge Deficit  Nutrition Diagnosis is: [x] ongoing  [] resolved [] not applicable     Nutrition Care Plan:  [x] In Progress  [] Achieved  [] Not applicable    New Nutrition Diagnosis: [x] Not applicable    Nutrition Interventions:     Education Provided   [] Yes:  [x] No: Pt declined nutrition education at this time. Pt made aware RD to remain available.     Recommendations:      1) Change diet to Consistent Carbohydrate with Evening Snack, no concentrated Phos. Fluid needs deferred to provider.   2) Provide nutrition education as able.   3) RD to allow double protein at meals to optimize protein intake.     Monitoring and Evaluation:   Continue to monitor nutritional intake, tolerance to diet prescription, weights, labs, skin integrity    RD remains available upon request and will follow up per protocol  Hanane Daniel, MS, RD, CDN Pager #[phone]

## 2021-10-28 NOTE — PROGRESS NOTE ADULT - ASSESSMENT
60M with history of DM type 2, HTN, HLD presenting as transfer from [facility] for chest pain concerning for NSTEMI, code stroke also called for L facial droop, CTA significant for ipsilateral high-grade stenosis of the left internal carotid artery at the carotid bifurcation seen on carotid duplex, now s/p cabg. Patient with ongoing HD requirements now requiring long term HD access planning.     - Plan for AVF on Tuesday, 11/2. Previously on coumadin, will need INR correction prior to OR  - Currently getting HD via shiley, will need PC on admission   - left arm precautions, will need vein mapping    Please contact Vascular Surgery (P: [phone]) with any questions.    Surgery, Vascular Team  Pager: [phone]  [facility]   Vascular Surgery [phone] 60M with history of DM type 2, HTN, HLD presenting as transfer from [facility] for chest pain concerning for NSTEMI, code stroke also called for L facial droop, CTA significant for ipsilateral high-grade stenosis of the left internal carotid artery at the carotid bifurcation seen on carotid duplex, now s/p cabg. Patient with ongoing HD requirements now requiring long term HD access planning.     - Plan for AVF on Tuesday, 11/2. Previously on coumadin, will need INR correction prior to OR  - Currently getting HD via shiley, will need PC by IR on admission   - left arm precautions, will need vein mapping    Please contact Vascular Surgery (P: [phone]) with any questions.    Surgery, Vascular Team  Pager: [phone]  [facility]   Vascular Surgery [phone] 60M with history of DM type 2, HTN, HLD presenting as transfer from [facility] for chest pain concerning for NSTEMI, code stroke also called for L facial droop, CTA significant for ipsilateral high-grade stenosis of the left internal carotid artery at the carotid bifurcation seen on carotid duplex, now s/p cabg. Patient with ongoing HD requirements now requiring long term HD access planning.     - Plan for LUE AV graft on Tuesday, 11/2. Previously on coumadin, will need INR correction prior to OR  - Currently getting HD via shiley, will need PC by IR  - left arm precautions, maintain pink band  - Please provide med/card optimization for planned procedure    Please contact Vascular Surgery (P: [phone]) with any questions.    Surgery, Vascular Team  Pager: [phone]  [facility]   Vascular Surgery [phone]

## 2021-10-28 NOTE — PROGRESS NOTE ADULT - ASSESSMENT
59 y/o M w/ hx of DM2, HTN and HLD initially presented as transfer from [facility] for chest pain concerning for NSTEMI and possible new CHF c/b JOSHUA likely on CKD, and hypertensive emergency. Hospital course complicated by s/p RRT for CVA/TIA ruled out and worsening anemia requiring 1 PRBC. Pt S/p HD session today and underwent LHC showing Multivessel CAD. CT surgery consulted for CABG evaluation.       Surgery/Hospital Course:  10/07 Code stroke called for left facial droop, CT showed old frontal infarct, found to have high grade left carotid stenosis, but unclear cause for acute neurologic deficit  10/13 initiated HD, patient admitted with a creatinine of 4.05, but did not know of a history of renal   10/21 s/p C2L, MVR(T), & LAAL. Received 4 PRBC, 1 PLT, 1000 FIEBA intraop. Required dual pressor and inotrope support w/ , Primacor, Levo, and Epi gtts.   10/22 Extubated at 04:55.   10/24 SOB, urgent HD overnight, bipap  10/25 O2 via HFNC transitioned to 5L NC. Plan for HD today. SB in 50s, backup VVI paced at 50 via temporary epicardial PW.  10/26 Temporary access for HD removed yesterday as was not functioning, will consult IR for permacath. Plan for HD today. Check afternoon INR for coumadin dosing tonight   10/27 Pt received to floor on 2 Arnold - VSS, NAD. Coumadin on hold for AVF w/ vascular Friday or Monday - will discuss initiating heparin gtt to bridge w/ Dr. Xiong in AM. WOODROW carrion placed in CTU for HD access will need to be transitioned to permacath prior to AVF - will d/w IR.   10/28 IR consult requested for PermCath placment. HD with 1uPRBC transfusion today. 61 y/o M w/ hx of DM2, HTN and HLD initially presented as transfer from [facility] for chest pain concerning for NSTEMI and possible new CHF c/b JOSHUA likely on CKD, and hypertensive emergency. Hospital course complicated by s/p RRT for CVA/TIA ruled out and worsening anemia requiring 1 PRBC. Pt S/p HD session today and underwent LHC showing Multivessel CAD. CT surgery consulted for CABG evaluation.       Surgery/Hospital Course:  10/07 Code stroke called for left facial droop, CT showed old frontal infarct, found to have high grade left carotid stenosis, but unclear cause for acute neurologic deficit  10/13 initiated HD, patient admitted with a creatinine of 4.05, but did not know of a history of renal   10/21 s/p C2L, MVR(T), & LAAL. Received 4 PRBC, 1 PLT, 1000 FIEBA intraop. Required dual pressor and inotrope support w/ , Primacor, Levo, and Epi gtts.   10/22 Extubated at 04:55.   10/24 SOB, urgent HD overnight, bipap  10/25 O2 via HFNC transitioned to 5L NC. Plan for HD today. SB in 50s, backup VVI paced at 50 via temporary epicardial PW.  10/26 Temporary access for HD removed yesterday as was not functioning, will consult IR for permacath. Plan for HD today. Check afternoon INR for coumadin dosing tonight   10/27 Pt received to floor on 2 Arnold - VSS, NAD. Coumadin on hold for AVF w/ vascular Friday or Monday - will discuss initiating heparin gtt to bridge w/ Dr. Xiong in AM. WOODROW carrion placed in CTU for HD access will need to be transitioned to permacath prior to AVF - will d/w IR.   10/28 IR consult requested for PermCath placement HD with 1uPRBC transfusion today. Hod narcotics. Pt lethargic but responsive to verbal stimuli. FS 121mg/dl. 92% RA

## 2021-10-28 NOTE — PROGRESS NOTE ADULT - SUBJECTIVE AND OBJECTIVE BOX
Vascular Surgery      VITALS  T(C): 36.9 (10-28-21 @ 05:00), Max: 36.9 (10-27-21 @ 19:24)  HR: 71 (10-28-21 @ 05:00) (57 - 71)  BP: 148/79 (10-28-21 @ 05:00) (115/74 - 148/79)  RR: 18 (10-28-21 @ 05:00) (18 - 23)  SpO2: 92% (10-28-21 @ 05:00) (92% - 100%)  CAPILLARY BLOOD GLUCOSE      POCT Blood Glucose.: 142 mg/dL (28 Oct 2021 07:36)  POCT Blood Glucose.: 189 mg/dL (27 Oct 2021 22:19)  POCT Blood Glucose.: 190 mg/dL (27 Oct 2021 21:32)  POCT Blood Glucose.: 155 mg/dL (27 Oct 2021 16:34)  POCT Blood Glucose.: 148 mg/dL (27 Oct 2021 12:57)      Is/Os    10-27 @ 07:01  -  10-28 @ 07:00  --------------------------------------------------------  IN:    Oral Fluid: 300 mL  Total IN: 300 mL    OUT:    Bulb (mL): 20 mL    sodium chloride 0.9%: 0 mL    Voided (mL): 0 mL  Total OUT: 20 mL    Total NET: 280 mL          MEDICATIONS (STANDING): aspirin  chewable 81 milliGRAM(s) Oral daily  atorvastatin 80 milliGRAM(s) Oral at bedtime  bisacodyl Suppository 10 milliGRAM(s) Rectal once  dextrose 40% Gel 15 Gram(s) Oral once  dextrose 50% Injectable 25 Gram(s) IV Push once  dextrose 50% Injectable 12.5 Gram(s) IV Push once  dextrose 50% Injectable 25 Gram(s) IV Push once  epoetin vern-epbx (RETACRIT) Injectable 90804 Unit(s) IV Push once  glucagon  Injectable 1 milliGRAM(s) IntraMuscular once  heparin   Injectable 5000 Unit(s) SubCutaneous every 8 hours  insulin glargine Injectable (LANTUS) 16 Unit(s) SubCutaneous at bedtime  insulin lispro (ADMELOG) corrective regimen sliding scale   SubCutaneous three times a day before meals  insulin lispro (ADMELOG) corrective regimen sliding scale   SubCutaneous at bedtime  insulin lispro Injectable (ADMELOG) 9 Unit(s) SubCutaneous three times a day before meals  pantoprazole    Tablet 40 milliGRAM(s) Oral before breakfast  polyethylene glycol 3350 17 Gram(s) Oral daily  senna 2 Tablet(s) Oral at bedtime  sevelamer carbonate 800 milliGRAM(s) Oral three times a day  sodium chloride 0.9%. 1000 milliLiter(s) IV Continuous <Continuous>    MEDICATIONS (PRN):acetaminophen   Tablet .. 650 milliGRAM(s) Oral every 6 hours PRN Mild Pain (1 - 3)  oxyCODONE    IR 5 milliGRAM(s) Oral every 4 hours PRN Moderate Pain (4 - 6)  oxyCODONE    IR 10 milliGRAM(s) Oral every 4 hours PRN Severe Pain (7 - 10)  sodium chloride 0.9% lock flush 10 milliLiter(s) IV Push every 1 hour PRN Pre/post blood products, medications, blood draw, and to maintain line patency      LABS  CBC (10-28 @ 05:50)                              8.2<L>                         12.30<H>  )----------------(  200        --    % Neutrophils, --    % Lymphocytes, ANC: --                                  25.4<L>  CBC (10-27 @ 01:34)                              7.8<L>                         9.89    )----------------(  134<L>     --    % Neutrophils, --    % Lymphocytes, ANC: --                                  24.4<L>    BMP (10-28 @ 05:50)             135     |  95<L>   |  81<H> 		Ca++ --      Ca 8.9                ---------------------------------( 126<H>		Mg --                 4.6     |  20<L>   |  5.79<H>			Ph --      BMP (10-27 @ 01:34)             134<L>  |  95<L>   |  56<H> 		Ca++ --      Ca 8.7                ---------------------------------( 150<H>		Mg 2.2                4.3     |  21<L>   |  4.18<H>			Ph 4.7<H>    LFTs (10-27 @ 01:34)      TPro 5.8<L> / Alb 2.8<L> / TBili 0.3 / DBili -- / AST 22 / ALT 20 / AlkPhos 81    Coags (10-27 @ 15:05)  aPTT -- / INR 1.63<H> / PT 19.1<H>            IMAGING STUDIES

## 2021-10-28 NOTE — PROGRESS NOTE ADULT - SUBJECTIVE AND OBJECTIVE BOX
Chief complaint  Patient is a 60y old  Male who presents with a chief complaint of NSTEMI (28 Oct 2021 09:18)   Review of systems  Patient in bed, looks comfortable, no hypoglycemic episodes.    Labs and Fingersticks  CAPILLARY BLOOD GLUCOSE      POCT Blood Glucose.: 148 mg/dL (28 Oct 2021 10:50)  POCT Blood Glucose.: 142 mg/dL (28 Oct 2021 07:36)  POCT Blood Glucose.: 189 mg/dL (27 Oct 2021 22:19)  POCT Blood Glucose.: 190 mg/dL (27 Oct 2021 21:32)  POCT Blood Glucose.: 155 mg/dL (27 Oct 2021 16:34)  POCT Blood Glucose.: 148 mg/dL (27 Oct 2021 12:57)      Anion Gap, Serum: 20 *H* (10-28 @ 05:50)  Anion Gap, Serum: 18 *H* (10-27 @ 01:34)      Calcium, Total Serum: 8.9 (10-28 @ 05:50)  Calcium, Total Serum: 8.7 (10-27 @ 01:34)  Albumin, Serum: 2.8 *L* (10-27 @ 01:34)    Alanine Aminotransferase (ALT/SGPT): 20 (10-27 @ 01:34)  Alkaline Phosphatase, Serum: 81 (10-27 @ 01:34)  Aspartate Aminotransferase (AST/SGOT): 22 (10-27 @ 01:34)        10-28    135  |  95<L>  |  81<H>  ----------------------------<  126<H>  4.6   |  20<L>  |  5.79<H>    Ca    8.9      28 Oct 2021 05:50  Phos  4.7     10-27  Mg     2.2     10-27    TPro  5.8<L>  /  Alb  2.8<L>  /  TBili  0.3  /  DBili  x   /  AST  22  /  ALT  20  /  AlkPhos  81  10-27                        8.2    12.30 )-----------( 200      ( 28 Oct 2021 05:50 )             25.4     Medications  MEDICATIONS  (STANDING):  aspirin  chewable 81 milliGRAM(s) Oral daily  atorvastatin 80 milliGRAM(s) Oral at bedtime  bisacodyl Suppository 10 milliGRAM(s) Rectal once  chlorhexidine 2% Cloths 1 Application(s) Topical daily  chlorhexidine 4% Liquid 1 Application(s) Topical <User Schedule>  dextrose 40% Gel 15 Gram(s) Oral once  dextrose 50% Injectable 25 Gram(s) IV Push once  dextrose 50% Injectable 12.5 Gram(s) IV Push once  dextrose 50% Injectable 25 Gram(s) IV Push once  glucagon  Injectable 1 milliGRAM(s) IntraMuscular once  heparin   Injectable 5000 Unit(s) SubCutaneous every 8 hours  insulin glargine Injectable (LANTUS) 16 Unit(s) SubCutaneous at bedtime  insulin lispro (ADMELOG) corrective regimen sliding scale   SubCutaneous three times a day before meals  insulin lispro (ADMELOG) corrective regimen sliding scale   SubCutaneous at bedtime  insulin lispro Injectable (ADMELOG) 9 Unit(s) SubCutaneous three times a day before meals  pantoprazole    Tablet 40 milliGRAM(s) Oral before breakfast  polyethylene glycol 3350 17 Gram(s) Oral daily  senna 2 Tablet(s) Oral at bedtime  sevelamer carbonate 800 milliGRAM(s) Oral three times a day  sodium chloride 0.9%. 1000 milliLiter(s) (10 mL/Hr) IV Continuous <Continuous>      Physical Exam  General: Patient comfortable in bed  Vital Signs Last 12 Hrs  T(F): 98.4 (10-28-21 @ 05:00), Max: 98.4 (10-28-21 @ 05:00)  HR: 71 (10-28-21 @ 05:00) (71 - 71)  BP: 148/79 (10-28-21 @ 05:00) (148/79 - 148/79)  BP(mean): --  RR: 18 (10-28-21 @ 05:00) (18 - 18)  SpO2: 92% (10-28-21 @ 05:00) (92% - 92%)  Neck: No palpable thyroid nodules.  CVS: S1S2, No murmurs  Respiratory: No wheezing, no crepitations  GI: Abdomen soft, bowel sounds positive  Musculoskeletal:  edema lower extremities.   Skin: No skin rashes, no ecchymosis    Diagnostics    Free Thyroxine, Serum: AM Sched. Collection: 26-Oct-2021 06:00 (10-25 @ 13:56)               Chief complaint  Patient is a 60y old  Male who presents with a chief complaint of NSTEMI (28 Oct 2021 09:18)   Review of systems  Patient in bed, looks comfortable, no hypoglycemic episodes.    Labs and Fingersticks  CAPILLARY BLOOD GLUCOSE      POCT Blood Glucose.: 148 mg/dL (28 Oct 2021 10:50)  POCT Blood Glucose.: 142 mg/dL (28 Oct 2021 07:36)  POCT Blood Glucose.: 189 mg/dL (27 Oct 2021 22:19)  POCT Blood Glucose.: 190 mg/dL (27 Oct 2021 21:32)  POCT Blood Glucose.: 155 mg/dL (27 Oct 2021 16:34)  POCT Blood Glucose.: 148 mg/dL (27 Oct 2021 12:57)      Anion Gap, Serum: 20 *H* (10-28 @ 05:50)  Anion Gap, Serum: 18 *H* (10-27 @ 01:34)      Calcium, Total Serum: 8.9 (10-28 @ 05:50)  Calcium, Total Serum: 8.7 (10-27 @ 01:34)  Albumin, Serum: 2.8 *L* (10-27 @ 01:34)    Alanine Aminotransferase (ALT/SGPT): 20 (10-27 @ 01:34)  Alkaline Phosphatase, Serum: 81 (10-27 @ 01:34)  Aspartate Aminotransferase (AST/SGOT): 22 (10-27 @ 01:34)        10-28    135  |  95<L>  |  81<H>  ----------------------------<  126<H>  4.6   |  20<L>  |  5.79<H>    Ca    8.9      28 Oct 2021 05:50  Phos  4.7     10-27  Mg     2.2     10-27    TPro  5.8<L>  /  Alb  2.8<L>  /  TBili  0.3  /  DBili  x   /  AST  22  /  ALT  20  /  AlkPhos  81  10-27                        8.2    12.30 )-----------( 200      ( 28 Oct 2021 05:50 )             25.4     Medications  MEDICATIONS  (STANDING):  aspirin  chewable 81 milliGRAM(s) Oral daily  atorvastatin 80 milliGRAM(s) Oral at bedtime  bisacodyl Suppository 10 milliGRAM(s) Rectal once  chlorhexidine 2% Cloths 1 Application(s) Topical daily  chlorhexidine 4% Liquid 1 Application(s) Topical <User Schedule>  dextrose 40% Gel 15 Gram(s) Oral once  dextrose 50% Injectable 25 Gram(s) IV Push once  dextrose 50% Injectable 12.5 Gram(s) IV Push once  dextrose 50% Injectable 25 Gram(s) IV Push once  glucagon  Injectable 1 milliGRAM(s) IntraMuscular once  heparin   Injectable 5000 Unit(s) SubCutaneous every 8 hours  insulin glargine Injectable (LANTUS) 16 Unit(s) SubCutaneous at bedtime  insulin lispro (ADMELOG) corrective regimen sliding scale   SubCutaneous three times a day before meals  insulin lispro (ADMELOG) corrective regimen sliding scale   SubCutaneous at bedtime  insulin lispro Injectable (ADMELOG) 9 Unit(s) SubCutaneous three times a day before meals  pantoprazole    Tablet 40 milliGRAM(s) Oral before breakfast  polyethylene glycol 3350 17 Gram(s) Oral daily  senna 2 Tablet(s) Oral at bedtime  sevelamer carbonate 800 milliGRAM(s) Oral three times a day  sodium chloride 0.9%. 1000 milliLiter(s) (10 mL/Hr) IV Continuous <Continuous>      Physical Exam  General: Patient comfortable in bed  Vital Signs Last 12 Hrs  T(F): 98.4 (10-28-21 @ 05:00), Max: 98.4 (10-28-21 @ 05:00)  HR: 71 (10-28-21 @ 05:00) (71 - 71)  BP: 148/79 (10-28-21 @ 05:00) (148/79 - 148/79)  BP(mean): --  RR: 18 (10-28-21 @ 05:00) (18 - 18)  SpO2: 92% (10-28-21 @ 05:00) (92% - 92%)  Neck: No palpable thyroid nodules.  CVS: S1S2, No murmurs  Respiratory: No wheezing, no crepitations  GI: Abdomen soft, bowel sounds positive  Musculoskeletal:  edema lower extremities.   Skin: No skin rashes, no ecchymosis    Diagnostics    Free Thyroxine, Serum: AM Sched. Collection: 26-Oct-2021 06:00 (10-25 @ 13:56)

## 2021-10-28 NOTE — PROGRESS NOTE ADULT - SUBJECTIVE AND OBJECTIVE BOX
Brief IR Follow-Up Note    Re-consulted for tunneled central catheter placement as patient is stable on floors. Pending LUE fistula creation on Tuesday 11/2 by vascular surgery.   Will plan for tunneled dialysis catheter placement on Monday 11/1.    -Please place IR Procedure Order, accepting attending Dr. Merlyn VILLAREAL at midnight for procedure 11/1  -Please hold therapeutic / prophylactic anticoagulation the night prior  -Obtain 4am labs (CBC, BMP, coags)

## 2021-10-28 NOTE — PROGRESS NOTE ADULT - ASSESSMENT
Assessment  DMT2: 60y Male with DM T2 with hyperglycemia, A1C 7%, was on oral meds and insulin at home, postop CABG on basal bolus insulin, bolus dose held this AM 2/2 patient refusing breakfast, blood sugars trending within overall acceptable range, no hypoglycemic episodes, eating meals, transferred out of ctu.  Hypothyroidism: Patient has no history thyroid disease, was not on any thyroid supplements, TSH elevated, Total T4 WNL, FT4 reordered and still pending..  CAD: s/p CABG, on medications, no chest pain, stable, monitored.  HTN: Controlled,  on antihypertensive medications.  HLD: On statin  Obesity: No strict exercise routines, not on any weight loss program, neither on low calorie diet.        Esperanza Bekcett MD  Cell: [phone]  Office: [phone]     Assessment  DMT2: 60y Male with DM T2 with hyperglycemia, A1C 7%, was on oral meds and insulin at home, postop CABG on basal bolus insulin, bolus  dose held this AM 2/2 patient refusing breakfast, blood sugars trending within overall acceptable range, no hypoglycemic episodes, eating meals, transferred out of ctu.  Hypothyroidism: Patient has no history thyroid disease, was not on any thyroid supplements, TSH elevated, Total T4 WNL, FT4 reordered and still pending..  CAD: s/p CABG, on medications, no chest pain, stable, monitored.  HTN: Controlled,  on antihypertensive medications.  HLD: On statin  Obesity: No strict exercise routines, not on any weight loss program, neither on low calorie diet.        Esperanza Beckett MD  Cell: [phone]  Office: [phone]

## 2021-10-28 NOTE — PROGRESS NOTE ADULT - SUBJECTIVE AND OBJECTIVE BOX
[facility] DIVISION OF KIDNEY DISEASES AND HYPERTENSION -- FOLLOW UP NOTE  --------------------------------------------------------------------------------  Chief Complaint: ESRD on HD now     24 hour events/subjective:    seen and examined this morning   more lethargic and somnolent   denied any acute complaints and reported feeling well  no acute issues overnight    PAST HISTORY  --------------------------------------------------------------------------------  No significant changes to PMH, PSH, FHx, SHx, unless otherwise noted    ALLERGIES & MEDICATIONS  --------------------------------------------------------------------------------  Allergies    No Known Allergies    Intolerances      Standing Inpatient Medications  aspirin  chewable 81 milliGRAM(s) Oral daily  atorvastatin 80 milliGRAM(s) Oral at bedtime  bisacodyl Suppository 10 milliGRAM(s) Rectal once  chlorhexidine 2% Cloths 1 Application(s) Topical daily  chlorhexidine 4% Liquid 1 Application(s) Topical <User Schedule>  dextrose 40% Gel 15 Gram(s) Oral once  dextrose 50% Injectable 25 Gram(s) IV Push once  dextrose 50% Injectable 12.5 Gram(s) IV Push once  dextrose 50% Injectable 25 Gram(s) IV Push once  glucagon  Injectable 1 milliGRAM(s) IntraMuscular once  heparin   Injectable 5000 Unit(s) SubCutaneous every 8 hours  insulin glargine Injectable (LANTUS) 16 Unit(s) SubCutaneous at bedtime  insulin lispro (ADMELOG) corrective regimen sliding scale   SubCutaneous three times a day before meals  insulin lispro (ADMELOG) corrective regimen sliding scale   SubCutaneous at bedtime  insulin lispro Injectable (ADMELOG) 9 Unit(s) SubCutaneous three times a day before meals  pantoprazole    Tablet 40 milliGRAM(s) Oral before breakfast  polyethylene glycol 3350 17 Gram(s) Oral daily  senna 2 Tablet(s) Oral at bedtime  sevelamer carbonate 800 milliGRAM(s) Oral three times a day  sodium chloride 0.9%. 1000 milliLiter(s) IV Continuous <Continuous>    PRN Inpatient Medications  acetaminophen   Tablet .. 650 milliGRAM(s) Oral every 6 hours PRN  sodium chloride 0.9% lock flush 10 milliLiter(s) IV Push every 1 hour PRN      REVIEW OF SYSTEMS    All other systems were reviewed and are negative, except as noted.    VITALS/PHYSICAL EXAM  --------------------------------------------------------------------------------  T(C): 36.9 (10-28-21 @ 05:00), Max: 36.9 (10-27-21 @ 19:24)  HR: 71 (10-28-21 @ 05:00) (57 - 71)  BP: 148/79 (10-28-21 @ 05:00) (121/69 - 148/79)  RR: 18 (10-28-21 @ 05:00) (18 - 23)  SpO2: 92% (10-28-21 @ 05:00) (92% - 100%)  Wt(kg): --        10-27-21 @ 07:01  -  10-28-21 @ 07:00  --------------------------------------------------------  IN: 300 mL / OUT: 20 mL / NET: 280 mL        Physical Exam:  	Gen: NAD  	HEENT: MMM  	Pulm: CTA B/L  	CV: S1S2  	Abd: Soft, +BS   	Ext: + LE edema B/L  	Neuro: Awake but sleepy   	Skin: Warm and dry  	Vascular access: R non tunneled IJ       LABS/STUDIES  --------------------------------------------------------------------------------              8.2    12.30 >-----------<  200      [10-28-21 @ 05:50]              25.4     135  |  95  |  81  ----------------------------<  126      [10-28-21 @ 05:50]  4.6   |  20  |  5.79        Ca     8.9     [10-28-21 @ 05:50]      Mg     2.2     [10-27-21 @ 01:34]      Phos  4.7     [10-27-21 @ 01:34]    TPro  5.8  /  Alb  2.8  /  TBili  0.3  /  DBili  x   /  AST  22  /  ALT  20  /  AlkPhos  81  [10-27-21 @ 01:34]    PT/INR: PT 19.1 , INR 1.63       [10-27-21 @ 15:05]      Creatinine Trend:  SCr 5.79 [10-28 @ 05:50]  SCr 4.18 [10-27 @ 01:34]  SCr 4.65 [10-26 @ 00:22]  SCr 4.72 [10-25 @ 00:25]  SCr 3.92 [10-24 @ 01:10]    Urinalysis - [10-07-21 @ 10:09]      Color Light Yellow / Appearance Clear / SG 1.015 / pH 6.0      Gluc 200 mg/dL / Ketone Negative  / Bili Negative / Urobili Negative       Blood Small / Protein 300 mg/dL / Leuk Est Negative / Nitrite Negative      RBC 2 / WBC 7 / Hyaline 6 / Gran  / Sq Epi  / Non Sq Epi 2 / Bacteria Negative      Iron 47, TIBC 245, %sat 19      [10-13-21 @ 13:10]  Ferritin 780      [10-13-21 @ 08:59]  TSH 6.40      [10-20-21 @ 17:03]    HBsAg Nonreact      [10-13-21 @ 09:15]  HCV 0.23, Nonreact      [10-07-21 @ 14:38]  HIV Nonreact      [10-13-21 @ 09:16]    BEULAH: titer Negative, pattern --      [10-07-21 @ 14:37]  C3 Complement 135      [10-07-21 @ 14:42]  C4 Complement 71      [10-07-21 @ 14:42]  ANCA: cANCA Negative, pANCA Negative, atypical ANCA Negative      [10-07-21 @ 14:37]  Syphilis Screen (Treponema Pallidum Ab) Negative      [10-07-21 @ 14:37]  PLA2R: SHANNAN <1.8, IFA --      [10-13-21 @ 13:55]  Free Light Chains: kappa 10.89, lambda 12.51, ratio = 0.87      [10-07 @ 14:42]  Immunofixation Serum:   No Monoclonal Band Identified    Reference Range: None Detected      [10-07-21 @ 14:42]  SPEP Interpretation: Normal Electrophoresis Pattern      [10-07-21 @ 14:42]

## 2021-10-28 NOTE — PROGRESS NOTE ADULT - ASSESSMENT
60 year old male with PMHx of DM and HTN who presented to the hospital as a transfer from OSH for NSTEMI. The nephrology team was consulted due to elevated creatinine of 4.05 upon presentation.    JOSHUA on CKD, now on HD, may need long term HD  - Likely with CKD from diabetic nephropathy, admitted with JOSHUA on CKD and hypervolemia, was initiated on HD prior to CABG for optimization.   --- initiated on HD on 10/14  - s/p LHC on 10/14 showing severe multivessel disease; now s/p CABG on 10/21  - Underwent HD yesterday tolerated well; due for HD today   --- HD through nontunneled L IJ placed on 10/26  - IR ob board for a tunneled cathter placement prior to discharge; planned for 11/1  - vascular surgery on board; plan for AVF creation prior to discharge; planned for 11/2  - monitor BMP  - adjust meds as per HD     Anemia:   - in the setting of CKD   - iron studies wnl   - continue on SISSY. Will give [id number] units with HD.   - monitor CBC    If any questions, please feel free to contact me     Saul Crews  Nephrology Fellow  [facility] Pager: [phone]   60 year old male with PMHx of DM and HTN who presented to the hospital as a transfer from OSH for NSTEMI. The nephrology team was consulted due to elevated creatinine of 4.05 upon presentation.    JOSHUA on CKD, now on HD, may need long term HD  - Likely with CKD from diabetic nephropathy, admitted with JOSHUA on CKD and hypervolemia, was initiated on HD prior to CABG for optimization.   --- initiated on HD on 10/14  - s/p LHC on 10/14 showing severe multivessel disease; now s/p CABG on 10/21  - Underwent HD yesterday tolerated well; due for HD today   --- HD through nontunneled L IJ placed on 10/26  - IR ob board for a tunneled cathter placement prior to discharge; planned for 11/1  - vascular surgery on board; plan for AVF creation prior to discharge; planned for 11/2  - monitor BMP  - adjust meds as per HD     Anemia:   - in the setting of CKD   - iron studies wnl   - continue on SISSY, 78696 units TIW with HD.   - monitor CBC    If any questions, please feel free to contact me     Saul Crews  Nephrology Fellow  [facility] Pager: [phone]

## 2021-10-29 LAB
ALBUMIN SERPL ELPH-MCNC: 3.1 G/DL — LOW (ref 3.3–5)
ALP SERPL-CCNC: 96 U/L — SIGNIFICANT CHANGE UP (ref 40–120)
ALT FLD-CCNC: 19 U/L — SIGNIFICANT CHANGE UP (ref 10–45)
ANION GAP SERPL CALC-SCNC: 22 MMOL/L — HIGH (ref 5–17)
ANISOCYTOSIS BLD QL: SLIGHT — SIGNIFICANT CHANGE UP
APTT BLD: 31.8 SEC — SIGNIFICANT CHANGE UP (ref 27.5–35.5)
AST SERPL-CCNC: 20 U/L — SIGNIFICANT CHANGE UP (ref 10–40)
BASOPHILS # BLD AUTO: 0 K/UL — SIGNIFICANT CHANGE UP (ref 0–0.2)
BASOPHILS NFR BLD AUTO: 0 % — SIGNIFICANT CHANGE UP (ref 0–2)
BILIRUB SERPL-MCNC: 0.3 MG/DL — SIGNIFICANT CHANGE UP (ref 0.2–1.2)
BUN SERPL-MCNC: 82 MG/DL — HIGH (ref 7–23)
CALCIUM SERPL-MCNC: 9.3 MG/DL — SIGNIFICANT CHANGE UP (ref 8.4–10.5)
CHLORIDE SERPL-SCNC: 95 MMOL/L — LOW (ref 96–108)
CO2 SERPL-SCNC: 21 MMOL/L — LOW (ref 22–31)
CREAT SERPL-MCNC: 5.87 MG/DL — HIGH (ref 0.5–1.3)
EOSINOPHIL # BLD AUTO: 0.48 K/UL — SIGNIFICANT CHANGE UP (ref 0–0.5)
EOSINOPHIL NFR BLD AUTO: 4.4 % — SIGNIFICANT CHANGE UP (ref 0–6)
GIANT PLATELETS BLD QL SMEAR: PRESENT — SIGNIFICANT CHANGE UP
GLUCOSE BLDC GLUCOMTR-MCNC: 139 MG/DL — HIGH (ref 70–99)
GLUCOSE BLDC GLUCOMTR-MCNC: 154 MG/DL — HIGH (ref 70–99)
GLUCOSE BLDC GLUCOMTR-MCNC: 185 MG/DL — HIGH (ref 70–99)
GLUCOSE SERPL-MCNC: 146 MG/DL — HIGH (ref 70–99)
HCT VFR BLD CALC: 28.5 % — LOW (ref 39–50)
HGB BLD-MCNC: 9.1 G/DL — LOW (ref 13–17)
HYPOCHROMIA BLD QL: SLIGHT — SIGNIFICANT CHANGE UP
INR BLD: 1.49 RATIO — HIGH (ref 0.88–1.16)
LYMPHOCYTES # BLD AUTO: 2.76 K/UL — SIGNIFICANT CHANGE UP (ref 1–3.3)
LYMPHOCYTES # BLD AUTO: 25.4 % — SIGNIFICANT CHANGE UP (ref 13–44)
MACROCYTES BLD QL: SLIGHT — SIGNIFICANT CHANGE UP
MAGNESIUM SERPL-MCNC: 2.4 MG/DL — SIGNIFICANT CHANGE UP (ref 1.6–2.6)
MANUAL SMEAR VERIFICATION: SIGNIFICANT CHANGE UP
MCHC RBC-ENTMCNC: 29.9 PG — SIGNIFICANT CHANGE UP (ref 27–34)
MCHC RBC-ENTMCNC: 31.9 GM/DL — LOW (ref 32–36)
MCV RBC AUTO: 93.8 FL — SIGNIFICANT CHANGE UP (ref 80–100)
MONOCYTES # BLD AUTO: 1.82 K/UL — HIGH (ref 0–0.9)
MONOCYTES NFR BLD AUTO: 16.7 % — HIGH (ref 2–14)
NEUTROPHILS # BLD AUTO: 5.82 K/UL — SIGNIFICANT CHANGE UP (ref 1.8–7.4)
NEUTROPHILS NFR BLD AUTO: 52.6 % — SIGNIFICANT CHANGE UP (ref 43–77)
NEUTS BAND # BLD: 0.9 % — SIGNIFICANT CHANGE UP (ref 0–8)
PHOSPHATE SERPL-MCNC: 6.9 MG/DL — HIGH (ref 2.5–4.5)
PLAT MORPH BLD: NORMAL — SIGNIFICANT CHANGE UP
PLATELET # BLD AUTO: 187 K/UL — SIGNIFICANT CHANGE UP (ref 150–400)
POLYCHROMASIA BLD QL SMEAR: SLIGHT — SIGNIFICANT CHANGE UP
POTASSIUM SERPL-MCNC: 4.5 MMOL/L — SIGNIFICANT CHANGE UP (ref 3.5–5.3)
POTASSIUM SERPL-SCNC: 4.5 MMOL/L — SIGNIFICANT CHANGE UP (ref 3.5–5.3)
PROT SERPL-MCNC: 6.5 G/DL — SIGNIFICANT CHANGE UP (ref 6–8.3)
PROTHROM AB SERPL-ACNC: 17.5 SEC — HIGH (ref 10.6–13.6)
RBC # BLD: 3.04 M/UL — LOW (ref 4.2–5.8)
RBC # FLD: 15.4 % — HIGH (ref 10.3–14.5)
RBC BLD AUTO: ABNORMAL
SODIUM SERPL-SCNC: 138 MMOL/L — SIGNIFICANT CHANGE UP (ref 135–145)
WBC # BLD: 10.87 K/UL — HIGH (ref 3.8–10.5)
WBC # FLD AUTO: 10.87 K/UL — HIGH (ref 3.8–10.5)

## 2021-10-29 PROCEDURE — 99233 SBSQ HOSP IP/OBS HIGH 50: CPT | Mod: GC

## 2021-10-29 PROCEDURE — 71045 X-RAY EXAM CHEST 1 VIEW: CPT | Mod: 26,77

## 2021-10-29 PROCEDURE — 36800 INSERTION OF CANNULA: CPT | Mod: 78

## 2021-10-29 PROCEDURE — 71045 X-RAY EXAM CHEST 1 VIEW: CPT | Mod: 26

## 2021-10-29 RX ORDER — CHLORHEXIDINE GLUCONATE 213 G/1000ML
1 SOLUTION TOPICAL
Refills: 0 | Status: DISCONTINUED | OUTPATIENT
Start: 2021-10-29 | End: 2021-11-02

## 2021-10-29 RX ORDER — ACETAMINOPHEN 500 MG
975 TABLET ORAL ONCE
Refills: 0 | Status: COMPLETED | OUTPATIENT
Start: 2021-10-29 | End: 2021-10-29

## 2021-10-29 RX ORDER — SODIUM CHLORIDE 9 MG/ML
10 INJECTION INTRAMUSCULAR; INTRAVENOUS; SUBCUTANEOUS
Refills: 0 | Status: DISCONTINUED | OUTPATIENT
Start: 2021-10-29 | End: 2021-11-02

## 2021-10-29 RX ORDER — WARFARIN SODIUM 2.5 MG/1
2.5 TABLET ORAL ONCE
Refills: 0 | Status: COMPLETED | OUTPATIENT
Start: 2021-10-29 | End: 2021-10-29

## 2021-10-29 RX ADMIN — Medication 1: at 07:58

## 2021-10-29 RX ADMIN — ATORVASTATIN CALCIUM 80 MILLIGRAM(S): 80 TABLET, FILM COATED ORAL at 21:07

## 2021-10-29 RX ADMIN — Medication 9 UNIT(S): at 07:58

## 2021-10-29 RX ADMIN — SEVELAMER CARBONATE 800 MILLIGRAM(S): 2400 POWDER, FOR SUSPENSION ORAL at 06:05

## 2021-10-29 RX ADMIN — HEPARIN SODIUM 5000 UNIT(S): 5000 INJECTION INTRAVENOUS; SUBCUTANEOUS at 06:05

## 2021-10-29 RX ADMIN — WARFARIN SODIUM 2.5 MILLIGRAM(S): 2.5 TABLET ORAL at 21:07

## 2021-10-29 RX ADMIN — Medication 1: at 12:20

## 2021-10-29 RX ADMIN — Medication 975 MILLIGRAM(S): at 02:58

## 2021-10-29 RX ADMIN — PANTOPRAZOLE SODIUM 40 MILLIGRAM(S): 20 TABLET, DELAYED RELEASE ORAL at 06:05

## 2021-10-29 RX ADMIN — SEVELAMER CARBONATE 800 MILLIGRAM(S): 2400 POWDER, FOR SUSPENSION ORAL at 21:07

## 2021-10-29 RX ADMIN — Medication 9 UNIT(S): at 12:20

## 2021-10-29 RX ADMIN — SENNA PLUS 2 TABLET(S): 8.6 TABLET ORAL at 21:07

## 2021-10-29 RX ADMIN — HEPARIN SODIUM 5000 UNIT(S): 5000 INJECTION INTRAVENOUS; SUBCUTANEOUS at 14:25

## 2021-10-29 RX ADMIN — HEPARIN SODIUM 5000 UNIT(S): 5000 INJECTION INTRAVENOUS; SUBCUTANEOUS at 21:07

## 2021-10-29 RX ADMIN — Medication 81 MILLIGRAM(S): at 18:14

## 2021-10-29 RX ADMIN — CHLORHEXIDINE GLUCONATE 1 APPLICATION(S): 213 SOLUTION TOPICAL at 14:25

## 2021-10-29 RX ADMIN — Medication 975 MILLIGRAM(S): at 04:09

## 2021-10-29 RX ADMIN — INSULIN GLARGINE 16 UNIT(S): 100 INJECTION, SOLUTION SUBCUTANEOUS at 21:31

## 2021-10-29 NOTE — PROGRESS NOTE ADULT - ASSESSMENT
59 y/o M w/ hx of DM2, HTN and HLD initially presented as transfer from [facility] for chest pain concerning for NSTEMI and possible new CHF c/b JOSHUA likely on CKD, and hypertensive emergency. Hospital course complicated by s/p RRT for CVA/TIA ruled out and worsening anemia requiring 1 PRBC. Pt S/p HD session today and underwent LHC showing Multivessel CAD. CT surgery consulted for CABG evaluation.       Surgery/Hospital Course:  10/07 Code stroke called for left facial droop, CT showed old frontal infarct, found to have high grade left carotid stenosis, but unclear cause for acute neurologic deficit  10/13 initiated HD, patient admitted with a creatinine of 4.05, but did not know of a history of renal   10/21 s/p C2L, MVR(T), & LAAL. Received 4 PRBC, 1 PLT, 1000 FIEBA intraop. Required dual pressor and inotrope support w/ , Primacor, Levo, and Epi gtts.   10/22 Extubated at 04:55.   10/24 SOB, urgent HD overnight, bipap  10/25 O2 via HFNC transitioned to 5L NC. Plan for HD today. SB in 50s, backup VVI paced at 50 via temporary epicardial PW.  10/26 Temporary access for HD removed yesterday as was not functioning, will consult IR for permacath. Plan for HD today. Check afternoon INR for coumadin dosing tonight   10/27 Pt received to floor on 2 Arnold - VSS, NAD. Coumadin on hold for AVF w/ vascular Friday or Monday - will discuss initiating heparin gtt to bridge w/ Dr. Xiong in AM. L LIDIA carrion placed in CTU for HD access will need to be transitioned to permacath prior to AVF - will d/w IR.   10/28 IR consult requested for PermCath placement HD with 1uPRBC transfusion today. Hod narcotics. Pt lethargic but responsive to verbal stimuli. FS 121mg/dl. 92% RA   10/29 VSS INR 1.49 HD cath poor flow- requested by nephrology to place new HD cath- Rt IJ Placed  - cleared for Vascular surgery placement of AVF on Tuesday

## 2021-10-29 NOTE — PROGRESS NOTE ADULT - ASSESSMENT
60 year old male with PMHx of DM and HTN who presented to the hospital as a transfer from OSH for NSTEMI. The nephrology team was consulted due to elevated creatinine of 4.05 upon presentation.    JOSHUA on CKD, now on HD, may need long term HD  - Likely with CKD from diabetic nephropathy, admitted with JOSHUA on CKD and hypervolemia, was initiated on HD prior to CABG for optimization.   --- initiated on HD on 10/14  - s/p LHC on 10/14 showing severe multivessel disease; now s/p CABG on 10/21  - Underwent HD yesterday however unable to attain proper blood flow through the LIJ cathetter  ---poor clearance with no improvement in creatinine this morning   CXR performed with catheter in the right place  - patient will need HD again today however will need a new access prior to that  discussed with the primary team of the same   - IR ob board for a tunneled cathter placement prior to discharge; planned for 11/1  - vascular surgery on board; plan for AVF creation prior to discharge; planned for 11/2  - monitor BMP  - adjust meds as per HD     Anemia:   - in the setting of CKD   - iron studies wnl   - continue on SISSY, 95847 units TIW with HD.   - monitor CBC    If any questions, please feel free to contact me     Saul Crews  Nephrology Fellow  [facility] Pager: [phone]

## 2021-10-29 NOTE — CHART NOTE - NSCHARTNOTEFT_GEN_A_CORE
Please document medicine and cardiac risk stratification for AVF placement.    Tentative OR plan for Tuesday   Preop on Monday

## 2021-10-29 NOTE — PROGRESS NOTE ADULT - SUBJECTIVE AND OBJECTIVE BOX
[facility] DIVISION OF KIDNEY DISEASES AND HYPERTENSION -- FOLLOW UP NOTE  --------------------------------------------------------------------------------  Chief Complaint: ESRD    24 hour events/subjective:    seen and ezxamined this mimi   reports feeling well   yesterday issues with HD as unable to get proper blood flow through the LIJ catheter     PAST HISTORY  --------------------------------------------------------------------------------  No significant changes to PMH, PSH, FHx, SHx, unless otherwise noted    ALLERGIES & MEDICATIONS  --------------------------------------------------------------------------------  Allergies    No Known Allergies    Intolerances      Standing Inpatient Medications  aspirin  chewable 81 milliGRAM(s) Oral daily  atorvastatin 80 milliGRAM(s) Oral at bedtime  bisacodyl Suppository 10 milliGRAM(s) Rectal once  chlorhexidine 2% Cloths 1 Application(s) Topical daily  chlorhexidine 4% Liquid 1 Application(s) Topical <User Schedule>  dextrose 40% Gel 15 Gram(s) Oral once  dextrose 50% Injectable 25 Gram(s) IV Push once  dextrose 50% Injectable 12.5 Gram(s) IV Push once  dextrose 50% Injectable 25 Gram(s) IV Push once  glucagon  Injectable 1 milliGRAM(s) IntraMuscular once  heparin   Injectable 5000 Unit(s) SubCutaneous every 8 hours  insulin glargine Injectable (LANTUS) 16 Unit(s) SubCutaneous at bedtime  insulin lispro (ADMELOG) corrective regimen sliding scale   SubCutaneous three times a day before meals  insulin lispro (ADMELOG) corrective regimen sliding scale   SubCutaneous at bedtime  insulin lispro Injectable (ADMELOG) 9 Unit(s) SubCutaneous three times a day before meals  pantoprazole    Tablet 40 milliGRAM(s) Oral before breakfast  polyethylene glycol 3350 17 Gram(s) Oral daily  senna 2 Tablet(s) Oral at bedtime  sevelamer carbonate 800 milliGRAM(s) Oral three times a day  sodium chloride 0.9%. 1000 milliLiter(s) IV Continuous <Continuous>    PRN Inpatient Medications  acetaminophen   Tablet .. 650 milliGRAM(s) Oral every 6 hours PRN  sodium chloride 0.9% lock flush 10 milliLiter(s) IV Push every 1 hour PRN      REVIEW OF SYSTEMS      All other systems were reviewed and are negative, except as noted.    VITALS/PHYSICAL EXAM  --------------------------------------------------------------------------------  T(C): 36.8 (10-29-21 @ 05:00), Max: 36.8 (10-29-21 @ 05:00)  HR: 58 (10-29-21 @ 09:05) (56 - 96)  BP: 133/76 (10-29-21 @ 09:05) (119/62 - 153/72)  RR: 18 (10-29-21 @ 05:00) (18 - 18)  SpO2: 95% (10-29-21 @ 09:05) (95% - 100%)  Wt(kg): --        10-28-21 @ 07:01  -  10-29-21 @ 07:00  --------------------------------------------------------  IN: 917 mL / OUT: 5 mL / NET: 912 mL    10-29-21 @ 07:01  -  10-29-21 @ 12:33  --------------------------------------------------------  IN: 240 mL / OUT: 0 mL / NET: 240 mL        Physical Exam:  	Gen: NAD  	HEENT: MMM  	Pulm: CTA B/L, midtsternal scar   	CV: S1S2  	Abd: Soft, +BS   	Ext: No LE edema B/L  	Neuro: Awake  	Skin: Warm and dry  	Vascular access: LIJ nontunneled       LABS/STUDIES  --------------------------------------------------------------------------------              9.1    10.87 >-----------<  187      [10-29-21 @ 06:52]              28.5     138  |  95  |  82  ----------------------------<  146      [10-29-21 @ 06:52]  4.5   |  21  |  5.87        Ca     9.3     [10-29-21 @ 06:52]      Mg     2.4     [10-29-21 @ 06:52]      Phos  6.9     [10-29-21 @ 06:52]    TPro  6.5  /  Alb  3.1  /  TBili  0.3  /  DBili  x   /  AST  20  /  ALT  19  /  AlkPhos  96  [10-29-21 @ 06:52]    PT/INR: PT 17.5 , INR 1.49       [10-29-21 @ 06:54]  PTT: 31.8       [10-29-21 @ 06:54]      Creatinine Trend:  SCr 5.87 [10-29 @ 06:52]  SCr 5.79 [10-28 @ 05:50]  SCr 4.18 [10-27 @ 01:34]  SCr 4.65 [10-26 @ 00:22]  SCr 4.72 [10-25 @ 00:25]    Urinalysis - [10-07-21 @ 10:09]      Color Light Yellow / Appearance Clear / SG 1.015 / pH 6.0      Gluc 200 mg/dL / Ketone Negative  / Bili Negative / Urobili Negative       Blood Small / Protein 300 mg/dL / Leuk Est Negative / Nitrite Negative      RBC 2 / WBC 7 / Hyaline 6 / Gran  / Sq Epi  / Non Sq Epi 2 / Bacteria Negative      Iron 47, TIBC 245, %sat 19      [10-13-21 @ 13:10]  Ferritin 780      [10-13-21 @ 08:59]  TSH 6.40      [10-20-21 @ 17:03]    HBsAg Nonreact      [10-13-21 @ 09:15]  HCV 0.23, Nonreact      [10-07-21 @ 14:38]  HIV Nonreact      [10-13-21 @ 09:16]    BEULAH: titer Negative, pattern --      [10-07-21 @ 14:37]  C3 Complement 135      [10-07-21 @ 14:42]  C4 Complement 71      [10-07-21 @ 14:42]  ANCA: cANCA Negative, pANCA Negative, atypical ANCA Negative      [10-07-21 @ 14:37]  Syphilis Screen (Treponema Pallidum Ab) Negative      [10-07-21 @ 14:37]  PLA2R: SHANNAN <1.8, IFA --      [10-13-21 @ 13:55]  Free Light Chains: kappa 10.89, lambda 12.51, ratio = 0.87      [10-07 @ 14:42]  Immunofixation Serum:   No Monoclonal Band Identified    Reference Range: None Detected      [10-07-21 @ 14:42]  SPEP Interpretation: Normal Electrophoresis Pattern      [10-07-21 @ 14:42]

## 2021-10-29 NOTE — PROCEDURE NOTE - NSPOSTPRCRAD_GEN_A_CORE
central line located in the/central line located in the superior vena cava/post-procedure radiography performed
central line located in the superior vena cava
central line located in the superior vena cava/no pneumothorax/post-procedure radiography performed

## 2021-10-29 NOTE — PROGRESS NOTE ADULT - ASSESSMENT
60M with history of DM type 2, HTN, HLD presenting as transfer from [facility] for chest pain concerning for NSTEMI, code stroke also called for L facial droop, CTA significant for ipsilateral high-grade stenosis of the left internal carotid artery at the carotid bifurcation seen on carotid duplex, now s/p cabg. Patient with ongoing HD requirements now requiring long term HD access planning.     - Plan for LUE AV graft on Tuesday, 11/2. Previously on coumadin, will need INR correction prior to OR  - Currently getting HD via [facility], will need PC by IR, schedule for Monday   - left arm precautions, maintain pink band  - Please provide med/card optimization for planned procedure    Please contact Vascular Surgery (P: [phone]) with any questions.

## 2021-10-29 NOTE — PROCEDURE NOTE - NSINDICATIONS_GEN_A_CORE
arterial puncture to obtain ABG's/critical patient/monitoring purposes
dialysis/CRRT
dialysis/CRRT
emergency venous access/hemodynamic monitoring

## 2021-10-29 NOTE — PROGRESS NOTE ADULT - PROBLEM SELECTOR PLAN 1
Refill given, methotrexate   Will continue current insulin regimen for now. Will continue monitoring FS, log, and FU.  Patient counseled for compliance with consistent low carb diet and exercise as tolerated outpatient.

## 2021-10-29 NOTE — PROCEDURE NOTE - NSPOSTCAREGUIDE_GEN_A_CORE
Care for catheter as per unit/ICU protocols
Verbal/written post procedure instructions were given to patient/caregiver
Verbal/written post procedure instructions were given to patient/caregiver/Care for catheter as per unit/ICU protocols
Verbal/written post procedure instructions were given to patient/caregiver/Care for catheter as per unit/ICU protocols

## 2021-10-29 NOTE — PROGRESS NOTE ADULT - ASSESSMENT
Assessment  DMT2: 60y Male with DM T2 with hyperglycemia, A1C 7%, was on oral meds and insulin at home, postop CABG on basal bolus insulin, bolus dose intermittently held 2/2 patient skipping meals, blood sugars trending within overall acceptable range, no hypoglycemic episodes, eating meals, appears comfortable.  Hypothyroidism: Patient has no history thyroid disease, was not on any thyroid supplements, TSH elevated, Total T4 WNL, FT4 reordered and still pending..  CAD: s/p CABG, on medications, no chest pain, stable, monitored.  HTN: Controlled,  on antihypertensive medications.  HLD: On statin  Obesity: No strict exercise routines, not on any weight loss program, neither on low calorie diet.        Esperanza Beckett MD  Cell: [phone]  Office: [phone]     Assessment  DMT2: 60y Male with DM T2 with hyperglycemia, A1C 7%, was on oral meds and insulin at home, postop CABG on basal bolus insulin, bolus dose intermittently held 2/2 patient skipping meals, blood sugars trending within overall acceptable range,  no hypoglycemic episodes, eating meals, appears comfortable.  Hypothyroidism: Patient has no history thyroid disease, was not on any thyroid supplements, TSH elevated, Total T4 WNL, FT4 reordered and still pending..  CAD: s/p CABG, on medications, no chest pain, stable, monitored.  HTN: Controlled,  on antihypertensive medications.  HLD: On statin  Obesity: No strict exercise routines, not on any weight loss program, neither on low calorie diet.        Esperanza Beckett MD  Cell: [phone]  Office: [phone]

## 2021-10-29 NOTE — PROCEDURE NOTE - NSINFORMCONSENT_GEN_A_CORE
Benefits, risks, and possible complications of procedure explained to patient/caregiver who verbalized understanding and gave verbal consent.
This was an emergent procedure.

## 2021-10-29 NOTE — PROGRESS NOTE ADULT - SUBJECTIVE AND OBJECTIVE BOX
Neurology Progress Note    S: Patient seen and examined in out of icu. better. in chair AAOx3 COTO following on NC    Medications:    MEDICATIONS  (STANDING):  aspirin  chewable 81 milliGRAM(s) Oral daily  atorvastatin 80 milliGRAM(s) Oral at bedtime  bisacodyl Suppository 10 milliGRAM(s) Rectal once  chlorhexidine 2% Cloths 1 Application(s) Topical daily  chlorhexidine 4% Liquid 1 Application(s) Topical <User Schedule>  dextrose 40% Gel 15 Gram(s) Oral once  dextrose 50% Injectable 25 Gram(s) IV Push once  dextrose 50% Injectable 12.5 Gram(s) IV Push once  dextrose 50% Injectable 25 Gram(s) IV Push once  glucagon  Injectable 1 milliGRAM(s) IntraMuscular once  heparin   Injectable 5000 Unit(s) SubCutaneous every 8 hours  insulin glargine Injectable (LANTUS) 16 Unit(s) SubCutaneous at bedtime  insulin lispro (ADMELOG) corrective regimen sliding scale   SubCutaneous three times a day before meals  insulin lispro (ADMELOG) corrective regimen sliding scale   SubCutaneous at bedtime  insulin lispro Injectable (ADMELOG) 9 Unit(s) SubCutaneous three times a day before meals  pantoprazole    Tablet 40 milliGRAM(s) Oral before breakfast  polyethylene glycol 3350 17 Gram(s) Oral daily  senna 2 Tablet(s) Oral at bedtime  sevelamer carbonate 800 milliGRAM(s) Oral three times a day  sodium chloride 0.9%. 1000 milliLiter(s) (10 mL/Hr) IV Continuous <Continuous>    MEDICATIONS  (PRN):  acetaminophen   Tablet .. 650 milliGRAM(s) Oral every 6 hours PRN Mild Pain (1 - 3)  sodium chloride 0.9% lock flush 10 milliLiter(s) IV Push every 1 hour PRN Pre/post blood products, medications, blood draw, and to maintain line patency          Vitals:  Vital Signs Last 24 Hrs  T(C): 36.8 (10-29-21 @ 05:00), Max: 36.8 (10-29-21 @ 05:00)  T(F): 98.2 (10-29-21 @ 05:00), Max: 98.2 (10-29-21 @ 05:00)  HR: 58 (10-29-21 @ 09:05) (56 - 96)  BP: 133/76 (10-29-21 @ 09:05) (119/62 - 153/72)  BP(mean): 110 (10-28-21 @ 19:38) (110 - 110)  RR: 18 (10-29-21 @ 05:00) (18 - 18)  SpO2: 95% (10-29-21 @ 09:05) (95% - 100%)          General Exam:   General Appearance: Appropriately dressed and in no acute distress       Head: Normocephalic, atraumatic and no dysmorphic features  Ear, Nose, and Throat: Moist mucous membranes  CVS: S1S2+  Resp: No SOB, no wheeze or rhonchi  Abd: soft NTND  Extremities: No edema, no cyanosis  Skin: No bruises, no rashes     Neurological Exam:  Mental Status: Awake, alert and oriented x2-3.  Able to follow simple and complex verbal commands. Able to name and repeat. fluent speech. No obvious aphasia or dysarthria noted.   Cranial Nerves: PERRL, EOMI, VFFC, sensation V1-V3 intact,  mild NLF facial asymmetry , equal elevation of palate, scm/trap 5/5, tongue is midline on protrusion. no obvious papilledema on fundoscopic exam. Hearing is grossly intact.   Motor: Normal bulk, tone and strength throughout. Fine finger movements were intact and symmetric. no tremors or drift noted.    Sensation: Intact to light touch and pinprick throughout. no right/left confusion. no extinction to tactile on DSS.   Reflexes: 1+ throughout at biceps, brachioradialis, triceps, patellars and ankles bilaterally and equal. No clonus. R toe and L toe were both downgoing.  Coordination: No dysmetria on FNF    Gait: deferred     I personally reviewed the below data/images/labs:    CBC Full  -  ( 29 Oct 2021 06:52 )  WBC Count : 10.87 K/uL  RBC Count : 3.04 M/uL  Hemoglobin : 9.1 g/dL  Hematocrit : 28.5 %  Platelet Count - Automated : 187 K/uL  Mean Cell Volume : 93.8 fl  Mean Cell Hemoglobin : 29.9 pg  Mean Cell Hemoglobin Concentration : 31.9 gm/dL  Auto Neutrophil # : 5.82 K/uL  Auto Lymphocyte # : 2.76 K/uL  Auto Monocyte # : 1.82 K/uL  Auto Eosinophil # : 0.48 K/uL  Auto Basophil # : 0.00 K/uL  Auto Neutrophil % : 52.6 %  Auto Lymphocyte % : 25.4 %  Auto Monocyte % : 16.7 %  Auto Eosinophil % : 4.4 %  Auto Basophil % : 0.0 %      10-29    138  |  95<L>  |  82<H>  ----------------------------<  146<H>  4.5   |  21<L>  |  5.87<H>    Ca    9.3      29 Oct 2021 06:52  Phos  6.9     10-29  Mg     2.4     10-29    TPro  6.5  /  Alb  3.1<L>  /  TBili  0.3  /  DBili  x   /  AST  20  /  ALT  19  /  AlkPhos  96  10-29      -10/07 CTH: No hemorrhage. 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   -10/07 CTA N: High-grade stenosis left internal carotid artery at the carotid bifurcation in the neck.   -10/07 CTA H: Normal intracranial circulation.    < from: MR Head No Cont (10.09.21 @ 20:22) >    EXAM:  MR BRAIN                            PROCEDURE DATE:  10/09/2021            INTERPRETATION:  CLINICAL HISTORY:Facial droop, on heparin drip, NSTEMI . Severe left ICA stenosis.    TECHNIQUE:  MRI of brain without contrast dated 10/9/2021.  Examination consisted of transaxial T1, T2, FLAIR, gradient echo as well as diffusion-weighted sequences with corresponding ADC maps. Coronal T2 and sagittal T1-weighted images were also acquired through the brain.    COMPARISON: CT head and CTA head [facility] 10/7/2021    FINDINGS:  There are no areas abnormal restricted diffusion within the brain parenchyma to suggest acute/subacute infarct. There is no acute intracranial hemorrhage, vasogenic edema or abnormal susceptibility artifact. Chronic lacunar infarcts are seen in the left frontal lobe, right frontal cranial radiata and right cerebellum. Additional nonspecific patchy and confluent areas of T2/FLAIR prolongation in the bihemispheric white matter likely represents mild chronic microvascular changes.    The ventricles, sulci and cisternal spaces are stable in size and configuration. There is no midline shift or abnormal extra-axial fluid collection.    Flow-voids of the major intracranial vessels are maintained, as seen best on the T2-weighted images, indicating their patency.    The visualized paranasal sinuses and mastoid air cells are free of acute disease. The orbital regions are unremarkable.    IMPRESSION:  No acute infarct or intracranial hemorrhage. Chronic infarcts and microvascular changes as above.    --- End of Report ---      NEIL CARDENAS MD; Attending Radiologist  This document has been electronically signed. Oct 10 2021  4:26AM    < end of copied text >  < from: [facility] Duplex Carotid, Prashant (10.08.21 @ 17:07) >    EXAM:  CAROTID DUPLEX BILATERAL                            PROCEDURE DATE:  10/08/2021      INTERPRETATION:  CLINICAL INFORMATION: Left ICA high-grade stenosis identified on recent CTA neck.    COMPARISON: CTA neck 10/7/2021.    TECHNIQUE: Grayscale, color and spectral Doppler examination of both carotid arteries was performed.    FINDINGS:    There are atheromatous plaques are present bilaterally in the region of the bifurcations of the common carotid arteries into internal and external branches.    Velocity elevation of the proximal right internal carotid artery results in 50-69% flow-limiting stenosis.    Velocity elevation of the proximal left internal carotid artery results in 70-9% flow-limiting stenosis.    Bilateral flow-limiting stenoses noted of the right and left external carotid arteries as well.    Peak systolic velocities are as follows:    RIGHT:  PROX CCA = 126 cm/s  DIST CCA = 99 cm/s  PROX ICA = 137 cm/s  MID ICA = 86 cm/s  DIST ICA = 80 cm/s  ECA = 202 cm/s    LEFT:  PROX CCA = 100 cm/s  DIST CCA = 59 cm/s  PROX ICA = 313 cm/s  MID ICA = 72 cm/s  DIST ICA = 47 cm/s  ECA = 298 cm/s    Antegrade flow is noted within both vertebral arteries.    IMPRESSION:    Left internal carotid artery 70-99% flow-limiting stenosis.  Right internal carotid artery 50-69% flow-limiting stenosis.  Bilateral external carotid artery flow limiting stenoses.  INDIANA Hammond was informed of these findings on 10/8/2021 at 5:06 PM.    Measurement of carotid stenosis is based on velocity parameters that correlate the residual internal carotid diameter with that of the more distal vessel in accordance with a method such as the [employer] Symptomatic Carotid Endarterectomy Trial (NASCET).    --- End of Report ---    FELIX MCMAHON M.D., ATTENDING RADIOLOGIST  This document has been electronically signed. Oct  8 2021  5:21PM    < end of copied text >

## 2021-10-29 NOTE — PROCEDURE NOTE - NSPROCDETAILS_GEN_ALL_CORE
location identified, draped/prepped, sterile technique used, needle inserted/introduced/positive blood return obtained via catheter/connected to a pressurized flush line/sutured in place/hemostasis with direct pressure, dressing applied/Seldinger technique/all materials/supplies accounted for at end of procedure
guidewire recovered/sterile dressing applied/sterile technique, catheter placed/ultrasound guidance with use of sterile gel and probe cove
guidewire recovered/lumen(s) aspirated and flushed/sterile dressing applied/sterile technique, catheter placed/ultrasound guidance with use of sterile gel and probe cove
guidewire recovered/lumen(s) aspirated and flushed/sterile dressing applied/sterile technique, catheter placed/ultrasound guidance with use of sterile gel and probe cove

## 2021-10-29 NOTE — PROGRESS NOTE ADULT - SUBJECTIVE AND OBJECTIVE BOX
Chief complaint  Patient is a 60y old  Male who presents with a chief complaint of NSTEMI (29 Oct 2021 10:26)   Review of systems  Patient in bed, looks comfortable, no hypoglycemic episodes.    Labs and Fingersticks  CAPILLARY BLOOD GLUCOSE      POCT Blood Glucose.: 185 mg/dL (29 Oct 2021 11:48)  POCT Blood Glucose.: 154 mg/dL (29 Oct 2021 07:38)  POCT Blood Glucose.: 134 mg/dL (28 Oct 2021 21:22)  POCT Blood Glucose.: 113 mg/dL (28 Oct 2021 16:21)      Anion Gap, Serum: 22 *H* (10-29 @ 06:52)  Anion Gap, Serum: 20 *H* (10-28 @ 05:50)      Calcium, Total Serum: 9.3 (10-29 @ 06:52)  Calcium, Total Serum: 8.9 (10-28 @ 05:50)  Albumin, Serum: 3.1 *L* (10-29 @ 06:52)    Alanine Aminotransferase (ALT/SGPT): 19 (10-29 @ 06:52)  Alkaline Phosphatase, Serum: 96 (10-29 @ 06:52)  Aspartate Aminotransferase (AST/SGOT): 20 (10-29 @ 06:52)        10-29    138  |  95<L>  |  82<H>  ----------------------------<  146<H>  4.5   |  21<L>  |  5.87<H>    Ca    9.3      29 Oct 2021 06:52  Phos  6.9     10-29  Mg     2.4     10-29    TPro  6.5  /  Alb  3.1<L>  /  TBili  0.3  /  DBili  x   /  AST  20  /  ALT  19  /  AlkPhos  96  10-29                        9.1    10.87 )-----------( 187      ( 29 Oct 2021 06:52 )             28.5     Medications  MEDICATIONS  (STANDING):  aspirin  chewable 81 milliGRAM(s) Oral daily  atorvastatin 80 milliGRAM(s) Oral at bedtime  bisacodyl Suppository 10 milliGRAM(s) Rectal once  chlorhexidine 2% Cloths 1 Application(s) Topical daily  chlorhexidine 4% Liquid 1 Application(s) Topical <User Schedule>  dextrose 40% Gel 15 Gram(s) Oral once  dextrose 50% Injectable 25 Gram(s) IV Push once  dextrose 50% Injectable 12.5 Gram(s) IV Push once  dextrose 50% Injectable 25 Gram(s) IV Push once  glucagon  Injectable 1 milliGRAM(s) IntraMuscular once  heparin   Injectable 5000 Unit(s) SubCutaneous every 8 hours  insulin glargine Injectable (LANTUS) 16 Unit(s) SubCutaneous at bedtime  insulin lispro (ADMELOG) corrective regimen sliding scale   SubCutaneous three times a day before meals  insulin lispro (ADMELOG) corrective regimen sliding scale   SubCutaneous at bedtime  insulin lispro Injectable (ADMELOG) 9 Unit(s) SubCutaneous three times a day before meals  pantoprazole    Tablet 40 milliGRAM(s) Oral before breakfast  polyethylene glycol 3350 17 Gram(s) Oral daily  senna 2 Tablet(s) Oral at bedtime  sevelamer carbonate 800 milliGRAM(s) Oral three times a day  sodium chloride 0.9%. 1000 milliLiter(s) (10 mL/Hr) IV Continuous <Continuous>      Physical Exam  General: Patient comfortable in bed  Vital Signs Last 12 Hrs  T(F): 98.2 (10-29-21 @ 05:00), Max: 98.2 (10-29-21 @ 05:00)  HR: 58 (10-29-21 @ 09:05) (57 - 58)  BP: 133/76 (10-29-21 @ 09:05) (130/64 - 133/76)  BP(mean): --  RR: 18 (10-29-21 @ 05:00) (18 - 18)  SpO2: 95% (10-29-21 @ 09:05) (95% - 95%)  Neck: No palpable thyroid nodules.  CVS: S1S2, No murmurs  Respiratory: No wheezing, no crepitations  GI: Abdomen soft, bowel sounds positive  Musculoskeletal:  edema lower extremities.   Skin: No skin rashes, no ecchymosis    Diagnostics    Free Thyroxine, Serum: AM Sched. Collection: 26-Oct-2021 06:00 (10-25 @ 13:56)           Chief complaint  Patient is a 60y old  Male who presents with a chief complaint of NSTEMI (29 Oct 2021 10:26)   Review of systems  Patient in bed, looks comfortable, no hypoglycemic episodes.    Labs and Fingersticks  CAPILLARY BLOOD GLUCOSE      POCT Blood Glucose.: 185 mg/dL (29 Oct 2021 11:48)  POCT Blood Glucose.: 154 mg/dL (29 Oct 2021 07:38)  POCT Blood Glucose.: 134 mg/dL (28 Oct 2021 21:22)  POCT Blood Glucose.: 113 mg/dL (28 Oct 2021 16:21)      Anion Gap, Serum: 22 *H* (10-29 @ 06:52)  Anion Gap, Serum: 20 *H* (10-28 @ 05:50)      Calcium, Total Serum: 9.3 (10-29 @ 06:52)  Calcium, Total Serum: 8.9 (10-28 @ 05:50)  Albumin, Serum: 3.1 *L* (10-29 @ 06:52)    Alanine Aminotransferase (ALT/SGPT): 19 (10-29 @ 06:52)  Alkaline Phosphatase, Serum: 96 (10-29 @ 06:52)  Aspartate Aminotransferase (AST/SGOT): 20 (10-29 @ 06:52)        10-29    138  |  95<L>  |  82<H>  ----------------------------<  146<H>  4.5   |  21<L>  |  5.87<H>    Ca    9.3      29 Oct 2021 06:52  Phos  6.9     10-29  Mg     2.4     10-29    TPro  6.5  /  Alb  3.1<L>  /  TBili  0.3  /  DBili  x   /  AST  20  /  ALT  19  /  AlkPhos  96  10-29                        9.1    10.87 )-----------( 187      ( 29 Oct 2021 06:52 )             28.5     Medications  MEDICATIONS  (STANDING):  aspirin  chewable 81 milliGRAM(s) Oral daily  atorvastatin 80 milliGRAM(s) Oral at bedtime  bisacodyl Suppository 10 milliGRAM(s) Rectal once  chlorhexidine 2% Cloths 1 Application(s) Topical daily  chlorhexidine 4% Liquid 1 Application(s) Topical <User Schedule>  dextrose 40% Gel 15 Gram(s) Oral once  dextrose 50% Injectable 25 Gram(s) IV Push once  dextrose 50% Injectable 12.5 Gram(s) IV Push once  dextrose 50% Injectable 25 Gram(s) IV Push once  glucagon  Injectable 1 milliGRAM(s) IntraMuscular once  heparin   Injectable 5000 Unit(s) SubCutaneous every 8 hours  insulin glargine Injectable (LANTUS) 16 Unit(s) SubCutaneous at bedtime  insulin lispro (ADMELOG) corrective regimen sliding scale   SubCutaneous three times a day before meals  insulin lispro (ADMELOG) corrective regimen sliding scale   SubCutaneous at bedtime  insulin lispro Injectable (ADMELOG) 9 Unit(s) SubCutaneous three times a day before meals  pantoprazole    Tablet 40 milliGRAM(s) Oral before breakfast  polyethylene glycol 3350 17 Gram(s) Oral daily  senna 2 Tablet(s) Oral at bedtime  sevelamer carbonate 800 milliGRAM(s) Oral three times a day  sodium chloride 0.9%. 1000 milliLiter(s) (10 mL/Hr) IV Continuous <Continuous>      Physical Exam  General: Patient comfortable in bed  Vital Signs Last 12 Hrs  T(F): 98.2 (10-29-21 @ 05:00), Max: 98.2 (10-29-21 @ 05:00)  HR: 58 (10-29-21 @ 09:05) (57 - 58)  BP: 133/76 (10-29-21 @ 09:05) (130/64 - 133/76)  BP(mean): --  RR: 18 (10-29-21 @ 05:00) (18 - 18)  SpO2: 95% (10-29-21 @ 09:05) (95% - 95%)  Neck: No palpable thyroid nodules.  CVS: S1S2, No murmurs  Respiratory: No wheezing, no crepitations  GI: Abdomen soft, bowel sounds positive  Musculoskeletal:  edema lower extremities.   Skin: No skin rashes, no ecchymosis    Diagnostics    Free Thyroxine, Serum: AM Sched. Collection: 26-Oct-2021 06:00 (10-25 @ 13:56)

## 2021-10-29 NOTE — PROGRESS NOTE ADULT - ATTENDING COMMENTS
JOSHUA on CKD, anuric since CABG, may need long term HD.   Initiated on HD prior to CABG to optimize for surgery.   Remains anuric.   Had HD yesterday, however had low blood flow via the dialysis catheter, and also noted to have no change in BUN and Scr post HD.   Recommend placement of new dialysis catheter.   Plan for HD today after new catheter placement, and again on Saturday.   Needs tunneled dialysis catheter and AVF prior to discharge from the [facility].     Angie Nair MD  Cell : [phone]  Pager : [phone]  Office : [phone]

## 2021-10-29 NOTE — PROGRESS NOTE ADULT - PROBLEM SELECTOR PLAN 1
Continue post-op care  Continue ASA 81 mg QD and Atorvastatin 80 mg qhs   Holding beta blocker given SB/accelerated junctional   isolate pw  Increase activity as tolerated, ambulate 4x daily and with PT   Chest PT, encourage incentive spirometry   Pain control w/ Tylenol only as pt was became oversedated when on narcotics   GI ppx w/ Protonix, DVT ppx w/ HSQ   Wound care and assessment   Dispo: home w/ PT when medically cleared

## 2021-10-29 NOTE — PROGRESS NOTE ADULT - PROBLEM SELECTOR PLAN 2
Continue post-op care  Coumadin on hold as pt will be going for AV fistula with vascular Friday or Monday   INR 1.49  isolate   Increase activity as tolerated, ambulate 4x daily and with PT   Chest PT, encourage incentive spirometry   Pain control w/ Tylenol only as pt was became oversedated when on narcotics   GI ppx w/ Protonix, DVT ppx w/ HSQ   Wound care and assessment   Dispo: home w/ PT when medically cleared

## 2021-10-29 NOTE — PROGRESS NOTE ADULT - ASSESSMENT
61 yo male with DM2, HTN, and HLD who initially presented to [facility] on 10/05 as a transfer from [facility] for NSTEMI and possible CHF. Patient on Heparin drip for NSTEMI. LKW 10:45AM. Patient had episode of bradycardia (HR 30s), then became altered. RRT called. BP during RRT 70s/40s. Code stroke called for L facial droop.   CTH negative for acute pathology, old L frontal cortical infarct seen.   CTA H unremarkable. CTA N shows high-grade stenosis left internal carotid artery at the carotid bifurcation in the neck.  10/06 TTE: EF 45%, moderate-severe MR, mild-moderate L ventricular systolic dysfunction.   A1c 7  MRI no AIS but old strokes  HD cath placement 10/12   CD with severe L ICA and Mod R ICA   s/p LHC 10/14  CABG 10/21  10/22 o/e COTO aaoX 2   10/23 now AAOx3 on    10/24 increased dyspneia overnight  10/29 AAOx3, COTO no neuro deficits     Impression: Mild L nasolabial flattening and dysarthria, ?decreased eye closure strength on the L. Possibly secondary to R brain dysfunction due to acute stroke of unknown mechanism vs peripheral etiology. Patient with old L frontal infarct on CTH, also with high-grade stenosis of L ICA at the carotid bifurcation which likely cause of old stroke     Recommendations:   - plan for OR T for AVF   - ASA 81MG PO daily, coumadin dosing for valve   - Avoid hypotension.  - High dose statin therapy - atorvastatin 40mg PO daily. LDL goal <70mg/dL.  -  vascular for ICA revascularization can be outpt. not acute   - lipid panel  - telemetry  - PT/OT/SS/SLP, OOBC  - permissive HTN, -180mmHg.  - check FS, glucose control <180  - GI/DVT ppx  - Counseling on diet, exercise, and medication adherence was done  - Counseling on smoking cessation and alcohol consumption offered when appropriate.  - Pain assessed and judicious use of narcotics when appropriate was discussed.    - Stroke education given when appropriate.  - Importance of fall prevention discussed.   - Differential diagnosis and plan of care discussed with patient and/or family and primary team  - Thank you for allowing me to participate in the care of this patient. Call with questions.   - remainin per CTICU, possible stepdown when able   Marcelino Patel MD  Vascular Neurology .

## 2021-10-29 NOTE — PROGRESS NOTE ADULT - PROBLEM SELECTOR PLAN 3
Renal following  Last HD 10/26 for 2L  10/29 new Rt IJ HD [facility]   Coumadin on hold as pt will be going for AV fistula with vascular Friday or Monday    IR  Monday 11/1 permacath placement prior to AV fistula   Continue Renvela 800 mg q8h  Avoid further nephrotoxic agents   Daily BUN/Cr to trend

## 2021-10-29 NOTE — PROGRESS NOTE ADULT - SUBJECTIVE AND OBJECTIVE BOX
Surgery Progress Note    Vital Signs Last 24 Hrs  T(C): 36.8 (29 Oct 2021 05:00), Max: 36.8 (29 Oct 2021 05:00)  T(F): 98.2 (29 Oct 2021 05:00), Max: 98.2 (29 Oct 2021 05:00)  HR: 57 (29 Oct 2021 05:00) (56 - 96)  BP: 130/64 (29 Oct 2021 05:00) (119/62 - 153/72)  BP(mean): 110 (28 Oct 2021 19:38) (110 - 110)  RR: 18 (29 Oct 2021 05:00) (18 - 18)  SpO2: 95% (29 Oct 2021 05:00) (95% - 100%)I&O's Detail    28 Oct 2021 07:01  -  29 Oct 2021 07:00  --------------------------------------------------------  IN:    Oral Fluid: 417 mL    Other (mL): 500 mL  Total IN: 917 mL    OUT:    Bulb (mL): 5 mL    Voided (mL): 0 mL  Total OUT: 5 mL    Total NET: 912 mL      29 Oct 2021 07:01  -  29 Oct 2021 10:26  --------------------------------------------------------  IN:    Oral Fluid: 240 mL  Total IN: 240 mL    OUT:  Total OUT: 0 mL    Total NET: 240 mL      MEDICATIONS  (STANDING):  aspirin  chewable 81 milliGRAM(s) Oral daily  atorvastatin 80 milliGRAM(s) Oral at bedtime  bisacodyl Suppository 10 milliGRAM(s) Rectal once  chlorhexidine 2% Cloths 1 Application(s) Topical daily  chlorhexidine 4% Liquid 1 Application(s) Topical <User Schedule>  dextrose 40% Gel 15 Gram(s) Oral once  dextrose 50% Injectable 25 Gram(s) IV Push once  dextrose 50% Injectable 12.5 Gram(s) IV Push once  dextrose 50% Injectable 25 Gram(s) IV Push once  glucagon  Injectable 1 milliGRAM(s) IntraMuscular once  heparin   Injectable 5000 Unit(s) SubCutaneous every 8 hours  insulin glargine Injectable (LANTUS) 16 Unit(s) SubCutaneous at bedtime  insulin lispro (ADMELOG) corrective regimen sliding scale   SubCutaneous three times a day before meals  insulin lispro (ADMELOG) corrective regimen sliding scale   SubCutaneous at bedtime  insulin lispro Injectable (ADMELOG) 9 Unit(s) SubCutaneous three times a day before meals  pantoprazole    Tablet 40 milliGRAM(s) Oral before breakfast  polyethylene glycol 3350 17 Gram(s) Oral daily  senna 2 Tablet(s) Oral at bedtime  sevelamer carbonate 800 milliGRAM(s) Oral three times a day  sodium chloride 0.9%. 1000 milliLiter(s) (10 mL/Hr) IV Continuous <Continuous>    MEDICATIONS  (PRN):  acetaminophen   Tablet .. 650 milliGRAM(s) Oral every 6 hours PRN Mild Pain (1 - 3)  sodium chloride 0.9% lock flush 10 milliLiter(s) IV Push every 1 hour PRN Pre/post blood products, medications, blood draw, and to maintain line patency      PHYSICAL EXAM:  Constitutional: A&Ox3, NAD  Respiratory: Unlabored breathing  Abdomen: Soft, nondistended, NTTP. No rebound or guarding.  Extremities: WWP, COTO spontaneously    LABS:                        9.1    10.87 )-----------( 187      ( 29 Oct 2021 06:52 )             28.5     10-29    138  |  95<L>  |  82<H>  ----------------------------<  146<H>  4.5   |  21<L>  |  5.87<H>    Ca    9.3      29 Oct 2021 06:52  Phos  6.9     10-29  Mg     2.4     10-29    TPro  6.5  /  Alb  3.1<L>  /  TBili  0.3  /  DBili  x   /  AST  20  /  ALT  19  /  AlkPhos  96  10-29    PT/INR - ( 27 Oct 2021 15:05 )   PT: 19.1 sec;   INR: 1.63 ratio         PTT - ( 29 Oct 2021 06:54 )  PTT:31.8 sec  LIVER FUNCTIONS - ( 29 Oct 2021 06:52 )  Alb: 3.1 g/dL / Pro: 6.5 g/dL / ALK PHOS: 96 U/L / ALT: 19 U/L / AST: 20 U/L / GGT: x

## 2021-10-29 NOTE — PROGRESS NOTE ADULT - SUBJECTIVE AND OBJECTIVE BOX
Subjective ' hello I am felling ok"    VITAL SIGNS    Telemetry:  acc junc/PVC  80    Vital Signs Last 24 Hrs  T(C): 36.4 (10-29-21 @ 13:18), Max: 36.8 (10-29-21 @ 05:00)  T(F): 97.5 (10-29-21 @ 13:18), Max: 98.2 (10-29-21 @ 05:00)  HR: 59 (10-29-21 @ 13:18) (56 - 59)  BP: 155/69 (10-29-21 @ 13:18) (130/64 - 155/69)  RR: 18 (10-29-21 @ 13:18) (18 - 18)  SpO2: 98% (10-29-21 @ 13:18) (95% - 100%)           10-28 @ 07:01  -  10-29 @ 07:00  --------------------------------------------------------  IN: 917 mL / OUT: 5 mL / NET: 912 mL    10-29 @ 07:01  -  10-29 @ 14:12  --------------------------------------------------------  IN: 240 mL / OUT: 0 mL / NET: 240 mL    Daily Weight in k.9 (29 Oct 2021 10:32)                            9.1    10.87 )-----------( 187      ( 29 Oct 2021 06:52 )             28.5       10-29    138  |  95<L>  |  82<H>  ----------------------------<  146<H>  4.5   |  21<L>  |  5.87<H>    Ca    9.3      29 Oct 2021 06:52  Phos  6.9     10-29  Mg     2.4     10-29    TPro  6.5  /  Alb  3.1<L>  /  TBili  0.3  /  DBili  x   /  AST  20  /  ALT  19  /  AlkPhos  96  10-29    PT/INR - ( 29 Oct 2021 06:54 )   PT: 17.5 sec;   INR: 1.49 ratio         PTT - ( 29 Oct 2021 06:54 )  PTT:31.8 sec  CAPILLARY BLOOD GLUCOSE  MEDICATIONS  (STANDING):  aspirin  chewable 81 milliGRAM(s) Oral daily  atorvastatin 80 milliGRAM(s) Oral at bedtime  bisacodyl Suppository 10 milliGRAM(s) Rectal once  chlorhexidine 2% Cloths 1 Application(s) Topical daily  chlorhexidine 4% Liquid 1 Application(s) Topical <User Schedule>  glucagon  Injectable 1 milliGRAM(s) IntraMuscular once  heparin   Injectable 5000 Unit(s) SubCutaneous every 8 hours  insulin glargine Injectable (LANTUS) 16 Unit(s) SubCutaneous at bedtime  insulin lispro (ADMELOG) corrective regimen sliding scale   SubCutaneous three times a day before meals  insulin lispro (ADMELOG) corrective regimen sliding scale   SubCutaneous at bedtime  insulin lispro Injectable (ADMELOG) 9 Unit(s) SubCutaneous three times a day before meals  pantoprazole    Tablet 40 milliGRAM(s) Oral before breakfast  polyethylene glycol 3350 17 Gram(s) Oral daily  senna 2 Tablet(s) Oral at bedtime  sevelamer carbonate 800 milliGRAM(s) Oral three times a day  sodium chloride 0.9%. 1000 milliLiter(s) (10 mL/Hr) IV Continuous <Continuous>    MEDICATIONS  (PRN):  acetaminophen   Tablet .. 650 milliGRAM(s) Oral every 6 hours PRN Mild Pain (1 - 3)  sodium chloride 0.9% lock flush 10 milliLiter(s) IV Push every 1 hour PRN Pre/post blood products, medications, blood draw, and to maintain line patency        POCT Blood Glucose.: 185 mg/dL (29 Oct 2021 11:48)  POCT Blood Glucose.: 154 mg/dL (29 Oct 2021 07:38)  POCT Blood Glucose.: 134 mg/dL (28 Oct 2021 21:22)  POCT Blood Glucose.: 113 mg/dL (28 Oct 2021 16:21)        Left IJ HD cath  + PW                        PHYSICAL EXAM        Neurology: alert and oriented x 3, nonfocal, no gross deficits    CV :[id number]    Sternal Wound :  CDI ,Sternum  Stable    Lungs: B/l breath sounds crackles in bases on 2l NC    Abdomen: soft, nontender, nondistended, positive bowel sounds,  BM 10/29     :HD                Extremities:  Equal strength throughout    B/ le warm well perfused + DP                                           Physical Therapy Rec:   Home  [  ]   Home w/ PT  [x  ]  Rehab  [  ]    Discussed with Cardiothoracic Team at AM rounds.

## 2021-10-29 NOTE — PROCEDURE NOTE - NSPERFORMEDBY_GEN_A_CORE
Myself
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 The surgical margins are positive for tumor cells.

## 2021-10-30 LAB
ANION GAP SERPL CALC-SCNC: 20 MMOL/L — HIGH (ref 5–17)
APTT BLD: 28.2 SEC — SIGNIFICANT CHANGE UP (ref 27.5–35.5)
BUN SERPL-MCNC: 80 MG/DL — HIGH (ref 7–23)
CALCIUM SERPL-MCNC: 9 MG/DL — SIGNIFICANT CHANGE UP (ref 8.4–10.5)
CHLORIDE SERPL-SCNC: 96 MMOL/L — SIGNIFICANT CHANGE UP (ref 96–108)
CO2 SERPL-SCNC: 21 MMOL/L — LOW (ref 22–31)
CREAT SERPL-MCNC: 5.67 MG/DL — HIGH (ref 0.5–1.3)
GLUCOSE BLDC GLUCOMTR-MCNC: 106 MG/DL — HIGH (ref 70–99)
GLUCOSE BLDC GLUCOMTR-MCNC: 110 MG/DL — HIGH (ref 70–99)
GLUCOSE BLDC GLUCOMTR-MCNC: 200 MG/DL — HIGH (ref 70–99)
GLUCOSE BLDC GLUCOMTR-MCNC: 205 MG/DL — HIGH (ref 70–99)
GLUCOSE SERPL-MCNC: 176 MG/DL — HIGH (ref 70–99)
HCT VFR BLD CALC: 27.9 % — LOW (ref 39–50)
HGB BLD-MCNC: 8.9 G/DL — LOW (ref 13–17)
INR BLD: 1.39 RATIO — HIGH (ref 0.88–1.16)
MCHC RBC-ENTMCNC: 29.6 PG — SIGNIFICANT CHANGE UP (ref 27–34)
MCHC RBC-ENTMCNC: 31.9 GM/DL — LOW (ref 32–36)
MCV RBC AUTO: 92.7 FL — SIGNIFICANT CHANGE UP (ref 80–100)
NRBC # BLD: 0 /100 WBCS — SIGNIFICANT CHANGE UP (ref 0–0)
PLATELET # BLD AUTO: 174 K/UL — SIGNIFICANT CHANGE UP (ref 150–400)
POTASSIUM SERPL-MCNC: 4.6 MMOL/L — SIGNIFICANT CHANGE UP (ref 3.5–5.3)
POTASSIUM SERPL-SCNC: 4.6 MMOL/L — SIGNIFICANT CHANGE UP (ref 3.5–5.3)
PROTHROM AB SERPL-ACNC: 16.4 SEC — HIGH (ref 10.6–13.6)
RBC # BLD: 3.01 M/UL — LOW (ref 4.2–5.8)
RBC # FLD: 15.2 % — HIGH (ref 10.3–14.5)
SARS-COV-2 RNA SPEC QL NAA+PROBE: SIGNIFICANT CHANGE UP
SODIUM SERPL-SCNC: 137 MMOL/L — SIGNIFICANT CHANGE UP (ref 135–145)
WBC # BLD: 11.34 K/UL — HIGH (ref 3.8–10.5)
WBC # FLD AUTO: 11.34 K/UL — HIGH (ref 3.8–10.5)

## 2021-10-30 PROCEDURE — 99233 SBSQ HOSP IP/OBS HIGH 50: CPT | Mod: GC

## 2021-10-30 RX ORDER — WARFARIN SODIUM 2.5 MG/1
2.5 TABLET ORAL ONCE
Refills: 0 | Status: COMPLETED | OUTPATIENT
Start: 2021-10-30 | End: 2021-10-30

## 2021-10-30 RX ADMIN — Medication 81 MILLIGRAM(S): at 12:30

## 2021-10-30 RX ADMIN — SEVELAMER CARBONATE 800 MILLIGRAM(S): 2400 POWDER, FOR SUSPENSION ORAL at 14:00

## 2021-10-30 RX ADMIN — CHLORHEXIDINE GLUCONATE 1 APPLICATION(S): 213 SOLUTION TOPICAL at 12:17

## 2021-10-30 RX ADMIN — SEVELAMER CARBONATE 800 MILLIGRAM(S): 2400 POWDER, FOR SUSPENSION ORAL at 05:23

## 2021-10-30 RX ADMIN — SEVELAMER CARBONATE 800 MILLIGRAM(S): 2400 POWDER, FOR SUSPENSION ORAL at 21:36

## 2021-10-30 RX ADMIN — Medication 9 UNIT(S): at 12:32

## 2021-10-30 RX ADMIN — POLYETHYLENE GLYCOL 3350 17 GRAM(S): 17 POWDER, FOR SOLUTION ORAL at 12:30

## 2021-10-30 RX ADMIN — HEPARIN SODIUM 5000 UNIT(S): 5000 INJECTION INTRAVENOUS; SUBCUTANEOUS at 14:30

## 2021-10-30 RX ADMIN — WARFARIN SODIUM 2.5 MILLIGRAM(S): 2.5 TABLET ORAL at 21:37

## 2021-10-30 RX ADMIN — HEPARIN SODIUM 5000 UNIT(S): 5000 INJECTION INTRAVENOUS; SUBCUTANEOUS at 21:35

## 2021-10-30 RX ADMIN — Medication 1: at 12:29

## 2021-10-30 RX ADMIN — HEPARIN SODIUM 5000 UNIT(S): 5000 INJECTION INTRAVENOUS; SUBCUTANEOUS at 05:23

## 2021-10-30 RX ADMIN — Medication 9 UNIT(S): at 17:01

## 2021-10-30 RX ADMIN — INSULIN GLARGINE 16 UNIT(S): 100 INJECTION, SOLUTION SUBCUTANEOUS at 21:37

## 2021-10-30 RX ADMIN — PANTOPRAZOLE SODIUM 40 MILLIGRAM(S): 20 TABLET, DELAYED RELEASE ORAL at 06:33

## 2021-10-30 RX ADMIN — Medication 9 UNIT(S): at 08:05

## 2021-10-30 RX ADMIN — ATORVASTATIN CALCIUM 80 MILLIGRAM(S): 80 TABLET, FILM COATED ORAL at 21:37

## 2021-10-30 NOTE — PROGRESS NOTE ADULT - PROBLEM SELECTOR PLAN 2
Continue post-op care  Coumadin low dose 2.5 - as pt will be going for AV fistula with vascular Friday or Monday   INR 1.49  isolate PW   Increase activity as tolerated, ambulate 4x daily and with PT   Chest PT, encourage incentive spirometry   Pain control w/ Tylenol only as pt was became oversedated when on narcotics   GI ppx w/ Protonix, DVT ppx w/ HSQ   Wound care and assessment   Dispo: home w/ PT when medically cleared

## 2021-10-30 NOTE — PROGRESS NOTE ADULT - ASSESSMENT
Assessment  DMT2: 60y Male with DM T2 with hyperglycemia, A1C 7%, was on oral meds and insulin at home, postop CABG on basal bolus insulin,  blood sugars trending down,  no hypoglycemic episodes, eating meals, appears comfortable.  Hypothyroidism: Patient has no history thyroid disease, was not on any thyroid supplements, TSH elevated, Total T4 WNL, FT4 reordered and still pending..  CAD: s/p CABG, on medications, no chest pain, stable, monitored.  HTN: Controlled,  on antihypertensive medications.  HLD: On statin  Obesity: No strict exercise routines, not on any weight loss program, neither on low calorie diet.        Esperanza Beckett MD  Cell: [phone]  Office: [phone]

## 2021-10-30 NOTE — PROGRESS NOTE ADULT - SUBJECTIVE AND OBJECTIVE BOX
[facility] Division of Kidney Diseases & Hypertension  FOLLOW UP NOTE  [phone]--------------------------------------------------------------------------------  Chief Complaint:Chest pain        24 hour events/subjective: Patient seen & examined. Labs & vitals reviewed. Voices no issues overnight. HD today      PAST HISTORY  --------------------------------------------------------------------------------  No significant changes to PMH, PSH, FHx, SHx, unless otherwise noted    ALLERGIES & MEDICATIONS  --------------------------------------------------------------------------------  Allergies    No Known Allergies    Intolerances      Standing Inpatient Medications  aspirin  chewable 81 milliGRAM(s) Oral daily  atorvastatin 80 milliGRAM(s) Oral at bedtime  bisacodyl Suppository 10 milliGRAM(s) Rectal once  chlorhexidine 2% Cloths 1 Application(s) Topical daily  chlorhexidine 4% Liquid 1 Application(s) Topical <User Schedule>  chlorhexidine 4% Liquid 1 Application(s) Topical <User Schedule>  chlorhexidine 4% Liquid 1 Application(s) Topical <User Schedule>  dextrose 40% Gel 15 Gram(s) Oral once  dextrose 50% Injectable 25 Gram(s) IV Push once  dextrose 50% Injectable 12.5 Gram(s) IV Push once  dextrose 50% Injectable 25 Gram(s) IV Push once  glucagon  Injectable 1 milliGRAM(s) IntraMuscular once  heparin   Injectable 5000 Unit(s) SubCutaneous every 8 hours  insulin glargine Injectable (LANTUS) 16 Unit(s) SubCutaneous at bedtime  insulin lispro (ADMELOG) corrective regimen sliding scale   SubCutaneous three times a day before meals  insulin lispro (ADMELOG) corrective regimen sliding scale   SubCutaneous at bedtime  insulin lispro Injectable (ADMELOG) 9 Unit(s) SubCutaneous three times a day before meals  pantoprazole    Tablet 40 milliGRAM(s) Oral before breakfast  polyethylene glycol 3350 17 Gram(s) Oral daily  senna 2 Tablet(s) Oral at bedtime  sevelamer carbonate 800 milliGRAM(s) Oral three times a day  sodium chloride 0.9%. 1000 milliLiter(s) IV Continuous <Continuous>    PRN Inpatient Medications  acetaminophen   Tablet .. 650 milliGRAM(s) Oral every 6 hours PRN  sodium chloride 0.9% lock flush 10 milliLiter(s) IV Push every 1 hour PRN  sodium chloride 0.9% lock flush 10 milliLiter(s) IV Push every 1 hour PRN      REVIEW OF SYSTEMS  --------------------------------------------------------------------------------  Gen: No  fevers/chills  Respiratory: No dyspnea, cough  CV: No chest pain  GI: No abdominal pain, diarrhea,  nausea, vomiting  : No increased frequency, dysuria, hematuria  MSK:  no edema  Neuro: No dizziness/lightheadedness      All other systems were reviewed and are negative, except as noted.    VITALS/PHYSICAL EXAM  --------------------------------------------------------------------------------  T(C): 37.1 (10-30-21 @ 12:15), Max: 37.1 (10-30-21 @ 12:15)  HR: 63 (10-30-21 @ 12:15) (52 - 63)  BP: 134/61 (10-30-21 @ 12:15) (118/74 - 159/71)  RR: 18 (10-30-21 @ 12:15) (18 - 18)  SpO2: 95% (10-30-21 @ 12:15) (95% - 100%)  Wt(kg): --        10-29-21 @ 07:01  -  10-30-21 @ 07:00  --------------------------------------------------------  IN: 1500 mL / OUT: 2200 mL / NET: -700 mL      Physical Exam:  	Gen: NAD  	HEENT: MMM  	Pulm: CTA B/L  	CV: S1S2  	Abd: Soft, +BS   	Ext: No LE edema B/L  	Neuro: Awake  	Skin: Warm and dry  	Vascular access: RIJ nontunneled       LABS/STUDIES  --------------------------------------------------------------------------------              8.9    11.34 >-----------<  174      [10-30-21 @ 10:34]              27.9     137  |  96  |  80  ----------------------------<  176      [10-30-21 @ 10:34]  4.6   |  21  |  5.67        Ca     9.0     [10-30-21 @ 10:34]      Mg     2.4     [10-29-21 @ 06:52]      Phos  6.9     [10-29-21 @ 06:52]    TPro  6.5  /  Alb  3.1  /  TBili  0.3  /  DBili  x   /  AST  20  /  ALT  19  /  AlkPhos  96  [10-29-21 @ 06:52]    PT/INR: PT 16.4 , INR 1.39       [10-30-21 @ 10:34]  PTT: 28.2       [10-30-21 @ 10:34]      Creatinine Trend:  SCr 5.67 [10-30 @ 10:34]  SCr 5.87 [10-29 @ 06:52]  SCr 5.79 [10-28 @ 05:50]  SCr 4.18 [10-27 @ 01:34]  SCr 4.65 [10-26 @ 00:22]

## 2021-10-30 NOTE — PROGRESS NOTE ADULT - SUBJECTIVE AND OBJECTIVE BOX
Subjective " hello I feel much better"    VITAL SIGNS    Telemetry:  Ect atrial 50-60    Vital Signs Last 24 Hrs  T(C): 36.7 (10-30-21 @ 05:44), Max: 36.7 (10-30-21 @ 05:44)  T(F): 98.1 (10-30-21 @ 05:44), Max: 98.1 (10-30-21 @ 05:44)  HR: 60 (10-30-21 @ 05:44) (52 - 60)  BP: 159/71 (10-30-21 @ 05:44) (118/74 - 159/71)  RR: 18 (10-30-21 @ 05:44) (18 - 18)  SpO2: 96% (10-30-21 @ 05:44) (96% - 100%)           10-29 @ 07:01  -  10-30 @ 07:00  --------------------------------------------------------  IN: 1500 mL / OUT: 2200 mL / NET: -700 mL    Daily Weight in k.3 (29 Oct 2021 18:54)                        8.9    11.34 )-----------( 174      ( 30 Oct 2021 10:34 )             27.9     10    137  |  96  |  80<H>  ----------------------------<  176<H>  4.6   |  21<L>  |  5.67<H>    Ca    9.0      30 Oct 2021 10:34  Phos  6.9     10-29  Mg     2.4     10-29    TPro  6.5  /  Alb  3.1<L>  /  TBili  0.3  /  DBili  x   /  AST  20  /  ALT  19  /  AlkPhos  96  10-29  < from: TTE with Doppler (w/Cont) (10.28.21 @ 18:37) >   Bioprosthetic mitral valve replacement. No mitral valve  regurgitation seen. Peak mitral valve gradient equals 17 mm  Hg, mean transmitral valve gradient equals 4-5 mm Hg, which  is probably normal in the settingof a bioprosthetic mitral  valve replacement. (HRabout 50s bpm)  2. Aortic valve not well visualized; appears to be a  calcified trileaflet valve with normal opening. No aortic  valve regurgitation seen.  3. Endocardial visualization enhanced with intravenous  injection of Ultrasonic Enhancing Agent (Definity).  Hyperdynamic left ventricular systolic function.  4. Normal right ventricular size and function.  5. Unable to estimate RVSP due to incomplete TR spectral  doppler signal.  6. Minimal pericardial effusion.  *** Compared with echocardiogram of 10/21/2021, patient is  s/p Bioprosthertic MVR.    < end of copied text >      MEDICATIONS  (STANDING):  aspirin  chewable 81 milliGRAM(s) Oral daily  atorvastatin 80 milliGRAM(s) Oral at bedtime  bisacodyl Suppository 10 milliGRAM(s) Rectal once  chlorhexidine 2% Cloths 1 Application(s) Topical daily  glucagon  Injectable 1 milliGRAM(s) IntraMuscular once  heparin   Injectable 5000 Unit(s) SubCutaneous every 8 hours  insulin glargine Injectable (LANTUS) 16 Unit(s) SubCutaneous at bedtime  insulin lispro (ADMELOG) corrective regimen sliding scale   SubCutaneous three times a day before meals  insulin lispro (ADMELOG) corrective regimen sliding scale   SubCutaneous at bedtime  insulin lispro Injectable (ADMELOG) 9 Unit(s) SubCutaneous three times a day before meals  pantoprazole    Tablet 40 milliGRAM(s) Oral before breakfast  polyethylene glycol 3350 17 Gram(s) Oral daily  senna 2 Tablet(s) Oral at bedtime  sevelamer carbonate 800 milliGRAM(s) Oral three times a day  sodium chloride 0.9%. 1000 milliLiter(s) (10 mL/Hr) IV Continuous <Continuous>    MEDICATIONS  (PRN):  acetaminophen   Tablet .. 650 milliGRAM(s) Oral every 6 hours PRN Mild Pain (1 - 3)  sodium chloride 0.9% lock flush 10 milliLiter(s) IV Push every 1 hour PRN Pre/post blood products, medications, blood draw, and to maintain line patency  sodium chloride 0.9% lock flush 10 milliLiter(s) IV Push every 1 hour PRN Pre/post blood products, medications, blood draw, and to maintain line patency    CAPILLARY BLOOD GLUCOSE  POCT Blood Glucose.: 110 mg/dL (30 Oct 2021 07:50)  POCT Blood Glucose.: 139 mg/dL (29 Oct 2021 21:23)  POCT Blood Glucose.: 185 mg/dL (29 Oct 2021 11:48)          Pacing Wires        [  ]   Settings:                                  Isolated  [  x]    Coumadin    [x ] YES          [  ]      NO         Reason:                               PHYSICAL EXAM        Neurology: alert and oriented x 3, nonfocal, no gross deficits    CV :[id number]    Sternal Wound : ЮЛИЯ sternum stable     Lungs: B/l easy breath sounds on room ir     Abdomen: soft, nontender, nondistended, positive bowel sounds, last bowel movement 10/29    :  voids             Extremities: equal strength throughout   B/lle warm well perfused + DP   Rt IJ sheyla                                          Physical Therapy Rec:   Home  [  ]   Home w/ PT  [  x]  Rehab  [  ]    Discussed with Cardiothoracic Team at AM rounds.

## 2021-10-30 NOTE — PROGRESS NOTE ADULT - SUBJECTIVE AND OBJECTIVE BOX
Surgery Progress Note    Vital Signs Last 24 Hrs  T(C): 36.8 (29 Oct 2021 05:00), Max: 36.8 (29 Oct 2021 05:00)  T(F): 98.2 (29 Oct 2021 05:00), Max: 98.2 (29 Oct 2021 05:00)  HR: 57 (29 Oct 2021 05:00) (56 - 96)  BP: 130/64 (29 Oct 2021 05:00) (119/62 - 153/72)  BP(mean): 110 (28 Oct 2021 19:38) (110 - 110)  RR: 18 (29 Oct 2021 05:00) (18 - 18)  SpO2: 95% (29 Oct 2021 05:00) (95% - 100%)I&O's Detail    28 Oct 2021 07:01  -  29 Oct 2021 07:00  --------------------------------------------------------  IN:    Oral Fluid: 417 mL    Other (mL): 500 mL  Total IN: 917 mL    OUT:    Bulb (mL): 5 mL    Voided (mL): 0 mL  Total OUT: 5 mL    Total NET: 912 mL      29 Oct 2021 07:01  -  29 Oct 2021 10:26  --------------------------------------------------------  IN:    Oral Fluid: 240 mL  Total IN: 240 mL    OUT:  Total OUT: 0 mL    Total NET: 240 mL      MEDICATIONS  (STANDING):  aspirin  chewable 81 milliGRAM(s) Oral daily  atorvastatin 80 milliGRAM(s) Oral at bedtime  bisacodyl Suppository 10 milliGRAM(s) Rectal once  chlorhexidine 2% Cloths 1 Application(s) Topical daily  chlorhexidine 4% Liquid 1 Application(s) Topical <User Schedule>  dextrose 40% Gel 15 Gram(s) Oral once  dextrose 50% Injectable 25 Gram(s) IV Push once  dextrose 50% Injectable 12.5 Gram(s) IV Push once  dextrose 50% Injectable 25 Gram(s) IV Push once  glucagon  Injectable 1 milliGRAM(s) IntraMuscular once  heparin   Injectable 5000 Unit(s) SubCutaneous every 8 hours  insulin glargine Injectable (LANTUS) 16 Unit(s) SubCutaneous at bedtime  insulin lispro (ADMELOG) corrective regimen sliding scale   SubCutaneous three times a day before meals  insulin lispro (ADMELOG) corrective regimen sliding scale   SubCutaneous at bedtime  insulin lispro Injectable (ADMELOG) 9 Unit(s) SubCutaneous three times a day before meals  pantoprazole    Tablet 40 milliGRAM(s) Oral before breakfast  polyethylene glycol 3350 17 Gram(s) Oral daily  senna 2 Tablet(s) Oral at bedtime  sevelamer carbonate 800 milliGRAM(s) Oral three times a day  sodium chloride 0.9%. 1000 milliLiter(s) (10 mL/Hr) IV Continuous <Continuous>    MEDICATIONS  (PRN):  acetaminophen   Tablet .. 650 milliGRAM(s) Oral every 6 hours PRN Mild Pain (1 - 3)  sodium chloride 0.9% lock flush 10 milliLiter(s) IV Push every 1 hour PRN Pre/post blood products, medications, blood draw, and to maintain line patency    LABS:                        9.1    10.87 )-----------( 187      ( 29 Oct 2021 06:52 )             28.5     10-29    138  |  95<L>  |  82<H>  ----------------------------<  146<H>  4.5   |  21<L>  |  5.87<H>    Ca    9.3      29 Oct 2021 06:52  Phos  6.9     10-29  Mg     2.4     10-29    TPro  6.5  /  Alb  3.1<L>  /  TBili  0.3  /  DBili  x   /  AST  20  /  ALT  19  /  AlkPhos  96  10-29    PT/INR - ( 27 Oct 2021 15:05 )   PT: 19.1 sec;   INR: 1.63 ratio         PTT - ( 29 Oct 2021 06:54 )  PTT:31.8 sec  LIVER FUNCTIONS - ( 29 Oct 2021 06:52 )  Alb: 3.1 g/dL / Pro: 6.5 g/dL / ALK PHOS: 96 U/L / ALT: 19 U/L / AST: 20 U/L / GGT: x

## 2021-10-30 NOTE — PROGRESS NOTE ADULT - SUBJECTIVE AND OBJECTIVE BOX
Neurology Progress Note    S: Patient seen and examined. doing okay R IJ in place  Medications:    MEDICATIONS  (STANDING):  aspirin  chewable 81 milliGRAM(s) Oral daily  atorvastatin 80 milliGRAM(s) Oral at bedtime  bisacodyl Suppository 10 milliGRAM(s) Rectal once  chlorhexidine 2% Cloths 1 Application(s) Topical daily  chlorhexidine 4% Liquid 1 Application(s) Topical <User Schedule>  chlorhexidine 4% Liquid 1 Application(s) Topical <User Schedule>  chlorhexidine 4% Liquid 1 Application(s) Topical <User Schedule>  dextrose 40% Gel 15 Gram(s) Oral once  dextrose 50% Injectable 25 Gram(s) IV Push once  dextrose 50% Injectable 12.5 Gram(s) IV Push once  dextrose 50% Injectable 25 Gram(s) IV Push once  glucagon  Injectable 1 milliGRAM(s) IntraMuscular once  heparin   Injectable 5000 Unit(s) SubCutaneous every 8 hours  insulin glargine Injectable (LANTUS) 16 Unit(s) SubCutaneous at bedtime  insulin lispro (ADMELOG) corrective regimen sliding scale   SubCutaneous three times a day before meals  insulin lispro (ADMELOG) corrective regimen sliding scale   SubCutaneous at bedtime  insulin lispro Injectable (ADMELOG) 9 Unit(s) SubCutaneous three times a day before meals  pantoprazole    Tablet 40 milliGRAM(s) Oral before breakfast  polyethylene glycol 3350 17 Gram(s) Oral daily  senna 2 Tablet(s) Oral at bedtime  sevelamer carbonate 800 milliGRAM(s) Oral three times a day  sodium chloride 0.9%. 1000 milliLiter(s) (10 mL/Hr) IV Continuous <Continuous>  warfarin 2.5 milliGRAM(s) Oral once    MEDICATIONS  (PRN):  acetaminophen   Tablet .. 650 milliGRAM(s) Oral every 6 hours PRN Mild Pain (1 - 3)  sodium chloride 0.9% lock flush 10 milliLiter(s) IV Push every 1 hour PRN Pre/post blood products, medications, blood draw, and to maintain line patency  sodium chloride 0.9% lock flush 10 milliLiter(s) IV Push every 1 hour PRN Pre/post blood products, medications, blood draw, and to maintain line patency          Vitals:  Vital Signs Last 24 Hrs  T(C): 37.1 (10-30-21 @ 12:15), Max: 37.1 (10-30-21 @ 12:15)  T(F): 98.7 (10-30-21 @ 12:15), Max: 98.7 (10-30-21 @ 12:15)  HR: 63 (10-30-21 @ 12:15) (55 - 63)  BP: 134/61 (10-30-21 @ 12:15) (118/74 - 159/71)  BP(mean): 100 (10-30-21 @ 05:44) (100 - 100)  RR: 18 (10-30-21 @ 12:15) (18 - 18)  SpO2: 95% (10-30-21 @ 12:15) (95% - 100%)        General Exam:   General Appearance: Appropriately dressed and in no acute distress       Head: Normocephalic, atraumatic and no dysmorphic features  Ear, Nose, and Throat: Moist mucous membranes  CVS: S1S2+  Resp: No SOB, no wheeze or rhonchi  Abd: soft NTND  Extremities: No edema, no cyanosis  Skin: No bruises, no rashes     Neurological Exam:  Mental Status: Awake, alert and oriented x2-3.  Able to follow simple and complex verbal commands. Able to name and repeat. fluent speech. No obvious aphasia or dysarthria noted.   Cranial Nerves: PERRL, EOMI, VFFC, sensation V1-V3 intact,  mild NLF facial asymmetry , equal elevation of palate, scm/trap 5/5, tongue is midline on protrusion. no obvious papilledema on fundoscopic exam. Hearing is grossly intact.   Motor: Normal bulk, tone and strength throughout. Fine finger movements were intact and symmetric. no tremors or drift noted.    Sensation: Intact to light touch and pinprick throughout. no right/left confusion. no extinction to tactile on DSS.   Reflexes: 1+ throughout at biceps, brachioradialis, triceps, patellars and ankles bilaterally and equal. No clonus. R toe and L toe were both downgoing.  Coordination: No dysmetria on FNF    Gait: deferred     I personally reviewed the below data/images/labs:    CBC Full  -  ( 30 Oct 2021 10:34 )  WBC Count : 11.34 K/uL  RBC Count : 3.01 M/uL  Hemoglobin : 8.9 g/dL  Hematocrit : 27.9 %  Platelet Count - Automated : 174 K/uL  Mean Cell Volume : 92.7 fl  Mean Cell Hemoglobin : 29.6 pg  Mean Cell Hemoglobin Concentration : 31.9 gm/dL  Auto Neutrophil # : x  Auto Lymphocyte # : x  Auto Monocyte # : x  Auto Eosinophil # : x  Auto Basophil # : x  Auto Neutrophil % : x  Auto Lymphocyte % : x  Auto Monocyte % : x  Auto Eosinophil % : x  Auto Basophil % : x    10-30    137  |  96  |  80<H>  ----------------------------<  176<H>  4.6   |  21<L>  |  5.67<H>    Ca    9.0      30 Oct 2021 10:34  Phos  6.9     10-29  Mg     2.4     10-29    TPro  6.5  /  Alb  3.1<L>  /  TBili  0.3  /  DBili  x   /  AST  20  /  ALT  19  /  AlkPhos  96  10-29        -10/07 CTH: No hemorrhage. 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   -10/07 CTA N: High-grade stenosis left internal carotid artery at the carotid bifurcation in the neck.   -10/07 CTA H: Normal intracranial circulation.    < from: MR Head No Cont (10.09.21 @ 20:22) >    EXAM:  MR BRAIN                            PROCEDURE DATE:  10/09/2021            INTERPRETATION:  CLINICAL HISTORY:Facial droop, on heparin drip, NSTEMI . Severe left ICA stenosis.    TECHNIQUE:  MRI of brain without contrast dated 10/9/2021.  Examination consisted of transaxial T1, T2, FLAIR, gradient echo as well as diffusion-weighted sequences with corresponding ADC maps. Coronal T2 and sagittal T1-weighted images were also acquired through the brain.    COMPARISON: CT head and CTA head [facility] 10/7/2021    FINDINGS:  There are no areas abnormal restricted diffusion within the brain parenchyma to suggest acute/subacute infarct. There is no acute intracranial hemorrhage, vasogenic edema or abnormal susceptibility artifact. Chronic lacunar infarcts are seen in the left frontal lobe, right frontal cranial radiata and right cerebellum. Additional nonspecific patchy and confluent areas of T2/FLAIR prolongation in the bihemispheric white matter likely represents mild chronic microvascular changes.    The ventricles, sulci and cisternal spaces are stable in size and configuration. There is no midline shift or abnormal extra-axial fluid collection.    Flow-voids of the major intracranial vessels are maintained, as seen best on the T2-weighted images, indicating their patency.    The visualized paranasal sinuses and mastoid air cells are free of acute disease. The orbital regions are unremarkable.    IMPRESSION:  No acute infarct or intracranial hemorrhage. Chronic infarcts and microvascular changes as above.    --- End of Report ---      NEIL CARDENAS MD; Attending Radiologist  This document has been electronically signed. Oct 10 2021  4:26AM    < end of copied text >  < from: [facility] Duplex Carotid, Prashant (10.08.21 @ 17:07) >    EXAM:  CAROTID DUPLEX BILATERAL                            PROCEDURE DATE:  10/08/2021      INTERPRETATION:  CLINICAL INFORMATION: Left ICA high-grade stenosis identified on recent CTA neck.    COMPARISON: CTA neck 10/7/2021.    TECHNIQUE: Grayscale, color and spectral Doppler examination of both carotid arteries was performed.    FINDINGS:    There are atheromatous plaques are present bilaterally in the region of the bifurcations of the common carotid arteries into internal and external branches.    Velocity elevation of the proximal right internal carotid artery results in 50-69% flow-limiting stenosis.    Velocity elevation of the proximal left internal carotid artery results in 70-9% flow-limiting stenosis.    Bilateral flow-limiting stenoses noted of the right and left external carotid arteries as well.    Peak systolic velocities are as follows:    RIGHT:  PROX CCA = 126 cm/s  DIST CCA = 99 cm/s  PROX ICA = 137 cm/s  MID ICA = 86 cm/s  DIST ICA = 80 cm/s  ECA = 202 cm/s    LEFT:  PROX CCA = 100 cm/s  DIST CCA = 59 cm/s  PROX ICA = 313 cm/s  MID ICA = 72 cm/s  DIST ICA = 47 cm/s  ECA = 298 cm/s    Antegrade flow is noted within both vertebral arteries.    IMPRESSION:    Left internal carotid artery 70-99% flow-limiting stenosis.  Right internal carotid artery 50-69% flow-limiting stenosis.  Bilateral external carotid artery flow limiting stenoses.  INDIANA Hammond was informed of these findings on 10/8/2021 at 5:06 PM.    Measurement of carotid stenosis is based on velocity parameters that correlate the residual internal carotid diameter with that of the more distal vessel in accordance with a method such as the [employer] Symptomatic Carotid Endarterectomy Trial (NASCET).    --- End of Report ---    FELIX MCMAHON M.D., ATTENDING RADIOLOGIST  This document has been electronically signed. Oct  8 2021  5:21PM    < end of copied text >

## 2021-10-30 NOTE — PROGRESS NOTE ADULT - SUBJECTIVE AND OBJECTIVE BOX
Chief complaint    Patient is a 60y old  Male who presents with a chief complaint of NSTEMI (29 Oct 2021 14:12)   Review of systems  Patient in bed, appears comfortable.    Labs and Fingersticks  CAPILLARY BLOOD GLUCOSE      POCT Blood Glucose.: 110 mg/dL (30 Oct 2021 07:50)  POCT Blood Glucose.: 139 mg/dL (29 Oct 2021 21:23)  POCT Blood Glucose.: 185 mg/dL (29 Oct 2021 11:48)      Anion Gap, Serum: 22 *H* (10-29 @ 06:52)      Calcium, Total Serum: 9.3 (10-29 @ 06:52)  Albumin, Serum: 3.1 *L* (10-29 @ 06:52)    Alanine Aminotransferase (ALT/SGPT): 19 (10-29 @ 06:52)  Alkaline Phosphatase, Serum: 96 (10-29 @ 06:52)  Aspartate Aminotransferase (AST/SGOT): 20 (10-29 @ 06:52)        10-29    138  |  95<L>  |  82<H>  ----------------------------<  146<H>  4.5   |  21<L>  |  5.87<H>    Ca    9.3      29 Oct 2021 06:52  Phos  6.9     10-29  Mg     2.4     10-29    TPro  6.5  /  Alb  3.1<L>  /  TBili  0.3  /  DBili  x   /  AST  20  /  ALT  19  /  AlkPhos  96  10-29                        9.1    10.87 )-----------( 187      ( 29 Oct 2021 06:52 )             28.5     Medications  MEDICATIONS  (STANDING):  aspirin  chewable 81 milliGRAM(s) Oral daily  atorvastatin 80 milliGRAM(s) Oral at bedtime  bisacodyl Suppository 10 milliGRAM(s) Rectal once  chlorhexidine 2% Cloths 1 Application(s) Topical daily  chlorhexidine 4% Liquid 1 Application(s) Topical <User Schedule>  chlorhexidine 4% Liquid 1 Application(s) Topical <User Schedule>  chlorhexidine 4% Liquid 1 Application(s) Topical <User Schedule>  dextrose 40% Gel 15 Gram(s) Oral once  dextrose 50% Injectable 25 Gram(s) IV Push once  dextrose 50% Injectable 12.5 Gram(s) IV Push once  dextrose 50% Injectable 25 Gram(s) IV Push once  glucagon  Injectable 1 milliGRAM(s) IntraMuscular once  heparin   Injectable 5000 Unit(s) SubCutaneous every 8 hours  insulin glargine Injectable (LANTUS) 16 Unit(s) SubCutaneous at bedtime  insulin lispro (ADMELOG) corrective regimen sliding scale   SubCutaneous three times a day before meals  insulin lispro (ADMELOG) corrective regimen sliding scale   SubCutaneous at bedtime  insulin lispro Injectable (ADMELOG) 9 Unit(s) SubCutaneous three times a day before meals  pantoprazole    Tablet 40 milliGRAM(s) Oral before breakfast  polyethylene glycol 3350 17 Gram(s) Oral daily  senna 2 Tablet(s) Oral at bedtime  sevelamer carbonate 800 milliGRAM(s) Oral three times a day  sodium chloride 0.9%. 1000 milliLiter(s) (10 mL/Hr) IV Continuous <Continuous>      Physical Exam  General: Patient comfortable in bed  Vital Signs Last 12 Hrs  T(F): 98.1 (10-30-21 @ 05:44), Max: 98.1 (10-30-21 @ 05:44)  HR: 60 (10-30-21 @ 05:44) (60 - 60)  BP: 159/71 (10-30-21 @ 05:44) (159/71 - 159/71)  BP(mean): 100 (10-30-21 @ 05:44) (100 - 100)  RR: 18 (10-30-21 @ 05:44) (18 - 18)  SpO2: 96% (10-30-21 @ 05:44) (96% - 96%)  Neck: No palpable thyroid nodules.  CVS: S1S2, No murmurs  Respiratory: No wheezing, no crepitations  GI: Abdomen soft, bowel sounds positive  Musculoskeletal:  edema lower extremities.     Diagnostics    Free Thyroxine, Serum: AM Sched. Collection: 21-Oct-2021 06:00 (10-20 @ 11:51)  Thyroid Stimulating Hormone, Serum: AM Sched. Collection: 21-Oct-2021 06:00 (10-20 @ 11:51)

## 2021-10-30 NOTE — PROGRESS NOTE ADULT - ATTENDING COMMENTS
No new complaints  1.  ARF on CKD.  Likely will -->ESRD designation.  Permcath, AV access creation, outpt center arrangement  2.  Anemia--not at goal, on SISSY, hgb10 goal  3.  Hyperphosphatemia--binder.  Goal <5  discussed with med team

## 2021-10-30 NOTE — PROGRESS NOTE ADULT - ASSESSMENT
61 y/o M w/ hx of DM2, HTN and HLD initially presented as transfer from [facility] for chest pain concerning for NSTEMI and possible new CHF c/b JOSHUA likely on CKD, and hypertensive emergency. Hospital course complicated by s/p RRT for CVA/TIA ruled out and worsening anemia requiring 1 PRBC. Pt S/p HD session today and underwent LHC showing Multivessel CAD. CT surgery consulted for CABG evaluation.       Surgery/Hospital Course:  10/07 Code stroke called for left facial droop, CT showed old frontal infarct, found to have high grade left carotid stenosis, but unclear cause for acute neurologic deficit  10/13 initiated HD, patient admitted with a creatinine of 4.05, but did not know of a history of renal   10/21 s/p C2L, MVR(T), & LAAL. Received 4 PRBC, 1 PLT, 1000 FIEBA intraop. Required dual pressor and inotrope support w/ , Primacor, Levo, and Epi gtts.   10/22 Extubated at 04:55.   10/24 SOB, urgent HD overnight, bipap  10/25 O2 via HFNC transitioned to 5L NC. Plan for HD today. SB in 50s, backup VVI paced at 50 via temporary epicardial PW.  10/26 Temporary access for HD removed yesterday as was not functioning, will consult IR for permacath. Plan for HD today. Check afternoon INR for coumadin dosing tonight   10/27 Pt received to floor on 2 Arnold - VSS, NAD. Coumadin on hold for AVF w/ vascular Friday or Monday - will discuss initiating heparin gtt to bridge w/ Dr. Xiong in AM. L LIDIA carrion placed in CTU for HD access will need to be transitioned to permacath prior to AVF - will d/w IR.   10/28 IR consult requested for PermCath placement HD with 1uPRBC transfusion today. Hod narcotics. Pt lethargic but responsive to verbal stimuli. FS 121mg/dl. 92% RA   10/29 VSS INR 1.49 HD cath poor flow- requested by nephrology to place new HD cath- Rt IJ Placed  - cleared for Vascular surgery placement of AVF on Tuesday   10/30 VSS  ECHO  resulted minimal pericardial effusion  improved mood / activity  HD today Perm Cath for Monday.

## 2021-10-30 NOTE — PROGRESS NOTE ADULT - PROBLEM SELECTOR PLAN 3
Renal following  10/29 new Rt IJ HD [facility] place  10/29 HD  10/30 HD   Coumadin low dose  as pt will be going for AV fistula with vascular Friday or Monday    IR  Monday 11/1 permacath placement prior to AV fistula   Continue Renvela 800 mg q8h  Avoid further nephrotoxic agents   Daily BUN/Cr to trend

## 2021-10-30 NOTE — PROGRESS NOTE ADULT - ASSESSMENT
60 year old male with PMHx of DM and HTN who presented to the hospital as a transfer from OSH for NSTEMI. The nephrology team was consulted due to elevated creatinine of 4.05 upon presentation.    JOSHUA on CKD, now on HD, may need long term HD  - Likely with CKD from diabetic nephropathy, admitted with JOSHUA on CKD and hypervolemia, was initiated on HD prior to CABG for optimization.   --- initiated on HD on 10/14  - s/p LHC on 10/14 showing severe multivessel disease; now s/p CABG on 10/21  - Underwent HD yesterday however unable to attain proper blood flow through the LIJ cathetter  Had HD yesterday, however had low blood flow via the dialysis catheter, and also noted to have no change in BUN and Scr post HD.   s/p new non tunneled RIJ dialysis catheter.   - IR ob board for a tunneled cathter placement prior to discharge; planned for 11/1  - vascular surgery on board; plan for AVF creation prior to discharge; planned for 11/2  - monitor BMP  - adjust meds as per HD     Anemia:   - in the setting of CKD   - iron studies wnl   - continue on SISSY, 17576 units TIW with HD.   - monitor CBC            If you have any questions, please feel free to contact me  Annika Vargas  Nephrology Fellow  Pager NS: [phone]/ TRACY: [phone]    (After 5 pm or on weekends please page the on-call fellow, can check [email or web address] for schedule. Login is marty garcia, schedule under [facility] medicine, psych, derm)

## 2021-10-30 NOTE — PROGRESS NOTE ADULT - ASSESSMENT
60M with history of DM type 2, HTN, HLD presenting as transfer from [facility] for chest pain concerning for NSTEMI, code stroke also called for L facial droop, CTA significant for ipsilateral high-grade stenosis of the left internal carotid artery at the carotid bifurcation seen on carotid duplex, now s/p cabg. Patient with ongoing HD requirements now requiring long term HD access planning.     - Plan for LUE AV graft on Tuesday, 11/2. Previously on coumadin, will need INR correction prior to OR. Last INR 10/29 1.49  - Currently getting HD via [facility], will need PC by IR, schedule for Monday   - left arm precautions, maintain pink band  - CT surgery clearance noted   - consent in chart     Please contact Vascular Surgery ([phone]: [phone]) with any questions. 60M with history of DM type 2, HTN, HLD presenting as transfer from [facility] for chest pain concerning for NSTEMI, code stroke also called for L facial droop, CTA significant for ipsilateral high-grade stenosis of the left internal carotid artery at the carotid bifurcation seen on carotid duplex, now s/p cabg. Patient with ongoing HD requirements now requiring long term HD access planning.     - Plan for LUE AV graft on Tuesday, 11/2. Previously on coumadin, will need INR correction prior to OR. Please hold coumadin  Last INR 10/29 1.49  - Currently getting HD via [facility], will need PC by IR, schedule for Monday   - left arm precautions, maintain pink band  - CT surgery clearance noted   - consent in chart     Please contact Vascular Surgery (P: [phone]) with any questions. 60M with history of DM type 2, HTN, HLD presenting as transfer from [facility] for chest pain concerning for NSTEMI, code stroke also called for L facial droop, CTA significant for ipsilateral high-grade stenosis of the left internal carotid artery at the carotid bifurcation seen on carotid duplex, now s/p cabg. Patient with ongoing HD requirements now requiring long term HD access planning.     - Plan for LUE AV graft on Tuesday, 11/2. Previously on coumadin, will need INR correction prior to OR. Please hold coumadin and use heparin gtt if patient needs to be on therapeutic AC.  - Currently getting HD via [facility], will need PC by IR, schedule for Monday   - left arm precautions, maintain pink band  - CT surgery clearance noted   - consent in chart     Please contact Vascular Surgery (P: [phone]) with any questions.

## 2021-10-30 NOTE — PROGRESS NOTE ADULT - ASSESSMENT
59 yo male with DM2, HTN, and HLD who initially presented to [facility] on 10/05 as a transfer from [facility] for NSTEMI and possible CHF. Patient on Heparin drip for NSTEMI. LKW 10:45AM. Patient had episode of bradycardia (HR 30s), then became altered. RRT called. BP during RRT 70s/40s. Code stroke called for L facial droop.   CTH negative for acute pathology, old L frontal cortical infarct seen.   CTA H unremarkable. CTA N shows high-grade stenosis left internal carotid artery at the carotid bifurcation in the neck.  10/06 TTE: EF 45%, moderate-severe MR, mild-moderate L ventricular systolic dysfunction.   A1c 7  MRI no AIS but old strokes  HD cath placement 10/12   CD with severe L ICA and Mod R ICA   s/p LHC 10/14  CABG 10/21  10/22 o/e COTO aaoX 2   10/23 now AAOx3 on    10/24 increased dyspneia overnight  10/30 AAOx3, COTO no neuro deficits     Impression: Mild L nasolabial flattening and dysarthria, ?decreased eye closure strength on the L. Possibly secondary to R brain dysfunction due to acute stroke of unknown mechanism vs peripheral etiology. Patient with old L frontal infarct on CTH, also with high-grade stenosis of L ICA at the carotid bifurcation which likely cause of old stroke     Recommendations:   - plan for permacath Monday   - ASA 81MG PO daily, coumadin dosing for valve   - Avoid hypotension.  - High dose statin therapy - atorvastatin 40mg PO daily. LDL goal <70mg/dL.  -  vascular for ICA revascularization can be outpt. not acute   - lipid panel  - telemetry  - PT/OT/SS/SLP, OOBC  - permissive HTN, -180mmHg.  - check FS, glucose control <180  - GI/DVT ppx  - Counseling on diet, exercise, and medication adherence was done  - Counseling on smoking cessation and alcohol consumption offered when appropriate.  - Pain assessed and judicious use of narcotics when appropriate was discussed.    - Stroke education given when appropriate.  - Importance of fall prevention discussed.   - Differential diagnosis and plan of care discussed with patient and/or family and primary team  - Thank you for allowing me to participate in the care of this patient. Call with questions.   - remainin per CTICU, possible stepdown when able   Marcelino Patel MD  Vascular Neurology .

## 2021-10-31 LAB
ANION GAP SERPL CALC-SCNC: 15 MMOL/L — SIGNIFICANT CHANGE UP (ref 5–17)
APTT BLD: 30.6 SEC — SIGNIFICANT CHANGE UP (ref 27.5–35.5)
BLD GP AB SCN SERPL QL: NEGATIVE — SIGNIFICANT CHANGE UP
BUN SERPL-MCNC: 64 MG/DL — HIGH (ref 7–23)
CALCIUM SERPL-MCNC: 9 MG/DL — SIGNIFICANT CHANGE UP (ref 8.4–10.5)
CHLORIDE SERPL-SCNC: 98 MMOL/L — SIGNIFICANT CHANGE UP (ref 96–108)
CO2 SERPL-SCNC: 24 MMOL/L — SIGNIFICANT CHANGE UP (ref 22–31)
CREAT SERPL-MCNC: 4.95 MG/DL — HIGH (ref 0.5–1.3)
GLUCOSE BLDC GLUCOMTR-MCNC: 118 MG/DL — HIGH (ref 70–99)
GLUCOSE BLDC GLUCOMTR-MCNC: 126 MG/DL — HIGH (ref 70–99)
GLUCOSE BLDC GLUCOMTR-MCNC: 221 MG/DL — HIGH (ref 70–99)
GLUCOSE BLDC GLUCOMTR-MCNC: 232 MG/DL — HIGH (ref 70–99)
GLUCOSE SERPL-MCNC: 128 MG/DL — HIGH (ref 70–99)
HCT VFR BLD CALC: 27.9 % — LOW (ref 39–50)
HGB BLD-MCNC: 9 G/DL — LOW (ref 13–17)
INR BLD: 1.41 RATIO — HIGH (ref 0.88–1.16)
MCHC RBC-ENTMCNC: 29.8 PG — SIGNIFICANT CHANGE UP (ref 27–34)
MCHC RBC-ENTMCNC: 32.3 GM/DL — SIGNIFICANT CHANGE UP (ref 32–36)
MCV RBC AUTO: 92.4 FL — SIGNIFICANT CHANGE UP (ref 80–100)
NRBC # BLD: 0 /100 WBCS — SIGNIFICANT CHANGE UP (ref 0–0)
PLATELET # BLD AUTO: 170 K/UL — SIGNIFICANT CHANGE UP (ref 150–400)
POTASSIUM SERPL-MCNC: 4.4 MMOL/L — SIGNIFICANT CHANGE UP (ref 3.5–5.3)
POTASSIUM SERPL-SCNC: 4.4 MMOL/L — SIGNIFICANT CHANGE UP (ref 3.5–5.3)
PROTHROM AB SERPL-ACNC: 16.7 SEC — HIGH (ref 10.6–13.6)
RBC # BLD: 3.02 M/UL — LOW (ref 4.2–5.8)
RBC # FLD: 15.1 % — HIGH (ref 10.3–14.5)
RH IG SCN BLD-IMP: POSITIVE — SIGNIFICANT CHANGE UP
SARS-COV-2 RNA SPEC QL NAA+PROBE: SIGNIFICANT CHANGE UP
SODIUM SERPL-SCNC: 137 MMOL/L — SIGNIFICANT CHANGE UP (ref 135–145)
WBC # BLD: 12.66 K/UL — HIGH (ref 3.8–10.5)
WBC # FLD AUTO: 12.66 K/UL — HIGH (ref 3.8–10.5)

## 2021-10-31 PROCEDURE — 93010 ELECTROCARDIOGRAM REPORT: CPT

## 2021-10-31 RX ORDER — INSULIN LISPRO 100/ML
10 VIAL (ML) SUBCUTANEOUS
Refills: 0 | Status: DISCONTINUED | OUTPATIENT
Start: 2021-10-31 | End: 2021-11-02

## 2021-10-31 RX ADMIN — HEPARIN SODIUM 5000 UNIT(S): 5000 INJECTION INTRAVENOUS; SUBCUTANEOUS at 15:10

## 2021-10-31 RX ADMIN — INSULIN GLARGINE 16 UNIT(S): 100 INJECTION, SOLUTION SUBCUTANEOUS at 21:46

## 2021-10-31 RX ADMIN — Medication 2: at 12:05

## 2021-10-31 RX ADMIN — SENNA PLUS 2 TABLET(S): 8.6 TABLET ORAL at 21:49

## 2021-10-31 RX ADMIN — Medication 9 UNIT(S): at 08:26

## 2021-10-31 RX ADMIN — Medication 81 MILLIGRAM(S): at 12:07

## 2021-10-31 RX ADMIN — HEPARIN SODIUM 5000 UNIT(S): 5000 INJECTION INTRAVENOUS; SUBCUTANEOUS at 05:31

## 2021-10-31 RX ADMIN — SEVELAMER CARBONATE 800 MILLIGRAM(S): 2400 POWDER, FOR SUSPENSION ORAL at 15:11

## 2021-10-31 RX ADMIN — POLYETHYLENE GLYCOL 3350 17 GRAM(S): 17 POWDER, FOR SOLUTION ORAL at 12:06

## 2021-10-31 RX ADMIN — ATORVASTATIN CALCIUM 80 MILLIGRAM(S): 80 TABLET, FILM COATED ORAL at 21:47

## 2021-10-31 RX ADMIN — Medication 9 UNIT(S): at 12:06

## 2021-10-31 RX ADMIN — Medication 2: at 17:14

## 2021-10-31 RX ADMIN — Medication 10 UNIT(S): at 17:15

## 2021-10-31 RX ADMIN — SEVELAMER CARBONATE 800 MILLIGRAM(S): 2400 POWDER, FOR SUSPENSION ORAL at 21:47

## 2021-10-31 RX ADMIN — CHLORHEXIDINE GLUCONATE 1 APPLICATION(S): 213 SOLUTION TOPICAL at 15:13

## 2021-10-31 RX ADMIN — HEPARIN SODIUM 5000 UNIT(S): 5000 INJECTION INTRAVENOUS; SUBCUTANEOUS at 21:47

## 2021-10-31 RX ADMIN — SEVELAMER CARBONATE 800 MILLIGRAM(S): 2400 POWDER, FOR SUSPENSION ORAL at 05:32

## 2021-10-31 RX ADMIN — PANTOPRAZOLE SODIUM 40 MILLIGRAM(S): 20 TABLET, DELAYED RELEASE ORAL at 05:34

## 2021-10-31 NOTE — PROGRESS NOTE ADULT - ASSESSMENT
Assessment  DMT2: 60y Male with DM T2 with hyperglycemia, A1C 7%, was on oral meds and insulin at home, postop CABG on basal bolus insulin, blood sugars in acceptable range this AM and fluctuating/running high postprandially, no hypoglycemic episodes, eating meals, appears comfortable, for AVF tomorrow.  Hypothyroidism: Patient has no history thyroid disease, was not on any thyroid supplements, TSH elevated, Total T4 WNL, FT4 reordered and still pending..  CAD: s/p CABG, on medications, no chest pain, stable, monitored.  HTN: Controlled,  on antihypertensive medications.  HLD: On statin  Obesity: No strict exercise routines, not on any weight loss program, neither on low calorie diet.        Esperanza Beckett MD  Cell: [phone]  Office: [phone]     Assessment  DMT2: 60y Male with DM T2 with hyperglycemia, A1C 7%, was on oral meds and insulin at home, postop CABG on basal bolus insulin, blood sugars in acceptable range this AM and fluctuating/running high postprandially, no hypoglycemic episodes,  eating meals, appears comfortable, for AVF tomorrow.  Hypothyroidism: Patient has no history thyroid disease, was not on any thyroid supplements, TSH elevated, Total T4 WNL, FT4 reordered and still pending..  CAD: s/p CABG, on medications, no chest pain, stable, monitored.  HTN: Controlled,  on antihypertensive medications.  HLD: On statin  Obesity: No strict exercise routines, not on any weight loss program, neither on low calorie diet.        Esperanza Beckett MD  Cell: [phone]  Office: [phone]

## 2021-10-31 NOTE — PROGRESS NOTE ADULT - ASSESSMENT
61 yo male with DM2, HTN, and HLD who initially presented to [facility] on 10/05 as a transfer from [facility] for NSTEMI and possible CHF. Patient on Heparin drip for NSTEMI. LKW 10:45AM. Patient had episode of bradycardia (HR 30s), then became altered. RRT called. BP during RRT 70s/40s. Code stroke called for L facial droop.   CTH negative for acute pathology, old L frontal cortical infarct seen.   CTA H unremarkable. CTA N shows high-grade stenosis left internal carotid artery at the carotid bifurcation in the neck.  10/06 TTE: EF 45%, moderate-severe MR, mild-moderate L ventricular systolic dysfunction.   A1c 7  MRI no AIS but old strokes  HD cath placement 10/12   CD with severe L ICA and Mod R ICA   s/p LHC 10/14  CABG 10/21    Impression: Mild L nasolabial flattening and dysarthria, ?decreased eye closure strength on the L. Possibly secondary to R brain dysfunction due to acute stroke of unknown mechanism vs peripheral etiology. Patient with old L frontal infarct on CTH, also with high-grade stenosis of L ICA at the carotid bifurcation which likely cause of old stroke     Recommendations:   - plan for permacath Monday   - ASA 81MG PO daily, coumadin dosing for valve   - Avoid hypotension.  - High dose statin therapy - atorvastatin 40mg PO daily. LDL goal <70mg/dL.  -  vascular for ICA revascularization can be outpt. not acute   - lipid panel  - telemetry  - PT/OT/SS/SLP, OOBC  - check FS, glucose control <180  - GI/DVT ppx  - Counseling on diet, exercise, and medication adherence was done  - Counseling on smoking cessation and alcohol consumption offered when appropriate.  - Pain assessed and judicious use of narcotics when appropriate was discussed.    - Stroke education given when appropriate.  - Importance of fall prevention discussed.   - Differential diagnosis and plan of care discussed with patient and/or family and primary team  - Thank you for allowing me to participate in the care of this patient. Call with questions.   Marcelino Patel MD  Vascular Neurology .

## 2021-10-31 NOTE — PROGRESS NOTE ADULT - ASSESSMENT
60M with history of DM type 2, HTN, HLD presenting as transfer from [facility] for chest pain concerning for NSTEMI, code stroke also called for L facial droop, CTA significant for ipsilateral high-grade stenosis of the left internal carotid artery at the carotid bifurcation seen on carotid duplex, now s/p cabg. Patient with ongoing HD requirements now requiring long term HD access planning.     - Plan for LUE AV graft on Tuesday, 11/2. Previously on coumadin, will need INR correction prior to OR.   - Please hold coumadin and use heparin gtt if patient needs to be on therapeutic AC.  - Continue to trend WBC  - Currently getting HD via [facility], will need PC by IR, schedule for Monday   - left arm precautions, maintain pink band  - CT surgery clearance noted   - consent in chart     Please contact Vascular Surgery (P: [phone]) with any questions.

## 2021-10-31 NOTE — PROGRESS NOTE ADULT - PROBLEM SELECTOR PLAN 1
Will increase Lantus 16u at bedtime.  Will increase Admelog to 10u before each meal and continue Admelog correction scale coverage. Will continue monitoring FS and FU.  Patient counseled for compliance with consistent low carb diet and exercise as tolerated outpatient.

## 2021-10-31 NOTE — PROGRESS NOTE ADULT - SUBJECTIVE AND OBJECTIVE BOX
Subjective " hello I am feeling better"    VITAL SIGNS    Telemetry:  SR 50-60    Vital Signs Last 24 Hrs  T(C): 37 (10-31-21 @ 05:10), Max: 37.1 (10-30-21 @ 12:15)  T(F): 98.6 (10-31-21 @ 05:10), Max: 98.7 (10-30-21 @ 12:15)  HR: 57 (10-31-21 @ 05:10) (57 - 64)  BP: 132/78 (10-31-21 @ 05:10) (124/64 - 134/61)  RR: 18 (10-31-21 @ 05:10) (18 - 18)  SpO2: 97% (10-31-21 @ 05:10) (95% - 100%)         10-30 @ 07:01  -  10-31 @ 07:00  --------------------------------------------------------  IN: 580 mL / OUT: 1501 mL / NET: -921 mL    Daily Weight in k.2 (30 Oct 2021 15:45)                        9.0    12.66 )-----------( 170      ( 31 Oct 2021 06:14 )    PT/INR - ( 31 Oct 2021 06:14 )   PT: 16.7 sec;   INR: 1.41 ratio         PTT - ( 31 Oct 2021 06:14 )  PTT:30.6 sec           27.9   10-31    137  |  98  |  64<H>  ----------------------------<  128<H>  4.4   |  24  |  4.95<H>    Ca    9.0      31 Oct 2021 06:13    MEDICATIONS  (STANDING):  aspirin  chewable 81 milliGRAM(s) Oral daily  atorvastatin 80 milliGRAM(s) Oral at bedtime  bisacodyl Suppository 10 milliGRAM(s) Rectal once>  glucagon  Injectable 1 milliGRAM(s) IntraMuscular once  heparin   Injectable 5000 Unit(s) SubCutaneous every 8 hours  insulin glargine Injectable (LANTUS) 16 Unit(s) SubCutaneous at bedtime  insulin lispro (ADMELOG) corrective regimen sliding scale   SubCutaneous three times a day before meals  insulin lispro (ADMELOG) corrective regimen sliding scale   SubCutaneous at bedtime  insulin lispro Injectable (ADMELOG) 9 Unit(s) SubCutaneous three times a day before meals  pantoprazole    Tablet 40 milliGRAM(s) Oral before breakfast  polyethylene glycol 3350 17 Gram(s) Oral daily  senna 2 Tablet(s) Oral at bedtime  sevelamer carbonate 800 milliGRAM(s) Oral three times a day  sodium chloride 0.9%. 1000 milliLiter(s) (10 mL/Hr) IV Continuous <Continuous>    MEDICATIONS  (PRN):  acetaminophen   Tablet .. 650 milliGRAM(s) Oral every 6 hours PRN Mild Pain (1 - 3)  sodium chloride 0.9% lock flush 10 milliLiter(s) IV Push every 1 hour PRN Pre/post blood products, medications, blood draw, and to maintain line patency  sodium chloride 0.9% lock flush 10 milliLiter(s) IV Push every 1 hour PRN Pre/post blood products, medications, blood draw, and to maintain line patency    CAPILLARY BLOOD GLUCOSE      POCT Blood Glucose.: 205 mg/dL (30 Oct 2021 21:19)  POCT Blood Glucose.: 106 mg/dL (30 Oct 2021 16:58)  POCT Blood Glucose.: 200 mg/dL (30 Oct 2021 11:46)  POCT Blood Glucose.: 110 mg/dL (30 Oct 2021 07:50)              PHYSICAL EXAM  Neurology: alert and oriented x 3, nonfocal, no gross deficits    CV :[id number]    Sternal Wound :  ЮЛИЯ sternum Stable +pw    Lungs:B/l breath sounds on room air     Abdomen: soft, nontender, nondistended, positive bowel sounds, last bowel movement 10/31    :  HD via left IJ [facility]             Extremities:  equal strength throughout   b/lle warm well perfused +Dp                                         Physical Therapy Rec:   Home  [  ]   Home w/ PT  [x  ]  Rehab  [  ]    Discussed with Cardiothoracic Team at AM rounds.

## 2021-10-31 NOTE — PROGRESS NOTE ADULT - SUBJECTIVE AND OBJECTIVE BOX
Neurology Progress Note    S: Patient seen and examined. doing okay   Medications:    MEDICATIONS  (STANDING):  aspirin  chewable 81 milliGRAM(s) Oral daily  atorvastatin 80 milliGRAM(s) Oral at bedtime  bisacodyl Suppository 10 milliGRAM(s) Rectal once  chlorhexidine 2% Cloths 1 Application(s) Topical daily  chlorhexidine 4% Liquid 1 Application(s) Topical <User Schedule>  chlorhexidine 4% Liquid 1 Application(s) Topical <User Schedule>  chlorhexidine 4% Liquid 1 Application(s) Topical <User Schedule>  dextrose 40% Gel 15 Gram(s) Oral once  dextrose 50% Injectable 25 Gram(s) IV Push once  dextrose 50% Injectable 12.5 Gram(s) IV Push once  dextrose 50% Injectable 25 Gram(s) IV Push once  glucagon  Injectable 1 milliGRAM(s) IntraMuscular once  heparin   Injectable 5000 Unit(s) SubCutaneous every 8 hours  insulin glargine Injectable (LANTUS) 16 Unit(s) SubCutaneous at bedtime  insulin lispro (ADMELOG) corrective regimen sliding scale   SubCutaneous three times a day before meals  insulin lispro (ADMELOG) corrective regimen sliding scale   SubCutaneous at bedtime  insulin lispro Injectable (ADMELOG) 10 Unit(s) SubCutaneous three times a day before meals  pantoprazole    Tablet 40 milliGRAM(s) Oral before breakfast  polyethylene glycol 3350 17 Gram(s) Oral daily  senna 2 Tablet(s) Oral at bedtime  sevelamer carbonate 800 milliGRAM(s) Oral three times a day  sodium chloride 0.9%. 1000 milliLiter(s) (10 mL/Hr) IV Continuous <Continuous>    MEDICATIONS  (PRN):  acetaminophen   Tablet .. 650 milliGRAM(s) Oral every 6 hours PRN Mild Pain (1 - 3)  sodium chloride 0.9% lock flush 10 milliLiter(s) IV Push every 1 hour PRN Pre/post blood products, medications, blood draw, and to maintain line patency  sodium chloride 0.9% lock flush 10 milliLiter(s) IV Push every 1 hour PRN Pre/post blood products, medications, blood draw, and to maintain line patency          Vitals:  Vital Signs Last 24 Hrs  T(C): 36.6 (10-31-21 @ 12:54), Max: 37 (10-31-21 @ 05:10)  T(F): 97.9 (10-31-21 @ 12:54), Max: 98.6 (10-31-21 @ 05:10)  HR: 60 (10-31-21 @ 12:54) (57 - 64)  BP: 94/57 (10-31-21 @ 12:54) (94/57 - 132/78)  BP(mean): --  RR: 18 (10-31-21 @ 12:54) (18 - 18)  SpO2: 94% (10-31-21 @ 12:54) (94% - 100%)        General Exam:   General Appearance: Appropriately dressed and in no acute distress       Head: Normocephalic, atraumatic and no dysmorphic features  Ear, Nose, and Throat: Moist mucous membranes  CVS: S1S2+  Resp: No SOB, no wheeze or rhonchi  Abd: soft NTND  Extremities: No edema, no cyanosis  Skin: No bruises, no rashes     Neurological Exam:  Mental Status: Awake, alert and oriented x2-3.  Able to follow simple and complex verbal commands. Able to name and repeat. fluent speech. No obvious aphasia or dysarthria noted.   Cranial Nerves: PERRL, EOMI, VFFC, sensation V1-V3 intact,  mild NLF facial asymmetry , equal elevation of palate, scm/trap 5/5, tongue is midline on protrusion. no obvious papilledema on fundoscopic exam. Hearing is grossly intact.   Motor: Normal bulk, tone and strength throughout. Fine finger movements were intact and symmetric. no tremors or drift noted.    Sensation: Intact to light touch and pinprick throughout. no right/left confusion. no extinction to tactile on DSS.   Reflexes: 1+ throughout at biceps, brachioradialis, triceps, patellars and ankles bilaterally and equal. No clonus. R toe and L toe were both downgoing.  Coordination: No dysmetria on FNF    Gait: deferred     I personally reviewed the below data/images/labs:    CBC Full  -  ( 31 Oct 2021 06:14 )  WBC Count : 12.66 K/uL  RBC Count : 3.02 M/uL  Hemoglobin : 9.0 g/dL  Hematocrit : 27.9 %  Platelet Count - Automated : 170 K/uL  Mean Cell Volume : 92.4 fl  Mean Cell Hemoglobin : 29.8 pg  Mean Cell Hemoglobin Concentration : 32.3 gm/dL  Auto Neutrophil # : x  Auto Lymphocyte # : x  Auto Monocyte # : x  Auto Eosinophil # : x  Auto Basophil # : x  Auto Neutrophil % : x  Auto Lymphocyte % : x  Auto Monocyte % : x  Auto Eosinophil % : x  Auto Basophil % : x      10-31    137  |  98  |  64<H>  ----------------------------<  128<H>  4.4   |  24  |  4.95<H>    Ca    9.0      31 Oct 2021 06:13          -10/07 CTH: No hemorrhage. 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   -10/07 CTA N: High-grade stenosis left internal carotid artery at the carotid bifurcation in the neck.   -10/07 CTA H: Normal intracranial circulation.    < from: MR Head No Cont (10.09.21 @ 20:22) >    EXAM:  MR BRAIN                            PROCEDURE DATE:  10/09/2021            INTERPRETATION:  CLINICAL HISTORY:Facial droop, on heparin drip, NSTEMI . Severe left ICA stenosis.    TECHNIQUE:  MRI of brain without contrast dated 10/9/2021.  Examination consisted of transaxial T1, T2, FLAIR, gradient echo as well as diffusion-weighted sequences with corresponding ADC maps. Coronal T2 and sagittal T1-weighted images were also acquired through the brain.    COMPARISON: CT head and CTA head andneck 10/7/2021    FINDINGS:  There are no areas abnormal restricted diffusion within the brain parenchyma to suggest acute/subacute infarct. There is no acute intracranial hemorrhage, vasogenic edema or abnormal susceptibility artifact. Chronic lacunar infarcts are seen in the left frontal lobe, right frontal cranial radiata and right cerebellum. Additional nonspecific patchy and confluent areas of T2/FLAIR prolongation in the bihemispheric white matter likely represents mild chronic microvascular changes.    The ventricles, sulci and cisternal spaces are stable in size and configuration. There is no midline shift or abnormal extra-axial fluid collection.    Flow-voids of the major intracranial vessels are maintained, as seen best on the T2-weighted images, indicating their patency.    The visualized paranasal sinuses and mastoid air cells are free of acute disease. The orbital regions are unremarkable.    IMPRESSION:  No acute infarct or intracranial hemorrhage. Chronic infarcts and microvascular changes as above.    --- End of Report ---      NEIL CARDENAS MD; Attending Radiologist  This document has been electronically signed. Oct 10 2021  4:26AM    < end of copied text >  < from: [facility] Duplex Carotid, Prashant (10.08.21 @ 17:07) >    EXAM:  CAROTID DUPLEX BILATERAL                            PROCEDURE DATE:  10/08/2021      INTERPRETATION:  CLINICAL INFORMATION: Left ICA high-grade stenosis identified on recent CTA neck.    COMPARISON: CTA neck 10/7/2021.    TECHNIQUE: Grayscale, color and spectral Doppler examination of both carotid arteries was performed.    FINDINGS:    There are atheromatous plaques are present bilaterally in the region of the bifurcations of the common carotid arteries into internal and external branches.    Velocity elevation of the proximal right internal carotid artery results in 50-69% flow-limiting stenosis.    Velocity elevation of the proximal left internal carotid artery results in 70-9% flow-limiting stenosis.    Bilateral flow-limiting stenoses noted of the right and left external carotid arteries as well.    Peak systolic velocities are as follows:    RIGHT:  PROX CCA = 126 cm/s  DIST CCA = 99 cm/s  PROX ICA = 137 cm/s  MID ICA = 86 cm/s  DIST ICA = 80 cm/s  ECA = 202 cm/s    LEFT:  PROX CCA = 100 cm/s  DIST CCA = 59 cm/s  PROX ICA = 313 cm/s  MID ICA = 72 cm/s  DIST ICA = 47 cm/s  ECA = 298 cm/s    Antegrade flow is noted within both vertebral arteries.    IMPRESSION:    Left internal carotid artery 70-99% flow-limiting stenosis.  Right internal carotid artery 50-69% flow-limiting stenosis.  Bilateral external carotid artery flow limiting stenoses.  INDIANA Hammond was informed of these findings on 10/8/2021 at 5:06 PM.    Measurement of carotid stenosis is based on velocity parameters that correlate the residual internal carotid diameter with that of the more distal vessel in accordance with a method such as the [employer] Symptomatic Carotid Endarterectomy Trial (NASCET).    --- End of Report ---    FELIX MCMAHON M.D., ATTENDING RADIOLOGIST  This document has been electronically signed. Oct  8 2021  5:21PM    < end of copied text >

## 2021-10-31 NOTE — PROGRESS NOTE ADULT - SUBJECTIVE AND OBJECTIVE BOX
Objective:  Vital Signs  T(C): 36.6 (10-31 @ 12:54), Max: 37 (10-31 @ 05:10)  HR: 60 (10-31 @ 12:54) (57 - 62)  BP: 94/57 (10-31 @ 12:54) (94/57 - 132/78)  RR: 18 (10-31 @ 12:54) (18 - 18)  SpO2: 94% (10-31 @ 12:54) (94% - 97%)  10-30-21 @ 07:01  -  10-31-21 @ 07:00  --------------------------------------------------------  IN:  Total IN: 0 mL    OUT:    Stool (mL): 1 mL    Voided (mL): 0 mL  Total OUT: 1 mL    Total NET: -1 mL      10-31-21 @ 07:01  -  10-31-21 @ 16:20  --------------------------------------------------------  IN:  Total IN: 0 mL    OUT:    Voided (mL): 300 mL  Total OUT: 300 mL    Total NET: -300 mL    Labs:                        9.0    12.66 )-----------( 170      ( 31 Oct 2021 06:14 )             27.9   10-31    137  |  98  |  64<H>  ----------------------------<  128<H>  4.4   |  24  |  4.95<H>    Ca    9.0      31 Oct 2021 06:13      CAPILLARY BLOOD GLUCOSE      POCT Blood Glucose.: 232 mg/dL (31 Oct 2021 11:42)  POCT Blood Glucose.: 126 mg/dL (31 Oct 2021 07:39)  POCT Blood Glucose.: 205 mg/dL (30 Oct 2021 21:19)  POCT Blood Glucose.: 106 mg/dL (30 Oct 2021 16:58)      Medications:   MEDICATIONS  (STANDING):  aspirin  chewable 81 milliGRAM(s) Oral daily  atorvastatin 80 milliGRAM(s) Oral at bedtime  bisacodyl Suppository 10 milliGRAM(s) Rectal once  chlorhexidine 2% Cloths 1 Application(s) Topical daily  chlorhexidine 4% Liquid 1 Application(s) Topical <User Schedule>  chlorhexidine 4% Liquid 1 Application(s) Topical <User Schedule>  chlorhexidine 4% Liquid 1 Application(s) Topical <User Schedule>  dextrose 40% Gel 15 Gram(s) Oral once  dextrose 50% Injectable 25 Gram(s) IV Push once  dextrose 50% Injectable 12.5 Gram(s) IV Push once  dextrose 50% Injectable 25 Gram(s) IV Push once  glucagon  Injectable 1 milliGRAM(s) IntraMuscular once  heparin   Injectable 5000 Unit(s) SubCutaneous every 8 hours  insulin glargine Injectable (LANTUS) 16 Unit(s) SubCutaneous at bedtime  insulin lispro (ADMELOG) corrective regimen sliding scale   SubCutaneous three times a day before meals  insulin lispro (ADMELOG) corrective regimen sliding scale   SubCutaneous at bedtime  insulin lispro Injectable (ADMELOG) 10 Unit(s) SubCutaneous three times a day before meals  pantoprazole    Tablet 40 milliGRAM(s) Oral before breakfast  polyethylene glycol 3350 17 Gram(s) Oral daily  senna 2 Tablet(s) Oral at bedtime  sevelamer carbonate 800 milliGRAM(s) Oral three times a day  sodium chloride 0.9%. 1000 milliLiter(s) (10 mL/Hr) IV Continuous <Continuous>    MEDICATIONS  (PRN):  acetaminophen   Tablet .. 650 milliGRAM(s) Oral every 6 hours PRN Mild Pain (1 - 3)  sodium chloride 0.9% lock flush 10 milliLiter(s) IV Push every 1 hour PRN Pre/post blood products, medications, blood draw, and to maintain line patency  sodium chloride 0.9% lock flush 10 milliLiter(s) IV Push every 1 hour PRN Pre/post blood products, medications, blood draw, and to maintain line patency      Imaging:

## 2021-10-31 NOTE — PROGRESS NOTE ADULT - PROBLEM SELECTOR PLAN 3
Renal following  NPO at midnight   Covid swab Type and screen   10/29 new Rt IJ HD [facility]  10/29 HD  10/30 HD   Coumadin low dose  as pt will be going for AV fistula with vascular Friday or Monday    IR  Monday 11/1 permacath placement prior to AV fistula   Continue Renvela 800 mg q8h     Daily BUN/Cr to trend

## 2021-10-31 NOTE — PROGRESS NOTE ADULT - ASSESSMENT
59 y/o M w/ hx of DM2, HTN and HLD initially presented as transfer from [facility] for chest pain concerning for NSTEMI and possible new CHF c/b JOSHUA likely on CKD, and hypertensive emergency. Hospital course complicated by s/p RRT for CVA/TIA ruled out and worsening anemia requiring 1 PRBC. Pt S/p HD session today and underwent LHC showing Multivessel CAD. CT surgery consulted for CABG evaluation.       Surgery/Hospital Course:  10/07 Code stroke called for left facial droop, CT showed old frontal infarct, found to have high grade left carotid stenosis, but unclear cause for acute neurologic deficit  10/13 initiated HD, patient admitted with a creatinine of 4.05, but did not know of a history of renal   10/21 s/p C2L, MVR(T), & LAAL. Received 4 PRBC, 1 PLT, 1000 FIEBA intraop. Required dual pressor and inotrope support w/ , Primacor, Levo, and Epi gtts.   10/22 Extubated at 04:55.   10/24 SOB, urgent HD overnight, bipap  10/25 O2 via HFNC transitioned to 5L NC. Plan for HD today. SB in 50s, backup VVI paced at 50 via temporary epicardial PW.  10/26 Temporary access for HD removed yesterday as was not functioning, will consult IR for permacath. Plan for HD today. Check afternoon INR for coumadin dosing tonight   10/27 Pt received to floor on 2 Arnold - VSS, NAD. Coumadin on hold for AVF w/ vascular Friday or Monday - will discuss initiating heparin gtt to bridge w/ Dr. Xiong in AM. L LIDIA carrion placed in CTU for HD access will need to be transitioned to permacath prior to AVF - will d/w IR.   10/28 IR consult requested for PermCath placement HD with 1uPRBC transfusion today. Hod narcotics. Pt lethargic but responsive to verbal stimuli. FS 121mg/dl. 92% RA   10/29 VSS INR 1.49 HD cath poor flow- requested by nephrology to place new HD cath- Rt IJ Placed  - cleared for Vascular surgery placement of AVF on Tuesday   10/30 VSS  ECHO  resulted minimal pericardial effusion  improved mood / activity  HD today Perm Cath for Monday.  10/31 VSS  EKG today COVID swab Type and screen NPO at midnight  Perm cath in am OOB chair

## 2021-10-31 NOTE — PROGRESS NOTE ADULT - SUBJECTIVE AND OBJECTIVE BOX
Chief complaint  Patient is a 60y old  Male who presents with a chief complaint of NSTEMI (31 Oct 2021 07:06)   Review of systems  Patient awake in chair, looks comfortable, no hypoglycemic episodes.    Labs and Fingersticks  CAPILLARY BLOOD GLUCOSE      POCT Blood Glucose.: 232 mg/dL (31 Oct 2021 11:42)  POCT Blood Glucose.: 126 mg/dL (31 Oct 2021 07:39)  POCT Blood Glucose.: 205 mg/dL (30 Oct 2021 21:19)  POCT Blood Glucose.: 106 mg/dL (30 Oct 2021 16:58)      Anion Gap, Serum: 15 (10-31 @ 06:13)  Anion Gap, Serum: 20 *H* (10-30 @ 10:34)      Calcium, Total Serum: 9.0 (10-31 @ 06:13)  Calcium, Total Serum: 9.0 (10-30 @ 10:34)          10-31    137  |  98  |  64<H>  ----------------------------<  128<H>  4.4   |  24  |  4.95<H>    Ca    9.0      31 Oct 2021 06:13                          9.0    12.66 )-----------( 170      ( 31 Oct 2021 06:14 )             27.9     Medications  MEDICATIONS  (STANDING):  aspirin  chewable 81 milliGRAM(s) Oral daily  atorvastatin 80 milliGRAM(s) Oral at bedtime  bisacodyl Suppository 10 milliGRAM(s) Rectal once  chlorhexidine 2% Cloths 1 Application(s) Topical daily  chlorhexidine 4% Liquid 1 Application(s) Topical <User Schedule>  chlorhexidine 4% Liquid 1 Application(s) Topical <User Schedule>  chlorhexidine 4% Liquid 1 Application(s) Topical <User Schedule>  dextrose 40% Gel 15 Gram(s) Oral once  dextrose 50% Injectable 25 Gram(s) IV Push once  dextrose 50% Injectable 12.5 Gram(s) IV Push once  dextrose 50% Injectable 25 Gram(s) IV Push once  glucagon  Injectable 1 milliGRAM(s) IntraMuscular once  heparin   Injectable 5000 Unit(s) SubCutaneous every 8 hours  insulin glargine Injectable (LANTUS) 16 Unit(s) SubCutaneous at bedtime  insulin lispro (ADMELOG) corrective regimen sliding scale   SubCutaneous three times a day before meals  insulin lispro (ADMELOG) corrective regimen sliding scale   SubCutaneous at bedtime  insulin lispro Injectable (ADMELOG) 10 Unit(s) SubCutaneous three times a day before meals  pantoprazole    Tablet 40 milliGRAM(s) Oral before breakfast  polyethylene glycol 3350 17 Gram(s) Oral daily  senna 2 Tablet(s) Oral at bedtime  sevelamer carbonate 800 milliGRAM(s) Oral three times a day  sodium chloride 0.9%. 1000 milliLiter(s) (10 mL/Hr) IV Continuous <Continuous>      Physical Exam  General: Patient comfortable in bed  Vital Signs Last 12 Hrs  T(F): 97.9 (10-31-21 @ 12:54), Max: 98.6 (10-31-21 @ 05:10)  HR: 60 (10-31-21 @ 12:54) (57 - 60)  BP: 94/57 (10-31-21 @ 12:54) (94/57 - 132/78)  BP(mean): --  RR: 18 (10-31-21 @ 12:54) (18 - 18)  SpO2: 94% (10-31-21 @ 12:54) (94% - 97%)  Neck: No palpable thyroid nodules.  CVS: S1S2, No murmurs  Respiratory: No wheezing, no crepitations  GI: Abdomen soft, bowel sounds positive  Musculoskeletal:  edema lower extremities.   Skin: No skin rashes, no ecchymosis    Diagnostics    Free Thyroxine, Serum: AM Sched. Collection: 30-Oct-2021 06:00 (10-29 @ 12:10)  Free Thyroxine, Serum: AM Sched. Collection: 26-Oct-2021 06:00  Cancel Reason: Lab Operations Cancel (10-25 @ 13:56)           Chief complaint  Patient is a 60y old  Male who presents with a chief complaint of NSTEMI (31 Oct 2021 07:06)   Review of systems  Patient awake in chair, looks comfortable, no hypoglycemic episodes.    Labs and Fingersticks  CAPILLARY BLOOD GLUCOSE      POCT Blood Glucose.: 232 mg/dL (31 Oct 2021 11:42)  POCT Blood Glucose.: 126 mg/dL (31 Oct 2021 07:39)  POCT Blood Glucose.: 205 mg/dL (30 Oct 2021 21:19)  POCT Blood Glucose.: 106 mg/dL (30 Oct 2021 16:58)      Anion Gap, Serum: 15 (10-31 @ 06:13)  Anion Gap, Serum: 20 *H* (10-30 @ 10:34)      Calcium, Total Serum: 9.0 (10-31 @ 06:13)  Calcium, Total Serum: 9.0 (10-30 @ 10:34)          10-31    137  |  98  |  64<H>  ----------------------------<  128<H>  4.4   |  24  |  4.95<H>    Ca    9.0      31 Oct 2021 06:13                          9.0    12.66 )-----------( 170      ( 31 Oct 2021 06:14 )             27.9     Medications  MEDICATIONS  (STANDING):  aspirin  chewable 81 milliGRAM(s) Oral daily  atorvastatin 80 milliGRAM(s) Oral at bedtime  bisacodyl Suppository 10 milliGRAM(s) Rectal once  chlorhexidine 2% Cloths 1 Application(s) Topical daily  chlorhexidine 4% Liquid 1 Application(s) Topical <User Schedule>  chlorhexidine 4% Liquid 1 Application(s) Topical <User Schedule>  chlorhexidine 4% Liquid 1 Application(s) Topical <User Schedule>  dextrose 40% Gel 15 Gram(s) Oral once  dextrose 50% Injectable 25 Gram(s) IV Push once  dextrose 50% Injectable 12.5 Gram(s) IV Push once  dextrose 50% Injectable 25 Gram(s) IV Push once  glucagon  Injectable 1 milliGRAM(s) IntraMuscular once  heparin   Injectable 5000 Unit(s) SubCutaneous every 8 hours  insulin glargine Injectable (LANTUS) 16 Unit(s) SubCutaneous at bedtime  insulin lispro (ADMELOG) corrective regimen sliding scale   SubCutaneous three times a day before meals  insulin lispro (ADMELOG) corrective regimen sliding scale   SubCutaneous at bedtime  insulin lispro Injectable (ADMELOG) 10 Unit(s) SubCutaneous three times a day before meals  pantoprazole    Tablet 40 milliGRAM(s) Oral before breakfast  polyethylene glycol 3350 17 Gram(s) Oral daily  senna 2 Tablet(s) Oral at bedtime  sevelamer carbonate 800 milliGRAM(s) Oral three times a day  sodium chloride 0.9%. 1000 milliLiter(s) (10 mL/Hr) IV Continuous <Continuous>      Physical Exam  General: Patient comfortable in bed  Vital Signs Last 12 Hrs  T(F): 97.9 (10-31-21 @ 12:54), Max: 98.6 (10-31-21 @ 05:10)  HR: 60 (10-31-21 @ 12:54) (57 - 60)  BP: 94/57 (10-31-21 @ 12:54) (94/57 - 132/78)  BP(mean): --  RR: 18 (10-31-21 @ 12:54) (18 - 18)  SpO2: 94% (10-31-21 @ 12:54) (94% - 97%)  Neck: No palpable thyroid nodules.  CVS: S1S2, No murmurs  Respiratory: No wheezing, no crepitations  GI: Abdomen soft, bowel sounds positive  Musculoskeletal:  edema lower extremities.   Skin: No skin rashes, no ecchymosis    Diagnostics    Free Thyroxine, Serum: AM Sched. Collection: 30-Oct-2021 06:00 (10-29 @ 12:10)  Free Thyroxine, Serum: AM Sched. Collection: 26-Oct-2021 06:00  Cancel Reason: Lab Operations Cancel (10-25 @ 13:56)

## 2021-11-01 ENCOUNTER — TRANSCRIPTION ENCOUNTER (OUTPATIENT)
Age: 61
End: 2021-11-01

## 2021-11-01 LAB
ANION GAP SERPL CALC-SCNC: 19 MMOL/L — HIGH (ref 5–17)
APTT BLD: 29.5 SEC — SIGNIFICANT CHANGE UP (ref 27.5–35.5)
BLD GP AB SCN SERPL QL: NEGATIVE — SIGNIFICANT CHANGE UP
BUN SERPL-MCNC: 85 MG/DL — HIGH (ref 7–23)
CALCIUM SERPL-MCNC: 9.3 MG/DL — SIGNIFICANT CHANGE UP (ref 8.4–10.5)
CHLORIDE SERPL-SCNC: 94 MMOL/L — LOW (ref 96–108)
CO2 SERPL-SCNC: 23 MMOL/L — SIGNIFICANT CHANGE UP (ref 22–31)
CREAT SERPL-MCNC: 6.28 MG/DL — HIGH (ref 0.5–1.3)
GLUCOSE BLDC GLUCOMTR-MCNC: 110 MG/DL — HIGH (ref 70–99)
GLUCOSE BLDC GLUCOMTR-MCNC: 117 MG/DL — HIGH (ref 70–99)
GLUCOSE BLDC GLUCOMTR-MCNC: 126 MG/DL — HIGH (ref 70–99)
GLUCOSE BLDC GLUCOMTR-MCNC: 133 MG/DL — HIGH (ref 70–99)
GLUCOSE SERPL-MCNC: 120 MG/DL — HIGH (ref 70–99)
HCG SERPL-ACNC: <2 MIU/ML — HIGH
HCT VFR BLD CALC: 27.7 % — LOW (ref 39–50)
HGB BLD-MCNC: 8.8 G/DL — LOW (ref 13–17)
INR BLD: 1.35 RATIO — HIGH (ref 0.88–1.16)
MAGNESIUM SERPL-MCNC: 2.6 MG/DL — SIGNIFICANT CHANGE UP (ref 1.6–2.6)
MCHC RBC-ENTMCNC: 29.5 PG — SIGNIFICANT CHANGE UP (ref 27–34)
MCHC RBC-ENTMCNC: 31.8 GM/DL — LOW (ref 32–36)
MCV RBC AUTO: 93 FL — SIGNIFICANT CHANGE UP (ref 80–100)
NRBC # BLD: 0 /100 WBCS — SIGNIFICANT CHANGE UP (ref 0–0)
PHOSPHATE SERPL-MCNC: 6.5 MG/DL — HIGH (ref 2.5–4.5)
PLATELET # BLD AUTO: 206 K/UL — SIGNIFICANT CHANGE UP (ref 150–400)
POTASSIUM SERPL-MCNC: 4.8 MMOL/L — SIGNIFICANT CHANGE UP (ref 3.5–5.3)
POTASSIUM SERPL-SCNC: 4.8 MMOL/L — SIGNIFICANT CHANGE UP (ref 3.5–5.3)
PROTHROM AB SERPL-ACNC: 16 SEC — HIGH (ref 10.6–13.6)
RBC # BLD: 2.98 M/UL — LOW (ref 4.2–5.8)
RBC # FLD: 14.9 % — HIGH (ref 10.3–14.5)
RH IG SCN BLD-IMP: POSITIVE — SIGNIFICANT CHANGE UP
SARS-COV-2 RNA SPEC QL NAA+PROBE: SIGNIFICANT CHANGE UP
SODIUM SERPL-SCNC: 136 MMOL/L — SIGNIFICANT CHANGE UP (ref 135–145)
T4 FREE SERPL-MCNC: 1.3 NG/DL — SIGNIFICANT CHANGE UP (ref 0.9–1.8)
WBC # BLD: 13.13 K/UL — HIGH (ref 3.8–10.5)
WBC # FLD AUTO: 13.13 K/UL — HIGH (ref 3.8–10.5)

## 2021-11-01 PROCEDURE — 99222 1ST HOSP IP/OBS MODERATE 55: CPT

## 2021-11-01 PROCEDURE — 36558 INSERT TUNNELED CV CATH: CPT

## 2021-11-01 PROCEDURE — 99232 SBSQ HOSP IP/OBS MODERATE 35: CPT | Mod: GC

## 2021-11-01 PROCEDURE — 77001 FLUOROGUIDE FOR VEIN DEVICE: CPT | Mod: 26

## 2021-11-01 PROCEDURE — 99221 1ST HOSP IP/OBS SF/LOW 40: CPT

## 2021-11-01 PROCEDURE — 76937 US GUIDE VASCULAR ACCESS: CPT | Mod: 26

## 2021-11-01 RX ORDER — ACETAMINOPHEN 500 MG
1000 TABLET ORAL ONCE
Refills: 0 | Status: COMPLETED | OUTPATIENT
Start: 2021-11-01 | End: 2021-11-01

## 2021-11-01 RX ORDER — FENTANYL CITRATE 50 UG/ML
50 INJECTION INTRAVENOUS
Refills: 0 | Status: DISCONTINUED | OUTPATIENT
Start: 2021-11-01 | End: 2021-11-02

## 2021-11-01 RX ORDER — FENTANYL CITRATE 50 UG/ML
25 INJECTION INTRAVENOUS
Refills: 0 | Status: DISCONTINUED | OUTPATIENT
Start: 2021-11-01 | End: 2021-11-02

## 2021-11-01 RX ORDER — ONDANSETRON 8 MG/1
4 TABLET, FILM COATED ORAL EVERY 4 HOURS
Refills: 0 | Status: DISCONTINUED | OUTPATIENT
Start: 2021-11-01 | End: 2021-11-02

## 2021-11-01 RX ADMIN — HEPARIN SODIUM 5000 UNIT(S): 5000 INJECTION INTRAVENOUS; SUBCUTANEOUS at 05:10

## 2021-11-01 RX ADMIN — SEVELAMER CARBONATE 800 MILLIGRAM(S): 2400 POWDER, FOR SUSPENSION ORAL at 05:10

## 2021-11-01 RX ADMIN — HEPARIN SODIUM 5000 UNIT(S): 5000 INJECTION INTRAVENOUS; SUBCUTANEOUS at 22:22

## 2021-11-01 RX ADMIN — SEVELAMER CARBONATE 800 MILLIGRAM(S): 2400 POWDER, FOR SUSPENSION ORAL at 22:22

## 2021-11-01 RX ADMIN — ATORVASTATIN CALCIUM 80 MILLIGRAM(S): 80 TABLET, FILM COATED ORAL at 22:22

## 2021-11-01 RX ADMIN — CHLORHEXIDINE GLUCONATE 1 APPLICATION(S): 213 SOLUTION TOPICAL at 08:20

## 2021-11-01 RX ADMIN — PANTOPRAZOLE SODIUM 40 MILLIGRAM(S): 20 TABLET, DELAYED RELEASE ORAL at 05:10

## 2021-11-01 RX ADMIN — HEPARIN SODIUM 5000 UNIT(S): 5000 INJECTION INTRAVENOUS; SUBCUTANEOUS at 14:17

## 2021-11-01 RX ADMIN — Medication 81 MILLIGRAM(S): at 14:16

## 2021-11-01 RX ADMIN — INSULIN GLARGINE 16 UNIT(S): 100 INJECTION, SOLUTION SUBCUTANEOUS at 22:24

## 2021-11-01 RX ADMIN — Medication 400 MILLIGRAM(S): at 17:16

## 2021-11-01 RX ADMIN — SENNA PLUS 2 TABLET(S): 8.6 TABLET ORAL at 22:22

## 2021-11-01 NOTE — OCCUPATIONAL THERAPY INITIAL EVALUATION ADULT - LIVES WITH, PROFILE
lives with spouse in a private house +8 steps to enter
lives with spouse in a private house +8 steps to enter

## 2021-11-01 NOTE — OCCUPATIONAL THERAPY INITIAL EVALUATION ADULT - MUSCLE TONE ASSESSMENT, REHAB EVAL
bilateral upper extremities/bilateral lower extremities/normal
left-side extremities/right-side extremities/normal

## 2021-11-01 NOTE — PROGRESS NOTE ADULT - ASSESSMENT
Assessment  DMT2: 60y Male with DM T2 with hyperglycemia, A1C 7%, was on oral meds and insulin at home, postop CABG on basal bolus insulin, bolus dose being held 2/2 NPO status, blood sugars in acceptable range, no hypoglycemias, NPO for AVF today.  Hypothyroidism: Patient has no history thyroid disease, was not on any thyroid supplements, TSH elevated, subclinical.  CAD: s/p CABG, on medications, no chest pain, stable, monitored.  HTN: Controlled,  on antihypertensive medications.  HLD: On statin  Obesity: No strict exercise routines, not on any weight loss program, neither on low calorie diet.        Esperanza Beckett MD  Cell: [phone]  Office: [phone]     Assessment  DMT2: 60y Male with DM T2 with hyperglycemia, A1C 7%, was on oral meds and insulin at home, postop CABG on basal bolus insulin, bolus dose being held 2/2 NPO status, blood sugars in  acceptable range, no hypoglycemias, NPO for AVF today.  Hypothyroidism: Patient has no history thyroid disease, was not on any thyroid supplements, TSH elevated, subclinical.  CAD: s/p CABG, on medications, no chest pain, stable, monitored.  HTN: Controlled,  on antihypertensive medications.  HLD: On statin  Obesity: No strict exercise routines, not on any weight loss program, neither on low calorie diet.        Esperanza Beckett MD  Cell: [phone]  Office: [phone]

## 2021-11-01 NOTE — PROGRESS NOTE ADULT - ASSESSMENT
60 year old male with PMHx of DM and HTN who presented to the hospital as a transfer from O[facility] for NSTEMI. The nephrology team was consulted due to elevated creatinine of 4.05 upon presentation.    JOSHUA on CKD, now on HD, need long term HD  - Likely with CKD from diabetic nephropathy, admitted with JOSHUA on CKD and hypervolemia, was initiated on HD prior to CABG for optimization.   --- initiated on HD on 10/14  - s/p LHC on 10/14 showing severe multivessel disease; now s/p CABG on 10/21    PLAN for HD today after tunnel cath and prior to AVF   - IR on board for a tunneled cathter placement today  planned for 11/1  - vascular surgery on board; plan for AVF creation prior to discharge; planned for 11/2  - monitor BMP  - adjust meds as per HD     Anemia:   - in the setting of CKD   - iron studies wnl   - continue on SISSY, [id number] units TIW with HD.   - monitor CBC    Dw CTS    (After 5 pm or on weekends please page the on-call fellow, can check [email or web address] for schedule. Login is marty garcia, schedule under [facility] medicine, psych, derm)

## 2021-11-01 NOTE — PRE-ANESTHESIA EVALUATION ADULT - NSANTHAIRWAYFT_ENT_ALL_CORE
MP 3, mouth opening > 3 cm, TM distance > 6 cm, normal neck ROM
poor dentition loose remaining teeth

## 2021-11-01 NOTE — CONSULT NOTE ADULT - ASSESSMENT
60 year old with dm, renal failure  , recent solumedrol transferred and need PermCath pending a AVF . no signs or symptoms of infection  wbc noted    no contraindication to PermCath

## 2021-11-01 NOTE — CHART NOTE - NSCHARTNOTEFT_GEN_A_CORE
Pre-operative Note    Pre-operative Diagnosis: ESRD requiring HD  Procedure: GYPSY AVF  Surgeon: Dr. Pinedo     Labs:                        8.8    13.13 )-----------( 206      ( 01 Nov 2021 06:14 )             27.7     11-01    136  |  94<L>  |  85<H>  ----------------------------<  120<H>  4.8   |  23  |  6.28<H>    Ca    9.3      01 Nov 2021 06:14  Phos  6.5     11-01  Mg     2.6     11-01      PT/INR - ( 01 Nov 2021 06:14 )   PT: 16.0 sec;   INR: 1.35 ratio         PTT - ( 01 Nov 2021 06:14 )  PTT:29.5 sec  Type & Screen #1: ABO Interpretation: AB (11-01-21 @ 06:23)    Type & Screen #2: ABO Interpretation: AB (11-01-21 @ 06:23)    Pregnancy Test: HCG Quantitative, Serum: <2.0 mIU/mL (11-01-21 @ 06:14)    Clearances:  - CT surgery clearance noted     Plan: GYPSY AVF with Dr. Pinedo tomorrow 11/1   - NPO at midnight  - Protect LUE, pink band   - COVID negative 11/1  - Please hold coumadin and use heparin gtt if patient needs to be on therapeutic AC  - Please obtain the following labs on day of surgery: BMP/mg/phos, CBC, coags, type and screen  - Consent obtained and in chart     Vascular Surgery  [id number] Pre-operative Note    Pre-operative Diagnosis: ESRD requiring HD  Procedure: GYPSY AVF  Surgeon: Dr. Pinedo     Labs:                        8.8    13.13 )-----------( 206      ( 01 Nov 2021 06:14 )             27.7     11-01    136  |  94<L>  |  85<H>  ----------------------------<  120<H>  4.8   |  23  |  6.28<H>    Ca    9.3      01 Nov 2021 06:14  Phos  6.5     11-01  Mg     2.6     11-01      PT/INR - ( 01 Nov 2021 06:14 )   PT: 16.0 sec;   INR: 1.35 ratio         PTT - ( 01 Nov 2021 06:14 )  PTT:29.5 sec  Type & Screen #1: ABO Interpretation: AB (11-01-21 @ 06:23)    Type & Screen #2: ABO Interpretation: AB (11-01-21 @ 06:23)      Clearances:  - CT surgery clearance noted     Plan: GYPSY AVF with Dr. Pinedo tomorrow 11/1   - NPO at midnight  - Protect LUE, pink band   - COVID negative 11/1  - Please hold coumadin and use heparin gtt if patient needs to be on therapeutic AC  - Please obtain the following labs on day of surgery: BMP/mg/phos, CBC, coags, type and screen  - Consent obtained and in chart     Vascular Surgery  [id number]

## 2021-11-01 NOTE — PROGRESS NOTE ADULT - SUBJECTIVE AND OBJECTIVE BOX
Neurology Progress Note    S: Patient seen and examined. doing okay. plan for permacath today   Medications:    MEDICATIONS  (STANDING):  aspirin  chewable 81 milliGRAM(s) Oral daily  atorvastatin 80 milliGRAM(s) Oral at bedtime  bisacodyl Suppository 10 milliGRAM(s) Rectal once  chlorhexidine 2% Cloths 1 Application(s) Topical daily  chlorhexidine 4% Liquid 1 Application(s) Topical <User Schedule>  chlorhexidine 4% Liquid 1 Application(s) Topical <User Schedule>  chlorhexidine 4% Liquid 1 Application(s) Topical <User Schedule>  dextrose 40% Gel 15 Gram(s) Oral once  dextrose 50% Injectable 25 Gram(s) IV Push once  dextrose 50% Injectable 25 Gram(s) IV Push once  dextrose 50% Injectable 12.5 Gram(s) IV Push once  glucagon  Injectable 1 milliGRAM(s) IntraMuscular once  heparin   Injectable 5000 Unit(s) SubCutaneous every 8 hours  insulin glargine Injectable (LANTUS) 16 Unit(s) SubCutaneous at bedtime  insulin lispro (ADMELOG) corrective regimen sliding scale   SubCutaneous three times a day before meals  insulin lispro (ADMELOG) corrective regimen sliding scale   SubCutaneous at bedtime  insulin lispro Injectable (ADMELOG) 10 Unit(s) SubCutaneous three times a day before meals  pantoprazole    Tablet 40 milliGRAM(s) Oral before breakfast  polyethylene glycol 3350 17 Gram(s) Oral daily  senna 2 Tablet(s) Oral at bedtime  sevelamer carbonate 800 milliGRAM(s) Oral three times a day  sodium chloride 0.9%. 1000 milliLiter(s) (10 mL/Hr) IV Continuous <Continuous>    MEDICATIONS  (PRN):  acetaminophen   Tablet .. 650 milliGRAM(s) Oral every 6 hours PRN Mild Pain (1 - 3)  fentaNYL    Injectable 25 MICROGram(s) IV Push every 5 minutes PRN Moderate Pain (4 - 6)  fentaNYL    Injectable 50 MICROGram(s) IV Push every 5 minutes PRN Severe Pain (7 - 10)  ondansetron Injectable 4 milliGRAM(s) IV Push every 4 hours PRN Nausea and/or Vomiting  sodium chloride 0.9% lock flush 10 milliLiter(s) IV Push every 1 hour PRN Pre/post blood products, medications, blood draw, and to maintain line patency  sodium chloride 0.9% lock flush 10 milliLiter(s) IV Push every 1 hour PRN Pre/post blood products, medications, blood draw, and to maintain line patency        Vitals:  Vital Signs Last 24 Hrs  T(C): 36.6 (11-01-21 @ 13:42), Max: 36.7 (10-31-21 @ 20:17)  T(F): 97.9 (11-01-21 @ 12:13), Max: 98 (10-31-21 @ 20:17)  HR: 67 (11-01-21 @ 14:20) (53 - 78)  BP: 126/69 (11-01-21 @ 14:20) (115/54 - 144/67)  BP(mean): --  RR: 18 (11-01-21 @ 13:42) (18 - 18)  SpO2: 95% (11-01-21 @ 14:20) (95% - 97%)        General Exam:   General Appearance: Appropriately dressed and in no acute distress       Head: Normocephalic, atraumatic and no dysmorphic features  Ear, Nose, and Throat: Moist mucous membranes  CVS: S1S2+  Resp: No SOB, no wheeze or rhonchi  Abd: soft NTND  Extremities: No edema, no cyanosis  Skin: No bruises, no rashes     Neurological Exam:  Mental Status: Awake, alert and oriented x2-3.  Able to follow simple and complex verbal commands. Able to name and repeat. fluent speech. No obvious aphasia or dysarthria noted.   Cranial Nerves: PERRL, EOMI, VFFC, sensation V1-V3 intact,  mild NLF facial asymmetry , equal elevation of palate, scm/trap 5/5, tongue is midline on protrusion. no obvious papilledema on fundoscopic exam. Hearing is grossly intact.   Motor: Normal bulk, tone and strength throughout. Fine finger movements were intact and symmetric. no tremors or drift noted.    Sensation: Intact to light touch and pinprick throughout. no right/left confusion. no extinction to tactile on DSS.   Reflexes: 1+ throughout at biceps, brachioradialis, triceps, patellars and ankles bilaterally and equal. No clonus. R toe and L toe were both downgoing.  Coordination: No dysmetria on FNF    Gait: deferred     I personally reviewed the below data/images/labs:    CBC Full  -  ( 01 Nov 2021 06:14 )  WBC Count : 13.13 K/uL  RBC Count : 2.98 M/uL  Hemoglobin : 8.8 g/dL  Hematocrit : 27.7 %  Platelet Count - Automated : 206 K/uL  Mean Cell Volume : 93.0 fl  Mean Cell Hemoglobin : 29.5 pg  Mean Cell Hemoglobin Concentration : 31.8 gm/dL  Auto Neutrophil # : x  Auto Lymphocyte # : x  Auto Monocyte # : x  Auto Eosinophil # : x  Auto Basophil # : x  Auto Neutrophil % : x  Auto Lymphocyte % : x  Auto Monocyte % : x  Auto Eosinophil % : x  Auto Basophil % : x      11-01    136  |  94<L>  |  85<H>  ----------------------------<  120<H>  4.8   |  23  |  6.28<H>    Ca    9.3      01 Nov 2021 06:14  Phos  6.5     11-01  Mg     2.6     11-01        -10/07 CTH: No hemorrhage. 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   -10/07 CTA N: High-grade stenosis left internal carotid artery at the carotid bifurcation in the neck.   -10/07 CTA H: Normal intracranial circulation.    < from: MR Head No Cont (10.09.21 @ 20:22) >    EXAM:  MR BRAIN                            PROCEDURE DATE:  10/09/2021            INTERPRETATION:  CLINICAL HISTORY:Facial droop, on heparin drip, NSTEMI . Severe left ICA stenosis.    TECHNIQUE:  MRI of brain without contrast dated 10/9/2021.  Examination consisted of transaxial T1, T2, FLAIR, gradient echo as well as diffusion-weighted sequences with corresponding ADC maps. Coronal T2 and sagittal T1-weighted images were also acquired through the brain.    COMPARISON: CT head and CTA head [facility] 10/7/2021    FINDINGS:  There are no areas abnormal restricted diffusion within the brain parenchyma to suggest acute/subacute infarct. There is no acute intracranial hemorrhage, vasogenic edema or abnormal susceptibility artifact. Chronic lacunar infarcts are seen in the left frontal lobe, right frontal cranial radiata and right cerebellum. Additional nonspecific patchy and confluent areas of T2/FLAIR prolongation in the bihemispheric white matter likely represents mild chronic microvascular changes.    The ventricles, sulci and cisternal spaces are stable in size and configuration. There is no midline shift or abnormal extra-axial fluid collection.    Flow-voids of the major intracranial vessels are maintained, as seen best on the T2-weighted images, indicating their patency.    The visualized paranasal sinuses and mastoid air cells are free of acute disease. The orbital regions are unremarkable.    IMPRESSION:  No acute infarct or intracranial hemorrhage. Chronic infarcts and microvascular changes as above.    --- End of Report ---      NEIL CARDENAS MD; Attending Radiologist  This document has been electronically signed. Oct 10 2021  4:26AM    < end of copied text >  < from: [facility] Duplex CarotidPrashant (10.08.21 @ 17:07) >    EXAM:  CAROTID DUPLEX BILATERAL                            PROCEDURE DATE:  10/08/2021      INTERPRETATION:  CLINICAL INFORMATION: Left ICA high-grade stenosis identified on recent CTA neck.    COMPARISON: CTA neck 10/7/2021.    TECHNIQUE: Grayscale, color and spectral Doppler examination of both carotid arteries was performed.    FINDINGS:    There are atheromatous plaques are present bilaterally in the region of the bifurcations of the common carotid arteries into internal and external branches.    Velocity elevation of the proximal right internal carotid artery results in 50-69% flow-limiting stenosis.    Velocity elevation of the proximal left internal carotid artery results in 70-9% flow-limiting stenosis.    Bilateral flow-limiting stenoses noted of the right and left external carotid arteries as well.    Peak systolic velocities are as follows:    RIGHT:  PROX CCA = 126 cm/s  DIST CCA = 99 cm/s  PROX ICA = 137 cm/s  MID ICA = 86 cm/s  DIST ICA = 80 cm/s  ECA = 202 cm/s    LEFT:  PROX CCA = 100 cm/s  DIST CCA = 59 cm/s  PROX ICA = 313 cm/s  MID ICA = 72 cm/s  DIST ICA = 47 cm/s  ECA = 298 cm/s    Antegrade flow is noted within both vertebral arteries.    IMPRESSION:    Left internal carotid artery 70-99% flow-limiting stenosis.  Right internal carotid artery 50-69% flow-limiting stenosis.  Bilateral external carotid artery flow limiting stenoses.  INDIANA Hammond was informed of these findings on 10/8/2021 at 5:06 PM.    Measurement of carotid stenosis is based on velocity parameters that correlate the residual internal carotid diameter with that of the more distal vessel in accordance with a method such as the [employer] Symptomatic Carotid Endarterectomy Trial (NASCET).    --- End of Report ---    FELIX H RAMBHIA M.D., ATTENDING RADIOLOGIST  This document has been electronically signed. Oct  8 2021  5:21PM    < end of copied text >

## 2021-11-01 NOTE — PROGRESS NOTE ADULT - ASSESSMENT
59 y/o M w/ hx of DM2, HTN and HLD initially presented as transfer from [facility] for chest pain concerning for NSTEMI and possible new CHF c/b JOSHUA likely on CKD, and hypertensive emergency. Hospital course complicated by s/p RRT for CVA/TIA ruled out and worsening anemia requiring 1 PRBC. Pt S/p HD session today and underwent LHC showing Multivessel CAD. CT surgery consulted for CABG evaluation.       Surgery/Hospital Course:  10/07 Code stroke called for left facial droop, CT showed old frontal infarct, found to have high grade left carotid stenosis, but unclear cause for acute neurologic deficit  10/13 initiated HD, patient admitted with a creatinine of 4.05, but did not know of a history of renal   10/21 s/p C2L, MVR(T), & LAAL. Received 4 PRBC, 1 PLT, 1000 FIEBA intraop. Required dual pressor and inotrope support w/ , Primacor, Levo, and Epi gtts.   10/22 Extubated at 04:55.   10/24 SOB, urgent HD overnight, bipap  10/25 O2 via HFNC transitioned to 5L NC. Plan for HD today. SB in 50s, backup VVI paced at 50 via temporary epicardial PW.  10/26 Temporary access for HD removed yesterday as was not functioning, will consult IR for permacath. Plan for HD today. Check afternoon INR for coumadin dosing tonight   10/27 Pt received to floor on 2 Arnold - VSS, NAD. Coumadin on hold for AVF w/ vascular Friday or Monday - will discuss initiating heparin gtt to bridge w/ Dr. Xiong in AM. L LIDIA carrion placed in CTU for HD access will need to be transitioned to permacath prior to AVF - will d/w IR.   10/28 IR consult requested for PermCath placement HD with 1uPRBC transfusion today. Hod narcotics. Pt lethargic but responsive to verbal stimuli. FS 121mg/dl. 92% RA   10/29 VSS INR 1.49 HD cath poor flow- requested by nephrology to place new HD cath- Rt IJ Placed  - cleared for Vascular surgery placement of AVF on Tuesday   10/30 VSS  ECHO  resulted minimal pericardial effusion  improved mood / activity  HD today Perm Cath for Monday.  10/31 VSS  EKG today COVID swab Type and screen NPO at midnight  Perm cath in am OOB chair   ID consulted as per IR request for clearance prior to PermCath placement. WBC 13, afebrile. no evidence of s/s of infection.

## 2021-11-01 NOTE — OCCUPATIONAL THERAPY INITIAL EVALUATION ADULT - DIAGNOSIS, OT EVAL
Patient is functionally independent, no skilled OT intervention is needed.
Patient presents with decreased balance, strength, endurance impacting ability to perform ADLs and functional mobility
none

## 2021-11-01 NOTE — PROGRESS NOTE ADULT - SUBJECTIVE AND OBJECTIVE BOX
VITAL SIGNS    Telemetry:  SB 59  Vital Signs Last 24 Hrs  T(C): 36.7 (21 @ 05:05), Max: 36.7 (10-31-21 @ 20:17)  T(F): 98 (21 @ 05:05), Max: 98 (10-31-21 @ 20:17)  HR: 53 (21 @ 05:05) (53 - 60)  BP: 140/70 (21 @ 05:05) (94/57 - 140/70)  RR: 18 (21 @ 05:05) (18 - 18)  SpO2: 97% (21 @ 05:05) (94% - 97%)            10-31 @ 07:01  -   @ 07:00  --------------------------------------------------------  IN: 850 mL / OUT: 550 mL / NET: 300 mL       Daily     Daily Weight in k.9 (2021 08:26)  Admit Wt: Drug Dosing Weight  Height (cm): 165.1 (21 Oct 2021 10:07)  Weight (kg): 80.2 (21 Oct 2021 10:07)  BMI (kg/m2): 29.4 (21 Oct 2021 10:07)  BSA (m2): 1.88 (21 Oct 2021 10:07)      CAPILLARY BLOOD GLUCOSE      POCT Blood Glucose.: 126 mg/dL (2021 07:19)  POCT Blood Glucose.: 118 mg/dL (31 Oct 2021 21:36)  POCT Blood Glucose.: 221 mg/dL (31 Oct 2021 16:41)  POCT Blood Glucose.: 232 mg/dL (31 Oct 2021 11:42)          MEDICATIONS  acetaminophen   Tablet .. 650 milliGRAM(s) Oral every 6 hours PRN  aspirin  chewable 81 milliGRAM(s) Oral daily  atorvastatin 80 milliGRAM(s) Oral at bedtime  bisacodyl Suppository 10 milliGRAM(s) Rectal once  chlorhexidine 2% Cloths 1 Application(s) Topical daily  chlorhexidine 4% Liquid 1 Application(s) Topical <User Schedule>  chlorhexidine 4% Liquid 1 Application(s) Topical <User Schedule>  chlorhexidine 4% Liquid 1 Application(s) Topical <User Schedule>  dextrose 40% Gel 15 Gram(s) Oral once  dextrose 50% Injectable 25 Gram(s) IV Push once  dextrose 50% Injectable 12.5 Gram(s) IV Push once  dextrose 50% Injectable 25 Gram(s) IV Push once  glucagon  Injectable 1 milliGRAM(s) IntraMuscular once  heparin   Injectable 5000 Unit(s) SubCutaneous every 8 hours  insulin glargine Injectable (LANTUS) 16 Unit(s) SubCutaneous at bedtime  insulin lispro (ADMELOG) corrective regimen sliding scale   SubCutaneous three times a day before meals  insulin lispro (ADMELOG) corrective regimen sliding scale   SubCutaneous at bedtime  insulin lispro Injectable (ADMELOG) 10 Unit(s) SubCutaneous three times a day before meals  pantoprazole    Tablet 40 milliGRAM(s) Oral before breakfast  polyethylene glycol 3350 17 Gram(s) Oral daily  senna 2 Tablet(s) Oral at bedtime  sevelamer carbonate 800 milliGRAM(s) Oral three times a day  sodium chloride 0.9% lock flush 10 milliLiter(s) IV Push every 1 hour PRN  sodium chloride 0.9% lock flush 10 milliLiter(s) IV Push every 1 hour PRN  sodium chloride 0.9%. 1000 milliLiter(s) IV Continuous <Continuous>      >>> <<<  PHYSICAL EXAM  Subjective: NAD   Neurology: alert and oriented x 3, nonfocal, no gross deficits  CV : s1s2 right HD cath  Sternal Wound :  CDI , Stable  Lungs: CTA b/l  Abdomen: soft, NT,ND, (+ )BM  Extremities:  -c/c/e    LABS      136  |  94<L>  |  85<H>  ----------------------------<  120<H>  4.8   |  23  |  6.28<H>    Ca    9.3      2021 06:14  Phos  6.5       Mg     2.6                                        8.8    13.13 )-----------( 206      ( 2021 06:14 )             27.7          PT/INR - ( 2021 06:14 )   PT: 16.0 sec;   INR: 1.35 ratio         PTT - ( 2021 06:14 )  PTT:29.5 sec       PAST MEDICAL & SURGICAL HISTORY:  Hypertension    Hyperlipidemia    Diabetes mellitus    No pertinent past surgical history

## 2021-11-01 NOTE — PRE PROCEDURE NOTE - PRE PROCEDURE EVALUATION
Interventional Radiology Pre-Procedure Note    Patient is a 60y old Male who presents with renal failure requiring a catheter for long term dialysis access prior to fistula creation. Elevated wbc count cleared by infectious disease.     PAST MEDICAL & SURGICAL HISTORY:  Hypertension    Hyperlipidemia    Diabetes mellitus    No pertinent past surgical history         Allergies: No Known Allergies      LABS:                        8.8    13.13 )-----------( 206      ( 01 Nov 2021 06:14 )             27.7     11-01    136  |  94<L>  |  85<H>  ----------------------------<  120<H>  4.8   |  23  |  6.28<H>    Ca    9.3      01 Nov 2021 06:14  Phos  6.5     11-01  Mg     2.6     11-01      PT/INR - ( 01 Nov 2021 06:14 )   PT: 16.0 sec;   INR: 1.35 ratio         PTT - ( 01 Nov 2021 06:14 )  PTT:29.5 sec    Procedure/ risks/ benefits were explained, informed consent obtained from patient, verbalizes understanding.

## 2021-11-01 NOTE — PROGRESS NOTE ADULT - ASSESSMENT
59 yo male with DM2, HTN, and HLD who initially presented to [facility] on 10/05 as a transfer from [facility] for NSTEMI and possible CHF. Patient on Heparin drip for NSTEMI. LKW 10:45AM. Patient had episode of bradycardia (HR 30s), then became altered. RRT called. BP during RRT 70s/40s. Code stroke called for L facial droop.   CTH negative for acute pathology, old L frontal cortical infarct seen.   CTA H unremarkable. CTA N shows high-grade stenosis left internal carotid artery at the carotid bifurcation in the neck.  10/06 TTE: EF 45%, moderate-severe MR, mild-moderate L ventricular systolic dysfunction.   A1c 7  MRI no AIS but old strokes  HD cath placement 10/12   CD with severe L ICA and Mod R ICA   s/p LHC 10/14  CABG 10/21    Impression: Mild L nasolabial flattening and dysarthria, ?decreased eye closure strength on the L. Possibly secondary to R brain dysfunction due to acute stroke of unknown mechanism vs peripheral etiology. Patient with old L frontal infarct on CTH, also with high-grade stenosis of L ICA at the carotid bifurcation which likely cause of old stroke     Recommendations:   - plan for permacath Monday today   - ASA 81MG PO daily, coumadin dosing for valve   - Avoid hypotension.  - High dose statin therapy - atorvastatin 40mg PO daily. LDL goal <70mg/dL.  -  vascular for ICA revascularization can be outpt. not acute   - lipid panel  - telemetry  - PT/OT/SS/SLP, OOBC  - check FS, glucose control <180  - GI/DVT ppx  - Counseling on diet, exercise, and medication adherence was done  - Counseling on smoking cessation and alcohol consumption offered when appropriate.  - Pain assessed and judicious use of narcotics when appropriate was discussed.    - Stroke education given when appropriate.  - Importance of fall prevention discussed.   - Differential diagnosis and plan of care discussed with patient and/or family and primary team  - Thank you for allowing me to participate in the care of this patient. Call with questions.   Marcelino Patel MD  Vascular Neurology .

## 2021-11-01 NOTE — PROGRESS NOTE ADULT - SUBJECTIVE AND OBJECTIVE BOX
Chief complaint  Patient is a 60y old  Male who presents with a chief complaint of NSTEMI (01 Nov 2021 09:03)   Review of systems  NPO for AVF, no hypoglycemic episodes.    Labs and Fingersticks  CAPILLARY BLOOD GLUCOSE      POCT Blood Glucose.: 133 mg/dL (01 Nov 2021 11:43)  POCT Blood Glucose.: 126 mg/dL (01 Nov 2021 07:19)  POCT Blood Glucose.: 118 mg/dL (31 Oct 2021 21:36)  POCT Blood Glucose.: 221 mg/dL (31 Oct 2021 16:41)      Anion Gap, Serum: 19 *H* (11-01 @ 06:14)  Anion Gap, Serum: 15 (10-31 @ 06:13)      Calcium, Total Serum: 9.3 (11-01 @ 06:14)  Calcium, Total Serum: 9.0 (10-31 @ 06:13)          11-01    136  |  94<L>  |  85<H>  ----------------------------<  120<H>  4.8   |  23  |  6.28<H>    Ca    9.3      01 Nov 2021 06:14  Phos  6.5     11-01  Mg     2.6     11-01                          8.8    13.13 )-----------( 206      ( 01 Nov 2021 06:14 )             27.7     Medications  MEDICATIONS  (STANDING):  aspirin  chewable 81 milliGRAM(s) Oral daily  atorvastatin 80 milliGRAM(s) Oral at bedtime  bisacodyl Suppository 10 milliGRAM(s) Rectal once  chlorhexidine 2% Cloths 1 Application(s) Topical daily  chlorhexidine 4% Liquid 1 Application(s) Topical <User Schedule>  chlorhexidine 4% Liquid 1 Application(s) Topical <User Schedule>  chlorhexidine 4% Liquid 1 Application(s) Topical <User Schedule>  dextrose 40% Gel 15 Gram(s) Oral once  dextrose 50% Injectable 25 Gram(s) IV Push once  dextrose 50% Injectable 25 Gram(s) IV Push once  dextrose 50% Injectable 12.5 Gram(s) IV Push once  glucagon  Injectable 1 milliGRAM(s) IntraMuscular once  heparin   Injectable 5000 Unit(s) SubCutaneous every 8 hours  insulin glargine Injectable (LANTUS) 16 Unit(s) SubCutaneous at bedtime  insulin lispro (ADMELOG) corrective regimen sliding scale   SubCutaneous three times a day before meals  insulin lispro (ADMELOG) corrective regimen sliding scale   SubCutaneous at bedtime  insulin lispro Injectable (ADMELOG) 10 Unit(s) SubCutaneous three times a day before meals  pantoprazole    Tablet 40 milliGRAM(s) Oral before breakfast  polyethylene glycol 3350 17 Gram(s) Oral daily  senna 2 Tablet(s) Oral at bedtime  sevelamer carbonate 800 milliGRAM(s) Oral three times a day  sodium chloride 0.9%. 1000 milliLiter(s) (10 mL/Hr) IV Continuous <Continuous>      Physical Exam  General: Patient comfortable in bed  Vital Signs Last 12 Hrs  T(F): 97.9 (11-01-21 @ 12:13), Max: 98 (11-01-21 @ 05:05)  HR: 56 (11-01-21 @ 12:13) (53 - 78)  BP: 115/54 (11-01-21 @ 12:13) (115/54 - 144/67)  BP(mean): --  RR: 18 (11-01-21 @ 12:13) (18 - 18)  SpO2: 97% (11-01-21 @ 12:13) (96% - 97%)  Neck: No palpable thyroid nodules.  CVS: S1S2, No murmurs  Respiratory: No wheezing, no crepitations  GI: Abdomen soft, bowel sounds positive  Musculoskeletal:  edema lower extremities.   Skin: No skin rashes, no ecchymosis    Diagnostics    Free Thyroxine, Serum: AM Sched. Collection: 01-Nov-2021 06:00 (10-31 @ 13:47)  Free Thyroxine, Serum: AM Sched. Collection: 30-Oct-2021 06:00 (10-29 @ 12:10)  Free Thyroxine, Serum: AM Sched. Collection: 26-Oct-2021 06:00  Cancel Reason: Lab Operations Cancel (10-25 @ 13:56)         Chief complaint  Patient is a 60y old  Male who presents with a chief complaint of NSTEMI (01 Nov 2021 09:03)   Review of systems  NPO for AVF, no hypoglycemic episodes.    Labs and Fingersticks  CAPILLARY BLOOD GLUCOSE      POCT Blood Glucose.: 133 mg/dL (01 Nov 2021 11:43)  POCT Blood Glucose.: 126 mg/dL (01 Nov 2021 07:19)  POCT Blood Glucose.: 118 mg/dL (31 Oct 2021 21:36)  POCT Blood Glucose.: 221 mg/dL (31 Oct 2021 16:41)      Anion Gap, Serum: 19 *H* (11-01 @ 06:14)  Anion Gap, Serum: 15 (10-31 @ 06:13)      Calcium, Total Serum: 9.3 (11-01 @ 06:14)  Calcium, Total Serum: 9.0 (10-31 @ 06:13)          11-01    136  |  94<L>  |  85<H>  ----------------------------<  120<H>  4.8   |  23  |  6.28<H>    Ca    9.3      01 Nov 2021 06:14  Phos  6.5     11-01  Mg     2.6     11-01                          8.8    13.13 )-----------( 206      ( 01 Nov 2021 06:14 )             27.7     Medications  MEDICATIONS  (STANDING):  aspirin  chewable 81 milliGRAM(s) Oral daily  atorvastatin 80 milliGRAM(s) Oral at bedtime  bisacodyl Suppository 10 milliGRAM(s) Rectal once  chlorhexidine 2% Cloths 1 Application(s) Topical daily  chlorhexidine 4% Liquid 1 Application(s) Topical <User Schedule>  chlorhexidine 4% Liquid 1 Application(s) Topical <User Schedule>  chlorhexidine 4% Liquid 1 Application(s) Topical <User Schedule>  dextrose 40% Gel 15 Gram(s) Oral once  dextrose 50% Injectable 25 Gram(s) IV Push once  dextrose 50% Injectable 25 Gram(s) IV Push once  dextrose 50% Injectable 12.5 Gram(s) IV Push once  glucagon  Injectable 1 milliGRAM(s) IntraMuscular once  heparin   Injectable 5000 Unit(s) SubCutaneous every 8 hours  insulin glargine Injectable (LANTUS) 16 Unit(s) SubCutaneous at bedtime  insulin lispro (ADMELOG) corrective regimen sliding scale   SubCutaneous three times a day before meals  insulin lispro (ADMELOG) corrective regimen sliding scale   SubCutaneous at bedtime  insulin lispro Injectable (ADMELOG) 10 Unit(s) SubCutaneous three times a day before meals  pantoprazole    Tablet 40 milliGRAM(s) Oral before breakfast  polyethylene glycol 3350 17 Gram(s) Oral daily  senna 2 Tablet(s) Oral at bedtime  sevelamer carbonate 800 milliGRAM(s) Oral three times a day  sodium chloride 0.9%. 1000 milliLiter(s) (10 mL/Hr) IV Continuous <Continuous>      Physical Exam  General: Patient comfortable in bed  Vital Signs Last 12 Hrs  T(F): 97.9 (11-01-21 @ 12:13), Max: 98 (11-01-21 @ 05:05)  HR: 56 (11-01-21 @ 12:13) (53 - 78)  BP: 115/54 (11-01-21 @ 12:13) (115/54 - 144/67)  BP(mean): --  RR: 18 (11-01-21 @ 12:13) (18 - 18)  SpO2: 97% (11-01-21 @ 12:13) (96% - 97%)  Neck: No palpable thyroid nodules.  CVS: S1S2, No murmurs  Respiratory: No wheezing, no crepitations  GI: Abdomen soft, bowel sounds positive  Musculoskeletal:  edema lower extremities.   Skin: No skin rashes, no ecchymosis    Diagnostics    Free Thyroxine, Serum: AM Sched. Collection: 01-Nov-2021 06:00 (10-31 @ 13:47)  Free Thyroxine, Serum: AM Sched. Collection: 30-Oct-2021 06:00 (10-29 @ 12:10)  Free Thyroxine, Serum: AM Sched. Collection: 26-Oct-2021 06:00  Cancel Reason: Lab Operations Cancel (10-25 @ 13:56)

## 2021-11-01 NOTE — CONSULT NOTE ADULT - SUBJECTIVE AND OBJECTIVE BOX
Patient is a 60y old  Male who presents with a chief complaint of NSTEMI (01 Nov 2021 12:41)    Admission HPI:  60 yr old M w/ hx of DM2, HTN and HLD presents as transfer from [facility] for chest pain concerning for NSTEMI and possible CHF.  Pt states that after he woke up this morning he felt midsternal chest pain this morning, that felt like a burning sensation. Pain was non radiating. Associated with shortness of breath. He initially took TUMS and drank some milk which alleviated some of the pain but not completely. Later he also started feeling very dizzy so he went to the ED. Denies associated palpitations, diaphoresis, N/V.  When he presented to [facility] he was noted to be SOB. Initially, they evaluated patient for CHF but Troponin and BNP levels were elevated so he was transferred here for NSTEMI and r/o CHF.    Per nursing notes, while at [facility], he was placed on BIPAP, given Solumedrol 120, Nitro SL x1, duoneb x2, ASA 81mg, Lasix 40mg w/ 200 cc output, and started at 10mL/hr Nitro drip then increased to 30mL/hr. There, initial /126 retake was 171/67. Nitro drip was d/c'd upon arrival to [facility] ED.     During my visit, patient was sitting up eating a sandwich. Appears comfortable on room air. Denies repeat of chest pain after this morning. Denies shortness of breath. Denies lower extremity edema, orthopnea or PND.     Labs also remarkable for JOSHUA, metabolic acidosis and hyperkalemia. Patient denies prior history of renal injury.       Wife can be reached at [phone]. Medication list confirmed w/ wife. Of note, patient w/ elevated BP to SBP 200s often at home; nifedipine used on a "as needed" basis for SBP > 200.  (06 Oct 2021 01:09)    Interval History:  Patient had code stroke for facial weakness- CTH/CTA negative.  S/P CABG on 10/21.  Course has been complicated by persistent renal insufficiency. Patient planned for AV fistula.  Also with anemia and transfused.    REVIEW OF SYSTEMS: No chest pain, shortness of breath, nausea, vomiting or diarhea; other ROS neg     PAST MEDICAL & SURGICAL HISTORY  Hypertension  Hyperlipidemia  Diabetes mellitus  No pertinent past surgical history    FUNCTIONAL HISTORY:   Lives w spouse in home w no VIKI and one flight  PTA Independent    CURRENT FUNCTIONAL STATUS:  CG transfers and gait    FAMILY HISTORY   Family history of CVA (Father)    MEDICATIONS   acetaminophen   Tablet .. 650 milliGRAM(s) Oral every 6 hours PRN  aspirin  chewable 81 milliGRAM(s) Oral daily  atorvastatin 80 milliGRAM(s) Oral at bedtime  bisacodyl Suppository 10 milliGRAM(s) Rectal once  chlorhexidine 2% Cloths 1 Application(s) Topical daily  chlorhexidine 4% Liquid 1 Application(s) Topical <User Schedule>  chlorhexidine 4% Liquid 1 Application(s) Topical <User Schedule>  chlorhexidine 4% Liquid 1 Application(s) Topical <User Schedule>  dextrose 40% Gel 15 Gram(s) Oral once  dextrose 50% Injectable 25 Gram(s) IV Push once  dextrose 50% Injectable 25 Gram(s) IV Push once  dextrose 50% Injectable 12.5 Gram(s) IV Push once  glucagon  Injectable 1 milliGRAM(s) IntraMuscular once  heparin   Injectable 5000 Unit(s) SubCutaneous every 8 hours  insulin glargine Injectable (LANTUS) 16 Unit(s) SubCutaneous at bedtime  insulin lispro (ADMELOG) corrective regimen sliding scale   SubCutaneous three times a day before meals  insulin lispro (ADMELOG) corrective regimen sliding scale   SubCutaneous at bedtime  insulin lispro Injectable (ADMELOG) 10 Unit(s) SubCutaneous three times a day before meals  pantoprazole    Tablet 40 milliGRAM(s) Oral before breakfast  polyethylene glycol 3350 17 Gram(s) Oral daily  senna 2 Tablet(s) Oral at bedtime  sevelamer carbonate 800 milliGRAM(s) Oral three times a day  sodium chloride 0.9% lock flush 10 milliLiter(s) IV Push every 1 hour PRN  sodium chloride 0.9% lock flush 10 milliLiter(s) IV Push every 1 hour PRN  sodium chloride 0.9%. 1000 milliLiter(s) IV Continuous <Continuous>    ALLERGIES  No Known Allergies    VITALS  T(C): 36.6 (11-01-21 @ 12:13), Max: 36.7 (10-31-21 @ 20:17)  HR: 56 (11-01-21 @ 12:13) (53 - 78)  BP: 115/54 (11-01-21 @ 12:13) (115/54 - 144/67)  RR: 18 (11-01-21 @ 12:13) (18 - 18)  SpO2: 97% (11-01-21 @ 12:13) (96% - 97%)  Wt(kg): --    PHYSICAL EXAM  Constitutional - NAD, Comfortable  HEENT - NCAT, EOMI  Neck - Supple, No limited ROM  Chest - CTA bilaterally, No wheeze, No rhonchi, No crackles  Cardiovascular - RRR, S1S2, No murmurs  Abdomen - BS+, Soft, NTND  Extremities - No C/C/E, No calf tenderness   Neurologic Exam -                    Cognitive - Awake, Alert, AAO to self, place, date, year, situation     Communication - Fluent, No dysarthria, no aphasia     Cranial Nerves - CN 2-12 intact     Motor - No focal deficits      Sensory - Intact to LT     Reflexes - DTR Intact, No primitive reflexive  Psychiatric - Mood stable, Affect WNL    RECENT LABS/IMAGING  CBC Full  -  ( 01 Nov 2021 06:14 )  WBC Count : 13.13 K/uL  RBC Count : 2.98 M/uL  Hemoglobin : 8.8 g/dL  Hematocrit : 27.7 %  Platelet Count - Automated : 206 K/uL  Mean Cell Volume : 93.0 fl  Mean Cell Hemoglobin : 29.5 pg  Mean Cell Hemoglobin Concentration : 31.8 gm/dL  Auto Neutrophil # : x  Auto Lymphocyte # : x  Auto Monocyte # : x  Auto Eosinophil # : x  Auto Basophil # : x  Auto Neutrophil % : x  Auto Lymphocyte % : x  Auto Monocyte % : x  Auto Eosinophil % : x  Auto Basophil % : x    11-01    136  |  94<L>  |  85<H>  ----------------------------<  120<H>  4.8   |  23  |  6.28<H>    Ca    9.3      01 Nov 2021 06:14  Phos  6.5     11-01  Mg     2.6     11-01    Impression:  59 yo with functional deficits secondary to diagnosis of debility    Plan:  PT- ROM, Bed Mob, Transfers, Amb w AD and bracing as needed  OT- ADLs, bracing  Prec- Falls, Cardiac, Pulm  DVT Prophylaxis- On heparin  Monitor anemia, H/H and renal function  Skin- Turn q2 h  Dispo-  Patient is a 60y old  Male who presents with a chief complaint of NSTEMI (01 Nov 2021 12:41)    Admission HPI:  60 yr old M w/ hx of DM2, HTN and HLD presents as transfer from [facility] for chest pain concerning for NSTEMI and possible CHF.  Pt states that after he woke up this morning he felt midsternal chest pain this morning, that felt like a burning sensation. Pain was non radiating. Associated with shortness of breath. He initially took TUMS and drank some milk which alleviated some of the pain but not completely. Later he also started feeling very dizzy so he went to the ED. Denies associated palpitations, diaphoresis, N/V.  When he presented to [facility] he was noted to be SOB. Initially, they evaluated patient for CHF but Troponin and BNP levels were elevated so he was transferred here for NSTEMI and r/o CHF.    Per nursing notes, while at [facility], he was placed on BIPAP, given Solumedrol 120, Nitro SL x1, duoneb x2, ASA 81mg, Lasix 40mg w/ 200 cc output, and started at 10mL/hr Nitro drip then increased to 30mL/hr. There, initial /126 retake was 171/67. Nitro drip was d/c'd upon arrival to [facility] ED.     During my visit, patient was sitting up eating a sandwich. Appears comfortable on room air. Denies repeat of chest pain after this morning. Denies shortness of breath. Denies lower extremity edema, orthopnea or PND.     Labs also remarkable for JOSHUA, metabolic acidosis and hyperkalemia. Patient denies prior history of renal injury.       Wife can be reached at [phone]. Medication list confirmed w/ wife. Of note, patient w/ elevated BP to SBP 200s often at home; nifedipine used on a "as needed" basis for SBP > 200.  (06 Oct 2021 01:09)    Interval History:  Patient had code stroke for facial weakness- CTH/CTA negative.  S/P CABG on 10/21.  Course has been complicated by persistent renal insufficiency. Patient planned for AV fistula.  Also with anemia and transfused.    REVIEW OF SYSTEMS:+ weakness, + poor endurance,  No chest pain, shortness of breath, nausea, vomiting or diarhea; other ROS neg     PAST MEDICAL & SURGICAL HISTORY  Hypertension  Hyperlipidemia  Diabetes mellitus  No pertinent past surgical history    FUNCTIONAL HISTORY:   Lives w spouse in home w no VIKI and one flight  PTA Independent    CURRENT FUNCTIONAL STATUS:  CG transfers and gait    FAMILY HISTORY   Family history of CVA (Father)    MEDICATIONS   acetaminophen   Tablet .. 650 milliGRAM(s) Oral every 6 hours PRN  aspirin  chewable 81 milliGRAM(s) Oral daily  atorvastatin 80 milliGRAM(s) Oral at bedtime  bisacodyl Suppository 10 milliGRAM(s) Rectal once  chlorhexidine 2% Cloths 1 Application(s) Topical daily  chlorhexidine 4% Liquid 1 Application(s) Topical <User Schedule>  chlorhexidine 4% Liquid 1 Application(s) Topical <User Schedule>  chlorhexidine 4% Liquid 1 Application(s) Topical <User Schedule>  dextrose 40% Gel 15 Gram(s) Oral once  dextrose 50% Injectable 25 Gram(s) IV Push once  dextrose 50% Injectable 25 Gram(s) IV Push once  dextrose 50% Injectable 12.5 Gram(s) IV Push once  glucagon  Injectable 1 milliGRAM(s) IntraMuscular once  heparin   Injectable 5000 Unit(s) SubCutaneous every 8 hours  insulin glargine Injectable (LANTUS) 16 Unit(s) SubCutaneous at bedtime  insulin lispro (ADMELOG) corrective regimen sliding scale   SubCutaneous three times a day before meals  insulin lispro (ADMELOG) corrective regimen sliding scale   SubCutaneous at bedtime  insulin lispro Injectable (ADMELOG) 10 Unit(s) SubCutaneous three times a day before meals  pantoprazole    Tablet 40 milliGRAM(s) Oral before breakfast  polyethylene glycol 3350 17 Gram(s) Oral daily  senna 2 Tablet(s) Oral at bedtime  sevelamer carbonate 800 milliGRAM(s) Oral three times a day  sodium chloride 0.9% lock flush 10 milliLiter(s) IV Push every 1 hour PRN  sodium chloride 0.9% lock flush 10 milliLiter(s) IV Push every 1 hour PRN  sodium chloride 0.9%. 1000 milliLiter(s) IV Continuous <Continuous>    ALLERGIES  No Known Allergies    VITALS  T(C): 36.6 (11-01-21 @ 12:13), Max: 36.7 (10-31-21 @ 20:17)  HR: 56 (11-01-21 @ 12:13) (53 - 78)  BP: 115/54 (11-01-21 @ 12:13) (115/54 - 144/67)  RR: 18 (11-01-21 @ 12:13) (18 - 18)  SpO2: 97% (11-01-21 @ 12:13) (96% - 97%)  Wt(kg): --    PHYSICAL EXAM  Constitutional - NAD, Comfortable  HEENT - NCAT, EOMI  Neck - Supple, No limited ROM  Chest -Decreased breath sounds, CTA, No wheeze, No rhonchi, No crackles  Cardiovascular - Seth, S1S2, No murmurs  Chest wall incision intact  Abdomen - BS+, Soft, NTND  Extremities - 1+ edema No calf tenderness   Neurologic Exam -                 AAO x 3  Motor 4+/5 bl UE and LEs  Sens intact     Psychiatric - Mood stable, Affect WNL    RECENT LABS/IMAGING  CBC Full  -  ( 01 Nov 2021 06:14 )  WBC Count : 13.13 K/uL  RBC Count : 2.98 M/uL  Hemoglobin : 8.8 g/dL  Hematocrit : 27.7 %  Platelet Count - Automated : 206 K/uL  Mean Cell Volume : 93.0 fl  Mean Cell Hemoglobin : 29.5 pg  Mean Cell Hemoglobin Concentration : 31.8 gm/dL  Auto Neutrophil # : x  Auto Lymphocyte # : x  Auto Monocyte # : x  Auto Eosinophil # : x  Auto Basophil # : x  Auto Neutrophil % : x  Auto Lymphocyte % : x  Auto Monocyte % : x  Auto Eosinophil % : x  Auto Basophil % : x    11-01    136  |  94<L>  |  85<H>  ----------------------------<  120<H>  4.8   |  23  |  6.28<H>    Ca    9.3      01 Nov 2021 06:14  Phos  6.5     11-01  Mg     2.6     11-01    Impression:  59 yo with functional deficits secondary to diagnosis of debility    Plan:  PT- ROM, Bed Mob, Transfers, Amb w AD and bracing as needed  OT- ADLs, bracing  Prec- Falls, Cardiac, Pulm  DVT Prophylaxis- On heparin  Monitor anemia, H/H and renal function  Skin- Turn q2 h  Dispo- Acute Rehab- can tolerate 3h/d of therapies and requires daily physician visits

## 2021-11-01 NOTE — PRE-ANESTHESIA EVALUATION ADULT - NSRADCARDRESULTSFT_GEN_ALL_CORE
< from: Cardiac Catheterization (10.14.21 @ 16:27) >    Diagnostic Findings:     Coronary Angiography   The coronary circulation is right dominant.      LM   Distal left main: There is a 40 % stenosis.      LAD   Ostial left anterior descending: Significant IFR value from LM and  ostial LAD with step up prior to this. . There is a 70 % De  Roshan stenosis. This is a good target for bypass.Instant wave-free  ratio was performed with a calculated value of 0.54.    CX   Ostial circumflex: There is an 80 % stenosis. First obtuse marginal:  Small caliber OM1. There is a 70 % stenosis. Second    Patient: TOM PLEITEZ         MRN: [id number]  Study Date: 10/14/2021   04:27 PM      Page 1 of 3          obtuse marginal: Medium sized vessel. . There is an 80 % stenosis.      RCA   Distal right coronary artery: There is a 90 % stenosis. Right  posterior descending artery: Distal RCA withosital RPDA  bifurcation lesion. . There is a 90 % stenosis.     < end of copied text>    < from: TTE with Doppler (w/Cont) (10.28.21 @ 18:37) >    EF (Brand Rule): 75 %Doppler Peak Velocity (m/sec):  MV=2.0 AoV=2.1  ------------------------------------------------------------------------  Observations:  Mitral Valve: Bioprosthetic mitral valve replacement. No  mitral valve regurgitation seen. Peak mitral valve gradient  equals 17 mm Hg, mean transmitral valve gradient equals 4-5  mm Hg, which is probably normal in the setting of a  bioprosthetic mitral valve replacement. (HRabout 50s bpm)  Aortic Valve/Aorta: Aortic valve not well visualized;  appears to be a calcified trileaflet valve with normal  opening. Peak transaortic valve gradient equals 19 mm Hg,  mean transaortic valve gradient equals 9 mm Hg, aortic  valve velocity time integral equals 42 cm. No aortic valve  regurgitation seen. Peak left ventricular outflow tract  gradient equals 12 mm Hg, mean gradient is equal to 5 mm  Hg, LVOT velocity time integral equals 30 cm.  Aortic Root: 3.1 cm.  LVOT diameter: 1.4 cm.  Left Atrium: Moderately dilated left atrium.  LA volume  index = 45 cc/m2.  Left Ventricle: Endocardial visualization enhanced with  intravenous injection of Ultrasonic Enhancing Agent  (Definity). Hyperdynamic left ventricular systolic  function. Septal motion consistent with cardiac surgery.  Eccentric left ventricular hypertrophy (dilated left  ventricle with normal relative wall thickness). Moderate  diastolic dysfunction (Stage II).  Right Heart: Normal right atrium. Normal right ventricular  size and function. Tricuspid valve not well visualized,  probably normal. Minimal tricuspid regurgitation. Pulmonic  valve not well visualized. Minimal pulmonic regurgitation.  Pericardium/Pleura: Minimal pericardial effusion.  Hemodynamic: Estimated right atrial pressure is 8 mm Hg.  Unable to estimate RVSP due to incomplete TR spectral  doppler signal.  ------------------------------------------------------------------------  Conclusions:  1. Bioprosthetic mitral valve replacement. No mitral valve  regurgitation seen. Peak mitral valve gradient equals 17 mm  Hg, mean transmitral valve gradient equals 4-5 mm Hg, which  is probably normal in the settingof a bioprosthetic mitral  valve replacement. (HRabout 50s bpm)  2. Aortic valve not well visualized; appears to be a  calcified trileaflet valve with normal opening. No aortic  valve regurgitation seen.  3. Endocardial visualization enhanced with intravenous  injection of Ultrasonic Enhancing Agent (Definity).  Hyperdynamic left ventricular systolic function.  4. Normal right ventricular size and function.  5. Unable to estimate RVSP due to incomplete TR spectral  doppler signal.  6. Minimal pericardial effusion.  *** Compared with echocardiogram of 10/21/2021, patient is  s/p Bioprosthertic MVR.    < end of copied text >

## 2021-11-01 NOTE — PRE-ANESTHESIA EVALUATION ADULT - NSANTHADDINFOFT_GEN_ALL_CORE
Chart reviewed, including medical and cardiac workup. Informed consent obtained, including all R/B/A.
Discussed with care team

## 2021-11-01 NOTE — PROGRESS NOTE ADULT - SUBJECTIVE AND OBJECTIVE BOX
Vascular Surgery    SUBJECTIVE:No acute events overnight.        OBJECTIVE      VITALS  T(C): 36.7 (11-01-21 @ 05:05), Max: 36.7 (10-31-21 @ 20:17)  HR: 53 (11-01-21 @ 05:05) (53 - 60)  BP: 140/70 (11-01-21 @ 05:05) (94/57 - 140/70)  RR: 18 (11-01-21 @ 05:05) (18 - 18)  SpO2: 97% (11-01-21 @ 05:05) (94% - 97%)  CAPILLARY BLOOD GLUCOSE      POCT Blood Glucose.: 118 mg/dL (31 Oct 2021 21:36)  POCT Blood Glucose.: 221 mg/dL (31 Oct 2021 16:41)  POCT Blood Glucose.: 232 mg/dL (31 Oct 2021 11:42)  POCT Blood Glucose.: 126 mg/dL (31 Oct 2021 07:39)      Is/Os    10-31 @ 07:01  -  11-01 @ 07:00  --------------------------------------------------------  IN:    Oral Fluid: 850 mL  Total IN: 850 mL    OUT:    Voided (mL): 550 mL  Total OUT: 550 mL    Total NET: 300 mL          MEDICATIONS (STANDING): aspirin  chewable 81 milliGRAM(s) Oral daily  atorvastatin 80 milliGRAM(s) Oral at bedtime  bisacodyl Suppository 10 milliGRAM(s) Rectal once  dextrose 40% Gel 15 Gram(s) Oral once  dextrose 50% Injectable 25 Gram(s) IV Push once  dextrose 50% Injectable 25 Gram(s) IV Push once  dextrose 50% Injectable 12.5 Gram(s) IV Push once  glucagon  Injectable 1 milliGRAM(s) IntraMuscular once  heparin   Injectable 5000 Unit(s) SubCutaneous every 8 hours  insulin glargine Injectable (LANTUS) 16 Unit(s) SubCutaneous at bedtime  insulin lispro (ADMELOG) corrective regimen sliding scale   SubCutaneous three times a day before meals  insulin lispro (ADMELOG) corrective regimen sliding scale   SubCutaneous at bedtime  insulin lispro Injectable (ADMELOG) 10 Unit(s) SubCutaneous three times a day before meals  pantoprazole    Tablet 40 milliGRAM(s) Oral before breakfast  polyethylene glycol 3350 17 Gram(s) Oral daily  senna 2 Tablet(s) Oral at bedtime  sevelamer carbonate 800 milliGRAM(s) Oral three times a day  sodium chloride 0.9%. 1000 milliLiter(s) IV Continuous <Continuous>    MEDICATIONS (PRN):acetaminophen   Tablet .. 650 milliGRAM(s) Oral every 6 hours PRN Mild Pain (1 - 3)  sodium chloride 0.9% lock flush 10 milliLiter(s) IV Push every 1 hour PRN Pre/post blood products, medications, blood draw, and to maintain line patency  sodium chloride 0.9% lock flush 10 milliLiter(s) IV Push every 1 hour PRN Pre/post blood products, medications, blood draw, and to maintain line patency      LABS  CBC (11-01 @ 06:14)                              8.8<L>                         13.13<H>  )----------------(  206        --    % Neutrophils, --    % Lymphocytes, ANC: --                                  27.7<L>  CBC (10-31 @ 06:14)                              9.0<L>                         12.66<H>  )----------------(  170        --    % Neutrophils, --    % Lymphocytes, ANC: --                                  27.9<L>    BMP (11-01 @ 06:14)             136     |  94<L>   |  85<H> 		Ca++ --      Ca 9.3                ---------------------------------( 120<H>		Mg 2.6                4.8     |  23      |  6.28<H>			Ph 6.5<H>  BMP (10-31 @ 06:13)             137     |  98      |  64<H> 		Ca++ --      Ca 9.0                ---------------------------------( 128<H>		Mg --                 4.4     |  24      |  4.95<H>			Ph --          Coags (11-01 @ 06:14)  aPTT 29.5 / INR 1.35<H> / PT 16.0<H>  Zofia (10-31 @ 06:14)  aPTT 30.6 / INR 1.41<H> / PT 16.7<H>            IMAGING STUDIES

## 2021-11-01 NOTE — OCCUPATIONAL THERAPY INITIAL EVALUATION ADULT - PERTINENT HX OF CURRENT PROBLEM, REHAB EVAL
60 yr old M w/ hx of DM2, HTN and HLD presents as transfer from [facility] for chest pain concerning for NSTEMI and possible CHF.Pt states that after he woke up this morning he felt midsternal chest pain this morning, that felt like a burning sensation. Pain was non radiating. Associated with shortness of breath.
59 y/o initially presented as transfer from [facility] for chest pain concerning for NSTEMI and possible new CHF c/b JOSHUA likely on CKD, and hypertensive emergency. Hospital course complicated by s/p RRT for CVA/TIA ruled out and worsening anemia r

## 2021-11-01 NOTE — PROGRESS NOTE ADULT - SUBJECTIVE AND OBJECTIVE BOX
[facility] Division of Kidney Diseases & Hypertension  HEMODIALYSIS NOTE  --------------------------------------------------------------------------------  Chief Complaint: ESRD/Ongoing hemodialysis requirement    24 hour events/subjective:    denies new issues  going for tunnel cath today and avf tomorrow    PAST HISTORY  --------------------------------------------------------------------------------  No significant changes to PMH, PSH, FHx, SHx, unless otherwise noted    ALLERGIES & MEDICATIONS  --------------------------------------------------------------------------------  Allergies    No Known Allergies    Intolerances      Standing Inpatient Medications  aspirin  chewable 81 milliGRAM(s) Oral daily  atorvastatin 80 milliGRAM(s) Oral at bedtime  bisacodyl Suppository 10 milliGRAM(s) Rectal once  chlorhexidine 2% Cloths 1 Application(s) Topical daily  chlorhexidine 4% Liquid 1 Application(s) Topical <User Schedule>  chlorhexidine 4% Liquid 1 Application(s) Topical <User Schedule>  chlorhexidine 4% Liquid 1 Application(s) Topical <User Schedule>  dextrose 40% Gel 15 Gram(s) Oral once  dextrose 50% Injectable 25 Gram(s) IV Push once  dextrose 50% Injectable 25 Gram(s) IV Push once  dextrose 50% Injectable 12.5 Gram(s) IV Push once  glucagon  Injectable 1 milliGRAM(s) IntraMuscular once  heparin   Injectable 5000 Unit(s) SubCutaneous every 8 hours  insulin glargine Injectable (LANTUS) 16 Unit(s) SubCutaneous at bedtime  insulin lispro (ADMELOG) corrective regimen sliding scale   SubCutaneous three times a day before meals  insulin lispro (ADMELOG) corrective regimen sliding scale   SubCutaneous at bedtime  insulin lispro Injectable (ADMELOG) 10 Unit(s) SubCutaneous three times a day before meals  pantoprazole    Tablet 40 milliGRAM(s) Oral before breakfast  polyethylene glycol 3350 17 Gram(s) Oral daily  senna 2 Tablet(s) Oral at bedtime  sevelamer carbonate 800 milliGRAM(s) Oral three times a day  sodium chloride 0.9%. 1000 milliLiter(s) IV Continuous <Continuous>    PRN Inpatient Medications  acetaminophen   Tablet .. 650 milliGRAM(s) Oral every 6 hours PRN  fentaNYL    Injectable 25 MICROGram(s) IV Push every 5 minutes PRN  fentaNYL    Injectable 50 MICROGram(s) IV Push every 5 minutes PRN  ondansetron Injectable 4 milliGRAM(s) IV Push every 4 hours PRN  sodium chloride 0.9% lock flush 10 milliLiter(s) IV Push every 1 hour PRN  sodium chloride 0.9% lock flush 10 milliLiter(s) IV Push every 1 hour PRN      REVIEW OF SYSTEMS  --------------------------------------------------------------------------------    All other systems were reviewed and are negative, except as noted.    VITALS/PHYSICAL EXAM  --------------------------------------------------------------------------------  T(C): 36.6 (11-01-21 @ 13:42), Max: 36.7 (10-31-21 @ 20:17)  HR: 67 (11-01-21 @ 14:20) (53 - 78)  BP: 126/69 (11-01-21 @ 14:20) (115/54 - 144/67)  RR: 18 (11-01-21 @ 13:42) (18 - 18)  SpO2: 95% (11-01-21 @ 14:20) (95% - 97%)  Wt(kg): --  Height (cm): 165.1 (11-01-21 @ 13:42)  Weight (kg): 80.2 (11-01-21 @ 13:42)  BMI (kg/m2): 29.4 (11-01-21 @ 13:42)  BSA (m2): 1.88 (11-01-21 @ 13:42)      10-31-21 @ 07:01  -  11-01-21 @ 07:00  --------------------------------------------------------  IN: 850 mL / OUT: 550 mL / NET: 300 mL      Physical Exam:  	Gen: NAD  	Pulm: decreased bases  	CV: RRR, S1S2; no rub  	UE: Warm,   	LE: Warm, edema  	Neuro: No focal deficits, intact gait  	Psych: Normal affect and mood  	Skin: Warm, without rashes  	Vascular access: temp cath    LABS/STUDIES  --------------------------------------------------------------------------------              8.8    13.13 >-----------<  206      [11-01-21 @ 06:14]              27.7     136  |  94  |  85  ----------------------------<  120      [11-01-21 @ 06:14]  4.8   |  23  |  6.28        Ca     9.3     [11-01-21 @ 06:14]      Mg     2.6     [11-01-21 @ 06:14]      Phos  6.5     [11-01-21 @ 06:14]      PT/INR: PT 16.0 , INR 1.35       [11-01-21 @ 06:14]  PTT: 29.5       [11-01-21 @ 06:14]

## 2021-11-01 NOTE — CONSULT NOTE ADULT - SUBJECTIVE AND OBJECTIVE BOX
Patient is a 60y old  Male who presents with a chief complaint of NSTEMI (01 Nov 2021 12:54)    HPI:  60 yr old M w/ hx of DM2, HTN and HLD presents as transfer from [facility] for chest pain concerning for NSTEMI and possible CHF.  Pt states that after he woke up this morning he felt midsternal chest pain this morning, that felt like a burning sensation. Pain was non radiating. Associated with shortness of breath. He initially took TUMS and drank some milk which alleviated some of the pain but not completely. Later he also started feeling very dizzy so he went to the ED. Denies associated palpitations, diaphoresis, N/V.  When he presented to [facility] he was noted to be SOB. Initially, they evaluated patient for CHF but Troponin and BNP levels were elevated so he was transferred here for NSTEMI and r/o CHF.    Per nursing notes, while at [facility], he was placed on BIPAP, given Solumedrol 120, Nitro SL x1, duoneb x2, ASA 81mg, Lasix 40mg w/ 200 cc output, and started at 10mL/hr Nitro drip then increased to 30mL/hr. There, initial /126 retake was 171/67. Nitro drip was d/c'd upon arrival to [facility] ED.     During my visit, patient was sitting up eating a sandwich. Appears comfortable on room air. Denies repeat of chest pain after this morning. Denies shortness of breath. Denies lower extremity edema, orthopnea or PND.     Labs also remarkable for JOSHUA, metabolic acidosis and hyperkalemia. Patient denies prior history of renal injury.       Wife can be reached at [phone]. Medication list confirmed w/ wife. Of note, patient w/ elevated BP to SBP 200s often at home; nifedipine used on a "as needed" basis for SBP > 200.  (06 Oct 2021 01:09)      PAST MEDICAL & SURGICAL HISTORY:  Hypertension    Hyperlipidemia    Diabetes mellitus    No pertinent past surgical history        Social history:    FAMILY HISTORY:  Family history of CVA (Father)           Gastrointestinal:	NO nausea,abdominal pain or diarrhea.    Genitourinary:	No dysuria,frequency. No flank pain    Musculoskeletal:	No joint swelling or pain.No weakness    Neurological:No confusion,diziness.No extremity weakness.No bladder or bowel incontinence	    Psychiatric:No delusions or hallucinations	    Hematology/Lymphatics:	No LN swelling.No gum bleeding     Endocrine:	No recent weight gain or loss.No abnormal heat/cold intolerance    Allergic/Immunologic:	No hives or rash   Allergies    No Known Allergies    Intolerances        Antimicrobials:          Vital Signs Last 24 Hrs  T(C): 36.6 (01 Nov 2021 12:13), Max: 36.7 (31 Oct 2021 20:17)  T(F): 97.9 (01 Nov 2021 12:13), Max: 98 (31 Oct 2021 20:17)  HR: 56 (01 Nov 2021 12:13) (53 - 78)  BP: 115/54 (01 Nov 2021 12:13) (115/54 - 144/67)  BP(mean): --  RR: 18 (01 Nov 2021 12:13) (18 - 18)  SpO2: 97% (01 Nov 2021 12:13) (96% - 97%)    PHYSICAL EXAM:Pleasant patient in no acute distress.      Constitutional:Comfortable.Awake and alert  No cachexia     Eyes:PERRL EOMI.NO discharge or conjunctival injection    ENMT:No sinus tenderness.No thrush.No pharyngeal exudate or erythema.Fair dental hygiene    Neck:Supple,No LN,no JVD      Respiratory:Good air entry bilaterally,CTA    Cardiovascular:S1 S2 wnl, No murmurs,rub or gallops    Gastrointestinal:Soft BS(+) no tenderness no masses ,No rebound or guarding    Genitourinary:No CVA tendereness     Rectal:    Extremities:No cyanosis,clubbing or edema.    Vascular:peripheral pulses felt    Neurological:AAO X 3,No grossly focal deficits    Skin:No rash     Lymph Nodes:No palpable LNs    Musculoskeletal:No joint swelling or LOM    Psychiatric:Affect normal.                                8.8    13.13 )-----------( 206      ( 01 Nov 2021 06:14 )             27.7         11-01    136  |  94<L>  |  85<H>  ----------------------------<  120<H>  4.8   |  23  |  6.28<H>    Ca    9.3      01 Nov 2021 06:14  Phos  6.5     11-01  Mg     2.6     11-01        RECENT CULTURES:      MICROBIOLOGY:          Radiology:      Assessment:        Recommendations and Plan:    Pager [phone]  After 5 pm/weekends or if no response :[phone]

## 2021-11-01 NOTE — PROGRESS NOTE ADULT - ASSESSMENT
60M with history of DM type 2, HTN, HLD presenting as transfer from [facility] for chest pain concerning for NSTEMI, code stroke also called for L facial droop, CTA significant for ipsilateral high-grade stenosis of the left internal carotid artery at the carotid bifurcation seen on carotid duplex, now s/p cabg. Patient with ongoing HD requirements now requiring long term HD access planning.     - Plan for LUE AV graft on Tuesday, 11/2  - Please hold coumadin and use heparin gtt if patient needs to be on therapeutic AC  - Please obtain the following labs on day prior to surgery: COVID swab  - Please obtain the following labs on day of surgery: BMP/mg/phos, CBC, coags, type and screen   - Continue to trend WBC  - PC IR today  - left arm precautions, maintain pink band  - CT surgery clearance noted   - consent in chart     Please contact Vascular Surgery (P: [phone]) with any questions.

## 2021-11-02 LAB
ANION GAP SERPL CALC-SCNC: 16 MMOL/L — SIGNIFICANT CHANGE UP (ref 5–17)
APTT BLD: 28.9 SEC — SIGNIFICANT CHANGE UP (ref 27.5–35.5)
BUN SERPL-MCNC: 48 MG/DL — HIGH (ref 7–23)
CALCIUM SERPL-MCNC: 8.8 MG/DL — SIGNIFICANT CHANGE UP (ref 8.4–10.5)
CHLORIDE SERPL-SCNC: 95 MMOL/L — LOW (ref 96–108)
CO2 SERPL-SCNC: 26 MMOL/L — SIGNIFICANT CHANGE UP (ref 22–31)
CREAT SERPL-MCNC: 3.46 MG/DL — HIGH (ref 0.5–1.3)
GAS PNL BLDA: SIGNIFICANT CHANGE UP
GLUCOSE BLDC GLUCOMTR-MCNC: 109 MG/DL — HIGH (ref 70–99)
GLUCOSE BLDC GLUCOMTR-MCNC: 129 MG/DL — HIGH (ref 70–99)
GLUCOSE BLDC GLUCOMTR-MCNC: 132 MG/DL — HIGH (ref 70–99)
GLUCOSE BLDC GLUCOMTR-MCNC: 134 MG/DL — HIGH (ref 70–99)
GLUCOSE SERPL-MCNC: 143 MG/DL — HIGH (ref 70–99)
HCT VFR BLD CALC: 28.1 % — LOW (ref 39–50)
HGB BLD-MCNC: 9 G/DL — LOW (ref 13–17)
INR BLD: 1.4 RATIO — HIGH (ref 0.88–1.16)
MAGNESIUM SERPL-MCNC: 2.1 MG/DL — SIGNIFICANT CHANGE UP (ref 1.6–2.6)
MCHC RBC-ENTMCNC: 29.4 PG — SIGNIFICANT CHANGE UP (ref 27–34)
MCHC RBC-ENTMCNC: 32 GM/DL — SIGNIFICANT CHANGE UP (ref 32–36)
MCV RBC AUTO: 91.8 FL — SIGNIFICANT CHANGE UP (ref 80–100)
NRBC # BLD: 0 /100 WBCS — SIGNIFICANT CHANGE UP (ref 0–0)
PHOSPHATE SERPL-MCNC: 3.6 MG/DL — SIGNIFICANT CHANGE UP (ref 2.5–4.5)
PLATELET # BLD AUTO: 177 K/UL — SIGNIFICANT CHANGE UP (ref 150–400)
POTASSIUM SERPL-MCNC: 3.7 MMOL/L — SIGNIFICANT CHANGE UP (ref 3.5–5.3)
POTASSIUM SERPL-SCNC: 3.7 MMOL/L — SIGNIFICANT CHANGE UP (ref 3.5–5.3)
PROTHROM AB SERPL-ACNC: 16.5 SEC — HIGH (ref 10.6–13.6)
RBC # BLD: 3.06 M/UL — LOW (ref 4.2–5.8)
RBC # FLD: 14.6 % — HIGH (ref 10.3–14.5)
SODIUM SERPL-SCNC: 137 MMOL/L — SIGNIFICANT CHANGE UP (ref 135–145)
WBC # BLD: 11.96 K/UL — HIGH (ref 3.8–10.5)
WBC # FLD AUTO: 11.96 K/UL — HIGH (ref 3.8–10.5)

## 2021-11-02 PROCEDURE — 36821 AV FUSION DIRECT ANY SITE: CPT

## 2021-11-02 RX ORDER — INSULIN LISPRO 100/ML
VIAL (ML) SUBCUTANEOUS AT BEDTIME
Refills: 0 | Status: DISCONTINUED | OUTPATIENT
Start: 2021-11-02 | End: 2021-11-08

## 2021-11-02 RX ORDER — HEPARIN SODIUM 5000 [USP'U]/ML
5000 INJECTION INTRAVENOUS; SUBCUTANEOUS EVERY 8 HOURS
Refills: 0 | Status: DISCONTINUED | OUTPATIENT
Start: 2021-11-02 | End: 2021-11-05

## 2021-11-02 RX ORDER — SEVELAMER CARBONATE 2400 MG/1
800 POWDER, FOR SUSPENSION ORAL THREE TIMES A DAY
Refills: 0 | Status: DISCONTINUED | OUTPATIENT
Start: 2021-11-02 | End: 2021-11-08

## 2021-11-02 RX ORDER — HYDROMORPHONE HYDROCHLORIDE 2 MG/ML
0.25 INJECTION INTRAMUSCULAR; INTRAVENOUS; SUBCUTANEOUS
Refills: 0 | Status: DISCONTINUED | OUTPATIENT
Start: 2021-11-02 | End: 2021-11-02

## 2021-11-02 RX ORDER — SODIUM CHLORIDE 9 MG/ML
10 INJECTION INTRAMUSCULAR; INTRAVENOUS; SUBCUTANEOUS
Refills: 0 | Status: DISCONTINUED | OUTPATIENT
Start: 2021-11-02 | End: 2021-11-08

## 2021-11-02 RX ORDER — ONDANSETRON 8 MG/1
4 TABLET, FILM COATED ORAL ONCE
Refills: 0 | Status: DISCONTINUED | OUTPATIENT
Start: 2021-11-02 | End: 2021-11-02

## 2021-11-02 RX ORDER — CARVEDILOL PHOSPHATE 80 MG/1
6.25 CAPSULE, EXTENDED RELEASE ORAL EVERY 12 HOURS
Refills: 0 | Status: DISCONTINUED | OUTPATIENT
Start: 2021-11-02 | End: 2021-11-03

## 2021-11-02 RX ORDER — NALOXONE HYDROCHLORIDE 4 MG/.1ML
0.4 SPRAY NASAL ONCE
Refills: 0 | Status: COMPLETED | OUTPATIENT
Start: 2021-11-02 | End: 2021-11-02

## 2021-11-02 RX ORDER — ASPIRIN/CALCIUM CARB/MAGNESIUM 324 MG
81 TABLET ORAL DAILY
Refills: 0 | Status: DISCONTINUED | OUTPATIENT
Start: 2021-11-02 | End: 2021-11-08

## 2021-11-02 RX ORDER — INSULIN LISPRO 100/ML
10 VIAL (ML) SUBCUTANEOUS
Refills: 0 | Status: DISCONTINUED | OUTPATIENT
Start: 2021-11-02 | End: 2021-11-08

## 2021-11-02 RX ORDER — INSULIN GLARGINE 100 [IU]/ML
16 INJECTION, SOLUTION SUBCUTANEOUS AT BEDTIME
Refills: 0 | Status: DISCONTINUED | OUTPATIENT
Start: 2021-11-02 | End: 2021-11-08

## 2021-11-02 RX ORDER — SODIUM CHLORIDE 9 MG/ML
1000 INJECTION INTRAMUSCULAR; INTRAVENOUS; SUBCUTANEOUS
Refills: 0 | Status: DISCONTINUED | OUTPATIENT
Start: 2021-11-02 | End: 2021-11-08

## 2021-11-02 RX ORDER — GLUCAGON INJECTION, SOLUTION 0.5 MG/.1ML
1 INJECTION, SOLUTION SUBCUTANEOUS ONCE
Refills: 0 | Status: DISCONTINUED | OUTPATIENT
Start: 2021-11-02 | End: 2021-11-08

## 2021-11-02 RX ORDER — PANTOPRAZOLE SODIUM 20 MG/1
40 TABLET, DELAYED RELEASE ORAL
Refills: 0 | Status: DISCONTINUED | OUTPATIENT
Start: 2021-11-02 | End: 2021-11-08

## 2021-11-02 RX ORDER — POLYETHYLENE GLYCOL 3350 17 G/17G
17 POWDER, FOR SOLUTION ORAL DAILY
Refills: 0 | Status: DISCONTINUED | OUTPATIENT
Start: 2021-11-02 | End: 2021-11-08

## 2021-11-02 RX ORDER — SENNA PLUS 8.6 MG/1
2 TABLET ORAL AT BEDTIME
Refills: 0 | Status: DISCONTINUED | OUTPATIENT
Start: 2021-11-02 | End: 2021-11-08

## 2021-11-02 RX ADMIN — SEVELAMER CARBONATE 800 MILLIGRAM(S): 2400 POWDER, FOR SUSPENSION ORAL at 06:08

## 2021-11-02 RX ADMIN — PANTOPRAZOLE SODIUM 40 MILLIGRAM(S): 20 TABLET, DELAYED RELEASE ORAL at 06:08

## 2021-11-02 RX ADMIN — SODIUM CHLORIDE 10 MILLILITER(S): 9 INJECTION INTRAMUSCULAR; INTRAVENOUS; SUBCUTANEOUS at 12:57

## 2021-11-02 RX ADMIN — CARVEDILOL PHOSPHATE 6.25 MILLIGRAM(S): 80 CAPSULE, EXTENDED RELEASE ORAL at 21:40

## 2021-11-02 RX ADMIN — SEVELAMER CARBONATE 800 MILLIGRAM(S): 2400 POWDER, FOR SUSPENSION ORAL at 21:40

## 2021-11-02 RX ADMIN — NALOXONE HYDROCHLORIDE 0.4 MILLIGRAM(S): 4 SPRAY NASAL at 16:55

## 2021-11-02 RX ADMIN — SENNA PLUS 2 TABLET(S): 8.6 TABLET ORAL at 21:40

## 2021-11-02 RX ADMIN — HEPARIN SODIUM 5000 UNIT(S): 5000 INJECTION INTRAVENOUS; SUBCUTANEOUS at 15:32

## 2021-11-02 RX ADMIN — Medication 81 MILLIGRAM(S): at 21:45

## 2021-11-02 RX ADMIN — INSULIN GLARGINE 16 UNIT(S): 100 INJECTION, SOLUTION SUBCUTANEOUS at 21:41

## 2021-11-02 RX ADMIN — HEPARIN SODIUM 5000 UNIT(S): 5000 INJECTION INTRAVENOUS; SUBCUTANEOUS at 21:40

## 2021-11-02 RX ADMIN — HEPARIN SODIUM 5000 UNIT(S): 5000 INJECTION INTRAVENOUS; SUBCUTANEOUS at 06:07

## 2021-11-02 NOTE — PROVIDER CONTACT NOTE (CHANGE IN STATUS NOTIFICATION) - BACKGROUND
10/13 c/o back pain radiating to left chest  10/19 Redo sternotomy with aortic re-branching with TEVAR type 1 endo leak.  10.29 returned to OR to repair Lumen

## 2021-11-02 NOTE — PRE-ANESTHESIA EVALUATION ADULT - NSANTHOSAYNRD_GEN_A_CORE
No. ABDULLAHI screening performed.  STOP BANG Legend: 0-2 = LOW Risk; 3-4 = INTERMEDIATE Risk; 5-8 = HIGH Risk
No. ABDULLAHI screening performed.  STOP BANG Legend: 0-2 = LOW Risk; 3-4 = INTERMEDIATE Risk; 5-8 = HIGH Risk

## 2021-11-02 NOTE — BRIEF OPERATIVE NOTE - SPECIMENS
"Oncology Rooming Note    February 10, 2020 2:12 PM   Leidy Ascencio is a 62 year old female who presents for:    Chief Complaint   Patient presents with     Oncology Clinic Visit     Return; Hemangiopericytoma of CNS, Grade II     Initial Vitals: /80   Pulse 88   Temp 98.7  F (37.1  C) (Oral)   Resp 16   Wt 87.5 kg (193 lb)   SpO2 100%   BMI 35.30 kg/m   Estimated body mass index is 35.3 kg/m  as calculated from the following:    Height as of 1/15/20: 1.575 m (5' 2\").    Weight as of this encounter: 87.5 kg (193 lb). Body surface area is 1.96 meters squared.  No Pain (0) Comment: Data Unavailable   No LMP recorded. Patient has had a hysterectomy.  Allergies reviewed: Yes  Medications reviewed: Yes    Medications: Medication refills not needed today.  Pharmacy name entered into [employer]:    [facility] [place] - [address] [place] [address] [facility] [place], [place] - [address] [facility] [phone]    Clinical concerns: MRI results; Post op       Diana Estevez CMA              "
None
None

## 2021-11-02 NOTE — BRIEF OPERATIVE NOTE - NSICDXBRIEFPREOP_GEN_ALL_CORE_FT
PRE-OP DIAGNOSIS:  CAD (coronary artery disease) 21-Oct-2021 17:06:26  Montana Jain  
PRE-OP DIAGNOSIS:  ESRD on dialysis 02-Nov-2021 12:21:33  Hilda Callaway

## 2021-11-02 NOTE — PROGRESS NOTE ADULT - SUBJECTIVE AND OBJECTIVE BOX
VITAL SIGNS    Telemetry:  SR 60  Vital Signs Last 24 Hrs  T(C): 36 (21 @ 11:50), Max: 36.8 (21 @ 16:40)  T(F): 96.8 (21 @ 11:50), Max: 98.3 (21 @ 04:58)  HR: 60 (21 @ 12:30) (52 - 68)  BP: 128/59 (21 @ 12:30) (114/61 - 164/72)  RR: 16 (21 @ 12:30) (16 - 18)  SpO2: 98% (21 @ 12:30) (92% - 100%)             @ 07:01  -   @ 07:00  --------------------------------------------------------  IN: 0 mL / OUT: 1701 mL / NET: -1701 mL       Daily Height in cm: 165.1 (2021 07:51)    Daily Weight in k.6 (2021 07:39)  Admit Wt: Drug Dosing Weight  Height (cm): 165.1 (2021 07:51)  Weight (kg): 80.2 (2021 07:51)  BMI (kg/m2): 29.4 (2021 07:51)  BSA (m2): 1.88 (2021 07:51)      CAPILLARY BLOOD GLUCOSE      POCT Blood Glucose.: 129 mg/dL (2021 12:45)  POCT Blood Glucose.: 110 mg/dL (2021 21:39)  POCT Blood Glucose.: 117 mg/dL (2021 18:25)          MEDICATIONS  aspirin  chewable 81 milliGRAM(s) Oral daily  bisacodyl Suppository 10 milliGRAM(s) Rectal once  carvedilol 6.25 milliGRAM(s) Oral every 12 hours  fentaNYL    Injectable 25 MICROGram(s) IV Push every 5 minutes PRN  fentaNYL    Injectable 50 MICROGram(s) IV Push every 5 minutes PRN  glucagon  Injectable 1 milliGRAM(s) IntraMuscular once  heparin   Injectable 5000 Unit(s) SubCutaneous every 8 hours  HYDROmorphone  Injectable 0.25 milliGRAM(s) IV Push every 10 minutes PRN  insulin glargine Injectable (LANTUS) 16 Unit(s) SubCutaneous at bedtime  insulin lispro (ADMELOG) corrective regimen sliding scale   SubCutaneous at bedtime  insulin lispro Injectable (ADMELOG) 10 Unit(s) SubCutaneous three times a day before meals  ondansetron Injectable 4 milliGRAM(s) IV Push every 4 hours PRN  ondansetron Injectable 4 milliGRAM(s) IV Push once PRN  pantoprazole    Tablet 40 milliGRAM(s) Oral two times a day  polyethylene glycol 3350 17 Gram(s) Oral daily  senna 2 Tablet(s) Oral at bedtime  sevelamer carbonate 800 milliGRAM(s) Oral three times a day  sodium chloride 0.9% lock flush 10 milliLiter(s) IV Push every 1 hour PRN  sodium chloride 0.9%. 1000 milliLiter(s) IV Continuous <Continuous>      >>> <<<  PHYSICAL EXAM  Subjective: NAD  Neurology: alert and oriented x 3, nonfocal, no gross deficits  CV : s1s2 righ acw permacath  Sternal Wound :  CDI , Stable  Lungs: CTA b/l  Abdomen: soft, NT,ND, (+ )BM  Extremities:  +1 edema     LABS      137  |  95<L>  |  48<H>  ----------------------------<  143<H>  3.7   |  26  |  3.46<H>    Ca    8.8      2021 03:50  Phos  3.6     11-02  Mg     2.1     11                                   9.0    11.96 )-----------( 177      ( 2021 03:50 )             28.1          PT/INR - ( 2021 03:50 )   PT: 16.5 sec;   INR: 1.40 ratio         PTT - ( 2021 03:50 )  PTT:28.9 sec       PAST MEDICAL & SURGICAL HISTORY:  Hypertension    Hyperlipidemia    Diabetes mellitus    No pertinent past surgical history

## 2021-11-02 NOTE — PROGRESS NOTE ADULT - ASSESSMENT
59 y/o M w/ hx of DM2, HTN and HLD initially presented as transfer from [facility] for chest pain concerning for NSTEMI and possible new CHF c/b JOSHUA likely on CKD, and hypertensive emergency. Hospital course complicated by s/p RRT for CVA/TIA ruled out and worsening anemia requiring 1 PRBC. Pt S/p HD session today and underwent LHC showing Multivessel CAD. CT surgery consulted for CABG evaluation.       Surgery/Hospital Course:  10/07 Code stroke called for left facial droop, CT showed old frontal infarct, found to have high grade left carotid stenosis, but unclear cause for acute neurologic deficit  10/13 initiated HD, patient admitted with a creatinine of 4.05, but did not know of a history of renal   10/21 s/p C2L, MVR(T), & LAAL. Received 4 PRBC, 1 PLT, 1000 FIEBA intraop. Required dual pressor and inotrope support w/ , Primacor, Levo, and Epi gtts.   10/22 Extubated at 04:55.   10/24 SOB, urgent HD overnight, bipap  10/25 O2 via HFNC transitioned to 5L NC. Plan for HD today. SB in 50s, backup VVI paced at 50 via temporary epicardial PW.  10/26 Temporary access for HD removed yesterday as was not functioning, will consult IR for permacath. Plan for HD today. Check afternoon INR for coumadin dosing tonight   10/27 Pt received to floor on 2 Arnold - VSS, NAD. Coumadin on hold for AVF w/ vascular Friday or Monday - will discuss initiating heparin gtt to bridge w/ Dr. Xiong in AM. L LIDIA carrion placed in CTU for HD access will need to be transitioned to permacath prior to AVF - will d/w IR.   10/28 IR consult requested for PermCath placement HD with 1uPRBC transfusion today. Hod narcotics. Pt lethargic but responsive to verbal stimuli. FS 121mg/dl. 92% RA   10/29 VSS INR 1.49 HD cath poor flow- requested by nephrology to place new HD cath- Rt IJ Placed  - cleared for Vascular surgery placement of AVF on Tuesday   10/30 VSS  ECHO  resulted minimal pericardial effusion  improved mood / activity  HD today Perm Cath for Monday.  10/31 VSS  EKG today COVID swab Type and screen NPO at midnight  Perm cath in am OOB chair   ID consulted as per IR request for clearance prior to PermCath placement. WBC 13, afebrile. no evidence of s/s of infection.    LUE 1st stage basilic vein transposition performed. The first-stage AVF to mature from 4 to 6 weeks before the second-stage transposition of the matured brachial vein  construction. PT to reevaluate acute vs subacute rehab requirement.

## 2021-11-02 NOTE — PRE-ANESTHESIA EVALUATION ADULT - NSANTHPMHFT_GEN_ALL_CORE
60 M w/ DM2, HTN and HLD, CAD p/w NSTEMI and JOSHUA on CKD and hypertensive emergency s/p CABG, MVR, hospital course complicated by CVA/TIA and ESRD
Medical record reviewed including NSTEMI CAD reduced Lv FXN mild-mod MR High grade carotid disease CKD now requiring HD for volume management
S/p CABG/MVR 10/21  S/p permacath 11/1

## 2021-11-02 NOTE — PROGRESS NOTE ADULT - SUBJECTIVE AND OBJECTIVE BOX
Chief complaint  Patient is a 60y old  Male who presents with a chief complaint of NSTEMI (01 Nov 2021 16:13)   Review of systems  Patient NPO for OR, no hypoglycemic episodes.    Labs and Fingersticks  CAPILLARY BLOOD GLUCOSE      POCT Blood Glucose.: 129 mg/dL (02 Nov 2021 12:45)  POCT Blood Glucose.: 110 mg/dL (01 Nov 2021 21:39)  POCT Blood Glucose.: 117 mg/dL (01 Nov 2021 18:25)      Anion Gap, Serum: 16 (11-02 @ 03:50)  Anion Gap, Serum: 19 *H* (11-01 @ 06:14)      Calcium, Total Serum: 8.8 (11-02 @ 03:50)  Calcium, Total Serum: 9.3 (11-01 @ 06:14)          11-02    137  |  95<L>  |  48<H>  ----------------------------<  143<H>  3.7   |  26  |  3.46<H>    Ca    8.8      02 Nov 2021 03:50  Phos  3.6     11-02  Mg     2.1     11-02                          9.0    11.96 )-----------( 177      ( 02 Nov 2021 03:50 )             28.1     Medications  MEDICATIONS  (STANDING):  aspirin  chewable 81 milliGRAM(s) Oral daily  bisacodyl Suppository 10 milliGRAM(s) Rectal once  carvedilol 6.25 milliGRAM(s) Oral every 12 hours  glucagon  Injectable 1 milliGRAM(s) IntraMuscular once  heparin   Injectable 5000 Unit(s) SubCutaneous every 8 hours  insulin glargine Injectable (LANTUS) 16 Unit(s) SubCutaneous at bedtime  insulin lispro (ADMELOG) corrective regimen sliding scale   SubCutaneous at bedtime  insulin lispro Injectable (ADMELOG) 10 Unit(s) SubCutaneous three times a day before meals  pantoprazole    Tablet 40 milliGRAM(s) Oral two times a day  polyethylene glycol 3350 17 Gram(s) Oral daily  senna 2 Tablet(s) Oral at bedtime  sevelamer carbonate 800 milliGRAM(s) Oral three times a day  sodium chloride 0.9%. 1000 milliLiter(s) (10 mL/Hr) IV Continuous <Continuous>      Physical Exam  Vital Signs Last 12 Hrs  T(F): 96.8 (11-02-21 @ 11:50), Max: 98.3 (11-02-21 @ 04:58)  HR: 60 (11-02-21 @ 12:30) (59 - 68)  BP: 128/59 (11-02-21 @ 12:30) (114/61 - 161/73)  BP(mean): 85 (11-02-21 @ 12:30) (78 - 85)  RR: 16 (11-02-21 @ 12:30) (16 - 17)  SpO2: 98% (11-02-21 @ 12:30) (92% - 100%)    Diagnostics    Free Thyroxine, Serum: AM Sched. Collection: 01-Nov-2021 06:00 (10-31 @ 13:47)  Free Thyroxine, Serum: AM Sched. Collection: 30-Oct-2021 06:00 (10-29 @ 12:10)  Free Thyroxine, Serum: AM Sched. Collection: 26-Oct-2021 06:00  Cancel Reason: Lab Operations Cancel (10-25 @ 13:56)           Chief complaint  Patient is a 60y old  Male who presents with a chief complaint of NSTEMI (01 Nov 2021 16:13)   Review of systems  Patient NPO for OR, no hypoglycemic episodes.    Labs and Fingersticks  CAPILLARY BLOOD GLUCOSE      POCT Blood Glucose.: 129 mg/dL (02 Nov 2021 12:45)  POCT Blood Glucose.: 110 mg/dL (01 Nov 2021 21:39)  POCT Blood Glucose.: 117 mg/dL (01 Nov 2021 18:25)      Anion Gap, Serum: 16 (11-02 @ 03:50)  Anion Gap, Serum: 19 *H* (11-01 @ 06:14)      Calcium, Total Serum: 8.8 (11-02 @ 03:50)  Calcium, Total Serum: 9.3 (11-01 @ 06:14)          11-02    137  |  95<L>  |  48<H>  ----------------------------<  143<H>  3.7   |  26  |  3.46<H>    Ca    8.8      02 Nov 2021 03:50  Phos  3.6     11-02  Mg     2.1     11-02                          9.0    11.96 )-----------( 177      ( 02 Nov 2021 03:50 )             28.1     Medications  MEDICATIONS  (STANDING):  aspirin  chewable 81 milliGRAM(s) Oral daily  bisacodyl Suppository 10 milliGRAM(s) Rectal once  carvedilol 6.25 milliGRAM(s) Oral every 12 hours  glucagon  Injectable 1 milliGRAM(s) IntraMuscular once  heparin   Injectable 5000 Unit(s) SubCutaneous every 8 hours  insulin glargine Injectable (LANTUS) 16 Unit(s) SubCutaneous at bedtime  insulin lispro (ADMELOG) corrective regimen sliding scale   SubCutaneous at bedtime  insulin lispro Injectable (ADMELOG) 10 Unit(s) SubCutaneous three times a day before meals  pantoprazole    Tablet 40 milliGRAM(s) Oral two times a day  polyethylene glycol 3350 17 Gram(s) Oral daily  senna 2 Tablet(s) Oral at bedtime  sevelamer carbonate 800 milliGRAM(s) Oral three times a day  sodium chloride 0.9%. 1000 milliLiter(s) (10 mL/Hr) IV Continuous <Continuous>      Physical Exam  Vital Signs Last 12 Hrs  T(F): 96.8 (11-02-21 @ 11:50), Max: 98.3 (11-02-21 @ 04:58)  HR: 60 (11-02-21 @ 12:30) (59 - 68)  BP: 128/59 (11-02-21 @ 12:30) (114/61 - 161/73)  BP(mean): 85 (11-02-21 @ 12:30) (78 - 85)  RR: 16 (11-02-21 @ 12:30) (16 - 17)  SpO2: 98% (11-02-21 @ 12:30) (92% - 100%)    Diagnostics    Free Thyroxine, Serum: AM Sched. Collection: 01-Nov-2021 06:00 (10-31 @ 13:47)  Free Thyroxine, Serum: AM Sched. Collection: 30-Oct-2021 06:00 (10-29 @ 12:10)  Free Thyroxine, Serum: AM Sched. Collection: 26-Oct-2021 06:00  Cancel Reason: Lab Operations Cancel (10-25 @ 13:56)

## 2021-11-02 NOTE — PROGRESS NOTE ADULT - ASSESSMENT
Assessment  DMT2: 60y Male with DM T2 with hyperglycemia, A1C 7%, was on oral meds and insulin at home, postop CABG on basal bolus insulin, bolus dose held 2/2 NPO status, blood sugars are stable and trending within acceptable range, no hypoglycemias, NPO.  Hypothyroidism: Patient has no history thyroid disease, was not on any thyroid supplements, TSH elevated, subclinical.  CAD: s/p CABG, on medications, no chest pain, stable, monitored.  HTN: Controlled,  on antihypertensive medications.  HLD: On statin  Obesity: No strict exercise routines, not on any weight loss program, neither on low calorie diet.        Esperanza Beckett MD  Cell: [phone]  Office: [phone]     Assessment  DMT2: 60y Male with DM T2 with hyperglycemia, A1C 7%, was on oral meds and insulin at home, postop CABG on basal bolus insulin,  bolus dose held 2/2 NPO status, blood sugars are stable and trending within acceptable range, no hypoglycemias, NPO.  Hypothyroidism: Patient has no history thyroid disease, was not on any thyroid supplements, TSH elevated, subclinical.  CAD: s/p CABG, on medications, no chest pain, stable, monitored.  HTN: Controlled,  on antihypertensive medications.  HLD: On statin  Obesity: No strict exercise routines, not on any weight loss program, neither on low calorie diet.        Esperanza Beckett MD  Cell: [phone]  Office: [phone]

## 2021-11-02 NOTE — PRE-ANESTHESIA EVALUATION ADULT - MALLAMPATI CLASS
Class I (easy) - visualization of the soft palate, fauces, uvula, and both anterior and posterior pillars
Class III - visualization of the soft palate and the base of the uvula
Class III - visualization of the soft palate and the base of the uvula

## 2021-11-02 NOTE — CHART NOTE - NSCHARTNOTEFT_GEN_A_CORE
Surgery Post-Op Note    Pre-Op Dx: ESRD  Procedure: CABG, 1-2 vessels, with mitral valve replacement  Basilic vein transposition  Surgeon:     Subjective:   Pt seen and examined at the bedside. Pt reports being in pain however controlled. Patient appears resting comfortably, responding to questions however hard to arouse,     Vital Signs Last 24 Hrs  T(C): 36.3 (02 Nov 2021 16:37), Max: 36.8 (02 Nov 2021 04:58)  T(F): 97.3 (02 Nov 2021 16:37), Max: 98.3 (02 Nov 2021 04:58)  HR: 58 (02 Nov 2021 16:37) (51 - 71)  BP: 120/72 (02 Nov 2021 16:37) (111/54 - 163/84)  BP(mean): 85 (02 Nov 2021 16:00) (78 - 96)  RR: 18 (02 Nov 2021 16:37) (16 - 18)  SpO2: 95% (02 Nov 2021 16:37) (92% - 100%)    Physical Exam:  General: NAD, resting comfortably in bed  Pulmonary: Nonlabored breathing, no respiratory distress  Cardiovascular: NSR  Incision: C/D/I, left arm pulses present  Drains: no drain  Extremities: WWP      LABS:                        9.0    11.96 )-----------( 177      ( 02 Nov 2021 03:50 )             28.1     11-02    137  |  95<L>  |  48<H>  ----------------------------<  143<H>  3.7   |  26  |  3.46<H>    Ca    8.8      02 Nov 2021 03:50  Phos  3.6     11-02  Mg     2.1     11-02      PT/INR - ( 02 Nov 2021 03:50 )   PT: 16.5 sec;   INR: 1.40 ratio      PTT - ( 02 Nov 2021 03:50 )  PTT:28.9 sec  CAPILLARY BLOOD GLUCOSE    POCT Blood Glucose.: 132 mg/dL (02 Nov 2021 16:24)  POCT Blood Glucose.: 134 mg/dL (02 Nov 2021 15:37)  POCT Blood Glucose.: 129 mg/dL (02 Nov 2021 12:45)  POCT Blood Glucose.: 110 mg/dL (01 Nov 2021 21:39)  POCT Blood Glucose.: 117 mg/dL (01 Nov 2021 18:25)      Assessment:  60M with history of DM type 2, HTN, HLD presenting as transfer from [facility] for chest pain concerning for NSTEMI, code stroke also called for L facial droop, CTA significant for ipsilateral high-grade stenosis of the left internal carotid artery at the carotid bifurcation seen on carotid duplex, now s/p cabg. Patient with ongoing HD requirements now several hours post op from basilic vein transposition.    Plan:  - Plan to administer Narcan d/t fentanyl given to pt. Pt known to have similar episode after oxycodone given 2-3 days prior.  IVF, Diet: Renal diet  Trend wbc  Left arm precautions, maintain pink band  Pain/nausea control PRN  Incentive spirometer/OOB/Ambulate    [id number]  Vascular Surgery

## 2021-11-02 NOTE — PROVIDER CONTACT NOTE (CHANGE IN STATUS NOTIFICATION) - RECOMMENDATIONS
Jeovany Hussein NP notified am labs sent and results  given to Cheikh Solis NP .  Continue to monitor patient.

## 2021-11-02 NOTE — BRIEF OPERATIVE NOTE - NSICDXBRIEFPROCEDURE_GEN_ALL_CORE_FT
PROCEDURES:  CABG, 1-2 vessels, with mitral valve replacement 21-Oct-2021 17:06:16  [place] Jain  
PROCEDURES:  Basilic vein transposition 02-Nov-2021 12:21:23  Hilda Callaway

## 2021-11-03 DIAGNOSIS — R00.1 BRADYCARDIA, UNSPECIFIED: ICD-10-CM

## 2021-11-03 LAB
ALBUMIN SERPL ELPH-MCNC: 2.9 G/DL — LOW (ref 3.3–5)
ALP SERPL-CCNC: 100 U/L — SIGNIFICANT CHANGE UP (ref 40–120)
ALT FLD-CCNC: 8 U/L — LOW (ref 10–45)
ANION GAP SERPL CALC-SCNC: 20 MMOL/L — HIGH (ref 5–17)
AST SERPL-CCNC: 21 U/L — SIGNIFICANT CHANGE UP (ref 10–40)
BILIRUB SERPL-MCNC: 0.2 MG/DL — SIGNIFICANT CHANGE UP (ref 0.2–1.2)
BUN SERPL-MCNC: 74 MG/DL — HIGH (ref 7–23)
CALCIUM SERPL-MCNC: 9.5 MG/DL — SIGNIFICANT CHANGE UP (ref 8.4–10.5)
CHLORIDE SERPL-SCNC: 95 MMOL/L — LOW (ref 96–108)
CO2 SERPL-SCNC: 23 MMOL/L — SIGNIFICANT CHANGE UP (ref 22–31)
CREAT SERPL-MCNC: 5.98 MG/DL — HIGH (ref 0.5–1.3)
GLUCOSE BLDC GLUCOMTR-MCNC: 111 MG/DL — HIGH (ref 70–99)
GLUCOSE BLDC GLUCOMTR-MCNC: 114 MG/DL — HIGH (ref 70–99)
GLUCOSE BLDC GLUCOMTR-MCNC: 168 MG/DL — HIGH (ref 70–99)
GLUCOSE SERPL-MCNC: 106 MG/DL — HIGH (ref 70–99)
HCT VFR BLD CALC: 27.3 % — LOW (ref 39–50)
HGB BLD-MCNC: 8.5 G/DL — LOW (ref 13–17)
MCHC RBC-ENTMCNC: 29.6 PG — SIGNIFICANT CHANGE UP (ref 27–34)
MCHC RBC-ENTMCNC: 31.1 GM/DL — LOW (ref 32–36)
MCV RBC AUTO: 95.1 FL — SIGNIFICANT CHANGE UP (ref 80–100)
NRBC # BLD: 0 /100 WBCS — SIGNIFICANT CHANGE UP (ref 0–0)
PLATELET # BLD AUTO: 208 K/UL — SIGNIFICANT CHANGE UP (ref 150–400)
POTASSIUM SERPL-MCNC: 5.2 MMOL/L — SIGNIFICANT CHANGE UP (ref 3.5–5.3)
POTASSIUM SERPL-SCNC: 5.2 MMOL/L — SIGNIFICANT CHANGE UP (ref 3.5–5.3)
PROT SERPL-MCNC: 7.1 G/DL — SIGNIFICANT CHANGE UP (ref 6–8.3)
RBC # BLD: 2.87 M/UL — LOW (ref 4.2–5.8)
RBC # FLD: 14.5 % — SIGNIFICANT CHANGE UP (ref 10.3–14.5)
SODIUM SERPL-SCNC: 138 MMOL/L — SIGNIFICANT CHANGE UP (ref 135–145)
WBC # BLD: 10.79 K/UL — HIGH (ref 3.8–10.5)
WBC # FLD AUTO: 10.79 K/UL — HIGH (ref 3.8–10.5)

## 2021-11-03 PROCEDURE — 99232 SBSQ HOSP IP/OBS MODERATE 35: CPT | Mod: GC

## 2021-11-03 PROCEDURE — 99221 1ST HOSP IP/OBS SF/LOW 40: CPT

## 2021-11-03 RX ORDER — ERYTHROPOIETIN 10000 [IU]/ML
4000 INJECTION, SOLUTION INTRAVENOUS; SUBCUTANEOUS
Refills: 0 | Status: DISCONTINUED | OUTPATIENT
Start: 2021-11-03 | End: 2021-11-07

## 2021-11-03 RX ORDER — CHLORHEXIDINE GLUCONATE 213 G/1000ML
1 SOLUTION TOPICAL
Refills: 0 | Status: DISCONTINUED | OUTPATIENT
Start: 2021-11-03 | End: 2021-11-08

## 2021-11-03 RX ADMIN — CHLORHEXIDINE GLUCONATE 1 APPLICATION(S): 213 SOLUTION TOPICAL at 09:20

## 2021-11-03 RX ADMIN — PANTOPRAZOLE SODIUM 40 MILLIGRAM(S): 20 TABLET, DELAYED RELEASE ORAL at 18:00

## 2021-11-03 RX ADMIN — Medication 10 UNIT(S): at 17:59

## 2021-11-03 RX ADMIN — SEVELAMER CARBONATE 800 MILLIGRAM(S): 2400 POWDER, FOR SUSPENSION ORAL at 22:07

## 2021-11-03 RX ADMIN — HEPARIN SODIUM 5000 UNIT(S): 5000 INJECTION INTRAVENOUS; SUBCUTANEOUS at 06:32

## 2021-11-03 RX ADMIN — INSULIN GLARGINE 16 UNIT(S): 100 INJECTION, SOLUTION SUBCUTANEOUS at 22:15

## 2021-11-03 RX ADMIN — CARVEDILOL PHOSPHATE 6.25 MILLIGRAM(S): 80 CAPSULE, EXTENDED RELEASE ORAL at 06:31

## 2021-11-03 RX ADMIN — SEVELAMER CARBONATE 800 MILLIGRAM(S): 2400 POWDER, FOR SUSPENSION ORAL at 06:30

## 2021-11-03 RX ADMIN — HEPARIN SODIUM 5000 UNIT(S): 5000 INJECTION INTRAVENOUS; SUBCUTANEOUS at 16:10

## 2021-11-03 RX ADMIN — HEPARIN SODIUM 5000 UNIT(S): 5000 INJECTION INTRAVENOUS; SUBCUTANEOUS at 22:06

## 2021-11-03 RX ADMIN — Medication 81 MILLIGRAM(S): at 16:12

## 2021-11-03 RX ADMIN — ERYTHROPOIETIN 4000 UNIT(S): 10000 INJECTION, SOLUTION INTRAVENOUS; SUBCUTANEOUS at 14:21

## 2021-11-03 RX ADMIN — SEVELAMER CARBONATE 800 MILLIGRAM(S): 2400 POWDER, FOR SUSPENSION ORAL at 16:10

## 2021-11-03 RX ADMIN — PANTOPRAZOLE SODIUM 40 MILLIGRAM(S): 20 TABLET, DELAYED RELEASE ORAL at 06:31

## 2021-11-03 RX ADMIN — Medication 10 UNIT(S): at 08:14

## 2021-11-03 NOTE — PROGRESS NOTE ADULT - ASSESSMENT
Assessment  DMT2: 60y Male with DM T2 with hyperglycemia, A1C 7%, was on oral meds and insulin at home, postop CABG on basal bolus insulin, blood sugars are stable and trending within acceptable range, no hypoglycemias. Patient is s/p AVF, eating meals, DC planning for rehab.  Hypothyroidism: Patient has no history thyroid disease, was not on any thyroid supplements, TSH elevated, subclinical.  CAD: s/p CABG, on medications, no chest pain, stable, monitored.  HTN: Controlled,  on antihypertensive medications.  HLD: On statin  Obesity: No strict exercise routines, not on any weight loss program, neither on low calorie diet.        Esperanza Beckett MD  Cell: [phone]  Office: [phone]     Assessment  DMT2: 60y Male with DM T2 with hyperglycemia, A1C 7%, was on oral meds and insulin at home, postop CABG on basal bolus insulin, blood sugars are stable and trending within acceptable range, no hypoglycemias.  Patient is s/p AVF, eating meals, DC planning for rehab.  Hypothyroidism: Patient has no history thyroid disease, was not on any thyroid supplements, TSH elevated, subclinical.  CAD: s/p CABG, on medications, no chest pain, stable, monitored.  HTN: Controlled,  on antihypertensive medications.  HLD: On statin  Obesity: No strict exercise routines, not on any weight loss program, neither on low calorie diet.        Esperanza Beckett MD  Cell: [phone]  Office: [phone]

## 2021-11-03 NOTE — CONSULT NOTE ADULT - ASSESSMENT
59 y/o male hx of DM, HTN, HLD, Hypothyroidism, CAD, former smoker, p/w chest pain and sob from OSH and transferred to [facility] concerning for NTESMI, possible CHF c/b JOSHUA likely on CKD and hypertensive emergency. Hospital course c/b s/p RRT for CVA/TIA, CT showed olf frontal infarct, with high grade left carotid stenosis. Hemodialysis initiated for JOSHUA, serum Cr was 4.05. s/p LHC on 10/14 that showed multivessel disease and severe MR, s/p C2L, MVR (T), and ANGELITA Ligation on 10/21/21. POD #!# with intermittent loss of capture early this morning, pAF, Tachy Seth. EPS consulted for further management.    !. Tachy Seth, pAF  2. Intermittent loss of capture on tele early morning  3. s/p C2L, MVR (T), LAAL on 10/21/21  4. JOSHUA likely on CKD  - Review of tele with intermittent loss of capture early this morning, while asleep, temp wire output mA 5, up to 10 for safety margin  - Patient on AVN with low dose Coreg 6.25mg, would recommend discontinuing Coreg  - Rec Amio loading with 400mg bid for 1 week, 200mg bid for 1 week then 200mg qd  - Monitor LFTs, TFTs (AST 35/ALT22)  - Will need outpatient monitoring of LFTs, TFTs, PFT with DLCO as outpatient while on Amio  - Right Shiley- HD per Renal (serum Cr today 5.98)  - Rec AC for pAF (likely Coumadin) 2/2 JOSHUA likely on CKD  - Post op TTE with EF 75%, LA size 3.9    NATASHA MilianC

## 2021-11-03 NOTE — PROGRESS NOTE ADULT - ASSESSMENT
59 yo male with DM2, HTN, and HLD who initially presented to [facility] on 10/05 as a transfer from [facility] for NSTEMI and possible CHF. Patient on Heparin drip for NSTEMI. LKW 10:45AM. Patient had episode of bradycardia (HR 30s), then became altered. RRT called. BP during RRT 70s/40s. Code stroke called for L facial droop.   CTH negative for acute pathology, old L frontal cortical infarct seen.   CTA H unremarkable. CTA N shows high-grade stenosis left internal carotid artery at the carotid bifurcation in the neck.  10/06 TTE: EF 45%, moderate-severe MR, mild-moderate L ventricular systolic dysfunction.   A1c 7  MRI no AIS but old strokes  HD cath placement 10/12   CD with severe L ICA and Mod R ICA   s/p LHC 10/14  CABG 10/21    Impression: Mild L nasolabial flattening and dysarthria, ?decreased eye closure strength on the L. Possibly secondary to R brain dysfunction due to acute stroke of unknown mechanism vs peripheral etiology. Patient with old L frontal infarct on CTH, also with high-grade stenosis of L ICA at the carotid bifurcation which likely cause of old stroke     Recommendations:   - f/u EP , hold AV mimi blockers   - perma cath 11/1 prior to AVF   - ASA 81MG PO daily, coumadin dosing for valve   - Avoid hypotension.  - High dose statin therapy - atorvastatin 40mg PO daily. LDL goal <70mg/dL.  -  vascular for ICA revascularization can be outpt. not acute   - lipid panel  - telemetry  - PT/OT/SS/SLP, OOBC  - check FS, glucose control <180  - GI/DVT ppx  - Counseling on diet, exercise, and medication adherence was done  - Counseling on smoking cessation and alcohol consumption offered when appropriate.  - Pain assessed and judicious use of narcotics when appropriate was discussed.    - Stroke education given when appropriate.  - Importance of fall prevention discussed.   - Differential diagnosis and plan of care discussed with patient and/or family and primary team  - Thank you for allowing me to participate in the care of this patient. Call with questions.   Marcelino Patel MD  Vascular Neurology .

## 2021-11-03 NOTE — PROGRESS NOTE ADULT - ASSESSMENT
59 y/o M w/ hx of DM2, HTN and HLD initially presented as transfer from [facility] for chest pain concerning for NSTEMI and possible new CHF c/b JOSHUA likely on CKD, and hypertensive emergency. Hospital course complicated by s/p RRT for CVA/TIA ruled out and worsening anemia requiring 1 PRBC. Pt S/p HD session today and underwent LHC showing Multivessel CAD. CT surgery consulted for CABG evaluation.     Surgery/Hospital Course:  10/07 Code stroke called for left facial droop, CT showed old frontal infarct, found to have high grade left carotid stenosis, but unclear cause for acute neurologic deficit  10/13 initiated HD, patient admitted with a creatinine of 4.05, but did not know of a history of renal   10/21 s/p C2L, MVR(T), & LAAL. Received 4 PRBC, 1 PLT, 1000 FIEBA intraop. Required dual pressor and inotrope support w/ , Primacor, Levo, and Epi gtts.   10/22 Extubated at 04:55.   10/24 SOB, urgent HD overnight, bipap  10/25 O2 via HFNC transitioned to 5L NC. Plan for HD today. SB in 50s, backup VVI paced at 50 via temporary epicardial PW.  10/26 Temporary access for HD removed yesterday as was not functioning, will consult IR for permacath. Plan for HD today. Check afternoon INR for coumadin dosing tonight   10/27 Pt received to floor on 2 Arnold - VSS, NAD. Coumadin on hold for AVF w/ vascular Friday or Monday - will discuss initiating heparin gtt to bridge w/ Dr. Xiong in AM. L LIDIA carrion placed in CTU for HD access will need to be transitioned to permacath prior to AVF - will d/w IR.   10/28 IR consult requested for PermCath placement HD with 1uPRBC transfusion today. Hod narcotics. Pt lethargic but responsive to verbal stimuli. FS 121mg/dl. 92% RA   10/29 VSS INR 1.49 HD cath poor flow- requested by nephrology to place new HD cath- Rt IJ Placed  - cleared for Vascular surgery placement of AVF on Tuesday   10/30 VSS  ECHO  resulted minimal pericardial effusion  improved mood / activity  HD today Perm Cath for Monday.  10/31 VSS  EKG today COVID swab Type and screen NPO at midnight  Perm cath in am OOB chair   ID consulted as per IR request for clearance prior to PermCath placement. WBC 13, afebrile. no evidence of s/s of infection.    LUE 1st stage basilic vein transposition performed. The first-stage AVF to mature from 4 to 6 weeks before the second-stage transposition of the matured brachial vein  construction. PT to reevaluate acute vs subacute rehab requirement.  11/3 Patient noted on tele SB /  @ 40 with loss of capture.  Intermittent Tachy / Seth --> EP consult called and Please schedule an appointment with your PCP in 1 week, call the office to schedule an appointment.reciated. Maintain PW --> EPM VVI 40 / vma 10. H.D today per renal.   Disposition: Acute Rehab once medically cleared.

## 2021-11-03 NOTE — PROGRESS NOTE ADULT - ASSESSMENT
60M with history of DM type 2, HTN, HLD presenting as transfer from [facility] for chest pain concerning for NSTEMI, code stroke also called for L facial droop, CTA significant for ipsilateral high-grade stenosis of the left internal carotid artery at the carotid bifurcation seen on carotid duplex, now s/p cabg. Patient with ongoing HD requirements now requiring long term HD access planning, s/p L basilic vein transposition in 1 month    - Plan for CEA outpatient  - Stage 2 basilic vein transposition in 1 month  - F/u with Dr. Pinedo 11/29, please call to confirm appointment      Instruction:  -Dressing down POD#2, patient can get dressing wet after that. Steri strips will come off on their own      Our team will sign off at this time, please reach out with any additional questions/concerns.     Please contact Vascular Surgery (P: [phone]) with any questions.

## 2021-11-03 NOTE — CONSULT NOTE ADULT - SUBJECTIVE AND OBJECTIVE BOX
CHIEF COMPLAINT:  NSTEMI    HISTORY OF PRESENT ILLNESS:  60 yr old M w/ hx of DM2, HTN and HLD presents as transfer from [facility] for chest pain concerning for NSTEMI and possible CHF.  Pt states that after he woke up this morning he felt midsternal chest pain this morning, that felt like a burning sensation. Pain was non radiating. Associated with shortness of breath. He initially took TUMS and drank some milk which alleviated some of the pain but not completely. Later he also started feeling very dizzy so he went to the ED. Denies associated palpitations, diaphoresis, N/V.  When he presented to [facility] he was noted to be SOB. Initially, they evaluated patient for CHF but Troponin and BNP levels were elevated so he was transferred here for NSTEMI and r/o CHF.    Per nursing notes, while at [facility], he was placed on BIPAP, given Solumedrol 120, Nitro SL x1, duoneb x2, ASA 81mg, Lasix 40mg w/ 200 cc output, and started at 10mL/hr Nitro drip then increased to 30mL/hr. There, initial /126 retake was 171/67. Nitro drip was d/c'd upon arrival to [facility] ED.     During my visit, patient was sitting up eating a sandwich. Appears comfortable on room air. Denies repeat of chest pain after this morning. Denies shortness of breath. Denies lower extremity edema, orthopnea or PND.     Labs also remarkable for JOSHUA, metabolic acidosis and hyperkalemia. Patient denies prior history of renal injury.     Allergies    No Known Allergies    Intolerances    	  MEDICATIONS:  aspirin  chewable 81 milliGRAM(s) Oral daily  heparin   Injectable 5000 Unit(s) SubCutaneous every 8 hours  bisacodyl Suppository 10 milliGRAM(s) Rectal once  pantoprazole    Tablet 40 milliGRAM(s) Oral two times a day  polyethylene glycol 3350 17 Gram(s) Oral daily  senna 2 Tablet(s) Oral at bedtime  glucagon  Injectable 1 milliGRAM(s) IntraMuscular once  insulin glargine Injectable (LANTUS) 16 Unit(s) SubCutaneous at bedtime  insulin lispro (ADMELOG) corrective regimen sliding scale   SubCutaneous at bedtime  insulin lispro Injectable (ADMELOG) 10 Unit(s) SubCutaneous three times a day before meals  chlorhexidine 2% Cloths 1 Application(s) Topical <User Schedule>  epoetin vern-epbx (RETACRIT) Injectable 4000 Unit(s) IV Push <User Schedule>  sodium chloride 0.9% lock flush 10 milliLiter(s) IV Push every 1 hour PRN  sodium chloride 0.9%. 1000 milliLiter(s) IV Continuous <Continuous>      PAST MEDICAL & SURGICAL HISTORY:  Hypertension  Hyperlipidemia  Diabetes mellitus  No pertinent past surgical history      FAMILY HISTORY:  Family history of CVA (Father)      SOCIAL HISTORY:    [ ] Non-smoker  [ ] Smoker  [ ] Alcohol      REVIEW OF SYSTEMS:  Seen with HD in progress  Constitutional: no fever or chills  Neuro: denies dizziness or lightheadedness  Respiratory: denies sob  Cardiac: denies chest pain or palpitations  GI: denies n/v, or abdominal pain  Ext: denies numbness or tingling      PHYSICAL EXAM:  T(C): 36.3 (11-03-21 @ 15:45), Max: 36.6 (11-02-21 @ 18:57)  HR: 108 (11-03-21 @ 15:45) (37 - 130)  BP: 141/77 (11-03-21 @ 15:45) (119/59 - 163/93)  RR: 20 (11-03-21 @ 15:45) (18 - 20)  SpO2: 96% (11-03-21 @ 15:45) (96% - 100%)  Wt(kg): --  I&O's Summary    02 Nov 2021 07:01  -  03 Nov 2021 07:00  --------------------------------------------------------  IN: 40 mL / OUT: 0 mL / NET: 40 mL    03 Nov 2021 07:01  -  03 Nov 2021 17:04  --------------------------------------------------------  IN: 250 mL / OUT: 2000 mL / NET: -1750 mL        Appearance: Normal, well groomed	  HEENT: Normocephalic, atraumatic  Cardiovascular: irregular S1 S2, occ tachycardia and bradycardia, no r/g; Temporay wire set VVI @ 40, mA 10, threshold 5  Respiratory: Lungs clear to auscultation	  Psychiatry: A & O x 3, Mood & affect appropriate  Gastrointestinal:  Soft, Non-tender, + BS	  Skin: No rashes, No ecchymoses, No cyanosis	  Neurologic: Non-focal  Extremities: Normal range of motion, No c/c/e  Vascular: Peripheral pulses palpable 2+ bilaterally; Right chest wall perma cath' left AV fistula        LABS:	 	    CBC Full  -  ( 03 Nov 2021 06:55 )  WBC Count : 10.79 K/uL  Hemoglobin : 8.5 g/dL  Hematocrit : 27.3 %  Platelet Count - Automated : 208 K/uL  Mean Cell Volume : 95.1 fl  Mean Cell Hemoglobin : 29.6 pg  Mean Cell Hemoglobin Concentration : 31.1 gm/dL  Auto Neutrophil # : x  Auto Lymphocyte # : x  Auto Monocyte # : x  Auto Eosinophil # : x  Auto Basophil # : x  Auto Neutrophil % : x  Auto Lymphocyte % : x  Auto Monocyte % : x  Auto Eosinophil % : x  Auto Basophil % : x    11-03    138  |  95<L>  |  74<H>  ----------------------------<  106<H>  5.2   |  23  |  5.98<H>  11-02    137  |  95<L>  |  48<H>  ----------------------------<  143<H>  3.7   |  26  |  3.46<H>    Ca    9.5      03 Nov 2021 06:53  Ca    8.8      02 Nov 2021 03:50  Phos  3.6     11-02  Mg     2.1     11-02    TPro  7.1  /  Alb  2.9<L>  /  TBili  0.2  /  DBili  x   /  AST  21  /  ALT  8<L>  /  AlkPhos  100  11-03      proBNP:   Lipid Profile:   HgA1c:   TSH:       CARDIAC MARKERS:      TELEMETRY: pAF/SB, 's, intermittent loss of capture early this morning  	    ECG 10/6/21 (Pre op): NSR, normal axis, CRISTIN 166ms, narrow QRS 92ms    ECG 10/31/21 (Post op): NSR @ 73 bpm, PVC, narrow QRS 88ms  	  TTE 10/28/21:    Dimensions:    Normal Values:  LA:     3.9    2.0 - 4.0 cm  Ao:     3.1    2.0 - 3.8 cm  SEPTUM: 1.0    0.6 - 1.2 cm  PWT:    0.9    0.6 - 1.1 cm  LVIDd: 6.1    3.0 - 5.6 cm  LVIDs:  4.3    1.8 - 4.0 cm  Derived variables:  LVMI: 138 g/m2  RWT: 0.32  Fractional short: 29 %  EF (Visual Estimate):  %  EF (Brand Rule): 75 %Doppler Peak Velocity (m/sec):  MV=2.0 AoV=2.1  ------------------------------------------------------------------------  Observations:  Mitral Valve: Bioprosthetic mitral valve replacement. No  mitral valve regurgitation seen. Peak mitral valve gradient  equals 17 mm Hg, mean transmitral valve gradient equals 4-5  mm Hg, which is probably normal in the setting of a  bioprosthetic mitral valve replacement. (HRabout 50s bpm)  Aortic Valve/Aorta: Aortic valve not well visualized;  appears to be a calcified trileaflet valve with normal  opening. Peak transaortic valve gradient equals 19 mm Hg,  mean transaortic valve gradient equals 9 mm Hg, aortic  valve velocity time integral equals 42 cm. No aortic valve  regurgitation seen. Peak left ventricular outflow tract  gradient equals 12 mm Hg, mean gradient is equal to 5 mm  Hg, LVOT velocity time integral equals 30 cm.  Aortic Root: 3.1 cm.  LVOT diameter: 1.4 cm.  Left Atrium: Moderately dilated left atrium.  LA volume  index = 45 cc/m2.  Left Ventricle: Endocardial visualization enhanced with  intravenous injection of Ultrasonic Enhancing Agent  (Definity). Hyperdynamic left ventricular systolic  function. Septal motion consistent with cardiac surgery.  Eccentric left ventricular hypertrophy (dilated left  ventricle with normal relative wall thickness). Moderate  diastolic dysfunction (Stage II).  Right Heart: Normal right atrium. Normal right ventricular  size and function. Tricuspid valve not well visualized,  probably normal. Minimal tricuspid regurgitation. Pulmonic  valve not well visualized. Minimal pulmonic regurgitation.  Pericardium/Pleura: Minimal pericardial effusion.  Hemodynamic: Estimated right atrial pressure is 8 mm Hg.  Unable to estimate RVSP due to incomplete TR spectral  doppler signal.  ------------------------------------------------------------------------  Conclusions:  1. Bioprosthetic mitral valve replacement. No mitral valve  regurgitation seen. Peak mitral valve gradient equals 17 mm  Hg, mean transmitral valve gradient equals 4-5 mm Hg, which  is probably normal in the setting of a bioprosthetic mitral  valve replacement. (HRabout 50s bpm)  2. Aortic valve not well visualized; appears to be a  calcified trileaflet valve with normal opening. No aortic  valve regurgitation seen.  3. Endocardial visualization enhanced with intravenous  injection of Ultrasonic Enhancing Agent (Definity).  Hyperdynamic left ventricular systolic function.  4. Normal right ventricular size and function.  5. Unable to estimate RVSP due to incomplete TR spectral  doppler signal.  6. Minimal pericardial effusion.  *** Compared with echocardiogram of 10/21/2021, patient is  s/p Bioprosthertic MVR.

## 2021-11-03 NOTE — PROGRESS NOTE ADULT - SUBJECTIVE AND OBJECTIVE BOX
[facility] Division of Kidney Diseases & Hypertension  HEMODIALYSIS NOTE  --------------------------------------------------------------------------------  Chief Complaint: ESRD/Ongoing hemodialysis requirement    24 hour events/subjective:    s/p AVF    PAST HISTORY  --------------------------------------------------------------------------------  No significant changes to PMH, PSH, FHx, SHx, unless otherwise noted    ALLERGIES & MEDICATIONS  --------------------------------------------------------------------------------  Allergies    No Known Allergies    Intolerances      Standing Inpatient Medications  aspirin  chewable 81 milliGRAM(s) Oral daily  bisacodyl Suppository 10 milliGRAM(s) Rectal once  carvedilol 6.25 milliGRAM(s) Oral every 12 hours  chlorhexidine 2% Cloths 1 Application(s) Topical <User Schedule>  glucagon  Injectable 1 milliGRAM(s) IntraMuscular once  heparin   Injectable 5000 Unit(s) SubCutaneous every 8 hours  insulin glargine Injectable (LANTUS) 16 Unit(s) SubCutaneous at bedtime  insulin lispro (ADMELOG) corrective regimen sliding scale   SubCutaneous at bedtime  insulin lispro Injectable (ADMELOG) 10 Unit(s) SubCutaneous three times a day before meals  pantoprazole    Tablet 40 milliGRAM(s) Oral two times a day  polyethylene glycol 3350 17 Gram(s) Oral daily  senna 2 Tablet(s) Oral at bedtime  sevelamer carbonate 800 milliGRAM(s) Oral three times a day  sodium chloride 0.9%. 1000 milliLiter(s) IV Continuous <Continuous>    PRN Inpatient Medications  sodium chloride 0.9% lock flush 10 milliLiter(s) IV Push every 1 hour PRN      REVIEW OF SYSTEMS  --------------------------------------------------------------------------------    VITALS/PHYSICAL EXAM  --------------------------------------------------------------------------------  T(C): 36.4 (11-03-21 @ 06:15), Max: 36.6 (11-02-21 @ 18:57)  HR: 130 (11-03-21 @ 06:15) (37 - 130)  BP: 129/75 (11-03-21 @ 06:15) (111/54 - 147/67)  RR: 18 (11-03-21 @ 06:15) (16 - 18)  SpO2: 96% (11-03-21 @ 06:15) (95% - 100%)  Wt(kg): --  Height (cm): 165.1 (11-02-21 @ 07:51)  Weight (kg): 80.2 (11-02-21 @ 07:51)  BMI (kg/m2): 29.4 (11-02-21 @ 07:51)  BSA (m2): 1.88 (11-02-21 @ 07:51)      11-02-21 @ 07:01  -  11-03-21 @ 07:00  --------------------------------------------------------  IN: 40 mL / OUT: 0 mL / NET: 40 mL      Physical Exam:  	Gen: NAD  	Pulm: scattered wheezing  	CV: RRR, S1S2; no rub  	Abd: +BS, soft, nontender/nondistended  	UE: Warm, FROM,   	LE: Warm, FROM,  trace edema  	Neuro: No focal deficits, intact gait  	Psych: Normal affect and mood  	Skin: Warm, without rashes  	Vascular access: right tunnel cath and ; new avf left    LABS/STUDIES  --------------------------------------------------------------------------------              8.5    10.79 >-----------<  208      [11-03-21 @ 06:55]              27.3     138  |  95  |  74  ----------------------------<  106      [11-03-21 @ 06:53]  5.2   |  23  |  5.98        Ca     9.5     [11-03-21 @ 06:53]      Mg     2.1     [11-02-21 @ 03:50]      Phos  3.6     [11-02-21 @ 03:50]    TPro  7.1  /  Alb  2.9  /  TBili  0.2  /  DBili  x   /  AST  21  /  ALT  8   /  AlkPhos  100  [11-03-21 @ 06:53]    PT/INR: PT 16.5 , INR 1.40       [11-02-21 @ 03:50]  PTT: 28.9       [11-02-21 @ 03:50]

## 2021-11-03 NOTE — CONSULT NOTE ADULT - ATTENDING COMMENTS
Asymptomatic episode of atrial fibrillation with offset pause with 2 beats of backup VVI pacing during sleep. No daytime episodes. Beginning short term amiodarone for post op AF.

## 2021-11-03 NOTE — PROGRESS NOTE ADULT - PROBLEM SELECTOR PLAN 1
Will continue current insulin regimen for now. Will continue monitoring FS, log, and FU.  If blood sugars remain stable suggest DC on current basal bolus insulin regimen, discontinue prior hypoglycemic agents, endo FU 2 weeks.  Patient counseled for compliance with consistent low carb diet and exercise as tolerated outpatient.

## 2021-11-03 NOTE — PROGRESS NOTE ADULT - SUBJECTIVE AND OBJECTIVE BOX
Neurology Progress Note    S: Patient seen and examined. doing okay.  now on floor   Medications:    MEDICATIONS  (STANDING):  aspirin  chewable 81 milliGRAM(s) Oral daily  bisacodyl Suppository 10 milliGRAM(s) Rectal once  chlorhexidine 2% Cloths 1 Application(s) Topical <User Schedule>  epoetin vern-epbx (RETACRIT) Injectable 4000 Unit(s) IV Push <User Schedule>  glucagon  Injectable 1 milliGRAM(s) IntraMuscular once  heparin   Injectable 5000 Unit(s) SubCutaneous every 8 hours  insulin glargine Injectable (LANTUS) 16 Unit(s) SubCutaneous at bedtime  insulin lispro (ADMELOG) corrective regimen sliding scale   SubCutaneous at bedtime  insulin lispro Injectable (ADMELOG) 10 Unit(s) SubCutaneous three times a day before meals  pantoprazole    Tablet 40 milliGRAM(s) Oral two times a day  polyethylene glycol 3350 17 Gram(s) Oral daily  senna 2 Tablet(s) Oral at bedtime  sevelamer carbonate 800 milliGRAM(s) Oral three times a day  sodium chloride 0.9%. 1000 milliLiter(s) (10 mL/Hr) IV Continuous <Continuous>    MEDICATIONS  (PRN):  sodium chloride 0.9% lock flush 10 milliLiter(s) IV Push every 1 hour PRN Pre/post blood products, medications, blood draw, and to maintain line patency          Vitals:  Vital Signs Last 24 Hrs  T(C): 36.3 (03 Nov 2021 15:45), Max: 36.6 (02 Nov 2021 18:57)  T(F): 97.3 (03 Nov 2021 15:45), Max: 97.9 (02 Nov 2021 18:57)  HR: 68 (03 Nov 2021 17:38) (37 - 130)  BP: 152/64 (03 Nov 2021 17:38) (119/59 - 163/93)  BP(mean): 93 (03 Nov 2021 06:15) (93 - 93)  RR: 20 (03 Nov 2021 15:45) (18 - 20)  SpO2: 94% (03 Nov 2021 17:38) (94% - 100%)      General Exam:   General Appearance: Appropriately dressed and in no acute distress       Head: Normocephalic, atraumatic and no dysmorphic features  Ear, Nose, and Throat: Moist mucous membranes  CVS: S1S2+  Resp: No SOB, no wheeze or rhonchi  Abd: soft NTND  Extremities: No edema, no cyanosis  Skin: No bruises, no rashes     Neurological Exam:  Mental Status: Awake, alert and oriented x2-3.  Able to follow simple and complex verbal commands. Able to name and repeat. fluent speech. No obvious aphasia or dysarthria noted.   Cranial Nerves: PERRL, EOMI, VFFC, sensation V1-V3 intact,  mild NLF facial asymmetry , equal elevation of palate, scm/trap 5/5, tongue is midline on protrusion. no obvious papilledema on fundoscopic exam. Hearing is grossly intact.   Motor: Normal bulk, tone and strength throughout. Fine finger movements were intact and symmetric. no tremors or drift noted.    Sensation: Intact to light touch and pinprick throughout. no right/left confusion. no extinction to tactile on DSS.   Reflexes: 1+ throughout at biceps, brachioradialis, triceps, patellars and ankles bilaterally and equal. No clonus. R toe and L toe were both downgoing.  Coordination: No dysmetria on FNF    Gait: deferred     I personally reviewed the below data/images/labs:  CBC Full  -  ( 03 Nov 2021 06:55 )  WBC Count : 10.79 K/uL  RBC Count : 2.87 M/uL  Hemoglobin : 8.5 g/dL  Hematocrit : 27.3 %  Platelet Count - Automated : 208 K/uL  Mean Cell Volume : 95.1 fl  Mean Cell Hemoglobin : 29.6 pg  Mean Cell Hemoglobin Concentration : 31.1 gm/dL  Auto Neutrophil # : x  Auto Lymphocyte # : x  Auto Monocyte # : x  Auto Eosinophil # : x  Auto Basophil # : x  Auto Neutrophil % : x  Auto Lymphocyte % : x  Auto Monocyte % : x  Auto Eosinophil % : x  Auto Basophil % : x    11-03    138  |  95<L>  |  74<H>  ----------------------------<  106<H>  5.2   |  23  |  5.98<H>    Ca    9.5      03 Nov 2021 06:53  Phos  3.6     11-02  Mg     2.1     11-02    TPro  7.1  /  Alb  2.9<L>  /  TBili  0.2  /  DBili  x   /  AST  21  /  ALT  8<L>  /  AlkPhos  100  11-03        -10/07 CTH: No hemorrhage. 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   -10/07 CTA N: High-grade stenosis left internal carotid artery at the carotid bifurcation in the neck.   -10/07 CTA H: Normal intracranial circulation.    < from: MR Head No Cont (10.09.21 @ 20:22) >    EXAM:  MR BRAIN                            PROCEDURE DATE:  10/09/2021            INTERPRETATION:  CLINICAL HISTORY:Facial droop, on heparin drip, NSTEMI . Severe left ICA stenosis.    TECHNIQUE:  MRI of brain without contrast dated 10/9/2021.  Examination consisted of transaxial T1, T2, FLAIR, gradient echo as well as diffusion-weighted sequences with corresponding ADC maps. Coronal T2 and sagittal T1-weighted images were also acquired through the brain.    COMPARISON: CT head and CTA head andneck 10/7/2021    FINDINGS:  There are no areas abnormal restricted diffusion within the brain parenchyma to suggest acute/subacute infarct. There is no acute intracranial hemorrhage, vasogenic edema or abnormal susceptibility artifact. Chronic lacunar infarcts are seen in the left frontal lobe, right frontal cranial radiata and right cerebellum. Additional nonspecific patchy and confluent areas of T2/FLAIR prolongation in the bihemispheric white matter likely represents mild chronic microvascular changes.    The ventricles, sulci and cisternal spaces are stable in size and configuration. There is no midline shift or abnormal extra-axial fluid collection.    Flow-voids of the major intracranial vessels are maintained, as seen best on the T2-weighted images, indicating their patency.    The visualized paranasal sinuses and mastoid air cells are free of acute disease. The orbital regions are unremarkable.    IMPRESSION:  No acute infarct or intracranial hemorrhage. Chronic infarcts and microvascular changes as above.    --- End of Report ---      NEIL CARDENAS MD; Attending Radiologist  This document has been electronically signed. Oct 10 2021  4:26AM    < end of copied text >  < from: [facility] Duplex Carotid, Bilat (10.08.21 @ 17:07) >    EXAM:  CAROTID DUPLEX BILATERAL                            PROCEDURE DATE:  10/08/2021      INTERPRETATION:  CLINICAL INFORMATION: Left ICA high-grade stenosis identified on recent CTA neck.    COMPARISON: CTA neck 10/7/2021.    TECHNIQUE: Grayscale, color and spectral Doppler examination of both carotid arteries was performed.    FINDINGS:    There are atheromatous plaques are present bilaterally in the region of the bifurcations of the common carotid arteries into internal and external branches.    Velocity elevation of the proximal right internal carotid artery results in 50-69% flow-limiting stenosis.    Velocity elevation of the proximal left internal carotid artery results in 70-9% flow-limiting stenosis.    Bilateral flow-limiting stenoses noted of the right and left external carotid arteries as well.    Peak systolic velocities are as follows:    RIGHT:  PROX CCA = 126 cm/s  DIST CCA = 99 cm/s  PROX ICA = 137 cm/s  MID ICA = 86 cm/s  DIST ICA = 80 cm/s  ECA = 202 cm/s    LEFT:  PROX CCA = 100 cm/s  DIST CCA = 59 cm/s  PROX ICA = 313 cm/s  MID ICA = 72 cm/s  DIST ICA = 47 cm/s  ECA = 298 cm/s    Antegrade flow is noted within both vertebral arteries.    IMPRESSION:    Left internal carotid artery 70-99% flow-limiting stenosis.  Right internal carotid artery 50-69% flow-limiting stenosis.  Bilateral external carotid artery flow limiting stenoses.  INDIANA Hammond was informed of these findings on 10/8/2021 at 5:06 PM.    Measurement of carotid stenosis is based on velocity parameters that correlate the residual internal carotid diameter with that of the more distal vessel in accordance with a method such as the [employer] Symptomatic Carotid Endarterectomy Trial (NASCET).    --- End of Report ---    FELIX MCMAHON M.D., ATTENDING RADIOLOGIST  This document has been electronically signed. Oct  8 2021  5:21PM    < end of copied text >

## 2021-11-03 NOTE — PROGRESS NOTE ADULT - PROBLEM SELECTOR PLAN 3
Renal following  Covid swab Type and screen   10/29 new Rt IJ HD [facility]  HD today per Renal   Coumadin low dose  as pt will be going for AV fistula with vascular Friday or Monday   IR Monday 11/1 permacath placement prior to AV fistula   Continue Renvela 800 mg q8h  Daily BUN/Cr to trend

## 2021-11-03 NOTE — CONSULT NOTE ADULT - PROVIDER SPECIALTY LIST ADULT
Electrophysiology
Infectious Disease
Intervent Radiology
Cardiology
Neurology
Nephrology
Rehab Medicine
Vascular Surgery
Intervent Radiology
Endocrinology
CT Surgery

## 2021-11-03 NOTE — PROGRESS NOTE ADULT - SUBJECTIVE AND OBJECTIVE BOX
Vascular Surgery    SUBJECTIVE: Pt seen and examined on rounds with team. No acute events overnight.        OBJECTIVE    PHYSICAL EXAM:   General: NAD, Lying in bed   Neuro: Awake and alert  Extremities: LUE basilic vein transposition, dressing in place c/d/i, palpable radial pulse, NV intact m/r/u/ain/pin      VITALS  T(C): 36.4 (11-03-21 @ 06:15), Max: 36.6 (11-02-21 @ 18:57)  HR: 130 (11-03-21 @ 06:15) (37 - 130)  BP: 129/75 (11-03-21 @ 06:15) (111/54 - 147/67)  RR: 18 (11-03-21 @ 06:15) (16 - 18)  SpO2: 96% (11-03-21 @ 06:15) (92% - 100%)  CAPILLARY BLOOD GLUCOSE      POCT Blood Glucose.: 109 mg/dL (02 Nov 2021 21:24)  POCT Blood Glucose.: 132 mg/dL (02 Nov 2021 16:24)  POCT Blood Glucose.: 134 mg/dL (02 Nov 2021 15:37)  POCT Blood Glucose.: 129 mg/dL (02 Nov 2021 12:45)      Is/Os    11-02 @ 07:01  -  11-03 @ 07:00  --------------------------------------------------------  IN:    sodium chloride 0.9%: 40 mL  Total IN: 40 mL    OUT:    Oral Fluid: 0 mL    Voided (mL): 0 mL  Total OUT: 0 mL    Total NET: 40 mL          MEDICATIONS (STANDING): aspirin  chewable 81 milliGRAM(s) Oral daily  bisacodyl Suppository 10 milliGRAM(s) Rectal once  carvedilol 6.25 milliGRAM(s) Oral every 12 hours  glucagon  Injectable 1 milliGRAM(s) IntraMuscular once  heparin   Injectable 5000 Unit(s) SubCutaneous every 8 hours  insulin glargine Injectable (LANTUS) 16 Unit(s) SubCutaneous at bedtime  insulin lispro (ADMELOG) corrective regimen sliding scale   SubCutaneous at bedtime  insulin lispro Injectable (ADMELOG) 10 Unit(s) SubCutaneous three times a day before meals  pantoprazole    Tablet 40 milliGRAM(s) Oral two times a day  polyethylene glycol 3350 17 Gram(s) Oral daily  senna 2 Tablet(s) Oral at bedtime  sevelamer carbonate 800 milliGRAM(s) Oral three times a day  sodium chloride 0.9%. 1000 milliLiter(s) IV Continuous <Continuous>    MEDICATIONS (PRN):sodium chloride 0.9% lock flush 10 milliLiter(s) IV Push every 1 hour PRN Pre/post blood products, medications, blood draw, and to maintain line patency      LABS  CBC (11-03 @ 06:55)                              8.5<L>                         10.79<H>  )----------------(  208        --    % Neutrophils, --    % Lymphocytes, ANC: --                                  27.3<L>  CBC (11-02 @ 03:50)                              9.0<L>                         11.96<H>  )----------------(  177        --    % Neutrophils, --    % Lymphocytes, ANC: --                                  28.1<L>    BMP (11-03 @ 06:53)             138     |  95<L>   |  74<H> 		Ca++ --      Ca 9.5                ---------------------------------( 106<H>		Mg --                 5.2     |  23      |  5.98<H>			Ph --      BMP (11-02 @ 03:50)             137     |  95<L>   |  48<H> 		Ca++ --      Ca 8.8                ---------------------------------( 143<H>		Mg 2.1                3.7     |  26      |  3.46<H>			Ph 3.6       LFTs (11-03 @ 06:53)      TPro 7.1 / Alb 2.9<L> / TBili 0.2 / DBili -- / AST 21 / ALT 8<L> / AlkPhos 100    Coags (11-02 @ 03:50)  aPTT 28.9 / INR 1.40<H> / PT 16.5<H>      ABG (11-02 @ 18:51)     7.44 / 38 / 134<H> / 26 / 1.6 / 99.4<H>%     Lactate:          IMAGING STUDIES

## 2021-11-03 NOTE — PROGRESS NOTE ADULT - PROBLEM SELECTOR PLAN 2
Continue post-op care  Holding AC therapy per Dr. Xiong   Maintain PW --> EPM VVI 40 / vma 10.   Increase activity as tolerated, ambulate 4x daily and with PT   Chest PT, encourage incentive spirometry   Pain control w/ Tylenol only as pt was became oversedated when on narcotics   GI ppx w/ Protonix, DVT ppx w/ HSQ   Wound care and assessment   Dispo: home w/ PT when medically cleared

## 2021-11-03 NOTE — CONSULT NOTE ADULT - CONSULT REQUESTED DATE/TIME
05-Oct-2021 17:55
01-Nov-2021
01-Nov-2021 12:54
03-Nov-2021 16:30
07-Oct-2021 11:00
06-Oct-2021 08:35
08-Oct-2021 08:03
26-Oct-2021 11:00
12-Oct-2021 10:53
14-Oct-2021 20:02
20-Oct-2021 12:57

## 2021-11-03 NOTE — PROGRESS NOTE ADULT - PROBLEM SELECTOR PLAN 1
Continue post-op care  Continue ASA 81 mg QD and Atorvastatin 80 mg qhs   Holding beta blocker given SB/accelerated junctional   Maintain PW --> EPM VVI 40 / vma 10.   Increase activity as tolerated, ambulate 4x daily and with PT   Chest PT, encourage incentive spirometry   Pain control w/ Tylenol only as pt was became oversedated when on narcotics   GI ppx w/ Protonix, DVT ppx w/ HSQ   Wound care and assessment   Dispo: Acute Rehab once medically cleared

## 2021-11-03 NOTE — PROGRESS NOTE ADULT - ASSESSMENT
60 year old male with PMHx of DM and HTN who presented to the hospital as a transfer from OSH for NSTEMI. The nephrology team was consulted due to elevated creatinine of 4.05 upon presentation.    JOSHUA on CKD, now on HD, need long term HD  - Likely with CKD from diabetic nephropathy, admitted with JOSHUA on CKD and hypervolemia, was initiated on HD prior to CABG for optimization.   --- initiated on HD on 10/14  - s/p LHC on 10/14 showing severe multivessel disease; now s/p CABG on 10/21    PLAN for HD today  - increase UF to 2-2.5 liters as tolerated  -s/p tunneled catheter  -s/p AVF creation   - monitor BMP  - adjust meds as per HD     Anemia:   - in the setting of CKD   - iron studies wnl   - SISSY, 4000 units TIW with HD.   - monitor CBC    Dw CTS    (After 5 pm or on weekends please page the on-call fellow, can check [email or web address] for schedule. Login is marty garcia, schedule under [facility] medicine, psych, derm)

## 2021-11-03 NOTE — PROGRESS NOTE ADULT - SUBJECTIVE AND OBJECTIVE BOX
Chief complaint  Patient is a 60y old  Male who presents with a chief complaint of NSTEMI (03 Nov 2021 12:10)   Review of systems  Patient in bed, looks comfortable, no hypoglycemic episodes.    Labs and Fingersticks  CAPILLARY BLOOD GLUCOSE      POCT Blood Glucose.: 111 mg/dL (03 Nov 2021 08:00)  POCT Blood Glucose.: 109 mg/dL (02 Nov 2021 21:24)  POCT Blood Glucose.: 132 mg/dL (02 Nov 2021 16:24)  POCT Blood Glucose.: 134 mg/dL (02 Nov 2021 15:37)      Anion Gap, Serum: 20 *H* (11-03 @ 06:53)  Anion Gap, Serum: 16 (11-02 @ 03:50)      Calcium, Total Serum: 9.5 (11-03 @ 06:53)  Calcium, Total Serum: 8.8 (11-02 @ 03:50)  Albumin, Serum: 2.9 *L* (11-03 @ 06:53)    Alanine Aminotransferase (ALT/SGPT): 8 *L* (11-03 @ 06:53)  Alkaline Phosphatase, Serum: 100 (11-03 @ 06:53)  Aspartate Aminotransferase (AST/SGOT): 21 (11-03 @ 06:53)        11-03    138  |  95<L>  |  74<H>  ----------------------------<  106<H>  5.2   |  23  |  5.98<H>    Ca    9.5      03 Nov 2021 06:53  Phos  3.6     11-02  Mg     2.1     11-02    TPro  7.1  /  Alb  2.9<L>  /  TBili  0.2  /  DBili  x   /  AST  21  /  ALT  8<L>  /  AlkPhos  100  11-03                        8.5    10.79 )-----------( 208      ( 03 Nov 2021 06:55 )             27.3     Medications  MEDICATIONS  (STANDING):  aspirin  chewable 81 milliGRAM(s) Oral daily  bisacodyl Suppository 10 milliGRAM(s) Rectal once  carvedilol 6.25 milliGRAM(s) Oral every 12 hours  chlorhexidine 2% Cloths 1 Application(s) Topical <User Schedule>  epoetin vern-epbx (RETACRIT) Injectable 4000 Unit(s) IV Push <User Schedule>  glucagon  Injectable 1 milliGRAM(s) IntraMuscular once  heparin   Injectable 5000 Unit(s) SubCutaneous every 8 hours  insulin glargine Injectable (LANTUS) 16 Unit(s) SubCutaneous at bedtime  insulin lispro (ADMELOG) corrective regimen sliding scale   SubCutaneous at bedtime  insulin lispro Injectable (ADMELOG) 10 Unit(s) SubCutaneous three times a day before meals  pantoprazole    Tablet 40 milliGRAM(s) Oral two times a day  polyethylene glycol 3350 17 Gram(s) Oral daily  senna 2 Tablet(s) Oral at bedtime  sevelamer carbonate 800 milliGRAM(s) Oral three times a day  sodium chloride 0.9%. 1000 milliLiter(s) (10 mL/Hr) IV Continuous <Continuous>      Physical Exam  General: Patient comfortable in bed  Vital Signs Last 12 Hrs  T(F): 97.9 (11-03-21 @ 12:10), Max: 97.9 (11-03-21 @ 12:10)  HR: 58 (11-03-21 @ 12:10) (58 - 130)  BP: 163/93 (11-03-21 @ 12:10) (129/75 - 163/93)  BP(mean): 93 (11-03-21 @ 06:15) (93 - 93)  RR: 20 (11-03-21 @ 12:10) (18 - 20)  SpO2: 97% (11-03-21 @ 12:10) (96% - 97%)  Neck: No palpable thyroid nodules.  CVS: S1S2, No murmurs  Respiratory: No wheezing, no crepitations  GI: Abdomen soft, bowel sounds positive  Musculoskeletal:  edema lower extremities.   Skin: No skin rashes, no ecchymosis    Diagnostics    Free Thyroxine, Serum: AM Sched. Collection: 01-Nov-2021 06:00 (10-31 @ 13:47)  Free Thyroxine, Serum: AM Sched. Collection: 30-Oct-2021 06:00 (10-29 @ 12:10)  Free Thyroxine, Serum: AM Sched. Collection: 26-Oct-2021 06:00  Cancel Reason: Lab Operations Cancel (10-25 @ 13:56)           Chief complaint  Patient is a 60y old  Male who presents with a chief complaint of NSTEMI (03 Nov 2021 12:10)   Review of systems  Patient in bed, looks comfortable, no hypoglycemic episodes.    Labs and Fingersticks  CAPILLARY BLOOD GLUCOSE      POCT Blood Glucose.: 111 mg/dL (03 Nov 2021 08:00)  POCT Blood Glucose.: 109 mg/dL (02 Nov 2021 21:24)  POCT Blood Glucose.: 132 mg/dL (02 Nov 2021 16:24)  POCT Blood Glucose.: 134 mg/dL (02 Nov 2021 15:37)      Anion Gap, Serum: 20 *H* (11-03 @ 06:53)  Anion Gap, Serum: 16 (11-02 @ 03:50)      Calcium, Total Serum: 9.5 (11-03 @ 06:53)  Calcium, Total Serum: 8.8 (11-02 @ 03:50)  Albumin, Serum: 2.9 *L* (11-03 @ 06:53)    Alanine Aminotransferase (ALT/SGPT): 8 *L* (11-03 @ 06:53)  Alkaline Phosphatase, Serum: 100 (11-03 @ 06:53)  Aspartate Aminotransferase (AST/SGOT): 21 (11-03 @ 06:53)        11-03    138  |  95<L>  |  74<H>  ----------------------------<  106<H>  5.2   |  23  |  5.98<H>    Ca    9.5      03 Nov 2021 06:53  Phos  3.6     11-02  Mg     2.1     11-02    TPro  7.1  /  Alb  2.9<L>  /  TBili  0.2  /  DBili  x   /  AST  21  /  ALT  8<L>  /  AlkPhos  100  11-03                        8.5    10.79 )-----------( 208      ( 03 Nov 2021 06:55 )             27.3     Medications  MEDICATIONS  (STANDING):  aspirin  chewable 81 milliGRAM(s) Oral daily  bisacodyl Suppository 10 milliGRAM(s) Rectal once  carvedilol 6.25 milliGRAM(s) Oral every 12 hours  chlorhexidine 2% Cloths 1 Application(s) Topical <User Schedule>  epoetin vern-epbx (RETACRIT) Injectable 4000 Unit(s) IV Push <User Schedule>  glucagon  Injectable 1 milliGRAM(s) IntraMuscular once  heparin   Injectable 5000 Unit(s) SubCutaneous every 8 hours  insulin glargine Injectable (LANTUS) 16 Unit(s) SubCutaneous at bedtime  insulin lispro (ADMELOG) corrective regimen sliding scale   SubCutaneous at bedtime  insulin lispro Injectable (ADMELOG) 10 Unit(s) SubCutaneous three times a day before meals  pantoprazole    Tablet 40 milliGRAM(s) Oral two times a day  polyethylene glycol 3350 17 Gram(s) Oral daily  senna 2 Tablet(s) Oral at bedtime  sevelamer carbonate 800 milliGRAM(s) Oral three times a day  sodium chloride 0.9%. 1000 milliLiter(s) (10 mL/Hr) IV Continuous <Continuous>      Physical Exam  General: Patient comfortable in bed  Vital Signs Last 12 Hrs  T(F): 97.9 (11-03-21 @ 12:10), Max: 97.9 (11-03-21 @ 12:10)  HR: 58 (11-03-21 @ 12:10) (58 - 130)  BP: 163/93 (11-03-21 @ 12:10) (129/75 - 163/93)  BP(mean): 93 (11-03-21 @ 06:15) (93 - 93)  RR: 20 (11-03-21 @ 12:10) (18 - 20)  SpO2: 97% (11-03-21 @ 12:10) (96% - 97%)  Neck: No palpable thyroid nodules.  CVS: S1S2, No murmurs  Respiratory: No wheezing, no crepitations  GI: Abdomen soft, bowel sounds positive  Musculoskeletal:  edema lower extremities.   Skin: No skin rashes, no ecchymosis    Diagnostics    Free Thyroxine, Serum: AM Sched. Collection: 01-Nov-2021 06:00 (10-31 @ 13:47)  Free Thyroxine, Serum: AM Sched. Collection: 30-Oct-2021 06:00 (10-29 @ 12:10)  Free Thyroxine, Serum: AM Sched. Collection: 26-Oct-2021 06:00  Cancel Reason: Lab Operations Cancel (10-25 @ 13:56)

## 2021-11-03 NOTE — PROGRESS NOTE ADULT - SUBJECTIVE AND OBJECTIVE BOX
VITAL SIGNS    Subjective:     Telemetry: SB 40-50's (Ectopic       Vital Signs Last 24 Hrs  T(C): 36.6 (21 @ 12:10), Max: 36.6 (21 @ 18:57)  T(F): 97.9 (21 @ 12:10), Max: 97.9 (21 @ 18:57)  HR: 58 (21 @ 12:10) (37 - 130)  BP: 163/93 (21 @ 12:10) (119/59 - 163/93)  RR: 20 (21 @ 12:10) (16 - 20)  SpO2: 97% (21 @ 12:10) (95% - 100%)            @ 07:01  -   @ 07:00  --------------------------------------------------------  IN: 40 mL / OUT: 0 mL / NET: 40 mL     @ 07:01  -   @ 15:50  --------------------------------------------------------  IN: 250 mL / OUT: 0 mL / NET: 250 mL    Daily     Daily Weight in k.3 (2021 12:10)    CAPILLARY BLOOD GLUCOSE    POCT Blood Glucose.: 111 mg/dL (2021 08:00)  POCT Blood Glucose.: 109 mg/dL (2021 21:24)  POCT Blood Glucose.: 132 mg/dL (2021 16:24)        PHYSICAL EXAM    Neurology: alert and oriented x 3, nonfocal, no gross deficits    CV: (+) S1 and S2, No murmurs, rubs, gallops or clicks     Sternal Wound: MSI -->CDI , sternum stable    Lungs: CTA B/L     Abdomen: soft, nontender, nondistended, positive bowel sounds, (+) Flatus; (+) BM     :  Voiding               Extremities:  B/L LE (+) edema; negative calf tenderness; (+) 2 DP palpable        aspirin  chewable 81 milliGRAM(s) Oral daily  bisacodyl Suppository 10 milliGRAM(s) Rectal once  carvedilol 6.25 milliGRAM(s) Oral every 12 hours  chlorhexidine 2% Cloths 1 Application(s) Topical <User Schedule>  epoetin vern-epbx (RETACRIT) Injectable 4000 Unit(s) IV Push <User Schedule>  glucagon Injectable 1 milliGRAM(s) IntraMuscular once  heparin Injectable 5000 Unit(s) SubCutaneous every 8 hours  insulin glargine Injectable (LANTUS) 16 Unit(s) SubCutaneous at bedtime  insulin lispro (ADMELOG) corrective regimen sliding scale   SubCutaneous at bedtime  insulin lispro Injectable (ADMELOG) 10 Unit(s) SubCutaneous three times a day before meals  pantoprazole Tablet 40 milliGRAM(s) Oral two times a day  polyethylene glycol 3350 17 Gram(s) Oral daily  senna 2 Tablet(s) Oral at bedtime  sevelamer carbonate 800 milliGRAM(s) Oral three times a day  sodium chloride 0.9% lock flush 10 milliLiter(s) IV Push every 1 hour PRN  sodium chloride 0.9%. 1000 milliLiter(s) IV Continuous <Continuous>    Physical Therapy Rec:   Home  [  ]   Home w/ PT  [  ]  Rehab  X]    Discussed with Cardiothoracic Team at AM rounds.     VITAL SIGNS    Subjective: "I'm feeling ok." Denies CP, palpitation, SOB, WYNNE, HA, dizziness, N/V/D, fever or chills.  No acute event noted overnight.      Telemetry: SB 40-50's (Ectopic Atrial)      Vital Signs Last 24 Hrs  T(C): 36.6 (21 @ 12:10), Max: 36.6 (21 @ 18:57)  T(F): 97.9 (21 @ 12:10), Max: 97.9 (21 @ 18:57)  HR: 58 (21 @ 12:10) (37 - 130)  BP: 163/93 (21 @ 12:10) (119/59 - 163/93)  RR: 20 (21 @ 12:10) (16 - 20)  SpO2: 97% (21 @ 12:10) (95% - 100%)            @ 07:01  -   @ 07:00  --------------------------------------------------------  IN: 40 mL / OUT: 0 mL / NET: 40 mL     @ 07:01  -   @ 15:50  --------------------------------------------------------  IN: 250 mL / OUT: 0 mL / NET: 250 mL    Daily     Daily Weight in k.3 (2021 12:10)    CAPILLARY BLOOD GLUCOSE    POCT Blood Glucose.: 111 mg/dL (2021 08:00)  POCT Blood Glucose.: 109 mg/dL (2021 21:24)  POCT Blood Glucose.: 132 mg/dL (2021 16:24)        PHYSICAL EXAM    Neurology: alert and oriented x 3, nonfocal, no gross deficits    CV: (+) S1 and S2, No murmurs, rubs, gallops or clicks     Sternal Wound: MSI -->CDI , sternum stable; PW x 2 --> EPM VVI 40/10     Lungs: CTA B/L     Abdomen: soft, nontender, nondistended, positive bowel sounds, (+) Flatus; (+) BM     :  Voiding               Extremities:  B/L LE (+) trace edema; negative calf tenderness; (+) 2 DP palpable; LUE AV fistula site with DSD.  Right Permacath with occlusive dressing C/D/I        aspirin  chewable 81 milliGRAM(s) Oral daily  bisacodyl Suppository 10 milliGRAM(s) Rectal once  carvedilol 6.25 milliGRAM(s) Oral every 12 hours  chlorhexidine 2% Cloths 1 Application(s) Topical <User Schedule>  epoetin vern-epbx (RETACRIT) Injectable 4000 Unit(s) IV Push <User Schedule>  glucagon Injectable 1 milliGRAM(s) IntraMuscular once  heparin Injectable 5000 Unit(s) SubCutaneous every 8 hours  insulin glargine Injectable (LANTUS) 16 Unit(s) SubCutaneous at bedtime  insulin lispro (ADMELOG) corrective regimen sliding scale   SubCutaneous at bedtime  insulin lispro Injectable (ADMELOG) 10 Unit(s) SubCutaneous three times a day before meals  pantoprazole Tablet 40 milliGRAM(s) Oral two times a day  polyethylene glycol 3350 17 Gram(s) Oral daily  senna 2 Tablet(s) Oral at bedtime  sevelamer carbonate 800 milliGRAM(s) Oral three times a day  sodium chloride 0.9% lock flush 10 milliLiter(s) IV Push every 1 hour PRN  sodium chloride 0.9%. 1000 milliLiter(s) IV Continuous <Continuous>    Physical Therapy Rec:   Home  [  ]   Home w/ PT  [  ]  Rehab  X]    Discussed with Cardiothoracic Team at AM rounds.

## 2021-11-04 LAB
ANION GAP SERPL CALC-SCNC: 18 MMOL/L — HIGH (ref 5–17)
BUN SERPL-MCNC: 46 MG/DL — HIGH (ref 7–23)
CALCIUM SERPL-MCNC: 9.5 MG/DL — SIGNIFICANT CHANGE UP (ref 8.4–10.5)
CHLORIDE SERPL-SCNC: 97 MMOL/L — SIGNIFICANT CHANGE UP (ref 96–108)
CO2 SERPL-SCNC: 23 MMOL/L — SIGNIFICANT CHANGE UP (ref 22–31)
CREAT SERPL-MCNC: 4.55 MG/DL — HIGH (ref 0.5–1.3)
GLUCOSE BLDC GLUCOMTR-MCNC: 110 MG/DL — HIGH (ref 70–99)
GLUCOSE BLDC GLUCOMTR-MCNC: 165 MG/DL — HIGH (ref 70–99)
GLUCOSE BLDC GLUCOMTR-MCNC: 174 MG/DL — HIGH (ref 70–99)
GLUCOSE BLDC GLUCOMTR-MCNC: 85 MG/DL — SIGNIFICANT CHANGE UP (ref 70–99)
GLUCOSE SERPL-MCNC: 110 MG/DL — HIGH (ref 70–99)
HCT VFR BLD CALC: 27.3 % — LOW (ref 39–50)
HGB BLD-MCNC: 8.5 G/DL — LOW (ref 13–17)
MCHC RBC-ENTMCNC: 29.1 PG — SIGNIFICANT CHANGE UP (ref 27–34)
MCHC RBC-ENTMCNC: 31.1 GM/DL — LOW (ref 32–36)
MCV RBC AUTO: 93.5 FL — SIGNIFICANT CHANGE UP (ref 80–100)
NRBC # BLD: 0 /100 WBCS — SIGNIFICANT CHANGE UP (ref 0–0)
PLATELET # BLD AUTO: 204 K/UL — SIGNIFICANT CHANGE UP (ref 150–400)
POTASSIUM SERPL-MCNC: 4.6 MMOL/L — SIGNIFICANT CHANGE UP (ref 3.5–5.3)
POTASSIUM SERPL-SCNC: 4.6 MMOL/L — SIGNIFICANT CHANGE UP (ref 3.5–5.3)
RBC # BLD: 2.92 M/UL — LOW (ref 4.2–5.8)
RBC # FLD: 14.3 % — SIGNIFICANT CHANGE UP (ref 10.3–14.5)
SODIUM SERPL-SCNC: 138 MMOL/L — SIGNIFICANT CHANGE UP (ref 135–145)
WBC # BLD: 11.55 K/UL — HIGH (ref 3.8–10.5)
WBC # FLD AUTO: 11.55 K/UL — HIGH (ref 3.8–10.5)

## 2021-11-04 PROCEDURE — 99232 SBSQ HOSP IP/OBS MODERATE 35: CPT | Mod: GC

## 2021-11-04 PROCEDURE — 93010 ELECTROCARDIOGRAM REPORT: CPT

## 2021-11-04 RX ORDER — AMIODARONE HYDROCHLORIDE 400 MG/1
400 TABLET ORAL EVERY 12 HOURS
Refills: 0 | Status: DISCONTINUED | OUTPATIENT
Start: 2021-11-04 | End: 2021-11-07

## 2021-11-04 RX ADMIN — SENNA PLUS 2 TABLET(S): 8.6 TABLET ORAL at 21:41

## 2021-11-04 RX ADMIN — PANTOPRAZOLE SODIUM 40 MILLIGRAM(S): 20 TABLET, DELAYED RELEASE ORAL at 05:17

## 2021-11-04 RX ADMIN — AMIODARONE HYDROCHLORIDE 400 MILLIGRAM(S): 400 TABLET ORAL at 08:20

## 2021-11-04 RX ADMIN — CHLORHEXIDINE GLUCONATE 1 APPLICATION(S): 213 SOLUTION TOPICAL at 12:20

## 2021-11-04 RX ADMIN — SEVELAMER CARBONATE 800 MILLIGRAM(S): 2400 POWDER, FOR SUSPENSION ORAL at 12:20

## 2021-11-04 RX ADMIN — HEPARIN SODIUM 5000 UNIT(S): 5000 INJECTION INTRAVENOUS; SUBCUTANEOUS at 05:17

## 2021-11-04 RX ADMIN — AMIODARONE HYDROCHLORIDE 400 MILLIGRAM(S): 400 TABLET ORAL at 21:42

## 2021-11-04 RX ADMIN — HEPARIN SODIUM 5000 UNIT(S): 5000 INJECTION INTRAVENOUS; SUBCUTANEOUS at 13:24

## 2021-11-04 RX ADMIN — Medication 10 UNIT(S): at 12:02

## 2021-11-04 RX ADMIN — PANTOPRAZOLE SODIUM 40 MILLIGRAM(S): 20 TABLET, DELAYED RELEASE ORAL at 16:41

## 2021-11-04 RX ADMIN — Medication 10 UNIT(S): at 08:20

## 2021-11-04 RX ADMIN — INSULIN GLARGINE 16 UNIT(S): 100 INJECTION, SOLUTION SUBCUTANEOUS at 21:40

## 2021-11-04 RX ADMIN — HEPARIN SODIUM 5000 UNIT(S): 5000 INJECTION INTRAVENOUS; SUBCUTANEOUS at 21:42

## 2021-11-04 RX ADMIN — SEVELAMER CARBONATE 800 MILLIGRAM(S): 2400 POWDER, FOR SUSPENSION ORAL at 21:42

## 2021-11-04 RX ADMIN — SEVELAMER CARBONATE 800 MILLIGRAM(S): 2400 POWDER, FOR SUSPENSION ORAL at 05:17

## 2021-11-04 RX ADMIN — Medication 81 MILLIGRAM(S): at 12:20

## 2021-11-04 RX ADMIN — Medication 10 UNIT(S): at 16:40

## 2021-11-04 NOTE — PROGRESS NOTE ADULT - SUBJECTIVE AND OBJECTIVE BOX
Chief complaint  Patient is a 60y old  Male who presents with a chief complaint of NSTEMI (04 Nov 2021 11:47)   Review of systems  Patient in bed, looks comfortable, no hypoglycemic episodes.    Labs and Fingersticks  CAPILLARY BLOOD GLUCOSE      POCT Blood Glucose.: 174 mg/dL (04 Nov 2021 11:47)  POCT Blood Glucose.: 110 mg/dL (04 Nov 2021 07:39)  POCT Blood Glucose.: 114 mg/dL (03 Nov 2021 21:46)  POCT Blood Glucose.: 168 mg/dL (03 Nov 2021 16:35)      Anion Gap, Serum: 18 *H* (11-04 @ 06:16)  Anion Gap, Serum: 20 *H* (11-03 @ 06:53)      Calcium, Total Serum: 9.5 (11-04 @ 06:16)  Calcium, Total Serum: 9.5 (11-03 @ 06:53)  Albumin, Serum: 2.9 *L* (11-03 @ 06:53)    Alanine Aminotransferase (ALT/SGPT): 8 *L* (11-03 @ 06:53)  Alkaline Phosphatase, Serum: 100 (11-03 @ 06:53)  Aspartate Aminotransferase (AST/SGOT): 21 (11-03 @ 06:53)        11-04    138  |  97  |  46<H>  ----------------------------<  110<H>  4.6   |  23  |  4.55<H>    Ca    9.5      04 Nov 2021 06:16    TPro  7.1  /  Alb  2.9<L>  /  TBili  0.2  /  DBili  x   /  AST  21  /  ALT  8<L>  /  AlkPhos  100  11-03                        8.5    11.55 )-----------( 204      ( 04 Nov 2021 06:16 )             27.3     Medications  MEDICATIONS  (STANDING):  aMIOdarone    Tablet 400 milliGRAM(s) Oral every 12 hours  aspirin  chewable 81 milliGRAM(s) Oral daily  bisacodyl Suppository 10 milliGRAM(s) Rectal once  chlorhexidine 2% Cloths 1 Application(s) Topical <User Schedule>  epoetin vern-epbx (RETACRIT) Injectable 4000 Unit(s) IV Push <User Schedule>  glucagon  Injectable 1 milliGRAM(s) IntraMuscular once  heparin   Injectable 5000 Unit(s) SubCutaneous every 8 hours  insulin glargine Injectable (LANTUS) 16 Unit(s) SubCutaneous at bedtime  insulin lispro (ADMELOG) corrective regimen sliding scale   SubCutaneous at bedtime  insulin lispro Injectable (ADMELOG) 10 Unit(s) SubCutaneous three times a day before meals  pantoprazole    Tablet 40 milliGRAM(s) Oral two times a day  polyethylene glycol 3350 17 Gram(s) Oral daily  senna 2 Tablet(s) Oral at bedtime  sevelamer carbonate 800 milliGRAM(s) Oral three times a day  sodium chloride 0.9%. 1000 milliLiter(s) (10 mL/Hr) IV Continuous <Continuous>      Physical Exam  General: Patient comfortable in bed  Vital Signs Last 12 Hrs  T(F): 98.2 (11-04-21 @ 04:22), Max: 98.2 (11-04-21 @ 04:22)  HR: 63 (11-04-21 @ 04:22) (63 - 63)  BP: 129/61 (11-04-21 @ 04:22) (129/61 - 129/61)  BP(mean): 84 (11-04-21 @ 04:22) (84 - 84)  RR: 18 (11-04-21 @ 04:22) (18 - 18)  SpO2: 96% (11-04-21 @ 04:22) (96% - 96%)  Neck: No palpable thyroid nodules.  CVS: S1S2, No murmurs  Respiratory: No wheezing, no crepitations  GI: Abdomen soft, bowel sounds positive  Musculoskeletal:  edema lower extremities.   Skin: No skin rashes, no ecchymosis    Diagnostics    Free Thyroxine, Serum: AM Sched. Collection: 01-Nov-2021 06:00 (10-31 @ 13:47)  Free Thyroxine, Serum: AM Sched. Collection: 30-Oct-2021 06:00 (10-29 @ 12:10)  Free Thyroxine, Serum: AM Sched. Collection: 26-Oct-2021 06:00  Cancel Reason: Lab Operations Cancel (10-25 @ 13:56)               Chief complaint  Patient is a 60y old  Male who presents with a chief complaint of NSTEMI (04 Nov 2021 11:47)   Review of systems  Patient in bed, looks comfortable, no hypoglycemic episodes.    Labs and Fingersticks  CAPILLARY BLOOD GLUCOSE      POCT Blood Glucose.: 174 mg/dL (04 Nov 2021 11:47)  POCT Blood Glucose.: 110 mg/dL (04 Nov 2021 07:39)  POCT Blood Glucose.: 114 mg/dL (03 Nov 2021 21:46)  POCT Blood Glucose.: 168 mg/dL (03 Nov 2021 16:35)      Anion Gap, Serum: 18 *H* (11-04 @ 06:16)  Anion Gap, Serum: 20 *H* (11-03 @ 06:53)      Calcium, Total Serum: 9.5 (11-04 @ 06:16)  Calcium, Total Serum: 9.5 (11-03 @ 06:53)  Albumin, Serum: 2.9 *L* (11-03 @ 06:53)    Alanine Aminotransferase (ALT/SGPT): 8 *L* (11-03 @ 06:53)  Alkaline Phosphatase, Serum: 100 (11-03 @ 06:53)  Aspartate Aminotransferase (AST/SGOT): 21 (11-03 @ 06:53)        11-04    138  |  97  |  46<H>  ----------------------------<  110<H>  4.6   |  23  |  4.55<H>    Ca    9.5      04 Nov 2021 06:16    TPro  7.1  /  Alb  2.9<L>  /  TBili  0.2  /  DBili  x   /  AST  21  /  ALT  8<L>  /  AlkPhos  100  11-03                        8.5    11.55 )-----------( 204      ( 04 Nov 2021 06:16 )             27.3     Medications  MEDICATIONS  (STANDING):  aMIOdarone    Tablet 400 milliGRAM(s) Oral every 12 hours  aspirin  chewable 81 milliGRAM(s) Oral daily  bisacodyl Suppository 10 milliGRAM(s) Rectal once  chlorhexidine 2% Cloths 1 Application(s) Topical <User Schedule>  epoetin vern-epbx (RETACRIT) Injectable 4000 Unit(s) IV Push <User Schedule>  glucagon  Injectable 1 milliGRAM(s) IntraMuscular once  heparin   Injectable 5000 Unit(s) SubCutaneous every 8 hours  insulin glargine Injectable (LANTUS) 16 Unit(s) SubCutaneous at bedtime  insulin lispro (ADMELOG) corrective regimen sliding scale   SubCutaneous at bedtime  insulin lispro Injectable (ADMELOG) 10 Unit(s) SubCutaneous three times a day before meals  pantoprazole    Tablet 40 milliGRAM(s) Oral two times a day  polyethylene glycol 3350 17 Gram(s) Oral daily  senna 2 Tablet(s) Oral at bedtime  sevelamer carbonate 800 milliGRAM(s) Oral three times a day  sodium chloride 0.9%. 1000 milliLiter(s) (10 mL/Hr) IV Continuous <Continuous>      Physical Exam  General: Patient comfortable in bed  Vital Signs Last 12 Hrs  T(F): 98.2 (11-04-21 @ 04:22), Max: 98.2 (11-04-21 @ 04:22)  HR: 63 (11-04-21 @ 04:22) (63 - 63)  BP: 129/61 (11-04-21 @ 04:22) (129/61 - 129/61)  BP(mean): 84 (11-04-21 @ 04:22) (84 - 84)  RR: 18 (11-04-21 @ 04:22) (18 - 18)  SpO2: 96% (11-04-21 @ 04:22) (96% - 96%)  Neck: No palpable thyroid nodules.  CVS: S1S2, No murmurs  Respiratory: No wheezing, no crepitations  GI: Abdomen soft, bowel sounds positive  Musculoskeletal:  edema lower extremities.   Skin: No skin rashes, no ecchymosis    Diagnostics    Free Thyroxine, Serum: AM Sched. Collection: 01-Nov-2021 06:00 (10-31 @ 13:47)  Free Thyroxine, Serum: AM Sched. Collection: 30-Oct-2021 06:00 (10-29 @ 12:10)  Free Thyroxine, Serum: AM Sched. Collection: 26-Oct-2021 06:00  Cancel Reason: Lab Operations Cancel (10-25 @ 13:56)

## 2021-11-04 NOTE — PROGRESS NOTE ADULT - PROBLEM SELECTOR PLAN 5
EP following  Maintain PW --> EPM VVI 40 / vma 10.   12 lead ectopic atrial with pac's  threshold 4.3  modified amio load 400 bid  Hold AV nodals

## 2021-11-04 NOTE — PROGRESS NOTE ADULT - SUBJECTIVE AND OBJECTIVE BOX
24H hour events:   Seen sitting up in bed; "I feel good"    MEDICATIONS:  aMIOdarone    Tablet 400 milliGRAM(s) Oral every 12 hours  aspirin  chewable 81 milliGRAM(s) Oral daily  heparin   Injectable 5000 Unit(s) SubCutaneous every 8 hours  bisacodyl Suppository 10 milliGRAM(s) Rectal once  pantoprazole    Tablet 40 milliGRAM(s) Oral two times a day  polyethylene glycol 3350 17 Gram(s) Oral daily  senna 2 Tablet(s) Oral at bedtime  glucagon  Injectable 1 milliGRAM(s) IntraMuscular once  insulin glargine Injectable (LANTUS) 16 Unit(s) SubCutaneous at bedtime  insulin lispro (ADMELOG) corrective regimen sliding scale   SubCutaneous at bedtime  insulin lispro Injectable (ADMELOG) 10 Unit(s) SubCutaneous three times a day before meals  chlorhexidine 2% Cloths 1 Application(s) Topical <User Schedule>  epoetin vern-epbx (RETACRIT) Injectable 4000 Unit(s) IV Push <User Schedule>  sodium chloride 0.9% lock flush 10 milliLiter(s) IV Push every 1 hour PRN  sodium chloride 0.9%. 1000 milliLiter(s) IV Continuous <Continuous>      REVIEW OF SYSTEMS:  Constitutional: (+) occ chills, but afebrile, WBC 10.79  Neuro: denies headache, dizziness, or lightheadedness  Respiratory: denies sob, (+) cough nonproductive  Cardiac: denies chest pain or palpitations  GI: denies n/v, or abdominal pain  : denies burning sensation  Ext: denies numbness or tingling      PHYSICAL EXAM:  T(C): 36.8 (11-04-21 @ 04:22), Max: 36.8 (11-04-21 @ 04:22)  HR: 63 (11-04-21 @ 04:22) (58 - 108)  BP: 129/61 (11-04-21 @ 04:22) (119/66 - 163/93)  RR: 18 (11-04-21 @ 04:22) (18 - 20)  SpO2: 96% (11-04-21 @ 04:22) (94% - 97%)  Wt(kg): --  I&O's Summary    03 Nov 2021 07:01  -  04 Nov 2021 07:00  --------------------------------------------------------  IN: 250 mL / OUT: 2000 mL / NET: -1750 mL    04 Nov 2021 07:01  -  04 Nov 2021 10:08  --------------------------------------------------------  IN: 360 mL / OUT: 0 mL / NET: 360 mL        Appearance: Normal, in ND< looks better today  HEENT: Normocephalic, atraumatic  Cardiovascular: RRR S1 S2, No JVD, No m/r/g; Temporary wire set VVI @ 40 ppm/mA 10, threshold 4.5  Respiratory: Lungs clear to auscultation	  Psychiatry: A & O x 3, Mood & affect appropriate  Gastrointestinal:  Soft, Non-tender, + BS	  Skin: No rashes, No ecchymoses, No cyanosis	  Neurologic: Non-focal  Extremities: Normal range of motion, No clubbing, cyanosis or edema  Vascular: Peripheral pulses palpable 2+ bilaterally        LABS:	 	    CBC Full  -  ( 04 Nov 2021 06:16 )  WBC Count : 11.55 K/uL  Hemoglobin : 8.5 g/dL  Hematocrit : 27.3 %  Platelet Count - Automated : 204 K/uL  Mean Cell Volume : 93.5 fl  Mean Cell Hemoglobin : 29.1 pg  Mean Cell Hemoglobin Concentration : 31.1 gm/dL  Auto Neutrophil # : x  Auto Lymphocyte # : x  Auto Monocyte # : x  Auto Eosinophil # : x  Auto Basophil # : x  Auto Neutrophil % : x  Auto Lymphocyte % : x  Auto Monocyte % : x  Auto Eosinophil % : x  Auto Basophil % : x    11-04    138  |  97  |  46<H>  ----------------------------<  110<H>  4.6   |  23  |  4.55<H>  11-03    138  |  95<L>  |  74<H>  ----------------------------<  106<H>  5.2   |  23  |  5.98<H>    Ca    9.5      04 Nov 2021 06:16  Ca    9.5      03 Nov 2021 06:53    TPro  7.1  /  Alb  2.9<L>  /  TBili  0.2  /  DBili  x   /  AST  21  /  ALT  8<L>  /  AlkPhos  100  11-03      proBNP:   Lipid Profile:   HgA1c:   TSH:       CARDIAC MARKERS:      TELEMETRY: Atrial ectopy 60s

## 2021-11-04 NOTE — PROGRESS NOTE ADULT - PROBLEM SELECTOR PLAN 2
Continue post-op care  Holding AC therapy per Dr. Xiong   Maintain PW --> EPM VVI 40 / vma 10.   Increase activity as tolerated, ambulate 4x daily and with PT   Chest PT, encourage incentive spirometry   Pain control w/ Tylenol only as pt was became oversedated when on narcotics   GI ppx w/ Protonix, DVT ppx w/ HSQ   Wound care and assessment

## 2021-11-04 NOTE — PROGRESS NOTE ADULT - SUBJECTIVE AND OBJECTIVE BOX
[facility] Division of Kidney Diseases & Hypertension  HEMODIALYSIS NOTE  --------------------------------------------------------------------------------  Chief Complaint: ESRD/Ongoing hemodialysis requirement    24 hour events/subjective:    tolerated HD yesterday 2 liters removed  breathing is improved    PAST HISTORY  --------------------------------------------------------------------------------  No significant changes to PMH, PSH, FHx, SHx, unless otherwise noted    ALLERGIES & MEDICATIONS  --------------------------------------------------------------------------------  Allergies    No Known Allergies    Intolerances      Standing Inpatient Medications  aMIOdarone    Tablet 400 milliGRAM(s) Oral every 12 hours  aspirin  chewable 81 milliGRAM(s) Oral daily  bisacodyl Suppository 10 milliGRAM(s) Rectal once  chlorhexidine 2% Cloths 1 Application(s) Topical <User Schedule>  epoetin vern-epbx (RETACRIT) Injectable 4000 Unit(s) IV Push <User Schedule>  glucagon  Injectable 1 milliGRAM(s) IntraMuscular once  heparin   Injectable 5000 Unit(s) SubCutaneous every 8 hours  insulin glargine Injectable (LANTUS) 16 Unit(s) SubCutaneous at bedtime  insulin lispro (ADMELOG) corrective regimen sliding scale   SubCutaneous at bedtime  insulin lispro Injectable (ADMELOG) 10 Unit(s) SubCutaneous three times a day before meals  pantoprazole    Tablet 40 milliGRAM(s) Oral two times a day  polyethylene glycol 3350 17 Gram(s) Oral daily  senna 2 Tablet(s) Oral at bedtime  sevelamer carbonate 800 milliGRAM(s) Oral three times a day  sodium chloride 0.9%. 1000 milliLiter(s) IV Continuous <Continuous>    PRN Inpatient Medications  sodium chloride 0.9% lock flush 10 milliLiter(s) IV Push every 1 hour PRN      REVIEW OF SYSTEMS  --------------------------------------------------------------------------------    All other systems were reviewed and are negative, except as noted.    VITALS/PHYSICAL EXAM  --------------------------------------------------------------------------------  T(C): 36.8 (11-04-21 @ 04:22), Max: 36.8 (11-04-21 @ 04:22)  HR: 63 (11-04-21 @ 04:22) (58 - 108)  BP: 129/61 (11-04-21 @ 04:22) (119/66 - 163/93)  RR: 18 (11-04-21 @ 04:22) (18 - 20)  SpO2: 96% (11-04-21 @ 04:22) (94% - 97%)  Wt(kg): --        11-03-21 @ 07:01  -  11-04-21 @ 07:00  --------------------------------------------------------  IN: 250 mL / OUT: 2000 mL / NET: -1750 mL    11-04-21 @ 07:01  -  11-04-21 @ 10:55  --------------------------------------------------------  IN: 360 mL / OUT: 0 mL / NET: 360 mL      Physical Exam:  	Gen: NAD  	Pulm: Crackles bases  	CV: RRR, S1S2; no rub  	Abd: +BS, soft  	UE: Warm, FROM,  	LE: Warm,minimal edema  	Neuro: No focal deficits, intact gait  	Psych: Normal affect and mood  	Skin: Warm, without rashes  	Vascular access: tunnel cath; avf    LABS/STUDIES  --------------------------------------------------------------------------------              8.5    11.55 >-----------<  204      [11-04-21 @ 06:16]              27.3     138  |  97  |  46  ----------------------------<  110      [11-04-21 @ 06:16]  4.6   |  23  |  4.55        Ca     9.5     [11-04-21 @ 06:16]    TPro  7.1  /  Alb  2.9  /  TBili  0.2  /  DBili  x   /  AST  21  /  ALT  8   /  AlkPhos  100  [11-03-21 @ 06:53]

## 2021-11-04 NOTE — PROGRESS NOTE ADULT - SUBJECTIVE AND OBJECTIVE BOX
S:  feeling better.  less sob    Telemetry:   60-8- burst afib    Vital Signs Last 24 Hrs  T(C): 36.8 (21 @ 04:22), Max: 36.8 (21 @ 04:22)  T(F): 98.2 (21 @ 04:22), Max: 98.2 (21 @ 04:22)  HR: 63 (21 @ 04:22) (58 - 108)  BP: 129/61 (21 @ 04:22) (119/66 - 163/93)  RR: 18 (21 @ 04:22) (18 - 20)  SpO2: 96% (21 @ 04:22) (94% - 97%)                    @ 07:01  -   @ 07:00  --------------------------------------------------------  IN: 250 mL / OUT: 2000 mL / NET: -1750 mL     @ 07:01  -   @ 11:48  --------------------------------------------------------  IN: 360 mL / OUT: 0 mL / NET: 360 mL          Daily Height in cm: 165.1 (2021 09:52)    Daily Weight in k.4 (2021 09:52)        CAPILLARY BLOOD GLUCOSE      POCT Blood Glucose.: 110 mg/dL (2021 07:39)  POCT Blood Glucose.: 114 mg/dL (2021 21:46)  POCT Blood Glucose.: 168 mg/dL (2021 16:35)          Drains:     MS         [  ] Drainage:                 L Pleural  [  ]  Drainage:                R Pleural  [  ]  Drainage:    Pacing Wires        [ x ]   Settings:   VVI 40/10                               Isolated  [  ]    Coumadin    [ ] YES          [ x]      NO         Reason:                                 8.5    11.55 )-----------( 204      ( 2021 06:16 )             27.3       11    138  |  97  |  46<H>  ----------------------------<  110<H>  4.6   |  23  |  4.55<H>    Ca    9.5      2021 06:16    TPro  7.1  /  Alb  2.9<L>  /  TBili  0.2  /  DBili  x   /  AST  21  /  ALT  8<L>  /  AlkPhos  100            PHYSICAL EXAM:    Neurology: alert and oriented x 3, nonfocal, no gross deficits    CV :  S1S2 heard RRR    Sternal Wound :  CDI , Stable    Lungs:  clear to ausc.  no w/r/r    Abdomen: soft, nontender, nondistended, positive bowel sounds, +bowel movement         Extremities:   no edema.  inc cdi            aMIOdarone    Tablet 400 milliGRAM(s) Oral every 12 hours  aspirin  chewable 81 milliGRAM(s) Oral daily  bisacodyl Suppository 10 milliGRAM(s) Rectal once  chlorhexidine 2% Cloths 1 Application(s) Topical <User Schedule>  epoetin vern-epbx (RETACRIT) Injectable 4000 Unit(s) IV Push <User Schedule>  glucagon  Injectable 1 milliGRAM(s) IntraMuscular once  heparin   Injectable 5000 Unit(s) SubCutaneous every 8 hours  insulin glargine Injectable (LANTUS) 16 Unit(s) SubCutaneous at bedtime  insulin lispro (ADMELOG) corrective regimen sliding scale   SubCutaneous at bedtime  insulin lispro Injectable (ADMELOG) 10 Unit(s) SubCutaneous three times a day before meals  pantoprazole    Tablet 40 milliGRAM(s) Oral two times a day  polyethylene glycol 3350 17 Gram(s) Oral daily  senna 2 Tablet(s) Oral at bedtime  sevelamer carbonate 800 milliGRAM(s) Oral three times a day  sodium chloride 0.9% lock flush 10 milliLiter(s) IV Push every 1 hour PRN  sodium chloride 0.9%. 1000 milliLiter(s) IV Continuous <Continuous>                              Physical Therapy Rec:   Home  [  ]   Home w/ PT  [  ]  Rehab  [x ]    Discussed with Cardiothoracic Team at AM rounds.

## 2021-11-04 NOTE — PROGRESS NOTE ADULT - PROBLEM SELECTOR PLAN 1
Continue post-op care  Continue ASA 81 mg QD and Atorvastatin 80 mg qhs   Holding beta blocker given SB/accelerated junctional   Maintain PW --> EPM VVI 40 / vma 10.   Increase activity as tolerated, ambulate 4x daily and with PT   Chest PT, encourage incentive spirometry   Pain control w/ Tylenol only as pt was became oversedated when on narcotics   GI ppx w/ Protonix, DVT ppx w/ HSQ   Wound care and assessment   Dispo: Acute Rehab [facility] most likely Monday

## 2021-11-04 NOTE — STUDENT SIGN OFF DOCUMENT - CO-SIGNATURE DATE
01-Nov-2021 14:40
04-Nov-2021 14:14
22-Oct-2021 14:41
26-Oct-2021 19:01
03-Nov-2021 19:16
25-Oct-2021 19:20

## 2021-11-04 NOTE — STUDENT SIGN OFF DOCUMENT - COPY OF STUDENT DOCUMENT REVIEW
physical therapy

## 2021-11-04 NOTE — PROGRESS NOTE ADULT - ASSESSMENT
59 y/o M w/ hx of DM2, HTN and HLD initially presented as transfer from [facility] for chest pain concerning for NSTEMI and possible new CHF c/b JOSHUA likely on CKD, and hypertensive emergency. Hospital course complicated by s/p RRT for CVA/TIA ruled out and worsening anemia requiring 1 PRBC. Pt S/p HD session today and underwent LHC showing Multivessel CAD. CT surgery consulted for CABG evaluation.     Surgery/Hospital Course:  10/07 Code stroke called for left facial droop, CT showed old frontal infarct, found to have high grade left carotid stenosis, but unclear cause for acute neurologic deficit  10/13 initiated HD, patient admitted with a creatinine of 4.05, but did not know of a history of renal   10/21 s/p C2L, MVR(T), & LAAL. Received 4 PRBC, 1 PLT, 1000 FIEBA intraop. Required dual pressor and inotrope support w/ , Primacor, Levo, and Epi gtts.   10/22 Extubated at 04:55.   10/24 SOB, urgent HD overnight, bipap  10/25 O2 via HFNC transitioned to 5L NC. Plan for HD today. SB in 50s, backup VVI paced at 50 via temporary epicardial PW.  10/26 Temporary access for HD removed yesterday as was not functioning, will consult IR for permacath. Plan for HD today. Check afternoon INR for coumadin dosing tonight   10/27 Pt received to floor on 2 Arnold - VSS, NAD. Coumadin on hold for AVF w/ vascular Friday or Monday - will discuss initiating heparin gtt to bridge w/ Dr. Xiong in AM. L LIDIA carrion placed in CTU for HD access will need to be transitioned to permacath prior to AVF - will d/w IR.   10/28 IR consult requested for PermCath placement HD with 1uPRBC transfusion today. Hod narcotics. Pt lethargic but responsive to verbal stimuli. FS 121mg/dl. 92% RA   10/29 VSS INR 1.49 HD cath poor flow- requested by nephrology to place new HD cath- Rt IJ Placed  - cleared for Vascular surgery placement of AVF on Tuesday   10/30 VSS  ECHO  resulted minimal pericardial effusion  improved mood / activity  HD today Perm Cath for Monday.  10/31 VSS  EKG today COVID swab Type and screen NPO at midnight  Perm cath in am OOB chair   ID consulted as per IR request for clearance prior to PermCath placement. WBC 13, afebrile. no evidence of s/s of infection.    LUE 1st stage basilic vein transposition performed. The first-stage AVF to mature from 4 to 6 weeks before the second-stage transposition of the matured brachial vein  construction. PT to reevaluate acute vs subacute rehab requirement.  11/3 Patient noted on tele SB /  @ 40 with loss of capture.  Intermittent Tachy / Seth --> EP consult  Maintain PW --> EPM VVI 40 / vma 10. H.D today per renal. Disposition: Acute  JR 60-80 burst afib.  VVI 40/10  threshold 4.3.   EKG ectopic atrial rhythm w/pac's.  On modified. amio load.  Set up for HD [facility].  Rehab [facility] most likely Monday

## 2021-11-04 NOTE — PROGRESS NOTE ADULT - ASSESSMENT
Assessment  DMT2: 60y Male with DM T2 with hyperglycemia, A1C 7%, was on oral meds and insulin at home, postop CABG on basal bolus insulin, blood sugars are fluctuating within overall acceptable range, no hypoglycemias, eating meals, DC planning for rehab, likely Monday.  Hypothyroidism: Patient has no history thyroid disease, was not on any thyroid supplements, TSH elevated, subclinical.  CAD: s/p CABG, on medications, no chest pain, stable, monitored.  HTN: Controlled,  on antihypertensive medications.  HLD: On statin  Obesity: No strict exercise routines, not on any weight loss program, neither on low calorie diet.        Esperanza Beckett MD  Cell: [phone]  Office: [phone]     Assessment  DMT2: 60y Male with DM T2 with hyperglycemia, A1C 7%, was on oral meds and insulin at home, postop CABG on basal bolus insulin, blood  sugars are fluctuating within overall acceptable range, no hypoglycemias, eating meals, DC planning for rehab, likely Monday.  Hypothyroidism: Patient has no history thyroid disease, was not on any thyroid supplements, TSH elevated, subclinical.  CAD: s/p CABG, on medications, no chest pain, stable, monitored.  HTN: Controlled,  on antihypertensive medications.  HLD: On statin  Obesity: No strict exercise routines, not on any weight loss program, neither on low calorie diet.        Esperanza Beckett MD  Cell: [phone]  Office: [phone]

## 2021-11-04 NOTE — PROGRESS NOTE ADULT - ASSESSMENT
60 year old male with PMHx of DM and HTN who presented to the hospital as a transfer from OSH for NSTEMI. The nephrology team was consulted due to elevated creatinine of 4.05 upon presentation.    JOSHUA on CKD, now on HD, need long term HD  - Likely with CKD from diabetic nephropathy, admitted with JOSHUA on CKD and hypervolemia, was initiated on HD prior to CABG for optimization.   --- initiated on HD on 10/14  - s/p LHC on 10/14 showing severe multivessel disease; now s/p CABG on 10/21    PLAN for extra UF today; then HD tomorrow  - 1.5 liters for 2 hours  -s/p tunneled catheter  -s/p AVF creation   - monitor BMP  - adjust meds as per HD     Anemia:   - in the setting of CKD   - iron studies wnl   - SISSY, 4000 units TIW with HD.   - monitor CBC    Dw CTS    (After 5 pm or on weekends please page the on-call fellow, can check [email or web address] for schedule. Login is marty garcia, schedule under [facility] medicine, psych, derm)

## 2021-11-04 NOTE — PROGRESS NOTE ADULT - PROBLEM SELECTOR PLAN 3
Renal following  10/29 new Rt IJ HD [facility]  HD today per Renal   Coumadin low dose  as pt will be going for AV fistula with vascular Friday or Monday   HD via permacath    Continue Renvela 800 mg q8h  Daily BUN/Cr to trend

## 2021-11-04 NOTE — PROGRESS NOTE ADULT - ASSESSMENT
59 y/o male hx of DM, HTN, HLD, Hypothyroidism, CAD, former smoker, p/w chest pain and sob from OSH and transferred to [facility] concerning for NTESMI, possible CHF c/b JOSHUA likely on CKD and hypertensive emergency. Hospital course c/b s/p RRT for CVA/TIA, CT showed olf frontal infarct, with high grade left carotid stenosis. Hemodialysis initiated for JOSHUA, serum Cr was 4.05. s/p LHC on 10/14 that showed multivessel disease and severe MR, s/p C2L, MVR (T), and ANGELITA Ligation on 10/21/21. POD #13 with intermittent loss of capture early this morning 11/3, pAF, Tachy Seth. EPS consulted for further management.   Post op TTE with EF 75%, LA size 3.9    1. Tachy Seth, pAF  2. Intermittent loss of capture on tele early morning  3. s/p C2L, MVR (T), LAAL on 10/21/21  4. JOSHUA likely on CKD  - Telemetry overnight without loss of capture, rate controlled in the 60s  - Temporary wire threshold 4.5; rarely need pacing overnight  - Coreg discontinued  - Continue Amio loading with 400mg bid for 1 week, 200mg bid for 1 week then 200mg qd  - Monitor LFTs, TFTs (AST 35/ALT22)  - Will need outpatient monitoring of LFTs, TFTs, PFT with DLCO as outpatient while on Amio  - Right Shiley- HD per Renal (serum Cr today 5.98)  - Rec AC for pAF (likely Coumadin) 2/2 JOSHUA likely on CKD  - HD planned for Friday   61 y/o male hx of DM, HTN, HLD, Hypothyroidism, CAD, former smoker, p/w chest pain and sob from OSH and transferred to [facility] concerning for NTESMI, possible CHF c/b JOSHUA likely on CKD and hypertensive emergency. Hospital course c/b s/p RRT for CVA/TIA, CT showed olf frontal infarct, with high grade left carotid stenosis. Hemodialysis initiated for JOSHUA, serum Cr was 4.05. s/p LHC on 10/14 that showed multivessel disease and severe MR, s/p C2L, MVR (T), and ANGELITA Ligation on 10/21/21. POD #13 with intermittent loss of capture early this morning 11/3, pAF, Tachy Seth. EPS consulted for further management.   Post op TTE with EF 75%, LA size 3.9    1. Tachy Seth, pAF  2. Intermittent loss of capture on tele early morning  3. s/p C2L, MVR (T), LAAL on 10/21/21  4. JOSHUA likely on CKD  - Telemetry overnight without loss of capture, rate controlled in the 60s  - Temporary wire threshold 4.5; rarely need pacing overnight  - Coreg discontinued  - Continue Amio loading with 400mg bid for 1 week, 200mg bid for 1 week then 200mg qd  - Monitor LFTs, TFTs (AST 35/ALT22)  - Will need outpatient monitoring of LFTs, TFTs, PFT with DLCO as outpatient while on Amio  - Right Shiley- HD per Renal (serum Cr today 5.98)  - Rec AC for pAF (likely Coumadin) 2/2 JOSHUA likely on CKD  - HD planned for Friday  - No indication for PPM at this time, will sign off, please reconsult as needed

## 2021-11-05 LAB
ANION GAP SERPL CALC-SCNC: 20 MMOL/L — HIGH (ref 5–17)
BUN SERPL-MCNC: 72 MG/DL — HIGH (ref 7–23)
CALCIUM SERPL-MCNC: 9.5 MG/DL — SIGNIFICANT CHANGE UP (ref 8.4–10.5)
CHLORIDE SERPL-SCNC: 95 MMOL/L — LOW (ref 96–108)
CO2 SERPL-SCNC: 23 MMOL/L — SIGNIFICANT CHANGE UP (ref 22–31)
CREAT SERPL-MCNC: 6.3 MG/DL — HIGH (ref 0.5–1.3)
GLUCOSE BLDC GLUCOMTR-MCNC: 117 MG/DL — HIGH (ref 70–99)
GLUCOSE BLDC GLUCOMTR-MCNC: 130 MG/DL — HIGH (ref 70–99)
GLUCOSE BLDC GLUCOMTR-MCNC: 151 MG/DL — HIGH (ref 70–99)
GLUCOSE BLDC GLUCOMTR-MCNC: 161 MG/DL — HIGH (ref 70–99)
GLUCOSE SERPL-MCNC: 129 MG/DL — HIGH (ref 70–99)
HCT VFR BLD CALC: 26 % — LOW (ref 39–50)
HGB BLD-MCNC: 8.4 G/DL — LOW (ref 13–17)
MCHC RBC-ENTMCNC: 29.9 PG — SIGNIFICANT CHANGE UP (ref 27–34)
MCHC RBC-ENTMCNC: 32.3 GM/DL — SIGNIFICANT CHANGE UP (ref 32–36)
MCV RBC AUTO: 92.5 FL — SIGNIFICANT CHANGE UP (ref 80–100)
NRBC # BLD: 0 /100 WBCS — SIGNIFICANT CHANGE UP (ref 0–0)
PLATELET # BLD AUTO: 191 K/UL — SIGNIFICANT CHANGE UP (ref 150–400)
POTASSIUM SERPL-MCNC: 4.6 MMOL/L — SIGNIFICANT CHANGE UP (ref 3.5–5.3)
POTASSIUM SERPL-SCNC: 4.6 MMOL/L — SIGNIFICANT CHANGE UP (ref 3.5–5.3)
RBC # BLD: 2.81 M/UL — LOW (ref 4.2–5.8)
RBC # FLD: 14.1 % — SIGNIFICANT CHANGE UP (ref 10.3–14.5)
SARS-COV-2 RNA SPEC QL NAA+PROBE: SIGNIFICANT CHANGE UP
SODIUM SERPL-SCNC: 138 MMOL/L — SIGNIFICANT CHANGE UP (ref 135–145)
WBC # BLD: 13.3 K/UL — HIGH (ref 3.8–10.5)
WBC # FLD AUTO: 13.3 K/UL — HIGH (ref 3.8–10.5)

## 2021-11-05 PROCEDURE — 99232 SBSQ HOSP IP/OBS MODERATE 35: CPT | Mod: GC

## 2021-11-05 RX ORDER — APIXABAN 2.5 MG/1
2.5 TABLET, FILM COATED ORAL
Refills: 0 | Status: DISCONTINUED | OUTPATIENT
Start: 2021-11-05 | End: 2021-11-08

## 2021-11-05 RX ADMIN — INSULIN GLARGINE 16 UNIT(S): 100 INJECTION, SOLUTION SUBCUTANEOUS at 22:12

## 2021-11-05 RX ADMIN — AMIODARONE HYDROCHLORIDE 400 MILLIGRAM(S): 400 TABLET ORAL at 19:46

## 2021-11-05 RX ADMIN — PANTOPRAZOLE SODIUM 40 MILLIGRAM(S): 20 TABLET, DELAYED RELEASE ORAL at 17:58

## 2021-11-05 RX ADMIN — Medication 10 UNIT(S): at 08:24

## 2021-11-05 RX ADMIN — SEVELAMER CARBONATE 800 MILLIGRAM(S): 2400 POWDER, FOR SUSPENSION ORAL at 05:20

## 2021-11-05 RX ADMIN — HEPARIN SODIUM 5000 UNIT(S): 5000 INJECTION INTRAVENOUS; SUBCUTANEOUS at 12:41

## 2021-11-05 RX ADMIN — POLYETHYLENE GLYCOL 3350 17 GRAM(S): 17 POWDER, FOR SOLUTION ORAL at 12:42

## 2021-11-05 RX ADMIN — AMIODARONE HYDROCHLORIDE 400 MILLIGRAM(S): 400 TABLET ORAL at 08:35

## 2021-11-05 RX ADMIN — Medication 10 UNIT(S): at 17:55

## 2021-11-05 RX ADMIN — SEVELAMER CARBONATE 800 MILLIGRAM(S): 2400 POWDER, FOR SUSPENSION ORAL at 12:42

## 2021-11-05 RX ADMIN — ERYTHROPOIETIN 4000 UNIT(S): 10000 INJECTION, SOLUTION INTRAVENOUS; SUBCUTANEOUS at 10:12

## 2021-11-05 RX ADMIN — CHLORHEXIDINE GLUCONATE 1 APPLICATION(S): 213 SOLUTION TOPICAL at 10:57

## 2021-11-05 RX ADMIN — Medication 81 MILLIGRAM(S): at 12:42

## 2021-11-05 RX ADMIN — SEVELAMER CARBONATE 800 MILLIGRAM(S): 2400 POWDER, FOR SUSPENSION ORAL at 22:15

## 2021-11-05 RX ADMIN — Medication 10 UNIT(S): at 12:43

## 2021-11-05 RX ADMIN — HEPARIN SODIUM 5000 UNIT(S): 5000 INJECTION INTRAVENOUS; SUBCUTANEOUS at 05:20

## 2021-11-05 RX ADMIN — PANTOPRAZOLE SODIUM 40 MILLIGRAM(S): 20 TABLET, DELAYED RELEASE ORAL at 05:20

## 2021-11-05 RX ADMIN — SENNA PLUS 2 TABLET(S): 8.6 TABLET ORAL at 22:15

## 2021-11-05 RX ADMIN — APIXABAN 2.5 MILLIGRAM(S): 2.5 TABLET, FILM COATED ORAL at 17:58

## 2021-11-05 NOTE — PROGRESS NOTE ADULT - ASSESSMENT
61 y/o M w/ hx of DM2, HTN and HLD initially presented as transfer from [facility] for chest pain concerning for NSTEMI and possible new CHF c/b JOSHUA likely on CKD, and hypertensive emergency. Hospital course complicated by s/p RRT for CVA/TIA ruled out and worsening anemia requiring 1 PRBC. Pt S/p HD session today and underwent LHC showing Multivessel CAD. CT surgery consulted for CABG evaluation.     Surgery/Hospital Course:  10/07 Code stroke called for left facial droop, CT showed old frontal infarct, found to have high grade left carotid stenosis, but unclear cause for acute neurologic deficit  10/13 initiated HD, patient admitted with a creatinine of 4.05, but did not know of a history of renal   10/21 s/p C2L, MVR(T), & LAAL. Received 4 PRBC, 1 PLT, 1000 FIEBA intraop. Required dual pressor and inotrope support w/ , Primacor, Levo, and Epi gtts.   10/22 Extubated at 04:55.   10/24 SOB, urgent HD overnight, bipap  10/25 O2 via HFNC transitioned to 5L NC. Plan for HD today. SB in 50s, backup VVI paced at 50 via temporary epicardial PW.  10/26 Temporary access for HD removed yesterday as was not functioning, will consult IR for permacath. Plan for HD today. Check afternoon INR for coumadin dosing tonight   10/27 Pt received to floor on 2 Arnold - VSS, NAD. Coumadin on hold for AVF w/ vascular Friday or Monday - will discuss initiating heparin gtt to bridge w/ Dr. Xiong in AM. L LIDIA carrion placed in CTU for HD access will need to be transitioned to permacath prior to AVF - will d/w IR.   10/28 IR consult requested for PermCath placement HD with 1uPRBC transfusion today. Hod narcotics. Pt lethargic but responsive to verbal stimuli. FS 121mg/dl. 92% RA   10/29 VSS INR 1.49 HD cath poor flow- requested by nephrology to place new HD cath- Rt IJ Placed  - cleared for Vascular surgery placement of AVF on Tuesday   10/30 VSS  ECHO  resulted minimal pericardial effusion  improved mood / activity  HD today Perm Cath for Monday.  10/31 VSS  EKG today COVID swab Type and screen NPO at midnight  Perm cath in am OOB chair   ID consulted as per IR request for clearance prior to PermCath placement. WBC 13, afebrile. no evidence of s/s of infection.    LUE 1st stage basilic vein transposition performed. The first-stage AVF to mature from 4 to 6 weeks before the second-stage transposition of the matured brachial vein  construction. PT to reevaluate acute vs subacute rehab requirement.  11/3 Patient noted on tele SB /  @ 40 with loss of capture.  Intermittent Tachy / Seth --> EP consult  Maintain PW --> EPM VVI 40 / vma 10. H.D today per renal. Disposition: Acute  JR 60-80 burst afib.  VVI 40/10  threshold 4.3.   EKG ectopic atrial rhythm w/pac's.  On modified. amio load.  Set up for HD [place].  Rehab [facility] most likely Monday.   VVS; Continue with current medication regimen.  Continue on Modified amio load.  Awaiting HD chair at [place].  Plan for [facility] on . PW to be cut prior to discharge.  Per Dr. Xiong will restart AC therapy Eliquis 2.5 mg PO BID upon discharge.

## 2021-11-05 NOTE — PROGRESS NOTE ADULT - ASSESSMENT
60 year old male with PMHx of DM and HTN who presented to the hospital as a transfer from OSH for NSTEMI. The nephrology team was consulted due to elevated creatinine of 4.05 upon presentation.    JOSHUA on CKD, now on HD, need long term HD  - Likely with CKD from diabetic nephropathy, admitted with JOSHUA on CKD and hypervolemia, was initiated on HD prior to CABG for optimization.   --- initiated on HD on 10/14  - s/p LHC on 10/14 showing severe multivessel disease; now s/p CABG on 10/21    Tolerating HD today  - 1.5 liters  -s/p tunneled catheter  -s/p AVF creation   - monitor BMP  - adjust meds as per HD     Anemia:   - in the setting of CKD   - iron studies wnl   - SISSY, 4000 units TIW with HD.   - monitor CBC    Dw CTS    (After 5 pm or on weekends please page the on-call fellow, can check [email or web address] for schedule. Login is marty garcia, schedule under [facility] medicine, psych, derm)

## 2021-11-05 NOTE — PROGRESS NOTE ADULT - SUBJECTIVE AND OBJECTIVE BOX
VITAL SIGNS    Subjective: "I'm ok." Denies CP, palpitation, SOB, WYNNE, HA, dizziness, N/V/D, fever or chills.  No acute event noted overnight.     Telemetry:  Atopic Atrial / SR 50-65     Vital Signs Last 24 Hrs  T(C): 36.4 (21 @ 09:30), Max: 37.1 (21 @ 18:57)  T(F): 97.6 (21 @ 09:30), Max: 98.8 (21 @ 18:57)  HR: 60 (21 @ 09:30) (58 - 71)  BP: 134/71 (21 @ 09:30) (106/73 - 155/80)  RR: 18 (21 @ 09:30) (18 - 18)  SpO2: 100% (21 @ 09:30) (93% - 100%)            @ 07:01  -   07:00  --------------------------------------------------------  IN: 960 mL / OUT: 1500 mL / NET: -540 mL    Daily     Daily Weight in k (2021 09:30)    CAPILLARY BLOOD GLUCOSE    POCT Blood Glucose.: 161 mg/dL (2021 08:21)  POCT Blood Glucose.: 165 mg/dL (2021 21:36)  POCT Blood Glucose.: 85 mg/dL (2021 16:24)  POCT Blood Glucose.: 174 mg/dL (2021 11:47)        PHYSICAL EXAM    Neurology: alert and oriented x 3, nonfocal, no gross deficits    CV: (+) S1 and S2, No murmurs, rubs, gallops or clicks     Sternal Wound: MSI -->CDI , sternum stable; PW x 2 --> EPM VVI 40      Lungs: CTA B/L     Abdomen: soft, nontender, nondistended, positive bowel sounds, (+) Flatus; (+) BM     :  Voiding               Extremities:  B/L LE (+) edema; negative calf tenderness; (+) 2 DP palpable        aMIOdarone Tablet 400 milliGRAM(s) Oral every 12 hours  aspirin  chewable 81 milliGRAM(s) Oral daily  bisacodyl Suppository 10 milliGRAM(s) Rectal once  chlorhexidine 2% Cloths 1 Application(s) Topical <User Schedule>  epoetin vern-epbx (RETACRIT) Injectable 4000 Unit(s) IV Push <User Schedule>  glucagon  Injectable 1 milliGRAM(s) IntraMuscular once  heparin   Injectable 5000 Unit(s) SubCutaneous every 8 hours  insulin glargine Injectable (LANTUS) 16 Unit(s) SubCutaneous at bedtime  insulin lispro (ADMELOG) corrective regimen sliding scale   SubCutaneous at bedtime  insulin lispro Injectable (ADMELOG) 10 Unit(s) SubCutaneous three times a day before meals  pantoprazole    Tablet 40 milliGRAM(s) Oral two times a day  polyethylene glycol 3350 17 Gram(s) Oral daily  senna 2 Tablet(s) Oral at bedtime  sevelamer carbonate 800 milliGRAM(s) Oral three times a day  sodium chloride 0.9% lock flush 10 milliLiter(s) IV Push every 1 hour PRN  sodium chloride 0.9%. 1000 milliLiter(s) IV Continuous <Continuous>    Physical Therapy Rec:   Home  [  ]   Home w/ PT  [  ]  Rehab  [ X ]    Discussed with Cardiothoracic Team at AM rounds.     VITAL SIGNS    Subjective: "I'm ok." Denies CP, palpitation, SOB, WYNNE, HA, dizziness, N/V/D, fever or chills.  No acute event noted overnight.     Telemetry:  Atopic Atrial / SR 50-65     Vital Signs Last 24 Hrs  T(C): 36.4 (21 @ 09:30), Max: 37.1 (21 @ 18:57)  T(F): 97.6 (21 @ 09:30), Max: 98.8 (21 @ 18:57)  HR: 60 (21 @ 09:30) (58 - 71)  BP: 134/71 (21 @ 09:30) (106/73 - 155/80)  RR: 18 (21 @ 09:30) (18 - 18)  SpO2: 100% (21 @ 09:30) (93% - 100%)            @ 07:01  -   07:00  --------------------------------------------------------  IN: 960 mL / OUT: 1500 mL / NET: -540 mL    Daily     Daily Weight in k (2021 09:30)    CAPILLARY BLOOD GLUCOSE    POCT Blood Glucose.: 161 mg/dL (2021 08:21)  POCT Blood Glucose.: 165 mg/dL (2021 21:36)  POCT Blood Glucose.: 85 mg/dL (2021 16:24)  POCT Blood Glucose.: 174 mg/dL (2021 11:47)        PHYSICAL EXAM    Neurology: alert and oriented x 3, nonfocal, no gross deficits    CV: (+) S1 and S2, No murmurs, rubs, gallops or clicks     Sternal Wound: MSI -->CDI , sternum stable; PW x 2 --> EPM VVI 40      Lungs: CTA B/L     Abdomen: soft, nontender, nondistended, positive bowel sounds, (+) Flatus; (+) BM     :  Voiding               Extremities:  B/L LE (+) edema; negative calf tenderness; (+) 2 DP palpable; LUE AV fistula (+) Bruit / (+) Thrills.  Right IJ permacath with occlusive dressing C/D/I.       aMIOdarone Tablet 400 milliGRAM(s) Oral every 12 hours  aspirin  chewable 81 milliGRAM(s) Oral daily  bisacodyl Suppository 10 milliGRAM(s) Rectal once  chlorhexidine 2% Cloths 1 Application(s) Topical <User Schedule>  epoetin vern-epbx (RETACRIT) Injectable 4000 Unit(s) IV Push <User Schedule>  glucagon  Injectable 1 milliGRAM(s) IntraMuscular once  heparin   Injectable 5000 Unit(s) SubCutaneous every 8 hours  insulin glargine Injectable (LANTUS) 16 Unit(s) SubCutaneous at bedtime  insulin lispro (ADMELOG) corrective regimen sliding scale   SubCutaneous at bedtime  insulin lispro Injectable (ADMELOG) 10 Unit(s) SubCutaneous three times a day before meals  pantoprazole    Tablet 40 milliGRAM(s) Oral two times a day  polyethylene glycol 3350 17 Gram(s) Oral daily  senna 2 Tablet(s) Oral at bedtime  sevelamer carbonate 800 milliGRAM(s) Oral three times a day  sodium chloride 0.9% lock flush 10 milliLiter(s) IV Push every 1 hour PRN  sodium chloride 0.9%. 1000 milliLiter(s) IV Continuous <Continuous>    Physical Therapy Rec:   Home  [  ]   Home w/ PT  [  ]  Rehab  [ X ]    Discussed with Cardiothoracic Team at AM rounds.

## 2021-11-05 NOTE — PROGRESS NOTE ADULT - SUBJECTIVE AND OBJECTIVE BOX
Chief complaint  Patient is a 60y old  Male who presents with a chief complaint of NSTEMI (05 Nov 2021 11:41)   Review of systems  Patient in bed, looks comfortable, no hypoglycemic episodes.    Labs and Fingersticks  CAPILLARY BLOOD GLUCOSE      POCT Blood Glucose.: 117 mg/dL (05 Nov 2021 12:41)  POCT Blood Glucose.: 161 mg/dL (05 Nov 2021 08:21)  POCT Blood Glucose.: 165 mg/dL (04 Nov 2021 21:36)  POCT Blood Glucose.: 85 mg/dL (04 Nov 2021 16:24)      Anion Gap, Serum: 20 *H* (11-05 @ 04:57)  Anion Gap, Serum: 18 *H* (11-04 @ 06:16)      Calcium, Total Serum: 9.5 (11-05 @ 04:57)  Calcium, Total Serum: 9.5 (11-04 @ 06:16)          11-05    138  |  95<L>  |  72<H>  ----------------------------<  129<H>  4.6   |  23  |  6.30<H>    Ca    9.5      05 Nov 2021 04:57                          8.4    13.30 )-----------( 191      ( 05 Nov 2021 04:57 )             26.0     Medications  MEDICATIONS  (STANDING):  aMIOdarone    Tablet 400 milliGRAM(s) Oral every 12 hours  aspirin  chewable 81 milliGRAM(s) Oral daily  bisacodyl Suppository 10 milliGRAM(s) Rectal once  chlorhexidine 2% Cloths 1 Application(s) Topical <User Schedule>  epoetin vern-epbx (RETACRIT) Injectable 4000 Unit(s) IV Push <User Schedule>  glucagon  Injectable 1 milliGRAM(s) IntraMuscular once  heparin   Injectable 5000 Unit(s) SubCutaneous every 8 hours  insulin glargine Injectable (LANTUS) 16 Unit(s) SubCutaneous at bedtime  insulin lispro (ADMELOG) corrective regimen sliding scale   SubCutaneous at bedtime  insulin lispro Injectable (ADMELOG) 10 Unit(s) SubCutaneous three times a day before meals  pantoprazole    Tablet 40 milliGRAM(s) Oral two times a day  polyethylene glycol 3350 17 Gram(s) Oral daily  senna 2 Tablet(s) Oral at bedtime  sevelamer carbonate 800 milliGRAM(s) Oral three times a day  sodium chloride 0.9%. 1000 milliLiter(s) (10 mL/Hr) IV Continuous <Continuous>      Physical Exam  General: Patient comfortable in bed  Vital Signs Last 12 Hrs  T(F): 97.5 (11-05-21 @ 12:30), Max: 97.6 (11-05-21 @ 04:53)  HR: 62 (11-05-21 @ 12:30) (58 - 62)  BP: 141/60 (11-05-21 @ 12:30) (130/68 - 141/60)  BP(mean): --  RR: 18 (11-05-21 @ 12:30) (18 - 18)  SpO2: 98% (11-05-21 @ 12:30) (98% - 100%)  Neck: No palpable thyroid nodules.  CVS: S1S2, No murmurs  Respiratory: No wheezing, no crepitations  GI: Abdomen soft, bowel sounds positive  Musculoskeletal:  edema lower extremities.   Skin: No skin rashes, no ecchymosis    Diagnostics    Free Thyroxine, Serum: AM Sched. Collection: 01-Nov-2021 06:00 (10-31 @ 13:47)  Free Thyroxine, Serum: AM Sched. Collection: 30-Oct-2021 06:00 (10-29 @ 12:10)  Free Thyroxine, Serum: AM Sched. Collection: 26-Oct-2021 06:00  Cancel Reason: Lab Operations Cancel (10-25 @ 13:56)           Chief complaint  Patient is a 60y old  Male who presents with a chief complaint of NSTEMI (05 Nov 2021 11:41)   Review of systems  Patient in bed, looks comfortable, no hypoglycemic episodes.    Labs and Fingersticks  CAPILLARY BLOOD GLUCOSE      POCT Blood Glucose.: 117 mg/dL (05 Nov 2021 12:41)  POCT Blood Glucose.: 161 mg/dL (05 Nov 2021 08:21)  POCT Blood Glucose.: 165 mg/dL (04 Nov 2021 21:36)  POCT Blood Glucose.: 85 mg/dL (04 Nov 2021 16:24)      Anion Gap, Serum: 20 *H* (11-05 @ 04:57)  Anion Gap, Serum: 18 *H* (11-04 @ 06:16)      Calcium, Total Serum: 9.5 (11-05 @ 04:57)  Calcium, Total Serum: 9.5 (11-04 @ 06:16)          11-05    138  |  95<L>  |  72<H>  ----------------------------<  129<H>  4.6   |  23  |  6.30<H>    Ca    9.5      05 Nov 2021 04:57                          8.4    13.30 )-----------( 191      ( 05 Nov 2021 04:57 )             26.0     Medications  MEDICATIONS  (STANDING):  aMIOdarone    Tablet 400 milliGRAM(s) Oral every 12 hours  aspirin  chewable 81 milliGRAM(s) Oral daily  bisacodyl Suppository 10 milliGRAM(s) Rectal once  chlorhexidine 2% Cloths 1 Application(s) Topical <User Schedule>  epoetin vern-epbx (RETACRIT) Injectable 4000 Unit(s) IV Push <User Schedule>  glucagon  Injectable 1 milliGRAM(s) IntraMuscular once  heparin   Injectable 5000 Unit(s) SubCutaneous every 8 hours  insulin glargine Injectable (LANTUS) 16 Unit(s) SubCutaneous at bedtime  insulin lispro (ADMELOG) corrective regimen sliding scale   SubCutaneous at bedtime  insulin lispro Injectable (ADMELOG) 10 Unit(s) SubCutaneous three times a day before meals  pantoprazole    Tablet 40 milliGRAM(s) Oral two times a day  polyethylene glycol 3350 17 Gram(s) Oral daily  senna 2 Tablet(s) Oral at bedtime  sevelamer carbonate 800 milliGRAM(s) Oral three times a day  sodium chloride 0.9%. 1000 milliLiter(s) (10 mL/Hr) IV Continuous <Continuous>      Physical Exam  General: Patient comfortable in bed  Vital Signs Last 12 Hrs  T(F): 97.5 (11-05-21 @ 12:30), Max: 97.6 (11-05-21 @ 04:53)  HR: 62 (11-05-21 @ 12:30) (58 - 62)  BP: 141/60 (11-05-21 @ 12:30) (130/68 - 141/60)  BP(mean): --  RR: 18 (11-05-21 @ 12:30) (18 - 18)  SpO2: 98% (11-05-21 @ 12:30) (98% - 100%)  Neck: No palpable thyroid nodules.  CVS: S1S2, No murmurs  Respiratory: No wheezing, no crepitations  GI: Abdomen soft, bowel sounds positive  Musculoskeletal:  edema lower extremities.   Skin: No skin rashes, no ecchymosis    Diagnostics    Free Thyroxine, Serum: AM Sched. Collection: 01-Nov-2021 06:00 (10-31 @ 13:47)  Free Thyroxine, Serum: AM Sched. Collection: 30-Oct-2021 06:00 (10-29 @ 12:10)  Free Thyroxine, Serum: AM Sched. Collection: 26-Oct-2021 06:00  Cancel Reason: Lab Operations Cancel (10-25 @ 13:56)

## 2021-11-05 NOTE — PROGRESS NOTE ADULT - PROBLEM SELECTOR PLAN 3
Renal following  10/29 new Rt IJ HD [facility]  HD today per Renal   Coumadin low dose  as pt will be going for AV fistula with vascular Friday or Monday   HD via permacath    Continue Renvela 800 mg q8h  Daily BUN/Cr to trend

## 2021-11-05 NOTE — PROGRESS NOTE ADULT - SUBJECTIVE AND OBJECTIVE BOX
[facility] Division of Kidney Diseases & Hypertension  HEMODIALYSIS NOTE  --------------------------------------------------------------------------------  Chief Complaint: ESRD/Ongoing hemodialysis requirement    24 hour events/subjective:    Pt seen on HD  Had UF yesterday  Breathing improved less SOB    PAST HISTORY  --------------------------------------------------------------------------------  No significant changes to PMH, PSH, FHx, SHx, unless otherwise noted    ALLERGIES & MEDICATIONS  --------------------------------------------------------------------------------  Allergies    No Known Allergies    Intolerances      Standing Inpatient Medications  aMIOdarone    Tablet 400 milliGRAM(s) Oral every 12 hours  aspirin  chewable 81 milliGRAM(s) Oral daily  bisacodyl Suppository 10 milliGRAM(s) Rectal once  chlorhexidine 2% Cloths 1 Application(s) Topical <User Schedule>  epoetin vern-epbx (RETACRIT) Injectable 4000 Unit(s) IV Push <User Schedule>  glucagon  Injectable 1 milliGRAM(s) IntraMuscular once  heparin   Injectable 5000 Unit(s) SubCutaneous every 8 hours  insulin glargine Injectable (LANTUS) 16 Unit(s) SubCutaneous at bedtime  insulin lispro (ADMELOG) corrective regimen sliding scale   SubCutaneous at bedtime  insulin lispro Injectable (ADMELOG) 10 Unit(s) SubCutaneous three times a day before meals  pantoprazole    Tablet 40 milliGRAM(s) Oral two times a day  polyethylene glycol 3350 17 Gram(s) Oral daily  senna 2 Tablet(s) Oral at bedtime  sevelamer carbonate 800 milliGRAM(s) Oral three times a day  sodium chloride 0.9%. 1000 milliLiter(s) IV Continuous <Continuous>    PRN Inpatient Medications  sodium chloride 0.9% lock flush 10 milliLiter(s) IV Push every 1 hour PRN      REVIEW OF SYSTEMS  --------------------------------------------------------------------------------    All other systems were reviewed and are negative, except as noted.    VITALS/PHYSICAL EXAM  --------------------------------------------------------------------------------  T(C): 36.4 (11-05-21 @ 09:30), Max: 37.1 (11-04-21 @ 18:57)  HR: 60 (11-05-21 @ 09:30) (58 - 71)  BP: 134/71 (11-05-21 @ 09:30) (106/73 - 155/80)  RR: 18 (11-05-21 @ 09:30) (18 - 18)  SpO2: 100% (11-05-21 @ 09:30) (93% - 100%)  Wt(kg): --  Height (cm): 165.1 (11-04-21 @ 09:52)  Weight (kg): 82.4 (11-04-21 @ 09:52)  BMI (kg/m2): 30.2 (11-04-21 @ 09:52)  BSA (m2): 1.9 (11-04-21 @ 09:52)      11-04-21 @ 07:01  -  11-05-21 @ 07:00  --------------------------------------------------------  IN: 960 mL / OUT: 1500 mL / NET: -540 mL    11-05-21 @ 07:01  -  11-05-21 @ 11:41  --------------------------------------------------------  IN: 240 mL / OUT: 0 mL / NET: 240 mL      Physical Exam:  	Gen: NAD  	Pulm: CTA B/L  	CV: RRR, S1S2  	Abd: +BS, soft,   	UE: Warm, FROM,  	LE: Warm, FROM, no edema  	Neuro: No focal deficits, intact gait  	Psych: Normal affect and mood  	Skin: Warm, without rashes  	Vascular access: tunnel cath    LABS/STUDIES  --------------------------------------------------------------------------------              8.4    13.30 >-----------<  191      [11-05-21 @ 04:57]              26.0     138  |  95  |  72  ----------------------------<  129      [11-05-21 @ 04:57]  4.6   |  23  |  6.30        Ca     9.5     [11-05-21 @ 04:57]

## 2021-11-05 NOTE — PROGRESS NOTE ADULT - ASSESSMENT
Assessment  DMT2: 60y Male with DM T2 with hyperglycemia, A1C 7%, was on oral meds and insulin at home, postop CABG on basal bolus insulin, blood  sugars are fluctuating within overall acceptable range, no hypoglycemias, eating meals, DC planning for rehab, likely Monday.  Hypothyroidism: Patient has no history thyroid disease, was not on any thyroid supplements, TSH elevated, subclinical.  CAD: s/p CABG, on medications, no chest pain, stable, monitored.  HTN: Controlled,  on antihypertensive medications.  HLD: On statin  Obesity: No strict exercise routines, not on any weight loss program, neither on low calorie diet.        Esperanza Bekcett MD  Cell: [phone]  Office: [phone]     Assessment  DMT2: 60y Male with DM T2 with hyperglycemia,  A1C 7%, was on oral meds and insulin at home, postop CABG on basal bolus insulin, blood  sugars are fluctuating within overall acceptable range, no hypoglycemias, eating meals,  DC planning for rehab, likely Monday.  Hypothyroidism: Patient has no history thyroid disease, was not on any thyroid supplements, TSH elevated, subclinical.  CAD: s/p CABG, on medications, no chest pain, stable, monitored.  HTN: Controlled,  on antihypertensive medications.  HLD: On statin  Obesity: No strict exercise routines, not on any weight loss program, neither on low calorie diet.        Esperanza Beckett MD  Cell: [phone]  Office: [phone]

## 2021-11-05 NOTE — PROGRESS NOTE ADULT - PROBLEM SELECTOR PLAN 5
EP signed off.   Maintain PW --> EPM VVI 40 / vma 10.   12 lead ectopic atrial with pac's  threshold 4.3  Continue on modified amio load 400 bid x 7 days then 200 mg PO BID x 7 days then start maintenance of 200 mg PO daily   Hold AV nodals No PPM warranted at this time EP signed off.   Maintain PW --> EPM VVI 40 / vma 10.   12 lead ectopic atrial with pac's  threshold 4.3  Continue on modified Amio load 400 bid x 7 days then 200 mg PO BID x 7 days then start maintenance of 200 mg PO daily   Hold AV nodals

## 2021-11-06 LAB
ANION GAP SERPL CALC-SCNC: 20 MMOL/L — HIGH (ref 5–17)
BUN SERPL-MCNC: 57 MG/DL — HIGH (ref 7–23)
CALCIUM SERPL-MCNC: 9.4 MG/DL — SIGNIFICANT CHANGE UP (ref 8.4–10.5)
CHLORIDE SERPL-SCNC: 96 MMOL/L — SIGNIFICANT CHANGE UP (ref 96–108)
CO2 SERPL-SCNC: 21 MMOL/L — LOW (ref 22–31)
CREAT SERPL-MCNC: 5.41 MG/DL — HIGH (ref 0.5–1.3)
GLUCOSE BLDC GLUCOMTR-MCNC: 112 MG/DL — HIGH (ref 70–99)
GLUCOSE BLDC GLUCOMTR-MCNC: 118 MG/DL — HIGH (ref 70–99)
GLUCOSE BLDC GLUCOMTR-MCNC: 120 MG/DL — HIGH (ref 70–99)
GLUCOSE BLDC GLUCOMTR-MCNC: 138 MG/DL — HIGH (ref 70–99)
GLUCOSE BLDC GLUCOMTR-MCNC: 174 MG/DL — HIGH (ref 70–99)
GLUCOSE SERPL-MCNC: 124 MG/DL — HIGH (ref 70–99)
HCT VFR BLD CALC: 26.1 % — LOW (ref 39–50)
HGB BLD-MCNC: 8.2 G/DL — LOW (ref 13–17)
MCHC RBC-ENTMCNC: 30.1 PG — SIGNIFICANT CHANGE UP (ref 27–34)
MCHC RBC-ENTMCNC: 31.4 GM/DL — LOW (ref 32–36)
MCV RBC AUTO: 96 FL — SIGNIFICANT CHANGE UP (ref 80–100)
NRBC # BLD: 0 /100 WBCS — SIGNIFICANT CHANGE UP (ref 0–0)
PLATELET # BLD AUTO: 180 K/UL — SIGNIFICANT CHANGE UP (ref 150–400)
POTASSIUM SERPL-MCNC: 4.7 MMOL/L — SIGNIFICANT CHANGE UP (ref 3.5–5.3)
POTASSIUM SERPL-SCNC: 4.7 MMOL/L — SIGNIFICANT CHANGE UP (ref 3.5–5.3)
RBC # BLD: 2.72 M/UL — LOW (ref 4.2–5.8)
RBC # FLD: 14.5 % — SIGNIFICANT CHANGE UP (ref 10.3–14.5)
SODIUM SERPL-SCNC: 137 MMOL/L — SIGNIFICANT CHANGE UP (ref 135–145)
WBC # BLD: 13.75 K/UL — HIGH (ref 3.8–10.5)
WBC # FLD AUTO: 13.75 K/UL — HIGH (ref 3.8–10.5)

## 2021-11-06 PROCEDURE — 71046 X-RAY EXAM CHEST 2 VIEWS: CPT | Mod: 26

## 2021-11-06 RX ADMIN — CHLORHEXIDINE GLUCONATE 1 APPLICATION(S): 213 SOLUTION TOPICAL at 14:30

## 2021-11-06 RX ADMIN — Medication 10 UNIT(S): at 12:17

## 2021-11-06 RX ADMIN — POLYETHYLENE GLYCOL 3350 17 GRAM(S): 17 POWDER, FOR SOLUTION ORAL at 12:17

## 2021-11-06 RX ADMIN — INSULIN GLARGINE 16 UNIT(S): 100 INJECTION, SOLUTION SUBCUTANEOUS at 21:57

## 2021-11-06 RX ADMIN — APIXABAN 2.5 MILLIGRAM(S): 2.5 TABLET, FILM COATED ORAL at 17:39

## 2021-11-06 RX ADMIN — APIXABAN 2.5 MILLIGRAM(S): 2.5 TABLET, FILM COATED ORAL at 06:20

## 2021-11-06 RX ADMIN — PANTOPRAZOLE SODIUM 40 MILLIGRAM(S): 20 TABLET, DELAYED RELEASE ORAL at 17:38

## 2021-11-06 RX ADMIN — Medication 81 MILLIGRAM(S): at 12:17

## 2021-11-06 RX ADMIN — Medication 10 UNIT(S): at 08:31

## 2021-11-06 RX ADMIN — PANTOPRAZOLE SODIUM 40 MILLIGRAM(S): 20 TABLET, DELAYED RELEASE ORAL at 06:20

## 2021-11-06 RX ADMIN — AMIODARONE HYDROCHLORIDE 400 MILLIGRAM(S): 400 TABLET ORAL at 08:32

## 2021-11-06 RX ADMIN — Medication 10 UNIT(S): at 17:36

## 2021-11-06 RX ADMIN — SEVELAMER CARBONATE 800 MILLIGRAM(S): 2400 POWDER, FOR SUSPENSION ORAL at 06:20

## 2021-11-06 RX ADMIN — SEVELAMER CARBONATE 800 MILLIGRAM(S): 2400 POWDER, FOR SUSPENSION ORAL at 14:32

## 2021-11-06 RX ADMIN — SEVELAMER CARBONATE 800 MILLIGRAM(S): 2400 POWDER, FOR SUSPENSION ORAL at 21:57

## 2021-11-06 NOTE — PROGRESS NOTE ADULT - ASSESSMENT
59 y/o M w/ hx of DM2, HTN and HLD initially presented as transfer from [facility] for chest pain concerning for NSTEMI and possible new CHF c/b JOSHUA likely on CKD, and hypertensive emergency. Hospital course complicated by s/p RRT for CVA/TIA ruled out and worsening anemia requiring 1 PRBC. Pt S/p HD session today and underwent LHC showing Multivessel CAD. CT surgery consulted for CABG evaluation.     Surgery/Hospital Course:  10/07 Code stroke called for left facial droop, CT showed old frontal infarct, found to have high grade left carotid stenosis, but unclear cause for acute neurologic deficit  10/13 initiated HD, patient admitted with a creatinine of 4.05, but did not know of a history of renal   10/21 s/p C2L, MVR(T), & LAAL. Received 4 PRBC, 1 PLT, 1000 FIEBA intraop. Required dual pressor and inotrope support w/ , Primacor, Levo, and Epi gtts.   10/22 Extubated at 04:55.   10/24 SOB, urgent HD overnight, bipap  10/25 O2 via HFNC transitioned to 5L NC. Plan for HD today. SB in 50s, backup VVI paced at 50 via temporary epicardial PW.  10/26 Temporary access for HD removed yesterday as was not functioning, will consult IR for permacath. Plan for HD today. Check afternoon INR for coumadin dosing tonight   10/27 Pt received to floor on 2 Arnold - VSS, NAD. Coumadin on hold for AVF w/ vascular Friday or Monday - will discuss initiating heparin gtt to bridge w/ Dr. Xiong in AM. L LIDIA carrion placed in CTU for HD access will need to be transitioned to permacath prior to AVF - will d/w IR.   10/28 IR consult requested for PermCath placement HD with 1uPRBC transfusion today. Hod narcotics. Pt lethargic but responsive to verbal stimuli. FS 121mg/dl. 92% RA   10/29 VSS INR 1.49 HD cath poor flow- requested by nephrology to place new HD cath- Rt IJ Placed  - cleared for Vascular surgery placement of AVF on Tuesday   10/30 VSS  ECHO  resulted minimal pericardial effusion  improved mood / activity  HD today Perm Cath for Monday.  10/31 VSS  EKG today COVID swab Type and screen NPO at midnight  Perm cath in am OOB chair   ID consulted as per IR request for clearance prior to PermCath placement. WBC 13, afebrile. no evidence of s/s of infection.    LUE 1st stage basilic vein transposition performed. The first-stage AVF to mature from 4 to 6 weeks before the second-stage transposition of the matured brachial vein  construction. PT to reevaluate acute vs subacute rehab requirement.  11/3 Patient noted on tele SB /  @ 40 with loss of capture.  Intermittent Tachy / Seth --> EP consult  Maintain PW --> EPM VVI 40 / vma 10. H.D today per renal. Disposition: Acute  JR 60-80 burst afib.  VVI 40/10  threshold 4.3.   EKG ectopic atrial rhythm w/pac's.  On modified. amio load.  Set up for HD [place] [facility].  Rehab [facility] most likely Monday.   VVS; Continue with current medication regimen.  Continue on Modified amio load.  Awaiting HD chair at [facility].  Plan for [facility] on . PW to be cut prior to discharge.  Per Dr. Xiong will restart AC therapy Eliquis 2.5 mg PO BID upon discharge.    VVS; Continue on current medication regimen. Plan for discharge to Rehab on .  Covid PCR to be sent in AM.  HD per Renal

## 2021-11-06 NOTE — PROGRESS NOTE ADULT - PROBLEM SELECTOR PLAN 2
Continue post-op care  Continue on Eliquis 2.5 mg PO BID for AC therapy   Maintain PW --> EPM VVI 40 / vma 10.   Increase activity as tolerated, ambulate 4x daily and with PT   Chest PT, encourage incentive spirometry   Pain control w/ Tylenol only as pt was became oversedated when on narcotics   GI ppx w/ Protonix, DVT ppx w/ HSQ   Wound care and assessment

## 2021-11-06 NOTE — PROGRESS NOTE ADULT - PROBLEM SELECTOR PLAN 1
Continue post-op care  Continue ASA 81 mg QD and Atorvastatin 80 mg qhs   Holding beta blocker given SB/accelerated junctional   Maintain PW --> EPM VVI 40 / vma 10.   Increase activity as tolerated, ambulate 4x daily and with PT   Chest PT, encourage incentive spirometry   Pain control w/ Tylenol only as pt was became oversedated when on narcotics   GI ppx w/ Protonix ppx   Wound care and assessment   Dispo: Acute Rehab [facility] most likely Monday 11/7

## 2021-11-06 NOTE — PROGRESS NOTE ADULT - SUBJECTIVE AND OBJECTIVE BOX
VITAL SIGNS    Subjective: "I'm feeling ok." Denies CP, palpitation, SOB, WYNNE, HA, dizziness, N/V/D, fever or chills.  No acute event noted overnight.     Telemetry: Atopic Atrial 50-60     Vital Signs Last 24 Hrs  T(C): 36.6 (21 @ 05:14), Max: 36.8 (21 @ 20:48)  T(F): 97.8 (21 @ 05:14), Max: 98.3 (21 @ 20:48)  HR: 55 (21 @ 05:14) (55 - 65)  BP: 120/59 (21 @ 05:14) (117/69 - 149/73)  RR: 18 (21 @ 05:14) (18 - 18)  SpO2: 95% (21 05:14) (95% - 98%)            07:01  -   @ 07:00  --------------------------------------------------------  IN: 1750 mL / OUT: 1850 mL / NET: -100 mL     @ 08:01  -   @ 12:58  --------------------------------------------------------  IN: 240 mL / OUT: 0 mL / NET: 240 mL    Daily     Daily Weight in k.5 (2021 07:21)    CAPILLARY BLOOD GLUCOSE    POCT Blood Glucose.: 118 mg/dL (2021 11:55)  POCT Blood Glucose.: 138 mg/dL (2021 08:14)  POCT Blood Glucose.: 130 mg/dL (2021 21:20)  POCT Blood Glucose.: 151 mg/dL (2021 16:50)        PHYSICAL EXAM    Neurology: alert and oriented x 3, nonfocal, no gross deficits    CV: (+) S1 and S2, No murmurs, rubs, gallops or clicks     Sternal Wound: MSI -->CDI , sternum stable; PW x 2 --> EPM VVI 40/10     Lungs: CTA B/L     Abdomen: soft, nontender, nondistended, positive bowel sounds, (+) Flatus; (+) BM     : Bladder non tender; non palpable            Extremities:  B/L LE (+) edema; negative calf tenderness; (+) 2 DP palpable; LUE AVF (+) Bruit; Faint Thrill.  Right IJ permacath with occlusive dressing C/D/I         aMIOdarone Tablet 400 milliGRAM(s) Oral every 12 hours  apixaban 2.5 milliGRAM(s) Oral two times a day  aspirin  chewable 81 milliGRAM(s) Oral daily  bisacodyl Suppository 10 milliGRAM(s) Rectal once  chlorhexidine 2% Cloths 1 Application(s) Topical <User Schedule>  epoetin vern-epbx (RETACRIT) Injectable 4000 Unit(s) IV Push <User Schedule>  glucagon  Injectable 1 milliGRAM(s) IntraMuscular once  insulin glargine Injectable (LANTUS) 16 Unit(s) SubCutaneous at bedtime  insulin lispro (ADMELOG) corrective regimen sliding scale SubCutaneous at bedtime  insulin lispro Injectable (ADMELOG) 10 Unit(s) SubCutaneous three times a day before meals  pantoprazole Tablet 40 milliGRAM(s) Oral two times a day  polyethylene glycol 3350 17 Gram(s) Oral daily  senna 2 Tablet(s) Oral at bedtime  sevelamer carbonate 800 milliGRAM(s) Oral three times a day  sodium chloride 0.9% lock flush 10 milliLiter(s) IV Push every 1 hour PRN  sodium chloride 0.9%. 1000 milliLiter(s) IV Continuous <Continuous>    Physical Therapy Rec:   Home  [  ]   Home w/ PT  [  ]  Rehab  [ X ]    Discussed with Cardiothoracic Team at AM rounds.

## 2021-11-06 NOTE — PROVIDER CONTACT NOTE (OTHER) - ASSESSMENT
A&Ox1, lethargic, pinpoint pupils, able to follow commands. VSS. Pt received precedex and fentanyl in OR.
patient is laying comfortably in bed; no complaints of chest pain or shortness of breath
Pt laying in bed asymptomatic. A&Ox4, denies CP, SOB, N/V, palpitations, dizziness. HR as low as 37. BP stable.
no s/s's hyperglycemia

## 2021-11-06 NOTE — PROGRESS NOTE ADULT - ASSESSMENT
Assessment  DMT2: 60y Male with DM T2 with hyperglycemia,  A1C 7%, was on oral meds and insulin at home, postop CABG on basal bolus insulin, blood  sugars are improving, no hypoglycemias, eating meals,  DC planning for rehab.  Hypothyroidism: Patient has no history thyroid disease, was not on any thyroid supplements, TSH elevated, subclinical.  CAD: s/p CABG, on medications, no chest pain, stable, monitored.  HTN: Controlled,  on antihypertensive medications.  HLD: On statin  Obesity: No strict exercise routines, not on any weight loss program, neither on low calorie diet.        Esperanza Beckett MD  Cell: [phone]  Office: [phone]

## 2021-11-06 NOTE — PROGRESS NOTE ADULT - PROBLEM SELECTOR PLAN 3
Renal following  10/29 new Rt IJ HD [facility]  HD today per Renal   HD via permacath    Continue Renvela 800 mg q8h  Daily BUN/Cr to trend  s/p 11/2 1st stage of AVF creation

## 2021-11-06 NOTE — PROVIDER CONTACT NOTE (OTHER) - SITUATION
Bradycardia
FSBS 290
hypertension; BP in 200s
Pt returned from PACU s/p AVF with change in mental status. Baseline is WDL.

## 2021-11-06 NOTE — PROGRESS NOTE ADULT - SUBJECTIVE AND OBJECTIVE BOX
Chief complaint    Patient is a 60y old  Male who presents with a chief complaint of NSTEMI (05 Nov 2021 12:55)   Review of systems  Patient in bed, appears comfortable.    Labs and Fingersticks  CAPILLARY BLOOD GLUCOSE      POCT Blood Glucose.: 138 mg/dL (06 Nov 2021 08:14)  POCT Blood Glucose.: 130 mg/dL (05 Nov 2021 21:20)  POCT Blood Glucose.: 151 mg/dL (05 Nov 2021 16:50)  POCT Blood Glucose.: 117 mg/dL (05 Nov 2021 12:41)      Anion Gap, Serum: 20 *H* (11-06 @ 08:18)  Anion Gap, Serum: 20 *H* (11-05 @ 04:57)      Calcium, Total Serum: 9.4 (11-06 @ 08:18)  Calcium, Total Serum: 9.5 (11-05 @ 04:57)          11-06    137  |  96  |  57<H>  ----------------------------<  124<H>  4.7   |  21<L>  |  5.41<H>    Ca    9.4      06 Nov 2021 08:18                          8.2    13.75 )-----------( 180      ( 06 Nov 2021 08:18 )             26.1     Medications  MEDICATIONS  (STANDING):  aMIOdarone    Tablet 400 milliGRAM(s) Oral every 12 hours  apixaban 2.5 milliGRAM(s) Oral two times a day  aspirin  chewable 81 milliGRAM(s) Oral daily  bisacodyl Suppository 10 milliGRAM(s) Rectal once  chlorhexidine 2% Cloths 1 Application(s) Topical <User Schedule>  epoetin vern-epbx (RETACRIT) Injectable 4000 Unit(s) IV Push <User Schedule>  glucagon  Injectable 1 milliGRAM(s) IntraMuscular once  insulin glargine Injectable (LANTUS) 16 Unit(s) SubCutaneous at bedtime  insulin lispro (ADMELOG) corrective regimen sliding scale   SubCutaneous at bedtime  insulin lispro Injectable (ADMELOG) 10 Unit(s) SubCutaneous three times a day before meals  pantoprazole    Tablet 40 milliGRAM(s) Oral two times a day  polyethylene glycol 3350 17 Gram(s) Oral daily  senna 2 Tablet(s) Oral at bedtime  sevelamer carbonate 800 milliGRAM(s) Oral three times a day  sodium chloride 0.9%. 1000 milliLiter(s) (10 mL/Hr) IV Continuous <Continuous>      Physical Exam  General: Patient comfortable in bed  Vital Signs Last 12 Hrs  T(F): 97.8 (11-06-21 @ 05:14), Max: 97.8 (11-06-21 @ 05:14)  HR: 55 (11-06-21 @ 05:14) (55 - 55)  BP: 120/59 (11-06-21 @ 05:14) (120/59 - 120/59)  BP(mean): 79 (11-06-21 @ 05:14) (79 - 79)  RR: 18 (11-06-21 @ 05:14) (18 - 18)  SpO2: 95% (11-06-21 @ 05:14) (95% - 95%)  Neck: No palpable thyroid nodules.  CVS: S1S2, No murmurs  Respiratory: No wheezing, no crepitations  GI: Abdomen soft, bowel sounds positive  Musculoskeletal:  edema lower extremities.     Diagnostics    Free Thyroxine, Serum: AM Sched. Collection: 21-Oct-2021 06:00 (10-20 @ 11:51)  Thyroid Stimulating Hormone, Serum: AM Sched. Collection: 21-Oct-2021 06:00 (10-20 @ 11:51)

## 2021-11-06 NOTE — PROVIDER CONTACT NOTE (OTHER) - ACTION/TREATMENT ORDERED:
NP at bedside. PW connected to EPM VVI 40/4.5 by NP. ABG to be sent. No further interventions at this time. Will continue to monitor pt.
PA notified and aware.  See insulin orders.  FSBS monitored.
NP at bedside. Pt placed on 2LNC. Narcan given as ordered. Pt responded appropriately. Reoriented as needed. Keep patient NPO until alert. Will continue to monitor pt.
give stat dose of COREG 6.25mg PO. Reschedule 6am dose for 8am

## 2021-11-07 LAB
ANION GAP SERPL CALC-SCNC: 18 MMOL/L — HIGH (ref 5–17)
ANION GAP SERPL CALC-SCNC: 20 MMOL/L — HIGH (ref 5–17)
BUN SERPL-MCNC: 76 MG/DL — HIGH (ref 7–23)
BUN SERPL-MCNC: 81 MG/DL — HIGH (ref 7–23)
CALCIUM SERPL-MCNC: 9.7 MG/DL — SIGNIFICANT CHANGE UP (ref 8.4–10.5)
CALCIUM SERPL-MCNC: 9.7 MG/DL — SIGNIFICANT CHANGE UP (ref 8.4–10.5)
CHLORIDE SERPL-SCNC: 95 MMOL/L — LOW (ref 96–108)
CHLORIDE SERPL-SCNC: 96 MMOL/L — SIGNIFICANT CHANGE UP (ref 96–108)
CO2 SERPL-SCNC: 21 MMOL/L — LOW (ref 22–31)
CO2 SERPL-SCNC: 22 MMOL/L — SIGNIFICANT CHANGE UP (ref 22–31)
CREAT SERPL-MCNC: 7.7 MG/DL — HIGH (ref 0.5–1.3)
CREAT SERPL-MCNC: 7.78 MG/DL — HIGH (ref 0.5–1.3)
GLUCOSE BLDC GLUCOMTR-MCNC: 106 MG/DL — HIGH (ref 70–99)
GLUCOSE BLDC GLUCOMTR-MCNC: 138 MG/DL — HIGH (ref 70–99)
GLUCOSE BLDC GLUCOMTR-MCNC: 139 MG/DL — HIGH (ref 70–99)
GLUCOSE BLDC GLUCOMTR-MCNC: 140 MG/DL — HIGH (ref 70–99)
GLUCOSE SERPL-MCNC: 113 MG/DL — HIGH (ref 70–99)
GLUCOSE SERPL-MCNC: 132 MG/DL — HIGH (ref 70–99)
HCT VFR BLD CALC: 24.9 % — LOW (ref 39–50)
HGB BLD-MCNC: 7.8 G/DL — LOW (ref 13–17)
MCHC RBC-ENTMCNC: 30.1 PG — SIGNIFICANT CHANGE UP (ref 27–34)
MCHC RBC-ENTMCNC: 31.3 GM/DL — LOW (ref 32–36)
MCV RBC AUTO: 96.1 FL — SIGNIFICANT CHANGE UP (ref 80–100)
NRBC # BLD: 0 /100 WBCS — SIGNIFICANT CHANGE UP (ref 0–0)
PLATELET # BLD AUTO: 219 K/UL — SIGNIFICANT CHANGE UP (ref 150–400)
POTASSIUM SERPL-MCNC: 5.3 MMOL/L — SIGNIFICANT CHANGE UP (ref 3.5–5.3)
POTASSIUM SERPL-MCNC: 5.3 MMOL/L — SIGNIFICANT CHANGE UP (ref 3.5–5.3)
POTASSIUM SERPL-SCNC: 5.3 MMOL/L — SIGNIFICANT CHANGE UP (ref 3.5–5.3)
POTASSIUM SERPL-SCNC: 5.3 MMOL/L — SIGNIFICANT CHANGE UP (ref 3.5–5.3)
RBC # BLD: 2.59 M/UL — LOW (ref 4.2–5.8)
RBC # FLD: 14.5 % — SIGNIFICANT CHANGE UP (ref 10.3–14.5)
SARS-COV-2 RNA SPEC QL NAA+PROBE: SIGNIFICANT CHANGE UP
SODIUM SERPL-SCNC: 135 MMOL/L — SIGNIFICANT CHANGE UP (ref 135–145)
SODIUM SERPL-SCNC: 137 MMOL/L — SIGNIFICANT CHANGE UP (ref 135–145)
WBC # BLD: 14.78 K/UL — HIGH (ref 3.8–10.5)
WBC # FLD AUTO: 14.78 K/UL — HIGH (ref 3.8–10.5)

## 2021-11-07 PROCEDURE — 99233 SBSQ HOSP IP/OBS HIGH 50: CPT | Mod: GC

## 2021-11-07 RX ORDER — ERYTHROPOIETIN 10000 [IU]/ML
8000 INJECTION, SOLUTION INTRAVENOUS; SUBCUTANEOUS
Refills: 0 | Status: DISCONTINUED | OUTPATIENT
Start: 2021-11-07 | End: 2021-11-08

## 2021-11-07 RX ORDER — AMIODARONE HYDROCHLORIDE 400 MG/1
200 TABLET ORAL EVERY 12 HOURS
Refills: 0 | Status: DISCONTINUED | OUTPATIENT
Start: 2021-11-07 | End: 2021-11-08

## 2021-11-07 RX ADMIN — SEVELAMER CARBONATE 800 MILLIGRAM(S): 2400 POWDER, FOR SUSPENSION ORAL at 13:27

## 2021-11-07 RX ADMIN — Medication 10 UNIT(S): at 08:09

## 2021-11-07 RX ADMIN — Medication 81 MILLIGRAM(S): at 12:09

## 2021-11-07 RX ADMIN — POLYETHYLENE GLYCOL 3350 17 GRAM(S): 17 POWDER, FOR SOLUTION ORAL at 12:09

## 2021-11-07 RX ADMIN — Medication 10 UNIT(S): at 17:42

## 2021-11-07 RX ADMIN — CHLORHEXIDINE GLUCONATE 1 APPLICATION(S): 213 SOLUTION TOPICAL at 11:24

## 2021-11-07 RX ADMIN — PANTOPRAZOLE SODIUM 40 MILLIGRAM(S): 20 TABLET, DELAYED RELEASE ORAL at 17:43

## 2021-11-07 RX ADMIN — SEVELAMER CARBONATE 800 MILLIGRAM(S): 2400 POWDER, FOR SUSPENSION ORAL at 05:25

## 2021-11-07 RX ADMIN — INSULIN GLARGINE 16 UNIT(S): 100 INJECTION, SOLUTION SUBCUTANEOUS at 22:21

## 2021-11-07 RX ADMIN — AMIODARONE HYDROCHLORIDE 400 MILLIGRAM(S): 400 TABLET ORAL at 08:10

## 2021-11-07 RX ADMIN — APIXABAN 2.5 MILLIGRAM(S): 2.5 TABLET, FILM COATED ORAL at 05:25

## 2021-11-07 RX ADMIN — APIXABAN 2.5 MILLIGRAM(S): 2.5 TABLET, FILM COATED ORAL at 17:43

## 2021-11-07 RX ADMIN — Medication 10 UNIT(S): at 12:08

## 2021-11-07 RX ADMIN — PANTOPRAZOLE SODIUM 40 MILLIGRAM(S): 20 TABLET, DELAYED RELEASE ORAL at 05:25

## 2021-11-07 NOTE — PROGRESS NOTE ADULT - ASSESSMENT
Assessment  DMT2: 60y Male with DM T2 with hyperglycemia,  A1C 7%, was on oral meds and insulin at home, postop CABG on basal bolus insulin, blood  sugars are stable and trending within acceptable range, no hypoglycemias, eating meals, appears comfortable, eager for DC.  Hypothyroidism: Patient has no history thyroid disease, was not on any thyroid supplements, TSH elevated, subclinical.  CAD: s/p CABG, on medications, no chest pain, stable, monitored.  HTN: Controlled,  on antihypertensive medications.  HLD: On statin  Obesity: No strict exercise routines, not on any weight loss program, neither on low calorie diet.        Esperanza Beckett MD  Cell: [phone]  Office: [phone]     Assessment  DMT2: 60y Male with DM T2 with hyperglycemia,  A1C 7%, was on oral meds and insulin at home, postop CABG on basal bolus insulin, blood  sugars are stable and  trending within acceptable range, no hypoglycemias, eating meals, appears comfortable, eager for DC.  Hypothyroidism: Patient has no history thyroid disease, was not on any thyroid supplements, TSH elevated, subclinical.  CAD: s/p CABG, on medications, no chest pain, stable, monitored.  HTN: Controlled,  on antihypertensive medications.  HLD: On statin  Obesity: No strict exercise routines, not on any weight loss program, neither on low calorie diet.        Esperanza Beckett MD  Cell: [phone]  Office: [phone]

## 2021-11-07 NOTE — PROGRESS NOTE ADULT - SUBJECTIVE AND OBJECTIVE BOX
Chief complaint  Patient is a 60y old  Male who presents with a chief complaint of NSTEMI (07 Nov 2021 13:11)   Review of systems  Patient awake in chair, looks comfortable, no hypoglycemic episodes.    Labs and Fingersticks  CAPILLARY BLOOD GLUCOSE      POCT Blood Glucose.: 140 mg/dL (07 Nov 2021 11:40)  POCT Blood Glucose.: 139 mg/dL (07 Nov 2021 08:05)  POCT Blood Glucose.: 112 mg/dL (06 Nov 2021 21:49)  POCT Blood Glucose.: 120 mg/dL (06 Nov 2021 17:36)  POCT Blood Glucose.: 174 mg/dL (06 Nov 2021 16:32)      Anion Gap, Serum: 20 *H* (11-07 @ 11:47)  Anion Gap, Serum: 18 *H* (11-07 @ 05:37)  Anion Gap, Serum: 20 *H* (11-06 @ 08:18)      Calcium, Total Serum: 9.7 (11-07 @ 11:47)  Calcium, Total Serum: 9.7 (11-07 @ 05:37)  Calcium, Total Serum: 9.4 (11-06 @ 08:18)          11-07    137  |  96  |  81<H>  ----------------------------<  113<H>  5.3   |  21<L>  |  7.70<H>    Ca    9.7      07 Nov 2021 11:47                          7.8    14.78 )-----------( 219      ( 07 Nov 2021 05:38 )             24.9     Medications  MEDICATIONS  (STANDING):  aMIOdarone    Tablet 200 milliGRAM(s) Oral every 12 hours  apixaban 2.5 milliGRAM(s) Oral two times a day  aspirin  chewable 81 milliGRAM(s) Oral daily  bisacodyl Suppository 10 milliGRAM(s) Rectal once  chlorhexidine 2% Cloths 1 Application(s) Topical <User Schedule>  epoetin vern-epbx (RETACRIT) Injectable 8000 Unit(s) IV Push <User Schedule>  glucagon  Injectable 1 milliGRAM(s) IntraMuscular once  insulin glargine Injectable (LANTUS) 16 Unit(s) SubCutaneous at bedtime  insulin lispro (ADMELOG) corrective regimen sliding scale   SubCutaneous at bedtime  insulin lispro Injectable (ADMELOG) 10 Unit(s) SubCutaneous three times a day before meals  pantoprazole    Tablet 40 milliGRAM(s) Oral two times a day  polyethylene glycol 3350 17 Gram(s) Oral daily  senna 2 Tablet(s) Oral at bedtime  sevelamer carbonate 800 milliGRAM(s) Oral three times a day  sodium chloride 0.9%. 1000 milliLiter(s) (10 mL/Hr) IV Continuous <Continuous>      Physical Exam  General: Patient comfortable in bed  Vital Signs Last 12 Hrs  T(F): 98.4 (11-07-21 @ 12:47), Max: 98.4 (11-07-21 @ 12:47)  HR: 54 (11-07-21 @ 12:47) (50 - 54)  BP: 133/65 (11-07-21 @ 12:47) (117/61 - 133/65)  BP(mean): 88 (11-07-21 @ 12:47) (79 - 88)  RR: 18 (11-07-21 @ 12:47) (18 - 18)  SpO2: 98% (11-07-21 @ 12:47) (98% - 98%)  Neck: No palpable thyroid nodules.  CVS: S1S2, No murmurs  Respiratory: No wheezing, no crepitations  GI: Abdomen soft, bowel sounds positive  Musculoskeletal:  edema lower extremities.   Skin: No skin rashes, no ecchymosis    Diagnostics    Free Thyroxine, Serum: AM Sched. Collection: 01-Nov-2021 06:00 (10-31 @ 13:47)  Free Thyroxine, Serum: AM Sched. Collection: 30-Oct-2021 06:00 (10-29 @ 12:10)  Free Thyroxine, Serum: AM Sched. Collection: 26-Oct-2021 06:00  Cancel Reason: Lab Operations Cancel (10-25 @ 13:56)               Chief complaint  Patient is a 60y old  Male who presents with a chief complaint of NSTEMI (07 Nov 2021 13:11)   Review of systems  Patient awake in chair, looks comfortable, no hypoglycemic episodes.    Labs and Fingersticks  CAPILLARY BLOOD GLUCOSE      POCT Blood Glucose.: 140 mg/dL (07 Nov 2021 11:40)  POCT Blood Glucose.: 139 mg/dL (07 Nov 2021 08:05)  POCT Blood Glucose.: 112 mg/dL (06 Nov 2021 21:49)  POCT Blood Glucose.: 120 mg/dL (06 Nov 2021 17:36)  POCT Blood Glucose.: 174 mg/dL (06 Nov 2021 16:32)      Anion Gap, Serum: 20 *H* (11-07 @ 11:47)  Anion Gap, Serum: 18 *H* (11-07 @ 05:37)  Anion Gap, Serum: 20 *H* (11-06 @ 08:18)      Calcium, Total Serum: 9.7 (11-07 @ 11:47)  Calcium, Total Serum: 9.7 (11-07 @ 05:37)  Calcium, Total Serum: 9.4 (11-06 @ 08:18)          11-07    137  |  96  |  81<H>  ----------------------------<  113<H>  5.3   |  21<L>  |  7.70<H>    Ca    9.7      07 Nov 2021 11:47                          7.8    14.78 )-----------( 219      ( 07 Nov 2021 05:38 )             24.9     Medications  MEDICATIONS  (STANDING):  aMIOdarone    Tablet 200 milliGRAM(s) Oral every 12 hours  apixaban 2.5 milliGRAM(s) Oral two times a day  aspirin  chewable 81 milliGRAM(s) Oral daily  bisacodyl Suppository 10 milliGRAM(s) Rectal once  chlorhexidine 2% Cloths 1 Application(s) Topical <User Schedule>  epoetin vern-epbx (RETACRIT) Injectable 8000 Unit(s) IV Push <User Schedule>  glucagon  Injectable 1 milliGRAM(s) IntraMuscular once  insulin glargine Injectable (LANTUS) 16 Unit(s) SubCutaneous at bedtime  insulin lispro (ADMELOG) corrective regimen sliding scale   SubCutaneous at bedtime  insulin lispro Injectable (ADMELOG) 10 Unit(s) SubCutaneous three times a day before meals  pantoprazole    Tablet 40 milliGRAM(s) Oral two times a day  polyethylene glycol 3350 17 Gram(s) Oral daily  senna 2 Tablet(s) Oral at bedtime  sevelamer carbonate 800 milliGRAM(s) Oral three times a day  sodium chloride 0.9%. 1000 milliLiter(s) (10 mL/Hr) IV Continuous <Continuous>      Physical Exam  General: Patient comfortable in bed  Vital Signs Last 12 Hrs  T(F): 98.4 (11-07-21 @ 12:47), Max: 98.4 (11-07-21 @ 12:47)  HR: 54 (11-07-21 @ 12:47) (50 - 54)  BP: 133/65 (11-07-21 @ 12:47) (117/61 - 133/65)  BP(mean): 88 (11-07-21 @ 12:47) (79 - 88)  RR: 18 (11-07-21 @ 12:47) (18 - 18)  SpO2: 98% (11-07-21 @ 12:47) (98% - 98%)  Neck: No palpable thyroid nodules.  CVS: S1S2, No murmurs  Respiratory: No wheezing, no crepitations  GI: Abdomen soft, bowel sounds positive  Musculoskeletal:  edema lower extremities.   Skin: No skin rashes, no ecchymosis    Diagnostics    Free Thyroxine, Serum: AM Sched. Collection: 01-Nov-2021 06:00 (10-31 @ 13:47)  Free Thyroxine, Serum: AM Sched. Collection: 30-Oct-2021 06:00 (10-29 @ 12:10)  Free Thyroxine, Serum: AM Sched. Collection: 26-Oct-2021 06:00  Cancel Reason: Lab Operations Cancel (10-25 @ 13:56)

## 2021-11-07 NOTE — PROGRESS NOTE ADULT - SUBJECTIVE AND OBJECTIVE BOX
[facility] Division of Kidney Diseases & Hypertension  HEMODIALYSIS NOTE  --------------------------------------------------------------------------------  Chief Complaint: ESRD/Ongoing hemodialysis requirement    24 hour events/subjective:    pt denies any complaints    PAST HISTORY  --------------------------------------------------------------------------------  No significant changes to PMH, PSH, FHx, SHx, unless otherwise noted    ALLERGIES & MEDICATIONS  --------------------------------------------------------------------------------  Allergies    No Known Allergies    Intolerances      Standing Inpatient Medications  aMIOdarone    Tablet 200 milliGRAM(s) Oral every 12 hours  apixaban 2.5 milliGRAM(s) Oral two times a day  aspirin  chewable 81 milliGRAM(s) Oral daily  bisacodyl Suppository 10 milliGRAM(s) Rectal once  chlorhexidine 2% Cloths 1 Application(s) Topical <User Schedule>  epoetin vern-epbx (RETACRIT) Injectable 4000 Unit(s) IV Push <User Schedule>  glucagon  Injectable 1 milliGRAM(s) IntraMuscular once  insulin glargine Injectable (LANTUS) 16 Unit(s) SubCutaneous at bedtime  insulin lispro (ADMELOG) corrective regimen sliding scale   SubCutaneous at bedtime  insulin lispro Injectable (ADMELOG) 10 Unit(s) SubCutaneous three times a day before meals  pantoprazole    Tablet 40 milliGRAM(s) Oral two times a day  polyethylene glycol 3350 17 Gram(s) Oral daily  senna 2 Tablet(s) Oral at bedtime  sevelamer carbonate 800 milliGRAM(s) Oral three times a day  sodium chloride 0.9%. 1000 milliLiter(s) IV Continuous <Continuous>    PRN Inpatient Medications  sodium chloride 0.9% lock flush 10 milliLiter(s) IV Push every 1 hour PRN      REVIEW OF SYSTEMS  --------------------------------------------------------------------------------  All other systems were reviewed and are negative, except as noted.    VITALS/PHYSICAL EXAM  --------------------------------------------------------------------------------  T(C): 36.9 (11-07-21 @ 12:47), Max: 36.9 (11-07-21 @ 12:47)  HR: 54 (11-07-21 @ 12:47) (50 - 54)  BP: 133/65 (11-07-21 @ 12:47) (117/61 - 150/68)  RR: 18 (11-07-21 @ 12:47) (18 - 18)  SpO2: 98% (11-07-21 @ 12:47) (98% - 98%)  Wt(kg): --        11-06-21 @ 08:01  -  11-07-21 @ 07:00  --------------------------------------------------------  IN: 850 mL / OUT: 325 mL / NET: 525 mL    11-07-21 @ 07:01  -  11-07-21 @ 13:11  --------------------------------------------------------  IN: 260 mL / OUT: 0 mL / NET: 260 mL      Physical Exam:  	Gen: NAD, well-appearing  	Pulm: crackles with decreased air entry  	CV: RRR, S1S2; no rub  	Back: No spinal or CVA tenderness; no sacral edema  	Abd: +BS, soft,   	UE: Warm, FROM  	LE: Warm, FROM,  edema  	Neuro: No focal deficits  	Psych: Normal affect and mood  	Skin: Warm, without rashes  	Vascular access: tunnel cath    LABS/STUDIES  --------------------------------------------------------------------------------              7.8    14.78 >-----------<  219      [11-07-21 @ 05:38]              24.9     137  |  96  |  81  ----------------------------<  113      [11-07-21 @ 11:47]  5.3   |  21  |  7.70        Ca     9.7     [11-07-21 @ 11:47]

## 2021-11-07 NOTE — PROGRESS NOTE ADULT - PROBLEM SELECTOR PLAN 5
No PPM warranted at this time EP signed off.   Maintain PW --> EPM VVI 40 / vma 10.  PW to be cut prior to discharge   12 lead ectopic atrial with pac's  threshold 4.3  Continue on modified Amio load 400 bid x 7 days then 200 mg PO BID x 7 days then start maintenance of 200 mg PO daily   Hold AV nodals No PPM warranted at this time EP signed off.   Maintain PW --> EPM VVI 40 / vma 10.  PW to be cut prior to discharge   12 lead ectopic atrial with pac's  threshold 4.3  modified Amio load changed to 200mg bid on 11/7  Hold AV nodals

## 2021-11-07 NOTE — PROGRESS NOTE ADULT - ASSESSMENT
60 year old male with PMHx of DM and HTN who presented to the hospital as a transfer from OSH for NSTEMI. The nephrology team was consulted due to elevated creatinine of 4.05 upon presentation.    JOSHUA on CKD, now on HD, need long term HD  - Likely with CKD from diabetic nephropathy, admitted with JOSHUA on CKD and hypervolemia, was initiated on HD prior to CABG for optimization.   --- initiated on HD on 10/14  - s/p LHC on 10/14 showing severe multivessel disease; now s/p CABG on 10/21    PLAN ON short HD Today for hyperkalemia and volume excess and again tomorrow  - 1.5 liters today  Need fluid restriction 1 liter daily  Low K diet  -s/p tunneled catheter  -s/p AVF creation   - monitor BMP  - adjust meds as per HD     Anemia:   - in the setting of CKD   - iron studies wnl   - increase SISSY, 8000 units TIW with HD.   - monitor CBC    Dw CTS    (After 5 pm or on weekends please page the on-call fellow, can check [email or web address] for schedule. Login is marty garcia, schedule under [facility] medicine, psych, derm)

## 2021-11-07 NOTE — PROGRESS NOTE ADULT - ASSESSMENT
59 y/o M w/ hx of DM2, HTN and HLD initially presented as transfer from [facility] for chest pain concerning for NSTEMI and possible new CHF c/b JOSHUA likely on CKD, and hypertensive emergency. Hospital course complicated by s/p RRT for CVA/TIA ruled out and worsening anemia requiring 1 PRBC. Pt S/p HD session today and underwent LHC showing Multivessel CAD. CT surgery consulted for CABG evaluation.     Surgery/Hospital Course:  10/07 Code stroke called for left facial droop, CT showed old frontal infarct, found to have high grade left carotid stenosis, but unclear cause for acute neurologic deficit  10/13 initiated HD, patient admitted with a creatinine of 4.05, but did not know of a history of renal   10/21 s/p C2L, MVR(T), & LAAL. Received 4 PRBC, 1 PLT, 1000 FIEBA intraop. Required dual pressor and inotrope support w/ , Primacor, Levo, and Epi gtts.   10/22 Extubated at 04:55.   10/24 SOB, urgent HD overnight, bipap  10/25 O2 via HFNC transitioned to 5L NC. Plan for HD today. SB in 50s, backup VVI paced at 50 via temporary epicardial PW.  10/26 Temporary access for HD removed yesterday as was not functioning, will consult IR for permacath. Plan for HD today. Check afternoon INR for coumadin dosing tonight   10/27 Pt received to floor on 2 [facility] - VSS, NAD. Coumadin on hold for AVF w/ vascular Friday or Monday - will discuss initiating heparin gtt to bridge w/ Dr. Xiong in AM. L LIDIA carrion placed in CTU for HD access will need to be transitioned to permacath prior to AVF - will d/w IR.   10/28 IR consult requested for PermCath placement HD with 1uPRBC transfusion today. Hod narcotics. Pt lethargic but responsive to verbal stimuli. FS 121mg/dl. 92% RA   10/29 VSS INR 1.49 HD cath poor flow- requested by nephrology to place new HD cath- Rt IJ Placed  - cleared for Vascular surgery placement of AVF on Tuesday   10/30 VSS  ECHO  resulted minimal pericardial effusion  improved mood / activity  HD today Perm Cath for Monday.  10/31 VSS  EKG today COVID swab Type and screen NPO at midnight  Perm cath in am OOB chair   ID consulted as per IR request for clearance prior to PermCath placement. WBC 13, afebrile. no evidence of s/s of infection.    LUE 1st stage basilic vein transposition performed. The first-stage AVF to mature from 4 to 6 weeks before the second-stage transposition of the matured brachial vein  construction. PT to reevaluate acute vs subacute rehab requirement.  11/3 Patient noted on tele SB /  @ 40 with loss of capture.  Intermittent Tachy / Seth --> EP consult  Maintain PW --> EPM VVI 40 / vma 10. H.D today per renal. Disposition: Acute  JR 60-80 burst afib.  VVI 40/10  threshold 4.3.   EKG ectopic atrial rhythm w/pac's.  On modified. amio load.  Set up for HD [place].  Rehab [facility] most likely Monday.   VVS; Continue with current medication regimen.  Continue on Modified amio load.  Awaiting HD chair at [facility].  Plan for [facility] on . PW to be cut prior to discharge.  Per Dr. Xiong will restart AC therapy Eliquis 2.5 mg PO BID upon discharge.    VVS; Continue on current medication regimen. Plan for discharge to Rehab on .  Covid PCR to be sent in AM.  HD per Renal     VSS.  Patient to undergo 2 hrs of HD today as discussed with renal for elevated K+ 5.3 creat 7.78    59 y/o M w/ hx of DM2, HTN and HLD initially presented as transfer from [facility] for chest pain concerning for NSTEMI and possible new CHF c/b JOSHUA likely on CKD, and hypertensive emergency. Hospital course complicated by s/p RRT for CVA/TIA ruled out and worsening anemia requiring 1 PRBC. Pt S/p HD session today and underwent LHC showing Multivessel CAD. CT surgery consulted for CABG evaluation.     Surgery/Hospital Course:  10/07 Code stroke called for left facial droop, CT showed old frontal infarct, found to have high grade left carotid stenosis, but unclear cause for acute neurologic deficit  10/13 initiated HD, patient admitted with a creatinine of 4.05, but did not know of a history of renal   10/21 s/p C2L, MVR(T), & LAAL. Received 4 PRBC, 1 PLT, 1000 FIEBA intraop. Required dual pressor and inotrope support w/ , Primacor, Levo, and Epi gtts.   10/22 Extubated at 04:55.   10/24 SOB, urgent HD overnight, bipap  10/25 O2 via HFNC transitioned to 5L NC. Plan for HD today. SB in 50s, backup VVI paced at 50 via temporary epicardial PW.  10/26 Temporary access for HD removed yesterday as was not functioning, will consult IR for permacath. Plan for HD today. Check afternoon INR for coumadin dosing tonight   10/27 Pt received to floor on 2 [facility] - VSS, NAD. Coumadin on hold for AVF w/ vascular Friday or Monday - will discuss initiating heparin gtt to bridge w/ Dr. Xiong in AM. L LIDIA carrion placed in CTU for HD access will need to be transitioned to permacath prior to AVF - will d/w IR.   10/28 IR consult requested for PermCath placement HD with 1uPRBC transfusion today. Hod narcotics. Pt lethargic but responsive to verbal stimuli. FS 121mg/dl. 92% RA   10/29 VSS INR 1.49 HD cath poor flow- requested by nephrology to place new HD cath- Rt IJ Placed  - cleared for Vascular surgery placement of AVF on Tuesday   10/30 VSS  ECHO  resulted minimal pericardial effusion  improved mood / activity  HD today Perm Cath for Monday.  10/31 VSS  EKG today COVID swab Type and screen NPO at midnight  Perm cath in am OOB chair   ID consulted as per IR request for clearance prior to PermCath placement. WBC 13, afebrile. no evidence of s/s of infection.    LUE 1st stage basilic vein transposition performed. The first-stage AVF to mature from 4 to 6 weeks before the second-stage transposition of the matured brachial vein  construction. PT to reevaluate acute vs subacute rehab requirement.  11/3 Patient noted on tele SB /  @ 40 with loss of capture.  Intermittent Tachy / Seth --> EP consult  Maintain PW --> EPM VVI 40 / vma 10. H.D today per renal. Disposition: Acute  JR 60-80 burst afib.  VVI 40/10  threshold 4.3.   EKG ectopic atrial rhythm w/pac's.  On modified. amio load.  Set up for HD [place].  Rehab [facility] most likely Monday.   VVS; Continue with current medication regimen.  Continue on Modified amio load.  Awaiting HD chair at [place].  Plan for [facility] on . PW to be cut prior to discharge.  Per Dr. Xiong will restart AC therapy Eliquis 2.5 mg PO BID upon discharge.    VVS; Continue on current medication regimen. Plan for discharge to Rehab on .  Covid PCR to be sent in AM.  HD per Renal     VSS.  Patient to undergo 2 hrs of HD today as discussed with renal for elevated K+ 5.3 creat 7.78/fluid overload.  Patient asymptomatic.  Will be undergo dialysis tomorrow as well.  Modified amio load changed to 200mg BID per Dr. Castañeda on bedside rounds for intermittent vpacing last evening.  Covid PCR sent for Acute rehab placement this week.

## 2021-11-07 NOTE — PROGRESS NOTE ADULT - SUBJECTIVE AND OBJECTIVE BOX
Subjective: "I feel ok "  Patient denies chest pain/shortness of breath. No nausea/vomiting     TELEMETRY: Atopic atrial rhythm 40-60s occasional v-pacing     VITAL SIGNS    Vital Signs Last 24 Hrs  T(C): 36.6 (21 @ 05:02), Max: 36.9 (21 @ 13:05)  T(F): 97.9 (21 @ 05:02), Max: 98.4 (21 @ 13:05)  HR: 50 (21 @ 05:02) (50 - 61)  BP: 117/61 (21 @ 05:02) (117/61 - 150/68)  RR: 18 (21 @ 05:02) (18 - 18)  SpO2: 98% (21 @ 05:02) (98% - 99%)             @ 08:01  -   @ 07:00  --------------------------------------------------------  IN: 850 mL / OUT: 325 mL / NET: 525 mL       Daily     Daily Weight in k.8 (2021 07:27)  Admit Wt: Drug Dosing Weight  Height (cm): 165.1 (2021 09:52)  Weight (kg): 82.4 (2021 09:52)  BMI (kg/m2): 30.2 (2021 09:52)  BSA (m2): 1.9 (2021 09:52)      CAPILLARY BLOOD GLUCOSE      POCT Blood Glucose.: 139 mg/dL (2021 08:05)  POCT Blood Glucose.: 112 mg/dL (2021 21:49)  POCT Blood Glucose.: 120 mg/dL (2021 17:36)  POCT Blood Glucose.: 174 mg/dL (2021 16:32)  POCT Blood Glucose.: 118 mg/dL (2021 11:55)            PHYSICAL EXAM    Neurology: alert and oriented x 3, nonfocal, no gross deficits  CV : +S1/S2  Sternal Wound :  ЮЛИЯ c/d/i , Stable  Lungs: Clear bilaterally. RR easy, unlabored   Abdomen: soft, nontender, nondistended, positive bowel sounds, +bowel movement   Neg N/V/D   :  Pt still makes some urine.  HD through Right IJ permacath   Extremities:   COTO; +2 LE edema, neg calf tenderness.   PPP bilaterally      PW: + V Wires to epicardial pacer set to VVI 40/10/0.8  brief intermittent pacing overnight       aMIOdarone    Tablet 200 milliGRAM(s) Oral every 12 hours  apixaban 2.5 milliGRAM(s) Oral two times a day  aspirin  chewable 81 milliGRAM(s) Oral daily  bisacodyl Suppository 10 milliGRAM(s) Rectal once  chlorhexidine 2% Cloths 1 Application(s) Topical <User Schedule>  epoetin vern-epbx (RETACRIT) Injectable 4000 Unit(s) IV Push <User Schedule>  glucagon  Injectable 1 milliGRAM(s) IntraMuscular once  insulin glargine Injectable (LANTUS) 16 Unit(s) SubCutaneous at bedtime  insulin lispro (ADMELOG) corrective regimen sliding scale   SubCutaneous at bedtime  insulin lispro Injectable (ADMELOG) 10 Unit(s) SubCutaneous three times a day before meals  pantoprazole    Tablet 40 milliGRAM(s) Oral two times a day  polyethylene glycol 3350 17 Gram(s) Oral daily  senna 2 Tablet(s) Oral at bedtime  sevelamer carbonate 800 milliGRAM(s) Oral three times a day  sodium chloride 0.9% lock flush 10 milliLiter(s) IV Push every 1 hour PRN  sodium chloride 0.9%. 1000 milliLiter(s) IV Continuous <Continuous>                         Subjective: "I feel ok "  Patient denies chest pain/shortness of breath. No nausea/vomiting     TELEMETRY: Atopic atrial rhythm 40-60s occasional v-pacing     VITAL SIGNS    Vital Signs Last 24 Hrs  T(C): 36.6 (21 @ 05:02), Max: 36.9 (21 @ 13:05)  T(F): 97.9 (21 @ 05:02), Max: 98.4 (21 @ 13:05)  HR: 50 (21 @ 05:02) (50 - 61)  BP: 117/61 (21 @ 05:02) (117/61 - 150/68)  RR: 18 (21 @ 05:02) (18 - 18)  SpO2: 98% (21 @ 05:02) (98% - 99%)             @ 08:01  -   @ 07:00  --------------------------------------------------------  IN: 850 mL / OUT: 325 mL / NET: 525 mL       Daily     Daily Weight in k.8 (2021 07:27)  Admit Wt: Drug Dosing Weight  Height (cm): 165.1 (2021 09:52)  Weight (kg): 82.4 (2021 09:52)  BMI (kg/m2): 30.2 (2021 09:52)  BSA (m2): 1.9 (2021 09:52)      CAPILLARY BLOOD GLUCOSE      POCT Blood Glucose.: 139 mg/dL (2021 08:05)  POCT Blood Glucose.: 112 mg/dL (2021 21:49)  POCT Blood Glucose.: 120 mg/dL (2021 17:36)  POCT Blood Glucose.: 174 mg/dL (2021 16:32)  POCT Blood Glucose.: 118 mg/dL (2021 11:55)            PHYSICAL EXAM    Neurology: alert and oriented x 3, nonfocal, no gross deficits  CV : +S1/S2  Sternal Wound :  ЮЛИЯ c/d/i , Stable  Lungs: Clear bilaterally. RR easy, unlabored   Abdomen: soft, nontender, nondistended, positive bowel sounds, +bowel movement   Neg N/V/D   :  Pt still makes some urine.  HD through Right IJ permacath   Extremities:   COTO; +2 LE edema, neg calf tenderness.  LUE AVF +B/T. PPP bilaterally    Lines- RIJ permacath insertion site no erythema or drainage.  Dressing is c/d/i.      PW: + V Wires to epicardial pacer set to VVI 40/10/0.8  brief intermittent pacing overnight       aMIOdarone    Tablet 200 milliGRAM(s) Oral every 12 hours  apixaban 2.5 milliGRAM(s) Oral two times a day  aspirin  chewable 81 milliGRAM(s) Oral daily  bisacodyl Suppository 10 milliGRAM(s) Rectal once  chlorhexidine 2% Cloths 1 Application(s) Topical <User Schedule>  epoetin vern-epbx (RETACRIT) Injectable 4000 Unit(s) IV Push <User Schedule>  glucagon  Injectable 1 milliGRAM(s) IntraMuscular once  insulin glargine Injectable (LANTUS) 16 Unit(s) SubCutaneous at bedtime  insulin lispro (ADMELOG) corrective regimen sliding scale   SubCutaneous at bedtime  insulin lispro Injectable (ADMELOG) 10 Unit(s) SubCutaneous three times a day before meals  pantoprazole    Tablet 40 milliGRAM(s) Oral two times a day  polyethylene glycol 3350 17 Gram(s) Oral daily  senna 2 Tablet(s) Oral at bedtime  sevelamer carbonate 800 milliGRAM(s) Oral three times a day  sodium chloride 0.9% lock flush 10 milliLiter(s) IV Push every 1 hour PRN  sodium chloride 0.9%. 1000 milliLiter(s) IV Continuous <Continuous>

## 2021-11-07 NOTE — PROGRESS NOTE ADULT - PROBLEM SELECTOR PLAN 1
Continue post-op care  Continue ASA 81 mg QD and Atorvastatin 80 mg qhs   Holding beta blocker given SB/accelerated junctional   Maintain PW --> EPM VVI 40 / vma 10.   Increase activity as tolerated, ambulate 4x daily and with PT   Chest PT, encourage incentive spirometry   Pain control w/ Tylenol only as pt was became oversedated when on narcotics   GI ppx w/ Protonix ppx   Wound care and assessment   Dispo: Acute Rehab [facility] most likely Monday 11/7 Continue post-op care  Continue ASA 81 mg QD and Atorvastatin 80 mg qhs   Holding beta blocker given SB/accelerated junctional   Amio changed to 200mg BID   Maintain PW --> EPM VVI 40 / vma 10.   Increase activity as tolerated, ambulate 4x daily and with PT   Chest PT, encourage incentive spirometry   Pain control w/ Tylenol only as pt was became oversedated when on narcotics   GI ppx w/ Protonix ppx   Wound care and assessment   Dispo: Acute Rehab [facility] most likely Monday 11/7

## 2021-11-07 NOTE — PROGRESS NOTE ADULT - PROBLEM SELECTOR PLAN 3
Renal following  10/29 new Rt IJ HD [facility]  HD today per Renal   HD via permacath    Continue Renvela 800 mg q8h  Daily BUN/Cr to trend  s/p 11/2 1st stage of AVF creation Renal following  HD today 11/7 per Renal for K+ 5.3/Creat 7.78 in setting of volume overload  HD via permacath    Continue Renvela 800 mg q8h  Daily BUN/Cr to trend  s/p 11/2 1st stage of AVF creation

## 2021-11-08 ENCOUNTER — TRANSCRIPTION ENCOUNTER (OUTPATIENT)
Age: 61
End: 2021-11-08

## 2021-11-08 ENCOUNTER — INPATIENT (INPATIENT)
Facility: HOSPITAL | Age: 61
LOS: 18 days | Discharge: HOME HEALTH | DRG: 949 | End: 2021-11-27
Attending: INTERNAL MEDICINE | Admitting: INTERNAL MEDICINE
Payer: MEDICAID

## 2021-11-08 VITALS
OXYGEN SATURATION: 98 % | DIASTOLIC BLOOD PRESSURE: 50 MMHG | RESPIRATION RATE: 18 BRPM | HEART RATE: 61 BPM | TEMPERATURE: 98 F | SYSTOLIC BLOOD PRESSURE: 115 MMHG

## 2021-11-08 VITALS
HEART RATE: 64 BPM | WEIGHT: 169.09 LBS | SYSTOLIC BLOOD PRESSURE: 132 MMHG | HEIGHT: 65 IN | TEMPERATURE: 99 F | DIASTOLIC BLOOD PRESSURE: 74 MMHG | OXYGEN SATURATION: 98 % | RESPIRATION RATE: 18 BRPM

## 2021-11-08 DIAGNOSIS — Z95.1 PRESENCE OF AORTOCORONARY BYPASS GRAFT: ICD-10-CM

## 2021-11-08 DIAGNOSIS — Z78.9 OTHER SPECIFIED HEALTH STATUS: Chronic | ICD-10-CM

## 2021-11-08 LAB
ALBUMIN SERPL ELPH-MCNC: 3.1 G/DL — LOW (ref 3.3–5)
ALP SERPL-CCNC: 106 U/L — SIGNIFICANT CHANGE UP (ref 40–120)
ALT FLD-CCNC: 5 U/L — LOW (ref 10–45)
ANION GAP SERPL CALC-SCNC: 19 MMOL/L — HIGH (ref 5–17)
AST SERPL-CCNC: 18 U/L — SIGNIFICANT CHANGE UP (ref 10–40)
BASOPHILS # BLD AUTO: 0 K/UL — SIGNIFICANT CHANGE UP (ref 0–0.2)
BASOPHILS NFR BLD AUTO: 0 % — SIGNIFICANT CHANGE UP (ref 0–2)
BILIRUB SERPL-MCNC: 0.2 MG/DL — SIGNIFICANT CHANGE UP (ref 0.2–1.2)
BUN SERPL-MCNC: 70 MG/DL — HIGH (ref 7–23)
CALCIUM SERPL-MCNC: 9.4 MG/DL — SIGNIFICANT CHANGE UP (ref 8.4–10.5)
CHLORIDE SERPL-SCNC: 97 MMOL/L — SIGNIFICANT CHANGE UP (ref 96–108)
CO2 SERPL-SCNC: 20 MMOL/L — LOW (ref 22–31)
CREAT SERPL-MCNC: 6.94 MG/DL — HIGH (ref 0.5–1.3)
EOSINOPHIL # BLD AUTO: 0.3 K/UL — SIGNIFICANT CHANGE UP (ref 0–0.5)
EOSINOPHIL NFR BLD AUTO: 2.5 % — SIGNIFICANT CHANGE UP (ref 0–6)
GLUCOSE BLDC GLUCOMTR-MCNC: 136 MG/DL — HIGH (ref 70–99)
GLUCOSE BLDC GLUCOMTR-MCNC: 189 MG/DL — HIGH (ref 70–99)
GLUCOSE BLDC GLUCOMTR-MCNC: 98 MG/DL — SIGNIFICANT CHANGE UP (ref 70–99)
GLUCOSE SERPL-MCNC: 222 MG/DL — HIGH (ref 70–99)
HCT VFR BLD CALC: 25.7 % — LOW (ref 39–50)
HGB BLD-MCNC: 7.9 G/DL — LOW (ref 13–17)
HYPOCHROMIA BLD QL: SLIGHT — SIGNIFICANT CHANGE UP
LYMPHOCYTES # BLD AUTO: 1.52 K/UL — SIGNIFICANT CHANGE UP (ref 1–3.3)
LYMPHOCYTES # BLD AUTO: 12.7 % — LOW (ref 13–44)
MANUAL SMEAR VERIFICATION: SIGNIFICANT CHANGE UP
MCHC RBC-ENTMCNC: 29.4 PG — SIGNIFICANT CHANGE UP (ref 27–34)
MCHC RBC-ENTMCNC: 30.7 GM/DL — LOW (ref 32–36)
MCV RBC AUTO: 95.5 FL — SIGNIFICANT CHANGE UP (ref 80–100)
MONOCYTES # BLD AUTO: 1.32 K/UL — HIGH (ref 0–0.9)
MONOCYTES NFR BLD AUTO: 11 % — SIGNIFICANT CHANGE UP (ref 2–14)
MYELOCYTES NFR BLD: 0.9 % — HIGH (ref 0–0)
NEUTROPHILS # BLD AUTO: 8.73 K/UL — HIGH (ref 1.8–7.4)
NEUTROPHILS NFR BLD AUTO: 72.9 % — SIGNIFICANT CHANGE UP (ref 43–77)
PLAT MORPH BLD: NORMAL — SIGNIFICANT CHANGE UP
PLATELET # BLD AUTO: 187 K/UL — SIGNIFICANT CHANGE UP (ref 150–400)
POLYCHROMASIA BLD QL SMEAR: SLIGHT — SIGNIFICANT CHANGE UP
POTASSIUM SERPL-MCNC: 4.9 MMOL/L — SIGNIFICANT CHANGE UP (ref 3.5–5.3)
POTASSIUM SERPL-SCNC: 4.9 MMOL/L — SIGNIFICANT CHANGE UP (ref 3.5–5.3)
PROT SERPL-MCNC: 6.8 G/DL — SIGNIFICANT CHANGE UP (ref 6–8.3)
RBC # BLD: 2.69 M/UL — LOW (ref 4.2–5.8)
RBC # FLD: 14.6 % — HIGH (ref 10.3–14.5)
RBC BLD AUTO: SIGNIFICANT CHANGE UP
SODIUM SERPL-SCNC: 136 MMOL/L — SIGNIFICANT CHANGE UP (ref 135–145)
WBC # BLD: 11.97 K/UL — HIGH (ref 3.8–10.5)
WBC # FLD AUTO: 11.97 K/UL — HIGH (ref 3.8–10.5)

## 2021-11-08 PROCEDURE — 83735 ASSAY OF MAGNESIUM: CPT

## 2021-11-08 PROCEDURE — 84134 ASSAY OF PREALBUMIN: CPT

## 2021-11-08 PROCEDURE — 86901 BLOOD TYPING SEROLOGIC RH(D): CPT

## 2021-11-08 PROCEDURE — 83935 ASSAY OF URINE OSMOLALITY: CPT

## 2021-11-08 PROCEDURE — 84132 ASSAY OF SERUM POTASSIUM: CPT

## 2021-11-08 PROCEDURE — 83605 ASSAY OF LACTIC ACID: CPT

## 2021-11-08 PROCEDURE — C1752: CPT

## 2021-11-08 PROCEDURE — 97530 THERAPEUTIC ACTIVITIES: CPT

## 2021-11-08 PROCEDURE — 85384 FIBRINOGEN ACTIVITY: CPT

## 2021-11-08 PROCEDURE — 87640 STAPH A DNA AMP PROBE: CPT

## 2021-11-08 PROCEDURE — 85730 THROMBOPLASTIN TIME PARTIAL: CPT

## 2021-11-08 PROCEDURE — 84439 ASSAY OF FREE THYROXINE: CPT

## 2021-11-08 PROCEDURE — 82435 ASSAY OF BLOOD CHLORIDE: CPT

## 2021-11-08 PROCEDURE — 36556 INSERT NON-TUNNEL CV CATH: CPT

## 2021-11-08 PROCEDURE — 82330 ASSAY OF CALCIUM: CPT

## 2021-11-08 PROCEDURE — 87635 SARS-COV-2 COVID-19 AMP PRB: CPT

## 2021-11-08 PROCEDURE — 82272 OCCULT BLD FECES 1-3 TESTS: CPT

## 2021-11-08 PROCEDURE — 83516 IMMUNOASSAY NONANTIBODY: CPT

## 2021-11-08 PROCEDURE — 99233 SBSQ HOSP IP/OBS HIGH 50: CPT | Mod: GC

## 2021-11-08 PROCEDURE — C1730: CPT

## 2021-11-08 PROCEDURE — 99153 MOD SED SAME PHYS/QHP EA: CPT

## 2021-11-08 PROCEDURE — 97165 OT EVAL LOW COMPLEX 30 MIN: CPT

## 2021-11-08 PROCEDURE — 86255 FLUORESCENT ANTIBODY SCREEN: CPT

## 2021-11-08 PROCEDURE — 86803 HEPATITIS C AB TEST: CPT

## 2021-11-08 PROCEDURE — 93458 L HRT ARTERY/VENTRICLE ANGIO: CPT

## 2021-11-08 PROCEDURE — 86334 IMMUNOFIX E-PHORESIS SERUM: CPT

## 2021-11-08 PROCEDURE — C1887: CPT

## 2021-11-08 PROCEDURE — U0005: CPT

## 2021-11-08 PROCEDURE — 87340 HEPATITIS B SURFACE AG IA: CPT

## 2021-11-08 PROCEDURE — 70551 MRI BRAIN STEM W/O DYE: CPT

## 2021-11-08 PROCEDURE — 84702 CHORIONIC GONADOTROPIN TEST: CPT

## 2021-11-08 PROCEDURE — 86923 COMPATIBILITY TEST ELECTRIC: CPT

## 2021-11-08 PROCEDURE — 86900 BLOOD TYPING SEROLOGIC ABO: CPT

## 2021-11-08 PROCEDURE — 80053 COMPREHEN METABOLIC PANEL: CPT

## 2021-11-08 PROCEDURE — P9016: CPT

## 2021-11-08 PROCEDURE — C1750: CPT

## 2021-11-08 PROCEDURE — C1769: CPT

## 2021-11-08 PROCEDURE — 84300 ASSAY OF URINE SODIUM: CPT

## 2021-11-08 PROCEDURE — P9037: CPT

## 2021-11-08 PROCEDURE — 82565 ASSAY OF CREATININE: CPT

## 2021-11-08 PROCEDURE — 76770 US EXAM ABDO BACK WALL COMP: CPT

## 2021-11-08 PROCEDURE — 84155 ASSAY OF PROTEIN SERUM: CPT

## 2021-11-08 PROCEDURE — 97161 PT EVAL LOW COMPLEX 20 MIN: CPT

## 2021-11-08 PROCEDURE — 74230 X-RAY XM SWLNG FUNCJ C+: CPT

## 2021-11-08 PROCEDURE — 82550 ASSAY OF CK (CPK): CPT

## 2021-11-08 PROCEDURE — 96375 TX/PRO/DX INJ NEW DRUG ADDON: CPT

## 2021-11-08 PROCEDURE — 93970 EXTREMITY STUDY: CPT

## 2021-11-08 PROCEDURE — 36415 COLL VENOUS BLD VENIPUNCTURE: CPT

## 2021-11-08 PROCEDURE — 92611 MOTION FLUOROSCOPY/SWALLOW: CPT

## 2021-11-08 PROCEDURE — 86780 TREPONEMA PALLIDUM: CPT

## 2021-11-08 PROCEDURE — 92526 ORAL FUNCTION THERAPY: CPT

## 2021-11-08 PROCEDURE — 83540 ASSAY OF IRON: CPT

## 2021-11-08 PROCEDURE — 82553 CREATINE MB FRACTION: CPT

## 2021-11-08 PROCEDURE — 84480 ASSAY TRIIODOTHYRONINE (T3): CPT

## 2021-11-08 PROCEDURE — 87389 HIV-1 AG W/HIV-1&-2 AB AG IA: CPT

## 2021-11-08 PROCEDURE — 97110 THERAPEUTIC EXERCISES: CPT

## 2021-11-08 PROCEDURE — 93308 TTE F-UP OR LMTD: CPT

## 2021-11-08 PROCEDURE — 94002 VENT MGMT INPAT INIT DAY: CPT

## 2021-11-08 PROCEDURE — 81001 URINALYSIS AUTO W/SCOPE: CPT

## 2021-11-08 PROCEDURE — C8929: CPT

## 2021-11-08 PROCEDURE — 85018 HEMOGLOBIN: CPT

## 2021-11-08 PROCEDURE — 97168 OT RE-EVAL EST PLAN CARE: CPT

## 2021-11-08 PROCEDURE — 84484 ASSAY OF TROPONIN QUANT: CPT

## 2021-11-08 PROCEDURE — 70496 CT ANGIOGRAPHY HEAD: CPT

## 2021-11-08 PROCEDURE — 82805 BLOOD GASES W/O2 SATURATION: CPT

## 2021-11-08 PROCEDURE — 87641 MR-STAPH DNA AMP PROBE: CPT

## 2021-11-08 PROCEDURE — 84295 ASSAY OF SERUM SODIUM: CPT

## 2021-11-08 PROCEDURE — 93880 EXTRACRANIAL BILAT STUDY: CPT

## 2021-11-08 PROCEDURE — 76937 US GUIDE VASCULAR ACCESS: CPT

## 2021-11-08 PROCEDURE — 83880 ASSAY OF NATRIURETIC PEPTIDE: CPT

## 2021-11-08 PROCEDURE — 92610 EVALUATE SWALLOWING FUNCTION: CPT

## 2021-11-08 PROCEDURE — 86038 ANTINUCLEAR ANTIBODIES: CPT

## 2021-11-08 PROCEDURE — 86891 AUTOLOGOUS BLOOD OP SALVAGE: CPT

## 2021-11-08 PROCEDURE — 99152 MOD SED SAME PHYS/QHP 5/>YRS: CPT

## 2021-11-08 PROCEDURE — 82140 ASSAY OF AMMONIA: CPT

## 2021-11-08 PROCEDURE — 85027 COMPLETE CBC AUTOMATED: CPT

## 2021-11-08 PROCEDURE — 84100 ASSAY OF PHOSPHORUS: CPT

## 2021-11-08 PROCEDURE — 84443 ASSAY THYROID STIM HORMONE: CPT

## 2021-11-08 PROCEDURE — 81599 UNLISTED MAAA: CPT

## 2021-11-08 PROCEDURE — 70498 CT ANGIOGRAPHY NECK: CPT

## 2021-11-08 PROCEDURE — C1894: CPT

## 2021-11-08 PROCEDURE — 85576 BLOOD PLATELET AGGREGATION: CPT

## 2021-11-08 PROCEDURE — 80048 BASIC METABOLIC PNL TOTAL CA: CPT

## 2021-11-08 PROCEDURE — C1889: CPT

## 2021-11-08 PROCEDURE — U0003: CPT

## 2021-11-08 PROCEDURE — 99261: CPT

## 2021-11-08 PROCEDURE — 82803 BLOOD GASES ANY COMBINATION: CPT

## 2021-11-08 PROCEDURE — 86850 RBC ANTIBODY SCREEN: CPT

## 2021-11-08 PROCEDURE — 94660 CPAP INITIATION&MGMT: CPT

## 2021-11-08 PROCEDURE — 99291 CRITICAL CARE FIRST HOUR: CPT

## 2021-11-08 PROCEDURE — 86036 ANCA SCREEN EACH ANTIBODY: CPT

## 2021-11-08 PROCEDURE — 86160 COMPLEMENT ANTIGEN: CPT

## 2021-11-08 PROCEDURE — 93005 ELECTROCARDIOGRAM TRACING: CPT

## 2021-11-08 PROCEDURE — 83036 HEMOGLOBIN GLYCOSYLATED A1C: CPT

## 2021-11-08 PROCEDURE — 85610 PROTHROMBIN TIME: CPT

## 2021-11-08 PROCEDURE — 77001 FLUOROGUIDE FOR VEIN DEVICE: CPT

## 2021-11-08 PROCEDURE — 84156 ASSAY OF PROTEIN URINE: CPT

## 2021-11-08 PROCEDURE — 83690 ASSAY OF LIPASE: CPT

## 2021-11-08 PROCEDURE — 96374 THER/PROPH/DIAG INJ IV PUSH: CPT

## 2021-11-08 PROCEDURE — 82962 GLUCOSE BLOOD TEST: CPT

## 2021-11-08 PROCEDURE — P9045: CPT

## 2021-11-08 PROCEDURE — 82570 ASSAY OF URINE CREATININE: CPT

## 2021-11-08 PROCEDURE — 93571 IV DOP VEL&/PRESS C FLO 1ST: CPT | Mod: LM

## 2021-11-08 PROCEDURE — 85025 COMPLETE CBC W/AUTO DIFF WBC: CPT

## 2021-11-08 PROCEDURE — 71046 X-RAY EXAM CHEST 2 VIEWS: CPT

## 2021-11-08 PROCEDURE — 99223 1ST HOSP IP/OBS HIGH 75: CPT

## 2021-11-08 PROCEDURE — 70450 CT HEAD/BRAIN W/O DYE: CPT

## 2021-11-08 PROCEDURE — 82728 ASSAY OF FERRITIN: CPT

## 2021-11-08 PROCEDURE — 36558 INSERT TUNNELED CV CATH: CPT

## 2021-11-08 PROCEDURE — 84165 PROTEIN E-PHORESIS SERUM: CPT

## 2021-11-08 PROCEDURE — C1751: CPT

## 2021-11-08 PROCEDURE — 36430 TRANSFUSION BLD/BLD COMPNT: CPT

## 2021-11-08 PROCEDURE — P9047: CPT

## 2021-11-08 PROCEDURE — 83550 IRON BINDING TEST: CPT

## 2021-11-08 PROCEDURE — 84436 ASSAY OF TOTAL THYROXINE: CPT

## 2021-11-08 PROCEDURE — 97116 GAIT TRAINING THERAPY: CPT

## 2021-11-08 PROCEDURE — 85014 HEMATOCRIT: CPT

## 2021-11-08 PROCEDURE — 86769 SARS-COV-2 COVID-19 ANTIBODY: CPT

## 2021-11-08 PROCEDURE — 71250 CT THORAX DX C-: CPT

## 2021-11-08 PROCEDURE — 71045 X-RAY EXAM CHEST 1 VIEW: CPT

## 2021-11-08 PROCEDURE — 82947 ASSAY GLUCOSE BLOOD QUANT: CPT

## 2021-11-08 PROCEDURE — 83521 IG LIGHT CHAINS FREE EACH: CPT

## 2021-11-08 RX ORDER — INSULIN LISPRO 100/ML
10 VIAL (ML) SUBCUTANEOUS
Qty: 0 | Refills: 0 | DISCHARGE
Start: 2021-11-08

## 2021-11-08 RX ORDER — PANTOPRAZOLE SODIUM 20 MG/1
1 TABLET, DELAYED RELEASE ORAL
Qty: 0 | Refills: 0 | DISCHARGE
Start: 2021-11-08

## 2021-11-08 RX ORDER — INSULIN GLARGINE 100 [IU]/ML
16 INJECTION, SOLUTION SUBCUTANEOUS
Qty: 0 | Refills: 0 | DISCHARGE
Start: 2021-11-08

## 2021-11-08 RX ORDER — PETROLATUM,WHITE
1 JELLY (GRAM) TOPICAL DAILY
Refills: 0 | Status: DISCONTINUED | OUTPATIENT
Start: 2021-11-08 | End: 2021-11-27

## 2021-11-08 RX ORDER — ERYTHROPOIETIN 10000 [IU]/ML
8000 INJECTION, SOLUTION INTRAVENOUS; SUBCUTANEOUS
Qty: 0 | Refills: 0 | DISCHARGE
Start: 2021-11-08

## 2021-11-08 RX ORDER — LABETALOL HCL 100 MG
1 TABLET ORAL
Qty: 0 | Refills: 0 | DISCHARGE

## 2021-11-08 RX ORDER — AMIODARONE HYDROCHLORIDE 400 MG/1
1 TABLET ORAL
Qty: 0 | Refills: 0 | DISCHARGE
Start: 2021-11-08

## 2021-11-08 RX ORDER — DEXTROSE 50 % IN WATER 50 %
25 SYRINGE (ML) INTRAVENOUS ONCE
Refills: 0 | Status: DISCONTINUED | OUTPATIENT
Start: 2021-11-08 | End: 2021-11-27

## 2021-11-08 RX ORDER — INSULIN GLARGINE 100 [IU]/ML
16 INJECTION, SOLUTION SUBCUTANEOUS AT BEDTIME
Refills: 0 | Status: DISCONTINUED | OUTPATIENT
Start: 2021-11-08 | End: 2021-11-15

## 2021-11-08 RX ORDER — POLYETHYLENE GLYCOL 3350 17 G/17G
17 POWDER, FOR SOLUTION ORAL
Qty: 0 | Refills: 0 | DISCHARGE
Start: 2021-11-08

## 2021-11-08 RX ORDER — ACETAMINOPHEN 500 MG
650 TABLET ORAL EVERY 6 HOURS
Refills: 0 | Status: DISCONTINUED | OUTPATIENT
Start: 2021-11-08 | End: 2021-11-27

## 2021-11-08 RX ORDER — ATORVASTATIN CALCIUM 80 MG/1
1 TABLET, FILM COATED ORAL
Qty: 0 | Refills: 0 | DISCHARGE

## 2021-11-08 RX ORDER — LOSARTAN/HYDROCHLOROTHIAZIDE 100MG-25MG
1 TABLET ORAL
Qty: 0 | Refills: 0 | DISCHARGE

## 2021-11-08 RX ORDER — ASPIRIN/CALCIUM CARB/MAGNESIUM 324 MG
1 TABLET ORAL
Qty: 0 | Refills: 0 | DISCHARGE
Start: 2021-11-08

## 2021-11-08 RX ORDER — ASPIRIN/CALCIUM CARB/MAGNESIUM 324 MG
81 TABLET ORAL DAILY
Refills: 0 | Status: DISCONTINUED | OUTPATIENT
Start: 2021-11-09 | End: 2021-11-27

## 2021-11-08 RX ORDER — PANTOPRAZOLE SODIUM 20 MG/1
40 TABLET, DELAYED RELEASE ORAL
Refills: 0 | Status: DISCONTINUED | OUTPATIENT
Start: 2021-11-09 | End: 2021-11-27

## 2021-11-08 RX ORDER — GLUCAGON INJECTION, SOLUTION 0.5 MG/.1ML
1 INJECTION, SOLUTION SUBCUTANEOUS ONCE
Refills: 0 | Status: DISCONTINUED | OUTPATIENT
Start: 2021-11-08 | End: 2021-11-27

## 2021-11-08 RX ORDER — SEVELAMER CARBONATE 2400 MG/1
800 POWDER, FOR SUSPENSION ORAL THREE TIMES A DAY
Refills: 0 | Status: DISCONTINUED | OUTPATIENT
Start: 2021-11-08 | End: 2021-11-09

## 2021-11-08 RX ORDER — INSULIN LISPRO 100/ML
10 VIAL (ML) SUBCUTANEOUS
Refills: 0 | Status: DISCONTINUED | OUTPATIENT
Start: 2021-11-08 | End: 2021-11-12

## 2021-11-08 RX ORDER — NIFEDIPINE 30 MG
1 TABLET, EXTENDED RELEASE 24 HR ORAL
Qty: 0 | Refills: 0 | DISCHARGE

## 2021-11-08 RX ORDER — ERYTHROPOIETIN 10000 [IU]/ML
8000 INJECTION, SOLUTION INTRAVENOUS; SUBCUTANEOUS
Refills: 0 | Status: DISCONTINUED | OUTPATIENT
Start: 2021-11-08 | End: 2021-11-10

## 2021-11-08 RX ORDER — SEVELAMER CARBONATE 2400 MG/1
1 POWDER, FOR SUSPENSION ORAL
Qty: 0 | Refills: 0 | DISCHARGE
Start: 2021-11-08

## 2021-11-08 RX ORDER — POLYETHYLENE GLYCOL 3350 17 G/17G
17 POWDER, FOR SOLUTION ORAL
Refills: 0 | Status: DISCONTINUED | OUTPATIENT
Start: 2021-11-08 | End: 2021-11-27

## 2021-11-08 RX ORDER — DEXTROSE 50 % IN WATER 50 %
15 SYRINGE (ML) INTRAVENOUS ONCE
Refills: 0 | Status: DISCONTINUED | OUTPATIENT
Start: 2021-11-08 | End: 2021-11-27

## 2021-11-08 RX ORDER — LANOLIN ALCOHOL/MO/W.PET/CERES
3 CREAM (GRAM) TOPICAL AT BEDTIME
Refills: 0 | Status: DISCONTINUED | OUTPATIENT
Start: 2021-11-08 | End: 2021-11-27

## 2021-11-08 RX ORDER — INSULIN LISPRO 100/ML
VIAL (ML) SUBCUTANEOUS AT BEDTIME
Refills: 0 | Status: DISCONTINUED | OUTPATIENT
Start: 2021-11-08 | End: 2021-11-27

## 2021-11-08 RX ORDER — INSULIN LISPRO 100/ML
VIAL (ML) SUBCUTANEOUS
Refills: 0 | Status: DISCONTINUED | OUTPATIENT
Start: 2021-11-08 | End: 2021-11-23

## 2021-11-08 RX ORDER — APIXABAN 2.5 MG/1
2.5 TABLET, FILM COATED ORAL
Refills: 0 | Status: DISCONTINUED | OUTPATIENT
Start: 2021-11-08 | End: 2021-11-27

## 2021-11-08 RX ORDER — SENNA PLUS 8.6 MG/1
2 TABLET ORAL AT BEDTIME
Refills: 0 | Status: DISCONTINUED | OUTPATIENT
Start: 2021-11-08 | End: 2021-11-27

## 2021-11-08 RX ORDER — SODIUM CHLORIDE 9 MG/ML
1000 INJECTION, SOLUTION INTRAVENOUS
Refills: 0 | Status: DISCONTINUED | OUTPATIENT
Start: 2021-11-08 | End: 2021-11-27

## 2021-11-08 RX ORDER — SENNA PLUS 8.6 MG/1
2 TABLET ORAL
Qty: 0 | Refills: 0 | DISCHARGE
Start: 2021-11-08

## 2021-11-08 RX ORDER — METFORMIN HYDROCHLORIDE 850 MG/1
1 TABLET ORAL
Qty: 0 | Refills: 0 | DISCHARGE

## 2021-11-08 RX ORDER — AMIODARONE HYDROCHLORIDE 400 MG/1
200 TABLET ORAL EVERY 12 HOURS
Refills: 0 | Status: DISCONTINUED | OUTPATIENT
Start: 2021-11-08 | End: 2021-11-12

## 2021-11-08 RX ORDER — DEXTROSE 50 % IN WATER 50 %
12.5 SYRINGE (ML) INTRAVENOUS ONCE
Refills: 0 | Status: DISCONTINUED | OUTPATIENT
Start: 2021-11-08 | End: 2021-11-27

## 2021-11-08 RX ORDER — APIXABAN 2.5 MG/1
1 TABLET, FILM COATED ORAL
Qty: 0 | Refills: 0 | DISCHARGE
Start: 2021-11-08

## 2021-11-08 RX ORDER — INSULIN GLARGINE 100 [IU]/ML
30 INJECTION, SOLUTION SUBCUTANEOUS
Qty: 0 | Refills: 0 | DISCHARGE

## 2021-11-08 RX ADMIN — APIXABAN 2.5 MILLIGRAM(S): 2.5 TABLET, FILM COATED ORAL at 18:25

## 2021-11-08 RX ADMIN — AMIODARONE HYDROCHLORIDE 200 MILLIGRAM(S): 400 TABLET ORAL at 18:26

## 2021-11-08 RX ADMIN — Medication 10 UNIT(S): at 08:08

## 2021-11-08 RX ADMIN — Medication 1 APPLICATION(S): at 18:26

## 2021-11-08 RX ADMIN — AMIODARONE HYDROCHLORIDE 200 MILLIGRAM(S): 400 TABLET ORAL at 06:48

## 2021-11-08 RX ADMIN — INSULIN GLARGINE 16 UNIT(S): 100 INJECTION, SOLUTION SUBCUTANEOUS at 21:19

## 2021-11-08 RX ADMIN — SENNA PLUS 2 TABLET(S): 8.6 TABLET ORAL at 21:19

## 2021-11-08 RX ADMIN — SEVELAMER CARBONATE 800 MILLIGRAM(S): 2400 POWDER, FOR SUSPENSION ORAL at 06:48

## 2021-11-08 RX ADMIN — SEVELAMER CARBONATE 800 MILLIGRAM(S): 2400 POWDER, FOR SUSPENSION ORAL at 14:06

## 2021-11-08 RX ADMIN — ERYTHROPOIETIN 8000 UNIT(S): 10000 INJECTION, SOLUTION INTRAVENOUS; SUBCUTANEOUS at 13:01

## 2021-11-08 RX ADMIN — SEVELAMER CARBONATE 800 MILLIGRAM(S): 2400 POWDER, FOR SUSPENSION ORAL at 21:19

## 2021-11-08 RX ADMIN — PANTOPRAZOLE SODIUM 40 MILLIGRAM(S): 20 TABLET, DELAYED RELEASE ORAL at 06:48

## 2021-11-08 RX ADMIN — Medication 81 MILLIGRAM(S): at 14:06

## 2021-11-08 RX ADMIN — APIXABAN 2.5 MILLIGRAM(S): 2.5 TABLET, FILM COATED ORAL at 06:49

## 2021-11-08 NOTE — H&P ADULT - NSHPSOCIALHISTORY_GEN_ALL_CORE
Social: Denies etoh, smoking, recreational drug  Functional History: Pt lives in private home with wife, no steps to enter, 8 steps inside +HR. Prior to admission, pt was I with all functional mobility and ADLs without AD. (-) .    Functional history:  Bed Mobility Training Sit-to-Supine: supervsion;  1 person assist  Bed Mobility Training Supine-to-Sit: supervsion;  1 person assist    Transfer Training Sit-to-Stand Transfer: contact guard;  1 person assist;  weight-bearing as tolerated   rolling walker  Transfer Training Stand-to-Sit Transfer: contact guard;  1 person assist;  weight-bearing as tolerated   rolling walker    Gait Training: contact guard;  minimum assist (75% patient effort);  1 person assist;  weight-bearing as tolerated   rolling walker;  50 feet

## 2021-11-08 NOTE — PROGRESS NOTE ADULT - PROBLEM SELECTOR PROBLEM 2
Hypothyroid
S/P MVR (mitral valve replacement)
S/P MVR (mitral valve replacement)
JOSHUA (acute kidney injury)
JOSHUA (acute kidney injury)
S/P MVR (mitral valve replacement)
S/P MVR (mitral valve replacement)
Hypothyroid
Hypothyroid
S/P MVR (mitral valve replacement)
S/P MVR (mitral valve replacement)
Hypothyroid
NSTEMI (non-ST elevation myocardial infarction)
S/P MVR (mitral valve replacement)
S/P MVR (mitral valve replacement)
Hypothyroid
NSTEMI (non-ST elevation myocardial infarction)
JOSHUA (acute kidney injury)
S/P MVR (mitral valve replacement)
Hypothyroid
S/P MVR (mitral valve replacement)
Hypothyroid
Hypertensive emergency
Hypertensive emergency
Hypothyroid
JOSHUA (acute kidney injury)
S/P MVR (mitral valve replacement)
Hypothyroid
S/P MVR (mitral valve replacement)
Hypertensive emergency
JOSHUA (acute kidney injury)
Hypothyroid
NSTEMI (non-ST elevation myocardial infarction)
Hypertensive emergency
NSTEMI (non-ST elevation myocardial infarction)
JOSHUA (acute kidney injury)
Hypertensive emergency

## 2021-11-08 NOTE — PROGRESS NOTE ADULT - SUBJECTIVE AND OBJECTIVE BOX
Chief complaint  Patient is a 61y old  Male who presents with a chief complaint of NSTEMI (08 Nov 2021 13:03)   Review of systems  Patient in bed, looks comfortable, no hypoglycemic episodes.    Labs and Fingersticks  CAPILLARY BLOOD GLUCOSE      POCT Blood Glucose.: 98 mg/dL (08 Nov 2021 13:57)  POCT Blood Glucose.: 189 mg/dL (08 Nov 2021 07:33)  POCT Blood Glucose.: 138 mg/dL (07 Nov 2021 21:38)  POCT Blood Glucose.: 106 mg/dL (07 Nov 2021 17:35)      Anion Gap, Serum: 19 *H* (11-08 @ 05:42)  Anion Gap, Serum: 20 *H* (11-07 @ 11:47)  Anion Gap, Serum: 18 *H* (11-07 @ 05:37)      Calcium, Total Serum: 9.4 (11-08 @ 05:42)  Calcium, Total Serum: 9.7 (11-07 @ 11:47)  Calcium, Total Serum: 9.7 (11-07 @ 05:37)  Albumin, Serum: 3.1 *L* (11-08 @ 05:42)    Alanine Aminotransferase (ALT/SGPT): 5 *L* (11-08 @ 05:42)  Alkaline Phosphatase, Serum: 106 (11-08 @ 05:42)  Aspartate Aminotransferase (AST/SGOT): 18 (11-08 @ 05:42)        11-08    136  |  97  |  70<H>  ----------------------------<  222<H>  4.9   |  20<L>  |  6.94<H>    Ca    9.4      08 Nov 2021 05:42    TPro  6.8  /  Alb  3.1<L>  /  TBili  0.2  /  DBili  x   /  AST  18  /  ALT  5<L>  /  AlkPhos  106  11-08                        7.9    11.97 )-----------( 187      ( 08 Nov 2021 05:42 )             25.7     Medications  MEDICATIONS  (STANDING):  aMIOdarone    Tablet 200 milliGRAM(s) Oral every 12 hours  apixaban 2.5 milliGRAM(s) Oral two times a day  aspirin  chewable 81 milliGRAM(s) Oral daily  bisacodyl Suppository 10 milliGRAM(s) Rectal once  chlorhexidine 2% Cloths 1 Application(s) Topical <User Schedule>  epoetin vern-epbx (RETACRIT) Injectable 8000 Unit(s) IV Push <User Schedule>  glucagon  Injectable 1 milliGRAM(s) IntraMuscular once  insulin glargine Injectable (LANTUS) 16 Unit(s) SubCutaneous at bedtime  insulin lispro (ADMELOG) corrective regimen sliding scale   SubCutaneous at bedtime  insulin lispro Injectable (ADMELOG) 10 Unit(s) SubCutaneous three times a day before meals  pantoprazole    Tablet 40 milliGRAM(s) Oral two times a day  polyethylene glycol 3350 17 Gram(s) Oral daily  senna 2 Tablet(s) Oral at bedtime  sevelamer carbonate 800 milliGRAM(s) Oral three times a day  sodium chloride 0.9%. 1000 milliLiter(s) (10 mL/Hr) IV Continuous <Continuous>      Physical Exam  General: Patient comfortable in bed  Vital Signs Last 12 Hrs  T(F): 98.3 (11-08-21 @ 11:15), Max: 98.3 (11-08-21 @ 11:15)  HR: 51 (11-08-21 @ 11:15) (51 - 54)  BP: 128/54 (11-08-21 @ 11:15) (109/54 - 128/54)  BP(mean): --  RR: 18 (11-08-21 @ 11:15) (18 - 18)  SpO2: 98% (11-08-21 @ 11:15) (98% - 99%)  Neck: No palpable thyroid nodules.  CVS: S1S2, No murmurs  Respiratory: No wheezing, no crepitations  GI: Abdomen soft, bowel sounds positive  Musculoskeletal:  edema lower extremities.   Skin: No skin rashes, no ecchymosis    Diagnostics    Free Thyroxine, Serum: AM Sched. Collection: 01-Nov-2021 06:00 (10-31 @ 13:47)  Free Thyroxine, Serum: AM Sched. Collection: 30-Oct-2021 06:00 (10-29 @ 12:10)  Free Thyroxine, Serum: AM Sched. Collection: 26-Oct-2021 06:00  Cancel Reason: Lab Operations Cancel (10-25 @ 13:56)           Chief complaint  Patient is a 61y old  Male who presents with a chief complaint of NSTEMI (08 Nov 2021 13:03)   Review of systems  Patient in bed, looks comfortable, no hypoglycemic episodes.    Labs and Fingersticks  CAPILLARY BLOOD GLUCOSE      POCT Blood Glucose.: 98 mg/dL (08 Nov 2021 13:57)  POCT Blood Glucose.: 189 mg/dL (08 Nov 2021 07:33)  POCT Blood Glucose.: 138 mg/dL (07 Nov 2021 21:38)  POCT Blood Glucose.: 106 mg/dL (07 Nov 2021 17:35)      Anion Gap, Serum: 19 *H* (11-08 @ 05:42)  Anion Gap, Serum: 20 *H* (11-07 @ 11:47)  Anion Gap, Serum: 18 *H* (11-07 @ 05:37)      Calcium, Total Serum: 9.4 (11-08 @ 05:42)  Calcium, Total Serum: 9.7 (11-07 @ 11:47)  Calcium, Total Serum: 9.7 (11-07 @ 05:37)  Albumin, Serum: 3.1 *L* (11-08 @ 05:42)    Alanine Aminotransferase (ALT/SGPT): 5 *L* (11-08 @ 05:42)  Alkaline Phosphatase, Serum: 106 (11-08 @ 05:42)  Aspartate Aminotransferase (AST/SGOT): 18 (11-08 @ 05:42)        11-08    136  |  97  |  70<H>  ----------------------------<  222<H>  4.9   |  20<L>  |  6.94<H>    Ca    9.4      08 Nov 2021 05:42    TPro  6.8  /  Alb  3.1<L>  /  TBili  0.2  /  DBili  x   /  AST  18  /  ALT  5<L>  /  AlkPhos  106  11-08                        7.9    11.97 )-----------( 187      ( 08 Nov 2021 05:42 )             25.7     Medications  MEDICATIONS  (STANDING):  aMIOdarone    Tablet 200 milliGRAM(s) Oral every 12 hours  apixaban 2.5 milliGRAM(s) Oral two times a day  aspirin  chewable 81 milliGRAM(s) Oral daily  bisacodyl Suppository 10 milliGRAM(s) Rectal once  chlorhexidine 2% Cloths 1 Application(s) Topical <User Schedule>  epoetin vern-epbx (RETACRIT) Injectable 8000 Unit(s) IV Push <User Schedule>  glucagon  Injectable 1 milliGRAM(s) IntraMuscular once  insulin glargine Injectable (LANTUS) 16 Unit(s) SubCutaneous at bedtime  insulin lispro (ADMELOG) corrective regimen sliding scale   SubCutaneous at bedtime  insulin lispro Injectable (ADMELOG) 10 Unit(s) SubCutaneous three times a day before meals  pantoprazole    Tablet 40 milliGRAM(s) Oral two times a day  polyethylene glycol 3350 17 Gram(s) Oral daily  senna 2 Tablet(s) Oral at bedtime  sevelamer carbonate 800 milliGRAM(s) Oral three times a day  sodium chloride 0.9%. 1000 milliLiter(s) (10 mL/Hr) IV Continuous <Continuous>      Physical Exam  General: Patient comfortable in bed  Vital Signs Last 12 Hrs  T(F): 98.3 (11-08-21 @ 11:15), Max: 98.3 (11-08-21 @ 11:15)  HR: 51 (11-08-21 @ 11:15) (51 - 54)  BP: 128/54 (11-08-21 @ 11:15) (109/54 - 128/54)  BP(mean): --  RR: 18 (11-08-21 @ 11:15) (18 - 18)  SpO2: 98% (11-08-21 @ 11:15) (98% - 99%)  Neck: No palpable thyroid nodules.  CVS: S1S2, No murmurs  Respiratory: No wheezing, no crepitations  GI: Abdomen soft, bowel sounds positive  Musculoskeletal:  edema lower extremities.   Skin: No skin rashes, no ecchymosis    Diagnostics    Free Thyroxine, Serum: AM Sched. Collection: 01-Nov-2021 06:00 (10-31 @ 13:47)  Free Thyroxine, Serum: AM Sched. Collection: 30-Oct-2021 06:00 (10-29 @ 12:10)  Free Thyroxine, Serum: AM Sched. Collection: 26-Oct-2021 06:00  Cancel Reason: Lab Operations Cancel (10-25 @ 13:56)

## 2021-11-08 NOTE — DISCHARGE NOTE NURSING/CASE MANAGEMENT/SOCIAL WORK - NSDCFUADDAPPT_GEN_ALL_CORE_FT
FU Dr Xiong after from dc from rehab.  Call [phone] with questions or concerns  FU Dr Pinedo vascular surgeon.  Call to schedule appointment

## 2021-11-08 NOTE — DISCHARGE NOTE PROVIDER - HOSPITAL COURSE
61 y/o M w/ hx of DM2, HTN and HLD initially presented as transfer from [facility] for chest pain concerning for NSTEMI and possible new CHF c/b JOSHUA likely on CKD, and hypertensive emergency. Hospital course complicated by s/p RRT for CVA/TIA ruled out and worsening anemia requiring 1 PRBC. Pt S/p HD session today and underwent LHC showing Multivessel CAD. CT surgery consulted for CABG evaluation.     Surgery/Hospital Course:  10/07 Code stroke called for left facial droop, CT showed old frontal infarct, found to have high grade left carotid stenosis, but unclear cause for acute neurologic deficit  10/13 initiated HD, patient admitted with a creatinine of 4.05, but did not know of a history of renal   10/21 s/p C2L, MVR(T), & LAAL. Received 4 PRBC, 1 PLT, 1000 FIEBA intraop. Required dual pressor and inotrope support w/ , Primacor, Levo, and Epi gtts.   10/22 Extubated at 04:55.   10/24 SOB, urgent HD overnight, bipap  10/25 O2 via HFNC transitioned to 5L NC. Plan for HD today. SB in 50s, backup VVI paced at 50 via temporary epicardial PW.  10/26 Temporary access for HD removed yesterday as was not functioning, will consult IR for permacath. Plan for HD today. Check afternoon INR for coumadin dosing tonight   10/27 Pt received to floor on 2 [facility] - VSS, NAD. Coumadin on hold for AVF w/ vascular Friday or Monday - will discuss initiating heparin gtt to bridge w/ Dr. Xiong in AM. L LIDIA carrion placed in CTU for HD access will need to be transitioned to permacath prior to AVF - will d/w IR.   10/28 IR consult requested for PermCath placement HD with 1uPRBC transfusion today. Hod narcotics. Pt lethargic but responsive to verbal stimuli. FS 121mg/dl. 92% RA   10/29 VSS INR 1.49 HD cath poor flow- requested by nephrology to place new HD cath- Rt IJ Placed  - cleared for Vascular surgery placement of AVF on Tuesday   10/30 VSS  ECHO  resulted minimal pericardial effusion  improved mood / activity  HD today Perm Cath for Monday.  10/31 VSS  EKG today COVID swab Type and screen NPO at midnight  Perm cath in am OOB chair   ID consulted as per IR request for clearance prior to PermCath placement. WBC 13, afebrile. no evidence of s/s of infection.    LUE 1st stage basilic vein transposition performed. The first-stage AVF to mature from 4 to 6 weeks before the second-stage transposition of the matured brachial vein  construction. PT to reevaluate acute vs subacute rehab requirement.  11/3 Patient noted on tele SB /  @ 40 with loss of capture.  Intermittent Tachy / Seth --> EP consult  Maintain PW --> EPM VVI 40 / vma 10. H.D today per renal. Disposition: Acute  JR 60-80 burst afib.  VVI 40/10  threshold 4.3.   EKG ectopic atrial rhythm w/pac's.  On modified. amio load.  Set up for HD [place].  Rehab [facility] most likely Monday.   VVS; Continue with current medication regimen.  Continue on Modified amio load.  Awaiting HD chair at [place].  Plan for [facility] on . PW to be cut prior to discharge.  Per Dr. Xiong will restart AC therapy Eliquis 2.5 mg PO BID upon discharge.    VVS; Continue on current medication regimen. Plan for discharge to Rehab on .  Covid PCR to be sent in AM.  HD per Renal     VSS.  Patient to undergo 2 hrs of HD today as discussed with renal for elevated K+ 5.3 creat 7.78/fluid overload.  Patient asymptomatic.  Will be undergo dialysis tomorrow as well.  Modified amio load changed to 200mg BID per Dr. Castañeda on bedside rounds for intermittent vpacing last evening.  Covid PCR sent for Acute rehab placement this week.    VSS  SR/SB 50-60.  Amio 200 QD.  PW cut.  HD today.  Covid-neg.  Eliquis for a/c

## 2021-11-08 NOTE — PROGRESS NOTE ADULT - SUBJECTIVE AND OBJECTIVE BOX
[facility] Division of Kidney Diseases & Hypertension  HEMODIALYSIS NOTE  --------------------------------------------------------------------------------  Chief Complaint: ESRD/Ongoing hemodialysis requirement    24 hour events/subjective:    pt had extra short treatment yesterday  Plan for HD today and then dc     PAST HISTORY  --------------------------------------------------------------------------------  No significant changes to PMH, PSH, FHx, SHx, unless otherwise noted    ALLERGIES & MEDICATIONS  --------------------------------------------------------------------------------  Allergies    No Known Allergies    Intolerances      Standing Inpatient Medications  aMIOdarone    Tablet 200 milliGRAM(s) Oral every 12 hours  apixaban 2.5 milliGRAM(s) Oral two times a day  aspirin  chewable 81 milliGRAM(s) Oral daily  bisacodyl Suppository 10 milliGRAM(s) Rectal once  chlorhexidine 2% Cloths 1 Application(s) Topical <User Schedule>  epoetin vern-epbx (RETACRIT) Injectable 8000 Unit(s) IV Push <User Schedule>  glucagon  Injectable 1 milliGRAM(s) IntraMuscular once  insulin glargine Injectable (LANTUS) 16 Unit(s) SubCutaneous at bedtime  insulin lispro (ADMELOG) corrective regimen sliding scale   SubCutaneous at bedtime  insulin lispro Injectable (ADMELOG) 10 Unit(s) SubCutaneous three times a day before meals  pantoprazole    Tablet 40 milliGRAM(s) Oral two times a day  polyethylene glycol 3350 17 Gram(s) Oral daily  senna 2 Tablet(s) Oral at bedtime  sevelamer carbonate 800 milliGRAM(s) Oral three times a day  sodium chloride 0.9%. 1000 milliLiter(s) IV Continuous <Continuous>    PRN Inpatient Medications  sodium chloride 0.9% lock flush 10 milliLiter(s) IV Push every 1 hour PRN      REVIEW OF SYSTEMS  --------------------------------------------------------------------------------    All other systems were reviewed and are negative, except as noted.    VITALS/PHYSICAL EXAM  --------------------------------------------------------------------------------  T(C): 36.6 (11-08-21 @ 06:45), Max: 37 (11-07-21 @ 15:00)  HR: 54 (11-08-21 @ 10:55) (52 - 57)  BP: 120/64 (11-08-21 @ 10:55) (106/64 - 134/62)  RR: 18 (11-08-21 @ 06:45) (17 - 18)  SpO2: 99% (11-08-21 @ 06:45) (96% - 99%)  Wt(kg): --        11-07-21 @ 07:01  -  11-08-21 @ 07:00  --------------------------------------------------------  IN: 940 mL / OUT: 1500 mL / NET: -560 mL      Physical Exam:  	Gen: NAD  	Pulm: improved air entry  	CV: RRR, S1S2; no rub  	Abd: +BS, soft  	UE: Warm, FROM,   	LE: Warm, FROM,  no edema  	Neuro: No focal deficits, intact gait  	Psych: Normal affect and mood  	Skin: Warm  	Vascular access: tunnel cath, maturing AVF    LABS/STUDIES  --------------------------------------------------------------------------------              7.9    11.97 >-----------<  187      [11-08-21 @ 05:42]              25.7     136  |  97  |  70  ----------------------------<  222      [11-08-21 @ 05:42]  4.9   |  20  |  6.94        Ca     9.4     [11-08-21 @ 05:42]    TPro  6.8  /  Alb  3.1  /  TBili  0.2  /  DBili  x   /  AST  18  /  ALT  5   /  AlkPhos  106  [11-08-21 @ 05:42]

## 2021-11-08 NOTE — PROGRESS NOTE ADULT - ASSESSMENT
Assessment  DMT2: 60y Male with DM T2 with hyperglycemia,  A1C 7%, was on oral meds and insulin at home, postop CABG on basal bolus insulin, blood  sugars are fluctuating within overall acceptable range, no hypoglycemias, eating meals, appears comfortable, planning DC.  Hypothyroidism: Patient has no history thyroid disease, was not on any thyroid supplements, TSH elevated, subclinical.  CAD: s/p CABG, on medications, no chest pain, stable, monitored.  HTN: Controlled,  on antihypertensive medications.  HLD: On statin  Obesity: No strict exercise routines, not on any weight loss program, neither on low calorie diet.        Esperanza Beckett MD  Cell: [phone]  Office: [phone]     Assessment  DMT2: 60y Male with DM T2 with hyperglycemia,  A1C 7%, was on oral meds and insulin at home, postop CABG on basal bolus insulin, blood  sugars are fluctuating within overall acceptable range, no hypoglycemias,  eating meals, appears comfortable, planning DC.  Hypothyroidism: Patient has no history thyroid disease, was not on any thyroid supplements, TSH elevated, subclinical.  CAD: s/p CABG, on medications, no chest pain, stable, monitored.  HTN: Controlled,  on antihypertensive medications.  HLD: On statin  Obesity: No strict exercise routines, not on any weight loss program, neither on low calorie diet.        Esperanza Beckett MD  Cell: [phone]  Office: [phone]

## 2021-11-08 NOTE — H&P ADULT - ASSESSMENT
Assessment/Plan:  TOM PLEITEZ is a 61y with a history of DM2, ?CKD, HTN and HLD  who presented to [facility] on 10/6/21 with SOB, CP found to have multivessel CAD and JOSHUA on CKD, s/p CABGx2, MVR, and LAAL. Started on HD T/T/S, now on HD M/W/F s/p Permacath and LUE AVF. Hospital Course complicated by post-op hemorrhagic shock , received 4 PRBC, 1 PLT, 1000 FIEBA intraop and post op dual pressor and inotrope support w/ , Primacor, Levo, and Epi gtts.  Afib, PACs- now on amiodarone. Patient now admitted for a multidisciplinary rehab program. 21 @ 14:40    -------------    Rehab Management/MEDICAL MANAGEMENT     #Debility  - Gait Instability, ADL impairments and Functional impairments: start Comprehensive Rehab Program of PT/OT  - 3 hours a day, 5 days a week  - Orthotics as needed   - also will benefit from cardiopulmonary conditioning.    #CAD and Afib s/p CABGx2, LAAL, MVR  - Most recent echo on 10/28 shows restoration of LVEF   - c/w amiodarone 200mg q12h  - c/w eliquis 2.5mg bid  - c/w ASA 81 daily  - close f/u Medicine    #?CKD on HD - M/W/F  - retacrit 8000ui intraHD  - renal consult  - Permacath placed 21 and LUE AVF placed   - chlorhexidine to keep access sites clean    #DM  - lantus 16 unit qhs  - lispro 10 unit premeal tid  - ISS    #Sleep  - melatonin PRN     #Skin  - Skin on admission  - Pressure injury/Skin: OOB to Chair, PT/OT     #Pain Mgmt   - Tylenol PRN    #GI/Bowel Mgmt   - Continent c/w Senna, Miralax    #/Bladder Mgmt   -  PVR per routine    #FEN   - Diet - Regular + Thins  [Renal, DASH/TLC]      #Precautions / PROPHYLAXIS:   - Falls, Cardiac, Sternal,   - ortho: Weight bearing status: WBAT   - Lungs: Aspiration, Incentive Spirometer   - DVT: eliquis        MEDICAL PROGNOSIS: GOOD                                   REHAB POTENTIAL: GOOD  ESTIMATED DISPOSITION: HOME                             ELOS: 10-14 Days   EXPECTED THERAPY:     P.T. 1hr/day       O.T. 1hr/day      S.L.P. 1hr/day      EXP FREQUENCY: 5 days per 7 day period     PRESCREEN COMPARISON: I have reviewed the prescreen information and I have found no relevant changes between the preadmission screening and my post admission evaluation     RATIONALE FOR INPATIENT ADMISSION - Patient demonstrates the following: (check all that apply)  [X] Medically appropriate for rehabilitation admission  [X] Has attainable rehab goals with an appropriate initial discharge plan  [X] Has rehabilitation potential (expected to make a significant improvement within a reasonable period of time)   [X] Requires close medical managment by a rehab physician, rehab nursing care, Hospitalist and comprehensive interdisciplinary team (including PT, OT, & or SLP, Prosthetics and Orthotics)     Assessment/Plan:  TOM PLEITEZ is a 61y with a history of DM2, ?CKD, HTN and HLD  who presented to [facility] on [facility] with SOB, CP found to have multivessel CAD and JOSHUA on CKD, s/p CABGx2, MVR, and LAAL. Started on HD T/T/S, now on HD M/W/F s/p Permacath and LUE AVF. Hospital Course complicated by post-op hemorrhagic shock , received 4 PRBC, 1 PLT, 1000 FIEBA intraop and post op dual pressor and inotrope support w/ , Primacor, Levo, and Epi gtts.  Afib, PACs- now on amiodarone. Patient now admitted for a multidisciplinary rehab program. 21 @ 14:40    -------------    Rehab Management/MEDICAL MANAGEMENT     #Debility  - Gait Instability, ADL impairments and Functional impairments: start Comprehensive Rehab Program of PT/OT/SLP  - 3 hours a day, 5 days a week  - Orthotics as needed   - also will benefit from cardiopulmonary conditioning.  - SLP for cognitive and memory deficits    #CAD and Afib s/p CABGx2, LAAL, MVR  - Most recent echo on 10/28 shows restoration of LVEF   - c/w amiodarone 200mg q12h  - c/w eliquis 2.5mg bid  - c/w ASA 81 daily  - close f/u Medicine    #?CKD on HD - M/W/F  - retacrit 8000ui intraHD  - renal consult  - Permacath placed 21 and LUE AVF placed   - chlorhexidine to keep access sites clean    #DM  - lantus 16 unit qhs  - lispro 10 unit premeal tid  - ISS    #Sleep  - melatonin PRN     #Skin  - Skin on admission: Sacral/left buttock sacral decubital ulcer with visible subcutaneous/dermis, dry b/l heels, permacath is c/d/i, LUE AVF site c/d/i, drain sites c/d/i; sternal incision site is c/d/i  - Pressure injury/Skin: OOB to Chair, PT/OT   - continue monitoring wound, incision and drain sites.  - Wound consult  - alyvene foam dressing and cavilon to wound    #Pain Mgmt   - Tylenol PRN    #GI/Bowel Mgmt   - Continent c/w Senna, Miralax    #/Bladder Mgmt   -  PVR per routine    #FEN   - Diet - Regular + Thins  [Renal, DASH/TLC]      #Precautions / PROPHYLAXIS:   - Falls, Cardiac, Sternal,   - ortho: Weight bearing status: WBAT   - Lungs: Aspiration, Incentive Spirometer   - DVT: eliquis        MEDICAL PROGNOSIS: GOOD                                   REHAB POTENTIAL: GOOD  ESTIMATED DISPOSITION: HOME                             ELOS: 10-14 Days   EXPECTED THERAPY:     P.T. 1hr/day       O.T. 1hr/day      S.L.P. 1hr/day      EXP FREQUENCY: 5 days per 7 day period     PRESCREEN COMPARISON: I have reviewed the prescreen information and I have found no relevant changes between the preadmission screening and my post admission evaluation     RATIONALE FOR INPATIENT ADMISSION - Patient demonstrates the following: (check all that apply)  [X] Medically appropriate for rehabilitation admission  [X] Has attainable rehab goals with an appropriate initial discharge plan  [X] Has rehabilitation potential (expected to make a significant improvement within a reasonable period of time)   [X] Requires close medical managment by a rehab physician, rehab nursing care, Hospitalist and comprehensive interdisciplinary team (including PT, OT, & or SLP, Prosthetics and Orthotics)

## 2021-11-08 NOTE — H&P ADULT - HISTORY OF PRESENT ILLNESS
61 y/o M w/ hx of DM2, HTN and HLD initially presented as transfer from [facility] for chest pain concerning for NSTEMI and possible new CHF c/b JOSHUA likely on CKD, and hypertensive emergency on 10/6/21. Hospital course complicated by s/p RRT for CVA/TIA ruled out and worsening anemia requiring 1 PRBC. Pt S/p HD session today and underwent LHC showing Multivessel CAD. CT surgery consulted for CABG evaluation. Patient developed left sided facial droop on 10/7 and stroke code was called. 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 Patient started Hemodialysis on 10/13. On 10/21 s/p CABGx2, Mitral Valve Repair, and left atrial appendage ligation, was extubated next day. He developed SOB on 10/24 and requeired bipap,  but was eventually trasitioned back down to [place]. Permacath placed 11/1/21 and LUE AV Fistula placed by vascular on 11/2/21. Her was noted to have PACs and Afib - started on amiodarone. He will be getting HD at [facility]. Cleared by vascular to restart eliquis 2.5mg bid. HD schedule changed from T/T/S to M/W/F.   Patient evaluated by PM&R PT/OT and recommended acute rehab. Patient admitted to [facility] on 11/8/21 for debility.

## 2021-11-08 NOTE — DISCHARGE NOTE PROVIDER - NSDCPNSUBOBJ_GEN_ALL_CORE
General: WN/WD NAD  Neurology: A&Ox3, nonfocal, COTO x 4  Respiratory: CTA B/L  CV: RRR, S1S2, no murmur  Abdominal: Soft, NT, ND no palpable mass  MSK: No edema, + peripheral pulses, FROM all 4 extremity  Incisions: intact, no erythema or drainage  Right permacath    Vital Signs Last 24 Hrs  T(C): 36.8 (08 Nov 2021 11:15), Max: 37 (07 Nov 2021 15:00)  T(F): 98.3 (08 Nov 2021 11:15), Max: 98.6 (07 Nov 2021 15:00)  HR: 51 (08 Nov 2021 11:15) (51 - 57)  BP: 128/54 (08 Nov 2021 11:15) (106/64 - 134/62)  BP(mean): 80 (07 Nov 2021 20:01) (80 - 80)  RR: 18 (08 Nov 2021 11:15) (17 - 18)  SpO2: 98% (08 Nov 2021 11:15) (96% - 99%)

## 2021-11-08 NOTE — PROGRESS NOTE ADULT - PROBLEM SELECTOR PLAN 4
Endo following   Continue Admelog 9u TID premeal and Lantus 16u qhs   Check FS TID/premeal and cover w/ ISS prn   Consistent carbohydrate diet
Endo following   Continue Admelog 9u TID premeal and Lantus 16u qhs   Check FS TID/premeal and cover w/ ISS prn   Consistent carbohydrate diet
acute CHF with dyspnea likely related to NSTEMI, Elevated BNP 45782, fluid overloaded on exam with sob  - c/w IV bumex 4mg q12  - started on coreg 6.125mg bid  - TTE EF 45%: mild-moderate LVSF  - strict I/O, daily weights  - monitor bmp, replete lytes as needed  - c/w NC, wean as tolerated  - ischemic workup as above
Endo following   Continue Admelog 9u TID premeal and Lantus 16u qhs   Check FS TID/premeal and cover w/ ISS prn   Consistent carbohydrate diet
acute CHF with dyspnea likely related to NSTEMI, Elevated BNP 05582, fluid overloaded on exam with sob  - c/w IV bumex 4mg q12  - started on coreg 6.125mg bid  - TTE EF 45%: mild-moderate LVSF  - strict I/O, daily weights  - monitor bmp, replete lytes as needed  - c/w NC, wean as tolerated  - ischemic workup as above
Suggest to continue medications, monitoring, FU primary team recommendations.
acute CHF with dyspnea likely related to NSTEMI, Elevated BNP 11343, fluid overloaded on exam with sob  - c/w IV bumex 4mg q12  - continue coreg 6.125mg bid  - TTE EF 45%: mild-moderate LVSF  - strict I/O, daily weights  - monitor bmp, replete lytes as needed  - c/w NC, wean as tolerated  - ischemic workup as above
Endo following   Continue Admelog 9u TID premeal and Lantus 16u qhs   Check FS TID/premeal and cover w/ ISS prn   Consistent carbohydrate diet
Suggest to continue medications, monitoring, FU primary team recommendations.
JOSHUA on CKD vs JOSHUA with hyperkalemia and meta acidosis on admission, reports hx of kidney disease but does not know baseline cr or nephrologist name  - renal US with normal kidney  - cr uptrending to 5.6 from 4.0   - monitor bmp  - c/w sodium bicarb 1300mg tid  - renal following  - explained risk of CARY requiring HD if get cath/contrast  - per renal, no urgent indication for HD but may require on this admission   -serologies pending
JOSHUA on CKD with hyperkalemia and meta acidosis on admission, reports hx of kidney disease but does not know baseline cr or nephrologist name  - obtain Renal US  - cr stable in low 4s  - monitor bmp  - increase sodium bicarb to 1300mg tid  - renal following  - explained risk of CARY requiring HD if get cath/contrast
Suggest to continue medications, monitoring, FU primary team recommendations.
acute CHF with dyspnea likely related to NSTEMI, Elevated BNP 43629, initially fluid overloaded on exam with sob  - ?c/w IV bumex 4mg q12  - continue coreg 6.125mg bid  - TTE EF 45%: mild-moderate LVSF  - strict I/O, daily weights  - monitor bmp, replete lytes as needed  - c/w NC, wean as tolerated  - ischemic workup as above  - volume status appears improved
acute CHF with dyspnea likely related to NSTEMI, Elevated BNP 74031, fluid overloaded on exam with sob  - c/w IV bumex 4mg q12  - started on coreg 6.125mg bid  - TTE EF 45%: mild-moderate LVSF  - strict I/O, daily weights  - monitor bmp, replete lytes as needed  - c/w NC, wean as tolerated  - ischemic workup as above
Endo following   Continue Admelog 9u TID premeal and Lantus 16u qhs   Check FS TID/premeal and cover w/ ISS prn   Consistent carbohydrate diet
Suggest to continue medications, monitoring, FU primary team recommendations.
Suggest to continue medications, monitoring, FU primary team recommendations.
acute CHF with dyspnea likely related to NSTEMI, Elevated BNP 99988, fluid overloaded on exam with sob  - c/w IV bumex 4mg q12  - started on coreg 6.125mg bid  - TTE EF 45%: mild-moderate LVSF  - strict I/O, daily weights  - monitor bmp, replete lytes as needed  - c/w NC, wean as tolerated  - ischemic workup as above
Daily CBC  Continue to trend H&H  Last transfusion 10/7
Suggest to continue medications, monitoring, FU primary team recommendations.
Endo following   Continue Admelog 9u TID premeal and Lantus 16u qhs   Check FS TID/premeal and cover w/ ISS prn   Consistent carbohydrate diet
Suggest to continue medications, monitoring, FU primary team recommendations.
JOSHUA on CKD vs JOSHUA with hyperkalemia and meta acidosis on admission, reports hx of kidney disease but does not know baseline cr or nephrologist name  - renal US with normal kidney  - cr uptrending to 5.4 from 4.0  - monitor bmp  - c/w sodium bicarb 1300mg tid  - renal following  - explained risk of CARY requiring HD if get cath/contrast  - per renal, no urgent indication for HD but may require on this admission   -serologies pending
Suggest to continue medications, monitoring, FU primary team recommendations.
Suggest to continue medications, monitoring, FU primary team recommendations.
Endo following   Continue Admelog 9u TID premeal and Lantus 16u qhs   Check FS TID/premeal and cover w/ ISS prn   Consistent carbohydrate diet
Endo following   Continue Admelog 9u TID premeal and Lantus 16u qhs   Check FS TID/premeal and cover w/ ISS prn   Consistent carbohydrate diet
Suggest to continue medications, monitoring, FU primary team recommendations.
Endo following   Continue Admelog 9u TID premeal and Lantus 16u qhs   Check FS TID/premeal and cover w/ ISS prn   Consistent carbohydrate diet
Suggest to continue medications, monitoring, FU primary team recommendations.
Endo following   Continue Admelog 9u TID premeal and Lantus 16u qhs   Check FS TID/premeal and cover w/ ISS prn   Consistent carbohydrate diet
Endo following   Continue Admelog 9u TID premeal and Lantus 16u qhs   Check FS TID/premeal and cover w/ ISS prn   Consistent carbohydrate diet
JOSHUA on CKD vs JOSHUA with hyperkalemia and meta acidosis on admission, reports hx of kidney disease but does not know baseline cr or nephrologist name  - renal US with normal kidney  - cr uptrending   - monitor bmp  - c/w sodium bicarb 1300mg tid  - renal following  - pt informed of risk of CARY requiring HD if get cath/contrast  - per renal, no urgent indication for HD but may require on this admission   -serologies pending
acute CHF with dyspnea likely related to NSTEMI, Elevated BNP 41098, initially fluid overloaded on exam with sob  - ?c/w IV bumex 4mg q12  - continue coreg 6.125mg bid  - TTE EF 45%: mild-moderate LVSF  - strict I/O, daily weights  - monitor bmp, replete lytes as needed  - c/w NC, wean as tolerated  - ischemic workup as above  - volume status appears improved
Suggest to continue medications, monitoring, FU primary team recommendations.
Endo following   Continue Admelog 9u TID premeal and Lantus 16u qhs   Check FS TID/premeal and cover w/ ISS prn   Consistent carbohydrate diet
Suggest to continue medications, monitoring, FU primary team recommendations.
acute CHF with dyspnea likely related to NSTEMI, Elevated BNP 69990, fluid overloaded on exam with sob  - c/w IV bumex 4mg q12  - started on coreg 6.125mg bid  - TTE EF 45%: mild-moderate LVSF  - strict I/O, daily weights  - monitor bmp, replete lytes as needed  - c/w NC, wean as tolerated  - ischemic workup as above
JOSHUA on CKD vs JOSHUA with hyperkalemia and meta acidosis on admission, reports hx of kidney disease but does not know baseline cr or nephrologist name  - renal US with normal kidney  - cr uptrending to 5.2 from 4.0  - monitor bmp  - c/w sodium bicarb 1300mg tid  - renal following  - explained risk of CARY requiring HD if get cath/contrast  - discussed with renal Dr Grubbs, no urgent indication for HD but rec repeat bmp tonight to check lytes, if any decompensation in fluid status may need urgent HD in MICU   -serologies pending
acute CHF with dyspnea likely related to NSTEMI, Elevated BNP 88156, fluid overloaded on exam with sob  - c/w IV bumex 4mg q12  - started on coreg 6.125mg bid  - TTE EF 45%: mild-moderate LVSF  - strict I/O, daily weights  - monitor bmp, replete lytes as needed  - c/w NC, wean as tolerated  - ischemic workup as above

## 2021-11-08 NOTE — PROGRESS NOTE ADULT - PROBLEM SELECTOR PLAN 6
Overweight/Obesity: Patient counseled for weight loss, exercise, low calorie diet.
Overweight/Obesity: Patient counseled for weight loss, exercise, low calorie diet.
hgb responded well to 1 unit prbcs, no s/s of bleeding  -hgb stable in 8s from 6  -monitor for bleeding  -c/w PPI IV BID  -per cards c/w asa and brillanta but stopped heparin ggt  -monitor cbc
hgb responded well to 1 unit prbcs, no s/s of bleeding  -hgb stable in 8s from 6  -monitor for bleeding  -c/w PPI IV BID  -monitor cbc
noncomplaint with insulin at home, Fs 300s  home regimen: Insulin Semglee 30U at bedtime, metformin 1000 mg BID  - hold metformin  -c/w lantus 25U at bedtime   - start premeal 3 units TID  - c/w WINDY  - adjust insulin as needed  - get a1c
hgb responded well to 1 unit prbcs, no s/s of bleeding  -hgb stable in 8s from 6  -monitor for bleeding  -c/w PPI IV BID  -per cards c/w asa and brillanta but stopped heparin ggt  -monitor cbc
Overweight/Obesity: Patient counseled for weight loss, exercise, low calorie diet.
hgb responded well to 1 unit prbcs, no s/s of bleeding  -hgb stable in 8s from 6  -monitor for bleeding  -c/w PPI IV BID  -per cards c/w asa and brillanta but stopped heparin ggt  -monitor cbc
hgb responded well to 1 unit prbcs, no s/s of bleeding  -hgb stable in 8s from 6  -monitor for bleeding  -c/w PPI IV BID  -per cards c/w asa and brillanta but stopped heparin ggt  -monitor cbc
Overweight/Obesity: Patient counseled for weight loss, exercise, low calorie diet.
hgb downtrending 6.7 from 8s, unknown baseline  -no s/s of GI bleeding or RP bleed or other bleeding at this time  -transfuse 1 unit prbcs   -monitor for bleeding  -start PPI IV BID  -recheck cbc post transfusion, cbc q6 hours  -per cards c/w asa and brillanta but stop heparin ggt
hgb responded well to 1 unit prbcs, no s/s of bleeding  -hgb stable in 8s from 6  -monitor for bleeding  -c/w PPI   -monitor cbc
Overweight/Obesity: Patient counseled for weight loss, exercise, low calorie diet.
hgb responded well to 1 unit prbcs, no s/s of bleeding  -hgb stable in 8s from 6  -monitor for bleeding  -c/w PPI IV BID  -monitor cbc
hgb responded well to 1 unit prbcs, no s/s of bleeding  -hgb stable in 8s from 6  -monitor for bleeding  -c/w PPI IV BID  -per cards c/w asa and brillanta but stopped heparin ggt  -monitor cbc
Overweight/Obesity: Patient counseled for weight loss, exercise, low calorie diet.
Overweight/Obesity: Patient counseled for weight loss, exercise, low calorie diet.
hgb responded well to 1 unit prbcs, no s/s of bleeding  -hgb stable in 8s from 6  -monitor for bleeding  -c/w PPI IV BID  -per cards c/w asa and brillanta but stopped heparin ggt  -monitor cbc
Overweight/Obesity: Patient counseled for weight loss, exercise, low calorie diet.
hgb responded well to 1 unit prbcs, no s/s of bleeding  -hgb stable in 8s from 6  -monitor for bleeding  -c/w PPI IV BID  -monitor cbc
hgb responded well to 1 unit prbcs, no s/s of bleeding  -hgb stable in 8s from 6  -monitor for bleeding  -c/w PPI IV BID  -per cards c/w asa and brillanta but stopped heparin ggt  -monitor cbc
Overweight/Obesity: Patient counseled for weight loss, exercise, low calorie diet.
hgb responded well to 1 unit prbcs, no s/s of bleeding  -hgb stable in 8s from 6  -monitor for bleeding  -c/w PPI IV BID  -monitor cbc

## 2021-11-08 NOTE — DISCHARGE NOTE PROVIDER - CARE PROVIDER_API CALL
Francis Xiong)  Surgery; Thoracic and Cardiac Surgery  [address] [place] [address]  Phone: [phone]  Fax: [phone]  Follow Up Time:     Michell Pinedo)  Surgery  [address] [facility] [address] [facility], [address]  [place] [address]  Phone: [phone]  Fax: [phone]  Follow Up Time:

## 2021-11-08 NOTE — DISCHARGE NOTE PROVIDER - NSDCMRMEDTOKEN_GEN_ALL_CORE_FT
amiodarone 200 mg oral tablet: 1 tab(s) orally every 12 hours  apixaban 2.5 mg oral tablet: 1 tab(s) orally 2 times a day  aspirin 81 mg oral tablet, chewable: 1 tab(s) orally once a day  epoetin vern: 8000 unit(s) intravenously 3 times a week  insulin glargine: 16 unit(s) subcutaneous once a day (at bedtime)  insulin lispro 100 units/mL injectable solution: 10 unit(s) subcutaneous 3 times a day (before meals)  pantoprazole 40 mg oral delayed release tablet: 1 tab(s) orally 2 times a day  polyethylene glycol 3350 oral powder for reconstitution: 17 gram(s) orally once a day  senna oral tablet: 2 tab(s) orally once a day (at bedtime)  sevelamer carbonate 800 mg oral tablet: 1 tab(s) orally 3 times a day

## 2021-11-08 NOTE — PROGRESS NOTE ADULT - PROBLEM SELECTOR PROBLEM 3
NSTEMI (non-ST elevation myocardial infarction)
NSTEMI (non-ST elevation myocardial infarction)
Acute CHF
NSTEMI (non-ST elevation myocardial infarction)
Hypertensive emergency
Hypertensive emergency
JOSHUA (acute kidney injury)
NSTEMI (non-ST elevation myocardial infarction)
Hypertensive emergency
JOSHUA (acute kidney injury)
JOSHUA (acute kidney injury)
NSTEMI (non-ST elevation myocardial infarction)
Acute CHF
JOSHUA (acute kidney injury)
NSTEMI (non-ST elevation myocardial infarction)
Hypertensive emergency
NSTEMI (non-ST elevation myocardial infarction)
Diabetes mellitus
JOSHUA (acute kidney injury)
NSTEMI (non-ST elevation myocardial infarction)
NSTEMI (non-ST elevation myocardial infarction)
Acute CHF
JOSHUA (acute kidney injury)
NSTEMI (non-ST elevation myocardial infarction)
NSTEMI (non-ST elevation myocardial infarction)
Acute CHF
Hypertensive emergency
Hypertensive emergency
NSTEMI (non-ST elevation myocardial infarction)
JOSHUA (acute kidney injury)
NSTEMI (non-ST elevation myocardial infarction)
JOSHUA (acute kidney injury)
Acute CHF
NSTEMI (non-ST elevation myocardial infarction)
Hypertensive emergency

## 2021-11-08 NOTE — PROGRESS NOTE ADULT - PROBLEM SELECTOR PROBLEM 1
Diabetes mellitus
Diabetes mellitus
S/P CABG x 2
S/P CABG x 2
NSTEMI (non-ST elevation myocardial infarction)
S/P CABG x 2
Diabetes mellitus
S/P CABG x 2
S/P CABG x 2
Diabetes mellitus
JOSHUA (acute kidney injury)
S/P CABG x 2
Diabetes mellitus
JOSHUA (acute kidney injury)
NSTEMI (non-ST elevation myocardial infarction)
NSTEMI (non-ST elevation myocardial infarction)
Diabetes mellitus
S/P CABG x 2
S/P CABG x 2
Diabetes mellitus
NSTEMI (non-ST elevation myocardial infarction)
S/P CABG x 2
Diabetes mellitus
NSTEMI (non-ST elevation myocardial infarction)
S/P CABG x 2
S/P CABG x 2
Diabetes mellitus
S/P CABG x 2
Diabetes mellitus
JOSHUA (acute kidney injury)
JOSHUA (acute kidney injury)
NSTEMI (non-ST elevation myocardial infarction)

## 2021-11-08 NOTE — PROGRESS NOTE ADULT - ATTENDING SUPERVISION STATEMENT
Fellow
ACP
Fellow

## 2021-11-08 NOTE — H&P ADULT - NSHPPHYSICALEXAM_GEN_ALL_CORE
Vital Signs  T(C): 36.8 (11-08-21 @ 11:15), Max: 37 (11-07-21 @ 15:00)  HR: 51 (11-08-21 @ 11:15) (51 - 57)  BP: 128/54 (11-08-21 @ 11:15) (106/64 - 134/62)  RR: 18 (11-08-21 @ 11:15) (17 - 18)  SpO2: 98% (11-08-21 @ 11:15) (96% - 99%)    Gen - NAD, Comfortable  HEENT - NCAT, EOMI, MMM  Neck - Supple, No limited ROM  Pulm - CTAB, No wheeze, No rhonchi, No crackles  Cardiovascular - RRR, S1S2, No m/r/g  Abdomen - Soft, NT/ND, +BS  Extremities - No C/C/E, No calf tenderness  Neuro-     Cognitive - AAOx3     Communication - Fluent, No dysarthria     Attention: Intact      Judgement: Good evidence of judgement     Memory: Recall 3 objects immediate and 3 min later         Cranial Nerves - CN 2-12 intact     Motor -                    LEFT    UE - ShAB 5/5, EF 5/5, EE 5/5, WE 5/5,  5/5                    RIGHT UE - ShAB 5/5, EF 5/5, EE 5/5, WE 5/5,  5/5                    LEFT    LE - HF 5/5, KE 5/5, DF 5/5, PF 5/5                    RIGHT LE - HF 5/5, KE 5/5, DF 5/5, PF 5/5        Sensory - Intact to LT     Reflexes - DTR Intact, No primitive reflex     Coordination - FTN intact     Tone - normal  Psychiatric - Mood stable, Affect WNL  Skin: Vital Signs  T(C): 36.8 (11-08-21 @ 11:15), Max: 37 (11-07-21 @ 15:00)  HR: 51 (11-08-21 @ 11:15) (51 - 57)  BP: 128/54 (11-08-21 @ 11:15) (106/64 - 134/62)  RR: 18 (11-08-21 @ 11:15) (17 - 18)  SpO2: 98% (11-08-21 @ 11:15) (96% - 99%)    Gen - NAD, Comfortable  HEENT - NCAT, EOMI, MMM  Neck - Supple, No limited ROM  Pulm - CTAB, No wheeze, No rhonchi, No crackles  Cardiovascular - RRR, S1S2, No m/r/g  Abdomen - Soft, NT/ND, +BS  Extremities - No C/C/E, No calf tenderness  Neuro-     Cognitive - AAOx3     Communication - Fluent, No dysarthria     Attention: Intact      Judgement: Good evidence of judgement     Memory: unable to recall 3 min later , only 1/3 objects     Cranial Nerves - CN 2-12 intact     Motor -                    LEFT    UE - ShAB 5/5, EF 5/5, EE 5/5, WE 5/5,  5/5                    RIGHT UE - ShAB 5/5, EF 5/5, EE 5/5, WE 5/5,  5/5                    LEFT    LE - HF 3/5, KE 4/5, DF 4/5, PF 4/5                    RIGHT LE - HF 3/5, KE 4/5, DF 4/5, PF 4/5        Sensory - Intact to LT     Reflexes - DTR Intact, No primitive reflex     Coordination - FTN intact     Tone - normal  Psychiatric - Mood stable, Affect WNL  Skin: Sacral/left buttock sacral decubital ulcer with visible subcutaneous/dermis, dry b/l heels, permacath is c/d/i, LUE AVF site c/d/i, drain sites c/d/i; sternal incision site is

## 2021-11-08 NOTE — DISCHARGE NOTE NURSING/CASE MANAGEMENT/SOCIAL WORK - PATIENT PORTAL LINK FT
You can access the FollowMyHealth Patient Portal offered by [facility] by registering at the following website: [email or web address]. By joining [employer]’s FollowMyHealth portal, you will also be able to view your health information using other applications (apps) compatible with our system.

## 2021-11-08 NOTE — PROGRESS NOTE ADULT - ASSESSMENT
60 year old male with PMHx of DM and HTN who presented to the hospital as a transfer from OSH for NSTEMI. The nephrology team was consulted due to elevated creatinine of 4.05 upon presentation.    JOSHUA on CKD, now on HD, need long term HD  - Likely with CKD from diabetic nephropathy, admitted with JOSHUA on CKD and hypervolemia, was initiated on HD prior to CABG for optimization.   --- initiated on HD on 10/14  - s/p LHC on 10/14 showing severe multivessel disease; now s/p CABG on 10/21    PLAN ON maintenance HD Today  - 1.5-2 liters today  Need fluid restriction 1 liter daily  Low K diet  -s/p tunneled catheter  -s/p AVF creation   - monitor BMP  - adjust meds as per HD     Anemia:   - in the setting of CKD   - iron studies wnl   - increase SISSY, 8000 units TIW with HD.   - monitor CBC    OK to dc after HD today from renal perspective  Dw CTS    (After 5 pm or on weekends please page the on-call fellow, can check [email or web address] for schedule. Login is marty garcia, schedule under [facility] medicine, psych, derm)

## 2021-11-08 NOTE — H&P ADULT - ATTENDING COMMENTS
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 Had blood transfusion for anemia. Now on HD via Right chest permacath, left AVF  Admitted to acute rehab 11/8//21    High post void residual urine volumes noted, no acute distress  Tolerating diet  no leg edema    Has sable and supportive family   Labs no acute abnormalities    Pleasant, with no overt cognitive deficits  Antigravity strenght all extremities,  Left arm AVF, Rt chest permacath    Commence PT/OT--muscle strengthening/stretching ROM, DME use  No blood draws/BP measurements on left am  Routine care  DVT ppx, GI protedtion  Will get Urology review if PVRs still elevated  >300cc, by end of the day    Seen and examined with Dr Montez ,Rehab Resident, Patient is stable to commence acute rehab    35 mins spent seeing/examining patient, reviewing investigation results, discussing with other providers,

## 2021-11-08 NOTE — DISCHARGE NOTE PROVIDER - NSDCFUADDAPPT_GEN_ALL_CORE_FT
FU Dr Xiong after from dc from rehab.  Call [phone] with questions or concerns  FU Dr Pinedo vascular surgeon.  Call to schedule appointment

## 2021-11-08 NOTE — DISCHARGE NOTE PROVIDER - CARE PROVIDERS DIRECT ADDRESSES
[email or web address]

## 2021-11-08 NOTE — PROGRESS NOTE ADULT - PROVIDER SPECIALTY LIST ADULT
CT Surgery
Critical Care
Critical Care
Endocrinology
Nephrology
Neurology
Neurology
Vascular Surgery
Vascular Surgery
CT Surgery
CT Surgery
Cardiology
Critical Care
Electrophysiology
Endocrinology
Hospitalist
Intervent Radiology
Nephrology
Neurology
Vascular Surgery
CT Surgery
Cardiology
Critical Care
Endocrinology
Hospitalist
Nephrology
Neurology
Vascular Surgery
CT Surgery
Cardiology
Endocrinology
Hospitalist
Nephrology
Neurology
Vascular Surgery
Vascular Surgery
Endocrinology
Endocrinology
Hospitalist
Nephrology
Neurology
Endocrinology
CT Surgery
Endocrinology
Endocrinology
CT Surgery
Endocrinology
Hospitalist
Hospitalist
CT Surgery
CT Surgery
Endocrinology
Hospitalist
Endocrinology
Hospitalist
Endocrinology
CT Surgery
Endocrinology
Endocrinology
Hospitalist
Hospitalist
Endocrinology
Hospitalist

## 2021-11-08 NOTE — H&P ADULT - NSHPREVIEWOFSYSTEMS_GEN_ALL_CORE
REVIEW OF SYSTEMS  Constitutional: No fever, No Chills, No fatigue  HEENT: No eye pain, No visual disturbances, No difficulty hearing, No Dysphagia   Pulm: No cough,  No shortness of breath  Cardio: No chest pain, No palpitations  GI:  No abdominal pain, No nausea, No vomiting  : No dysuria, No frequency, No hematuria  Neuro: No headaches, No memory loss, No loss of strength, No numbness, No tremors  Skin: No itching, No rashes, No lesions   Endo: No temperature intolerance  MSK: No joint pain, No joint swelling, No muscle pain, No Neck or back pain  Psych:  No depression, No anxiety REVIEW OF SYSTEMS  Constitutional: No fever, No Chills, No fatigue  HEENT: No eye pain, No visual disturbances, No difficulty hearing, No Dysphagia   Pulm: No cough,  No shortness of breath  Cardio: +sternal chest pain, No palpitations  GI:  No abdominal pain, No nausea, No vomiting  : No dysuria, No frequency, No hematuria  Neuro: No headaches, No memory loss, +loss of strength, No numbness, No tremors  Skin: No itching, No rashes, +lesion at sacrum  Endo: No temperature intolerance  MSK: No joint pain, No joint swelling, No muscle pain, No Neck or back pain  Psych:  No depression, No anxiety

## 2021-11-08 NOTE — PROGRESS NOTE ADULT - ASSESSMENT
59 yo male with DM2, HTN, and HLD who initially presented to [facility] on 10/05 as a transfer from [facility] for NSTEMI and possible CHF. Patient on Heparin drip for NSTEMI. LKW 10:45AM. Patient had episode of bradycardia (HR 30s), then became altered. RRT called. BP during RRT 70s/40s. Code stroke called for L facial droop.   CTH negative for acute pathology, old L frontal cortical infarct seen.   CTA H unremarkable. CTA N shows high-grade stenosis left internal carotid artery at the carotid bifurcation in the neck.  10/06 TTE: EF 45%, moderate-severe MR, mild-moderate L ventricular systolic dysfunction.   A1c 7  MRI no AIS but old strokes  HD cath placement 10/12   CD with severe L ICA and Mod R ICA   s/p LHC 10/14  CABG 10/21    Impression: Mild L nasolabial flattening and dysarthria, ?decreased eye closure strength on the L. Possibly secondary to R brain dysfunction due to acute stroke of unknown mechanism vs peripheral etiology. Patient with old L frontal infarct on CTH, also with high-grade stenosis of L ICA at the carotid bifurcation which likely cause of old stroke     Recommendations:   - f/u EP , hold AV mimi blockers   - perma cath 11/1 prior to AVF   - ASA 81MG PO daily, coumadin dosing for valve   - Avoid hypotension.  - High dose statin therapy - atorvastatin 40mg PO daily. LDL goal <70mg/dL.  -  vascular for ICA revascularization can be outpt. not acute   - lipid panel  - telemetry  - PT/OT/SS/SLP, OOBC  - check FS, glucose control <180  - GI/DVT ppx  - Counseling on diet, exercise, and medication adherence was done  - Counseling on smoking cessation and alcohol consumption offered when appropriate.  - Pain assessed and judicious use of narcotics when appropriate was discussed.    - Stroke education given when appropriate.  - Importance of fall prevention discussed.   - Differential diagnosis and plan of care discussed with patient and/or family and primary team  - Thank you for allowing me to participate in the care of this patient. Call with questions.   - d/c planning rehab this week   Marcelino Patel MD  Vascular Neurology .

## 2021-11-08 NOTE — PROGRESS NOTE ADULT - PROBLEM SELECTOR PROBLEM 5
Hyperlipidemia
Bradycardia
Hyperlipidemia
Bradycardia
Altered mental status
Altered mental status
Hyperlipidemia
Bradycardia
Hyperlipidemia
Altered mental status
Hyperlipidemia
Hyperkalemia
Altered mental status
Hyperlipidemia
Altered mental status
Altered mental status
Hyperlipidemia
Altered mental status
Altered mental status
Hyperlipidemia
Altered mental status

## 2021-11-08 NOTE — DISCHARGE NOTE PROVIDER - NSDCCPCAREPLAN_GEN_ALL_CORE_FT
PRINCIPAL DISCHARGE DIAGNOSIS  Diagnosis: S/P CABG x 2  Assessment and Plan of Treatment: take meds as prescribed  participate in rehab activities  shower daily  FU Dr Xiong after dc from rehab      SECONDARY DISCHARGE DIAGNOSES  Diagnosis: S/P MVR (mitral valve replacement)  Assessment and Plan of Treatment: take meds as prescribed   participate in rehab activities  shower daily  FU Dr Xiong after dc from rehab    Diagnosis: Acute renal failure  Assessment and Plan of Treatment: HD M/W/F  epogen 3x weekly    Diagnosis: AVF (arteriovenous fistula)  Assessment and Plan of Treatment: 1st stage AVF done  FU Dr Pinedo for completion AVF

## 2021-11-08 NOTE — PROGRESS NOTE ADULT - PROBLEM SELECTOR PROBLEM 6
Obesity
Obesity
Anemia
Obesity
Obesity
Anemia
Obesity
Anemia
Obesity
Anemia
Obesity
Anemia
Obesity
Anemia
Obesity
Anemia
Diabetes mellitus
Anemia

## 2021-11-08 NOTE — H&P ADULT - NSHPLABSRESULTS_GEN_ALL_CORE
11-08    136  |  97  |  70<H>  ----------------------------<  222<H>  4.9   |  20<L>  |  6.94<H>  11-07    137  |  96  |  81<H>  ----------------------------<  113<H>  5.3   |  21<L>  |  7.70<H>  11-07    135  |  95<L>  |  76<H>  ----------------------------<  132<H>  5.3   |  22  |  7.78<H>    Ca    9.4      08 Nov 2021 05:42  Ca    9.7      07 Nov 2021 11:47  Ca    9.7      07 Nov 2021 05:37    TPro  6.8  /  Alb  3.1<L>  /  TBili  0.2  /  DBili  x   /  AST  18  /  ALT  5<L>  /  AlkPhos  106  11-08                                7.9    11.97 )-----------( 187      ( 08 Nov 2021 05:42 )             25.7                         7.8    14.78 )-----------( 219      ( 07 Nov 2021 05:38 )             24.9                         8.2    13.75 )-----------( 180      ( 06 Nov 2021 08:18 )             26.1     CAPILLARY BLOOD GLUCOSE      POCT Blood Glucose.: 98 mg/dL (08 Nov 2021 13:57)  POCT Blood Glucose.: 189 mg/dL (08 Nov 2021 07:33)  POCT Blood Glucose.: 138 mg/dL (07 Nov 2021 21:38)  POCT Blood Glucose.: 106 mg/dL (07 Nov 2021 17:35)    < from: Xray Chest 2 Views PA/Lat (11.06.21 @ 09:19) >          INTERPRETATION:  DATE OF STUDY: 11/6/21    COMPARISON: 10/27/21    CLINICAL INDICATION: S/P CTS. Assess for effusion.    TECHNIQUE: PA and lateral chest films.    FINDINGS/  IMPRESSION:  Heart is top normal in size.  S/P sternotomy; mitral valve ring; CABG.  Right-sided double lumen catheter tip in the SVC.  No focal infiltrates. Left upper lung scarring is redemonstrated. Focal linear atelectasis seen in lower right lung.  No pleural effusion. No pneumothorax.    < end of copied text >    < from: TTE with Doppler (w/Cont) (10.28.21 @ 18:37) >    Conclusions:  1. Bioprosthetic mitral valve replacement. No mitral valve  regurgitation seen. Peak mitral valve gradient equals 17 mm  Hg, mean transmitral valve gradient equals 4-5 mm Hg, which  is probably normal in the settingof a bioprosthetic mitral  valve replacement. (HRabout 50s bpm)  2. Aortic valve not well visualized; appears to be a  calcified trileaflet valve with normal opening. No aortic  valve regurgitation seen.  3. Endocardial visualization enhanced with intravenous  injection of Ultrasonic Enhancing Agent (Definity).  Hyperdynamic left ventricular systolic function.  4. Normal right ventricular size and function.  5. Unable to estimate RVSP due to incomplete TR spectral  doppler signal.  6. Minimal pericardial effusion.  *** Compared with echocardiogram of 10/21/2021, patient is  s/p Bioprosthertic MVR.    < end of copied text >

## 2021-11-08 NOTE — PROGRESS NOTE ADULT - PROBLEM SELECTOR PROBLEM 4
Diabetes mellitus
Hypertension
Hypertension
Acute CHF
Hypertension
Acute CHF
Diabetes mellitus
Diabetes mellitus
Hypertension
Diabetes mellitus
Hypertension
Hypertension
Diabetes mellitus
JOSHUA (acute kidney injury)
Acute CHF
Hypertension
Anemia
Diabetes mellitus
Hypertension
Diabetes mellitus
Hypertension
Hypertension
Diabetes mellitus
Diabetes mellitus
Hypertension
Hypertension
Acute CHF
Acute CHF
Hypertension
JOSHUA (acute kidney injury)
Acute CHF
Diabetes mellitus
Hypertension
Acute CHF
Acute CHF
JOSHUA (acute kidney injury)
JOSHUA (acute kidney injury)
Acute CHF
JOSHUA (acute kidney injury)

## 2021-11-08 NOTE — PROGRESS NOTE ADULT - SUBJECTIVE AND OBJECTIVE BOX
Neurology Progress Note    S: Patient seen and examined. doing okay.  doing okay in bed   Medications:    MEDICATIONS  (STANDING):  aMIOdarone    Tablet 200 milliGRAM(s) Oral every 12 hours  apixaban 2.5 milliGRAM(s) Oral two times a day  aspirin  chewable 81 milliGRAM(s) Oral daily  bisacodyl Suppository 10 milliGRAM(s) Rectal once  chlorhexidine 2% Cloths 1 Application(s) Topical <User Schedule>  epoetin vern-epbx (RETACRIT) Injectable 8000 Unit(s) IV Push <User Schedule>  glucagon  Injectable 1 milliGRAM(s) IntraMuscular once  insulin glargine Injectable (LANTUS) 16 Unit(s) SubCutaneous at bedtime  insulin lispro (ADMELOG) corrective regimen sliding scale   SubCutaneous at bedtime  insulin lispro Injectable (ADMELOG) 10 Unit(s) SubCutaneous three times a day before meals  pantoprazole    Tablet 40 milliGRAM(s) Oral two times a day  polyethylene glycol 3350 17 Gram(s) Oral daily  senna 2 Tablet(s) Oral at bedtime  sevelamer carbonate 800 milliGRAM(s) Oral three times a day  sodium chloride 0.9%. 1000 milliLiter(s) (10 mL/Hr) IV Continuous <Continuous>    MEDICATIONS  (PRN):  sodium chloride 0.9% lock flush 10 milliLiter(s) IV Push every 1 hour PRN Pre/post blood products, medications, blood draw, and to maintain line patency          Vitals:  Vital Signs Last 24 Hrs  T(C): 36.6 (11-08-21 @ 06:45), Max: 37 (11-07-21 @ 15:00)  T(F): 97.9 (11-08-21 @ 06:45), Max: 98.6 (11-07-21 @ 15:00)  HR: 54 (11-08-21 @ 10:55) (52 - 57)  BP: 120/64 (11-08-21 @ 10:55) (106/64 - 134/62)  BP(mean): 80 (11-07-21 @ 20:01) (80 - 88)  RR: 18 (11-08-21 @ 06:45) (17 - 18)  SpO2: 99% (11-08-21 @ 06:45) (96% - 99%)      General Exam:   General Appearance: Appropriately dressed and in no acute distress       Head: Normocephalic, atraumatic and no dysmorphic features  Ear, Nose, and Throat: Moist mucous membranes  CVS: S1S2+  Resp: No SOB, no wheeze or rhonchi  Abd: soft NTND  Extremities: No edema, no cyanosis  Skin: No bruises, no rashes     Neurological Exam:  Mental Status: Awake, alert and oriented x2-3.  Able to follow simple and complex verbal commands. Able to name and repeat. fluent speech. No obvious aphasia or dysarthria noted.   Cranial Nerves: PERRL, EOMI, VFFC, sensation V1-V3 intact,  mild NLF facial asymmetry , equal elevation of palate, scm/trap 5/5, tongue is midline on protrusion. no obvious papilledema on fundoscopic exam. Hearing is grossly intact.   Motor: Normal bulk, tone and strength throughout. Fine finger movements were intact and symmetric. no tremors or drift noted.    Sensation: Intact to light touch and pinprick throughout. no right/left confusion. no extinction to tactile on DSS.   Reflexes: 1+ throughout at biceps, brachioradialis, triceps, patellars and ankles bilaterally and equal. No clonus. R toe and L toe were both downgoing.  Coordination: No dysmetria on FNF    Gait: deferred     I personally reviewed the below data/images/labs:    CBC Full  -  ( 08 Nov 2021 05:42 )  WBC Count : 11.97 K/uL  RBC Count : 2.69 M/uL  Hemoglobin : 7.9 g/dL  Hematocrit : 25.7 %  Platelet Count - Automated : 187 K/uL  Mean Cell Volume : 95.5 fl  Mean Cell Hemoglobin : 29.4 pg  Mean Cell Hemoglobin Concentration : 30.7 gm/dL  Auto Neutrophil # : 8.73 K/uL  Auto Lymphocyte # : 1.52 K/uL  Auto Monocyte # : 1.32 K/uL  Auto Eosinophil # : 0.30 K/uL  Auto Basophil # : 0.00 K/uL  Auto Neutrophil % : 72.9 %  Auto Lymphocyte % : 12.7 %  Auto Monocyte % : 11.0 %  Auto Eosinophil % : 2.5 %  Auto Basophil % : 0.0 %        11-08    136  |  97  |  70<H>  ----------------------------<  222<H>  4.9   |  20<L>  |  6.94<H>    Ca    9.4      08 Nov 2021 05:42    TPro  6.8  /  Alb  3.1<L>  /  TBili  0.2  /  DBili  x   /  AST  18  /  ALT  5<L>  /  AlkPhos  106  11-08    -10/07 CTH: No hemorrhage. 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   -10/07 CTA N: High-grade stenosis left internal carotid artery at the carotid bifurcation in the neck.   -10/07 CTA H: Normal intracranial circulation.    < from: MR Head No Cont (10.09.21 @ 20:22) >    EXAM:  MR BRAIN                            PROCEDURE DATE:  10/09/2021            INTERPRETATION:  CLINICAL HISTORY:Facial droop, on heparin drip, NSTEMI . Severe left ICA stenosis.    TECHNIQUE:  MRI of brain without contrast dated 10/9/2021.  Examination consisted of transaxial T1, T2, FLAIR, gradient echo as well as diffusion-weighted sequences with corresponding ADC maps. Coronal T2 and sagittal T1-weighted images were also acquired through the brain.    COMPARISON: CT head and CTA head [facility] 10/7/2021    FINDINGS:  There are no areas abnormal restricted diffusion within the brain parenchyma to suggest acute/subacute infarct. There is no acute intracranial hemorrhage, vasogenic edema or abnormal susceptibility artifact. Chronic lacunar infarcts are seen in the left frontal lobe, right frontal cranial radiata and right cerebellum. Additional nonspecific patchy and confluent areas of T2/FLAIR prolongation in the bihemispheric white matter likely represents mild chronic microvascular changes.    The ventricles, sulci and cisternal spaces are stable in size and configuration. There is no midline shift or abnormal extra-axial fluid collection.    Flow-voids of the major intracranial vessels are maintained, as seen best on the T2-weighted images, indicating their patency.    The visualized paranasal sinuses and mastoid air cells are free of acute disease. The orbital regions are unremarkable.    IMPRESSION:  No acute infarct or intracranial hemorrhage. Chronic infarcts and microvascular changes as above.    --- End of Report ---      NEIL CARDENAS MD; Attending Radiologist  This document has been electronically signed. Oct 10 2021  4:26AM    < end of copied text >  < from: VA Duplex Carotid, Bilat (10.08.21 @ 17:07) >    EXAM:  CAROTID DUPLEX BILATERAL                            PROCEDURE DATE:  10/08/2021      INTERPRETATION:  CLINICAL INFORMATION: Left ICA high-grade stenosis identified on recent CTA neck.    COMPARISON: CTA neck 10/7/2021.    TECHNIQUE: Grayscale, color and spectral Doppler examination of both carotid arteries was performed.    FINDINGS:    There are atheromatous plaques are present bilaterally in the region of the bifurcations of the common carotid arteries into internal and external branches.    Velocity elevation of the proximal right internal carotid artery results in 50-69% flow-limiting stenosis.    Velocity elevation of the proximal left internal carotid artery results in 70-9% flow-limiting stenosis.    Bilateral flow-limiting stenoses noted of the right and left external carotid arteries as well.    Peak systolic velocities are as follows:    RIGHT:  PROX CCA = 126 cm/s  DIST CCA = 99 cm/s  PROX ICA = 137 cm/s  MID ICA = 86 cm/s  DIST ICA = 80 cm/s  ECA = 202 cm/s    LEFT:  PROX CCA = 100 cm/s  DIST CCA = 59 cm/s  PROX ICA = 313 cm/s  MID ICA = 72 cm/s  DIST ICA = 47 cm/s  ECA = 298 cm/s    Antegrade flow is noted within both vertebral arteries.    IMPRESSION:    Left internal carotid artery 70-99% flow-limiting stenosis.  Right internal carotid artery 50-69% flow-limiting stenosis.  Bilateral external carotid artery flow limiting stenoses.  INDIANA Hammond was informed of these findings on 10/8/2021 at 5:06 PM.    Measurement of carotid stenosis is based on velocity parameters that correlate the residual internal carotid diameter with that of the more distal vessel in accordance with a method such as the [employer] Symptomatic Carotid Endarterectomy Trial (NASCET).    --- End of Report ---    FELIX MCMAHON M.D., ATTENDING RADIOLOGIST  This document has been electronically signed. Oct  8 2021  5:21PM    < end of copied text >

## 2021-11-09 ENCOUNTER — TRANSCRIPTION ENCOUNTER (OUTPATIENT)
Age: 61
End: 2021-11-09

## 2021-11-09 LAB
ALBUMIN SERPL ELPH-MCNC: 2.1 G/DL — LOW (ref 3.3–5)
ALP SERPL-CCNC: 110 U/L — SIGNIFICANT CHANGE UP (ref 40–120)
ALT FLD-CCNC: 11 U/L — SIGNIFICANT CHANGE UP (ref 10–45)
ANION GAP SERPL CALC-SCNC: 10 MMOL/L — SIGNIFICANT CHANGE UP (ref 5–17)
AST SERPL-CCNC: 30 U/L — SIGNIFICANT CHANGE UP (ref 10–40)
BASOPHILS # BLD AUTO: 0.1 K/UL — SIGNIFICANT CHANGE UP (ref 0–0.2)
BASOPHILS NFR BLD AUTO: 0.9 % — SIGNIFICANT CHANGE UP (ref 0–2)
BILIRUB SERPL-MCNC: 0.4 MG/DL — SIGNIFICANT CHANGE UP (ref 0.2–1.2)
BUN SERPL-MCNC: 49 MG/DL — HIGH (ref 7–23)
CALCIUM SERPL-MCNC: 9.3 MG/DL — SIGNIFICANT CHANGE UP (ref 8.4–10.5)
CHLORIDE SERPL-SCNC: 99 MMOL/L — SIGNIFICANT CHANGE UP (ref 96–108)
CO2 SERPL-SCNC: 29 MMOL/L — SIGNIFICANT CHANGE UP (ref 22–31)
COVID-19 NUCLEOCAPSID GAM AB INTERP: POSITIVE
COVID-19 NUCLEOCAPSID TOTAL GAM ANTIBODY RESULT: 45.7 INDEX — HIGH
COVID-19 SPIKE DOMAIN AB INTERP: POSITIVE
COVID-19 SPIKE DOMAIN ANTIBODY RESULT: >250 U/ML — HIGH
CREAT SERPL-MCNC: 5.72 MG/DL — HIGH (ref 0.5–1.3)
EOSINOPHIL # BLD AUTO: 0.41 K/UL — SIGNIFICANT CHANGE UP (ref 0–0.5)
EOSINOPHIL NFR BLD AUTO: 3.8 % — SIGNIFICANT CHANGE UP (ref 0–6)
GLUCOSE BLDC GLUCOMTR-MCNC: 103 MG/DL — HIGH (ref 70–99)
GLUCOSE BLDC GLUCOMTR-MCNC: 135 MG/DL — HIGH (ref 70–99)
GLUCOSE BLDC GLUCOMTR-MCNC: 146 MG/DL — HIGH (ref 70–99)
GLUCOSE BLDC GLUCOMTR-MCNC: 186 MG/DL — HIGH (ref 70–99)
GLUCOSE SERPL-MCNC: 128 MG/DL — HIGH (ref 70–99)
HBV SURFACE AB SER-ACNC: 3 MIU/ML — LOW
HBV SURFACE AG SER-ACNC: SIGNIFICANT CHANGE UP
HCT VFR BLD CALC: 26.2 % — LOW (ref 39–50)
HCV AB S/CO SERPL IA: 0.37 S/CO — SIGNIFICANT CHANGE UP (ref 0–0.99)
HCV AB SERPL-IMP: SIGNIFICANT CHANGE UP
HGB BLD-MCNC: 8.3 G/DL — LOW (ref 13–17)
IMM GRANULOCYTES NFR BLD AUTO: 0.6 % — SIGNIFICANT CHANGE UP (ref 0–1.5)
LYMPHOCYTES # BLD AUTO: 1.77 K/UL — SIGNIFICANT CHANGE UP (ref 1–3.3)
LYMPHOCYTES # BLD AUTO: 16.5 % — SIGNIFICANT CHANGE UP (ref 13–44)
MCHC RBC-ENTMCNC: 30.2 PG — SIGNIFICANT CHANGE UP (ref 27–34)
MCHC RBC-ENTMCNC: 31.7 GM/DL — LOW (ref 32–36)
MCV RBC AUTO: 95.3 FL — SIGNIFICANT CHANGE UP (ref 80–100)
MONOCYTES # BLD AUTO: 2.13 K/UL — HIGH (ref 0–0.9)
MONOCYTES NFR BLD AUTO: 19.8 % — HIGH (ref 2–14)
NEUTROPHILS # BLD AUTO: 6.28 K/UL — SIGNIFICANT CHANGE UP (ref 1.8–7.4)
NEUTROPHILS NFR BLD AUTO: 58.4 % — SIGNIFICANT CHANGE UP (ref 43–77)
NRBC # BLD: 0 /100 WBCS — SIGNIFICANT CHANGE UP (ref 0–0)
PLATELET # BLD AUTO: 181 K/UL — SIGNIFICANT CHANGE UP (ref 150–400)
POTASSIUM SERPL-MCNC: 4.6 MMOL/L — SIGNIFICANT CHANGE UP (ref 3.5–5.3)
POTASSIUM SERPL-SCNC: 4.6 MMOL/L — SIGNIFICANT CHANGE UP (ref 3.5–5.3)
PROT SERPL-MCNC: 7.5 G/DL — SIGNIFICANT CHANGE UP (ref 6–8.3)
RBC # BLD: 2.75 M/UL — LOW (ref 4.2–5.8)
RBC # FLD: 14.6 % — HIGH (ref 10.3–14.5)
SARS-COV-2 IGG+IGM SERPL QL IA: 45.7 INDEX — HIGH
SARS-COV-2 IGG+IGM SERPL QL IA: >250 U/ML — HIGH
SARS-COV-2 IGG+IGM SERPL QL IA: POSITIVE
SARS-COV-2 IGG+IGM SERPL QL IA: POSITIVE
SARS-COV-2 RNA SPEC QL NAA+PROBE: SIGNIFICANT CHANGE UP
SODIUM SERPL-SCNC: 138 MMOL/L — SIGNIFICANT CHANGE UP (ref 135–145)
WBC # BLD: 10.75 K/UL — HIGH (ref 3.8–10.5)
WBC # FLD AUTO: 10.75 K/UL — HIGH (ref 3.8–10.5)

## 2021-11-09 PROCEDURE — 99223 1ST HOSP IP/OBS HIGH 75: CPT

## 2021-11-09 PROCEDURE — 99233 SBSQ HOSP IP/OBS HIGH 50: CPT

## 2021-11-09 RX ORDER — SEVELAMER CARBONATE 2400 MG/1
800 POWDER, FOR SUSPENSION ORAL
Refills: 0 | Status: DISCONTINUED | OUTPATIENT
Start: 2021-11-09 | End: 2021-11-12

## 2021-11-09 RX ADMIN — SENNA PLUS 2 TABLET(S): 8.6 TABLET ORAL at 21:29

## 2021-11-09 RX ADMIN — SEVELAMER CARBONATE 800 MILLIGRAM(S): 2400 POWDER, FOR SUSPENSION ORAL at 17:04

## 2021-11-09 RX ADMIN — APIXABAN 2.5 MILLIGRAM(S): 2.5 TABLET, FILM COATED ORAL at 17:04

## 2021-11-09 RX ADMIN — PANTOPRAZOLE SODIUM 40 MILLIGRAM(S): 20 TABLET, DELAYED RELEASE ORAL at 05:59

## 2021-11-09 RX ADMIN — APIXABAN 2.5 MILLIGRAM(S): 2.5 TABLET, FILM COATED ORAL at 05:59

## 2021-11-09 RX ADMIN — AMIODARONE HYDROCHLORIDE 200 MILLIGRAM(S): 400 TABLET ORAL at 08:01

## 2021-11-09 RX ADMIN — SEVELAMER CARBONATE 800 MILLIGRAM(S): 2400 POWDER, FOR SUSPENSION ORAL at 08:01

## 2021-11-09 RX ADMIN — INSULIN GLARGINE 16 UNIT(S): 100 INJECTION, SOLUTION SUBCUTANEOUS at 21:29

## 2021-11-09 RX ADMIN — Medication 81 MILLIGRAM(S): at 11:43

## 2021-11-09 RX ADMIN — Medication 10 UNIT(S): at 11:44

## 2021-11-09 RX ADMIN — Medication 2: at 11:44

## 2021-11-09 RX ADMIN — SEVELAMER CARBONATE 800 MILLIGRAM(S): 2400 POWDER, FOR SUSPENSION ORAL at 11:43

## 2021-11-09 RX ADMIN — Medication 10 UNIT(S): at 08:01

## 2021-11-09 RX ADMIN — Medication 1 APPLICATION(S): at 11:43

## 2021-11-09 RX ADMIN — Medication 3 MILLIGRAM(S): at 21:29

## 2021-11-09 NOTE — PROGRESS NOTE ADULT - SUBJECTIVE AND OBJECTIVE BOX
HPI:  62 y/o M w/ hx of DM2, HTN and HLD initially presented as transfer from [facility] for chest pain concerning for NSTEMI and possible new CHF c/b JOSHUA likely on CKD, and hypertensive emergency on 10/6/21. Hospital course complicated by s/p RRT for CVA/TIA ruled out and worsening anemia requiring 1 PRBC. Pt S/p HD session today and underwent LHC showing Multivessel CAD. CT surgery consulted for CABG evaluation. Patient developed left sided facial droop on 10/7 and stroke code was called. 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 Patient started Hemodialysis on 10/13. On 10/21 s/p CABGx2, Mitral Valve Repair, and left atrial appendage ligation, was extubated next day. He developed SOB on 10/24 and requeired bipap,  but was eventually trasitioned back down to [place]. Permacath placed 11/1/21 and LUE AV Fistula placed by vascular on 11/2/21. Her was noted to have PACs and Afib - started on amiodarone. He will be getting HD at [place]. Cleared by vascular to restart eliquis 2.5mg bid. HD schedule changed from T/T/S to M/W/F.   Patient evaluated by PM&R PT/OT and recommended acute rehab. Patient admitted to [facility] on 11/8/21 for debility. (08 Nov 2021 14:09)      INTERVAL HPI/OVERNIGHT EVENTS:  Chart Reviewed and patient seen at bedside.  Patient was retaining urine last night, voided 700+cc and SC shows over 1000cc.  Slept well.  +BM  No other issues.    ROS:  Denies fevers, chills, chest pain, shortness of breath, abdominal pain, headaches, nausea/vomiting    Vital Signs Last 24 Hrs  T(C): 36.9 (09 Nov 2021 07:21), Max: 37.2 (08 Nov 2021 16:54)  T(F): 98.5 (09 Nov 2021 07:21), Max: 99 (08 Nov 2021 16:54)  HR: 67 (09 Nov 2021 07:53) (47 - 67)  BP: 118/54 (09 Nov 2021 07:53) (115/50 - 132/74)  BP(mean): --  RR: 16 (09 Nov 2021 07:53) (16 - 18)  SpO2: 100% (09 Nov 2021 07:53) (97% - 100%)    Physical Exam:  Gen - NAD, Comfortable  HEENT - NCAT, EOMI, MMM  Neck - Supple, No limited ROM  Pulm - CTAB, No wheeze, No rhonchi, No crackles  Cardiovascular - RRR, S1S2, No m/r/g  Abdomen - Soft, NT/ND, +BS  Extremities - No C/C/E, No calf tenderness  Neuro-     Cognitive - AAOx3     Attention: Intact      Judgement: Good evidence of judgement     Motor -                    LEFT    UE - ShAB 5/5, EF 5/5, EE 5/5, WE 5/5,  5/5                    RIGHT UE - ShAB 5/5, EF 5/5, EE 5/5, WE 5/5,  5/5                    LEFT    LE - HF 4/5, KE 5/5, DF 5/5, PF 5/5                    RIGHT LE - HF 4/5, KE 5/5, DF 5/5, PF 5/5        Sensory - Intact to LT     Reflexes - DTR Intact, No primitive reflex     Coordination - FTN intact     Tone - normal  Psychiatric - Mood stable, Affect WNL  Skin: Sacral/left buttock sacral decubital ulcer with visible subcutaneous/dermis, dry b/l heels, permacath is c/d/i, LUE AVF site c/d/i, drain sites c/d/i; sternal incision site is also c/d/i    LABS:  11-09    138  |  99  |  49<H>  ----------------------------<  128<H>  4.6   |  29  |  5.72<H>  11-08    136  |  97  |  70<H>  ----------------------------<  222<H>  4.9   |  20<L>  |  6.94<H>  11-07    137  |  96  |  81<H>  ----------------------------<  113<H>  5.3   |  21<L>  |  7.70<H>    Ca    9.3      09 Nov 2021 05:00  Ca    9.4      08 Nov 2021 05:42  Ca    9.7      07 Nov 2021 11:47    TPro  7.5  /  Alb  2.1<L>  /  TBili  0.4  /  DBili  x   /  AST  30  /  ALT  11  /  AlkPhos  110  11-09  TPro  6.8  /  Alb  3.1<L>  /  TBili  0.2  /  DBili  x   /  AST  18  /  ALT  5<L>  /  AlkPhos  106  11-08                                              8.3    10.75 )-----------( 181      ( 09 Nov 2021 05:00 )             26.2                         7.9    11.97 )-----------( 187      ( 08 Nov 2021 05:42 )             25.7                         7.8    14.78 )-----------( 219      ( 07 Nov 2021 05:38 )             24.9     CAPILLARY BLOOD GLUCOSE      POCT Blood Glucose.: 135 mg/dL (09 Nov 2021 07:18)  POCT Blood Glucose.: 136 mg/dL (08 Nov 2021 21:18)  POCT Blood Glucose.: 98 mg/dL (08 Nov 2021 13:57)      MEDICATIONS:  MEDICATIONS  (STANDING):  aMIOdarone    Tablet 200 milliGRAM(s) Oral every 12 hours  apixaban 2.5 milliGRAM(s) Oral two times a day  AQUAPHOR (petrolatum Ointment) 1 Application(s) Topical daily  aspirin  chewable 81 milliGRAM(s) Oral daily  dextrose 40% Gel 15 Gram(s) Oral once  dextrose 5%. 1000 milliLiter(s) (50 mL/Hr) IV Continuous <Continuous>  dextrose 5%. 1000 milliLiter(s) (100 mL/Hr) IV Continuous <Continuous>  dextrose 50% Injectable 25 Gram(s) IV Push once  dextrose 50% Injectable 12.5 Gram(s) IV Push once  dextrose 50% Injectable 25 Gram(s) IV Push once  epoetin vern-epbx (RETACRIT) Injectable 8000 Unit(s) SubCutaneous <User Schedule>  glucagon  Injectable 1 milliGRAM(s) IntraMuscular once  insulin glargine Injectable (LANTUS) 16 Unit(s) SubCutaneous at bedtime  insulin lispro (ADMELOG) corrective regimen sliding scale   SubCutaneous at bedtime  insulin lispro (ADMELOG) corrective regimen sliding scale   SubCutaneous three times a day before meals  insulin lispro Injectable (ADMELOG) 10 Unit(s) SubCutaneous three times a day before meals  pantoprazole    Tablet 40 milliGRAM(s) Oral before breakfast  senna 2 Tablet(s) Oral at bedtime  sevelamer carbonate 800 milliGRAM(s) Oral three times a day    MEDICATIONS  (PRN):  acetaminophen     Tablet .. 650 milliGRAM(s) Oral every 6 hours PRN Mild Pain (1 - 3), Moderate Pain (4 - 6)  melatonin 3 milliGRAM(s) Oral at bedtime PRN Insomnia  polyethylene glycol 3350 17 Gram(s) Oral two times a day PRN Constipation         HPI:  62 y/o M w/ hx of DM2, HTN and HLD initially presented as transfer from [facility] for chest pain concerning for NSTEMI and possible new CHF c/b JOSHUA likely on CKD, and hypertensive emergency on 10/6/21. Hospital course complicated by s/p RRT for CVA/TIA ruled out and worsening anemia requiring 1 PRBC. Pt S/p HD session today and underwent LHC showing Multivessel CAD. CT surgery consulted for CABG evaluation. Patient developed left sided facial droop on 10/7 and stroke code was called. 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 Patient started Hemodialysis on 10/13. On 10/21 s/p CABGx2, Mitral Valve Repair, and left atrial appendage ligation, was extubated next day. He developed SOB on 10/24 and requeired bipap,  but was eventually trasitioned back down to [place]. Permacath placed 11/1/21 and LUE AV Fistula placed by vascular on 11/2/21. Her was noted to have PACs and Afib - started on amiodarone. He will be getting HD at [place]. Cleared by vascular to restart eliquis 2.5mg bid. HD schedule changed from T/T/S to M/W/F.   Patient evaluated by PM&R PT/OT and recommended acute rehab. Patient admitted to [facility] on 11/8/21 for debility. (08 Nov 2021 14:09)      INTERVAL HPI/OVERNIGHT EVENTS:  Chart Reviewed and patient seen at bedside.  Patient was retaining urine last night, voided 700+cc and SC shows over 1000cc.  Slept well.  +BM  No other issues.  will get urology review    ROS:  Denies fevers, chills, chest pain, shortness of breath, abdominal pain, headaches, nausea/vomiting    Vital Signs Last 24 Hrs  T(C): 36.9 (09 Nov 2021 07:21), Max: 37.2 (08 Nov 2021 16:54)  T(F): 98.5 (09 Nov 2021 07:21), Max: 99 (08 Nov 2021 16:54)  HR: 67 (09 Nov 2021 07:53) (47 - 67)  BP: 118/54 (09 Nov 2021 07:53) (115/50 - 132/74)  BP(mean): --  RR: 16 (09 Nov 2021 07:53) (16 - 18)  SpO2: 100% (09 Nov 2021 07:53) (97% - 100%)    Physical Exam:  Gen - NAD, Comfortable  HEENT - NCAT, EOMI, MMM  Neck - Supple, No limited ROM  Pulm - CTAB, No wheeze, No rhonchi, No crackles  Cardiovascular - RRR, S1S2, No m/r/g  Abdomen - Soft, NT/ND, +BS  Extremities - No C/C/E, No calf tenderness  Neuro-     Cognitive - AAOx3     Attention: Intact      Judgement: Good evidence of judgement     Motor -                    LEFT    UE - ShAB 5/5, EF 5/5, EE 5/5, WE 5/5,  5/5                    RIGHT UE - ShAB 5/5, EF 5/5, EE 5/5, WE 5/5,  5/5                    LEFT    LE - HF 4/5, KE 5/5, DF 5/5, PF 5/5                    RIGHT LE - HF 4/5, KE 5/5, DF 5/5, PF 5/5        Sensory - Intact to LT     Reflexes - DTR Intact, No primitive reflex     Coordination - FTN intact     Tone - normal  Psychiatric - Mood stable, Affect WNL  Skin: Sacral/left buttock sacral decubital ulcer with visible subcutaneous/dermis, dry b/l heels, permacath is c/d/i, LUE AVF site c/d/i sternal incision site is also c/d/i    LABS:  11-09    138  |  99  |  49<H>  ----------------------------<  128<H>  4.6   |  29  |  5.72<H>  11-08    136  |  97  |  70<H>  ----------------------------<  222<H>  4.9   |  20<L>  |  6.94<H>  11-07    137  |  96  |  81<H>  ----------------------------<  113<H>  5.3   |  21<L>  |  7.70<H>    Ca    9.3      09 Nov 2021 05:00  Ca    9.4      08 Nov 2021 05:42  Ca    9.7      07 Nov 2021 11:47    TPro  7.5  /  Alb  2.1<L>  /  TBili  0.4  /  DBili  x   /  AST  30  /  ALT  11  /  AlkPhos  110  11-09  TPro  6.8  /  Alb  3.1<L>  /  TBili  0.2  /  DBili  x   /  AST  18  /  ALT  5<L>  /  AlkPhos  106  11-08                                              8.3    10.75 )-----------( 181      ( 09 Nov 2021 05:00 )             26.2                         7.9    11.97 )-----------( 187      ( 08 Nov 2021 05:42 )             25.7                         7.8    14.78 )-----------( 219      ( 07 Nov 2021 05:38 )             24.9     CAPILLARY BLOOD GLUCOSE      POCT Blood Glucose.: 135 mg/dL (09 Nov 2021 07:18)  POCT Blood Glucose.: 136 mg/dL (08 Nov 2021 21:18)  POCT Blood Glucose.: 98 mg/dL (08 Nov 2021 13:57)      MEDICATIONS:  MEDICATIONS  (STANDING):  aMIOdarone    Tablet 200 milliGRAM(s) Oral every 12 hours  apixaban 2.5 milliGRAM(s) Oral two times a day  AQUAPHOR (petrolatum Ointment) 1 Application(s) Topical daily  aspirin  chewable 81 milliGRAM(s) Oral daily  dextrose 40% Gel 15 Gram(s) Oral once  dextrose 5%. 1000 milliLiter(s) (50 mL/Hr) IV Continuous <Continuous>  dextrose 5%. 1000 milliLiter(s) (100 mL/Hr) IV Continuous <Continuous>  dextrose 50% Injectable 25 Gram(s) IV Push once  dextrose 50% Injectable 12.5 Gram(s) IV Push once  dextrose 50% Injectable 25 Gram(s) IV Push once  epoetin vern-epbx (RETACRIT) Injectable 8000 Unit(s) SubCutaneous <User Schedule>  glucagon  Injectable 1 milliGRAM(s) IntraMuscular once  insulin glargine Injectable (LANTUS) 16 Unit(s) SubCutaneous at bedtime  insulin lispro (ADMELOG) corrective regimen sliding scale   SubCutaneous at bedtime  insulin lispro (ADMELOG) corrective regimen sliding scale   SubCutaneous three times a day before meals  insulin lispro Injectable (ADMELOG) 10 Unit(s) SubCutaneous three times a day before meals  pantoprazole    Tablet 40 milliGRAM(s) Oral before breakfast  senna 2 Tablet(s) Oral at bedtime  sevelamer carbonate 800 milliGRAM(s) Oral three times a day    MEDICATIONS  (PRN):  acetaminophen     Tablet .. 650 milliGRAM(s) Oral every 6 hours PRN Mild Pain (1 - 3), Moderate Pain (4 - 6)  melatonin 3 milliGRAM(s) Oral at bedtime PRN Insomnia  polyethylene glycol 3350 17 Gram(s) Oral two times a day PRN Constipation      Therapy  Patient will commence Therapy today, addressing functional deficits, balance and ROM

## 2021-11-09 NOTE — DIETITIAN INITIAL EVALUATION ADULT. - EDUCATION DIETARY MODIFICATIONS
Nutrition Education Provided on Consistent Carbohydrate Renal Diet/(2) meets goals/outcomes/verbalization

## 2021-11-09 NOTE — CONSULT NOTE ADULT - SUBJECTIVE AND OBJECTIVE BOX
NEPHROLOGY CONSULTATION    CHIEF COMPLAINT: s/p OHS    HPI:  Pt is 61 yo M w/hx of DM2, HTN and HLD initially presented as transfer from [facility] for chest pain concerning for NSTEMI and possible new CHF c/b JOSHUA likely on CKD, and hypertensive emergency on 10/6/21. Pt s/p LHC showing multivessel CAD. CT surgery consulted for CABG evaluation. Patient developed left sided facial droop on 10/7 and stroke code was called. He was found to have old frontal infarct and left carotid stenosis, but no acute findings. Patient started Hemodialysis on 10/14/21. On 10/21 s/p CABG x 2, Mitral Valve Repair, and left atrial appendage ligation, was extubated next day. Perm cath placed 11/1/21 and LUE AV Fistula placed by vascular on 11/2/21. He was noted to have PACs and Afib - started on amiodarone. Adm to AR at [facility] 11/8/21. Last HD yesterday.     ROS:  as above    Allergies:  No Known Allergies    PAST MEDICAL & SURGICAL HISTORY: as above  Hypertension  Hyperlipidemia  Diabetes mellitus    SOCIAL HISTORY:  negative    FAMILY HISTORY:  Family history of CVA (Father)    MEDICATIONS  (STANDING):  aMIOdarone    Tablet 200 milliGRAM(s) Oral every 12 hours  apixaban 2.5 milliGRAM(s) Oral two times a day  AQUAPHOR (petrolatum Ointment) 1 Application(s) Topical daily  aspirin  chewable 81 milliGRAM(s) Oral daily  dextrose 40% Gel 15 Gram(s) Oral once  dextrose 5%. 1000 milliLiter(s) (50 mL/Hr) IV Continuous <Continuous>  dextrose 5%. 1000 milliLiter(s) (100 mL/Hr) IV Continuous <Continuous>  dextrose 50% Injectable 25 Gram(s) IV Push once  dextrose 50% Injectable 12.5 Gram(s) IV Push once  dextrose 50% Injectable 25 Gram(s) IV Push once  epoetin vern-epbx (RETACRIT) Injectable 8000 Unit(s) SubCutaneous <User Schedule>  glucagon  Injectable 1 milliGRAM(s) IntraMuscular once  insulin glargine Injectable (LANTUS) 16 Unit(s) SubCutaneous at bedtime  insulin lispro (ADMELOG) corrective regimen sliding scale   SubCutaneous at bedtime  insulin lispro (ADMELOG) corrective regimen sliding scale   SubCutaneous three times a day before meals  insulin lispro Injectable (ADMELOG) 10 Unit(s) SubCutaneous three times a day before meals  pantoprazole    Tablet 40 milliGRAM(s) Oral before breakfast  senna 2 Tablet(s) Oral at bedtime  sevelamer carbonate 800 milliGRAM(s) Oral three times a day    Vital Signs Last 24 Hrs  T(C): 36.9 (11-09-21 @ 07:21), Max: 37.2 (11-08-21 @ 16:54)  T(F): 98.5 (11-09-21 @ 07:21), Max: 99 (11-08-21 @ 16:54)  HR: 67 (11-09-21 @ 07:53) (47 - 67)  BP: 118/54 (11-09-21 @ 07:53) (115/50 - 132/74)  RR: 16 (11-09-21 @ 07:53) (16 - 18)  SpO2: 100% (11-09-21 @ 07:53) (97% - 100%)    s1s2  b/l air entry  soft  edema    LABS:                        8.3    10.75 )-----------( 181      ( 09 Nov 2021 05:00 )             26.2     11-09    138  |  99  |  49<H>  ----------------------------<  128<H>  4.6   |  29  |  5.72<H>    Ca    9.3      09 Nov 2021 05:00    TPro  7.5  /  Alb  2.1<L>  /  TBili  0.4  /  DBili  x   /  AST  30  /  ALT  11  /  AlkPhos  110  11-09    LIVER FUNCTIONS - ( 09 Nov 2021 05:00 )  Alb: 2.1 g/dL / Pro: 7.5 g/dL / ALK PHOS: 110 U/L / ALT: 11 U/L / AST: 30 U/L / GGT: x           A/P:       NEPHROLOGY CONSULTATION    CHIEF COMPLAINT: s/p OHS    HPI:  Pt is 59 yo M w/hx of DM2, HTN and HLD initially presented as transfer from [facility] for chest pain concerning for NSTEMI and possible new CHF c/b JOSHUA likely on CKD, and hypertensive emergency on 10/6/21. Pt s/p LHC showing multivessel CAD. CT surgery consulted for CABG evaluation. Patient developed left sided facial droop on 10/7 and stroke code was called. He was found to have old frontal infarct and left carotid stenosis, but no acute findings. Patient started Hemodialysis on 10/14/21. On 10/21 s/p CABG x 2, Mitral Valve Repair, and left atrial appendage ligation, was extubated next day. Perm cath placed 11/1/21 and LUE AV Fistula placed by vascular on 11/2/21. He was noted to have PACs and Afib - started on amiodarone. Adm to AR at [facility] 11/8/21. Last HD yesterday. Denies CP, SOB, N/V/D/C/F/C.    ROS:  as above    Allergies:  No Known Allergies    PAST MEDICAL & SURGICAL HISTORY: as above  Hypertension  Hyperlipidemia  Diabetes mellitus    SOCIAL HISTORY:  negative    FAMILY HISTORY:  Family history of CVA (Father)    MEDICATIONS  (STANDING):  aMIOdarone    Tablet 200 milliGRAM(s) Oral every 12 hours  apixaban 2.5 milliGRAM(s) Oral two times a day  AQUAPHOR (petrolatum Ointment) 1 Application(s) Topical daily  aspirin  chewable 81 milliGRAM(s) Oral daily  dextrose 40% Gel 15 Gram(s) Oral once  dextrose 5%. 1000 milliLiter(s) (50 mL/Hr) IV Continuous <Continuous>  dextrose 5%. 1000 milliLiter(s) (100 mL/Hr) IV Continuous <Continuous>  dextrose 50% Injectable 25 Gram(s) IV Push once  dextrose 50% Injectable 12.5 Gram(s) IV Push once  dextrose 50% Injectable 25 Gram(s) IV Push once  epoetin vern-epbx (RETACRIT) Injectable 8000 Unit(s) SubCutaneous <User Schedule>  glucagon  Injectable 1 milliGRAM(s) IntraMuscular once  insulin glargine Injectable (LANTUS) 16 Unit(s) SubCutaneous at bedtime  insulin lispro (ADMELOG) corrective regimen sliding scale   SubCutaneous at bedtime  insulin lispro (ADMELOG) corrective regimen sliding scale   SubCutaneous three times a day before meals  insulin lispro Injectable (ADMELOG) 10 Unit(s) SubCutaneous three times a day before meals  pantoprazole    Tablet 40 milliGRAM(s) Oral before breakfast  senna 2 Tablet(s) Oral at bedtime  sevelamer carbonate 800 milliGRAM(s) Oral three times a day    Vital Signs Last 24 Hrs  T(C): 36.9 (11-09-21 @ 07:21), Max: 37.2 (11-08-21 @ 16:54)  T(F): 98.5 (11-09-21 @ 07:21), Max: 99 (11-08-21 @ 16:54)  HR: 67 (11-09-21 @ 07:53) (47 - 67)  BP: 118/54 (11-09-21 @ 07:53) (115/50 - 132/74)  RR: 16 (11-09-21 @ 07:53) (16 - 18)  SpO2: 100% (11-09-21 @ 07:53) (97% - 100%)    s1s2  b/l air entry  soft  sm edema    LABS:                        8.3    10.75 )-----------( 181      ( 09 Nov 2021 05:00 )             26.2     11-09    138  |  99  |  49<H>  ----------------------------<  128<H>  4.6   |  29  |  5.72<H>    Ca    9.3      09 Nov 2021 05:00    TPro  7.5  /  Alb  2.1<L>  /  TBili  0.4  /  DBili  x   /  AST  30  /  ALT  11  /  AlkPhos  110  11-09    LIVER FUNCTIONS - ( 09 Nov 2021 05:00 )  Alb: 2.1 g/dL / Pro: 7.5 g/dL / ALK PHOS: 110 U/L / ALT: 11 U/L / AST: 30 U/L / GGT: x           A/P:    S/p CABG x 2, Mitral Valve Repair, and left atrial appendage ligation 10/21/21  Hospital course complicated by JOSHUA/CKD  Now on HD  Will continue HD MWF  Renal diet  TMP as able  Epoetin w/HD  Will follow    [phone]

## 2021-11-09 NOTE — DIETITIAN INITIAL EVALUATION ADULT. - ADD RECOMMEND
1) Monitor Weights, Intake, Tolerance, Skin & Labwork   2) Recommend Change Diet to Consistent Carbohydrate Renal Diet 3) Nerpo 8oz Daily 4) Nutrition Education Provided on Consistent Carbohydrate Renal Diet 5) Continue Nutrition Plan of Care

## 2021-11-09 NOTE — CONSULT NOTE ADULT - SUBJECTIVE AND OBJECTIVE BOX
Patient is a 61y old  Male who presents with a chief complaint of Debility (08 Nov 2021 14:09)    HPI:  61 y/o M w/ hx of DM2, HTN and HLD initially presented as transfer from [facility] for chest pain concerning for NSTEMI and possible new CHF c/b JOSHUA likely on CKD, and hypertensive emergency on 10/6/21. Hospital course complicated by s/p RRT for CVA/TIA ruled out and worsening anemia requiring 1 PRBC. Pt had HD session and underwent LHC showing Multivessel CAD. CT surgery consulted for CABG evaluation. Patient developed left sided facial droop on 10/7 and stroke code was called. 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 Patient started Hemodialysis on 10/13. On 10/21 s/p CABGx2, Mitral Valve Repair, and left atrial appendage ligation, was extubated next day. He developed SOB on 10/24 and required bipap,  but was eventually trasitioned back down to [place]. Permacath placed 11/1/21 and LUE AV Fistula placed by vascular on 11/2/21. Her was noted to have PACs and Afib - started on amiodarone. He will be getting HD at [place]. Cleared by vascular to restart eliquis 2.5mg bid. HD schedule changed from T/T/S to M/W/F.   Patient evaluated by PM&R PT/OT and recommended acute rehab. Patient admitted to [facility] on 11/8/21 for debility. (08 Nov 2021 14:09)    PAST MEDICAL & SURGICAL HISTORY:  Hypertension    Hyperlipidemia    Diabetes mellitus    No pertinent past surgical history      SOCIAL HISTORY:  FAMILY HISTORY:    ALLERGIES:  No Known Allergies    MEDICATIONS  (STANDING):  aMIOdarone    Tablet 200 milliGRAM(s) Oral every 12 hours  apixaban 2.5 milliGRAM(s) Oral two times a day  AQUAPHOR (petrolatum Ointment) 1 Application(s) Topical daily  aspirin  chewable 81 milliGRAM(s) Oral daily  dextrose 40% Gel 15 Gram(s) Oral once  dextrose 5%. 1000 milliLiter(s) (50 mL/Hr) IV Continuous <Continuous>  dextrose 5%. 1000 milliLiter(s) (100 mL/Hr) IV Continuous <Continuous>  dextrose 50% Injectable 25 Gram(s) IV Push once  dextrose 50% Injectable 12.5 Gram(s) IV Push once  dextrose 50% Injectable 25 Gram(s) IV Push once  epoetin vern-epbx (RETACRIT) Injectable 8000 Unit(s) SubCutaneous <User Schedule>  glucagon  Injectable 1 milliGRAM(s) IntraMuscular once  insulin glargine Injectable (LANTUS) 16 Unit(s) SubCutaneous at bedtime  insulin lispro (ADMELOG) corrective regimen sliding scale   SubCutaneous at bedtime  insulin lispro (ADMELOG) corrective regimen sliding scale   SubCutaneous three times a day before meals  insulin lispro Injectable (ADMELOG) 10 Unit(s) SubCutaneous three times a day before meals  pantoprazole    Tablet 40 milliGRAM(s) Oral before breakfast  senna 2 Tablet(s) Oral at bedtime  sevelamer carbonate 800 milliGRAM(s) Oral three times a day    MEDICATIONS  (PRN):  acetaminophen     Tablet .. 650 milliGRAM(s) Oral every 6 hours PRN Mild Pain (1 - 3), Moderate Pain (4 - 6)  melatonin 3 milliGRAM(s) Oral at bedtime PRN Insomnia  polyethylene glycol 3350 17 Gram(s) Oral two times a day PRN Constipation    Review of Systems: Refer to HPI for pertinent positives and negatives. All other ROS reviewed and negative except as otherwise stated above.    Vital Signs Last 24 Hrs  T(F): 98.5 (09 Nov 2021 07:21), Max: 99 (08 Nov 2021 16:54)  HR: 47 (09 Nov 2021 07:21) (47 - 64)  BP: 119/65 (09 Nov 2021 07:21) (115/50 - 132/74)  RR: 16 (09 Nov 2021 07:21) (16 - 18)  SpO2: 99% (09 Nov 2021 07:21) (97% - 99%)  I&O's Summary    PHYSICAL EXAM:  GENERAL: NAD, well-groomed, well-developed  HEAD:  Atraumatic, Normocephalic  EYES: EOMI, PERRL, conjunctiva and sclera clear  ENMT: Moist mucous membranes, Good dentition  NECK: Supple, No JVD  CHEST/LUNG: Clear to auscultation bilaterally, non-labored breathing, good air entry  HEART: RRR; S1/S2, No murmur  ABDOMEN: Soft, Nontender, Nondistended; Bowel sounds present  VASCULAR: Normal pulses, Normal capillary refill  EXTREMITIES: No cyanosis, No edema  LYMPH: No lymphadenopathy noted  SKIN: Warm, Intact  PSYCH: Normal mood and affect  NERVOUS SYSTEM:  A/O x3, Good concentration; CN 2-12 intact, No focal deficits, moves all extremities    LABS:                        8.3    10.75 )-----------( 181      ( 09 Nov 2021 05:00 )             26.2     11-09    138  |  99  |  49  ----------------------------<  128  4.6   |  29  |  5.72    Ca    9.3      09 Nov 2021 05:00    TPro  7.5  /  Alb  2.1  /  TBili  0.4  /  DBili  x   /  AST  30  /  ALT  11  /  AlkPhos  110  11-09    eGFR if Non African American: 10 mL/min/1.73M2 (11-09-21 @ 05:00)  eGFR if African American: 12 mL/min/1.73M2 (11-09-21 @ 05:00)                        POCT Blood Glucose.: 135 mg/dL (09 Nov 2021 07:18)  POCT Blood Glucose.: 136 mg/dL (08 Nov 2021 21:18)  POCT Blood Glucose.: 98 mg/dL (08 Nov 2021 13:57)          COVID-19 PCR: NotDetec (11-08-21 @ 18:30)  COVID-19 PCR: NotDetec (11-07-21 @ 05:39)  COVID-19 PCR: NotDetec (11-05-21 @ 08:07)  COVID-19 PCR: NotDetec (11-01-21 @ 06:14)  COVID-19 PCR: NotDetec (10-31-21 @ 08:44)    RADIOLOGY & ADDITIONAL TESTS:    Care Discussed with Consultants/Other Providers: Patient is a 61y old  Male who presents with a chief complaint of Debility (08 Nov 2021 14:09)    HPI:  59 y/o M w/ hx of DM2, HTN and HLD initially presented as transfer from [facility] for chest pain concerning for NSTEMI and possible new CHF c/b JOSHUA likely on CKD, and hypertensive emergency on 10/6/21. Hospital course complicated by s/p RRT for CVA/TIA ruled out and worsening anemia requiring 1 PRBC. Pt had HD session and underwent LHC showing Multivessel CAD. CT surgery consulted for CABG evaluation. Patient developed left sided facial droop on 10/7 and stroke code was called. 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 Patient started Hemodialysis on 10/13. On 10/21 s/p CABGx2, Mitral Valve Repair, and left atrial appendage ligation, was extubated next day. He developed SOB on 10/24 and required bipap,  but was eventually trasitioned back down to [place]. Permacath placed 11/1/21 and LUE AV Fistula placed by vascular on 11/2/21. Her was noted to have PACs and Afib - started on amiodarone. He will be getting HD at [place]. Cleared by vascular to restart eliquis 2.5mg bid. HD schedule changed from T/T/S to M/W/F.   Patient evaluated by PM&R PT/OT and recommended acute rehab. Patient admitted to [facility] on 11/8/21 for debility. (08 Nov 2021 14:09)    PAST MEDICAL & SURGICAL HISTORY:  Hypertension    Hyperlipidemia    Diabetes mellitus    No pertinent past surgical history      SOCIAL HISTORY:  FAMILY HISTORY:    ALLERGIES:  No Known Allergies    MEDICATIONS  (STANDING):  aMIOdarone    Tablet 200 milliGRAM(s) Oral every 12 hours  apixaban 2.5 milliGRAM(s) Oral two times a day  AQUAPHOR (petrolatum Ointment) 1 Application(s) Topical daily  aspirin  chewable 81 milliGRAM(s) Oral daily  dextrose 40% Gel 15 Gram(s) Oral once  dextrose 5%. 1000 milliLiter(s) (50 mL/Hr) IV Continuous <Continuous>  dextrose 5%. 1000 milliLiter(s) (100 mL/Hr) IV Continuous <Continuous>  dextrose 50% Injectable 25 Gram(s) IV Push once  dextrose 50% Injectable 12.5 Gram(s) IV Push once  dextrose 50% Injectable 25 Gram(s) IV Push once  epoetin vern-epbx (RETACRIT) Injectable 8000 Unit(s) SubCutaneous <User Schedule>  glucagon  Injectable 1 milliGRAM(s) IntraMuscular once  insulin glargine Injectable (LANTUS) 16 Unit(s) SubCutaneous at bedtime  insulin lispro (ADMELOG) corrective regimen sliding scale   SubCutaneous at bedtime  insulin lispro (ADMELOG) corrective regimen sliding scale   SubCutaneous three times a day before meals  insulin lispro Injectable (ADMELOG) 10 Unit(s) SubCutaneous three times a day before meals  pantoprazole    Tablet 40 milliGRAM(s) Oral before breakfast  senna 2 Tablet(s) Oral at bedtime  sevelamer carbonate 800 milliGRAM(s) Oral three times a day    MEDICATIONS  (PRN):  acetaminophen     Tablet .. 650 milliGRAM(s) Oral every 6 hours PRN Mild Pain (1 - 3), Moderate Pain (4 - 6)  melatonin 3 milliGRAM(s) Oral at bedtime PRN Insomnia  polyethylene glycol 3350 17 Gram(s) Oral two times a day PRN Constipation    Review of Systems: Refer to HPI for pertinent positives and negatives. All other ROS reviewed and negative except as otherwise stated above.    Vital Signs Last 24 Hrs  T(F): 98.5 (09 Nov 2021 07:21), Max: 99 (08 Nov 2021 16:54)  HR: 47 (09 Nov 2021 07:21) (47 - 64)  BP: 119/65 (09 Nov 2021 07:21) (115/50 - 132/74)  RR: 16 (09 Nov 2021 07:21) (16 - 18)  SpO2: 99% (09 Nov 2021 07:21) (97% - 99%)  I&O's Summary    PHYSICAL EXAM:  GENERAL: NAD, well-groomed, well-developed  HEAD:  Atraumatic, Normocephalic  EYES: EOMI, PERRL, conjunctiva and sclera clear  ENMT: Moist mucous membranes, Good dentition  NECK: Supple, No JVD  CHEST/LUNG: Clear to auscultation bilaterally, non-labored breathing, good air entry  HEART: RRR; S1/S2, No murmur  ABDOMEN: Soft, Nontender, Nondistended; Bowel sounds present  VASCULAR: Normal pulses, Normal capillary refill  EXTREMITIES: No cyanosis, No edema  LYMPH: No lymphadenopathy noted  SKIN: Warm, Intact  PSYCH: Normal mood and affect  NERVOUS SYSTEM:  A/O x3, Good concentration; CN 2-12 intact, No focal deficits, moves all extremities    LABS:                        8.3    10.75 )-----------( 181      ( 09 Nov 2021 05:00 )             26.2     11-09    138  |  99  |  49  ----------------------------<  128  4.6   |  29  |  5.72    Ca    9.3      09 Nov 2021 05:00    TPro  7.5  /  Alb  2.1  /  TBili  0.4  /  DBili  x   /  AST  30  /  ALT  11  /  AlkPhos  110  11-09    eGFR if Non African American: 10 mL/min/1.73M2 (11-09-21 @ 05:00)  eGFR if African American: 12 mL/min/1.73M2 (11-09-21 @ 05:00)        POCT Blood Glucose.: 135 mg/dL (09 Nov 2021 07:18)  POCT Blood Glucose.: 136 mg/dL (08 Nov 2021 21:18)  POCT Blood Glucose.: 98 mg/dL (08 Nov 2021 13:57)          COVID-19 PCR: NotDetec (11-08-21 @ 18:30)  COVID-19 PCR: NotDetec (11-07-21 @ 05:39)  COVID-19 PCR: NotDetec (11-05-21 @ 08:07)  COVID-19 PCR: NotDetec (11-01-21 @ 06:14)  COVID-19 PCR: NotDetec (10-31-21 @ 08:44)    RADIOLOGY & ADDITIONAL TESTS:    < from: TTE with Doppler (w/Cont) (10.28.21 @ 18:37) >  Conclusions:  1. Bioprosthetic mitral valve replacement. No mitral valve  regurgitation seen. Peak mitral valve gradient equals 17 mm  Hg, mean transmitral valve gradient equals 4-5 mm Hg, which  is probably normal in the settingof a bioprosthetic mitral  valve replacement. (HRabout 50s bpm)  2. Aortic valve not well visualized; appears to be a  calcified trileaflet valve with normal opening. No aortic  valve regurgitation seen.  3. Endocardial visualization enhanced with intravenous  injection of Ultrasonic Enhancing Agent (Definity).  Hyperdynamic left ventricular systolic function.  4. Normal right ventricular size and function.  5. Unable to estimate RVSP due to incomplete TR spectral  doppler signal.  6. Minimal pericardial effusion.  *** Compared with echocardiogram of 10/21/2021, patient is  s/p Bioprosthertic MVR.    < end of copied text >  < from: Xray Chest 2 Views PA/Lat (11.06.21 @ 09:19) >  IMPRESSION:  Heart is top normal in size.  S/P sternotomy; mitral valve ring; CABG.  Right-sided double lumen catheter tip in the SVC.  No focal infiltrates. Left upper lung scarring is redemonstrated. Focal linear atelectasis seen in lower right lung.  No pleural effusion. No pneumothorax.    < end of copied text >      Care Discussed with Consultants/Other Providers:  Dr. Harden Patient is a 61y old  Male who presents with a chief complaint of Debility (08 Nov 2021 14:09)    HPI:  61 y/o M w/ hx of DM2, HTN and HLD initially presented as transfer from [facility] for chest pain concerning for NSTEMI and possible new CHF c/b JOSHUA likely on CKD, and hypertensive emergency on 10/6/21. Hospital course complicated by s/p RRT for CVA/TIA ruled out and worsening anemia requiring 1 PRBC. Pt had HD session and underwent LHC showing Multivessel CAD. CT surgery consulted for CABG evaluation. Patient developed left sided facial droop on 10/7 and stroke code was called. 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 Patient started Hemodialysis on 10/13. On 10/21 s/p CABGx2, Mitral Valve Repair, and left atrial appendage ligation, was extubated next day. He developed SOB on 10/24 and required bipap,  but was eventually trasitioned back down to [place]. Permacath placed 11/1/21 and LUE AV Fistula placed by vascular on 11/2/21. Her was noted to have PACs and Afib - started on amiodarone. He will be getting HD at [place]. Cleared by vascular to restart eliquis 2.5mg bid. HD schedule changed from T/T/S to M/W/F.   Patient evaluated by PM&R PT/OT and recommended acute rehab. Patient admitted to [facility] on 11/8/21 for debility. (08 Nov 2021 14:09)    Pt reports occasional SOB. He denies CP, palpitations, abd pain, n/v/d.     PAST MEDICAL & SURGICAL HISTORY:  Hypertension    Hyperlipidemia    Diabetes mellitus    No pertinent past surgical history      SOCIAL HISTORY:  FAMILY HISTORY:    ALLERGIES:  No Known Allergies    MEDICATIONS  (STANDING):  aMIOdarone    Tablet 200 milliGRAM(s) Oral every 12 hours  apixaban 2.5 milliGRAM(s) Oral two times a day  AQUAPHOR (petrolatum Ointment) 1 Application(s) Topical daily  aspirin  chewable 81 milliGRAM(s) Oral daily  dextrose 40% Gel 15 Gram(s) Oral once  dextrose 5%. 1000 milliLiter(s) (50 mL/Hr) IV Continuous <Continuous>  dextrose 5%. 1000 milliLiter(s) (100 mL/Hr) IV Continuous <Continuous>  dextrose 50% Injectable 25 Gram(s) IV Push once  dextrose 50% Injectable 12.5 Gram(s) IV Push once  dextrose 50% Injectable 25 Gram(s) IV Push once  epoetin vern-epbx (RETACRIT) Injectable 8000 Unit(s) SubCutaneous <User Schedule>  glucagon  Injectable 1 milliGRAM(s) IntraMuscular once  insulin glargine Injectable (LANTUS) 16 Unit(s) SubCutaneous at bedtime  insulin lispro (ADMELOG) corrective regimen sliding scale   SubCutaneous at bedtime  insulin lispro (ADMELOG) corrective regimen sliding scale   SubCutaneous three times a day before meals  insulin lispro Injectable (ADMELOG) 10 Unit(s) SubCutaneous three times a day before meals  pantoprazole    Tablet 40 milliGRAM(s) Oral before breakfast  senna 2 Tablet(s) Oral at bedtime  sevelamer carbonate 800 milliGRAM(s) Oral three times a day    MEDICATIONS  (PRN):  acetaminophen     Tablet .. 650 milliGRAM(s) Oral every 6 hours PRN Mild Pain (1 - 3), Moderate Pain (4 - 6)  melatonin 3 milliGRAM(s) Oral at bedtime PRN Insomnia  polyethylene glycol 3350 17 Gram(s) Oral two times a day PRN Constipation    Review of Systems: Refer to HPI for pertinent positives and negatives. All other ROS reviewed and negative except as otherwise stated above.    Vital Signs Last 24 Hrs  T(F): 98.5 (09 Nov 2021 07:21), Max: 99 (08 Nov 2021 16:54)  HR: 47 (09 Nov 2021 07:21) (47 - 64)  BP: 119/65 (09 Nov 2021 07:21) (115/50 - 132/74)  RR: 16 (09 Nov 2021 07:21) (16 - 18)  SpO2: 99% (09 Nov 2021 07:21) (97% - 99%)  I&O's Summary    PHYSICAL EXAM:  GENERAL: NAD, well-groomed, well-developed  HEAD:  Atraumatic, Normocephalic  EYES: EOMI, PERRL, conjunctiva and sclera clear  ENMT: Moist mucous membranes, Good dentition  NECK: Supple, No JVD  CHEST/LUNG: Clear to auscultation bilaterally, non-labored breathing, good air entry  HEART: RRR; S1/S2, No murmur  ABDOMEN: Soft, Nontender, Nondistended; Bowel sounds present  VASCULAR: Normal pulses, Normal capillary refill  EXTREMITIES: No cyanosis, No edema  LYMPH: No lymphadenopathy noted  SKIN: Warm, Intact  PSYCH: Normal mood and affect  NERVOUS SYSTEM:  A/O x3, Good concentration; memory / cognition seems slightly slowed. CN 2-12 intact, No focal deficits, moves all extremities    LABS:                        8.3    10.75 )-----------( 181      ( 09 Nov 2021 05:00 )             26.2     11-09    138  |  99  |  49  ----------------------------<  128  4.6   |  29  |  5.72    Ca    9.3      09 Nov 2021 05:00    TPro  7.5  /  Alb  2.1  /  TBili  0.4  /  DBili  x   /  AST  30  /  ALT  11  /  AlkPhos  110  11-09    eGFR if Non African American: 10 mL/min/1.73M2 (11-09-21 @ 05:00)  eGFR if African American: 12 mL/min/1.73M2 (11-09-21 @ 05:00)        POCT Blood Glucose.: 135 mg/dL (09 Nov 2021 07:18)  POCT Blood Glucose.: 136 mg/dL (08 Nov 2021 21:18)  POCT Blood Glucose.: 98 mg/dL (08 Nov 2021 13:57)          COVID-19 PCR: NotDetec (11-08-21 @ 18:30)  COVID-19 PCR: NotDetec (11-07-21 @ 05:39)  COVID-19 PCR: NotDetec (11-05-21 @ 08:07)  COVID-19 PCR: NotDetec (11-01-21 @ 06:14)  COVID-19 PCR: NotDetec (10-31-21 @ 08:44)    RADIOLOGY & ADDITIONAL TESTS:    < from: TTE with Doppler (w/Cont) (10.28.21 @ 18:37) >  Conclusions:  1. Bioprosthetic mitral valve replacement. No mitral valve  regurgitation seen. Peak mitral valve gradient equals 17 mm  Hg, mean transmitral valve gradient equals 4-5 mm Hg, which  is probably normal in the settingof a bioprosthetic mitral  valve replacement. (HRabout 50s bpm)  2. Aortic valve not well visualized; appears to be a  calcified trileaflet valve with normal opening. No aortic  valve regurgitation seen.  3. Endocardial visualization enhanced with intravenous  injection of Ultrasonic Enhancing Agent (Definity).  Hyperdynamic left ventricular systolic function.  4. Normal right ventricular size and function.  5. Unable to estimate RVSP due to incomplete TR spectral  doppler signal.  6. Minimal pericardial effusion.  *** Compared with echocardiogram of 10/21/2021, patient is  s/p Bioprosthertic MVR.    < end of copied text >  < from: Xray Chest 2 Views PA/Lat (11.06.21 @ 09:19) >  IMPRESSION:  Heart is top normal in size.  S/P sternotomy; mitral valve ring; CABG.  Right-sided double lumen catheter tip in the SVC.  No focal infiltrates. Left upper lung scarring is redemonstrated. Focal linear atelectasis seen in lower right lung.  No pleural effusion. No pneumothorax.    < end of copied text >      Care Discussed with Consultants/Other Providers:  Dr. Harden Patient is a 61y old  Male who presents with a chief complaint of Debility (08 Nov 2021 14:09)    HPI:  61 y/o M w/ hx of DM2, HTN and HLD initially presented as transfer from [facility] for chest pain concerning for NSTEMI and possible new CHF c/b JOSHUA likely on CKD, and hypertensive emergency on 10/6/21. Hospital course complicated by s/p RRT for CVA/TIA ruled out and worsening anemia requiring 1 PRBC. Pt had HD session and underwent LHC showing Multivessel CAD. CT surgery consulted for CABG evaluation. Patient developed left sided facial droop on 10/7 and stroke code was called. 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 Patient started Hemodialysis on 10/13. On 10/21 s/p CABGx2, Mitral Valve Repair, and left atrial appendage ligation, was extubated next day. He developed SOB on 10/24 and required bipap,  but was eventually trasitioned back down to [place]. Permacath placed 11/1/21 and LUE AV Fistula placed by vascular on 11/2/21. Her was noted to have PACs and Afib - started on amiodarone. He will be getting HD at [place]. Cleared by vascular to restart eliquis 2.5mg bid. HD schedule changed from T/T/S to M/W/F.   Patient evaluated by PM&R PT/OT and recommended acute rehab. Patient admitted to [facility] on 11/8/21 for debility. (08 Nov 2021 14:09)    Pt reports occasional SOB. He denies CP, palpitations, abd pain, n/v/d.     PAST MEDICAL & SURGICAL HISTORY:  Hypertension    Hyperlipidemia    Diabetes mellitus    No pertinent past surgical history      SOCIAL HISTORY: smoked 3 ppd, recently quit. smoked since 32 yo  FAMILY HISTORY: CVA    ALLERGIES:  No Known Allergies    MEDICATIONS  (STANDING):  aMIOdarone    Tablet 200 milliGRAM(s) Oral every 12 hours  apixaban 2.5 milliGRAM(s) Oral two times a day  AQUAPHOR (petrolatum Ointment) 1 Application(s) Topical daily  aspirin  chewable 81 milliGRAM(s) Oral daily  dextrose 40% Gel 15 Gram(s) Oral once  dextrose 5%. 1000 milliLiter(s) (50 mL/Hr) IV Continuous <Continuous>  dextrose 5%. 1000 milliLiter(s) (100 mL/Hr) IV Continuous <Continuous>  dextrose 50% Injectable 25 Gram(s) IV Push once  dextrose 50% Injectable 12.5 Gram(s) IV Push once  dextrose 50% Injectable 25 Gram(s) IV Push once  epoetin vern-epbx (RETACRIT) Injectable 8000 Unit(s) SubCutaneous <User Schedule>  glucagon  Injectable 1 milliGRAM(s) IntraMuscular once  insulin glargine Injectable (LANTUS) 16 Unit(s) SubCutaneous at bedtime  insulin lispro (ADMELOG) corrective regimen sliding scale   SubCutaneous at bedtime  insulin lispro (ADMELOG) corrective regimen sliding scale   SubCutaneous three times a day before meals  insulin lispro Injectable (ADMELOG) 10 Unit(s) SubCutaneous three times a day before meals  pantoprazole    Tablet 40 milliGRAM(s) Oral before breakfast  senna 2 Tablet(s) Oral at bedtime  sevelamer carbonate 800 milliGRAM(s) Oral three times a day    MEDICATIONS  (PRN):  acetaminophen     Tablet .. 650 milliGRAM(s) Oral every 6 hours PRN Mild Pain (1 - 3), Moderate Pain (4 - 6)  melatonin 3 milliGRAM(s) Oral at bedtime PRN Insomnia  polyethylene glycol 3350 17 Gram(s) Oral two times a day PRN Constipation    Review of Systems: Refer to HPI for pertinent positives and negatives. All other ROS reviewed and negative except as otherwise stated above.    Vital Signs Last 24 Hrs  T(F): 98.5 (09 Nov 2021 07:21), Max: 99 (08 Nov 2021 16:54)  HR: 47 (09 Nov 2021 07:21) (47 - 64)  BP: 119/65 (09 Nov 2021 07:21) (115/50 - 132/74)  RR: 16 (09 Nov 2021 07:21) (16 - 18)  SpO2: 99% (09 Nov 2021 07:21) (97% - 99%)  I&O's Summary    PHYSICAL EXAM:  GENERAL: NAD, well-groomed, well-developed  HEAD:  Atraumatic, Normocephalic  EYES: EOMI, PERRL, conjunctiva and sclera clear  ENMT: Moist mucous membranes, Good dentition  NECK: Supple, No JVD  CHEST/LUNG: sternal wound c/d/i. Clear to auscultation bilaterally, non-labored breathing, good air entry  HEART: RRR; S1/S2, No murmur  ABDOMEN: Soft, Nontender, Nondistended; Bowel sounds present  VASCULAR: Normal pulses, Normal capillary refill  EXTREMITIES: No cyanosis, No edema  LYMPH: No lymphadenopathy noted  SKIN: Warm, Intact  PSYCH: Normal mood and flat affect  NERVOUS SYSTEM:  A/O x3, Good concentration; memory / cognition seems slightly slowed. CN 2-12 intact, No focal deficits, moves all extremities    LABS:                        8.3    10.75 )-----------( 181      ( 09 Nov 2021 05:00 )             26.2     11-09    138  |  99  |  49  ----------------------------<  128  4.6   |  29  |  5.72    Ca    9.3      09 Nov 2021 05:00    TPro  7.5  /  Alb  2.1  /  TBili  0.4  /  DBili  x   /  AST  30  /  ALT  11  /  AlkPhos  110  11-09    eGFR if Non African American: 10 mL/min/1.73M2 (11-09-21 @ 05:00)  eGFR if African American: 12 mL/min/1.73M2 (11-09-21 @ 05:00)        POCT Blood Glucose.: 135 mg/dL (09 Nov 2021 07:18)  POCT Blood Glucose.: 136 mg/dL (08 Nov 2021 21:18)  POCT Blood Glucose.: 98 mg/dL (08 Nov 2021 13:57)          COVID-19 PCR: NotDetec (11-08-21 @ 18:30)  COVID-19 PCR: NotDetec (11-07-21 @ 05:39)  COVID-19 PCR: NotDetec (11-05-21 @ 08:07)  COVID-19 PCR: NotDetec (11-01-21 @ 06:14)  COVID-19 PCR: NotDetec (10-31-21 @ 08:44)    RADIOLOGY & ADDITIONAL TESTS:    < from: TTE with Doppler (w/Cont) (10.28.21 @ 18:37) >  Conclusions:  1. Bioprosthetic mitral valve replacement. No mitral valve  regurgitation seen. Peak mitral valve gradient equals 17 mm  Hg, mean transmitral valve gradient equals 4-5 mm Hg, which  is probably normal in the settingof a bioprosthetic mitral  valve replacement. (HRabout 50s bpm)  2. Aortic valve not well visualized; appears to be a  calcified trileaflet valve with normal opening. No aortic  valve regurgitation seen.  3. Endocardial visualization enhanced with intravenous  injection of Ultrasonic Enhancing Agent (Definity).  Hyperdynamic left ventricular systolic function.  4. Normal right ventricular size and function.  5. Unable to estimate RVSP due to incomplete TR spectral  doppler signal.  6. Minimal pericardial effusion.  *** Compared with echocardiogram of 10/21/2021, patient is  s/p Bioprosthertic MVR.    < end of copied text >  < from: Xray Chest 2 Views PA/Lat (11.06.21 @ 09:19) >  IMPRESSION:  Heart is top normal in size.  S/P sternotomy; mitral valve ring; CABG.  Right-sided double lumen catheter tip in the SVC.  No focal infiltrates. Left upper lung scarring is redemonstrated. Focal linear atelectasis seen in lower right lung.  No pleural effusion. No pneumothorax.    < end of copied text >      Care Discussed with Consultants/Other Providers:  Dr. Harden

## 2021-11-09 NOTE — DIETITIAN INITIAL EVALUATION ADULT. - PERTINENT LABORATORY DATA
11/9  Na-138  K-4.56  BUN-49H  Creat-5.72  Gluc-128H  Hemoglobin A1c - 7.0H(on 10/7)  POCT (over Last 3 Days) - Ranging from

## 2021-11-09 NOTE — DIETITIAN INITIAL EVALUATION ADULT. - ORAL INTAKE PTA/DIET HISTORY
Patient Does Not Follow Diet @Home, Consumes 2 Meals a Day & Doesn't Take Vitamin/Supplements @Home     on Consistent Carbohydrate DASH-TLC Renal Diet  Recommend Change Diet to Consistent Carbohydrate Renal Diet & Recommend Initiate Nepro 8oz Daily (Provides 425kcal & 19grams of Protein)

## 2021-11-09 NOTE — PROGRESS NOTE ADULT - ASSESSMENT
TOM PLEITEZ is a 61y with a history of DM2, ?CKD, HTN and HLD  who presented to [facility] on 10/6/21 with SOB, CP found to have multivessel CAD and JOSHUA on CKD, s/p CABGx2, MVR, and LAAL. Started on HD T/T/S, now on HD M/W/F s/p Permacath and LUE AVF. Hospital Course complicated by post-op hemorrhagic shock , received 4 PRBC, 1 PLT, 1000 FIEBA intraop and post op dual pressor and inotrope support w/ , Primacor, Levo, and Epi gtts.  Afib, PACs- now on amiodarone. Patient now admitted for a multidisciplinary rehab program. 21 @ 14:40    -------------    Rehab Management/MEDICAL MANAGEMENT     #Debility  - Gait Instability, ADL impairments and Functional impairments: start Comprehensive Rehab Program of PT/OT/SLP  - 3 hours a day, 5 days a week  - Orthotics as needed   - also will benefit from cardiopulmonary conditioning.  - SLP for cognitive and memory deficits    #CAD and Afib s/p CABGx2, LAAL, MVR  - Most recent echo on 10/28 shows restoration of LVEF   - c/w amiodarone 200mg q12h  - c/w eliquis 2.5mg bid  - c/w ASA 81 daily  - close f/u Medicine    #?CKD on HD - M/W/F  - retacrit 8000ui intraHD  - renal consult  - Permacath placed 21 and LUE AVF placed   - chlorhexidine to keep access sites clean    #DM  - lantus 16 unit qhs  - lispro 10 unit premeal tid  - ISS    #Sleep  - melatonin PRN     #Skin  - Skin on admission: Sacral/left buttock sacral decubital ulcer with visible subcutaneous/dermis, dry b/l heels, permacath is c/d/i, LUE AVF site c/d/i, drain sites c/d/i; sternal incision site is c/d/i  - Pressure injury/Skin: OOB to Chair, PT/OT   - continue monitoring wound, incision and drain sites.  - Wound consult  - alyvene foam dressing and cavilon to wound    #Pain Mgmt   - Tylenol PRN    #GI/Bowel Mgmt   - Continent c/w Senna, Miralax    #/Bladder Mgmt   -  PVR qh and SC>400cc  - will get urology consult if persists.    #FEN   - Diet - Regular + Thins  [Renal, DASH/TLC]      #Precautions / PROPHYLAXIS:   - Falls, Cardiac, Sternal,   - ortho: Weight bearing status: WBAT   - Lungs: Aspiration, Incentive Spirometer   - DVT: eliquis

## 2021-11-09 NOTE — PROGRESS NOTE ADULT - ATTENDING COMMENTS
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 Had blood transfusion for anemia.   ESRD  on HD via Right chest permacath, left AVF  new Dx of ESRD on HD  Admitted to acute rehab 11/8//21    Urinary retention noted, no acute distress of pain  will get Urology review  continue intermittent straight catheterization with parameters as stated  Tolerating diet      Pleasant and compliant with treatment  Antigravity strenght all extremities,  Left arm AVF, Rt chest permacath, precautions as noted  -No blood draws/BP measurements on left am    Continue PT/OT--muscle strengthening/stretching ROM, DME use    Routine care  DVT ppx, GI protedtion  Urology consult for urinary retention,     Seen and examined with Dr Montez ,Rehab Resident, Patient is stable to commence acute rehab

## 2021-11-09 NOTE — DIETITIAN INITIAL EVALUATION ADULT. - PERSON TAUGHT/METHOD
Nutrition Education Provided on Consistent Carbohydrate Renal Diet/verbal instruction/patient instructed

## 2021-11-09 NOTE — CONSULT NOTE ADULT - ASSESSMENT
59 y/o M w/ hx of DM2, HTN and HLD initially presented as transfer from [facility] for chest pain concerning for NSTEMI and possible new CHF c/b JOSHUA likely on CKD, and hypertensive emergency on 10/6/21. Hospital course complicated by s/p RRT for CVA/TIA ruled out and worsening anemia requiring 1 PRBC. Pt had HD session and underwent LHC showing Multivessel CAD. CT surgery consulted for CABG evaluation. Patient developed left sided facial droop on 10/7 and stroke code was called. 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 Patient started Hemodialysis on 10/13. On 10/21 s/p CABGx2, Mitral Valve Repair, and left atrial appendage ligation, was extubated next day. He developed SOB on 10/24 and required bipap,  but was eventually trasitioned back down to [place]. Permacath placed 11/1/21 and LUE AV Fistula placed by vascular on 11/2/21. Her was noted to have PACs and Afib - started on amiodarone. He will be getting HD at [place]. Cleared by vascular to restart eliquis 2.5mg bid. HD schedule changed from T/T/S to M/W/F.   Patient evaluated by PM&R PT/OT and recommended acute rehab. Patient admitted to [facility] on 11/8/21 for debility. (08 Nov 2021 14:09)    CABG  MV repair  - aspirin    ESRD  anemia of chronic dz  -sevelamer  -HD at [place] on discharge  -epopoeitin injections    atrial fibrillation - apixiban  ecg  amiodarone   59 y/o M w/ hx of DM2, HTN and HLD initially presented as transfer from [facility] for chest pain concerning for NSTEMI and possible new CHF c/b JOSHUA likely on CKD, and hypertensive emergency on 10/6/21. Hospital course complicated by s/p RRT for CVA/TIA ruled out and worsening anemia requiring 1 PRBC. Pt had HD session and underwent LHC showing Multivessel CAD. CT surgery consulted for CABG evaluation. Patient developed left sided facial droop on 10/7 and stroke code was called. 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 Patient started Hemodialysis on 10/13. On 10/21 s/p CABGx2, Mitral Valve Repair, and left atrial appendage ligation, was extubated next day. He developed SOB on 10/24 and required bipap,  but was eventually trasitioned back down to [place]. Permacath placed 11/1/21 and LUE AV Fistula placed by vascular on 11/2/21. Her was noted to have PACs and Afib - started on amiodarone. He will be getting HD at [place]. Cleared by vascular to restart eliquis 2.5mg bid. HD schedule changed from T/T/S to M/W/F.   Patient evaluated by PM&R PT/OT and recommended acute rehab. Patient admitted to [facility] on 11/8/21 for debility. (08 Nov 2021 14:09)    CABG  MV repair  - aspirin    ESRD  anemia of chronic dz  -sevelamer  -HD at [place] on discharge  -epopoeitin injections  - Nephro consult    atrial fibrillation - apixiban  ecg  amiodarone   59 y/o M w/ hx of DM2, HTN and HLD initially presented as transfer from [facility] for chest pain concerning for NSTEMI and possible new CHF c/b JOSHUA likely on CKD, and hypertensive emergency on 10/6/21. Hospital course complicated by s/p RRT for CVA/TIA ruled out and worsening anemia requiring 1 PRBC. Pt had HD session and underwent LHC showing Multivessel CAD. CT surgery consulted for CABG evaluation. Patient developed left sided facial droop on 10/7 and stroke code was called. 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 Patient started Hemodialysis on 10/13. On 10/21 s/p CABGx2, Mitral Valve Repair, and left atrial appendage ligation, was extubated next day. He developed SOB on 10/24 and required bipap,  but was eventually trasitioned back down to [place]. Permacath placed 11/1/21 and LUE AV Fistula placed by vascular on 11/2/21. Her was noted to have PACs and Afib - started on amiodarone. He will be getting HD at [place]. Cleared by vascular to restart eliquis 2.5mg bid. HD schedule changed from T/T/S to M/W/F.   Patient evaluated by PM&R PT/OT and recommended acute rehab. Patient admitted to [facility] on 11/8/21 for debility. (08 Nov 2021 14:09)      59 y/o male hx of DM, HTN, HLD, Hypothyroidism, CAD, former smoker, p/w chest pain and sob from OSH and transferred to [facility] concerning for NTESMI, possible CHF c/b JOSHUA likely on CKD and hypertensive emergency. Hospital course c/b s/p RRT for CVA/TIA, CT showed olf frontal infarct, with high grade left carotid stenosis. Hemodialysis initiated for JOSHUA, serum Cr was 4.05. s/p LHC on 10/14 that showed multivessel disease and severe MR, s/p C2L, MVR (T), and ANGELITA Ligation on 10/21/21. POD #13 with intermittent loss of capture early this morning 11/3, pAF, Tachy Seth. EPS consulted for further management.   Post op TTE with EF 75%, LA size 3.9      - pain control w/ tylenol - noted to be oversedated while on protonics.     NSTEMI 2/2 CAD s/p CABG (10/21)  s/p MV repair  HLD  - aspirin  - statin  - eliquis BID for AC for MV  - surgical site on chest c/d/i    # ESRD on HD (MWF), due to diabetic nephropathy  # anemia of chronic dz 2/2 ESRD  -HD via permacath (placed 11/1/21). 1st stage AVF creation (11/2)  -sevelamer  -HD at [place] on discharge  - Nephro consult  - retacrit     # paroxysmal atrial fibrillation   # tachy seth syndrome  seen by EP at OSH.   apixiban  s/p amiodarone load. 200 mg BID x1 week (starting 11/7 based on notes medication list?), then 200 mg qd. Monitor TFTs and LFTs on amio.   post op AF, may just need amio short term. ?  - hold AV mimi agents due to bradycardia.     DM2  -hgba1c 7%  -glargine 16 u  -aspart 10 units TID wm    subclinical hypothyroidism   61 y/o M w/ hx of DM2, HTN and HLD initially presented as transfer from [facility] for chest pain concerning for NSTEMI and possible new CHF c/b JOSHUA likely on CKD, and hypertensive emergency on 10/6/21. Hospital course complicated by s/p RRT for CVA/TIA ruled out and worsening anemia requiring 1 PRBC. Pt had HD session and underwent LHC showing Multivessel CAD. CT surgery consulted for CABG evaluation. Patient developed left sided facial droop on 10/7 and stroke code was called. 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 Patient started Hemodialysis on 10/13. On 10/21 s/p CABGx2, Mitral Valve Repair, and left atrial appendage ligation, was extubated next day. He developed SOB on 10/24 and required bipap,  but was eventually trasitioned back down to [place]. Permacath placed 11/1/21 and LUE AV Fistula placed by vascular on 11/2/21. Her was noted to have PACs and Afib - started on amiodarone. He will be getting HD at [place]. Cleared by vascular to restart eliquis 2.5mg bid. HD schedule changed from T/T/S to M/W/F.   Patient evaluated by PM&R PT/OT and recommended acute rehab. Patient admitted to [facility] on 11/8/21 for debility. (08 Nov 2021 14:09)      61 y/o male hx of DM, HTN, HLD, Hypothyroidism, CAD, former smoker, p/w chest pain and sob from OSH and transferred to [facility] concerning for NTESMI, possible CHF c/b JOSHUA likely on CKD and hypertensive emergency. Hospital course c/b s/p RRT for CVA/TIA, CT showed olf frontal infarct, with high grade left carotid stenosis. Hemodialysis initiated for JOSHUA, serum Cr was 4.05. s/p LHC on 10/14 that showed multivessel disease and severe MR, s/p C2L, MVR (T), and ANGELITA Ligation on 10/21/21. POD #13 with intermittent loss of capture early this morning 11/3, pAF, Tachy Seth. EPS consulted for further management.   Post op TTE with EF 75%, LA size 3.9      - pain control w/ tylenol - noted to be oversedated while on narcotivs    NSTEMI 2/2 CAD s/p CABG (10/21)  s/p MV repair  HLD  - aspirin  - statin  - eliquis BID for AC for MV  - surgical site on chest c/d/i    # ESRD on HD (MWF), due to diabetic nephropathy  # anemia of chronic dz 2/2 ESRD  -HD via permacath (placed 11/1/21). 1st stage AVF creation (11/2)  -sevelamer  -HD at [place] on discharge  - Nephro consult  - retacrit     # paroxysmal atrial fibrillation   # tachy seth syndrome  seen by EP at OSH.   apixiban  s/p amiodarone load. 200 mg BID x1 week (starting 11/7 based on notes medication list?), then 200 mg qd. Monitor TFTs and LFTs on amio.   post op AF, may just need amio short term. ?  - hold AV mimi agents due to bradycardia.     DM2  -hgba1c 7%  -glargine 16 u  -aspart 10 units TID wm    subclinical hypothyroidism   59 y/o M w/ hx of DM2, HTN and HLD initially presented as transfer from [facility] for chest pain concerning for NSTEMI and possible new CHF c/b JOSHUA likely on CKD, and hypertensive emergency on 10/6/21. Hospital course complicated by s/p RRT for CVA/TIA ruled out and worsening anemia requiring 1 PRBC. Pt had HD session and underwent LHC showing Multivessel CAD. CT surgery consulted for CABG evaluation. Patient developed left sided facial droop on 10/7 and stroke code was called. Pt found to have old frontal infarct and left carotid stenosis, but it did not explain acute neurological deficit. Patient started Hemodialysis on 10/13. On 10/21 s/p CABGx2, Mitral Valve Repair, and left atrial appendage ligation, was extubated next day. He developed SOB on 10/24 and required bipap,  but was eventually trasitioned back down to [place]. Permacath placed 11/1/21 and LUE AV Fistula placed by vascular on 11/2/21. Her was noted to have PACs and Afib - started on amiodarone. He will be getting HD at [place]. Cleared by vascular to restart eliquis 2.5mg bid. HD schedule changed from T/T/S to M/W/F.   Patient evaluated by PM&R PT/OT and recommended acute rehab. Patient admitted to [facility] on 11/8/21 for debility. (08 Nov 2021 14:09)      59 y/o male hx of DM, HTN, HLD, Hypothyroidism, CAD, former smoker, p/w chest pain and sob from OSH and transferred to [facility] concerning for NTESMI, possible CHF c/b JOSHUA likely on CKD and hypertensive emergency. Hospital course c/b s/p RRT for CVA/TIA, CT showed olf frontal infarct, with high grade left carotid stenosis. Hemodialysis initiated for JOSHUA, serum Cr was 4.05. s/p LHC on 10/14 that showed multivessel disease and severe MR, s/p C2L, MVR (T), and ANGELITA Ligation on 10/21/21. POD #13 with intermittent loss of capture early this morning 11/3, pAF, Tachy Seth. EPS consulted for further management.   Post op TTE with EF 75%, LA size 3.9      Now admitted to [facility] after for initiation of a multidisciplinary rehab program consisting focused on functional mobility, transfers and ADLs - PT/OT/DVT PPX PAIN MEDS    # Functional and gait instability:  - C/w PT/OT per primary team   - pain control w/ tylenol - noted to be oversedated while on narcotivs    #NSTEMI 2/2 CAD s/p CABG (10/21)  #s/p MV repair  #HLD  - s/p LHC on 10/14 showing severe multivessel disease; now s/p CABG on 10/21  - aspirin  - statin  - eliquis BID for AC for MV  - surgical site on chest c/d/i    # ESRD on HD ([place]), due to diabetic nephropathy  # anemia of chronic dz 2/2 ESRD  -HD via permacath (placed 11/1/21). 1st stage AVF creation (11/2)  -sevelamer  -HD at [place] on discharge  - Nephro consult  - retacrit     # paroxysmal atrial fibrillation   # tachy seth syndrome  -seen by EP at OSH.   -apixiban  -s/p amiodarone load. 200 mg BID x1 week (starting 11/7 based on notes medication list?), then 200 mg qd. Monitor TFTs and LFTs on amio.   - post op AF, possible pt may just need amio short term.   - hold AV mimi agents due to bradycardia.     # DM2  -hgba1c 7%  -glargine 16 u  -aspart 10 units TID wm    # subclinical hypothyroidism   61 y/o M w/ hx of DM2, HTN and HLD, transfer from [facility] for NSTEMI, admitted w/ JOSHUA on CKD and hypertensive emergency, new start to HD (10/13). He underwent LHC showing Multivessel CAD. 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  s/p CABG x2 (10/21), MV repair, and left atrial ligation. Permacath placed 11/1/21 and LUE AV Fistula placed by vascular on 11/2/21. Her was noted to have PACs and Afib - started on amiodarone and eliquis.    Now admitted to [facility] after for initiation of a multidisciplinary rehab program consisting focused on functional mobility, transfers and ADLs.    # Functional and gait instability:  - C/w PT/OT per primary team   - pain control w/ tylenol - noted to be oversedated while on narcotics    #NSTEMI 2/2 CAD s/p CABG (10/21)  #s/p MV repair  #HLD  - s/p LHC on 10/14 showing severe multivessel disease; now s/p CABG on 10/21. Echocardiogram reviewed w/ EF 75% (10/28)  - aspirin  - statin  - eliquis BID for AC for MV  - surgical site on chest c/d/i    # ESRD on HD ([place]), due to diabetic nephropathy  # anemia of chronic dz 2/2 ESRD  -received blood transfusion at OSH. no e/o hypervolemia on exam.   -HD via permacath (placed 11/1/21). 1st stage AVF creation (11/2)  -sevelamer  -HD at [place] on discharge  - Nephro consult  - retacrit     # paroxysmal atrial fibrillation   # tachy blaine syndrome  -seen by EP at OSH. Post op AF, possible pt may just need amio short term.   -apixiban  -s/p amiodarone load. 200 mg BID x1 week (starting 11/7 based on notes medication list?), then 200 mg qd. Monitor TFTs and LFTs on amio.   - hold AV mimi agents due to bradycardia.     # Acute urinary retention  noted high PVRs  - cont scheduled bladder checks.  - consider tamsulosin. Rehab considering Urology consult    # DM2  -hgba1c 7%  -glargine 16 u  -aspart 10 units TID wm    # subclinical hypothyroidism  - TSH mildly elevated 6.4, pt asymptomatic  - seen by Endo during admission. Outpt f/u.     # hx of severe tobacco use d/o  - smoked 3 ppd, recently quit a few months ago    dvt ppx: cont apixiban

## 2021-11-09 NOTE — DIETITIAN INITIAL EVALUATION ADULT. - SIGNS/SYMPTOMS
Fair PO Intake, Significant Weight Decrease & Fat Depletion/Muscle Wasting Observed Stage 2 Pressure Ulcer

## 2021-11-09 NOTE — DIETITIAN INITIAL EVALUATION ADULT. - PHYSCIAL ASSESSMENT
Temporalis, Orbital Fat Pads, Buccal Fat Pads & Tricep Wasting Observed  (Per Nutrition Focused Physical Exam)

## 2021-11-09 NOTE — DIETITIAN NUTRITION RISK NOTIFICATION - MALNUTRITION EVALUATION AS DEMONSTRATED BY (ADULTS > 20 YEARS OF AGE)
ANTICOAGULATION FOLLOW-UP CLINIC VISIT    Patient Name:  Noe Florence  Date:  2019  Contact Type:  Telephone    SUBJECTIVE:  Patient Findings     Positives:   Missed doses (Pt missed his Thursday dose-(2.5mg))             OBJECTIVE    INR   Date Value Ref Range Status   2019 1.44 (H) 0.86 - 1.14 Final       ASSESSMENT / PLAN  INR assessment SUB    Recheck INR In: 1 WEEK    INR Location Clinic      Anticoagulation Summary  As of 2019    INR goal:   1.5-2.5   TTR:   62.0 % (3.1 y)   INR used for dosin.44! (2019)   Warfarin maintenance plan:   2.5 mg (5 mg x 0.5) every Tue, Thu; 5 mg (5 mg x 1) all other days   Full warfarin instructions:   : 7.5 mg; Otherwise 2.5 mg every Tue, Thu; 5 mg all other days   Weekly warfarin total:   30 mg   Plan last modified:   Ciro Sharp RN (2019)   Next INR check:   2019   Priority:   INR   Target end date:   Indefinite    Indications    Atrial fibrillation (H) [I48.91] [I48.91]  Long-term (current) use of anticoagulants [Z79.01] [Z79.01]  Deep vein thrombosis (DVT) (H) [I82.409]             Anticoagulation Episode Summary     INR check location:       Preferred lab:       Send INR reminders to:   [facility]    Comments:   Patient contact number: [phone] Hx of Falls/Bleed  Can leave results with Rica (friend)  [facility] see's pt. 19 Restarted Warfarin due to DVT.       Anticoagulation Care Providers     Provider Role Specialty Phone number    Frida Desai MD Responsible Cardiology [phone]            See the Encounter Report to view Anticoagulation Flowsheet and Dosing Calendar (Go to Encounters tab in chart review, and find the Anticoagulation Therapy Visit)  Spoke with Je Abebe RN                 
Weight loss.../Inadequate energy intake.../Loss of subcutaneous fat.../Loss of muscle...

## 2021-11-10 ENCOUNTER — TRANSCRIPTION ENCOUNTER (OUTPATIENT)
Age: 61
End: 2021-11-10

## 2021-11-10 LAB
ALBUMIN SERPL ELPH-MCNC: 2.1 G/DL — LOW (ref 3.3–5)
ANION GAP SERPL CALC-SCNC: 14 MMOL/L — SIGNIFICANT CHANGE UP (ref 5–17)
BUN SERPL-MCNC: 87 MG/DL — HIGH (ref 7–23)
CALCIUM SERPL-MCNC: 8.8 MG/DL — SIGNIFICANT CHANGE UP (ref 8.4–10.5)
CHLORIDE SERPL-SCNC: 98 MMOL/L — SIGNIFICANT CHANGE UP (ref 96–108)
CO2 SERPL-SCNC: 25 MMOL/L — SIGNIFICANT CHANGE UP (ref 22–31)
CREAT SERPL-MCNC: 8.07 MG/DL — HIGH (ref 0.5–1.3)
GLUCOSE BLDC GLUCOMTR-MCNC: 128 MG/DL — HIGH (ref 70–99)
GLUCOSE BLDC GLUCOMTR-MCNC: 138 MG/DL — HIGH (ref 70–99)
GLUCOSE BLDC GLUCOMTR-MCNC: 154 MG/DL — HIGH (ref 70–99)
GLUCOSE BLDC GLUCOMTR-MCNC: 171 MG/DL — HIGH (ref 70–99)
GLUCOSE SERPL-MCNC: 187 MG/DL — HIGH (ref 70–99)
HCT VFR BLD CALC: 24.7 % — LOW (ref 39–50)
HGB BLD-MCNC: 7.8 G/DL — LOW (ref 13–17)
MCHC RBC-ENTMCNC: 29.7 PG — SIGNIFICANT CHANGE UP (ref 27–34)
MCHC RBC-ENTMCNC: 31.6 GM/DL — LOW (ref 32–36)
MCV RBC AUTO: 93.9 FL — SIGNIFICANT CHANGE UP (ref 80–100)
NRBC # BLD: 0 /100 WBCS — SIGNIFICANT CHANGE UP (ref 0–0)
PHOSPHATE SERPL-MCNC: 6.6 MG/DL — HIGH (ref 2.5–4.5)
PLATELET # BLD AUTO: 213 K/UL — SIGNIFICANT CHANGE UP (ref 150–400)
POTASSIUM SERPL-MCNC: 5 MMOL/L — SIGNIFICANT CHANGE UP (ref 3.5–5.3)
POTASSIUM SERPL-SCNC: 5 MMOL/L — SIGNIFICANT CHANGE UP (ref 3.5–5.3)
RBC # BLD: 2.63 M/UL — LOW (ref 4.2–5.8)
RBC # FLD: 14.1 % — SIGNIFICANT CHANGE UP (ref 10.3–14.5)
SODIUM SERPL-SCNC: 137 MMOL/L — SIGNIFICANT CHANGE UP (ref 135–145)
WBC # BLD: 9.69 K/UL — SIGNIFICANT CHANGE UP (ref 3.8–10.5)
WBC # FLD AUTO: 9.69 K/UL — SIGNIFICANT CHANGE UP (ref 3.8–10.5)

## 2021-11-10 PROCEDURE — 99222 1ST HOSP IP/OBS MODERATE 55: CPT

## 2021-11-10 PROCEDURE — 93010 ELECTROCARDIOGRAM REPORT: CPT

## 2021-11-10 PROCEDURE — 99232 SBSQ HOSP IP/OBS MODERATE 35: CPT

## 2021-11-10 PROCEDURE — 99233 SBSQ HOSP IP/OBS HIGH 50: CPT

## 2021-11-10 RX ORDER — ERYTHROPOIETIN 10000 [IU]/ML
10000 INJECTION, SOLUTION INTRAVENOUS; SUBCUTANEOUS
Refills: 0 | Status: DISCONTINUED | OUTPATIENT
Start: 2021-11-10 | End: 2021-11-18

## 2021-11-10 RX ADMIN — SEVELAMER CARBONATE 800 MILLIGRAM(S): 2400 POWDER, FOR SUSPENSION ORAL at 07:46

## 2021-11-10 RX ADMIN — Medication 10 UNIT(S): at 18:36

## 2021-11-10 RX ADMIN — Medication 10 UNIT(S): at 12:57

## 2021-11-10 RX ADMIN — AMIODARONE HYDROCHLORIDE 200 MILLIGRAM(S): 400 TABLET ORAL at 18:41

## 2021-11-10 RX ADMIN — Medication 10 UNIT(S): at 07:46

## 2021-11-10 RX ADMIN — Medication 2: at 18:36

## 2021-11-10 RX ADMIN — Medication 1 APPLICATION(S): at 12:59

## 2021-11-10 RX ADMIN — SEVELAMER CARBONATE 800 MILLIGRAM(S): 2400 POWDER, FOR SUSPENSION ORAL at 12:58

## 2021-11-10 RX ADMIN — PANTOPRAZOLE SODIUM 40 MILLIGRAM(S): 20 TABLET, DELAYED RELEASE ORAL at 05:51

## 2021-11-10 RX ADMIN — SENNA PLUS 2 TABLET(S): 8.6 TABLET ORAL at 21:08

## 2021-11-10 RX ADMIN — Medication 81 MILLIGRAM(S): at 12:58

## 2021-11-10 RX ADMIN — APIXABAN 2.5 MILLIGRAM(S): 2.5 TABLET, FILM COATED ORAL at 18:41

## 2021-11-10 RX ADMIN — INSULIN GLARGINE 16 UNIT(S): 100 INJECTION, SOLUTION SUBCUTANEOUS at 21:07

## 2021-11-10 RX ADMIN — ERYTHROPOIETIN 10000 UNIT(S): 10000 INJECTION, SOLUTION INTRAVENOUS; SUBCUTANEOUS at 15:06

## 2021-11-10 RX ADMIN — APIXABAN 2.5 MILLIGRAM(S): 2.5 TABLET, FILM COATED ORAL at 05:51

## 2021-11-10 RX ADMIN — SEVELAMER CARBONATE 800 MILLIGRAM(S): 2400 POWDER, FOR SUSPENSION ORAL at 18:41

## 2021-11-10 NOTE — PROGRESS NOTE ADULT - SUBJECTIVE AND OBJECTIVE BOX
S/p stable HD    Vital Signs Last 24 Hrs  T(C): 37.3 (11-10-21 @ 20:19), Max: 37.3 (11-10-21 @ 20:19)  T(F): 99.1 (11-10-21 @ 20:19), Max: 99.1 (11-10-21 @ 20:19)  HR: 61 (11-10-21 @ 20:19) (44 - 61)  BP: 140/69 (11-10-21 @ 20:19) (120/51 - 147/60)  RR: 16 (11-10-21 @ 20:19) (14 - 16)  SpO2: 100% (11-10-21 @ 20:19) (98% - 100%)    s1s2  b/l air entry  soft  sm edema                        7.8    9.69  )-----------( 213      ( 10 Nov 2021 14:30 )             24.7     10 Nov 2021 14:30    137    |  98     |  87     ----------------------------<  187    5.0     |  25     |  8.07     Ca    8.8        10 Nov 2021 14:30  Phos  6.6       10 Nov 2021 14:30    TPro  x      /  Alb  2.1    /  TBili  x      /  DBili  x      /  AST  x      /  ALT  x      /  AlkPhos  x      10 Nov 2021 14:30    LIVER FUNCTIONS - ( 10 Nov 2021 14:30 )  Alb: 2.1 g/dL / Pro: x     / ALK PHOS: x     / ALT: x     / AST: x     / GGT: x           acetaminophen     Tablet .. 650 milliGRAM(s) Oral every 6 hours PRN  aMIOdarone    Tablet 200 milliGRAM(s) Oral every 12 hours  apixaban 2.5 milliGRAM(s) Oral two times a day  AQUAPHOR (petrolatum Ointment) 1 Application(s) Topical daily  aspirin  chewable 81 milliGRAM(s) Oral daily  dextrose 40% Gel 15 Gram(s) Oral once  dextrose 5%. 1000 milliLiter(s) IV Continuous <Continuous>  dextrose 5%. 1000 milliLiter(s) IV Continuous <Continuous>  dextrose 50% Injectable 25 Gram(s) IV Push once  dextrose 50% Injectable 12.5 Gram(s) IV Push once  dextrose 50% Injectable 25 Gram(s) IV Push once  epoetin vern-epbx (RETACRIT) Injectable 02285 Unit(s) SubCutaneous <User Schedule>  glucagon  Injectable 1 milliGRAM(s) IntraMuscular once  insulin glargine Injectable (LANTUS) 16 Unit(s) SubCutaneous at bedtime  insulin lispro (ADMELOG) corrective regimen sliding scale   SubCutaneous at bedtime  insulin lispro (ADMELOG) corrective regimen sliding scale   SubCutaneous three times a day before meals  insulin lispro Injectable (ADMELOG) 10 Unit(s) SubCutaneous three times a day before meals  melatonin 3 milliGRAM(s) Oral at bedtime PRN  pantoprazole    Tablet 40 milliGRAM(s) Oral before breakfast  polyethylene glycol 3350 17 Gram(s) Oral two times a day PRN  senna 2 Tablet(s) Oral at bedtime  sevelamer carbonate 800 milliGRAM(s) Oral three times a day with meals    A/P:    S/p CABG x 2, Mitral Valve Repair, and left atrial appendage ligation 10/21/21  Hospital course complicated by JOSHUA/CKD  Now on HD  Will continue HD MWF  Renal diet  TMP as able  Epoetin w/HD  Will follow    [phone]

## 2021-11-10 NOTE — CONSULT NOTE ADULT - ASSESSMENT
Assessment:  Chriss Mattson is a 61 year old man with past medical history of Hypertension, Hyperlipidemia, CVA, Type 2 Diabetes mellitus with recent hospitalization at [facility] for NSTEMI s/p CABG with hospital course complicated by CHF, atrial fibrillation, and acute renal injury requiring dialysis, also respiratory failure s/p intubation, now currently admitted to acute rehab at [facility].    Cardiology consulted due to bradycardia. Review of Cardiac EP notes at [facility] indicates that patient had temporary pacing wire requiring intermittent pacing, AV mimi blocker was discontinued and it was there was no indication for PPM per EP.    Recommendations:  [] Bradycardia: Avoid AV mimi blockers.   [] Post -op AF: Was discharged on amiodarone 200 mg daily as per EP             Assessment:  Chriss Mattson is a 61 year old man with past medical history of Hypertension, Hyperlipidemia, CVA, Type 2 Diabetes mellitus with recent hospitalization at [facility] for NSTEMI s/p CABG with hospital course complicated by CHF, atrial fibrillation, and acute renal injury requiring dialysis, also respiratory failure s/p intubation, now currently admitted to acute rehab at [facility].    Cardiology consulted due to bradycardia. Review of Cardiac EP notes at [facility] indicates that patient had temporary pacing wire requiring intermittent pacing, AV mimi blocker was discontinued and it was there was no indication for PPM per EP. On 10/21/21 patient had CABG x 2 (LIMA-LAD, SVG-PDA) with MVR and Left atrial appendage ligation.    Recommendations:  [] Bradycardia: Avoid AV mimi blockers.   [] Post -op AF: Was discharged on amiodarone 200 mg daily as per EP             Assessment:  Chriss Mattson is a 61 year old man with past medical history of Hypertension, Hyperlipidemia, CVA, Type 2 Diabetes mellitus with recent hospitalization at [facility] for NSTEMI s/p CABG, MVR and left atrial appendage ligation with hospital course complicated by CHF, atrial fibrillation, and acute renal injury requiring dialysis, also respiratory failure s/p intubation, now currently admitted to acute rehab at [facility].    Cardiology consulted due to bradycardia. On 10/21/21 patient had CABG x 2 (LIMA-LAD, SVG-PDA) with MVR and Left atrial appendage ligation. Review of Cardiac EP notes at [facility] indicates that patient had temporary pacing wire requiring intermittent pacing, AV mimi blocker was discontinued and it was there was no indication for PPM per EP.     Recommendations:  [] Bradycardia: HR in 40-50s on flow sheet, no rhythm available. Avoid AV mimi blockers. Recommend telemetry monitoring to ensure no significant conduction disease. Patient is asymptomatic at this time.   [] Post -op Atrial fibrillation: Currently on amiodarone 200 mg q12h, would transition to 200 mg daily once patient completes 1 week of the q12h dosing per cardiac EP [facility] notes. On Eliquis for stroke prophylaxis    The cardiology team to follow along.    Morgan Roy MD  Cardiology            Assessment:  Chriss Mattson is a 61 year old man with past medical history of Hypertension, Hyperlipidemia, CVA, Type 2 Diabetes mellitus with recent hospitalization at [facility] for NSTEMI s/p CABG, MVR and left atrial appendage ligation with hospital course complicated by CHF, atrial fibrillation, and acute renal injury requiring dialysis, also respiratory failure s/p intubation, now currently admitted to acute rehab at [facility].    Cardiology consulted due to bradycardia. On 10/21/21 patient had CABG x 2 (LIMA-LAD, SVG-PDA) with MVR and Left atrial appendage ligation. Review of Cardiac EP notes at [facility] indicates that patient had temporary pacing wire requiring intermittent pacing, AV mimi blocker was discontinued and it was there was no indication for PPM per EP.     Recommendations:  [] Bradycardia: HR in 40-50s on flow sheet, no rhythm available. Avoid AV mimi blockers. Recommend telemetry monitoring to ensure no significant conduction disease. Patient is asymptomatic at this time.   [] Post -op Atrial fibrillation: Currently on amiodarone 200 mg q12h, would transition to 200 mg daily once patient completes 1 week of the q12h dosing per cardiac EP [facility] notes. On Eliquis for stroke prophylaxis  [] CAD s/p CABG and MVR: Continue Aspirin 81 mg daily, recommend statin     The cardiology team to follow along.    Morgan Roy MD  Cardiology

## 2021-11-10 NOTE — PROGRESS NOTE ADULT - SUBJECTIVE AND OBJECTIVE BOX
HPI:  60 y/o M w/ hx of DM2, HTN and HLD initially presented as transfer from [facility] for chest pain concerning for NSTEMI and possible new CHF c/b JOSHUA likely on CKD, and hypertensive emergency on 10/6/21. Hospital course complicated by s/p RRT for CVA/TIA ruled out and worsening anemia requiring 1 PRBC. Pt S/p HD session today and underwent LHC showing Multivessel CAD. CT surgery consulted for CABG evaluation. Patient developed left sided facial droop on 10/7 and stroke code was called. 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 Patient started Hemodialysis on 10/13. On 10/21 s/p CABGx2, Mitral Valve Repair, and left atrial appendage ligation, was extubated next day. He developed SOB on 10/24 and requeired bipap,  but was eventually trasitioned back down to [place]. Permacath placed 11/1/21 and LUE AV Fistula placed by vascular on 11/2/21. Her was noted to have PACs and Afib - started on amiodarone. He will be getting HD at [place]. Cleared by vascular to restart eliquis 2.5mg bid. HD schedule changed from T/T/S to M/W/F.   Patient evaluated by PM&R PT/OT and recommended acute rehab. Patient admitted to [facility] on 11/8/21 for debility. (08 Nov 2021 14:09)      INTERVAL HPI/OVERNIGHT EVENTS:  Chart Reviewed and patient seen at bedside.  Slept well overnight  Urinary retention better - PVR ~77, 115 - will continue to monitor for another day  Found to be bradycardic this morning 44-46 BPM - amiodarone was held    ROS:  Denies fevers, chills, chest pain, shortness of breath, abdominal pain, headaches, nausea/vomiting    Vital Signs Last 24 Hrs  T(C): 36.5 (10 Nov 2021 07:38), Max: 36.8 (09 Nov 2021 19:42)  T(F): 97.7 (10 Nov 2021 07:38), Max: 98.2 (09 Nov 2021 19:42)  HR: 44 (10 Nov 2021 07:38) (44 - 53)  BP: 133/66 (10 Nov 2021 07:38) (120/51 - 138/67)  RR: 16 (10 Nov 2021 07:38) (16 - 16)  SpO2: 98% (10 Nov 2021 07:38) (96% - 100%)    Physical Exam:  Gen - NAD, Comfortable  HEENT - NCAT, EOMI, MMM  Neck - Supple, No limited ROM  Pulm - CTAB, No wheeze, No rhonchi, No crackles  Cardiovascular - RRR, S1S2, No m/r/g  Abdomen - Soft, NT/ND, +BS  Extremities - No C/C/E, No calf tenderness  Neuro-     Cognitive - AAOx3     Attention: Intact      Judgement: Good evidence of judgement     Motor -                    LEFT    UE - ShAB 5/5, EF 5/5, EE 5/5, WE 5/5,  5/5                    RIGHT UE - ShAB 5/5, EF 5/5, EE 5/5, WE 5/5,  5/5                    LEFT    LE - HF 4/5, KE 5/5, DF 5/5, PF 5/5                    RIGHT LE - HF 4/5, KE 5/5, DF 5/5, PF 5/5        Sensory - Intact to LT     Reflexes - DTR Intact, No primitive reflex     Coordination - FTN intact     Tone - normal  Psychiatric - Mood stable, Affect WNL  Skin: Sacral/left buttock sacral decubital ulcer with visible subcutaneous/dermis, dry b/l heels, permacath is c/d/i, LUE AVF site c/d/i sternal incision site is also c/d/i    LABS:  11-09    138  |  99  |  49<H>  ----------------------------<  128<H>  4.6   |  29  |  5.72<H>  11-08    136  |  97  |  70<H>  ----------------------------<  222<H>  4.9   |  20<L>  |  6.94<H>  11-07    137  |  96  |  81<H>  ----------------------------<  113<H>  5.3   |  21<L>  |  7.70<H>    Ca    9.3      09 Nov 2021 05:00  Ca    9.4      08 Nov 2021 05:42  Ca    9.7      07 Nov 2021 11:47    TPro  7.5  /  Alb  2.1<L>  /  TBili  0.4  /  DBili  x   /  AST  30  /  ALT  11  /  AlkPhos  110  11-09  TPro  6.8  /  Alb  3.1<L>  /  TBili  0.2  /  DBili  x   /  AST  18  /  ALT  5<L>  /  AlkPhos  106  11-08                                          8.3    10.75 )-----------( 181      ( 09 Nov 2021 05:00 )             26.2                         7.9    11.97 )-----------( 187      ( 08 Nov 2021 05:42 )             25.7                         7.8    14.78 )-----------( 219      ( 07 Nov 2021 05:38 )             24.9     CAPILLARY BLOOD GLUCOSE  POCT Blood Glucose.: 128 mg/dL (10 Nov 2021 12:10)  POCT Blood Glucose.: 138 mg/dL (10 Nov 2021 07:44)  POCT Blood Glucose.: 146 mg/dL (09 Nov 2021 21:28)  POCT Blood Glucose.: 103 mg/dL (09 Nov 2021 16:32)      MEDICATIONS:  MEDICATIONS  (STANDING):  aMIOdarone    Tablet 200 milliGRAM(s) Oral every 12 hours  apixaban 2.5 milliGRAM(s) Oral two times a day  AQUAPHOR (petrolatum Ointment) 1 Application(s) Topical daily  aspirin  chewable 81 milliGRAM(s) Oral daily  dextrose 40% Gel 15 Gram(s) Oral once  dextrose 5%. 1000 milliLiter(s) (50 mL/Hr) IV Continuous <Continuous>  dextrose 5%. 1000 milliLiter(s) (100 mL/Hr) IV Continuous <Continuous>  dextrose 50% Injectable 25 Gram(s) IV Push once  dextrose 50% Injectable 12.5 Gram(s) IV Push once  dextrose 50% Injectable 25 Gram(s) IV Push once  epoetin vern-epbx (RETACRIT) Injectable 20378 Unit(s) SubCutaneous <User Schedule>  glucagon  Injectable 1 milliGRAM(s) IntraMuscular once  insulin glargine Injectable (LANTUS) 16 Unit(s) SubCutaneous at bedtime  insulin lispro (ADMELOG) corrective regimen sliding scale   SubCutaneous at bedtime  insulin lispro (ADMELOG) corrective regimen sliding scale   SubCutaneous three times a day before meals  insulin lispro Injectable (ADMELOG) 10 Unit(s) SubCutaneous three times a day before meals  pantoprazole    Tablet 40 milliGRAM(s) Oral before breakfast  senna 2 Tablet(s) Oral at bedtime  sevelamer carbonate 800 milliGRAM(s) Oral three times a day with meals    MEDICATIONS  (PRN):  acetaminophen     Tablet .. 650 milliGRAM(s) Oral every 6 hours PRN Mild Pain (1 - 3), Moderate Pain (4 - 6)  melatonin 3 milliGRAM(s) Oral at bedtime PRN Insomnia  polyethylene glycol 3350 17 Gram(s) Oral two times a day PRN Constipation      Therapy  Patient will commence Therapy today, addressing functional deficits, balance and ROM

## 2021-11-10 NOTE — PROGRESS NOTE ADULT - ASSESSMENT
61 y/o M w/ hx of DM2, HTN and HLD, transfer from [facility] for NSTEMI, admitted w/ JOSHUA on CKD and hypertensive emergency, new start to HD (10/13). He underwent LHC showing Multivessel CAD. 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  s/p CABG x2 (10/21), MV repair, and left atrial ligation. Permacath placed 11/1/21 and LUE AV Fistula placed by vascular on 11/2/21. Her was noted to have PACs and Afib - started on amiodarone and eliquis.    Now admitted to [facility] after for initiation of a multidisciplinary rehab program consisting focused on functional mobility, transfers and ADLs.    # Functional and gait instability:  - C/w PT/OT per primary team   - pain control w/ tylenol - noted to be oversedated while on narcotics    #NSTEMI 2/2 CAD s/p CABG (10/21)  #s/p MV repair  #HLD  - s/p LHC on 10/14 showing severe multivessel disease; now s/p CABG on 10/21. Echocardiogram reviewed w/ EF 75% (10/28)  - aspirin  - statin  - eliquis BID for AC for MV  - surgical site on chest c/d/i    # ESRD on HD (MW), due to diabetic nephropathy  # anemia of chronic dz 2/2 ESRD  -received blood transfusion at OSH. no e/o hypervolemia on exam.   -HD via permacath (placed 11/1/21). 1st stage AVF creation (11/2)  -sevelamer  -HD at [place] on discharge  - Nephro consult  - retacrit     # paroxysmal atrial fibrillation   # tachy blaine syndrome  -seen by EP at OSH. Post op AF, possible pt may just need amio short term.   -apixiban  -s/p amiodarone load. 200 mg BID x1 week (starting 11/7 based on notes medication list?), then 200 mg qd. Monitor TFTs and LFTs on amio. Amiodarone now being held d/t bradycardia.   - hold AV mimi agents due to bradycardia.  - Will order an EKG  - Cardiology consult for further management     # Acute urinary retention  noted high PVRs  - cont scheduled bladder checks.  - consider tamsulosin. Rehab considering Urology consult    # DM2  -hgba1c 7%  -glargine 16 u  -aspart 10 units TID wm    # subclinical hypothyroidism  - TSH mildly elevated 6.4, pt asymptomatic  - seen by Endo during admission. Outpt f/u.     # hx of severe tobacco use d/o  - smoked 3 ppd, recently quit a few months ago    dvt ppx: cont apixiban   59 y/o M w/ hx of DM2, HTN and HLD, transfer from [facility] for NSTEMI, admitted w/ JOSHUA on CKD and hypertensive emergency, new start to HD (10/13). He underwent LHC showing Multivessel CAD. 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  s/p CABG x2 (10/21), MV repair, and left atrial ligation. Permacath placed 11/1/21 and LUE AV Fistula placed by vascular on 11/2/21. Her was noted to have PACs and Afib - started on amiodarone and eliquis.    Now admitted to [facility] after for initiation of a multidisciplinary rehab program consisting focused on functional mobility, transfers and ADLs.    # Functional and gait instability:  - C/w PT/OT per primary team   - pain control w/ tylenol - noted to be oversedated while on narcotics    #NSTEMI 2/2 CAD s/p CABG (10/21)  #s/p MV repair  #HLD  - s/p LHC on 10/14 showing severe multivessel disease; now s/p CABG on 10/21. Echocardiogram reviewed w/ EF 75% (10/28)  - aspirin  - statin  - eliquis BID for AC for MV  - surgical site on chest c/d/i    # ESRD on HD (MW), due to diabetic nephropathy  # anemia of chronic dz 2/2 ESRD  -received blood transfusion at OSH. no e/o hypervolemia on exam.   -HD via permacath (placed 11/1/21). 1st stage AVF creation (11/2)  -sevelamer  -HD at [place] on discharge  - Nephro consult  - retacrit     # paroxysmal atrial fibrillation   # tachy blaine syndrome  -seen by EP at OSH. Post op AF, possible pt may just need amio short term.   -apixiban  -s/p amiodarone load. 200 mg BID x1 week (starting 11/7 based on notes medication list?), then 200 mg qd. Monitor TFTs and LFTs on amio. Amiodarone now being held d/t bradycardia.   - hold AV mimi agents due to bradycardia.  - Will order an EKG  - Cardiology consult for further management     # Acute urinary retention  noted high PVRs  - cont scheduled bladder checks.  - consider tamsulosin. Rehab considering Urology consult    # DM2  -hgba1c 7%  -glargine 16 u  -aspart 10 units TID wm    # subclinical hypothyroidism  - TSH mildly elevated 6.4, pt asymptomatic  - seen by Endo during admission. Outpt f/u.     # hx of severe tobacco use d/o  - smoked 3 ppd, recently quit a few months ago    # sacral wound  - appreciate wound care consult.     dvt ppx: cont apixiban   59 y/o M on acute rehab treatment s/p CABG, ESRD on HD and A-fibrillation    (Hx DM2, HTN and HLD, transfer from [facility] for NSTEMI, admitted w/ JOSHUA on CKD and hypertensive emergency, new start to HD (10/13). He underwent LHC showing Multivessel CAD. 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  s/p CABG x2 (10/21), MV repair, and left atrial ligation. Permacath placed 11/1/21 and LUE AV Fistula placed by vascular on 11/2/21. Her was noted to have PACs and Afib - started on amiodarone and eliquis.  Now admitted to [facility] after for initiation of a multidisciplinary rehab program consisting focused on functional mobility, transfers and ADLs)    # Functional and gait instability:  - C/w PT/OT per primary team   - pain control w/ tylenol     #NSTEMI 2/2 CAD s/p CABG (10/21)  #s/p MV repair  #HLD  - s/p LHC on 10/14 showing severe multivessel disease; now s/p CABG on 10/21. Echocardiogram reviewed w/ EF 75% (10/28)  - aspirin  - statin  - eliquis BID for AC for MV  - surgical site on chest c/d/i    # Bradycardia--HR 40s await Cardiology review (amiodarone temporarily on hold), will recommence lower dose once HR improves, while awaiting cardiology    # ESRD on HD (MWF), due to diabetic nephropathy  # anemia of chronic dz 2/2 ESRD  -received blood transfusion at OSH. no e/o hypervolemia on exam.   -HD via permacath (placed 11/1/21). 1st stage AVF creation (11/2)  -sevelamer  -HD at [place] on discharge, renal f/u  - retacrit     # paroxysmal atrial fibrillation   # tachy blaine syndrome  -seen by EP at OSH. Post op AF, possible pt may just need amio short term.   -apixiban  -s/p amiodarone load. 200 mg BID x1 week (starting 11/7 based on notes medication list?), then 200 mg qd. Monitor TFTs and LFTs on amio. Amiodarone now being held d/t bradycardia.   - hold AV mimi agents due to bradycardia.  - Cardiology consult for further management     # Acute urinary retention  noted high PVRs  - cont scheduled bladder checks.  - consider tamsulosin. Rehab considering Urology consult    # DM2  -hgba1c 7%  -glargine 16 u  -aspart 10 units TID wm    # subclinical hypothyroidism  - TSH mildly elevated 6.4, pt asymptomatic  - seen by Endo during admission. Outpt f/u.     # hx of severe tobacco use d/o  - smoked 3 ppd, recently quit a few months ago    # sacral wound--sacral stage 2 ulcer  - appreciate wound care consult.     dvt ppx: cont apixiban

## 2021-11-10 NOTE — PROGRESS NOTE ADULT - ATTENDING COMMENTS
I was personally present during review.  61 y/o M on acute rehab treatment s/p CABG, ESRD on HD and A-fibrillation  Bradycardia asymptomatic awaiting cardiology review and EKG  Obstructive uropathy resolving  no acute distress  Antigravity strength all extremities  Wounds unremarkable   Sacral stage 2 ulcer on f/u by wound care      Seen and examined with Ms Alvares, Rehab NP  stable to continue acute rehab treatment

## 2021-11-10 NOTE — ADVANCED PRACTICE NURSE CONSULT - ASSESSMENT
Patient presents with a few small (1 x 0.5 x 0.1 cm) partial thickness wounds on left edge of gluteal cleft.   These are likely mixed etiology, from both pressure & moisture.  Open areas are clean with no necrosis or drainage, no periwound erythema.

## 2021-11-10 NOTE — ADVANCED PRACTICE NURSE CONSULT - RECOMMEDATIONS
Suggest daily normal saline cleanse, pat dry.  Apply Cavillon film barrier to wounds & periwound area.  Maintain clean & dry gluteal cleft/sacral area.  Offload pressure with turn & position while in bed, & encourage frequent weight shift in chair.  Nutritional support per Dietician recommendations.

## 2021-11-10 NOTE — PROGRESS NOTE ADULT - ATTENDING COMMENTS
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 Had blood transfusion for anemia.   ESRD  on HD via Right chest permacath, left AVF  new Dx of ESRD on HD  Admitted to acute rehab 11/8//21    Urinary retention noted, no acute distress of pain  will get Urology review  continue intermittent straight catheterization with parameters as stated  Tolerating diet      Pleasant and compliant with treatment  Antigravity strenght all extremities,  Left arm AVF, Rt chest permacath, precautions as noted  -No blood draws/BP measurements on left am    Continue PT/OT--muscle strengthening/stretching ROM, DME use    Routine care  DVT ppx, GI protedtion  Urology consult for urinary retention,     Seen and examined with Dr Montez ,Rehab Resident, Patient is stable to commence acute rehab . 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 Had blood transfusion for anemia.   ESRD  on HD via Right chest permacath, left AVF  new Dx of ESRD on HD  Admitted to acute rehab 11/8//21    Urinary retention noted, no acute distress of pain  will get Urology review  continue intermittent straight catheterization with parameters as stated  Tolerating diet      Pleasant and compliant with treatment  Antigravity strenght all extremities,  Left arm AVF, Rt chest permacath, precautions as noted  -No blood draws/BP measurements on left am    Continue PT/OT--muscle strengthening/stretching ROM, DME use    Routine care  DVT ppx, GI protedtion  Urology consult for urinary retention,     Seen and examined with Ms Giorgio NP Rehab medicine Patient is stable to commence acute rehab .

## 2021-11-10 NOTE — PROGRESS NOTE ADULT - ASSESSMENT
TOM PLEITEZ is a 61y with a history of DM2, ?CKD, HTN and HLD  who presented to [facility] on 10/6/21 with SOB, CP found to have multivessel CAD and JOSHUA on CKD, s/p CABGx2, MVR, and LAAL. Started on HD T/T/S, now on HD M/W/F s/p Permacath and LUE AVF. Hospital Course complicated by post-op hemorrhagic shock , received 4 PRBC, 1 PLT, 1000 FIEBA intraop and post op dual pressor and inotrope support w/ , Primacor, Levo, and Epi gtts.  Afib, PACs- now on amiodarone. Patient now admitted for a multidisciplinary rehab program. 21 @ 14:40    -------------    Rehab Management/MEDICAL MANAGEMENT     #Debility  - Gait Instability, ADL impairments and Functional impairments: start Comprehensive Rehab Program of PT/OT/SLP  - 3 hours a day, 5 days a week  - Orthotics as needed   - also will benefit from cardiopulmonary conditioning.  - SLP for cognitive and memory deficits    #CAD and Afib s/p CABGx2, LAAL, MVR  - Most recent echo on 10/28 shows restoration of LVEF   - c/w amiodarone 200mg q12h - held today  - c/w eliquis 2.5mg bid  - c/w ASA 81 daily  - close f/u Medicine  - Cardiology consult     #?CKD on HD - M/W/F  - retacrit 8000ui intraHD  - renal consult  - Permacath placed 21 and LUE AVF placed   - chlorhexidine to keep access sites clean    #DM  - lantus 16 unit qhs  - lispro 10 unit premeal tid  - ISS    #Sleep  - melatonin PRN     #Skin  - Skin on admission: Sacral/left buttock sacral decubital ulcer with visible subcutaneous/dermis, dry b/l heels, permacath is c/d/i, LUE AVF site c/d/i, drain sites c/d/i; sternal incision site is c/d/i  - Pressure injury/Skin: OOB to Chair, PT/OT   - continue monitoring wound, incision and drain sites.  - Wound consult  - alyvene foam dressing and cavilon to wound    #Pain Mgmt   - Tylenol PRN    #GI/Bowel Mgmt   - Continent c/w Senna, Miralax    #/Bladder Mgmt   - PVR qh and SC>400cc - better   - will get urology consult if persists.    #FEN   - Diet - Regular + Thins  [Renal, DASH/TLC]      #Precautions / PROPHYLAXIS:   - Falls, Cardiac, Sternal  - ortho: Weight bearing status: WBAT   - Lungs: Aspiration, Incentive Spirometer   - DVT: eliquis

## 2021-11-10 NOTE — PROGRESS NOTE ADULT - SUBJECTIVE AND OBJECTIVE BOX
Patient is a 61y old  Male who presents with a chief complaint of Debility (09 Nov 2021 10:58)      24 HOUR EVENTS:  No overnight events reported.   bradycardic.     SUBJECTIVE:  Patient seen and examined at bedside.   He has incisional pain in his chest.   Denies having any pressure like or radiating pain.   not having any symptoms from bradycardia.     ALLERGIES:  No Known Allergies    MEDICATIONS  (STANDING):  aMIOdarone    Tablet 200 milliGRAM(s) Oral every 12 hours  apixaban 2.5 milliGRAM(s) Oral two times a day  AQUAPHOR (petrolatum Ointment) 1 Application(s) Topical daily  aspirin  chewable 81 milliGRAM(s) Oral daily  dextrose 40% Gel 15 Gram(s) Oral once  dextrose 5%. 1000 milliLiter(s) (50 mL/Hr) IV Continuous <Continuous>  dextrose 5%. 1000 milliLiter(s) (100 mL/Hr) IV Continuous <Continuous>  dextrose 50% Injectable 25 Gram(s) IV Push once  dextrose 50% Injectable 12.5 Gram(s) IV Push once  dextrose 50% Injectable 25 Gram(s) IV Push once  epoetin vern-epbx (RETACRIT) Injectable 8000 Unit(s) SubCutaneous <User Schedule>  glucagon  Injectable 1 milliGRAM(s) IntraMuscular once  insulin glargine Injectable (LANTUS) 16 Unit(s) SubCutaneous at bedtime  insulin lispro (ADMELOG) corrective regimen sliding scale   SubCutaneous at bedtime  insulin lispro (ADMELOG) corrective regimen sliding scale   SubCutaneous three times a day before meals  insulin lispro Injectable (ADMELOG) 10 Unit(s) SubCutaneous three times a day before meals  pantoprazole    Tablet 40 milliGRAM(s) Oral before breakfast  senna 2 Tablet(s) Oral at bedtime  sevelamer carbonate 800 milliGRAM(s) Oral three times a day with meals    MEDICATIONS  (PRN):  acetaminophen     Tablet .. 650 milliGRAM(s) Oral every 6 hours PRN Mild Pain (1 - 3), Moderate Pain (4 - 6)  melatonin 3 milliGRAM(s) Oral at bedtime PRN Insomnia  polyethylene glycol 3350 17 Gram(s) Oral two times a day PRN Constipation    Vital Signs Last 24 Hrs  T(F): 97.7 (10 Nov 2021 07:38), Max: 98.2 (09 Nov 2021 19:42)  HR: 44 (10 Nov 2021 07:38) (44 - 53)  BP: 133/66 (10 Nov 2021 07:38) (120/51 - 138/67)  RR: 16 (10 Nov 2021 07:38) (16 - 16)  SpO2: 98% (10 Nov 2021 07:38) (96% - 100%)  I&O's Summary    09 Nov 2021 07:01  -  10 Nov 2021 07:00  --------------------------------------------------------  IN: 0 mL / OUT: 100 mL / NET: -100 mL      PHYSICAL EXAM:  General: NAD, A/O   ENT: Moist mucous membranes, no thrush  Neck: Supple, No JVD  Lungs: Clear to auscultation bilaterally, good air entry, non-labored breathing  Cardio: RRR, S1/S2, No murmur. incisional scar c/d/i  Abdomen: Soft, Nontender, Nondistended; Bowel sounds present  Extremities: No calf tenderness, No pitting edema    LABS:                        8.3    10.75 )-----------( 181      ( 09 Nov 2021 05:00 )             26.2     11-09    138  |  99  |  49  ----------------------------<  128  4.6   |  29  |  5.72    Ca    9.3      09 Nov 2021 05:00    TPro  7.5  /  Alb  2.1  /  TBili  0.4  /  DBili  x   /  AST  30  /  ALT  11  /  AlkPhos  110  11-09        eGFR if Non African American: 10 mL/min/1.73M2 (11-09-21 @ 05:00)  eGFR if African American: 12 mL/min/1.73M2 (11-09-21 @ 05:00)          POCT Blood Glucose.: 138 mg/dL (10 Nov 2021 07:44)  POCT Blood Glucose.: 146 mg/dL (09 Nov 2021 21:28)  POCT Blood Glucose.: 103 mg/dL (09 Nov 2021 16:32)          COVID-19 PCR: NotDetec (11-08-21 @ 18:30)  COVID-19 PCR: NotDetec (11-07-21 @ 05:39)  COVID-19 PCR: NotDetec (11-05-21 @ 08:07)  COVID-19 PCR: NotDetec (11-01-21 @ 06:14)  COVID-19 PCR: NotDetec (10-31-21 @ 08:44)    RADIOLOGY & ADDITIONAL TESTS:    Care Discussed with Consultants/Other 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   bradycardic.     SUBJECTIVE:  Patient seen and examined at bedside.   He has incisional pain in his chest.   Denies having any pressure like or radiating pain.   not having any symptoms from bradycardia.     ALLERGIES:  No Known Allergies    MEDICATIONS  (STANDING):  aMIOdarone    Tablet 200 milliGRAM(s) Oral every 12 hours  apixaban 2.5 milliGRAM(s) Oral two times a day  AQUAPHOR (petrolatum Ointment) 1 Application(s) Topical daily  aspirin  chewable 81 milliGRAM(s) Oral daily  dextrose 40% Gel 15 Gram(s) Oral once  dextrose 5%. 1000 milliLiter(s) (50 mL/Hr) IV Continuous <Continuous>  dextrose 5%. 1000 milliLiter(s) (100 mL/Hr) IV Continuous <Continuous>  dextrose 50% Injectable 25 Gram(s) IV Push once  dextrose 50% Injectable 12.5 Gram(s) IV Push once  dextrose 50% Injectable 25 Gram(s) IV Push once  epoetin vern-epbx (RETACRIT) Injectable 8000 Unit(s) SubCutaneous <User Schedule>  glucagon  Injectable 1 milliGRAM(s) IntraMuscular once  insulin glargine Injectable (LANTUS) 16 Unit(s) SubCutaneous at bedtime  insulin lispro (ADMELOG) corrective regimen sliding scale   SubCutaneous at bedtime  insulin lispro (ADMELOG) corrective regimen sliding scale   SubCutaneous three times a day before meals  insulin lispro Injectable (ADMELOG) 10 Unit(s) SubCutaneous three times a day before meals  pantoprazole    Tablet 40 milliGRAM(s) Oral before breakfast  senna 2 Tablet(s) Oral at bedtime  sevelamer carbonate 800 milliGRAM(s) Oral three times a day with meals    MEDICATIONS  (PRN):  acetaminophen     Tablet .. 650 milliGRAM(s) Oral every 6 hours PRN Mild Pain (1 - 3), Moderate Pain (4 - 6)  melatonin 3 milliGRAM(s) Oral at bedtime PRN Insomnia  polyethylene glycol 3350 17 Gram(s) Oral two times a day PRN Constipation    Vital Signs Last 24 Hrs  T(F): 97.7 (10 Nov 2021 07:38), Max: 98.2 (09 Nov 2021 19:42)  HR: 44 (10 Nov 2021 07:38) (44 - 53)  BP: 133/66 (10 Nov 2021 07:38) (120/51 - 138/67)  RR: 16 (10 Nov 2021 07:38) (16 - 16)  SpO2: 98% (10 Nov 2021 07:38) (96% - 100%)  I&O's Summary    09 Nov 2021 07:01  -  10 Nov 2021 07:00  --------------------------------------------------------  IN: 0 mL / OUT: 100 mL / NET: -100 mL      PHYSICAL EXAM:  General: NAD, A/O   ENT: Moist mucous membranes, no thrush  Neck: Supple, No JVD  Lungs: Clear to auscultation bilaterally, good air entry, non-labored breathing  Cardio: sinus bradycardia. , S1/S2, No murmur. incisional scar c/d/i  Abdomen: Soft, Nontender, Nondistended; Bowel sounds present  Extremities: No calf tenderness, No pitting edema    LABS:                        8.3    10.75 )-----------( 181      ( 09 Nov 2021 05:00 )             26.2     11-09    138  |  99  |  49  ----------------------------<  128  4.6   |  29  |  5.72    Ca    9.3      09 Nov 2021 05:00    TPro  7.5  /  Alb  2.1  /  TBili  0.4  /  DBili  x   /  AST  30  /  ALT  11  /  AlkPhos  110  11-09        eGFR if Non African American: 10 mL/min/1.73M2 (11-09-21 @ 05:00)  eGFR if African American: 12 mL/min/1.73M2 (11-09-21 @ 05:00)          POCT Blood Glucose.: 138 mg/dL (10 Nov 2021 07:44)  POCT Blood Glucose.: 146 mg/dL (09 Nov 2021 21:28)  POCT Blood Glucose.: 103 mg/dL (09 Nov 2021 16:32)          COVID-19 PCR: NotDetec (11-08-21 @ 18:30)  COVID-19 PCR: NotDetec (11-07-21 @ 05:39)  COVID-19 PCR: NotDetec (11-05-21 @ 08:07)  COVID-19 PCR: NotDetec (11-01-21 @ 06:14)  COVID-19 PCR: NotDetec (10-31-21 @ 08:44)    RADIOLOGY & ADDITIONAL TESTS:    Care Discussed with Consultants/Other Providers:   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 He underwent LHC showing Multivessel CAD. 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  s/p CABG x2 (10/21), MV repair, and left atrial ligation. Permacath placed 11/1/21 and LUE AV Fistula placed by vascular on 11/2/21. Her was noted to have PACs and Afib - started on amiodarone and eliquis.T/F to [facility] for Acute Rehab treatment for Debility post CABG, ESRD on HD, Afib, to improve function and ensure safe community discharge    Interval events  Patient seen and examined at bedside.   Incisional chest and left leg wounds are unremarkable  Wound care recs for sacral stage 2 ulcer appreciated--cavilon cream   Denies having any pressure like or radiating pain.   Low HR, asymptomatic, cardiology referral, ordered as recs by hospitalist  Now voiding appropriately, low PVR, will monitor for next 24 hr and DC bladder scan if unremarkable  No acute distress, on routine HD    ALLERGIES:  No Known Allergies    MEDICATIONS  (STANDING):  aMIOdarone    Tablet 200 milliGRAM(s) Oral every 12 hours  apixaban 2.5 milliGRAM(s) Oral two times a day  AQUAPHOR (petrolatum Ointment) 1 Application(s) Topical daily  aspirin  chewable 81 milliGRAM(s) Oral daily  dextrose 40% Gel 15 Gram(s) Oral once  dextrose 5%. 1000 milliLiter(s) (50 mL/Hr) IV Continuous <Continuous>  dextrose 5%. 1000 milliLiter(s) (100 mL/Hr) IV Continuous <Continuous>  dextrose 50% Injectable 25 Gram(s) IV Push once  dextrose 50% Injectable 12.5 Gram(s) IV Push once  dextrose 50% Injectable 25 Gram(s) IV Push once  epoetin vern-epbx (RETACRIT) Injectable 8000 Unit(s) SubCutaneous <User Schedule>  glucagon  Injectable 1 milliGRAM(s) IntraMuscular once  insulin glargine Injectable (LANTUS) 16 Unit(s) SubCutaneous at bedtime  insulin lispro (ADMELOG) corrective regimen sliding scale   SubCutaneous at bedtime  insulin lispro (ADMELOG) corrective regimen sliding scale   SubCutaneous three times a day before meals  insulin lispro Injectable (ADMELOG) 10 Unit(s) SubCutaneous three times a day before meals  pantoprazole    Tablet 40 milliGRAM(s) Oral before breakfast  senna 2 Tablet(s) Oral at bedtime  sevelamer carbonate 800 milliGRAM(s) Oral three times a day with meals    MEDICATIONS  (PRN):  acetaminophen     Tablet .. 650 milliGRAM(s) Oral every 6 hours PRN Mild Pain (1 - 3), Moderate Pain (4 - 6)  melatonin 3 milliGRAM(s) Oral at bedtime PRN Insomnia  polyethylene glycol 3350 17 Gram(s) Oral two times a day PRN Constipation    Vital Signs Last 24 Hrs  T(C): 36.5 (10 Nov 2021 07:38), Max: 36.8 (09 Nov 2021 19:42)  T(F): 97.7 (10 Nov 2021 07:38), Max: 98.2 (09 Nov 2021 19:42)  HR: 44 (10 Nov 2021 07:38) (44 - 53)  BP: 133/66 (10 Nov 2021 07:38) (120/51 - 138/67)  RR: 16 (10 Nov 2021 07:38) (16 - 16)  SpO2: 98% (10 Nov 2021 07:38) (96% - 100%)    PHYSICAL EXAM:  General: alert and fully oriented  ENT: Moist mucous membranes, no thrush  Neck: Supple, No JVD  Lungs: Clear to auscultation bilaterally, good air entry, non-labored breathing  Cardio: sinus bradycardia. , S1/S2, No murmur. incisional scar chest  and LLE c/d/i  Abdomen: Soft, Nontender, Nondistended; Bowel sounds present  Extremities: No calf tenderness, No pitting edema, Left AVF (not in use), Rt chest permacath,     LABS:                        8.3    10.75 )-----------( 181      ( 09 Nov 2021 05:00 )             26.2     11-09    138  |  99  |  49  ----------------------------<  128  4.6   |  29  |  5.72    Ca    9.3      09 Nov 2021 05:00    TPro  7.5  /  Alb  2.1  /  TBili  0.4  /  DBili  x   /  AST  30  /  ALT  11  /  AlkPhos  110  11-09  eGFR if Non African American: 10 mL/min/1.73M2 (11-09-21 @ 05:00)  eGFR if African American: 12 mL/min/1.73M2 (11-09-21 @ 05:00)      POCT Blood Glucose.: 146 mg/dL (09 Nov 2021 21:28)  POCT Blood Glucose.: 103 mg/dL (09 Nov 2021 16:32)      < from: MR Head No Cont (10.09.21 @ 20:22) >  No acute infarct or intracranial hemorrhage. Chronic infarcts and microvascular changes as above.

## 2021-11-10 NOTE — CONSULT NOTE ADULT - SUBJECTIVE AND OBJECTIVE BOX
TOM MATTSON  [id number]      HPI:    Tom Mattson is a 61 year old man with past medical history of Hypertension, Hyperlipidemia, CVA, Type 2 Diabetes mellitus with recent hospitalization at [facility] for NSTEMI s/p CABG with hospital course complicated by CHF, atrial fibrillation, and acute renal injury requiring dialysis, also respiratory failure s/p intubation, now currently admitted to acute rehab at [facility].    ALLERGIES:  No Known Allergies      PAST MEDICAL & SURGICAL HISTORY:  Hypertension    Hyperlipidemia    Diabetes mellitus    No pertinent past surgical history          CURRENT MEDICATIONS:  acetaminophen     Tablet .. 650 milliGRAM(s) Oral every 6 hours PRN  aMIOdarone    Tablet 200 milliGRAM(s) Oral every 12 hours  apixaban 2.5 milliGRAM(s) Oral two times a day  AQUAPHOR (petrolatum Ointment) 1 Application(s) Topical daily  aspirin  chewable 81 milliGRAM(s) Oral daily  dextrose 40% Gel 15 Gram(s) Oral once  dextrose 5%. 1000 milliLiter(s) IV Continuous <Continuous>  dextrose 5%. 1000 milliLiter(s) IV Continuous <Continuous>  dextrose 50% Injectable 25 Gram(s) IV Push once  dextrose 50% Injectable 12.5 Gram(s) IV Push once  dextrose 50% Injectable 25 Gram(s) IV Push once  epoetin vern-epbx (RETACRIT) Injectable 49805 Unit(s) SubCutaneous <User Schedule>  glucagon  Injectable 1 milliGRAM(s) IntraMuscular once  insulin glargine Injectable (LANTUS) 16 Unit(s) SubCutaneous at bedtime  insulin lispro (ADMELOG) corrective regimen sliding scale   SubCutaneous at bedtime  insulin lispro (ADMELOG) corrective regimen sliding scale   SubCutaneous three times a day before meals  insulin lispro Injectable (ADMELOG) 10 Unit(s) SubCutaneous three times a day before meals  melatonin 3 milliGRAM(s) Oral at bedtime PRN  pantoprazole    Tablet 40 milliGRAM(s) Oral before breakfast  polyethylene glycol 3350 17 Gram(s) Oral two times a day PRN  senna 2 Tablet(s) Oral at bedtime  sevelamer carbonate 800 milliGRAM(s) Oral three times a day with meals      SOCIAL HISTORY:      FAMILY HISTORY:  Family history of CVA (Father)        ROS:  All 10 systems reviewed and positives noted in HPI    OBJECTIVE:    VITAL SIGNS:  Vital Signs Last 24 Hrs  T(C): 36.5 (10 Nov 2021 07:38), Max: 36.8 (09 Nov 2021 19:42)  T(F): 97.7 (10 Nov 2021 07:38), Max: 98.2 (09 Nov 2021 19:42)  HR: 44 (10 Nov 2021 07:38) (44 - 53)  BP: 133/66 (10 Nov 2021 07:38) (120/51 - 138/67)  BP(mean): --  RR: 16 (10 Nov 2021 07:38) (16 - 16)  SpO2: 98% (10 Nov 2021 07:38) (96% - 100%)    PHYSICAL EXAM:  General: well appearing, no distress  HEENT: sclera anicteric  Neck: supple, no carotid bruits b/l  CVS: JVP ~ 7 cm H20, RRR, s1, s2, no murmurs/rubs/gallops  Chest: unlabored respirations, clear to auscultation b/l  Abdomen: non-distended  Extremities: no lower extremity edema b/l  Neuro: awake, alert & oriented x 3  Psych: normal affect      LABS:                        8.3    10.75 )-----------( 181      ( 09 Nov 2021 05:00 )             26.2     11-09    138  |  99  |  49<H>  ----------------------------<  128<H>  4.6   |  29  |  5.72<H>    Ca    9.3      09 Nov 2021 05:00    TPro  7.5  /  Alb  2.1<L>  /  TBili  0.4  /  DBili  x   /  AST  30  /  ALT  11  /  AlkPhos  110  11-09              ECG:      TTE:     TOM MATTSON  [id number]      HPI:    Tom Mattson is a 61 year old man with past medical history of Hypertension, Hyperlipidemia, CVA, Type 2 Diabetes mellitus with recent hospitalization at [facility] for NSTEMI s/p CABG with hospital course complicated by CHF, atrial fibrillation, and acute renal injury requiring dialysis, also respiratory failure s/p intubation, now currently admitted to acute rehab at [facility].    ALLERGIES:  No Known Allergies      PAST MEDICAL & SURGICAL HISTORY:  Hypertension    Hyperlipidemia    Diabetes mellitus    No pertinent past surgical history          CURRENT MEDICATIONS:  acetaminophen     Tablet .. 650 milliGRAM(s) Oral every 6 hours PRN  aMIOdarone    Tablet 200 milliGRAM(s) Oral every 12 hours  apixaban 2.5 milliGRAM(s) Oral two times a day  AQUAPHOR (petrolatum Ointment) 1 Application(s) Topical daily  aspirin  chewable 81 milliGRAM(s) Oral daily  dextrose 40% Gel 15 Gram(s) Oral once  dextrose 5%. 1000 milliLiter(s) IV Continuous <Continuous>  dextrose 5%. 1000 milliLiter(s) IV Continuous <Continuous>  dextrose 50% Injectable 25 Gram(s) IV Push once  dextrose 50% Injectable 12.5 Gram(s) IV Push once  dextrose 50% Injectable 25 Gram(s) IV Push once  epoetin vern-epbx (RETACRIT) Injectable 71460 Unit(s) SubCutaneous <User Schedule>  glucagon  Injectable 1 milliGRAM(s) IntraMuscular once  insulin glargine Injectable (LANTUS) 16 Unit(s) SubCutaneous at bedtime  insulin lispro (ADMELOG) corrective regimen sliding scale   SubCutaneous at bedtime  insulin lispro (ADMELOG) corrective regimen sliding scale   SubCutaneous three times a day before meals  insulin lispro Injectable (ADMELOG) 10 Unit(s) SubCutaneous three times a day before meals  melatonin 3 milliGRAM(s) Oral at bedtime PRN  pantoprazole    Tablet 40 milliGRAM(s) Oral before breakfast  polyethylene glycol 3350 17 Gram(s) Oral two times a day PRN  senna 2 Tablet(s) Oral at bedtime  sevelamer carbonate 800 milliGRAM(s) Oral three times a day with meals      SOCIAL HISTORY:      FAMILY HISTORY:  Family history of CVA (Father)        ROS:  All 10 systems reviewed and positives noted in HPI    OBJECTIVE:    VITAL SIGNS:  Vital Signs Last 24 Hrs  T(C): 36.5 (10 Nov 2021 07:38), Max: 36.8 (09 Nov 2021 19:42)  T(F): 97.7 (10 Nov 2021 07:38), Max: 98.2 (09 Nov 2021 19:42)  HR: 44 (10 Nov 2021 07:38) (44 - 53)  BP: 133/66 (10 Nov 2021 07:38) (120/51 - 138/67)  BP(mean): --  RR: 16 (10 Nov 2021 07:38) (16 - 16)  SpO2: 98% (10 Nov 2021 07:38) (96% - 100%)    PHYSICAL EXAM:  General: well appearing, no distress  HEENT: sclera anicteric  Neck: supple, no carotid bruits b/l  CVS: JVP ~ 7 cm H20, RRR, s1, s2, no murmurs/rubs/gallops  Chest: unlabored respirations, clear to auscultation b/l  Abdomen: non-distended  Extremities: no lower extremity edema b/l  Neuro: awake, alert & oriented x 3  Psych: normal affect      LABS:                        8.3    10.75 )-----------( 181      ( 09 Nov 2021 05:00 )             26.2     11-09    138  |  99  |  49<H>  ----------------------------<  128<H>  4.6   |  29  |  5.72<H>    Ca    9.3      09 Nov 2021 05:00    TPro  7.5  /  Alb  2.1<L>  /  TBili  0.4  /  DBili  x   /  AST  30  /  ALT  11  /  AlkPhos  110  11-09          TTE (10/28/21):  LVEF 75%  Normal RV size and systolic function  Dilated left atrium  Bio-MVR  Minimal pericardial effusion       ECG:      TTE:     TOM MATTSON  [id number]      HPI:    Tom Mattson is a 61 year old man with past medical history of Hypertension, Hyperlipidemia, CVA, Type 2 Diabetes mellitus with recent hospitalization at [facility] for NSTEMI s/p CABG with hospital course complicated by CHF, atrial fibrillation, and acute renal injury requiring dialysis, also respiratory failure s/p intubation, now currently admitted to acute rehab at [facility].    Cardiology consulted due to bradycardia.       ALLERGIES:  No Known Allergies      PAST MEDICAL & SURGICAL HISTORY:  Hypertension    Hyperlipidemia    Diabetes mellitus    No pertinent past surgical history          CURRENT MEDICATIONS:  acetaminophen     Tablet .. 650 milliGRAM(s) Oral every 6 hours PRN  aMIOdarone    Tablet 200 milliGRAM(s) Oral every 12 hours  apixaban 2.5 milliGRAM(s) Oral two times a day  AQUAPHOR (petrolatum Ointment) 1 Application(s) Topical daily  aspirin  chewable 81 milliGRAM(s) Oral daily  dextrose 40% Gel 15 Gram(s) Oral once  dextrose 5%. 1000 milliLiter(s) IV Continuous <Continuous>  dextrose 5%. 1000 milliLiter(s) IV Continuous <Continuous>  dextrose 50% Injectable 25 Gram(s) IV Push once  dextrose 50% Injectable 12.5 Gram(s) IV Push once  dextrose 50% Injectable 25 Gram(s) IV Push once  epoetin vern-epbx (RETACRIT) Injectable 19227 Unit(s) SubCutaneous <User Schedule>  glucagon  Injectable 1 milliGRAM(s) IntraMuscular once  insulin glargine Injectable (LANTUS) 16 Unit(s) SubCutaneous at bedtime  insulin lispro (ADMELOG) corrective regimen sliding scale   SubCutaneous at bedtime  insulin lispro (ADMELOG) corrective regimen sliding scale   SubCutaneous three times a day before meals  insulin lispro Injectable (ADMELOG) 10 Unit(s) SubCutaneous three times a day before meals  melatonin 3 milliGRAM(s) Oral at bedtime PRN  pantoprazole    Tablet 40 milliGRAM(s) Oral before breakfast  polyethylene glycol 3350 17 Gram(s) Oral two times a day PRN  senna 2 Tablet(s) Oral at bedtime  sevelamer carbonate 800 milliGRAM(s) Oral three times a day with meals      SOCIAL HISTORY:      FAMILY HISTORY:  Family history of CVA (Father)        ROS:  All 10 systems reviewed and positives noted in HPI    OBJECTIVE:    VITAL SIGNS:  Vital Signs Last 24 Hrs  T(C): 36.5 (10 Nov 2021 07:38), Max: 36.8 (09 Nov 2021 19:42)  T(F): 97.7 (10 Nov 2021 07:38), Max: 98.2 (09 Nov 2021 19:42)  HR: 44 (10 Nov 2021 07:38) (44 - 53)  BP: 133/66 (10 Nov 2021 07:38) (120/51 - 138/67)  BP(mean): --  RR: 16 (10 Nov 2021 07:38) (16 - 16)  SpO2: 98% (10 Nov 2021 07:38) (96% - 100%)    PHYSICAL EXAM:  General: well appearing, no distress  HEENT: sclera anicteric  Neck: supple, no carotid bruits b/l  CVS: JVP ~ 7 cm H20, RRR, s1, s2, no murmurs/rubs/gallops  Chest: unlabored respirations, clear to auscultation b/l  Abdomen: non-distended  Extremities: no lower extremity edema b/l  Neuro: awake, alert & oriented x 3  Psych: normal affect      LABS:                        8.3    10.75 )-----------( 181      ( 09 Nov 2021 05:00 )             26.2     11-09    138  |  99  |  49<H>  ----------------------------<  128<H>  4.6   |  29  |  5.72<H>    Ca    9.3      09 Nov 2021 05:00    TPro  7.5  /  Alb  2.1<L>  /  TBili  0.4  /  DBili  x   /  AST  30  /  ALT  11  /  AlkPhos  110  11-09          TTE (10/28/21):  LVEF 75%  Normal RV size and systolic function  Dilated left atrium  Bio-MVR  Minimal pericardial effusion       ECG:      TTE:     TOM MATTSON  [id number]      HPI:    Tom Mattson is a 61 year old man with past medical history of Hypertension, Hyperlipidemia, CVA, Type 2 Diabetes mellitus with recent hospitalization at [facility] for NSTEMI s/p CABG with hospital course complicated by CHF, atrial fibrillation, and acute renal injury requiring dialysis, also respiratory failure s/p intubation, now currently admitted to acute rehab at [facility].    Cardiology consulted due to bradycardia. The patient was seen and examined at dialysis, denies dizziness or syncope. Denies angina or dyspnea.      ALLERGIES:  No Known Allergies      PAST MEDICAL & SURGICAL HISTORY:  Hypertension  Hyperlipidemia  Diabetes mellitus  CABG      CURRENT MEDICATIONS:  acetaminophen     Tablet .. 650 milliGRAM(s) Oral every 6 hours PRN  aMIOdarone    Tablet 200 milliGRAM(s) Oral every 12 hours  apixaban 2.5 milliGRAM(s) Oral two times a day  AQUAPHOR (petrolatum Ointment) 1 Application(s) Topical daily  aspirin  chewable 81 milliGRAM(s) Oral daily  dextrose 40% Gel 15 Gram(s) Oral once  dextrose 5%. 1000 milliLiter(s) IV Continuous <Continuous>  dextrose 5%. 1000 milliLiter(s) IV Continuous <Continuous>  dextrose 50% Injectable 25 Gram(s) IV Push once  dextrose 50% Injectable 12.5 Gram(s) IV Push once  dextrose 50% Injectable 25 Gram(s) IV Push once  epoetin vern-epbx (RETACRIT) Injectable 60024 Unit(s) SubCutaneous <User Schedule>  glucagon  Injectable 1 milliGRAM(s) IntraMuscular once  insulin glargine Injectable (LANTUS) 16 Unit(s) SubCutaneous at bedtime  insulin lispro (ADMELOG) corrective regimen sliding scale   SubCutaneous at bedtime  insulin lispro (ADMELOG) corrective regimen sliding scale   SubCutaneous three times a day before meals  insulin lispro Injectable (ADMELOG) 10 Unit(s) SubCutaneous three times a day before meals  melatonin 3 milliGRAM(s) Oral at bedtime PRN  pantoprazole    Tablet 40 milliGRAM(s) Oral before breakfast  polyethylene glycol 3350 17 Gram(s) Oral two times a day PRN  senna 2 Tablet(s) Oral at bedtime  sevelamer carbonate 800 milliGRAM(s) Oral three times a day with meals      FAMILY HISTORY:  Family history of CVA (Father)    ROS:  All 10 systems reviewed and positives noted in HPI    OBJECTIVE:    VITAL SIGNS:  Vital Signs Last 24 Hrs  T(C): 36.5 (10 Nov 2021 07:38), Max: 36.8 (09 Nov 2021 19:42)  T(F): 97.7 (10 Nov 2021 07:38), Max: 98.2 (09 Nov 2021 19:42)  HR: 44 (10 Nov 2021 07:38) (44 - 53)  BP: 133/66 (10 Nov 2021 07:38) (120/51 - 138/67)  BP(mean): --  RR: 16 (10 Nov 2021 07:38) (16 - 16)  SpO2: 98% (10 Nov 2021 07:38) (96% - 100%)    PHYSICAL EXAM:  General: middle aged man, resting, no distress  HEENT: sclera anicteric  Neck: supple  CVS: JVP ~ 7 cm H20, RRR, s1, s2, no murmurs/rubs  Chest: unlabored respirations, clear to auscultation anteriorly   Extremities: no lower extremity edema b/l  Neuro: awake, alert & oriented x 3  Psych: normal affect      LABS:                        8.3    10.75 )-----------( 181      ( 09 Nov 2021 05:00 )             26.2     11-09    138  |  99  |  49<H>  ----------------------------<  128<H>  4.6   |  29  |  5.72<H>    Ca    9.3      09 Nov 2021 05:00    TPro  7.5  /  Alb  2.1<L>  /  TBili  0.4  /  DBili  x   /  AST  30  /  ALT  11  /  AlkPhos  110  11-09          TTE (10/28/21):  LVEF 75%  Normal RV size and systolic function  Dilated left atrium  Bio-MVR  Minimal pericardial effusion     ECG (11/4/21): possible ectopic atrial rhythm

## 2021-11-11 LAB
GLUCOSE BLDC GLUCOMTR-MCNC: 121 MG/DL — HIGH (ref 70–99)
GLUCOSE BLDC GLUCOMTR-MCNC: 122 MG/DL — HIGH (ref 70–99)
GLUCOSE BLDC GLUCOMTR-MCNC: 140 MG/DL — HIGH (ref 70–99)
GLUCOSE BLDC GLUCOMTR-MCNC: 148 MG/DL — HIGH (ref 70–99)
GLUCOSE BLDC GLUCOMTR-MCNC: 99 MG/DL — SIGNIFICANT CHANGE UP (ref 70–99)

## 2021-11-11 PROCEDURE — 99233 SBSQ HOSP IP/OBS HIGH 50: CPT

## 2021-11-11 PROCEDURE — 99232 SBSQ HOSP IP/OBS MODERATE 35: CPT

## 2021-11-11 RX ORDER — INSULIN LISPRO 100/ML
5 VIAL (ML) SUBCUTANEOUS ONCE
Refills: 0 | Status: COMPLETED | OUTPATIENT
Start: 2021-11-11 | End: 2021-11-11

## 2021-11-11 RX ADMIN — Medication 5 UNIT(S): at 18:26

## 2021-11-11 RX ADMIN — APIXABAN 2.5 MILLIGRAM(S): 2.5 TABLET, FILM COATED ORAL at 05:21

## 2021-11-11 RX ADMIN — Medication 10 UNIT(S): at 08:29

## 2021-11-11 RX ADMIN — INSULIN GLARGINE 16 UNIT(S): 100 INJECTION, SOLUTION SUBCUTANEOUS at 21:14

## 2021-11-11 RX ADMIN — Medication 1 APPLICATION(S): at 11:48

## 2021-11-11 RX ADMIN — APIXABAN 2.5 MILLIGRAM(S): 2.5 TABLET, FILM COATED ORAL at 17:26

## 2021-11-11 RX ADMIN — SEVELAMER CARBONATE 800 MILLIGRAM(S): 2400 POWDER, FOR SUSPENSION ORAL at 17:25

## 2021-11-11 RX ADMIN — Medication 81 MILLIGRAM(S): at 11:48

## 2021-11-11 RX ADMIN — SEVELAMER CARBONATE 800 MILLIGRAM(S): 2400 POWDER, FOR SUSPENSION ORAL at 08:30

## 2021-11-11 RX ADMIN — PANTOPRAZOLE SODIUM 40 MILLIGRAM(S): 20 TABLET, DELAYED RELEASE ORAL at 05:21

## 2021-11-11 RX ADMIN — Medication 3 MILLIGRAM(S): at 21:11

## 2021-11-11 RX ADMIN — SEVELAMER CARBONATE 800 MILLIGRAM(S): 2400 POWDER, FOR SUSPENSION ORAL at 11:48

## 2021-11-11 RX ADMIN — SENNA PLUS 2 TABLET(S): 8.6 TABLET ORAL at 21:11

## 2021-11-11 NOTE — PROGRESS NOTE ADULT - ATTENDING COMMENTS
Dx: Debility s/p CABG for multivessel disease, new dx Afib and ESRD on Hemodialysis,  old frontal infarct and left carotid stenosis, interval AMS requiring intubation x1 day during acute hosp care. Admitted to acute rehab 11/8//21    Engaging in acute rehab   Bradycardia on cardiology/hospitalist f/u, asymptomatic on amiodarone  Tolerating HD as noted by Renal  ESRD  on HD via Right chest permacath, left AVF  Urinary retention resolved    Antigravity strength all extremities  Left arm AVF, Rt chest permacath, precautions as noted  -No blood draws/BP measurements on left am    Continue PT/OT--muscle strengthening/stretching ROM, DME use  Team meeting today estimated DC 10-14 days from admission, barriers to dc and goals of treatment as noted in assessment --IDT    Routine care  DVT ppx, GI protection  Renal precautions, fluid restricion as per renal, routine HD      Seen and examined with Dr Montez Rehab [facility] resident, patient is stable to continue acute rehab treatment

## 2021-11-11 NOTE — PROGRESS NOTE ADULT - SUBJECTIVE AND OBJECTIVE BOX
Stable in rehab    Vital Signs Last 24 Hrs  T(C): 37.1 (11-11-21 @ 11:43), Max: 37.3 (11-10-21 @ 20:19)  T(F): 98.7 (11-11-21 @ 11:43), Max: 99.1 (11-10-21 @ 20:19)  HR: 50 (11-11-21 @ 11:43) (50 - 61)  BP: 119/58 (11-11-21 @ 11:43) (118/58 - 140/69)  RR: 14 (11-11-21 @ 11:43) (14 - 16)  SpO2: 100% (11-11-21 @ 11:43) (98% - 100%)    s1s2  b/l air entry  soft  sm edema                                7.8    9.69  )-----------( 213      ( 10 Nov 2021 14:30 )             24.7     10 Nov 2021 14:30    137    |  98     |  87     ----------------------------<  187    5.0     |  25     |  8.07     Ca    8.8        10 Nov 2021 14:30  Phos  6.6       10 Nov 2021 14:30    TPro  x      /  Alb  2.1    /  TBili  x      /  DBili  x      /  AST  x      /  ALT  x      /  AlkPhos  x      10 Nov 2021 14:30    LIVER FUNCTIONS - ( 10 Nov 2021 14:30 )  Alb: 2.1 g/dL / Pro: x     / ALK PHOS: x     / ALT: x     / AST: x     / GGT: x           acetaminophen     Tablet .. 650 milliGRAM(s) Oral every 6 hours PRN  aMIOdarone    Tablet 200 milliGRAM(s) Oral every 12 hours  apixaban 2.5 milliGRAM(s) Oral two times a day  AQUAPHOR (petrolatum Ointment) 1 Application(s) Topical daily  aspirin  chewable 81 milliGRAM(s) Oral daily  dextrose 40% Gel 15 Gram(s) Oral once  dextrose 5%. 1000 milliLiter(s) IV Continuous <Continuous>  dextrose 5%. 1000 milliLiter(s) IV Continuous <Continuous>  dextrose 50% Injectable 25 Gram(s) IV Push once  dextrose 50% Injectable 12.5 Gram(s) IV Push once  dextrose 50% Injectable 25 Gram(s) IV Push once  epoetin vern-epbx (RETACRIT) Injectable 84430 Unit(s) SubCutaneous <User Schedule>  glucagon  Injectable 1 milliGRAM(s) IntraMuscular once  insulin glargine Injectable (LANTUS) 16 Unit(s) SubCutaneous at bedtime  insulin lispro (ADMELOG) corrective regimen sliding scale   SubCutaneous at bedtime  insulin lispro (ADMELOG) corrective regimen sliding scale   SubCutaneous three times a day before meals  insulin lispro Injectable (ADMELOG) 10 Unit(s) SubCutaneous three times a day before meals  melatonin 3 milliGRAM(s) Oral at bedtime PRN  pantoprazole    Tablet 40 milliGRAM(s) Oral before breakfast  polyethylene glycol 3350 17 Gram(s) Oral two times a day PRN  senna 2 Tablet(s) Oral at bedtime  sevelamer carbonate 800 milliGRAM(s) Oral three times a day with meals    A/P:    S/p CABG x 2, Mitral Valve Repair, and left atrial appendage ligation 10/21/21  Hospital course complicated by JOSHUA/CKD  Now on HD  Will continue HD MWF  Renal diet  TMP as able  Epoetin w/HD  Sevelamer for high Phos  Will follow    [phone]

## 2021-11-11 NOTE — PROGRESS NOTE ADULT - ASSESSMENT
TOM PLEITEZ is a 61y with a history of DM2, ?CKD, HTN and HLD  who presented to [facility] on 10/6/21 with SOB, CP found to have multivessel CAD and JOSHUA on CKD, s/p CABGx2, MVR, and LAAL. Started on HD T/T/S, now on HD M/W/F s/p Permacath and LUE AVF. Hospital Course complicated by post-op hemorrhagic shock , received 4 PRBC, 1 PLT, 1000 FIEBA intraop and post op dual pressor and inotrope support w/ , Primacor, Levo, and Epi gtts.  Afib, PACs- now on amiodarone. Patient now admitted for a multidisciplinary rehab program. 21 @ 14:40    -------------    Rehab Management/MEDICAL MANAGEMENT     #Debility  - Gait Instability, ADL impairments and Functional impairments: start Comprehensive Rehab Program of PT/OT/SLP  - 3 hours a day, 5 days a week  - Orthotics as needed   - also will benefit from cardiopulmonary conditioning.  - SLP for cognitive and memory deficits    #CAD and Afib s/p CABGx2, LAAL, MVR  - Most recent echo on 10/28 shows restoration of LVEF   - c/w amiodarone 200mg q12h - held in AM but got last evening.  - c/w eliquis 2.5mg bid  - c/w ASA 81 daily  - close f/u Medicine  - Cardiology consult - rec c/w amiodarone 200mg q12h 7 day course and then change to 200mg qd. - will keep parameters    #?CKD on HD - M/W/F  - retacrit 8000ui intraHD  - renal consult  - Permacath placed 21 and LUE AVF placed   - chlorhexidine to keep access sites clean    #DM  - lantus 16 unit qhs  - lispro 10 unit premeal tid  - ISS    #Sleep  - melatonin PRN     #Skin  - Skin on admission: Sacral/left buttock sacral decubital ulcer with visible subcutaneous/dermis, dry b/l heels, permacath is c/d/i, LUE AVF site c/d/i, drain sites c/d/i; sternal incision site is c/d/i  - Pressure injury/Skin: OOB to Chair, PT/OT   - continue monitoring wound, incision and drain sites.  - Wound consult  - alyvene foam dressing and cavilon to wound    #Pain Mgmt   - Tylenol PRN    #GI/Bowel Mgmt   - Continent c/w Senna, Miralax    #/Bladder Mgmt   - PVR q6h and SC>400cc - better   - will get urology consult if persists.    #FEN   - Diet - Regular + Thins  [Renal, DASH/TLC]      #Precautions / PROPHYLAXIS:   - Falls, Cardiac, Sternal  - ortho: Weight bearing status: WBAT   - Lungs: Aspiration, Incentive Spirometer   - DVT: eliquis   TOM PLEITEZ is a 61y with a history of DM2, ?CKD, HTN and HLD  who presented to [facility] on 10/6/21 with SOB, CP found to have multivessel CAD and JOSHUA on CKD, s/p CABGx2, MVR, and LAAL. Started on HD T/T/S, now on HD M/W/F s/p Permacath and LUE AVF. Hospital Course complicated by post-op hemorrhagic shock , received 4 PRBC, 1 PLT, 1000 FIEBA intraop and post op dual pressor and inotrope support w/ , Primacor, Levo, and Epi gtts.  Afib, PACs- now on amiodarone. Patient now admitted for a multidisciplinary rehab program. 21 @ 14:40    -------------    Rehab Management/MEDICAL MANAGEMENT     *Cardiology consult - rec c/w amiodarone 200mg q12h 7 day course and then change to 200mg qd. - will keep parameters  *SLP pt was coughing w/ regular diet rec downgrade diet -- will f/u to see when to upgrade diet again.  *Continue BS - oliguria vs intact production of urine?    #Debility  - Gait Instability, ADL impairments and Functional impairments: start Comprehensive Rehab Program of PT/OT/SLP  - 3 hours a day, 5 days a week  - Orthotics as needed   - also will benefit from cardiopulmonary conditioning.  - SLP for cognitive and memory deficits    #CAD and Afib s/p CABGx2, LAAL, MVR  - Most recent echo on 10/28 shows restoration of LVEF   - c/w amiodarone 200mg q12h - held in AM but got last evening.  - c/w eliquis 2.5mg bid  - c/w ASA 81 daily  - close f/u Medicine  - Cardiology consult - rec c/w amiodarone 200mg q12h 7 day course and then change to 200mg qd. - will keep parameters    #?CKD on HD - M/W/F  - retacrit 8000ui intraHD  - renal consult  - Permacath placed 21 and LUE AVF placed   - chlorhexidine to keep access sites clean    #DM  - lantus 16 unit qhs  - lispro 10 unit premeal tid  - ISS    #Sleep  - melatonin PRN     #Skin  - Skin on admission: Sacral/left buttock sacral decubital ulcer with visible subcutaneous/dermis, dry b/l heels, permacath is c/d/i, LUE AVF site c/d/i, drain sites c/d/i; sternal incision site is c/d/i  - Pressure injury/Skin: OOB to Chair, PT/OT   - continue monitoring wound, incision and drain sites.  - Wound consult  - alyvene foam dressing and cavilon to wound    #Pain Mgmt   - Tylenol PRN    #GI/Bowel Mgmt   - Continent c/w Senna, Miralax    #/Bladder Mgmt   - PVR q6h and SC>400cc - better   - will get urology consult if persists.    #FEN   - Diet - Pureed w/ Nectar  [Renal, DASH/TLC]    - per SLP pt was coughing w/ regular diet rec downgrade diet -- will f/u to see when to upgrade diet again.    #Precautions / PROPHYLAXIS:   - Falls, Cardiac, Sternal  - ortho: Weight bearing status: WBAT   - Lungs: Aspiration, Incentive Spirometer   - DVT: eliquis   TOM PLEITEZ is a 61y with a history of DM2, ?CKD, HTN and HLD  who presented to [facility] on 10/6/21 with SOB, CP found to have multivessel CAD and JOSHUA on CKD, s/p CABGx2, MVR, and LAAL. Started on HD T/T/S, now on HD M/W/F s/p Permacath and LUE AVF. Hospital Course complicated by post-op hemorrhagic shock , received 4 PRBC, 1 PLT, 1000 FIEBA intraop and post op dual pressor and inotrope support w/ , Primacor, Levo, and Epi gtts.  Afib, PACs- now on amiodarone. Patient now admitted for a multidisciplinary rehab program. 21 @ 14:40    *Cardiology consult - rec c/w amiodarone 200mg q12h 7 day course and then change to 200mg qd. - will keep parameters  *SLP pt was coughing w/ regular diet rec downgrade diet -- will f/u to see when to upgrade diet again.  *Continue BS - oliguria vs intact production of urine?    #Debility  - Gait Instability, ADL impairments and Functional impairments: start Comprehensive Rehab Program of PT/OT/SLP  - 3 hours a day, 5 days a week  - Orthotics as needed   - also will benefit from cardiopulmonary conditioning.  - SLP for cognitive and memory deficits    #CAD and Afib s/p CABGx2, LAAL, MVR  - Most recent echo on 10/28 shows restoration of LVEF   - c/w amiodarone 200mg q12h - held in AM but got last evening.  - c/w eliquis 2.5mg bid  - c/w ASA 81 daily  - close f/u Medicine  - Cardiology consult - rec c/w amiodarone 200mg q12h 7 day course and then change to 200mg qd.  will keep parameters (hold if HR ,50)    #?CKD on HD - M/W/F  - retacrit 8000ui intraHD  - renal consult  - Permacath placed 21 and LUE AVF placed   - chlorhexidine to keep access sites clean    #DM  - lantus 16 unit qhs  - lispro 10 unit premeal tid  - ISS    #Sleep  - melatonin PRN     #Skin  - Skin on admission: Sacral/left buttock sacral decubital ulcer with visible subcutaneous/dermis, dry b/l heels, permacath is c/d/i, LUE AVF site c/d/i, drain sites c/d/i; sternal incision site is c/d/i  - Pressure injury/Skin: OOB to Chair, PT/OT   - continue monitoring wound, incision and drain sites.  - Wound consult  - alyvene foam dressing and cavilon to wound    #Pain Mgmt   - Tylenol PRN    #GI/Bowel Mgmt   - Continent c/w Senna, Miralax    #/Bladder Mgmt --Voiding, PVRs low, urine quantity unclear if oligo/anuric    #FEN   - Diet - Pureed w/ Nectar  [Renal, DASH/TLC]    - per SLP diet downgraded to Pureed with thick liquids due to chocking while eating    #Precautions / PROPHYLAXIS:   - Falls, Cardiac, Sternal  - ortho: Weight bearing status: WBAT   - Lungs: Aspiration, Incentive Spirometer   - DVT: eliquis    Team meeting today 21  Ext DC 10-14 days from Admission date  SLP--C/w cognitive therapy for executive function deficits TOM PLEITEZ is a 61y with a history of DM2, ?CKD, HTN and HLD  who presented to [facility] on 10/6/21 with SOB, CP found to have multivessel CAD and JOSHUA on CKD, s/p CABGx2, MVR, and LAAL. Started on HD T/T/S, now on HD M/W/F s/p Permacath and LUE AVF. Hospital Course complicated by post-op hemorrhagic shock , received 4 PRBC, 1 PLT, 1000 FIEBA intraop and post op dual pressor and inotrope support w/ , Primacor, Levo, and Epi gtts.  Afib, PACs- now on amiodarone. Patient now admitted for a multidisciplinary rehab program. 21 @ 14:40    *Cardiology consult - rec c/w amiodarone 200mg q12h 7 day course and then change to 200mg qd. - will keep parameters  *SLP pt was coughing w/ regular diet rec downgrade diet -- will f/u to see when to upgrade diet again.  *Continue BS - oliguria vs intact production of urine?    #Debility  - Gait Instability, ADL impairments and Functional impairments: start Comprehensive Rehab Program of PT/OT/SLP  - 3 hours a day, 5 days a week  - Orthotics as needed   - also will benefit from cardiopulmonary conditioning.  - SLP for cognitive and memory deficits    #CAD and Afib s/p CABGx2, LAAL, MVR  - Most recent echo on 10/28 shows restoration of LVEF   - c/w amiodarone 200mg q12h - held in AM but got last evening.  - c/w eliquis 2.5mg bid  - c/w ASA 81 daily  - close f/u Medicine  - Cardiology consult - rec c/w amiodarone 200mg q12h 7 day course and then change to 200mg qd.  will keep parameters (hold if HR ,50)    #?CKD on HD - M/W/F  - retacrit 8000ui intraHD  - renal consult  - Permacath placed 21 and LUE AVF placed   - chlorhexidine to keep access sites clean    #DM  - lantus 16 unit qhs  - lispro 10 unit premeal tid  - ISS    #Sleep  - melatonin PRN     #Skin  - Skin on admission: Sacral/left buttock sacral decubital ulcer with visible subcutaneous/dermis, dry b/l heels, permacath is c/d/i, LUE AVF site c/d/i, drain sites c/d/i; sternal incision site is c/d/i  - Pressure injury/Skin: OOB to Chair, PT/OT   - continue monitoring wound, incision and drain sites.  - Wound consult  - alyvene foam dressing and cavilon to wound    #Pain Mgmt   - Tylenol PRN    #GI/Bowel Mgmt   - Continent c/w Senna, Miralax    #/Bladder Mgmt --Voiding, PVRs low, urine quantity unclear if oligo/anuric    #FEN   - Diet - Pureed w/ Nectar  [Renal, DASH/TLC]    - per SLP diet downgraded to Pureed with thick liquids due to chocking while eating    #Precautions / PROPHYLAXIS:   - Falls, Cardiac, Sternal  - ortho: Weight bearing status: WBAT   - Lungs: Aspiration, Incentive Spirometer   - DVT: eliquis    IDT   SW: lives w/ spouse,w/ 8 steps in home, no hand rail. Wife works weekend but can help  Functional: eating mod I, upper body dressing set up, lbd mod/min A, bathing min a, toilet transfers CG/min A, toileting min A, short transfer min A; ambulates 40 ft RW mod A, transfers min A. Has mild expressive and receptive aphasia, anomia, possiblly from underlying cognitive deficit.   EDOD:  Tues w/ HC   TOM PLEITEZ is a 61y with a history of DM2, ?CKD, HTN and HLD  who presented to [facility] on 10/6/21 with SOB, CP found to have multivessel CAD and JOSHUA on CKD, s/p CABGx2, MVR, and LAAL. Started on HD T/T/S, now on HD M/W/F s/p Permacath and LUE AVF. Hospital Course complicated by post-op hemorrhagic shock , received 4 PRBC, 1 PLT, 1000 FIEBA intraop and post op dual pressor and inotrope support w/ , Primacor, Levo, and Epi gtts.  Afib, PACs- now on amiodarone. Patient now admitted for a multidisciplinary rehab program. 21 @ 14:40    *Cardiology consult - rec c/w amiodarone 200mg q12h 7 day course and then change to 200mg qd. - will keep parameters  *SLP pt was coughing w/ regular diet rec downgrade diet -- will f/u to see when to upgrade diet again.  *Continue BS - oliguria vs intact production of urine?    #Debility  - Gait Instability, ADL impairments and Functional impairments: start Comprehensive Rehab Program of PT/OT/SLP  - 3 hours a day, 5 days a week  - Orthotics as needed   - also will benefit from cardiopulmonary conditioning.  - SLP for cognitive and memory deficits    #CAD and Afib s/p CABGx2, LAAL, MVR  - Most recent echo on 10/28 shows restoration of LVEF   - c/w amiodarone 200mg q12h - held in AM but got last evening.  - c/w eliquis 2.5mg bid  - c/w ASA 81 daily  - close f/u Medicine  - Cardiology consult - rec c/w amiodarone 200mg q12h 7 day course and then change to 200mg qd.  will keep parameters (hold if HR ,50)    #?CKD on HD - M/W/F  - retacrit 8000ui intraHD  - renal consult  - Permacath placed 21 and LUE AVF placed   - chlorhexidine to keep access sites clean    #DM  - lantus 16 unit qhs  - lispro 10 unit premeal tid  - ISS    #Sleep  - melatonin PRN     #Skin  - Skin on admission: Sacral/left buttock sacral decubital ulcer with visible subcutaneous/dermis, dry b/l heels, permacath is c/d/i, LUE AVF site c/d/i, drain sites c/d/i; sternal incision site is c/d/i  - Pressure injury/Skin: OOB to Chair, PT/OT   - continue monitoring wound, incision and drain sites.  - Wound consult  - alyvene foam dressing and cavilon to wound    #Pain Mgmt   - Tylenol PRN    #GI/Bowel Mgmt   - Continent c/w Senna, Miralax    #/Bladder Mgmt --Voiding, PVRs low, urine quantity unclear if oligo/anuric    #FEN   - Diet - Pureed w/ Nectar  [Renal, DASH/TLC]    - per SLP diet downgraded to Pureed with thick liquids due to chocking while eating    #Precautions / PROPHYLAXIS:   - Falls, Cardiac, Sternal  - ortho: Weight bearing status: WBAT   - Lungs: Aspiration, Incentive Spirometer   - DVT: eliquis    IDT   SW: lives w/ spouse,w/ 8 steps in home, no hand rail. Wife works weekend but can help  Functional: eating mod I, upper body dressing set up, lbd mod/min A, bathing min a, toilet transfers CG/min A, toileting min A, short transfer min A; ambulates 40 ft RW mod A, transfers min A. Has mild expressive and receptive aphasia, anomia, possibly from underlying cognitive deficit.   SLP noted deficits with communication and executive function for which he is receiving  therapy  Barriers to d/c --deficits with communication/executive fxn, medical issues (low HR), suboptimal motor strength, poor balance  Goals--independence with transfers, ambulate without supervision, with gait assistance, cane  EDOD:  Tu w/ HC

## 2021-11-11 NOTE — PROGRESS NOTE ADULT - ASSESSMENT
59 y/o M w/ hx of DM2, HTN and HLD, transfer from [facility] for NSTEMI, admitted w/ JOSHUA on CKD and hypertensive emergency, new start to HD (10/13). He underwent LHC showing Multivessel CAD. 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  s/p CABG x2 (10/21), MV repair, and left atrial ligation. Permacath placed 11/1/21 and LUE AV Fistula placed by vascular on 11/2/21. Her was noted to have PACs and Afib - started on amiodarone and eliquis.    Now admitted to [facility] after for initiation of a multidisciplinary rehab program consisting focused on functional mobility, transfers and ADLs.    # Functional and gait instability:  - C/w PT/OT per primary team   - pain control w/ tylenol     #NSTEMI 2/2 CAD s/p CABG (10/21)  #s/p MV repair  #HLD  - s/p LHC on 10/14 showing severe multivessel disease; now s/p CABG on 10/21. Echocardiogram reviewed w/ EF 75% (10/28)  - aspirin  - statin  - eliquis BID for AC for MV  - surgical site on chest c/d/i    # ESRD on HD (MWF), due to diabetic nephropathy  # anemia of chronic dz 2/2 ESRD  -received blood transfusion at OSH. no e/o hypervolemia on exam.   -HD via permacath (placed 11/1/21). 1st stage AVF creation (11/2)  -sevelamer  -HD at [facility] on discharge, renal f/u  - retacrit     # paroxysmal atrial fibrillation   # tachy blaine syndrome  -apixiban  -s/p amiodarone load (initial consult note on 11/3). Cardiology consulted. Continue 200 mg BID x1 week (11/16), then 200 mg qd (start 11/17). Monitor TFTs and LFTs on amio.   - hold AV mimi agents due to bradycardia.     # Acute urinary retention  noted high PVRs, improving.   - cont scheduled bladder checks.  - consider tamsulosin. Rehab considering Urology consult    # DM2  -hgba1c 7%  -glargine 16 u  -aspart 10 units TID wm    # subclinical hypothyroidism  - TSH mildly elevated 6.4, pt asymptomatic  - seen by Endo during admission. Outpt f/u.     # hx of severe tobacco use d/o  - smoked 3 ppd, recently quit a few months ago    # sacral wound--sacral stage 2 ulcer  - appreciate wound care consult.     dvt ppx: cont apixiban   61 y/o M w/ hx of DM2, HTN and HLD, transfer from [facility] for NSTEMI, admitted w/ JOSHUA on CKD and hypertensive emergency, new start to HD (10/13). He underwent LHC showing Multivessel CAD. 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  s/p CABG x2 (10/21), MV repair, and left atrial ligation. Permacath placed 11/1/21 and LUE AV Fistula placed by vascular on 11/2/21. Her was noted to have PACs and Afib - started on amiodarone and eliquis.    Now admitted to [facility] after for initiation of a multidisciplinary rehab program consisting focused on functional mobility, transfers and ADLs.    # Functional and gait instability:  - C/w PT/OT per primary team   - pain control w/ tylenol     #NSTEMI 2/2 CAD s/p CABG (10/21)  #s/p MV repair  #HLD  - s/p LHC on 10/14 showing severe multivessel disease; now s/p CABG on 10/21. Echocardiogram reviewed w/ EF 75% (10/28)  - aspirin  - statin  - eliquis BID for AC for MV  - surgical site on chest c/d/i    # ESRD on HD (MWF), due to diabetic nephropathy  # anemia of chronic dz 2/2 ESRD  -received blood transfusion at OSH. no e/o hypervolemia on exam.   -HD via permacath (placed 11/1/21). 1st stage AVF creation (11/2)  -sevelamer  -HD at [place] on discharge, renal f/u  - retacrit     # paroxysmal atrial fibrillation   # tachy blaine syndrome  -apixiban  -s/p amiodarone load (initial consult note on 11/3). Cardiology consulted. Continue 200 mg BID x1 week (11/16), then 200 mg qd (start 11/17). Monitor TFTs and LFTs on amio.   - hold AV mimi agents due to bradycardia.   - ECG read as above  - Per Dr. Roy , consider tele monitoring.     # QTc prolongation  noted to be 500 ms.   - avoid QT prolonging agents, monitor on ECG.     # Acute urinary retention  noted high PVRs, improving.   - cont scheduled bladder checks.  - consider tamsulosin. Rehab considering Urology consult    # DM2  -hgba1c 7%  -glargine 16 u  -aspart 10 units TID wm    # subclinical hypothyroidism  - TSH mildly elevated 6.4, pt asymptomatic  - seen by Endo during admission. Outpt f/u.     # hx of severe tobacco use d/o  - smoked 3 ppd, recently quit a few months ago    # sacral wound--sacral stage 2 ulcer  - appreciate wound care consult.     dvt ppx: cont apixiban

## 2021-11-11 NOTE — PROGRESS NOTE ADULT - SUBJECTIVE AND OBJECTIVE BOX
Follow up for bradycardia  SUBJ:    denies cardiac sympotms per se however grossly debilitated    PMH  Hypertension    Hyperlipidemia    Diabetes mellitus        MEDICATIONS  (STANDING):  aMIOdarone    Tablet 200 milliGRAM(s) Oral every 12 hours  apixaban 2.5 milliGRAM(s) Oral two times a day  AQUAPHOR (petrolatum Ointment) 1 Application(s) Topical daily  aspirin  chewable 81 milliGRAM(s) Oral daily  dextrose 40% Gel 15 Gram(s) Oral once  dextrose 5%. 1000 milliLiter(s) (50 mL/Hr) IV Continuous <Continuous>  dextrose 5%. 1000 milliLiter(s) (100 mL/Hr) IV Continuous <Continuous>  dextrose 50% Injectable 25 Gram(s) IV Push once  dextrose 50% Injectable 12.5 Gram(s) IV Push once  dextrose 50% Injectable 25 Gram(s) IV Push once  epoetin vern-epbx (RETACRIT) Injectable 63677 Unit(s) SubCutaneous <User Schedule>  glucagon  Injectable 1 milliGRAM(s) IntraMuscular once  insulin glargine Injectable (LANTUS) 16 Unit(s) SubCutaneous at bedtime  insulin lispro (ADMELOG) corrective regimen sliding scale   SubCutaneous at bedtime  insulin lispro (ADMELOG) corrective regimen sliding scale   SubCutaneous three times a day before meals  insulin lispro Injectable (ADMELOG) 10 Unit(s) SubCutaneous three times a day before meals  pantoprazole    Tablet 40 milliGRAM(s) Oral before breakfast  senna 2 Tablet(s) Oral at bedtime  sevelamer carbonate 800 milliGRAM(s) Oral three times a day with meals    MEDICATIONS  (PRN):  acetaminophen     Tablet .. 650 milliGRAM(s) Oral every 6 hours PRN Mild Pain (1 - 3), Moderate Pain (4 - 6)  melatonin 3 milliGRAM(s) Oral at bedtime PRN Insomnia  polyethylene glycol 3350 17 Gram(s) Oral two times a day PRN Constipation        PHYSICAL EXAM:  Vital Signs Last 24 Hrs  T(C): 36.8 (11 Nov 2021 20:24), Max: 37.1 (11 Nov 2021 11:43)  T(F): 98.2 (11 Nov 2021 20:24), Max: 98.7 (11 Nov 2021 11:43)  HR: 56 (11 Nov 2021 20:24) (50 - 56)  BP: 135/60 (11 Nov 2021 20:24) (116/51 - 135/60)  BP(mean): --  RR: 16 (11 Nov 2021 20:24) (14 - 16)  SpO2: 99% (11 Nov 2021 20:24) (99% - 100%)    GENERAL: NAD, well-groomed, well-developed  HEAD:  Atraumatic, Normocephalic  EYES: EOMI, PERRLA, conjunctiva and sclera clear  ENT: Moist mucous membranes,  NECK: Supple, No JVD, no bruits  CHEST/LUNG: Clear to percussion bilaterally; No rales, rhonchi, wheezing, or rubs  HEART: Regular rate and rhythm; No murmurs, rubs, or gallops PMI non displaced.  ABDOMEN: Soft, Nontender, Nondistended; Bowel sounds present  EXTREMITIES:  2+ Peripheral Pulses, No clubbing, cyanosis, or edema  SKIN: No rashes or lesions  NERVOUS SYSTEM:  Cranial Nerves II-XII intact      TELEMETRY:    ECG:    qtc 499 ectopic atria rhythm 45\  ECHO    < from: TTE with Doppler (w/Cont) (10.28.21 @ 18:37) >  Conclusions:  1. Bioprosthetic mitral valve replacement. No mitral valve  regurgitation seen. Peak mitral valve gradient equals 17 mm  Hg, mean transmitral valve gradient equals 4-5 mm Hg, which  is probably normal in the settingof a bioprosthetic mitral  valve replacement. (HRabout 50s bpm)  2. Aortic valve not well visualized; appears to be a  calcified trileaflet valve with normal opening. No aortic  valve regurgitation seen.  3. Endocardial visualization enhanced with intravenous  injection of Ultrasonic Enhancing Agent (Definity).  Hyperdynamic left ventricular systolic function.  4. Normal right ventricular size and function.  5. Unable to estimate RVSP due to incomplete TR spectral  doppler signal.  6. Minimal pericardial effusion.  *** Compared with echocardiogram of 10/21/2021, patient is  s/p Bioprosthertic MVR.  ------------------------------------------------------------------------  Confirmed on  10/29/2021 - 15:21:42 by Montana Pierre M.D.  ------------------------------------------------------------------------    < end of copied text >    LABS:                        7.8    9.69  )-----------( 213      ( 10 Nov 2021 14:30 )             24.7     11-10    137  |  98  |  87<H>  ----------------------------<  187<H>  5.0   |  25  |  8.07<H>    Ca    8.8      10 Nov 2021 14:30  Phos  6.6     11-10    TPro  x   /  Alb  2.1<L>  /  TBili  x   /  DBili  x   /  AST  x   /  ALT  x   /  AlkPhos  x   11-10    I&O's Summary    10 Nov 2021 07:01  -  11 Nov 2021 07:00  --------------------------------------------------------  IN: 0 mL / OUT: 1800 mL / NET: -1800 mL      BNP    RADIOLOGY & ADDITIONAL STUDIES:    < from: Xray Chest 2 Views PA/Lat (11.06.21 @ 09:19) >  FINDINGS/  IMPRESSION:  Heart is top normal in size.  S/P sternotomy; mitral valve ring; CABG.  Right-sided double lumen catheter tip in the SVC.  No focal infiltrates. Left upper lung scarring is redemonstrated. Focal linear atelectasis seen in lower right lung.  No pleural effusion. No pneumothorax.    --- End of Report ---      LIBRADO MCKEON MD; Attending Radiologist  This document has been electronically signed. Nov 6 2021  2:13PM    < end of copied text >      ECHO:    impression  previous recommendations from EP service are noted with tapering doses of amiodarone 11/4/21 400 mg q12; 11/7-8 200 mg q12 day 5 (current dose) suggested, however now with bradycardia and borderline QTC prolongation would decrease dose of amiodarone to 200 mg daily and check EKG and tsh and lfts and chest x-ray

## 2021-11-11 NOTE — PROGRESS NOTE ADULT - SUBJECTIVE AND OBJECTIVE BOX
Patient is a 61y old  Male who presents with a chief complaint of Debility (10 Nov 2021 22:30)      24 HOUR EVENTS:  No overnight events reported.     SUBJECTIVE:  Patient seen and examined at bedside.  He denies having any chest pain, no dizziness, no lightheadedness. No pain. No n/v/d. No SOB.   Therapy is going well.      ALLERGIES:  No Known Allergies    MEDICATIONS  (STANDING):  aMIOdarone    Tablet 200 milliGRAM(s) Oral every 12 hours  apixaban 2.5 milliGRAM(s) Oral two times a day  AQUAPHOR (petrolatum Ointment) 1 Application(s) Topical daily  aspirin  chewable 81 milliGRAM(s) Oral daily  dextrose 40% Gel 15 Gram(s) Oral once  dextrose 5%. 1000 milliLiter(s) (50 mL/Hr) IV Continuous <Continuous>  dextrose 5%. 1000 milliLiter(s) (100 mL/Hr) IV Continuous <Continuous>  dextrose 50% Injectable 25 Gram(s) IV Push once  dextrose 50% Injectable 12.5 Gram(s) IV Push once  dextrose 50% Injectable 25 Gram(s) IV Push once  epoetin vern-epbx (RETACRIT) Injectable 05717 Unit(s) SubCutaneous <User Schedule>  glucagon  Injectable 1 milliGRAM(s) IntraMuscular once  insulin glargine Injectable (LANTUS) 16 Unit(s) SubCutaneous at bedtime  insulin lispro (ADMELOG) corrective regimen sliding scale   SubCutaneous at bedtime  insulin lispro (ADMELOG) corrective regimen sliding scale   SubCutaneous three times a day before meals  insulin lispro Injectable (ADMELOG) 10 Unit(s) SubCutaneous three times a day before meals  pantoprazole    Tablet 40 milliGRAM(s) Oral before breakfast  senna 2 Tablet(s) Oral at bedtime  sevelamer carbonate 800 milliGRAM(s) Oral three times a day with meals    MEDICATIONS  (PRN):  acetaminophen     Tablet .. 650 milliGRAM(s) Oral every 6 hours PRN Mild Pain (1 - 3), Moderate Pain (4 - 6)  melatonin 3 milliGRAM(s) Oral at bedtime PRN Insomnia  polyethylene glycol 3350 17 Gram(s) Oral two times a day PRN Constipation    Vital Signs Last 24 Hrs  T(F): 99.1 (10 Nov 2021 20:19), Max: 99.1 (10 Nov 2021 20:19)  HR: 50 (11 Nov 2021 05:23) (49 - 61)  BP: 125/63 (11 Nov 2021 05:23) (125/63 - 147/60)  RR: 16 (10 Nov 2021 20:19) (14 - 16)  SpO2: 100% (10 Nov 2021 20:19) (100% - 100%)  I&O's Summary    PHYSICAL EXAM:  General: NAD, A/O x 3  ENT: Moist mucous membranes, no thrush  Neck: Supple, No JVD  Lungs: Clear to auscultation bilaterally, good air entry, non-labored breathing  Cardio:sinus bradycardia, S1/S2, No murmur  Abdomen: Soft, Nontender, Nondistended; Bowel sounds present  Extremities: No calf tenderness, No pitting edema    LABS:                        7.8    9.69  )-----------( 213      ( 10 Nov 2021 14:30 )             24.7     11-10    137  |  98  |  87  ----------------------------<  187  5.0   |  25  |  8.07    Ca    8.8      10 Nov 2021 14:30  Phos  6.6     11-10    TPro  x   /  Alb  2.1  /  TBili  x   /  DBili  x   /  AST  x   /  ALT  x   /  AlkPhos  x   11-10        eGFR if Non African American: 7 mL/min/1.73M2 (11-10-21 @ 14:30)  eGFR if : 8 mL/min/1.73M2 (11-10-21 @ 14:30)                            POCT Blood Glucose.: 148 mg/dL (11 Nov 2021 08:27)  POCT Blood Glucose.: 154 mg/dL (10 Nov 2021 21:06)  POCT Blood Glucose.: 171 mg/dL (10 Nov 2021 18:35)  POCT Blood Glucose.: 128 mg/dL (10 Nov 2021 12:10)          COVID-19 PCR: NotDetec (11-08-21 @ 18:30)  COVID-19 PCR: NotDetec (11-07-21 @ 05:39)  COVID-19 PCR: NotDetec (11-05-21 @ 08:07)  COVID-19 PCR: NotDetec (11-01-21 @ 06:14)  COVID-19 PCR: NotDetec (10-31-21 @ 08:44)    RADIOLOGY & ADDITIONAL TESTS:    Care Discussed with Consultants/Other Providers:    Patient is a 61y old  Male who presents with a chief complaint of Debility (10 Nov 2021 22:30)      24 HOUR EVENTS:  No overnight events reported.     SUBJECTIVE:  Patient seen and examined at bedside.  He denies having any chest pain, no dizziness, no lightheadedness. No pain. No n/v/d. No SOB.   Therapy is going well.      ALLERGIES:  No Known Allergies    MEDICATIONS  (STANDING):  aMIOdarone    Tablet 200 milliGRAM(s) Oral every 12 hours  apixaban 2.5 milliGRAM(s) Oral two times a day  AQUAPHOR (petrolatum Ointment) 1 Application(s) Topical daily  aspirin  chewable 81 milliGRAM(s) Oral daily  dextrose 40% Gel 15 Gram(s) Oral once  dextrose 5%. 1000 milliLiter(s) (50 mL/Hr) IV Continuous <Continuous>  dextrose 5%. 1000 milliLiter(s) (100 mL/Hr) IV Continuous <Continuous>  dextrose 50% Injectable 25 Gram(s) IV Push once  dextrose 50% Injectable 12.5 Gram(s) IV Push once  dextrose 50% Injectable 25 Gram(s) IV Push once  epoetin vern-epbx (RETACRIT) Injectable 89330 Unit(s) SubCutaneous <User Schedule>  glucagon  Injectable 1 milliGRAM(s) IntraMuscular once  insulin glargine Injectable (LANTUS) 16 Unit(s) SubCutaneous at bedtime  insulin lispro (ADMELOG) corrective regimen sliding scale   SubCutaneous at bedtime  insulin lispro (ADMELOG) corrective regimen sliding scale   SubCutaneous three times a day before meals  insulin lispro Injectable (ADMELOG) 10 Unit(s) SubCutaneous three times a day before meals  pantoprazole    Tablet 40 milliGRAM(s) Oral before breakfast  senna 2 Tablet(s) Oral at bedtime  sevelamer carbonate 800 milliGRAM(s) Oral three times a day with meals    MEDICATIONS  (PRN):  acetaminophen     Tablet .. 650 milliGRAM(s) Oral every 6 hours PRN Mild Pain (1 - 3), Moderate Pain (4 - 6)  melatonin 3 milliGRAM(s) Oral at bedtime PRN Insomnia  polyethylene glycol 3350 17 Gram(s) Oral two times a day PRN Constipation    Vital Signs Last 24 Hrs  T(F): 99.1 (10 Nov 2021 20:19), Max: 99.1 (10 Nov 2021 20:19)  HR: 50 (11 Nov 2021 05:23) (49 - 61)  BP: 125/63 (11 Nov 2021 05:23) (125/63 - 147/60)  RR: 16 (10 Nov 2021 20:19) (14 - 16)  SpO2: 100% (10 Nov 2021 20:19) (100% - 100%)  I&O's Summary    PHYSICAL EXAM:  General: NAD, A/O x 3  ENT: Moist mucous membranes, no thrush  Neck: Supple, No JVD  Lungs: Clear to auscultation bilaterally, good air entry, non-labored breathing  Cardio:sinus bradycardia, S1/S2, No murmur  Abdomen: Soft, Nontender, Nondistended; Bowel sounds present  Extremities: No calf tenderness, No pitting edema    LABS:                        7.8    9.69  )-----------( 213      ( 10 Nov 2021 14:30 )             24.7     11-10    137  |  98  |  87  ----------------------------<  187  5.0   |  25  |  8.07    Ca    8.8      10 Nov 2021 14:30  Phos  6.6     11-10    TPro  x   /  Alb  2.1  /  TBili  x   /  DBili  x   /  AST  x   /  ALT  x   /  AlkPhos  x   11-10        eGFR if Non African American: 7 mL/min/1.73M2 (11-10-21 @ 14:30)  eGFR if : 8 mL/min/1.73M2 (11-10-21 @ 14:30)                            POCT Blood Glucose.: 148 mg/dL (11 Nov 2021 08:27)  POCT Blood Glucose.: 154 mg/dL (10 Nov 2021 21:06)  POCT Blood Glucose.: 171 mg/dL (10 Nov 2021 18:35)  POCT Blood Glucose.: 128 mg/dL (10 Nov 2021 12:10)          COVID-19 PCR: NotDetec (11-08-21 @ 18:30)  COVID-19 PCR: NotDetec (11-07-21 @ 05:39)  COVID-19 PCR: NotDetec (11-05-21 @ 08:07)  COVID-19 PCR: NotDetec (11-01-21 @ 06:14)  COVID-19 PCR: NotDetec (10-31-21 @ 08:44)    RADIOLOGY & ADDITIONAL TESTS:    EKG read: inverted t waves in v1 and v2, ST abnormalities throughout, bradycardia.     Care Discussed with Consultants/Other Providers:   Dr. boyd

## 2021-11-11 NOTE — PROGRESS NOTE ADULT - SUBJECTIVE AND OBJECTIVE BOX
HPI:  59 y/o M w/ hx of DM2, HTN and HLD initially presented as transfer from [facility] for chest pain concerning for NSTEMI and possible new CHF c/b JOSHUA likely on CKD, and hypertensive emergency on 10/6/21. Hospital course complicated by s/p RRT for CVA/TIA ruled out and worsening anemia requiring 1 PRBC. Pt S/p HD session today and underwent LHC showing Multivessel CAD. CT surgery consulted for CABG evaluation. Patient developed left sided facial droop on 10/7 and stroke code was called. 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 Patient started Hemodialysis on 10/13. On 10/21 s/p CABGx2, Mitral Valve Repair, and left atrial appendage ligation, was extubated next day. He developed SOB on 10/24 and requeired bipap,  but was eventually trasitioned back down to [place]. Permacath placed 11/1/21 and LUE AV Fistula placed by vascular on 11/2/21. Her was noted to have PACs and Afib - started on amiodarone. He will be getting HD at [place]. Cleared by vascular to restart eliquis 2.5mg bid. HD schedule changed from T/T/S to M/W/F.   Patient evaluated by PM&R PT/OT and recommended acute rehab. Patient admitted to [facility] on 11/8/21 for debility. (08 Nov 2021 14:09)      INTERVAL HPI/OVERNIGHT EVENTS:  Chart Reviewed and patient seen at bedside.  Patient was bradycardic this AM - amiodarone was held. Asymptomatic bradycardia as no dizziness, no lightheadedness, no chest pain.  Tolerated HD yesterday.  Voided yesterday night, BS was low. - Unsure of amount of urine he is actually putting out.    ROS:  Denies fevers, chills, chest pain, shortness of breath, abdominal pain, headaches, nausea/vomiting    Vital Signs Last 24 Hrs  T(C): 37.3 (10 Nov 2021 20:19), Max: 37.3 (10 Nov 2021 20:19)  T(F): 99.1 (10 Nov 2021 20:19), Max: 99.1 (10 Nov 2021 20:19)  HR: 50 (11 Nov 2021 05:23) (49 - 61)  BP: 125/63 (11 Nov 2021 05:23) (125/63 - 147/60)  BP(mean): --  RR: 16 (10 Nov 2021 20:19) (14 - 16)  SpO2: 100% (10 Nov 2021 20:19) (100% - 100%)    Physical Exam:  Gen - NAD, Comfortable  HEENT - NCAT, EOMI, MMM  Neck - Supple, No limited ROM  Pulm - CTAB, No wheeze, No rhonchi, No crackles  Cardiovascular - RRR, S1S2, No m/r/g  Abdomen - Soft, NT/ND, +BS  Extremities - No C/C/E, No calf tenderness  Neuro-     Cognitive - AAOx3     Attention: Intact      Judgement: Good evidence of judgement     Motor -                    LEFT    UE - ShAB 5/5, EF 5/5, EE 5/5, WE 5/5,  5/5                    RIGHT UE - ShAB 5/5, EF 5/5, EE 5/5, WE 5/5,  5/5                    LEFT    LE - HF 4/5, KE 5/5, DF 5/5, PF 5/5                    RIGHT LE - HF 4/5, KE 5/5, DF 5/5, PF 5/5        Sensory - Intact to LT     Reflexes - DTR Intact, No primitive reflex     Coordination - FTN intact     Tone - normal  Psychiatric - Mood stable, Affect WNL  Skin: Sacral/left buttock sacral decubital ulcer with visible subcutaneous/dermis, dry b/l heels, permacath is c/d/i, LUE AVF site c/d/i sternal incision site is also c/d/i    LABS:  11-10    137  |  98  |  87<H>  ----------------------------<  187<H>  5.0   |  25  |  8.07<H>  11-09    138  |  99  |  49<H>  ----------------------------<  128<H>  4.6   |  29  |  5.72<H>    Ca    8.8      10 Nov 2021 14:30  Ca    9.3      09 Nov 2021 05:00  Phos  6.6     11-10    TPro  x   /  Alb  2.1<L>  /  TBili  x   /  DBili  x   /  AST  x   /  ALT  x   /  AlkPhos  x   11-10  TPro  7.5  /  Alb  2.1<L>  /  TBili  0.4  /  DBili  x   /  AST  30  /  ALT  11  /  AlkPhos  110  11-09                                              7.8    9.69  )-----------( 213      ( 10 Nov 2021 14:30 )             24.7                         8.3    10.75 )-----------( 181      ( 09 Nov 2021 05:00 )             26.2     CAPILLARY BLOOD GLUCOSE      POCT Blood Glucose.: 148 mg/dL (11 Nov 2021 08:27)  POCT Blood Glucose.: 154 mg/dL (10 Nov 2021 21:06)  POCT Blood Glucose.: 171 mg/dL (10 Nov 2021 18:35)  POCT Blood Glucose.: 128 mg/dL (10 Nov 2021 12:10)      MEDICATIONS:  MEDICATIONS  (STANDING):  aMIOdarone    Tablet 200 milliGRAM(s) Oral every 12 hours  apixaban 2.5 milliGRAM(s) Oral two times a day  AQUAPHOR (petrolatum Ointment) 1 Application(s) Topical daily  aspirin  chewable 81 milliGRAM(s) Oral daily  dextrose 40% Gel 15 Gram(s) Oral once  dextrose 5%. 1000 milliLiter(s) (50 mL/Hr) IV Continuous <Continuous>  dextrose 5%. 1000 milliLiter(s) (100 mL/Hr) IV Continuous <Continuous>  dextrose 50% Injectable 25 Gram(s) IV Push once  dextrose 50% Injectable 12.5 Gram(s) IV Push once  dextrose 50% Injectable 25 Gram(s) IV Push once  epoetin vern-epbx (RETACRIT) Injectable 67976 Unit(s) SubCutaneous <User Schedule>  glucagon  Injectable 1 milliGRAM(s) IntraMuscular once  insulin glargine Injectable (LANTUS) 16 Unit(s) SubCutaneous at bedtime  insulin lispro (ADMELOG) corrective regimen sliding scale   SubCutaneous at bedtime  insulin lispro (ADMELOG) corrective regimen sliding scale   SubCutaneous three times a day before meals  insulin lispro Injectable (ADMELOG) 10 Unit(s) SubCutaneous three times a day before meals  pantoprazole    Tablet 40 milliGRAM(s) Oral before breakfast  senna 2 Tablet(s) Oral at bedtime  sevelamer carbonate 800 milliGRAM(s) Oral three times a day with meals    MEDICATIONS  (PRN):  acetaminophen     Tablet .. 650 milliGRAM(s) Oral every 6 hours PRN Mild Pain (1 - 3), Moderate Pain (4 - 6)  melatonin 3 milliGRAM(s) Oral at bedtime PRN Insomnia  polyethylene glycol 3350 17 Gram(s) Oral two times a day PRN Constipation         HPI:  61 y/o M w/ hx of DM2, HTN and HLD initially presented as transfer from [facility] for chest pain concerning for NSTEMI and possible new CHF c/b JOSHUA likely on CKD, and hypertensive emergency on 10/6/21. Hospital course complicated by s/p RRT for CVA/TIA ruled out and worsening anemia requiring 1 PRBC. Pt S/p HD session today and underwent LHC showing Multivessel CAD. CT surgery consulted for CABG evaluation. Patient developed left sided facial droop on 10/7 and stroke code was called. 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 Patient started Hemodialysis on 10/13. On 10/21 s/p CABGx2, Mitral Valve Repair, and left atrial appendage ligation, was extubated next day. He developed SOB on 10/24 and requeired bipap,  but was eventually trasitioned back down to [place]. Permacath placed 11/1/21 and LUE AV Fistula placed by vascular on 11/2/21. Her was noted to have PACs and Afib - started on amiodarone. He will be getting HD at [place]. Cleared by vascular to restart eliquis 2.5mg bid. HD schedule changed from T/T/S to M/W/F.   Patient evaluated by PM&R PT/OT and recommended acute rehab. Patient admitted to [facility] on 11/8/21 for debility. (08 Nov 2021 14:09)      INTERVAL HPI/OVERNIGHT EVENTS:  Chart Reviewed and patient seen/examined at bedside.  Patient was bradycardic this AM - amiodarone was held. Asymptomatic bradycardia as no dizziness, no lightheadedness, no chest pain.  Tolerated HD yesterday.  Cardiology and Hospitalist recs appreciated, c/w amiodarone as per cardiology and hold only if HR <50  Hospitalist will clarify with Cards for any further changes  Voided yesterday night, BS was low. - Unsure of amount of urine he is actually putting out.    ROS:  Denies fevers, chills, chest pain, shortness of breath, abdominal pain, headaches, nausea/vomiting    Vital Signs Last 24 Hrs  T(C): 37.3 (10 Nov 2021 20:19), Max: 37.3 (10 Nov 2021 20:19)  T(F): 99.1 (10 Nov 2021 20:19), Max: 99.1 (10 Nov 2021 20:19)  HR: 50 (11 Nov 2021 05:23) (49 - 61)  BP: 125/63 (11 Nov 2021 05:23) (125/63 - 147/60)  BP(mean): --  RR: 16 (10 Nov 2021 20:19) (14 - 16)  SpO2: 100% (10 Nov 2021 20:19) (100% - 100%)    Physical Exam:  Gen - NAD, Comfortable  HEENT - NCAT, EOMI, MMM  Neck - Supple, No limited ROM  Pulm - CTAB, No wheeze, No rhonchi, No crackles  Cardiovascular - RRR, S1S2, No m/r/g  Abdomen - Soft, NT/ND, +BS  Extremities - No C/C/E, No calf tenderness  Neuro-     Cognitive - AAOx3     Attention: Intact      Judgement: Good evidence of judgement     Motor -                    LEFT    UE - ShAB 5/5, EF 5/5, EE 5/5, WE 5/5,  5/5                    RIGHT UE - ShAB 5/5, EF 5/5, EE 5/5, WE 5/5,  5/5                    LEFT    LE - HF 4/5, KE 5/5, DF 5/5, PF 5/5                    RIGHT LE - HF 4/5, KE 5/5, DF 5/5, PF 5/5        Sensory - Intact to LT     Reflexes - DTR Intact, No primitive reflex     Coordination - FTN intact     Tone - normal  Psychiatric - Mood stable, Affect WNL  Skin: Sacral/left buttock sacral decubital ulcer with visible subcutaneous/dermis, dry b/l heels, permacath is c/d/i, LUE AVF site c/d/i sternal incision site is also c/d/i    LABS:  11-10    137  |  98  |  87<H>  ----------------------------<  187<H>  5.0   |  25  |  8.07<H>  11-09    138  |  99  |  49<H>  ----------------------------<  128<H>  4.6   |  29  |  5.72<H>    Ca    8.8      10 Nov 2021 14:30  Ca    9.3      09 Nov 2021 05:00  Phos  6.6     11-10    TPro  x   /  Alb  2.1<L>  /  TBili  x   /  DBili  x   /  AST  x   /  ALT  x   /  AlkPhos  x   11-10  TPro  7.5  /  Alb  2.1<L>  /  TBili  0.4  /  DBili  x   /  AST  30  /  ALT  11  /  AlkPhos  110  11-09                                              7.8    9.69  )-----------( 213      ( 10 Nov 2021 14:30 )             24.7                         8.3    10.75 )-----------( 181      ( 09 Nov 2021 05:00 )             26.2     CAPILLARY BLOOD GLUCOSE      POCT Blood Glucose.: 148 mg/dL (11 Nov 2021 08:27)  POCT Blood Glucose.: 154 mg/dL (10 Nov 2021 21:06)  POCT Blood Glucose.: 171 mg/dL (10 Nov 2021 18:35)  POCT Blood Glucose.: 128 mg/dL (10 Nov 2021 12:10)      MEDICATIONS:  MEDICATIONS  (STANDING):  aMIOdarone    Tablet 200 milliGRAM(s) Oral every 12 hours  apixaban 2.5 milliGRAM(s) Oral two times a day  AQUAPHOR (petrolatum Ointment) 1 Application(s) Topical daily  aspirin  chewable 81 milliGRAM(s) Oral daily  dextrose 40% Gel 15 Gram(s) Oral once  dextrose 5%. 1000 milliLiter(s) (50 mL/Hr) IV Continuous <Continuous>  dextrose 5%. 1000 milliLiter(s) (100 mL/Hr) IV Continuous <Continuous>  dextrose 50% Injectable 25 Gram(s) IV Push once  dextrose 50% Injectable 12.5 Gram(s) IV Push once  dextrose 50% Injectable 25 Gram(s) IV Push once  epoetin vern-epbx (RETACRIT) Injectable 83160 Unit(s) SubCutaneous <User Schedule>  glucagon  Injectable 1 milliGRAM(s) IntraMuscular once  insulin glargine Injectable (LANTUS) 16 Unit(s) SubCutaneous at bedtime  insulin lispro (ADMELOG) corrective regimen sliding scale   SubCutaneous at bedtime  insulin lispro (ADMELOG) corrective regimen sliding scale   SubCutaneous three times a day before meals  insulin lispro Injectable (ADMELOG) 10 Unit(s) SubCutaneous three times a day before meals  pantoprazole    Tablet 40 milliGRAM(s) Oral before breakfast  senna 2 Tablet(s) Oral at bedtime  sevelamer carbonate 800 milliGRAM(s) Oral three times a day with meals    MEDICATIONS  (PRN):  acetaminophen     Tablet .. 650 milliGRAM(s) Oral every 6 hours PRN Mild Pain (1 - 3), Moderate Pain (4 - 6)  melatonin 3 milliGRAM(s) Oral at bedtime PRN Insomnia  polyethylene glycol 3350 17 Gram(s) Oral two times a day PRN Constipation         HPI:  61 y/o M w/ hx of DM2, HTN and HLD initially presented as transfer from [facility] for chest pain concerning for NSTEMI and possible new CHF c/b JOSHUA likely on CKD, and hypertensive emergency on 10/6/21. Hospital course complicated by s/p RRT for CVA/TIA ruled out and worsening anemia requiring 1 PRBC. Pt S/p HD session today and underwent LHC showing Multivessel CAD. CT surgery consulted for CABG evaluation. Patient developed left sided facial droop on 10/7 and stroke code was called. 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 Patient started Hemodialysis on 10/13. On 10/21 s/p CABGx2, Mitral Valve Repair, and left atrial appendage ligation, was extubated next day. He developed SOB on 10/24 and requeired bipap,  but was eventually trasitioned back down to [place]. Permacath placed 11/1/21 and LUE AV Fistula placed by vascular on 11/2/21. Her was noted to have PACs and Afib - started on amiodarone. He will be getting HD at [place]. Cleared by vascular to restart eliquis 2.5mg bid. HD schedule changed from T/T/S to M/W/F.   Patient evaluated by PM&R PT/OT and recommended acute rehab. Patient admitted to [facility] on 11/8/21 for debility. (08 Nov 2021 14:09)      INTERVAL HPI/OVERNIGHT EVENTS:  Chart Reviewed and patient seen/examined at bedside.  Dysphagia at meal time yday, SLP downgraded diet to Pureed with thick liqiud  Patient was bradycardic this AM - amiodarone was held. Asymptomatic bradycardia as no dizziness, no lightheadedness, no chest pain.  Tolerated HD yesterday.  Cardiology and Hospitalist recs appreciated, c/w amiodarone as per cardiology and hold only if HR <50  Hospitalist will clarify with Cards for any further changes  Voided yesterday night, BS was low. - Unsure of amount of urine he is actually putting out.    ROS:  Denies fevers, chills, chest pain, shortness of breath, abdominal pain, headaches, nausea/vomiting    Vital Signs Last 24 Hrs  T(C): 37.3 (10 Nov 2021 20:19), Max: 37.3 (10 Nov 2021 20:19)  T(F): 99.1 (10 Nov 2021 20:19), Max: 99.1 (10 Nov 2021 20:19)  HR: 50 (11 Nov 2021 05:23) (49 - 61)  BP: 125/63 (11 Nov 2021 05:23) (125/63 - 147/60)  BP(mean): --  RR: 16 (10 Nov 2021 20:19) (14 - 16)  SpO2: 100% (10 Nov 2021 20:19) (100% - 100%)    Physical Exam:  Gen - NAD, Comfortable  HEENT - NCAT, EOMI, MMM  Neck - Supple, No limited ROM  Pulm - CTAB, No wheeze, No rhonchi, No crackles  Cardiovascular - RRR, S1S2, No m/r/g  Abdomen - Soft, NT/ND, +BS  Extremities - No C/C/E, No calf tenderness  Neuro-     Cognitive - AAOx3     Attention: Intact      Judgement: Good evidence of judgement     Motor -                    LEFT    UE - ShAB 5/5, EF 5/5, EE 5/5, WE 5/5,  5/5                    RIGHT UE - ShAB 5/5, EF 5/5, EE 5/5, WE 5/5,  5/5                    LEFT    LE - HF 4/5, KE 5/5, DF 5/5, PF 5/5                    RIGHT LE - HF 4/5, KE 5/5, DF 5/5, PF 5/5        Sensory - Intact to LT     Reflexes - DTR Intact, No primitive reflex     Coordination - FTN intact     Tone - normal  Psychiatric - Mood stable, Affect WNL  Skin: Sacral/left buttock sacral decubital ulcer with visible subcutaneous/dermis, dry b/l heels, permacath is c/d/i, LUE AVF site c/d/i sternal incision site is also c/d/i    LABS:  11-10    137  |  98  |  87<H>  ----------------------------<  187<H>  5.0   |  25  |  8.07<H>  11-09    138  |  99  |  49<H>  ----------------------------<  128<H>  4.6   |  29  |  5.72<H>    Ca    8.8      10 Nov 2021 14:30  Ca    9.3      09 Nov 2021 05:00  Phos  6.6     11-10    TPro  x   /  Alb  2.1<L>  /  TBili  x   /  DBili  x   /  AST  x   /  ALT  x   /  AlkPhos  x   11-10  TPro  7.5  /  Alb  2.1<L>  /  TBili  0.4  /  DBili  x   /  AST  30  /  ALT  11  /  AlkPhos  110  11-09                7.8    9.69  )-----------( 213      ( 10 Nov 2021 14:30 )             24.7                         8.3    10.75 )-----------( 181      ( 09 Nov 2021 05:00 )             26.2     CAPILLARY BLOOD GLUCOSE      POCT Blood Glucose.: 148 mg/dL (11 Nov 2021 08:27)  POCT Blood Glucose.: 154 mg/dL (10 Nov 2021 21:06)  POCT Blood Glucose.: 171 mg/dL (10 Nov 2021 18:35)  POCT Blood Glucose.: 128 mg/dL (10 Nov 2021 12:10)      MEDICATIONS:  MEDICATIONS  (STANDING):  aMIOdarone    Tablet 200 milliGRAM(s) Oral every 12 hours  apixaban 2.5 milliGRAM(s) Oral two times a day  AQUAPHOR (petrolatum Ointment) 1 Application(s) Topical daily  aspirin  chewable 81 milliGRAM(s) Oral daily  dextrose 40% Gel 15 Gram(s) Oral once  dextrose 5%. 1000 milliLiter(s) (50 mL/Hr) IV Continuous <Continuous>  dextrose 5%. 1000 milliLiter(s) (100 mL/Hr) IV Continuous <Continuous>  dextrose 50% Injectable 25 Gram(s) IV Push once  dextrose 50% Injectable 12.5 Gram(s) IV Push once  dextrose 50% Injectable 25 Gram(s) IV Push once  epoetin vern-epbx (RETACRIT) Injectable 92382 Unit(s) SubCutaneous <User Schedule>  glucagon  Injectable 1 milliGRAM(s) IntraMuscular once  insulin glargine Injectable (LANTUS) 16 Unit(s) SubCutaneous at bedtime  insulin lispro (ADMELOG) corrective regimen sliding scale   SubCutaneous at bedtime  insulin lispro (ADMELOG) corrective regimen sliding scale   SubCutaneous three times a day before meals  insulin lispro Injectable (ADMELOG) 10 Unit(s) SubCutaneous three times a day before meals  pantoprazole    Tablet 40 milliGRAM(s) Oral before breakfast  senna 2 Tablet(s) Oral at bedtime  sevelamer carbonate 800 milliGRAM(s) Oral three times a day with meals    MEDICATIONS  (PRN):  acetaminophen     Tablet .. 650 milliGRAM(s) Oral every 6 hours PRN Mild Pain (1 - 3), Moderate Pain (4 - 6)  melatonin 3 milliGRAM(s) Oral at bedtime PRN Insomnia  polyethylene glycol 3350 17 Gram(s) Oral two times a day PRN Constipation

## 2021-11-12 LAB
ALBUMIN SERPL ELPH-MCNC: 2.4 G/DL — LOW (ref 3.3–5)
ALP SERPL-CCNC: 130 U/L — HIGH (ref 40–120)
ALT FLD-CCNC: 15 U/L — SIGNIFICANT CHANGE UP (ref 10–45)
ANION GAP SERPL CALC-SCNC: 10 MMOL/L — SIGNIFICANT CHANGE UP (ref 5–17)
AST SERPL-CCNC: 32 U/L — SIGNIFICANT CHANGE UP (ref 10–40)
BILIRUB DIRECT SERPL-MCNC: 0.1 MG/DL — SIGNIFICANT CHANGE UP (ref 0–0.2)
BILIRUB INDIRECT FLD-MCNC: 0.2 MG/DL — SIGNIFICANT CHANGE UP (ref 0.2–1)
BILIRUB SERPL-MCNC: 0.3 MG/DL — SIGNIFICANT CHANGE UP (ref 0.2–1.2)
BUN SERPL-MCNC: 89 MG/DL — HIGH (ref 7–23)
CALCIUM SERPL-MCNC: 9.1 MG/DL — SIGNIFICANT CHANGE UP (ref 8.4–10.5)
CHLORIDE SERPL-SCNC: 94 MMOL/L — LOW (ref 96–108)
CO2 SERPL-SCNC: 27 MMOL/L — SIGNIFICANT CHANGE UP (ref 22–31)
CREAT SERPL-MCNC: 8 MG/DL — HIGH (ref 0.5–1.3)
GLUCOSE BLDC GLUCOMTR-MCNC: 115 MG/DL — HIGH (ref 70–99)
GLUCOSE BLDC GLUCOMTR-MCNC: 116 MG/DL — HIGH (ref 70–99)
GLUCOSE BLDC GLUCOMTR-MCNC: 78 MG/DL — SIGNIFICANT CHANGE UP (ref 70–99)
GLUCOSE BLDC GLUCOMTR-MCNC: 96 MG/DL — SIGNIFICANT CHANGE UP (ref 70–99)
GLUCOSE SERPL-MCNC: 104 MG/DL — HIGH (ref 70–99)
HCT VFR BLD CALC: 24.7 % — LOW (ref 39–50)
HGB BLD-MCNC: 8 G/DL — LOW (ref 13–17)
MCHC RBC-ENTMCNC: 30.3 PG — SIGNIFICANT CHANGE UP (ref 27–34)
MCHC RBC-ENTMCNC: 32.4 GM/DL — SIGNIFICANT CHANGE UP (ref 32–36)
MCV RBC AUTO: 93.6 FL — SIGNIFICANT CHANGE UP (ref 80–100)
NRBC # BLD: 0 /100 WBCS — SIGNIFICANT CHANGE UP (ref 0–0)
PHOSPHATE SERPL-MCNC: 7.2 MG/DL — HIGH (ref 2.5–4.5)
PLATELET # BLD AUTO: 242 K/UL — SIGNIFICANT CHANGE UP (ref 150–400)
POTASSIUM SERPL-MCNC: 5.5 MMOL/L — HIGH (ref 3.5–5.3)
POTASSIUM SERPL-SCNC: 5.5 MMOL/L — HIGH (ref 3.5–5.3)
PROT SERPL-MCNC: 7.5 G/DL — SIGNIFICANT CHANGE UP (ref 6–8.3)
RBC # BLD: 2.64 M/UL — LOW (ref 4.2–5.8)
RBC # FLD: 14.1 % — SIGNIFICANT CHANGE UP (ref 10.3–14.5)
SODIUM SERPL-SCNC: 131 MMOL/L — LOW (ref 135–145)
TSH SERPL-MCNC: 2.28 UIU/ML — SIGNIFICANT CHANGE UP (ref 0.36–3.74)
WBC # BLD: 9.2 K/UL — SIGNIFICANT CHANGE UP (ref 3.8–10.5)
WBC # FLD AUTO: 9.2 K/UL — SIGNIFICANT CHANGE UP (ref 3.8–10.5)

## 2021-11-12 PROCEDURE — 99233 SBSQ HOSP IP/OBS HIGH 50: CPT

## 2021-11-12 PROCEDURE — 93010 ELECTROCARDIOGRAM REPORT: CPT

## 2021-11-12 PROCEDURE — 71045 X-RAY EXAM CHEST 1 VIEW: CPT | Mod: 26

## 2021-11-12 RX ORDER — SEVELAMER CARBONATE 2400 MG/1
1600 POWDER, FOR SUSPENSION ORAL
Refills: 0 | Status: DISCONTINUED | OUTPATIENT
Start: 2021-11-12 | End: 2021-11-27

## 2021-11-12 RX ORDER — AMIODARONE HYDROCHLORIDE 400 MG/1
200 TABLET ORAL DAILY
Refills: 0 | Status: DISCONTINUED | OUTPATIENT
Start: 2021-11-12 | End: 2021-11-12

## 2021-11-12 RX ORDER — INSULIN LISPRO 100/ML
5 VIAL (ML) SUBCUTANEOUS
Refills: 0 | Status: DISCONTINUED | OUTPATIENT
Start: 2021-11-12 | End: 2021-11-15

## 2021-11-12 RX ADMIN — SEVELAMER CARBONATE 1600 MILLIGRAM(S): 2400 POWDER, FOR SUSPENSION ORAL at 18:15

## 2021-11-12 RX ADMIN — Medication 650 MILLIGRAM(S): at 22:15

## 2021-11-12 RX ADMIN — SENNA PLUS 2 TABLET(S): 8.6 TABLET ORAL at 21:34

## 2021-11-12 RX ADMIN — Medication 650 MILLIGRAM(S): at 21:47

## 2021-11-12 RX ADMIN — SEVELAMER CARBONATE 800 MILLIGRAM(S): 2400 POWDER, FOR SUSPENSION ORAL at 07:57

## 2021-11-12 RX ADMIN — APIXABAN 2.5 MILLIGRAM(S): 2.5 TABLET, FILM COATED ORAL at 18:15

## 2021-11-12 RX ADMIN — INSULIN GLARGINE 16 UNIT(S): 100 INJECTION, SOLUTION SUBCUTANEOUS at 21:34

## 2021-11-12 RX ADMIN — Medication 5 UNIT(S): at 12:02

## 2021-11-12 RX ADMIN — PANTOPRAZOLE SODIUM 40 MILLIGRAM(S): 20 TABLET, DELAYED RELEASE ORAL at 06:04

## 2021-11-12 RX ADMIN — Medication 81 MILLIGRAM(S): at 12:03

## 2021-11-12 RX ADMIN — SEVELAMER CARBONATE 800 MILLIGRAM(S): 2400 POWDER, FOR SUSPENSION ORAL at 12:03

## 2021-11-12 RX ADMIN — Medication 10 UNIT(S): at 07:56

## 2021-11-12 RX ADMIN — APIXABAN 2.5 MILLIGRAM(S): 2.5 TABLET, FILM COATED ORAL at 06:04

## 2021-11-12 RX ADMIN — ERYTHROPOIETIN 10000 UNIT(S): 10000 INJECTION, SOLUTION INTRAVENOUS; SUBCUTANEOUS at 16:29

## 2021-11-12 RX ADMIN — Medication 1 APPLICATION(S): at 21:34

## 2021-11-12 NOTE — PROGRESS NOTE ADULT - SUBJECTIVE AND OBJECTIVE BOX
Stable on HD    Vital Signs Last 24 Hrs  T(C): 36.4 (11-12-21 @ 14:45), Max: 36.8 (11-11-21 @ 20:24)  T(F): 97.6 (11-12-21 @ 14:45), Max: 98.2 (11-11-21 @ 20:24)  HR: 44 (11-12-21 @ 14:45) (44 - 56)  BP: 141/65 (11-12-21 @ 14:45) (114/52 - 141/65)  RR: 15 (11-12-21 @ 14:45) (15 - 16)  SpO2: 98% (11-12-21 @ 14:45) (98% - 100%)    s1s2  b/l air entry  soft  sm edema                                         8.0    9.20  )-----------( 242      ( 12 Nov 2021 14:45 )             24.7     12 Nov 2021 14:45    131    |  94     |  89     ----------------------------<  104    5.5     |  27     |  8.00     Ca    9.1        12 Nov 2021 14:45  Phos  7.2       12 Nov 2021 14:45    TPro  7.5    /  Alb  2.4    /  TBili  0.3    /  DBili  0.1    /  AST  32     /  ALT  15     /  AlkPhos  130    12 Nov 2021 14:45    LIVER FUNCTIONS - ( 12 Nov 2021 14:45 )  Alb: 2.4 g/dL / Pro: 7.5 g/dL / ALK PHOS: 130 U/L / ALT: 15 U/L / AST: 32 U/L / GGT: x           acetaminophen     Tablet .. 650 milliGRAM(s) Oral every 6 hours PRN  apixaban 2.5 milliGRAM(s) Oral two times a day  AQUAPHOR (petrolatum Ointment) 1 Application(s) Topical daily  aspirin  chewable 81 milliGRAM(s) Oral daily  dextrose 40% Gel 15 Gram(s) Oral once  dextrose 5%. 1000 milliLiter(s) IV Continuous <Continuous>  dextrose 5%. 1000 milliLiter(s) IV Continuous <Continuous>  dextrose 50% Injectable 25 Gram(s) IV Push once  dextrose 50% Injectable 12.5 Gram(s) IV Push once  dextrose 50% Injectable 25 Gram(s) IV Push once  epoetin vern-epbx (RETACRIT) Injectable 86088 Unit(s) SubCutaneous <User Schedule>  glucagon  Injectable 1 milliGRAM(s) IntraMuscular once  insulin glargine Injectable (LANTUS) 16 Unit(s) SubCutaneous at bedtime  insulin lispro (ADMELOG) corrective regimen sliding scale   SubCutaneous at bedtime  insulin lispro (ADMELOG) corrective regimen sliding scale   SubCutaneous three times a day before meals  insulin lispro Injectable (ADMELOG) 5 Unit(s) SubCutaneous three times a day before meals  melatonin 3 milliGRAM(s) Oral at bedtime PRN  pantoprazole    Tablet 40 milliGRAM(s) Oral before breakfast  polyethylene glycol 3350 17 Gram(s) Oral two times a day PRN  senna 2 Tablet(s) Oral at bedtime  sevelamer carbonate 800 milliGRAM(s) Oral three times a day with meals    A/P:    S/p CABG x 2, Mitral Valve Repair, and left atrial appendage ligation 10/21/21  Hospital course complicated by JOSHUA/CKD  Now on HD MWF  Renal diet  TMP as able  Epoetin w/HD  Sevelamer for high Phos  Bradycardia  Cardiology following    [phone]

## 2021-11-12 NOTE — PROGRESS NOTE ADULT - SUBJECTIVE AND OBJECTIVE BOX
HPI:  61 y/o M w/ hx of DM2, HTN and HLD initially presented as transfer from [facility] for chest pain concerning for NSTEMI and possible new CHF c/b JOSHUA likely on CKD, and hypertensive emergency on 10/6/21. Hospital course complicated by s/p RRT for CVA/TIA ruled out and worsening anemia requiring 1 PRBC. Pt S/p HD session today and underwent LHC showing Multivessel CAD. CT surgery consulted for CABG evaluation. Patient developed left sided facial droop on 10/7 and stroke code was called. 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 Patient started Hemodialysis on 10/13. On 10/21 s/p CABGx2, Mitral Valve Repair, and left atrial appendage ligation, was extubated next day. He developed SOB on 10/24 and requeired bipap,  but was eventually trasitioned back down to [place]. Permacath placed 11/1/21 and LUE AV Fistula placed by vascular on 11/2/21. Her was noted to have PACs and Afib - started on amiodarone. He will be getting HD at [place]. Cleared by vascular to restart eliquis 2.5mg bid. HD schedule changed from T/T/S to M/W/F.   Patient evaluated by PM&R PT/OT and recommended acute rehab. Patient admitted to [facility] on 11/8/21 for debility. (08 Nov 2021 14:09)      INTERVAL HPI/OVERNIGHT EVENTS:  Chart Reviewed and patient seen at bedside.  Patient was reported to have visual field problems yesterday during speech therapy- patient denies visual problems.  Diet upgraded yesterday evening as difficulty eating was due to not having dentures. Will keep on pureed food but thin liquids until patient's wife brings dentures.  Was bradycardic this morning as well. Last Amiodarone dose he actaully received was 11/10 at 6pm..  Spoke w/ Dr. Stewart from cardiology who gave new recommendations this morning.  Patient otherwise still asymptomatic.  +BM last night.    ROS:  Denies fevers, chills, chest pain, shortness of breath, abdominal pain, headaches, nausea/vomiting    Vital Signs Last 24 Hrs  T(C): 36.4 (12 Nov 2021 08:10), Max: 37.1 (11 Nov 2021 11:43)  T(F): 97.6 (12 Nov 2021 08:10), Max: 98.7 (11 Nov 2021 11:43)  HR: 45 (12 Nov 2021 08:10) (45 - 56)  BP: 114/52 (12 Nov 2021 08:10) (114/52 - 135/60)  BP(mean): --  RR: 15 (12 Nov 2021 08:10) (14 - 16)  SpO2: 100% (12 Nov 2021 08:10) (99% - 100%)    Physical Exam:    Gen - NAD, Comfortable  HEENT - NCAT, EOMI, MMM  Neck - Supple, No limited ROM  Pulm - CTAB, No wheeze, No rhonchi, No crackles  Cardiovascular - RRR, S1S2, No m/r/g  Abdomen - Soft, NT/ND, +BS  Extremities - No C/C/E, No calf tenderness  Neuro-     Cognitive - AAOx3     Attention: Intact      Judgement: Good evidence of judgement     Motor -                    LEFT    UE - ShAB 5/5, EF 5/5, EE 5/5, WE 5/5,  5/5                    RIGHT UE - ShAB 5/5, EF 5/5, EE 5/5, WE 5/5,  5/5                    LEFT    LE - HF 4/5, KE 5/5, DF 5/5, PF 5/5                    RIGHT LE - HF 4/5, KE 5/5, DF 5/5, PF 5/5        Sensory - Intact to LT     Reflexes - DTR Intact, No primitive reflex     Coordination - FTN intact     Tone - normal  Psychiatric - Mood stable, Affect WNL  Skin: Sacral/left buttock sacral decubital ulcer with visible subcutaneous/dermis, dry b/l heels, permacath is c/d/i, LUE AVF site c/d/i sternal incision site is also c/d/i    LABS:  11-10    137  |  98  |  87<H>  ----------------------------<  187<H>  5.0   |  25  |  8.07<H>    Ca    8.8      10 Nov 2021 14:30  Phos  6.6     11-10    TPro  x   /  Alb  2.1<L>  /  TBili  x   /  DBili  x   /  AST  x   /  ALT  x   /  AlkPhos  x   11-10                                              7.8    9.69  )-----------( 213      ( 10 Nov 2021 14:30 )             24.7     CAPILLARY BLOOD GLUCOSE      POCT Blood Glucose.: 115 mg/dL (12 Nov 2021 07:35)  POCT Blood Glucose.: 121 mg/dL (11 Nov 2021 21:07)  POCT Blood Glucose.: 140 mg/dL (11 Nov 2021 18:05)  POCT Blood Glucose.: 122 mg/dL (11 Nov 2021 17:24)  POCT Blood Glucose.: 99 mg/dL (11 Nov 2021 11:45)      MEDICATIONS:  MEDICATIONS  (STANDING):  aMIOdarone    Tablet 200 milliGRAM(s) Oral daily  apixaban 2.5 milliGRAM(s) Oral two times a day  AQUAPHOR (petrolatum Ointment) 1 Application(s) Topical daily  aspirin  chewable 81 milliGRAM(s) Oral daily  dextrose 40% Gel 15 Gram(s) Oral once  dextrose 5%. 1000 milliLiter(s) (50 mL/Hr) IV Continuous <Continuous>  dextrose 5%. 1000 milliLiter(s) (100 mL/Hr) IV Continuous <Continuous>  dextrose 50% Injectable 25 Gram(s) IV Push once  dextrose 50% Injectable 12.5 Gram(s) IV Push once  dextrose 50% Injectable 25 Gram(s) IV Push once  epoetin vern-epbx (RETACRIT) Injectable 67278 Unit(s) SubCutaneous <User Schedule>  glucagon  Injectable 1 milliGRAM(s) IntraMuscular once  insulin glargine Injectable (LANTUS) 16 Unit(s) SubCutaneous at bedtime  insulin lispro (ADMELOG) corrective regimen sliding scale   SubCutaneous at bedtime  insulin lispro (ADMELOG) corrective regimen sliding scale   SubCutaneous three times a day before meals  insulin lispro Injectable (ADMELOG) 10 Unit(s) SubCutaneous three times a day before meals  pantoprazole    Tablet 40 milliGRAM(s) Oral before breakfast  senna 2 Tablet(s) Oral at bedtime  sevelamer carbonate 800 milliGRAM(s) Oral three times a day with meals    MEDICATIONS  (PRN):  acetaminophen     Tablet .. 650 milliGRAM(s) Oral every 6 hours PRN Mild Pain (1 - 3), Moderate Pain (4 - 6)  melatonin 3 milliGRAM(s) Oral at bedtime PRN Insomnia  polyethylene glycol 3350 17 Gram(s) Oral two times a day PRN Constipation         HPI:  61 y/o M w/ hx of DM2, HTN and HLD initially presented as transfer from [facility] for chest pain concerning for NSTEMI and possible new CHF c/b JOSHUA likely on CKD, and hypertensive emergency on 10/6/21. Hospital course complicated by s/p RRT for CVA/TIA ruled out and worsening anemia requiring 1 PRBC. Pt S/p HD session today and underwent LHC showing Multivessel CAD. CT surgery consulted for CABG evaluation. Patient developed left sided facial droop on 10/7 and stroke code was called. 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 Patient started Hemodialysis on 10/13. On 10/21 s/p CABGx2, Mitral Valve Repair, and left atrial appendage ligation, was extubated next day. He developed SOB on 10/24 and requeired bipap,  but was eventually trasitioned back down to [place]. Permacath placed 11/1/21 and LUE AV Fistula placed by vascular on 11/2/21. Her was noted to have PACs and Afib - started on amiodarone. He will be getting HD at [place]. Cleared by vascular to restart eliquis 2.5mg bid. HD schedule changed from T/T/S to M/W/F.   Patient evaluated by PM&R PT/OT and recommended acute rehab. Patient admitted to [facility] on 11/8/21 for debility. (08 Nov 2021 14:09)      INTERVAL HPI/OVERNIGHT EVENTS:  Chart Reviewed and patient seen at bedside.  Patient was reported to have visual field problems yesterday during speech therapy- patient denies visual problems.  Diet upgraded yesterday evening as difficulty eating was due to not having dentures. Will keep on pureed food but thin liquids until patient's wife brings dentures.  Was bradycardic this morning as well. Last Amiodarone dose he actaully received was 11/10 at 6pm..  Spoke w/ Dr. Stewart from cardiology who gave new recommendations this morning. to stop amiodarone  Patient otherwise still asymptomatic.  +BM last night.  Low sugar readings, insulin dose reduced    ROS:  Denies fevers, chills, chest pain, shortness of breath, abdominal pain, headaches, nausea/vomiting    Vital Signs Last 24 Hrs  T(C): 36.4 (12 Nov 2021 08:10), Max: 37.1 (11 Nov 2021 11:43)  T(F): 97.6 (12 Nov 2021 08:10), Max: 98.7 (11 Nov 2021 11:43)  HR: 45 (12 Nov 2021 08:10) (45 - 56)  BP: 114/52 (12 Nov 2021 08:10) (114/52 - 135/60)  BP(mean): --  RR: 15 (12 Nov 2021 08:10) (14 - 16)  SpO2: 100% (12 Nov 2021 08:10) (99% - 100%)    Physical Exam:    Gen - NAD, Comfortable  HEENT - NCAT, EOMI, MMM  Neck - Supple, No limited ROM  Pulm - CTAB, No wheeze, No rhonchi, No crackles  Cardiovascular - RRR, S1S2, No m/r/g  Abdomen - Soft, NT/ND, +BS  Extremities - No C/C/E, No calf tenderness  Neuro-     Cognitive - AAOx3     Attention: Intact      Judgement: Good evidence of judgement     Motor -                    LEFT    UE - ShAB 5/5, EF 5/5, EE 5/5, WE 5/5,  5/5                    RIGHT UE - ShAB 5/5, EF 5/5, EE 5/5, WE 5/5,  5/5                    LEFT    LE - HF 4/5, KE 5/5, DF 5/5, PF 5/5                    RIGHT LE - HF 4/5, KE 5/5, DF 5/5, PF 5/5        Sensory - Intact to LT     Reflexes - DTR Intact, No primitive reflex     Coordination - FTN intact     Tone - normal  Psychiatric - Mood stable, Affect WNL  Skin: Sacral/left buttock sacral decubital ulcer with visible subcutaneous/dermis, dry b/l heels, permacath is c/d/i, LUE AVF site c/d/i sternal incision site is also c/d/i    LABS:  11-10    137  |  98  |  87<H>  ----------------------------<  187<H>  5.0   |  25  |  8.07<H>    Ca    8.8      10 Nov 2021 14:30  Phos  6.6     11-10    TPro  x   /  Alb  2.1<L>  /  TBili  x   /  DBili  x   /  AST  x   /  ALT  x   /  AlkPhos  x   11-10                                              7.8    9.69  )-----------( 213      ( 10 Nov 2021 14:30 )             24.7     CAPILLARY BLOOD GLUCOSE  POCT Blood Glucose.: 115 mg/dL (12 Nov 2021 07:35)  POCT Blood Glucose.: 121 mg/dL (11 Nov 2021 21:07)    MEDICATIONS:  MEDICATIONS  (STANDING):  aMIOdarone    Tablet 200 milliGRAM(s) Oral daily--will be d/[place]  apixaban 2.5 milliGRAM(s) Oral two times a day  AQUAPHOR (petrolatum Ointment) 1 Application(s) Topical daily  aspirin  chewable 81 milliGRAM(s) Oral daily  dextrose 40% Gel 15 Gram(s) Oral once  dextrose 5%. 1000 milliLiter(s) (50 mL/Hr) IV Continuous <Continuous>  dextrose 5%. 1000 milliLiter(s) (100 mL/Hr) IV Continuous <Continuous>  dextrose 50% Injectable 25 Gram(s) IV Push once  dextrose 50% Injectable 12.5 Gram(s) IV Push once  dextrose 50% Injectable 25 Gram(s) IV Push once  epoetin vern-epbx (RETACRIT) Injectable 48383 Unit(s) SubCutaneous <User Schedule>  glucagon  Injectable 1 milliGRAM(s) IntraMuscular once  insulin glargine Injectable (LANTUS) 16 Unit(s) SubCutaneous at bedtime  insulin lispro (ADMELOG) corrective regimen sliding scale   SubCutaneous at bedtime  insulin lispro (ADMELOG) corrective regimen sliding scale   SubCutaneous three times a day before meals  insulin lispro Injectable (ADMELOG) 10 Unit(s) SubCutaneous three times a day before meals  pantoprazole    Tablet 40 milliGRAM(s) Oral before breakfast  senna 2 Tablet(s) Oral at bedtime  sevelamer carbonate 800 milliGRAM(s) Oral three times a day with meals    MEDICATIONS  (PRN):  acetaminophen     Tablet .. 650 milliGRAM(s) Oral every 6 hours PRN Mild Pain (1 - 3), Moderate Pain (4 - 6)  melatonin 3 milliGRAM(s) Oral at bedtime PRN Insomnia  polyethylene glycol 3350 17 Gram(s) Oral two times a day PRN Constipation         HPI:  59 y/o M w/ hx of DM2, HTN and HLD initially presented as transfer from [facility] for chest pain concerning for NSTEMI and possible new CHF c/b JOSHUA likely on CKD, and hypertensive emergency on 10/6/21. Hospital course complicated by s/p RRT for CVA/TIA ruled out and worsening anemia requiring 1 PRBC. Pt S/p HD session today and underwent LHC showing Multivessel CAD. CT surgery consulted for CABG evaluation. Patient developed left sided facial droop on 10/7 and stroke code was called. 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 Patient started Hemodialysis on 10/13. On 10/21 s/p CABGx2, Mitral Valve Repair, and left atrial appendage ligation, was extubated next day. He developed SOB on 10/24 and requeired bipap,  but was eventually trasitioned back down to [place]. Permacath placed 11/1/21 and LUE AV Fistula placed by vascular on 11/2/21. Her was noted to have PACs and Afib - started on amiodarone. He will be getting HD at [place]. Cleared by vascular to restart eliquis 2.5mg bid. HD schedule changed from T/T/S to M/W/F.   Patient evaluated by PM&R PT/OT and recommended acute rehab. Patient admitted to [facility] on 11/8/21 for debility. (08 Nov 2021 14:09)      INTERVAL HPI/OVERNIGHT EVENTS:  Chart Reviewed and patient seen/examined at bedside.  Patient was reported to have visual field problems yesterday during speech therapy- patient denies visual problems.  Diet upgraded yesterday evening as difficulty eating was due to not having dentures. Will keep on pureed food but thin liquids until patient's wife brings dentures.  Was bradycardic this morning as well. Last Amiodarone dose he actaully received was 11/10 at 6pm..  Spoke w/ Dr. Stewart from cardiology who gave new recommendations this morning. to stop amiodarone  Patient otherwise still asymptomatic.  +BM last night.  Low sugar readings, insulin dose reduced    ROS:  Denies fevers, chills, chest pain, shortness of breath, abdominal pain, headaches, nausea/vomiting    Vital Signs Last 24 Hrs  T(C): 36.4 (12 Nov 2021 08:10), Max: 37.1 (11 Nov 2021 11:43)  T(F): 97.6 (12 Nov 2021 08:10), Max: 98.7 (11 Nov 2021 11:43)  HR: 45 (12 Nov 2021 08:10) (45 - 56)  BP: 114/52 (12 Nov 2021 08:10) (114/52 - 135/60)  BP(mean): --  RR: 15 (12 Nov 2021 08:10) (14 - 16)  SpO2: 100% (12 Nov 2021 08:10) (99% - 100%)    Physical Exam:    Gen - NAD, Comfortable  HEENT - NCAT, EOMI, MMM  Neck - Supple, No limited ROM  Pulm - CTAB, No wheeze, No rhonchi, No crackles  Cardiovascular - RRR, S1S2, No m/r/g  Abdomen - Soft, NT/ND, +BS  Extremities - No C/C/E, No calf tenderness  Neuro-     Cognitive - AAOx3     Attention: Intact      Judgement: Good evidence of judgement     Motor -                    LEFT    UE - ShAB 5/5, EF 5/5, EE 5/5, WE 5/5,  5/5                    RIGHT UE - ShAB 5/5, EF 5/5, EE 5/5, WE 5/5,  5/5                    LEFT    LE - HF 4/5, KE 5/5, DF 5/5, PF 5/5                    RIGHT LE - HF 4/5, KE 5/5, DF 5/5, PF 5/5        Sensory - Intact to LT     Reflexes - DTR Intact, No primitive reflex     Coordination - FTN intact     Tone - normal  Psychiatric - Mood stable, Affect WNL  Skin: Sacral/left buttock sacral decubital ulcer with visible subcutaneous/dermis, dry b/l heels, permacath is c/d/i, LUE AVF site c/d/i sternal incision site is also c/d/i    LABS:  11-10    137  |  98  |  87<H>  ----------------------------<  187<H>  5.0   |  25  |  8.07<H>    Ca    8.8      10 Nov 2021 14:30  Phos  6.6     11-10    TPro  x   /  Alb  2.1<L>  /  TBili  x   /  DBili  x   /  AST  x   /  ALT  x   /  AlkPhos  x   11-10                                              7.8    9.69  )-----------( 213      ( 10 Nov 2021 14:30 )             24.7     CAPILLARY BLOOD GLUCOSE  POCT Blood Glucose.: 115 mg/dL (12 Nov 2021 07:35)  POCT Blood Glucose.: 121 mg/dL (11 Nov 2021 21:07)    MEDICATIONS:  MEDICATIONS  (STANDING):  aMIOdarone    Tablet 200 milliGRAM(s) Oral daily--will be d/[place]  apixaban 2.5 milliGRAM(s) Oral two times a day  AQUAPHOR (petrolatum Ointment) 1 Application(s) Topical daily  aspirin  chewable 81 milliGRAM(s) Oral daily  dextrose 40% Gel 15 Gram(s) Oral once  dextrose 5%. 1000 milliLiter(s) (50 mL/Hr) IV Continuous <Continuous>  dextrose 5%. 1000 milliLiter(s) (100 mL/Hr) IV Continuous <Continuous>  dextrose 50% Injectable 25 Gram(s) IV Push once  dextrose 50% Injectable 12.5 Gram(s) IV Push once  dextrose 50% Injectable 25 Gram(s) IV Push once  epoetin vern-epbx (RETACRIT) Injectable 79050 Unit(s) SubCutaneous <User Schedule>  glucagon  Injectable 1 milliGRAM(s) IntraMuscular once  insulin glargine Injectable (LANTUS) 16 Unit(s) SubCutaneous at bedtime  insulin lispro (ADMELOG) corrective regimen sliding scale   SubCutaneous at bedtime  insulin lispro (ADMELOG) corrective regimen sliding scale   SubCutaneous three times a day before meals  insulin lispro Injectable (ADMELOG) 10 Unit(s) SubCutaneous three times a day before meals  pantoprazole    Tablet 40 milliGRAM(s) Oral before breakfast  senna 2 Tablet(s) Oral at bedtime  sevelamer carbonate 800 milliGRAM(s) Oral three times a day with meals    MEDICATIONS  (PRN):  acetaminophen     Tablet .. 650 milliGRAM(s) Oral every 6 hours PRN Mild Pain (1 - 3), Moderate Pain (4 - 6)  melatonin 3 milliGRAM(s) Oral at bedtime PRN Insomnia  polyethylene glycol 3350 17 Gram(s) Oral two times a day PRN Constipation

## 2021-11-12 NOTE — PROGRESS NOTE ADULT - ASSESSMENT
61 y/o M w/ hx of DM2, HTN and HLD, transfer from [facility] for NSTEMI, admitted w/ JOSHUA on CKD and hypertensive emergency, new start to HD (10/13). He underwent LHC showing Multivessel CAD. 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  s/p CABG x2 (10/21), MV repair, and left atrial ligation. Permacath placed 11/1/21 and LUE AV Fistula placed by vascular on 11/2/21. Her was noted to have PACs and Afib - started on amiodarone and eliquis.    Now admitted to [facility] after for initiation of a multidisciplinary rehab program consisting focused on functional mobility, transfers and ADLs.    # Functional and gait instability:  - C/w PT/OT per primary team   - pain control w/ tylenol     #NSTEMI 2/2 CAD s/p CABG (10/21)  #s/p MV repair  #HLD  - s/p LHC on 10/14 showing severe multivessel disease; now s/p CABG on 10/21. Echocardiogram reviewed w/ EF 75% (10/28)  - aspirin  - statin  - eliquis BID for AC for MV  - surgical site on chest c/d/i    # ESRD on HD (MWF), due to diabetic nephropathy  # anemia of chronic dz 2/2 ESRD  -received blood transfusion at OSH. no e/o hypervolemia on exam.   -HD via permacath (placed 11/1/21). 1st stage AVF creation (11/2)  -sevelamer  -HD at [place] on discharge, renal f/u  - retacrit     # paroxysmal atrial fibrillation   # tachy blaine syndrome  -cont apixiban  -hold AV Christina agents due to bradycardia  -s/p amiodarone load (initial consult note on 11/3). Pt was on amiodarone 200 mg BID, but now held due to bradycardia.   Discussed w/ Dr. Stewart:  -daily EKGs  -can restart amiodarone if becomes tachycardic. Pt developed AF post operatively, so it is possible amio was only needed short term.  -f/u TFTs, LFTs, and CXR.     # QTc prolongation  noted to be 500 ms.   - avoid QT prolonging agents, monitor on daily ECG.     # DM2  -hgba1c 7%  -glargine 16 u  -aspart 10 units TID wm    # subclinical hypothyroidism  - TSH mildly elevated 6.4, pt asymptomatic  - seen by Endo during admission. Outpt f/u.     # hx of severe tobacco use d/o  - smoked 3 ppd, recently quit a few months ago    # sacral wound--sacral stage 2 ulcer  - appreciate wound care consult.     dvt ppx: cont apixiban   59 y/o M w/ hx of DM2, HTN and HLD, transfer from [facility] for NSTEMI, admitted w/ JOSHUA on CKD and hypertensive emergency, new start to HD (10/13). He underwent LHC showing Multivessel CAD. 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  s/p CABG x2 (10/21), MV repair, and left atrial ligation. Permacath placed 11/1/21 and LUE AV Fistula placed by vascular on 11/2/21. Her was noted to have PACs and Afib - started on amiodarone and eliquis.    Now admitted to [facility] after for initiation of a multidisciplinary rehab program consisting focused on functional mobility, transfers and ADLs.    # Functional and gait instability:  - C/w PT/OT per primary team   - pain control w/ tylenol     #NSTEMI 2/2 CAD s/p CABG (10/21)  #s/p MV repair  #HLD  - s/p LHC on 10/14 showing severe multivessel disease; now s/p CABG on 10/21. Echocardiogram reviewed w/ EF 75% (10/28)  - aspirin  - statin  - eliquis BID for AC for MV  - surgical site on chest c/d/i    # ESRD on HD (MWF), due to diabetic nephropathy  # anemia of chronic dz 2/2 ESRD  -received blood transfusion at OSH. no e/o hypervolemia on exam.   -HD via permacath (placed 11/1/21). 1st stage AVF creation (11/2)  -sevelamer  -HD at [place] on discharge, renal f/u  - retacrit     # paroxysmal atrial fibrillation   # tachy blaine syndrome  -cont apixiban  -hold AV Christina agents due to bradycardia  -s/p amiodarone load (initial consult note on 11/3). Pt was on amiodarone 200 mg BID, but now held due to bradycardia.   Discussed w/ Dr. Stewart:  -daily EKGs  -can restart amiodarone if becomes tachycardic. Pt developed AF post operatively, so it is possible amio was only needed short term.  -f/u TFTs, LFTs, and CXR.     # QTc prolongation  noted to be 500 ms.   - avoid QT prolonging agents, monitor on daily ECG.     # DM2  -hgba1c 7%  -glargine 16 u  -aspart reduced from 10-->5 units TID wm     # subclinical hypothyroidism  - TSH mildly elevated 6.4, pt asymptomatic  - seen by Endo during admission. Outpt f/u.     # Acute urinary retention  noted high PVRs, improving.   - cont scheduled bladder checks.  - consider tamsulosin. Rehab considering Urology consult    # hx of severe tobacco use d/o  - smoked 3 ppd, recently quit a few months ago    # sacral wound--sacral stage 2 ulcer  - appreciate wound care consult.     dvt ppx: cont apixiban

## 2021-11-12 NOTE — PROGRESS NOTE ADULT - ASSESSMENT
TOM PLEITEZ is a 61y with a history of DM2, ?CKD, HTN and HLD  who presented to [facility] on 10/6/21 with SOB, CP found to have multivessel CAD and JOSHUA on CKD, s/p CABGx2, MVR, and LAAL. Started on HD T/T/S, now on HD M/W/F s/p Permacath and LUE AVF. Hospital Course complicated by post-op hemorrhagic shock , received 4 PRBC, 1 PLT, 1000 FIEBA intraop and post op dual pressor and inotrope support w/ , Primacor, Levo, and Epi gtts.  Afib, PACs- now on amiodarone. Patient now admitted for a multidisciplinary rehab program. 21 @ 14:40    *Cardiology consult D/C amiodarone and daily EKGs. Will get LFT and TSH w/ HD labs today  *SLP: diet upgraded to pureed with thin liquids. Can upgrade diet to regular if patient's wife brings dentures and cleared by SLP     #Debility  - Gait Instability, ADL impairments and Functional impairments: start Comprehensive Rehab Program of PT/OT/SLP  - 3 hours a day, 5 days a week  - Orthotics as needed   - also will benefit from cardiopulmonary conditioning.  - SLP for cognitive and memory deficits    #CAD and Afib s/p CABGx2, LAAL, MVR with Tachy-Seth Syndrome  - Most recent echo on 10/28 shows restoration of LVEF   - d/[place] amiodatone  - c/w eliquis 2.5mg bid  - c/w ASA 81 daily  - close f/u Medicine  - Cardiology consult appreciated: spoke w/ Dr. Stewart today -  rec d/c amiodarone   - DAILY EKGs per cardiology to monitor for tachy-seth syndrome.   - If tachy then speak w/ cardiology regarding restarting amiodarone.      #?CKD on HD - M/W/F  - retacrit 8000ui intraHD  - renal consult  - Permacath placed 21 and LUE AVF placed   - chlorhexidine to keep access sites clean    #DM  - lantus 16 unit qhs  - lispro 10 unit premeal tid  - ISS    #Sleep  - melatonin PRN     #Skin  - Skin on admission: Sacral/left buttock sacral decubital ulcer with visible subcutaneous/dermis, dry b/l heels, permacath is c/d/i, LUE AVF site c/d/i, drain sites c/d/i; sternal incision site is c/d/i  - Pressure injury/Skin: OOB to Chair, PT/OT   - continue monitoring wound, incision and drain sites.  - Wound consult  - alyvene foam dressing and cavilon to wound    #Pain Mgmt   - Tylenol PRN    #GI/Bowel Mgmt   - Continent c/w Senna, Miralax    #/Bladder Mgmt --Voiding, PVRs low, urine quantity unclear if oligo/anuric    #FEN   - Diet - Pureed w/ Nectar  [Renal, DASH/TLC]    - per SLP diet upgraded to Pureed with thin liquids -  Diet upgraded yesterday evening as difficulty eating was due to not having dentures. Will keep on pureed food but thin liquids until patient's wife brings dentures.      #Precautions / PROPHYLAXIS:   - Falls, Cardiac, Sternal  - ortho: Weight bearing status: WBAT   - Lungs: Aspiration, Incentive Spirometer   - DVT: eliquis    IDT   SW: lives w/ spouse,w/ 8 steps in home, no hand rail. Wife works weekend but can help  Functional: eating mod I, upper body dressing set up, lbd mod/min A, bathing min a, toilet transfers CG/min A, toileting min A, short transfer min A; ambulates 40 ft RW mod A, transfers min A. Has mild expressive and receptive aphasia, anomia, possibly from underlying cognitive deficit.   SLP noted deficits with communication and executive function for which he is receiving  therapy  Barriers to d/c --deficits with communication/executive fxn, medical issues (low HR), suboptimal motor strength, poor balance  Goals--independence with transfers, ambulate without supervision, with gait assistance, cane  EDOD:  w/      TOM PLEITEZ is a 61y with a history of DM2, ?CKD, HTN and HLD  who presented to [facility] on 10/6/21 with SOB, CP found to have multivessel CAD and JOSHUA on CKD, s/p CABGx2, MVR, and LAAL. Started on HD T/T/S, now on HD M/W/F s/p Permacath and LUE AVF. Hospital Course complicated by post-op hemorrhagic shock , received 4 PRBC, 1 PLT, 1000 FIEBA intraop and post op dual pressor and inotrope support w/ , Primacor, Levo, and Epi gtts.  Afib, PACs- now on amiodarone. Patient now admitted for a multidisciplinary rehab program. 21 @ 14:40    *Cardiology consult D/C amiodarone and daily EKGs. Will get LFT and TSH w/ HD labs today  *SLP: diet upgraded to pureed with thin liquids. Can upgrade diet to regular if patient's wife brings dentures and cleared by SLP   *DM: lispro decreased to     #Debility  - Gait Instability, ADL impairments and Functional impairments: start Comprehensive Rehab Program of PT/OT/SLP  - 3 hours a day, 5 days a week  - Orthotics as needed   - also will benefit from cardiopulmonary conditioning.  - SLP for cognitive and memory deficits    #CAD and Afib s/p CABGx2, LAAL, MVR with Tachy-Seth Syndrome  - Most recent echo on 10/28 shows restoration of LVEF   - d/[place] amiodatone  - c/w eliquis 2.5mg bid  - c/w ASA 81 daily  - close f/u Medicine  - Cardiology consult appreciated: spoke w/ Dr. Stewart today -  rec d/c amiodarone   - DAILY EKGs per cardiology to monitor for tachy-seth syndrome.   - If tachy then speak w/ cardiology regarding restarting amiodarone.      #?CKD on HD - M/W/F  - retacrit 8000ui intraHD  - renal consult  - Permacath placed 21 and LUE AVF placed   - chlorhexidine to keep access sites clean    #DM  - lantus 16 unit qhs  - decreased lispro   - ISS    #Sleep  - melatonin PRN     #Skin  - Skin on admission: Sacral/left buttock sacral decubital ulcer with visible subcutaneous/dermis, dry b/l heels, permacath is c/d/i, LUE AVF site c/d/i, drain sites c/d/i; sternal incision site is c/d/i  - Pressure injury/Skin: OOB to Chair, PT/OT   - continue monitoring wound, incision and drain sites.  - Wound consult  - alyvene foam dressing and cavilon to wound    #Pain Mgmt   - Tylenol PRN    #GI/Bowel Mgmt   - Continent c/w Senna, Miralax    #/Bladder Mgmt --Voiding, PVRs low, urine quantity unclear if oligo/anuric    #FEN   - Diet - Pureed w/ Nectar  [Renal, DASH/TLC]    - per SLP diet upgraded to Pureed with thin liquids -  Diet upgraded yesterday evening as difficulty eating was due to not having dentures. Will keep on pureed food but thin liquids until patient's wife brings dentures.      #Precautions / PROPHYLAXIS:   - Falls, Cardiac, Sternal  - ortho: Weight bearing status: WBAT   - Lungs: Aspiration, Incentive Spirometer   - DVT: eliquis    IDT   SW: lives w/ spouse,w/ 8 steps in home, no hand rail. Wife works weekend but can help  Functional: eating mod I, upper body dressing set up, lbd mod/min A, bathing min a, toilet transfers CG/min A, toileting min A, short transfer min A; ambulates 40 ft RW mod A, transfers min A. Has mild expressive and receptive aphasia, anomia, possibly from underlying cognitive deficit.   SLP noted deficits with communication and executive function for which he is receiving  therapy  Barriers to d/c --deficits with communication/executive fxn, medical issues (low HR), suboptimal motor strength, poor balance  Goals--independence with transfers, ambulate without supervision, with gait assistance, cane  EDOD:  w/      TOM PLEITEZ is a 61y with a history of DM2, ?CKD, HTN and HLD  who presented to [facility] on 10/6/21 with SOB, CP found to have multivessel CAD and JOSHUA on CKD, s/p CABGx2, MVR, and LAAL. Started on HD T/T/S, now on HD M/W/F s/p Permacath and LUE AVF. Hospital Course complicated by post-op hemorrhagic shock , received 4 PRBC, 1 PLT, 1000 FIEBA intraop and post op dual pressor and inotrope support w/ , Primacor, Levo, and Epi gtts.  Afib, PACs- now on amiodarone. Patient now admitted for a multidisciplinary rehab program. 21 @ 14:40    *Cardiology consult D/C amiodarone and daily EKGs. Will get LFT and TSH w/ HD labs today  *SLP: diet upgraded to pureed with thin liquids. Can upgrade diet to regular if patient's wife brings dentures and cleared by SLP   *DM: lispro decreased to 5//5units pre meals    #Debility  - Gait Instability, ADL impairments and Functional impairments: start Comprehensive Rehab Program of PT/OT/SLP  - 3 hours a day, 5 days a week  - Orthotics as needed   - also will benefit from cardiopulmonary conditioning.  - SLP for cognitive and memory deficits    #CAD and Afib s/p CABGx2, LAAL, MVR with Tachy-Seth Syndrome  - Most recent echo on 10/28 shows restoration of LVEF   - d/[place] amiodatone  - c/w eliquis 2.5mg bid  - c/w ASA 81 daily  - close f/u Medicine  - Cardiology consult appreciated: spoke w/ Dr. Stewart today -  rec d/c amiodarone   - DAILY EKGs per cardiology to monitor for tachy-seth syndrome.   - If tachy then speak w/ cardiology regarding restarting amiodarone.      #?CKD on HD - M/W/F  - retacrit 8000ui intraHD  - renal consult  - Permacath placed 21 and LUE AVF placed   - chlorhexidine to keep access sites clean    #DM  - lantus 16 unit qhs  - decreased lispro 5/5/5  - ISS    #Sleep  - melatonin PRN     #Skin  - Skin on admission: Sacral/left buttock sacral decubital ulcer with visible subcutaneous/dermis, dry b/l heels, permacath is c/d/i, LUE AVF site c/d/i, drain sites c/d/i; sternal incision site is c/d/i  - Pressure injury/Skin: OOB to Chair, PT/OT   - continue monitoring wound, incision and drain sites.  - Wound consult  - alyvene foam dressing and cavilon to wound    #Pain Mgmt   - Tylenol PRN    #GI/Bowel Mgmt   - Continent c/w Senna, Miralax    #/Bladder Mgmt --Voiding, PVRs low, urine quantity unclear if oligo/anuric    #FEN   - Diet - Pureed w/ Nectar  [Renal, DASH/TLC]    - per SLP diet upgraded to Pureed with thin liquids -  Diet upgraded yesterday evening as difficulty eating was due to not having dentures. Will keep on pureed food but thin liquids until patient's wife brings dentures.      #Precautions / PROPHYLAXIS:   - Falls, Cardiac, Sternal  - ortho: Weight bearing status: WBAT   - Lungs: Aspiration, Incentive Spirometer   - DVT: eliquis    IDT   SW: lives w/ spouse,w/ 8 steps in home, no hand rail. Wife works weekend but can help  Functional: eating mod I, upper body dressing set up, lbd mod/min A, bathing min a, toilet transfers CG/min A, toileting min A, short transfer min A; ambulates 40 ft RW mod A, transfers min A. Has mild expressive and receptive aphasia, anomia, possibly from underlying cognitive deficit.   SLP noted deficits with communication and executive function for which he is receiving  therapy  Barriers to d/c --deficits with communication/executive fxn, medical issues (low HR), suboptimal motor strength, poor balance  Goals--independence with transfers, ambulate without supervision, with gait assistance, cane  EDOD:  w/      TOM PLEITEZ is a 61y with a history of DM2, ?CKD, HTN and HLD  who presented to [facility] on 10/6/21 with SOB, CP found to have multivessel CAD and JOSHUA on CKD, s/p CABGx2, MVR, and LAAL. Started on HD T/T/S, now on HD M/W/F s/p Permacath and LUE AVF. Hospital Course complicated by post-op hemorrhagic shock , received 4 PRBC, 1 PLT, 1000 FIEBA intraop and post op dual pressor and inotrope support w/ , Primacor, Levo, and Epi gtts.  Afib, PACs- now on amiodarone. Patient now admitted for a multidisciplinary rehab program. 21 @ 14:40    *Cardiology consult D/C amiodarone and daily EKGs. Will get LFT and TSH w/ HD labs today  *SLP: diet upgraded to pureed with thin liquids. Can upgrade diet to regular if patient's wife brings dentures and cleared by SLP   *DM: lispro decreased to 5//5units pre meals    #Debility  - Gait Instability, ADL impairments and Functional impairments: start Comprehensive Rehab Program of PT/OT/SLP  - 3 hours a day, 5 days a week  - Orthotics as needed   - also will benefit from cardiopulmonary conditioning.  - SLP for cognitive and memory deficits    #CAD and Afib s/p CABGx2, LAAL, MVR with Tachy-Seth Syndrome  - Most recent echo on 10/28 shows restoration of LVEF   - d/[place] amiodatone  - c/w eliquis 2.5mg bid  - c/w ASA 81 daily  - close f/u Medicine  - Cardiology consult appreciated: spoke w/ Dr. Stewart today -  rec d/c amiodarone   - DAILY EKGs per cardiology to monitor for tachy-seth syndrome.   - If tachy then speak w/ cardiology regarding restarting amiodarone.      #?CKD on HD - M/W/F  - retacrit 8000ui intraHD  - renal consult  - Permacath placed 21 and LUE AVF placed   - chlorhexidine to keep access sites clean    #DM  - lantus 16 unit qhs  - decreased lispro 5/5/5  - ISS    #Sleep  - melatonin PRN     #Skin  - Skin on admission: Sacral/left buttock sacral decubital ulcer with visible subcutaneous/dermis, dry b/l heels, permacath is c/d/i, LUE AVF site c/d/i, drain sites c/d/i; sternal incision site is c/d/i  - Pressure injury/Skin: OOB to Chair, PT/OT   - continue monitoring wound, incision and drain sites.  - Wound consult  - alyvene foam dressing and cavilon to wound    #Pain Mgmt   - Tylenol PRN    #GI/Bowel Mgmt   - Continent c/w Senna, Miralax    #/Bladder Mgmt --Voiding, PVRs low, urine quantity unclear if oligo/anuric    #FEN   - Diet - Pureed w/ Nectar  [Renal, DASH/TLC]    - per SLP diet upgraded to Pureed with thin liquids -  Diet upgraded yesterday evening as difficulty eating was due to not having dentures. Will keep on pureed food but thin liquids until patient's wife brings dentures.      #Precautions / PROPHYLAXIS:   - Falls, Cardiac, Sternal  - ortho: Weight bearing status: WBAT   - Lungs: Aspiration, Incentive Spirometer   - DVT: eliquis    IDT   SW: lives w/ spouse,w/ 8 steps in home, no hand rail. Wife works weekend but can help  Functional: eating mod I, upper body dressing set up, lbd mod/min A, bathing min a, toilet transfers CG/min A, toileting min A, short transfer min A; ambulates 40 ft RW mod A, transfers min A. Has mild expressive and receptive aphasia, anomia, possibly from underlying cognitive deficit.   SLP noted deficits with communication and executive function for which he is receiving  therapy  Barriers to d/c --deficits with communication/executive fxn, medical issues (low HR), suboptimal motor strength, poor balance  Goals--independence with transfers, ambulate without supervision, with gait assistance, cane  EDOD:  Tues w/ HC    Spoke w/ Wife Marcus [phone] on 21. -- provided update and inquired about reading glasses, which patient doesn't have a home.   TOM PLEITEZ is a 61y with a history of DM2, ?CKD, HTN and HLD  who presented to [facility] on 10/6/21 with SOB, CP found to have multivessel CAD and JOSHUA on CKD, s/p CABGx2, MVR, and LAAL. Started on HD T/T/S, now on HD M/W/F s/p Permacath and LUE AVF. Hospital Course complicated by post-op hemorrhagic shock , received 4 PRBC, 1 PLT, 1000 FIEBA intraop and post op dual pressor and inotrope support w/ , Primacor, Levo, and Epi gtts.  Afib, PACs- now on amiodarone. Patient now admitted for a multidisciplinary rehab program. 21 @ 14:40    *Cardiology consult D/C amiodarone and daily EKGs. Will get LFT and TSH w/ HD labs today  *SLP: diet upgraded to pureed with thin liquids. Can upgrade diet to regular if patient's wife brings dentures and cleared by SLP   *DM: lispro decreased to 5/5/5units pre meals  * Await family response to clarify on previous visual deficits and use of spectables     #Debility  - Gait Instability, ADL impairments and Functional impairments: start Comprehensive Rehab Program of PT/OT/SLP  - 3 hours a day, 5 days a week  - Orthotics as needed   - also will benefit from cardiopulmonary conditioning.  - SLP for cognitive and memory deficits    #CAD and Afib s/p CABGx2, LAAL, MVR with Tachy-Seth Syndrome  - Most recent echo on 10/28 shows restoration of LVEF   - d/[place] amiodatone  - c/w eliquis 2.5mg bid  - c/w ASA 81 daily  - close f/u Medicine  - Cardiology consult appreciated: spoke w/ Dr. Stewart today -  rec d/c amiodarone   - DAILY EKGs per cardiology to monitor for tachy-seth syndrome.   - If tachy then speak w/ cardiology regarding restarting amiodarone.      #?CKD on HD - M/W/F  - retacrit 8000ui intraHD  - renal consult  - Permacath placed 21 and LUE AVF placed   - chlorhexidine to keep access sites clean    #DM- with hypoglycemia-revised insulin regime    - lantus 16 unit qhs  - decreased lispro     #Sleep  - melatonin PRN     #Skin  - Skin on admission: Sacral/left buttock sacral decubital ulcer with visible subcutaneous/dermis, dry b/l heels, permacath is c/d/i, LUE AVF site c/d/i, drain sites c/d/i; sternal incision site is c/d/i  - Pressure injury/Skin: OOB to Chair, PT/OT   - continue monitoring wound, incision and drain sites.  - Wound consult  - alyvene foam dressing and cavilon to wound    #Pain Mgmt   - Tylenol PRN    #GI/Bowel Mgmt   - Continent c/w Senna, Miralax    #/Bladder Mgmt --Voiding, PVRs low, urine quantity unclear if oligo/anuric    #FEN   - Diet - Pureed w/ Nectar  [Renal, DASH/TLC]    - per SLP diet upgraded to Pureed with thin liquids -  Diet upgraded yesterday evening as difficulty eating was due to not having dentures. Will keep on pureed food but thin liquids until patient's wife brings dentures.      #Precautions / PROPHYLAXIS:   - Falls, Cardiac, Sternal  - ortho: Weight bearing status: WBAT   - Lungs: Aspiration, Incentive Spirometer   - DVT: eliquis    IDT   SW: lives w/ spouse,w/ 8 steps in home, no hand rail. Wife works weekend but can help  Functional: eating mod I, upper body dressing set up, lbd mod/min A, bathing min a, toilet transfers CG/min A, toileting min A, short transfer min A; ambulates 40 ft RW mod A, transfers min A. Has mild expressive and receptive aphasia, anomia, possibly from underlying cognitive deficit.   SLP noted deficits with communication and executive function for which he is receiving  therapy  Barriers to d/c --deficits with communication/executive fxn, medical issues (low HR), suboptimal motor strength, poor balance  Goals--independence with transfers, ambulate without supervision, with gait assistance, cane  EDOD:  Tu w/ HC    Spoke w/ Wife Marcus [phone] on 21. -- provided update and inquired about reading glasses, which patient doesn't have a home.    Subsequent attempt to get collateral, unsuccessful left msg for wife

## 2021-11-12 NOTE — PROGRESS NOTE ADULT - ATTENDING COMMENTS
TOM PLEITEZ is a 61y with a history of DM2, ?CKD, HTN and HLD  who presented to [facility] on 10/6/21 with SOB, CP found to have multivessel CAD and JOSHUA on CKD, s/p CABGx2, MVR, and LAAL. Started on HD T/T/S, now on HD M/W/F s/p Permacath and LUE AVF. Hospital Course complicated by post-op hemorrhagic shock , received 4 PRBC, 1 PLT, 1000 FIEBA intraop and post op dual pressor and inotrope support w/ , Primacor, Levo, and Epi gtts.  Afib, PACs- now on amiodarone. Patient now admitted for a multidisciplinary rehab program. 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 Admitted to acute rehab 11/8//21    Tachy-Seth syndrome with low HR--Amiodarone d/[place] as per cardiology recs, Hospitalist f/u  Short acting insulin reduced to address low normal Bld sugar readings   Possible visual field deficits as reported by PT, has normal visual fields on bed side exam  Let message for wife, to get collateral hx and clarify if patient was previously using glasses/to bring it in    Otherwise stable, in no distress  Tolerating HD as noted by Renal  ESRD  on HD via Right chest permacath, left AVF    Antigravity strength all extremities  -Chest precuations  -No blood draws/BP measurements on left am    Continue PT/OT--muscle strengthening/stretching ROM, DME use  Team meeting 11/12- estimated DC 11/23 barriers to dc and goals of treatment as noted in assessment --IDT    Routine care  DVT ppx, GI protection  Renal precautions, fluid restricion as per renal, routine HD  Seen with Dr Montez, Rehab Med Resident, stable to continue acute rehab

## 2021-11-12 NOTE — PROGRESS NOTE ADULT - SUBJECTIVE AND OBJECTIVE BOX
Patient is a 61y old  Male who presents with a chief complaint of Debility (11 Nov 2021 21:55)      24 HOUR EVENTS:  No overnight events reported.     SUBJECTIVE:  Patient seen and examined at bedside.   He offers no complaints - currently no sx of bradycardia, including no dizziness, lightheadedness. No chest pain or palpitations.     ALLERGIES:  No Known Allergies    MEDICATIONS  (STANDING):  apixaban 2.5 milliGRAM(s) Oral two times a day  AQUAPHOR (petrolatum Ointment) 1 Application(s) Topical daily  aspirin  chewable 81 milliGRAM(s) Oral daily  dextrose 40% Gel 15 Gram(s) Oral once  dextrose 5%. 1000 milliLiter(s) (50 mL/Hr) IV Continuous <Continuous>  dextrose 5%. 1000 milliLiter(s) (100 mL/Hr) IV Continuous <Continuous>  dextrose 50% Injectable 25 Gram(s) IV Push once  dextrose 50% Injectable 12.5 Gram(s) IV Push once  dextrose 50% Injectable 25 Gram(s) IV Push once  epoetin vern-epbx (RETACRIT) Injectable 80058 Unit(s) SubCutaneous <User Schedule>  glucagon  Injectable 1 milliGRAM(s) IntraMuscular once  insulin glargine Injectable (LANTUS) 16 Unit(s) SubCutaneous at bedtime  insulin lispro (ADMELOG) corrective regimen sliding scale   SubCutaneous at bedtime  insulin lispro (ADMELOG) corrective regimen sliding scale   SubCutaneous three times a day before meals  insulin lispro Injectable (ADMELOG) 10 Unit(s) SubCutaneous three times a day before meals  pantoprazole    Tablet 40 milliGRAM(s) Oral before breakfast  senna 2 Tablet(s) Oral at bedtime  sevelamer carbonate 800 milliGRAM(s) Oral three times a day with meals    MEDICATIONS  (PRN):  acetaminophen     Tablet .. 650 milliGRAM(s) Oral every 6 hours PRN Mild Pain (1 - 3), Moderate Pain (4 - 6)  melatonin 3 milliGRAM(s) Oral at bedtime PRN Insomnia  polyethylene glycol 3350 17 Gram(s) Oral two times a day PRN Constipation    Vital Signs Last 24 Hrs  T(F): 97.6 (12 Nov 2021 08:10), Max: 98.7 (11 Nov 2021 11:43)  HR: 45 (12 Nov 2021 08:10) (45 - 56)  BP: 114/52 (12 Nov 2021 08:10) (114/52 - 135/60)  RR: 15 (12 Nov 2021 08:10) (14 - 16)  SpO2: 100% (12 Nov 2021 08:10) (99% - 100%)  I&O's Summary    PHYSICAL EXAM:  General: NAD, A/O   ENT: Moist mucous membranes, no thrush  Neck: Supple, No JVD  Lungs: Clear to auscultation bilaterally, good air entry, non-labored breathing  Cardio: sinus bradycardia, S1/S2, No murmur. midline surgical site on chest c/d/i  Abdomen: Soft, Nontender, Nondistended; Bowel sounds present  Extremities: No calf tenderness, No pitting edema    LABS:                        7.8    9.69  )-----------( 213      ( 10 Nov 2021 14:30 )             24.7     11-10    137  |  98  |  87  ----------------------------<  187  5.0   |  25  |  8.07    Ca    8.8      10 Nov 2021 14:30  Phos  6.6     11-10    TPro  x   /  Alb  2.1  /  TBili  x   /  DBili  x   /  AST  x   /  ALT  x   /  AlkPhos  x   11-10        eGFR if Non African American: 7 mL/min/1.73M2 (11-10-21 @ 14:30)  eGFR if : 8 mL/min/1.73M2 (11-10-21 @ 14:30)            POCT Blood Glucose.: 115 mg/dL (12 Nov 2021 07:35)  POCT Blood Glucose.: 121 mg/dL (11 Nov 2021 21:07)  POCT Blood Glucose.: 140 mg/dL (11 Nov 2021 18:05)  POCT Blood Glucose.: 122 mg/dL (11 Nov 2021 17:24)  POCT Blood Glucose.: 99 mg/dL (11 Nov 2021 11:45)          COVID-19 PCR: NotDetec (11-08-21 @ 18:30)  COVID-19 PCR: NotDetec (11-07-21 @ 05:39)  COVID-19 PCR: NotDetec (11-05-21 @ 08:07)  COVID-19 PCR: NotDetec (11-01-21 @ 06:14)  [facility] @ 08:44[facility]    RADIOLOGY & ADDITIONAL TESTS:    CXR read personally by me w/o any infiltrates. Possible left blurring costophrenic angle. Will await formal read    Care Discussed with Consultants/Other Providers:   Dr Montez PMR  Dr. [facility]   Patient is a 61y old  Male who presents with a chief complaint of Debility (11 Nov 2021 21:55)      24 HOUR EVENTS:  continues to be bradycardic    SUBJECTIVE:  Patient seen and examined at bedside.   He offers no complaints - currently no sx of bradycardia, including no dizziness, lightheadedness. No chest pain or palpitations.     ALLERGIES:  No Known Allergies    MEDICATIONS  (STANDING):  apixaban 2.5 milliGRAM(s) Oral two times a day  AQUAPHOR (petrolatum Ointment) 1 Application(s) Topical daily  aspirin  chewable 81 milliGRAM(s) Oral daily  dextrose 40% Gel 15 Gram(s) Oral once  dextrose 5%. 1000 milliLiter(s) (50 mL/Hr) IV Continuous <Continuous>  dextrose 5%. 1000 milliLiter(s) (100 mL/Hr) IV Continuous <Continuous>  dextrose 50% Injectable 25 Gram(s) IV Push once  dextrose 50% Injectable 12.5 Gram(s) IV Push once  dextrose 50% Injectable 25 Gram(s) IV Push once  epoetin vern-epbx (RETACRIT) Injectable 91860 Unit(s) SubCutaneous <User Schedule>  glucagon  Injectable 1 milliGRAM(s) IntraMuscular once  insulin glargine Injectable (LANTUS) 16 Unit(s) SubCutaneous at bedtime  insulin lispro (ADMELOG) corrective regimen sliding scale   SubCutaneous at bedtime  insulin lispro (ADMELOG) corrective regimen sliding scale   SubCutaneous three times a day before meals  insulin lispro Injectable (ADMELOG) 10 Unit(s) SubCutaneous three times a day before meals  pantoprazole    Tablet 40 milliGRAM(s) Oral before breakfast  senna 2 Tablet(s) Oral at bedtime  sevelamer carbonate 800 milliGRAM(s) Oral three times a day with meals    MEDICATIONS  (PRN):  acetaminophen     Tablet .. 650 milliGRAM(s) Oral every 6 hours PRN Mild Pain (1 - 3), Moderate Pain (4 - 6)  melatonin 3 milliGRAM(s) Oral at bedtime PRN Insomnia  polyethylene glycol 3350 17 Gram(s) Oral two times a day PRN Constipation    Vital Signs Last 24 Hrs  T(F): 97.6 (12 Nov 2021 08:10), Max: 98.7 (11 Nov 2021 11:43)  HR: 45 (12 Nov 2021 08:10) (45 - 56)  BP: 114/52 (12 Nov 2021 08:10) (114/52 - 135/60)  RR: 15 (12 Nov 2021 08:10) (14 - 16)  SpO2: 100% (12 Nov 2021 08:10) (99% - 100%)  I&O's Summary    PHYSICAL EXAM:  General: NAD, A/O   ENT: Moist mucous membranes, no thrush  Neck: Supple, No JVD  Lungs: Clear to auscultation bilaterally, good air entry, non-labored breathing  Cardio: sinus bradycardia, S1/S2, No murmur. midline surgical site on chest c/d/i  Abdomen: Soft, Nontender, Nondistended; Bowel sounds present  Extremities: No calf tenderness, No pitting edema    LABS:                        7.8    9.69  )-----------( 213      ( 10 Nov 2021 14:30 )             24.7     11-10    137  |  98  |  87  ----------------------------<  187  5.0   |  25  |  8.07    Ca    8.8      10 Nov 2021 14:30  Phos  6.6     11-10    TPro  x   /  Alb  2.1  /  TBili  x   /  DBili  x   /  AST  x   /  ALT  x   /  AlkPhos  x   11-10        eGFR if Non African American: 7 mL/min/1.73M2 (11-10-21 @ 14:30)  eGFR if : 8 mL/min/1.73M2 (11-10-21 @ 14:30)            POCT Blood Glucose.: 115 mg/dL (12 Nov 2021 07:35)  POCT Blood Glucose.: 121 mg/dL (11 Nov 2021 21:07)  POCT Blood Glucose.: 140 mg/dL (11 Nov 2021 18:05)  POCT Blood Glucose.: 122 mg/dL (11 Nov 2021 17:24)  POCT Blood Glucose.: 99 mg/dL (11 Nov 2021 11:45)          COVID-19 PCR: NotDetec (11-08-21 @ 18:30)  COVID-19 PCR: NotDetec (11-07-21 @ 05:39)  COVID-19 PCR: NotDetec (11-05-21 @ 08:07)  COVID-19 PCR: NotDetec (11-01-21 @ 06:14)  COVID-19 PCR: NotDete (10-31-21 @ 08:44)    RADIOLOGY & ADDITIONAL TESTS:    CXR read personally by me w/o any infiltrates. Possible left blurring costophrenic angle. Will await formal read    Care Discussed with Consultants/Other Providers:   Dr Montez PMR  Dr. [facility]

## 2021-11-12 NOTE — CHART NOTE - NSCHARTNOTEFT_GEN_A_CORE
Nutrition Follow Up Note  Hospital Course   (Per Electronic Medical Record)    Source:  Patient [X]  Medical Record [X]      Diet:   Consistent Carbohydrate DASH-TLC Renal Puree (Dysphagia 1) Diet w/ Thin Liquids   Diet Consistency Downgraded on  11/10  Tolerates Diet Consistency Well  No Chewing/Swallowing Difficulties   No Recent Nausea, Vomiting or Constipation & Some Recent Diarrhea (as Per Patient)  Education Provided on Proper Nutrition   Consumes % of Meals (as Per Documentation)  - States Good PO Intake/Appetite   Declines Nutrition Supplementation    Enteral/Parenteral Nutrition: Not Applicable    Current Weight: 164.9lb on 11/10  Obtain New Weight to Confirm  Obtain Weights Weekly     Pertinent Medications: MEDICATIONS  (STANDING):  aMIOdarone    Tablet 200 milliGRAM(s) Oral daily  apixaban 2.5 milliGRAM(s) Oral two times a day  AQUAPHOR (petrolatum Ointment) 1 Application(s) Topical daily  aspirin  chewable 81 milliGRAM(s) Oral daily  dextrose 40% Gel 15 Gram(s) Oral once  dextrose 5%. 1000 milliLiter(s) (50 mL/Hr) IV Continuous <Continuous>  dextrose 5%. 1000 milliLiter(s) (100 mL/Hr) IV Continuous <Continuous>  dextrose 50% Injectable 25 Gram(s) IV Push once  dextrose 50% Injectable 12.5 Gram(s) IV Push once  dextrose 50% Injectable 25 Gram(s) IV Push once  epoetin vern-epbx (RETACRIT) Injectable 49946 Unit(s) SubCutaneous <User Schedule>  glucagon  Injectable 1 milliGRAM(s) IntraMuscular once  insulin glargine Injectable (LANTUS) 16 Unit(s) SubCutaneous at bedtime  insulin lispro (ADMELOG) corrective regimen sliding scale   SubCutaneous at bedtime  insulin lispro (ADMELOG) corrective regimen sliding scale   SubCutaneous three times a day before meals  insulin lispro Injectable (ADMELOG) 10 Unit(s) SubCutaneous three times a day before meals  pantoprazole    Tablet 40 milliGRAM(s) Oral before breakfast  senna 2 Tablet(s) Oral at bedtime  sevelamer carbonate 800 milliGRAM(s) Oral three times a day with meals    MEDICATIONS  (PRN):  acetaminophen     Tablet .. 650 milliGRAM(s) Oral every 6 hours PRN Mild Pain (1 - 3), Moderate Pain (4 - 6)  melatonin 3 milliGRAM(s) Oral at bedtime PRN Insomnia  polyethylene glycol 3350 17 Gram(s) Oral two times a day PRN Constipation    Pertinent Labs:  11-10 Na137 mmol/L Glu 187 mg/dL<H> K+ 5.0 mmol/L Cr  8.07 mg/dL<H> BUN 87 mg/dL<H> 11-10 Phos 6.6 mg/dL<H> 11-10 Alb 2.1 g/dL<L> 10-15 PAB 22 mg/dL    Skin: Stage 2 Pressure Ulcer on Left Buttock   Multiple Surgical Incisions  (as Per Nursing Flow Sheet)     Edema: None Noted (as Per Documentation)     Last Bowel Movement: on 11/10    Estimated Needs:   [X] No Change Since Previous Assessment    Previous Nutrition Diagnosis:   Moderate Malnutrition  Increased Nutrient Needs - Calories & Protein     Nutrition Diagnosis is [X] Ongoing - Declined Nutrition Supplementation     New Nutrition Diagnosis: [X] Not Applicable    Interventions:   1. Education Provided on Proper Nutrition   2. Recommend Continue Nutrition Plan of Care     Monitoring & Evaluation:   [X] Weights   [X] PO Intake   [X] Skin Integrity   [X] Follow Up (Per Protocol)  [X] Tolerance to Diet Prescription   [X] Other: Labs     Registered Dietitian/Nutritionist Remains Available.  Mason Glass RDN    Pager #[phone]  Phone# [phone]

## 2021-11-13 LAB
GLUCOSE BLDC GLUCOMTR-MCNC: 139 MG/DL — HIGH (ref 70–99)
GLUCOSE BLDC GLUCOMTR-MCNC: 164 MG/DL — HIGH (ref 70–99)
GLUCOSE BLDC GLUCOMTR-MCNC: 236 MG/DL — HIGH (ref 70–99)
GLUCOSE BLDC GLUCOMTR-MCNC: 98 MG/DL — SIGNIFICANT CHANGE UP (ref 70–99)

## 2021-11-13 PROCEDURE — 93010 ELECTROCARDIOGRAM REPORT: CPT

## 2021-11-13 PROCEDURE — 99232 SBSQ HOSP IP/OBS MODERATE 35: CPT

## 2021-11-13 RX ADMIN — Medication 5 UNIT(S): at 16:36

## 2021-11-13 RX ADMIN — SEVELAMER CARBONATE 1600 MILLIGRAM(S): 2400 POWDER, FOR SUSPENSION ORAL at 16:36

## 2021-11-13 RX ADMIN — PANTOPRAZOLE SODIUM 40 MILLIGRAM(S): 20 TABLET, DELAYED RELEASE ORAL at 08:31

## 2021-11-13 RX ADMIN — SEVELAMER CARBONATE 1600 MILLIGRAM(S): 2400 POWDER, FOR SUSPENSION ORAL at 11:45

## 2021-11-13 RX ADMIN — SEVELAMER CARBONATE 1600 MILLIGRAM(S): 2400 POWDER, FOR SUSPENSION ORAL at 08:32

## 2021-11-13 RX ADMIN — INSULIN GLARGINE 16 UNIT(S): 100 INJECTION, SOLUTION SUBCUTANEOUS at 21:04

## 2021-11-13 RX ADMIN — Medication 1 APPLICATION(S): at 11:44

## 2021-11-13 RX ADMIN — APIXABAN 2.5 MILLIGRAM(S): 2.5 TABLET, FILM COATED ORAL at 08:32

## 2021-11-13 RX ADMIN — Medication 5 UNIT(S): at 11:45

## 2021-11-13 RX ADMIN — APIXABAN 2.5 MILLIGRAM(S): 2.5 TABLET, FILM COATED ORAL at 16:37

## 2021-11-13 RX ADMIN — SENNA PLUS 2 TABLET(S): 8.6 TABLET ORAL at 21:05

## 2021-11-13 RX ADMIN — Medication 4: at 11:46

## 2021-11-13 RX ADMIN — Medication 81 MILLIGRAM(S): at 11:45

## 2021-11-13 RX ADMIN — Medication 2: at 16:36

## 2021-11-13 NOTE — PROGRESS NOTE ADULT - SUBJECTIVE AND OBJECTIVE BOX
Subjective: no complaints.       MEDICATIONS  (STANDING):  apixaban 2.5 milliGRAM(s) Oral two times a day  AQUAPHOR (petrolatum Ointment) 1 Application(s) Topical daily  aspirin  chewable 81 milliGRAM(s) Oral daily  dextrose 40% Gel 15 Gram(s) Oral once  dextrose 5%. 1000 milliLiter(s) (50 mL/Hr) IV Continuous <Continuous>  dextrose 5%. 1000 milliLiter(s) (100 mL/Hr) IV Continuous <Continuous>  dextrose 50% Injectable 25 Gram(s) IV Push once  dextrose 50% Injectable 12.5 Gram(s) IV Push once  dextrose 50% Injectable 25 Gram(s) IV Push once  epoetin vern-epbx (RETACRIT) Injectable 32326 Unit(s) SubCutaneous <User Schedule>  glucagon  Injectable 1 milliGRAM(s) IntraMuscular once  insulin glargine Injectable (LANTUS) 16 Unit(s) SubCutaneous at bedtime  insulin lispro (ADMELOG) corrective regimen sliding scale   SubCutaneous at bedtime  insulin lispro (ADMELOG) corrective regimen sliding scale   SubCutaneous three times a day before meals  insulin lispro Injectable (ADMELOG) 5 Unit(s) SubCutaneous three times a day before meals  pantoprazole    Tablet 40 milliGRAM(s) Oral before breakfast  senna 2 Tablet(s) Oral at bedtime  sevelamer carbonate 1600 milliGRAM(s) Oral three times a day with meals    MEDICATIONS  (PRN):  acetaminophen     Tablet .. 650 milliGRAM(s) Oral every 6 hours PRN Mild Pain (1 - 3), Moderate Pain (4 - 6)  melatonin 3 milliGRAM(s) Oral at bedtime PRN Insomnia  polyethylene glycol 3350 17 Gram(s) Oral two times a day PRN Constipation          T(C): 36.5 (11-13-21 @ 07:38), Max: 36.9 (11-12-21 @ 20:27)  HR: 51 (11-13-21 @ 07:38) (44 - 68)  BP: 112/71 (11-13-21 @ 07:38) (112/71 - 141/65)  RR: 14 (11-13-21 @ 07:38) (14 - 17)  SpO2: 97% (11-13-21 @ 07:38) (97% - 100%)  Wt(kg): --        I&O's Detail    12 Nov 2021 07:01  -  13 Nov 2021 07:00  --------------------------------------------------------  IN:    Other (mL): 800 mL  Total IN: 800 mL    OUT:    Other (mL): 2300 mL  Total OUT: 2300 mL    Total NET: -1500 mL               PHYSICAL EXAM:    GENERAL: NAD  NECK: Supple, no inc in JVP  CHEST/LUNG: Clear  HEART: S1S2  ABDOMEN: Soft  EXTREMITIES:  min edema  R chest PC      LABS:  CBC Full  -  ( 12 Nov 2021 14:45 )  WBC Count : 9.20 K/uL  RBC Count : 2.64 M/uL  Hemoglobin : 8.0 g/dL  Hematocrit : 24.7 %  Platelet Count - Automated : 242 K/uL  Mean Cell Volume : 93.6 fl  Mean Cell Hemoglobin : 30.3 pg  Mean Cell Hemoglobin Concentration : 32.4 gm/dL  Auto Neutrophil # : x  Auto Lymphocyte # : x  Auto Monocyte # : x  Auto Eosinophil # : x  Auto Basophil # : x  Auto Neutrophil % : x  Auto Lymphocyte % : x  Auto Monocyte % : x  Auto Eosinophil % : x  Auto Basophil % : x    11-12    131<L>  |  94<L>  |  89<H>  ----------------------------<  104<H>  5.5<H>   |  27  |  8.00<H>    Ca    9.1      12 Nov 2021 14:45  Phos  7.2     11-12    TPro  7.5  /  Alb  2.4<L>  /  TBili  0.3  /  DBili  0.1  /  AST  32  /  ALT  15  /  AlkPhos  130<H>  11-12            Impression:  * S/p CABG x 2, Mitral Valve Repair, and left atrial appendage ligation 10/21/21  * Hospital course complicated by JOSHUA/CKD. HD dependent now    Recommendations:   * Next HD ordered for Mon

## 2021-11-13 NOTE — PROGRESS NOTE ADULT - SUBJECTIVE AND OBJECTIVE BOX
Cc: Gait dysfunction    HPI: Patient with no new medical issues today.  Pain controlled, no chest pain, no N/V, no Fevers/Chills. No other new ROS  Has been tolerating rehabilitation program.    acetaminophen     Tablet .. 650 milliGRAM(s) Oral every 6 hours PRN  apixaban 2.5 milliGRAM(s) Oral two times a day  AQUAPHOR (petrolatum Ointment) 1 Application(s) Topical daily  aspirin  chewable 81 milliGRAM(s) Oral daily  dextrose 40% Gel 15 Gram(s) Oral once  dextrose 5%. 1000 milliLiter(s) IV Continuous <Continuous>  dextrose 5%. 1000 milliLiter(s) IV Continuous <Continuous>  dextrose 50% Injectable 25 Gram(s) IV Push once  dextrose 50% Injectable 12.5 Gram(s) IV Push once  dextrose 50% Injectable 25 Gram(s) IV Push once  epoetin vern-epbx (RETACRIT) Injectable 56825 Unit(s) SubCutaneous <User Schedule>  glucagon  Injectable 1 milliGRAM(s) IntraMuscular once  insulin glargine Injectable (LANTUS) 16 Unit(s) SubCutaneous at bedtime  insulin lispro (ADMELOG) corrective regimen sliding scale   SubCutaneous at bedtime  insulin lispro (ADMELOG) corrective regimen sliding scale   SubCutaneous three times a day before meals  insulin lispro Injectable (ADMELOG) 5 Unit(s) SubCutaneous three times a day before meals  melatonin 3 milliGRAM(s) Oral at bedtime PRN  pantoprazole    Tablet 40 milliGRAM(s) Oral before breakfast  polyethylene glycol 3350 17 Gram(s) Oral two times a day PRN  senna 2 Tablet(s) Oral at bedtime  sevelamer carbonate 1600 milliGRAM(s) Oral three times a day with meals      T(C): 36.5 (11-13-21 @ 07:38), Max: 36.9 (11-12-21 @ 20:27)  HR: 51 (11-13-21 @ 07:38) (44 - 68)  BP: 112/71 (11-13-21 @ 07:38) (112/71 - 141/65)  RR: 14 (11-13-21 @ 07:38) (14 - 17)  SpO2: 97% (11-13-21 @ 07:38) (97% - 100%)    In NAD  HEENT- EOMI  Heart- No cyanosis   Lungs- CTA bl.  Abd- + BS, NT  Ext- No calf pain  Neuro- Exam unchanged                          8.0    9.20  )-----------( 242      ( 12 Nov 2021 14:45 )             24.7     11-12    131<L>  |  94<L>  |  89<H>  ----------------------------<  104<H>  5.5<H>   |  27  |  8.00<H>    Ca    9.1      12 Nov 2021 14:45  Phos  7.2     11-12    TPro  7.5  /  Alb  2.4<L>  /  TBili  0.3  /  DBili  0.1  /  AST  32  /  ALT  15  /  AlkPhos  130<H>  11-12        Imp: Patient with diagnosis of s/p CABG admitted for comprehensive acute rehabilitation.    Plan:  - Continue PT/OT/SLP  - DVT prophylaxis - apixaban  - Skin- Turn q2h, check skin daily  - Continue current medications; patient medically stable.   -Active issues- No tachycardia noted today, continue to closely monitor, appreciate internal medicine consultation   - Patient is stable to continue current rehabilitation program.

## 2021-11-14 LAB
GLUCOSE BLDC GLUCOMTR-MCNC: 106 MG/DL — HIGH (ref 70–99)
GLUCOSE BLDC GLUCOMTR-MCNC: 120 MG/DL — HIGH (ref 70–99)
GLUCOSE BLDC GLUCOMTR-MCNC: 152 MG/DL — HIGH (ref 70–99)
GLUCOSE BLDC GLUCOMTR-MCNC: 92 MG/DL — SIGNIFICANT CHANGE UP (ref 70–99)

## 2021-11-14 PROCEDURE — 99232 SBSQ HOSP IP/OBS MODERATE 35: CPT

## 2021-11-14 PROCEDURE — 93010 ELECTROCARDIOGRAM REPORT: CPT | Mod: 77

## 2021-11-14 PROCEDURE — 93010 ELECTROCARDIOGRAM REPORT: CPT

## 2021-11-14 RX ORDER — INSULIN GLARGINE 100 [IU]/ML
8 INJECTION, SOLUTION SUBCUTANEOUS ONCE
Refills: 0 | Status: COMPLETED | OUTPATIENT
Start: 2021-11-14 | End: 2021-11-14

## 2021-11-14 RX ADMIN — Medication 81 MILLIGRAM(S): at 12:06

## 2021-11-14 RX ADMIN — Medication 1 APPLICATION(S): at 12:06

## 2021-11-14 RX ADMIN — Medication 5 UNIT(S): at 07:50

## 2021-11-14 RX ADMIN — SEVELAMER CARBONATE 1600 MILLIGRAM(S): 2400 POWDER, FOR SUSPENSION ORAL at 07:50

## 2021-11-14 RX ADMIN — PANTOPRAZOLE SODIUM 40 MILLIGRAM(S): 20 TABLET, DELAYED RELEASE ORAL at 07:50

## 2021-11-14 RX ADMIN — SEVELAMER CARBONATE 1600 MILLIGRAM(S): 2400 POWDER, FOR SUSPENSION ORAL at 12:06

## 2021-11-14 RX ADMIN — SENNA PLUS 2 TABLET(S): 8.6 TABLET ORAL at 21:46

## 2021-11-14 RX ADMIN — Medication 2: at 12:05

## 2021-11-14 RX ADMIN — SEVELAMER CARBONATE 1600 MILLIGRAM(S): 2400 POWDER, FOR SUSPENSION ORAL at 17:00

## 2021-11-14 RX ADMIN — Medication 5 UNIT(S): at 12:05

## 2021-11-14 RX ADMIN — APIXABAN 2.5 MILLIGRAM(S): 2.5 TABLET, FILM COATED ORAL at 17:01

## 2021-11-14 RX ADMIN — INSULIN GLARGINE 8 UNIT(S): 100 INJECTION, SOLUTION SUBCUTANEOUS at 22:11

## 2021-11-14 RX ADMIN — APIXABAN 2.5 MILLIGRAM(S): 2.5 TABLET, FILM COATED ORAL at 07:50

## 2021-11-14 RX ADMIN — Medication 5 UNIT(S): at 16:59

## 2021-11-14 NOTE — PROGRESS NOTE ADULT - SUBJECTIVE AND OBJECTIVE BOX
Cc: Gait dysfunction    HPI: Patient with no new medical issues today.  Denies any palpitations.    Pain controlled, no chest pain, no N/V, no Fevers/Chills. No other new ROS  Has been tolerating rehabilitation program.    acetaminophen     Tablet .. 650 milliGRAM(s) Oral every 6 hours PRN  apixaban 2.5 milliGRAM(s) Oral two times a day  AQUAPHOR (petrolatum Ointment) 1 Application(s) Topical daily  aspirin  chewable 81 milliGRAM(s) Oral daily  dextrose 40% Gel 15 Gram(s) Oral once  dextrose 5%. 1000 milliLiter(s) IV Continuous <Continuous>  dextrose 5%. 1000 milliLiter(s) IV Continuous <Continuous>  dextrose 50% Injectable 25 Gram(s) IV Push once  dextrose 50% Injectable 12.5 Gram(s) IV Push once  dextrose 50% Injectable 25 Gram(s) IV Push once  epoetin vern-epbx (RETACRIT) Injectable 12892 Unit(s) SubCutaneous <User Schedule>  glucagon  Injectable 1 milliGRAM(s) IntraMuscular once  insulin glargine Injectable (LANTUS) 16 Unit(s) SubCutaneous at bedtime  insulin lispro (ADMELOG) corrective regimen sliding scale   SubCutaneous at bedtime  insulin lispro (ADMELOG) corrective regimen sliding scale   SubCutaneous three times a day before meals  insulin lispro Injectable (ADMELOG) 5 Unit(s) SubCutaneous three times a day before meals  melatonin 3 milliGRAM(s) Oral at bedtime PRN  pantoprazole    Tablet 40 milliGRAM(s) Oral before breakfast  polyethylene glycol 3350 17 Gram(s) Oral two times a day PRN  senna 2 Tablet(s) Oral at bedtime  sevelamer carbonate 1600 milliGRAM(s) Oral three times a day with meals      T(C): 36.2 (11-14-21 @ 07:16), Max: 36.9 (11-13-21 @ 20:55)  HR: 88 (11-14-21 @ 07:16) (52 - 88)  BP: 105/52 (11-14-21 @ 07:16) (105/52 - 133/69)  RR: 14 (11-14-21 @ 07:16) (14 - 16)  SpO2: 98% (11-14-21 @ 07:16) (98% - 98%)    In NAD  HEENT- EOMI  Heart- RRR, S1S2  Lungs- CTA bl.  Abd- + BS, NT  Ext- No calf pain  Neuro- Exam unchanged                          8.0    9.20  )-----------( 242      ( 12 Nov 2021 14:45 )             24.7     11-12    131<L>  |  94<L>  |  89<H>  ----------------------------<  104<H>  5.5<H>   |  27  |  8.00<H>    Ca    9.1      12 Nov 2021 14:45  Phos  7.2     11-12    TPro  7.5  /  Alb  2.4<L>  /  TBili  0.3  /  DBili  0.1  /  AST  32  /  ALT  15  /  AlkPhos  130<H>  11-12      Imp: Patient with diagnosis of s/p CABG admitted for comprehensive acute rehabilitation.    Plan:  - Continue PT/OT/SLP  - DVT prophylaxis - apixaban  - Skin- Turn q2h, check skin daily  - Continue current medications; patient medically stable.   -Active issues- No tachycardia noted today, patient asymptomatic, continue to closely monitor, seen by Nephrology, continue HD.      - Patient is stable to continue current rehabilitation program.

## 2021-11-15 LAB
ANION GAP SERPL CALC-SCNC: 12 MMOL/L — SIGNIFICANT CHANGE UP (ref 5–17)
BUN SERPL-MCNC: 92 MG/DL — HIGH (ref 7–23)
CALCIUM SERPL-MCNC: 8.8 MG/DL — SIGNIFICANT CHANGE UP (ref 8.4–10.5)
CHLORIDE SERPL-SCNC: 97 MMOL/L — SIGNIFICANT CHANGE UP (ref 96–108)
CO2 SERPL-SCNC: 26 MMOL/L — SIGNIFICANT CHANGE UP (ref 22–31)
CREAT SERPL-MCNC: 8.64 MG/DL — HIGH (ref 0.5–1.3)
GLUCOSE BLDC GLUCOMTR-MCNC: 132 MG/DL — HIGH (ref 70–99)
GLUCOSE BLDC GLUCOMTR-MCNC: 138 MG/DL — HIGH (ref 70–99)
GLUCOSE BLDC GLUCOMTR-MCNC: 89 MG/DL — SIGNIFICANT CHANGE UP (ref 70–99)
GLUCOSE BLDC GLUCOMTR-MCNC: 95 MG/DL — SIGNIFICANT CHANGE UP (ref 70–99)
GLUCOSE SERPL-MCNC: 95 MG/DL — SIGNIFICANT CHANGE UP (ref 70–99)
HCT VFR BLD CALC: 24.8 % — LOW (ref 39–50)
HGB BLD-MCNC: 7.9 G/DL — LOW (ref 13–17)
MCHC RBC-ENTMCNC: 30.4 PG — SIGNIFICANT CHANGE UP (ref 27–34)
MCHC RBC-ENTMCNC: 31.9 GM/DL — LOW (ref 32–36)
MCV RBC AUTO: 95.4 FL — SIGNIFICANT CHANGE UP (ref 80–100)
NRBC # BLD: 0 /100 WBCS — SIGNIFICANT CHANGE UP (ref 0–0)
PHOSPHATE SERPL-MCNC: 6.2 MG/DL — HIGH (ref 2.5–4.5)
PLATELET # BLD AUTO: 290 K/UL — SIGNIFICANT CHANGE UP (ref 150–400)
POTASSIUM SERPL-MCNC: 5.1 MMOL/L — SIGNIFICANT CHANGE UP (ref 3.5–5.3)
POTASSIUM SERPL-SCNC: 5.1 MMOL/L — SIGNIFICANT CHANGE UP (ref 3.5–5.3)
RBC # BLD: 2.6 M/UL — LOW (ref 4.2–5.8)
RBC # FLD: 14.6 % — HIGH (ref 10.3–14.5)
SARS-COV-2 RNA SPEC QL NAA+PROBE: SIGNIFICANT CHANGE UP
SODIUM SERPL-SCNC: 135 MMOL/L — SIGNIFICANT CHANGE UP (ref 135–145)
WBC # BLD: 8.41 K/UL — SIGNIFICANT CHANGE UP (ref 3.8–10.5)
WBC # FLD AUTO: 8.41 K/UL — SIGNIFICANT CHANGE UP (ref 3.8–10.5)

## 2021-11-15 PROCEDURE — 99233 SBSQ HOSP IP/OBS HIGH 50: CPT

## 2021-11-15 RX ORDER — AMIODARONE HYDROCHLORIDE 400 MG/1
200 TABLET ORAL DAILY
Refills: 0 | Status: DISCONTINUED | OUTPATIENT
Start: 2021-11-15 | End: 2021-11-15

## 2021-11-15 RX ORDER — INSULIN GLARGINE 100 [IU]/ML
10 INJECTION, SOLUTION SUBCUTANEOUS AT BEDTIME
Refills: 0 | Status: DISCONTINUED | OUTPATIENT
Start: 2021-11-15 | End: 2021-11-17

## 2021-11-15 RX ADMIN — APIXABAN 2.5 MILLIGRAM(S): 2.5 TABLET, FILM COATED ORAL at 18:06

## 2021-11-15 RX ADMIN — INSULIN GLARGINE 10 UNIT(S): 100 INJECTION, SOLUTION SUBCUTANEOUS at 21:00

## 2021-11-15 RX ADMIN — Medication 5 UNIT(S): at 08:06

## 2021-11-15 RX ADMIN — SEVELAMER CARBONATE 1600 MILLIGRAM(S): 2400 POWDER, FOR SUSPENSION ORAL at 12:03

## 2021-11-15 RX ADMIN — ERYTHROPOIETIN 10000 UNIT(S): 10000 INJECTION, SOLUTION INTRAVENOUS; SUBCUTANEOUS at 16:35

## 2021-11-15 RX ADMIN — Medication 3 MILLIGRAM(S): at 21:00

## 2021-11-15 RX ADMIN — PANTOPRAZOLE SODIUM 40 MILLIGRAM(S): 20 TABLET, DELAYED RELEASE ORAL at 06:24

## 2021-11-15 RX ADMIN — SENNA PLUS 2 TABLET(S): 8.6 TABLET ORAL at 21:01

## 2021-11-15 RX ADMIN — SEVELAMER CARBONATE 1600 MILLIGRAM(S): 2400 POWDER, FOR SUSPENSION ORAL at 08:06

## 2021-11-15 RX ADMIN — Medication 81 MILLIGRAM(S): at 12:03

## 2021-11-15 RX ADMIN — APIXABAN 2.5 MILLIGRAM(S): 2.5 TABLET, FILM COATED ORAL at 06:24

## 2021-11-15 RX ADMIN — SEVELAMER CARBONATE 1600 MILLIGRAM(S): 2400 POWDER, FOR SUSPENSION ORAL at 18:06

## 2021-11-15 RX ADMIN — Medication 1 APPLICATION(S): at 12:04

## 2021-11-15 NOTE — CHART NOTE - NSCHARTNOTEFT_GEN_A_CORE
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     PAST MEDICAL & SURGICAL HISTORY:  Hypertension    Hyperlipidemia    Diabetes mellitus    No pertinent past surgical history    Based upon consideration of the patient's impairments, functional status, complicating conditions and any other contributing factors and after information garnered from the assessments of all therapy disciplines involved in treating the patient and other pertinent clinicians:    INTERDISCIPLINARY REHABILITATION INTERVENTIONS:    [ X  ] Transfer Training  [ X  ] Bed Mobility  [ X  ] Therapeutic Exercise  [ X ] Balance/Coordination Exercises  [ X ] Locomotion retraining  [ X  ] Stairs  [  X ] Functional Transfer Training  [   ] Bowel/Bladder program  [   ] Pain Management  [   ] Skin/Wound Care  [   ] Visual/Perceptual Training  [   x] Therapeutic Recreation Activities  [ x  ] Neuromuscular Re-education  [ X  ] Activities of Daily Living  [   ] Speech Exercise  [   ] Swallowing Exercises  [   ] Vital Stim  [   ] Dietary Supplements  [   ] Calorie Count  [   ] Cognitive Exercises  [   ] Congnitive/Linguistic Treatment  [   ] Behavior Program  [   ] Neuropsych Therapy  [ X  ] Patient/Family Counseling  [ X ] Family Training  [ X  ] Community Re-entry  [   ] Orthotic Evaluation  [   ] Prosthetic Eval/Training    MEDICAL PROGNOSIS:  Fair    REHAB POTENTIAL:  Fair  EXPECTED DAILY THERAPY:         PT: srengthening /stretching ROM back and lower extremities muscles        OT: ADLs fine motor activity       ST: Cognitive and linguistic therapy/higher executive function       P&O: n/a    EXPECTED INTENSITY OF PROGRAM:  3 hrs / Day    EXPECTED FREQUENCY OF PROGRAM: 5 Days/ Week    ESTIMATED LOS:  [  ] 5-7 Days  [  ] 7-10 Days  [ x ] 10- 14 Days  [  ] 14- 18 Days  [  ] 18- 21 Days    ESTIMATED DISPOSITION:  [  ] Home   [  ] Home with Outpatient Therapies  [ x ] Home with Home Therapies  [  ] Assisted Living  [  ] Nursing Home  [  ] Long Term Acute Care    INTERDISCIPLINARY FUNCTIONAL OUTCOMES/GOALS:         Gait/Mobility: with gait assistance/without assistance       Transfers: with supervision       ADLs: independent       Functional Transfers: with supervision       Medication Management:- with supervision due to executive function deficits       Communication: improved speech and executive function       Cognitive: address executive function deficits       Dysphagia: progress from pureed to regular diet       Bladder n/a       Bowel: n/a     Functional Independent Measures: 6  7 = Independent  6 = Modified Independent  5 = Supervision  4 = Minimal Assist/ Contact Guard  3 = Moderate Assistance  2 = Maximum Assistance  1 = Total Assistance  0 = Unable to assess

## 2021-11-15 NOTE — PROGRESS NOTE ADULT - SUBJECTIVE AND OBJECTIVE BOX
Stable on HD    Vital Signs Last 24 Hrs  T(C): 36.6 (11-15-21 @ 17:30), Max: 36.8 (11-15-21 @ 14:00)  T(F): 97.9 (11-15-21 @ 17:30), Max: 98.2 (11-15-21 @ 14:00)  HR: 67 (11-15-21 @ 17:30) (49 - 67)  BP: 142/66 (11-15-21 @ 17:30) (127/55 - 143/69)  RR: 16 (11-15-21 @ 17:30) (14 - 16)  SpO2: 99% (11-15-21 @ 17:30) (98% - 99%)    s1s2  b/l air entry  soft  sm edema                                                 7.9    8.41  )-----------( 290      ( 15 Nov 2021 15:00 )             24.8     15 Nov 2021 15:00    135    |  97     |  92     ----------------------------<  95     5.1     |  26     |  8.64     Ca    8.8        15 Nov 2021 15:00  Phos  6.2       15 Nov 2021 15:00    acetaminophen     Tablet .. 650 milliGRAM(s) Oral every 6 hours PRN  apixaban 2.5 milliGRAM(s) Oral two times a day  AQUAPHOR (petrolatum Ointment) 1 Application(s) Topical daily  aspirin  chewable 81 milliGRAM(s) Oral daily  dextrose 40% Gel 15 Gram(s) Oral once  dextrose 5%. 1000 milliLiter(s) IV Continuous <Continuous>  dextrose 5%. 1000 milliLiter(s) IV Continuous <Continuous>  dextrose 50% Injectable 25 Gram(s) IV Push once  dextrose 50% Injectable 12.5 Gram(s) IV Push once  dextrose 50% Injectable 25 Gram(s) IV Push once  epoetin vern-epbx (RETACRIT) Injectable 05766 Unit(s) SubCutaneous <User Schedule>  glucagon  Injectable 1 milliGRAM(s) IntraMuscular once  insulin glargine Injectable (LANTUS) 10 Unit(s) SubCutaneous at bedtime  insulin lispro (ADMELOG) corrective regimen sliding scale   SubCutaneous at bedtime  insulin lispro (ADMELOG) corrective regimen sliding scale   SubCutaneous three times a day before meals  melatonin 3 milliGRAM(s) Oral at bedtime PRN  pantoprazole    Tablet 40 milliGRAM(s) Oral before breakfast  polyethylene glycol 3350 17 Gram(s) Oral two times a day PRN  senna 2 Tablet(s) Oral at bedtime  sevelamer carbonate 1600 milliGRAM(s) Oral three times a day with meals    A/P:    S/p CABG x 2, Mitral Valve Repair, and left atrial appendage ligation 10/21/21  Hospital course complicated by JOSHUA/CKD  Now on HD MWF  Renal diet  TMP as able  Epoetin w/HD 3 x week  Sevelamer for high Phos    [phone]

## 2021-11-15 NOTE — PROGRESS NOTE ADULT - ASSESSMENT
59 y/o M w/ hx of DM2, HTN and HLD, transfer from [facility] for NSTEMI, admitted w/ JOSHUA on CKD and hypertensive emergency, new start to HD (10/13). He underwent LHC showing Multivessel CAD. 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  s/p CABG x2 (10/21), MV repair, and left atrial ligation. Permacath placed 11/1/21 and LUE AV Fistula placed by vascular on 11/2/21. Her was noted to have PACs and Afib - started on amiodarone and Eliquis  Now admitted to [facility] after for initiation of a multidisciplinary rehab program consisting focused on functional mobility, transfers and ADLs- pt/ot/dvt ppx    #NSTEMI 2/2 CAD s/p CABG (10/21)  #s/p MV repair  #HLD  - s/p LHC on 10/14 showing severe multivessel disease; now s/p CABG on 10/21. Echocardiogram reviewed w/ EF 75% (10/28)  - aspirin  - statin  - eliquis       # ESRD on HD (MW), due to diabetic nephropathy  # anemia of chronic dz 2/2 ESRD  -received blood transfusion at OSH.   -HD at [place] on discharge, renal f/u  - retacrit     # paroxysmal atrial fibrillation   # tachy blaine syndrome  -cont apixiban  -hold AV Christina agents due to bradycardia  -amiodarone - now held due to bradycardia.   - cardio consult noted  -can restart amiodarone if becomes tachycardic. Pt developed AF post operatively, so it is possible amio was only needed short term.  QTc prolongation  avoid QT prolonging agents, monitr on daily ECG.     # DM2  - noted hypoglycemia this am  -hgba1c 7%  - decrease Lantus to 10, d/c pre meal c/w ISS, Accu-Cheks       # subclinical hypothyroidism  - TSH mildly elevated 6.4, pt asymptomatic  - seen by Endo during admission. Outpt f/u.       # hx of severe tobacco use d/o  - smoked 3 ppd, recently quit a few months ago    # sacral wound--sacral stage 2 ulcer  - appreciate wound care consult.     dvt ppx: cont apixaban

## 2021-11-15 NOTE — PROGRESS NOTE ADULT - SUBJECTIVE AND OBJECTIVE BOX
HPI:  59 y/o M w/ hx of DM2, HTN and HLD initially presented as transfer from [facility] for chest pain concerning for NSTEMI and possible new CHF c/b JOSHUA likely on CKD, and hypertensive emergency on 10/6/21. Hospital course complicated by s/p RRT for CVA/TIA ruled out and worsening anemia requiring 1 PRBC. Pt S/p HD session today and underwent LHC showing Multivessel CAD. CT surgery consulted for CABG evaluation. Patient developed left sided facial droop on 10/7 and stroke code was called. 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 Patient started Hemodialysis on 10/13. On 10/21 s/p CABGx2, Mitral Valve Repair, and left atrial appendage ligation, was extubated next day. He developed SOB on 10/24 and requeired bipap,  but was eventually trasitioned back down to [place]. Permacath placed 11/1/21 and LUE AV Fistula placed by vascular on 11/2/21. Her was noted to have PACs and Afib - started on amiodarone. He will be getting HD at [place]. Cleared by vascular to restart eliquis 2.5mg bid. HD schedule changed from T/T/S to M/W/F.   Patient evaluated by PM&R PT/OT and recommended acute rehab. Patient admitted to [facility] on 11/8/21 for debility. (08 Nov 2021 14:09)      INTERVAL HPI/OVERNIGHT EVENTS:  Chart Reviewed and patient seen at bedside.  Patient was very sleepy this morning despite having full night's rest.  He was arousable, though and followed orders appropriately.   +BM  No other issues    ROS:  Denies fevers, chills, chest pain, shortness of breath, abdominal pain, headaches, nausea/vomiting    Vital Signs Last 24 Hrs  T(C): 36.4 (15 Nov 2021 07:15), Max: 36.4 (15 Nov 2021 07:15)  T(F): 97.6 (15 Nov 2021 07:15), Max: 97.6 (15 Nov 2021 07:15)  HR: 49 (15 Nov 2021 07:15) (49 - 53)  BP: 127/55 (15 Nov 2021 07:15) (127/55 - 143/69)  BP(mean): --  RR: 14 (15 Nov 2021 07:15) (14 - 14)  SpO2: 98% (15 Nov 2021 07:15) (98% - 98%)    Physical Exam:    Gen - NAD, Comfortable  HEENT - NCAT, EOMI, MMM  Neck - Supple, No limited ROM  Pulm - CTAB, No wheeze, No rhonchi, No crackles  Cardiovascular - RRR, S1S2, No m/r/g  Abdomen - Soft, NT/ND, +BS  Extremities - No C/C/E, No calf tenderness  Neuro-     Cognitive - AAOx3     Attention: Intact      Judgement: Good evidence of judgement     Motor -                    LEFT    UE - ShAB 5/5, EF 5/5, EE 5/5, WE 5/5,  5/5                    RIGHT UE - ShAB 5/5, EF 5/5, EE 5/5, WE 5/5,  5/5                    LEFT    LE - HF 4/5, KE 5/5, DF 5/5, PF 5/5                    RIGHT LE - HF 4/5, KE 5/5, DF 5/5, PF 5/5        Sensory - Intact to LT     Reflexes - DTR Intact, No primitive reflex     Coordination - FTN intact     Tone - normal  Psychiatric - Mood stable, Affect WNL  Skin: Sacral/left buttock sacral decubital ulcer with visible subcutaneous/dermis, dry b/l heels, permacath is c/d/i, LUE AVF site c/d/i sternal incision site is also c/d/i      LABS:  11-12    131<L>  |  94<L>  |  89<H>  ----------------------------<  104<H>  5.5<H>   |  27  |  8.00<H>    Ca    9.1      12 Nov 2021 14:45    TPro  7.5  /  Alb  2.4<L>  /  TBili  0.3  /  DBili  0.1  /  AST  32  /  ALT  15  /  AlkPhos  130<H>  11-12                                              8.0    9.20  )-----------( 242      ( 12 Nov 2021 14:45 )             24.7     CAPILLARY BLOOD GLUCOSE      POCT Blood Glucose.: 138 mg/dL (15 Nov 2021 11:51)  POCT Blood Glucose.: 89 mg/dL (15 Nov 2021 07:52)  POCT Blood Glucose.: 92 mg/dL (14 Nov 2021 21:41)  POCT Blood Glucose.: 106 mg/dL (14 Nov 2021 16:32)      MEDICATIONS:  MEDICATIONS  (STANDING):  apixaban 2.5 milliGRAM(s) Oral two times a day  AQUAPHOR (petrolatum Ointment) 1 Application(s) Topical daily  aspirin  chewable 81 milliGRAM(s) Oral daily  dextrose 40% Gel 15 Gram(s) Oral once  dextrose 5%. 1000 milliLiter(s) (50 mL/Hr) IV Continuous <Continuous>  dextrose 5%. 1000 milliLiter(s) (100 mL/Hr) IV Continuous <Continuous>  dextrose 50% Injectable 25 Gram(s) IV Push once  dextrose 50% Injectable 12.5 Gram(s) IV Push once  dextrose 50% Injectable 25 Gram(s) IV Push once  epoetin vern-epbx (RETACRIT) Injectable 59961 Unit(s) SubCutaneous <User Schedule>  glucagon  Injectable 1 milliGRAM(s) IntraMuscular once  insulin glargine Injectable (LANTUS) 10 Unit(s) SubCutaneous at bedtime  insulin lispro (ADMELOG) corrective regimen sliding scale   SubCutaneous at bedtime  insulin lispro (ADMELOG) corrective regimen sliding scale   SubCutaneous three times a day before meals  pantoprazole    Tablet 40 milliGRAM(s) Oral before breakfast  senna 2 Tablet(s) Oral at bedtime  sevelamer carbonate 1600 milliGRAM(s) Oral three times a day with meals    MEDICATIONS  (PRN):  acetaminophen     Tablet .. 650 milliGRAM(s) Oral every 6 hours PRN Mild Pain (1 - 3), Moderate Pain (4 - 6)  melatonin 3 milliGRAM(s) Oral at bedtime PRN Insomnia  polyethylene glycol 3350 17 Gram(s) Oral two times a day PRN Constipation         HPI:  61 y/o M w/ hx of DM2, HTN and HLD initially presented as transfer from [facility] for chest pain concerning for NSTEMI and possible new CHF c/b JOSHUA likely on CKD, and hypertensive emergency on 10/6/21. Hospital course complicated by s/p RRT for CVA/TIA ruled out and worsening anemia requiring 1 PRBC. Pt S/p HD session today and underwent LHC showing Multivessel CAD. CT surgery consulted for CABG evaluation. Patient developed left sided facial droop on 10/7 and stroke code was called. 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 Patient started Hemodialysis on 10/13. On 10/21 s/p CABGx2, Mitral Valve Repair, and left atrial appendage ligation, was extubated next day. He developed SOB on 10/24 and requeired bipap,  but was eventually trasitioned back down to [place]. Permacath placed 11/1/21 and LUE AV Fistula placed by vascular on 11/2/21. Her was noted to have PACs and Afib - started on amiodarone. He will be getting HD at [place]. Cleared by vascular to restart eliquis 2.5mg bid. HD schedule changed from T/T/S to M/W/F.   Patient evaluated by PM&R PT/OT and recommended acute rehab. Patient admitted to [facility] on 11/8/21 for debility. (08 Nov 2021 14:09)      INTERVAL HPI/OVERNIGHT EVENTS:  Chart Reviewed and patient seen at bedside.  Patient was very sleepy this morning despite having full night's rest.  He was arousable, though and followed orders appropriately.   +BM  No other issues    ROS:  Denies fevers, chills, chest pain, shortness of breath, abdominal pain, headaches, nausea/vomiting    Vital Signs Last 24 Hrs  T(C): 36.4 (15 Nov 2021 07:15), Max: 36.4 (15 Nov 2021 07:15)  T(F): 97.6 (15 Nov 2021 07:15), Max: 97.6 (15 Nov 2021 07:15)  HR: 49 (15 Nov 2021 07:15) (49 - 53)  BP: 127/55 (15 Nov 2021 07:15) (127/55 - 143/69)  BP(mean): --  RR: 14 (15 Nov 2021 07:15) (14 - 14)  SpO2: 98% (15 Nov 2021 07:15) (98% - 98%)    Physical Exam:    Gen - NAD, Comfortable  HEENT - NCAT, EOMI, MMM  Neck - Supple, No limited ROM  Pulm - CTAB, No wheeze, No rhonchi, No crackles  Cardiovascular - RRR, S1S2, No m/r/g  Abdomen - Soft, NT/ND, +BS  Extremities - No C/C/E, No calf tenderness  Neuro-     Cognitive - AAOx3     Attention: Intact      Judgement: Good evidence of judgement     Motor -UE 5/5, LE 4/5        Sensory - Intact to LT     Reflexes - DTR Intact, No primitive reflex     Coordination - FTN intact     Tone - normal  Psychiatric - Mood stable, Affect WNL  Skin: Sacral/left buttock sacral decubital ulcer with visible subcutaneous/dermis, dry b/l heels, permacath is c/d/i, LUE AVF site c/d/i sternal incision site is also c/d/i      LABS:  11-12    131<L>  |  94<L>  |  89<H>  ----------------------------<  104<H>  5.5<H>   |  27  |  8.00<H>    Ca    9.1      12 Nov 2021 14:45    TPro  7.5  /  Alb  2.4<L>  /  TBili  0.3  /  DBili  0.1  /  AST  32  /  ALT  15  /  AlkPhos  130<H>  11-12                                              8.0    9.20  )-----------( 242      ( 12 Nov 2021 14:45 )             24.7     CAPILLARY BLOOD GLUCOSE      POCT Blood Glucose.: 138 mg/dL (15 Nov 2021 11:51)  POCT Blood Glucose.: 89 mg/dL (15 Nov 2021 07:52)    MEDICATIONS:  MEDICATIONS  (STANDING):  apixaban 2.5 milliGRAM(s) Oral two times a day  AQUAPHOR (petrolatum Ointment) 1 Application(s) Topical daily  aspirin  chewable 81 milliGRAM(s) Oral daily  dextrose 40% Gel 15 Gram(s) Oral once  dextrose 5%. 1000 milliLiter(s) (50 mL/Hr) IV Continuous <Continuous>  dextrose 5%. 1000 milliLiter(s) (100 mL/Hr) IV Continuous <Continuous>  dextrose 50% Injectable 25 Gram(s) IV Push once  dextrose 50% Injectable 12.5 Gram(s) IV Push once  dextrose 50% Injectable 25 Gram(s) IV Push once  epoetin vern-epbx (RETACRIT) Injectable 09807 Unit(s) SubCutaneous <User Schedule>  glucagon  Injectable 1 milliGRAM(s) IntraMuscular once  insulin glargine Injectable (LANTUS) 10 Unit(s) SubCutaneous at bedtime  insulin lispro (ADMELOG) corrective regimen sliding scale   SubCutaneous at bedtime  insulin lispro (ADMELOG) corrective regimen sliding scale   SubCutaneous three times a day before meals  pantoprazole    Tablet 40 milliGRAM(s) Oral before breakfast  senna 2 Tablet(s) Oral at bedtime  sevelamer carbonate 1600 milliGRAM(s) Oral three times a day with meals    MEDICATIONS  (PRN):  acetaminophen     Tablet .. 650 milliGRAM(s) Oral every 6 hours PRN Mild Pain (1 - 3), Moderate Pain (4 - 6)  melatonin 3 milliGRAM(s) Oral at bedtime PRN Insomnia  polyethylene glycol 3350 17 Gram(s) Oral two times a day PRN Constipation        Therapy:  UE --good motor function  LE-- Motor function against resistance, but balance is suboptimal.

## 2021-11-15 NOTE — PROGRESS NOTE ADULT - SUBJECTIVE AND OBJECTIVE BOX
59 y/o M w/ hx of DM2, HTN and HLD, transfer from [facility] for NSTEMI, admitted w/ JOSHUA on CKD and hypertensive emergency, new start to HD (10/13). He underwent LHC showing Multivessel CAD. 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  s/p CABG x2 (10/21), MV repair, and left atrial ligation. Permacath placed 11/1/21 and LUE AV Fistula placed by vascular on 11/2/21. Her was noted to have PACs and Afib     seen at the bedside, no n/v, no sob, no chest pain    Vital Signs Last 24 Hrs  T(C): 36.4 (15 Nov 2021 07:15), Max: 36.4 (15 Nov 2021 07:15)  T(F): 97.6 (15 Nov 2021 07:15), Max: 97.6 (15 Nov 2021 07:15)  HR: 49 (15 Nov 2021 07:15) (49 - 53)  BP: 127/55 (15 Nov 2021 07:15) (127/55 - 143/69)  BP(mean): --  RR: 14 (15 Nov 2021 07:15) (14 - 14)  SpO2: 98% (15 Nov 2021 07:15) (98% - 98%)    GENERAL- NAD  EAR/NOSE/MOUTH/THROAT - no pharyngeal exudates, no oral leisions,  MMM  EYES- JOSEPH, conjunctiva and Sclera clear  NECK- supple  RESPIRATORY-  clear to auscultation bilaterally, non laboured breathing  CARDIOVASCULAR - SIS2, RRR  GI - soft NT BS present  EXTREMITIES- no pedal edema  NEUROLOGY- no gross focal deficits  SKIN- chest incision clean  PSYCHIATRY- AAO X 3      CAPILLARY BLOOD GLUCOSE      POCT Blood Glucose.: 89 mg/dL (15 Nov 2021 07:52)  POCT Blood Glucose.: 92 mg/dL (14 Nov 2021 21:41)  POCT Blood Glucose.: 106 mg/dL (14 Nov 2021 16:32)  POCT Blood Glucose.: 152 mg/dL (14 Nov 2021 12:03)      MEDICATIONS  (STANDING):  apixaban 2.5 milliGRAM(s) Oral two times a day  AQUAPHOR (petrolatum Ointment) 1 Application(s) Topical daily  aspirin  chewable 81 milliGRAM(s) Oral daily  epoetin vern-epbx (RETACRIT) Injectable 70362 Unit(s) SubCutaneous <User Schedule>  glucagon  Injectable 1 milliGRAM(s) IntraMuscular once  insulin glargine Injectable (LANTUS) 10 Unit(s) SubCutaneous at bedtime  insulin lispro (ADMELOG) corrective regimen sliding scale   SubCutaneous at bedtime  insulin lispro (ADMELOG) corrective regimen sliding scale   SubCutaneous three times a day before meals  pantoprazole    Tablet 40 milliGRAM(s) Oral before breakfast  senna 2 Tablet(s) Oral at bedtime  sevelamer carbonate 1600 milliGRAM(s) Oral three times a day with meals    MEDICATIONS  (PRN):  acetaminophen     Tablet .. 650 milliGRAM(s) Oral every 6 hours PRN Mild Pain (1 - 3), Moderate Pain (4 - 6)  melatonin 3 milliGRAM(s) Oral at bedtime PRN Insomnia  polyethylene glycol 3350 17 Gram(s) Oral two times a day PRN Constipation   59 y/o M w/ hx of DM2, HTN and HLD, transfer from [facility] for NSTEMI, admitted w/ JOSHUA on CKD and hypertensive emergency, new start to HD (10/13). He underwent LHC showing Multivessel CAD. 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  s/p CABG x2 (10/21), MV repair, and left atrial ligation. Permacath placed 11/1/21 and LUE AV Fistula placed by vascular on 11/2/21. Her was noted to have PACs and Afib     seen at the bedside, no n/v, no sob, no chest pain    Vital Signs Last 24 Hrs  T(C): 36.4 (15 Nov 2021 07:15), Max: 36.4 (15 Nov 2021 07:15)  T(F): 97.6 (15 Nov 2021 07:15), Max: 97.6 (15 Nov 2021 07:15)  HR: 49 (15 Nov 2021 07:15) (49 - 53)  BP: 127/55 (15 Nov 2021 07:15) (127/55 - 143/69)  BP(mean): --  RR: 14 (15 Nov 2021 07:15) (14 - 14)  SpO2: 98% (15 Nov 2021 07:15) (98% - 98%)    GENERAL- NAD  EAR/NOSE/MOUTH/THROAT - no pharyngeal exudates, no oral leisions,  MMM  EYES- JOSEPH, conjunctiva and Sclera clear  NECK- supple  RESPIRATORY-  clear to auscultation bilaterally, non laboured breathing  CARDIOVASCULAR - SIS2, IRIR  GI - soft NT BS present  EXTREMITIES- no pedal edema  NEUROLOGY- no gross focal deficits  SKIN- chest incision clean  PSYCHIATRY- AAO X 3      CAPILLARY BLOOD GLUCOSE      POCT Blood Glucose.: 89 mg/dL (15 Nov 2021 07:52)  POCT Blood Glucose.: 92 mg/dL (14 Nov 2021 21:41)  POCT Blood Glucose.: 106 mg/dL (14 Nov 2021 16:32)  POCT Blood Glucose.: 152 mg/dL (14 Nov 2021 12:03)      MEDICATIONS  (STANDING):  apixaban 2.5 milliGRAM(s) Oral two times a day  AQUAPHOR (petrolatum Ointment) 1 Application(s) Topical daily  aspirin  chewable 81 milliGRAM(s) Oral daily  epoetin vern-epbx (RETACRIT) Injectable 66350 Unit(s) SubCutaneous <User Schedule>  glucagon  Injectable 1 milliGRAM(s) IntraMuscular once  insulin glargine Injectable (LANTUS) 10 Unit(s) SubCutaneous at bedtime  insulin lispro (ADMELOG) corrective regimen sliding scale   SubCutaneous at bedtime  insulin lispro (ADMELOG) corrective regimen sliding scale   SubCutaneous three times a day before meals  pantoprazole    Tablet 40 milliGRAM(s) Oral before breakfast  senna 2 Tablet(s) Oral at bedtime  sevelamer carbonate 1600 milliGRAM(s) Oral three times a day with meals    MEDICATIONS  (PRN):  acetaminophen     Tablet .. 650 milliGRAM(s) Oral every 6 hours PRN Mild Pain (1 - 3), Moderate Pain (4 - 6)  melatonin 3 milliGRAM(s) Oral at bedtime PRN Insomnia  polyethylene glycol 3350 17 Gram(s) Oral two times a day PRN Constipation

## 2021-11-15 NOTE — PROGRESS NOTE ADULT - ASSESSMENT
TOM PLEITEZ is a 61y with a history of DM2, ?CKD, HTN and HLD  who presented to [facility] on 10/6/21 with SOB, CP found to have multivessel CAD and JOSHUA on CKD, s/p CABGx2, MVR, and LAAL. Started on HD T/T/S, now on HD M/W/F s/p Permacath and LUE AVF. Hospital Course complicated by post-op hemorrhagic shock , received 4 PRBC, 1 PLT, 1000 FIEBA intraop and post op dual pressor and inotrope support w/ , Primacor, Levo, and Epi gtts.  Afib, PACs- now on amiodarone. Patient now admitted for a multidisciplinary rehab program. 21 @ 14:40    *Cardiology consult D/C amiodarone and daily EKGs. Will get LFT and TSH w/ HD labs today  *SLP: diet upgraded to pureed with thin liquids. Can upgrade diet to regular if patient's wife brings dentures and cleared by SLP   *DM: lispro decreased to 5/5/5 units pre meals  *Family said he did not need corrective lens prior.    #Debility  - Gait Instability, ADL impairments and Functional impairments: start Comprehensive Rehab Program of PT/OT/SLP  - 3 hours a day, 5 days a week  - Orthotics as needed   - also will benefit from cardiopulmonary conditioning.  - SLP for cognitive and memory deficits    #CAD and Afib s/p CABGx2, LAAL, MVR with Tachy-Seth Syndrome  - Most recent echo on 10/28 shows restoration of LVEF   - d/africa amiodatone  - c/w eliquis 2.5mg bid  - c/w ASA 81 daily  - close f/u Medicine  - Cardiology consult appreciated: spoke w/ Dr. Stewart today -  rec d/c amiodarone   - DAILY EKGs per cardiology to monitor for tachy-seth syndrome.   - If tachy then speak w/ cardiology regarding restarting amiodarone.      #?CKD on HD - M/W/F  - retacrit 8000ui intraHD  - renal consult  - Permacath placed 21 and LUE AVF placed   - chlorhexidine to keep access sites clean    #DM- with hypoglycemia-revised insulin regime    - lantus 16 unit qhs  - decreased lispro 5/5/5    #Sleep  - melatonin PRN     #Skin  - Skin on admission: Sacral/left buttock sacral decubital ulcer with visible subcutaneous/dermis, dry b/l heels, permacath is c/d/i, LUE AVF site c/d/i, drain sites c/d/i; sternal incision site is c/d/i  - Pressure injury/Skin: OOB to Chair, PT/OT   - continue monitoring wound, incision and drain sites.  - Wound consult  - alyvene foam dressing and cavilon to wound    #Pain Mgmt   - Tylenol PRN    #GI/Bowel Mgmt   - Continent c/w Senna, Miralax    #/Bladder Mgmt --Voiding, PVRs low, urine quantity unclear if oligo/anuric    #FEN   - Diet - Pureed w/ Nectar  [Renal, DASH/TLC]    - per SLP diet upgraded to Pureed with thin liquids -  Diet upgraded yesterday evening as difficulty eating was due to not having dentures. Will keep on pureed food but thin liquids until patient's wife brings dentures.      #Precautions / PROPHYLAXIS:   - Falls, Cardiac, Sternal  - ortho: Weight bearing status: WBAT   - Lungs: Aspiration, Incentive Spirometer   - DVT: eliquis    IDT   SW: lives w/ spouse,w/ 8 steps in home, no hand rail. Wife works weekend but can help  Functional: eating mod I, upper body dressing set up, lbd mod/min A, bathing min a, toilet transfers CG/min A, toileting min A, short transfer min A; ambulates 40 ft RW mod A, transfers min A. Has mild expressive and receptive aphasia, anomia, possibly from underlying cognitive deficit.   SLP noted deficits with communication and executive function for which he is receiving  therapy  Barriers to d/c --deficits with communication/executive fxn, medical issues (low HR), suboptimal motor strength, poor balance  Goals--independence with transfers, ambulate without supervision, with gait assistance, cane  EDOD:  Tu w/ HC    Spoke w/ Wife Marcus [phone] on 21. -- provided update and inquired about reading glasses, which patient doesn't have a home.

## 2021-11-15 NOTE — PROGRESS NOTE ADULT - ATTENDING COMMENTS
61 y/o M on acute rehab after CABG, new Dx, of Afib and ESRD on Hemodialysis,  old frontal infarct and left carotid stenosis, interval AMS requiring intubation x1 day during acute hosp care. Admitted to acute rehab 11/8//21    Tachy-Seth syndrome with low HR controlled with--Amiodarone   Bld sugar controlled  Family reports that patient has no eye conditions  Tired and sleeping prior to review, but rousable and engaged actively  no focal deficits    Tolerating HD as noted by Renal  ESRD  on HD via Right chest permacath, left AVF  -Chest precuations   and precautions to protect AVF on left arm    Continue PT/OT--muscle strengthening/stretching ROM, DME use  Estimated dc 11/23    Continue PT/OT  DM care  DVT ppx, GI protection  Renal precautions, fluid restriction as per renal, routine HD  Seen with Dr Montez, Rehab Med Resident, stable to continue acute rehab.

## 2021-11-16 LAB
GLUCOSE BLDC GLUCOMTR-MCNC: 103 MG/DL — HIGH (ref 70–99)
GLUCOSE BLDC GLUCOMTR-MCNC: 119 MG/DL — HIGH (ref 70–99)
GLUCOSE BLDC GLUCOMTR-MCNC: 212 MG/DL — HIGH (ref 70–99)
GLUCOSE BLDC GLUCOMTR-MCNC: 89 MG/DL — SIGNIFICANT CHANGE UP (ref 70–99)

## 2021-11-16 PROCEDURE — 93010 ELECTROCARDIOGRAM REPORT: CPT

## 2021-11-16 PROCEDURE — 99233 SBSQ HOSP IP/OBS HIGH 50: CPT

## 2021-11-16 RX ADMIN — SENNA PLUS 2 TABLET(S): 8.6 TABLET ORAL at 21:15

## 2021-11-16 RX ADMIN — SEVELAMER CARBONATE 1600 MILLIGRAM(S): 2400 POWDER, FOR SUSPENSION ORAL at 08:11

## 2021-11-16 RX ADMIN — Medication 81 MILLIGRAM(S): at 12:49

## 2021-11-16 RX ADMIN — APIXABAN 2.5 MILLIGRAM(S): 2.5 TABLET, FILM COATED ORAL at 17:09

## 2021-11-16 RX ADMIN — SEVELAMER CARBONATE 1600 MILLIGRAM(S): 2400 POWDER, FOR SUSPENSION ORAL at 17:09

## 2021-11-16 RX ADMIN — Medication 1 APPLICATION(S): at 12:50

## 2021-11-16 RX ADMIN — APIXABAN 2.5 MILLIGRAM(S): 2.5 TABLET, FILM COATED ORAL at 05:30

## 2021-11-16 RX ADMIN — SEVELAMER CARBONATE 1600 MILLIGRAM(S): 2400 POWDER, FOR SUSPENSION ORAL at 12:49

## 2021-11-16 RX ADMIN — INSULIN GLARGINE 10 UNIT(S): 100 INJECTION, SOLUTION SUBCUTANEOUS at 21:16

## 2021-11-16 RX ADMIN — PANTOPRAZOLE SODIUM 40 MILLIGRAM(S): 20 TABLET, DELAYED RELEASE ORAL at 05:30

## 2021-11-16 RX ADMIN — Medication 3 MILLIGRAM(S): at 21:15

## 2021-11-16 RX ADMIN — Medication 4: at 17:08

## 2021-11-16 NOTE — PROGRESS NOTE ADULT - SUBJECTIVE AND OBJECTIVE BOX
HPI:  59 y/o M w/ hx of DM2, HTN and HLD initially presented as transfer from [facility] for chest pain concerning for NSTEMI and possible new CHF c/b JOSHUA likely on CKD, and hypertensive emergency on 10/6/21. Hospital course complicated by s/p RRT for CVA/TIA ruled out and worsening anemia requiring 1 PRBC. Pt S/p HD session today and underwent LHC showing Multivessel CAD. CT surgery consulted for CABG evaluation. Patient developed left sided facial droop on 10/7 and stroke code was called. 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 Patient started Hemodialysis on 10/13. On 10/21 s/p CABGx2, Mitral Valve Repair, and left atrial appendage ligation, was extubated next day. He developed SOB on 10/24 and requeired bipap,  but was eventually trasitioned back down to [place]. Permacath placed 11/1/21 and LUE AV Fistula placed by vascular on 11/2/21. Her was noted to have PACs and Afib - started on amiodarone. He will be getting HD at [facility]. Cleared by vascular to restart eliquis 2.5mg bid. HD schedule changed from T/T/S to M/W/F.   Patient evaluated by PM&R PT/OT and recommended acute rehab. Patient admitted to [facility] on 11/8/21 for debility. (08 Nov 2021 14:09)      INTERVAL HPI/OVERNIGHT EVENTS:  Chart Reviewed and patient seen at bedside.  Patient more awake and alert today.  Got HD yestersay afternoon.  +BM  No complaints or issues.    ROS:  Denies fevers, chills, chest pain, shortness of breath, abdominal pain, headaches, nausea/vomiting    Vital Signs Last 24 Hrs  T(C): 36.7 (16 Nov 2021 07:14), Max: 36.8 (15 Nov 2021 14:00)  T(F): 98.1 (16 Nov 2021 07:14), Max: 98.2 (15 Nov 2021 14:00)  HR: 58 (16 Nov 2021 07:14) (49 - 67)  BP: 103/57 (16 Nov 2021 07:14) (103/57 - 154/66)  BP(mean): --  RR: 14 (16 Nov 2021 07:14) (14 - 16)  SpO2: 98% (16 Nov 2021 07:14) (98% - 100%)    Physical Exam:  Gen - NAD, Comfortable  HEENT - NCAT, EOMI, MMM  Neck - Supple, No limited ROM  Pulm - CTAB, No wheeze, No rhonchi, No crackles  Cardiovascular - RRR, S1S2, No m/r/g  Abdomen - Soft, NT/ND, +BS  Extremities - No C/C/E, No calf tenderness  Neuro-     Cognitive - AAOx2-3     Attention: Intact      Judgement: Good evidence of judgement     Motor -UE 5/5, LE 4/5        Sensory - Intact to LT     Reflexes - DTR Intact, No primitive reflex     Coordination - FTN intact     Tone - normal  Psychiatric - Mood stable, Affect WNL  Skin: Sacral/left buttock sacral decubital ulcer with visible subcutaneous/dermis, dry b/l heels, permacath is c/d/i, LUE AVF site c/d/i sternal incision site is also c/d/i      LABS:  11-15    135  |  97  |  92<H>  ----------------------------<  95  5.1   |  26  |  8.64<H>    Ca    8.8      15 Nov 2021 15:00  Phos  6.2     11-15                                      7.9    8.41  )-----------( 290      ( 15 Nov 2021 15:00 )             24.8     CAPILLARY BLOOD GLUCOSE      POCT Blood Glucose.: 103 mg/dL (16 Nov 2021 08:01)  POCT Blood Glucose.: 132 mg/dL (15 Nov 2021 20:51)  POCT Blood Glucose.: 95 mg/dL (15 Nov 2021 18:04)  POCT Blood Glucose.: 138 mg/dL (15 Nov 2021 11:51)      MEDICATIONS:  MEDICATIONS  (STANDING):  apixaban 2.5 milliGRAM(s) Oral two times a day  AQUAPHOR (petrolatum Ointment) 1 Application(s) Topical daily  aspirin  chewable 81 milliGRAM(s) Oral daily  dextrose 40% Gel 15 Gram(s) Oral once  dextrose 5%. 1000 milliLiter(s) (50 mL/Hr) IV Continuous <Continuous>  dextrose 5%. 1000 milliLiter(s) (100 mL/Hr) IV Continuous <Continuous>  dextrose 50% Injectable 12.5 Gram(s) IV Push once  dextrose 50% Injectable 25 Gram(s) IV Push once  dextrose 50% Injectable 25 Gram(s) IV Push once  epoetin vern-epbx (RETACRIT) Injectable 08207 Unit(s) SubCutaneous <User Schedule>  glucagon  Injectable 1 milliGRAM(s) IntraMuscular once  insulin glargine Injectable (LANTUS) 10 Unit(s) SubCutaneous at bedtime  insulin lispro (ADMELOG) corrective regimen sliding scale   SubCutaneous three times a day before meals  insulin lispro (ADMELOG) corrective regimen sliding scale   SubCutaneous at bedtime  pantoprazole    Tablet 40 milliGRAM(s) Oral before breakfast  senna 2 Tablet(s) Oral at bedtime  sevelamer carbonate 1600 milliGRAM(s) Oral three times a day with meals    MEDICATIONS  (PRN):  acetaminophen     Tablet .. 650 milliGRAM(s) Oral every 6 hours PRN Mild Pain (1 - 3), Moderate Pain (4 - 6)  melatonin 3 milliGRAM(s) Oral at bedtime PRN Insomnia  polyethylene glycol 3350 17 Gram(s) Oral two times a day PRN Constipation         HPI:  59 y/o M w/ hx of DM2, HTN and HLD initially presented as transfer from [facility] for chest pain concerning for NSTEMI and possible new CHF c/b JOSHUA likely on CKD, and hypertensive emergency on 10/6/21. Hospital course complicated by s/p RRT for CVA/TIA ruled out and worsening anemia requiring 1 PRBC. Pt S/p HD session today and underwent LHC showing Multivessel CAD. CT surgery consulted for CABG evaluation. Patient developed left sided facial droop on 10/7 and stroke code was called. 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 Patient started Hemodialysis on 10/13. On 10/21 s/p CABGx2, Mitral Valve Repair, and left atrial appendage ligation, was extubated next day. He developed SOB on 10/24 and requeired bipap,  but was eventually trasitioned back down to [place]. Permacath placed 11/1/21 and LUE AV Fistula placed by vascular on 11/2/21. Her was noted to have PACs and Afib - started on amiodarone. He will be getting HD at [place]. Cleared by vascular to restart eliquis 2.5mg bid. HD schedule changed from T/T/S to M/W/F.   Patient evaluated by PM&R PT/OT and recommended acute rehab. Patient admitted to [facility] on 11/8/21 for debility. (08 Nov 2021 14:09)    INTERVAL HPI/OVERNIGHT EVENTS:  Chart Reviewed and patient seen at bedside.  Patient more awake and alert today.  Got HD yesterday afternoon. Alert and engaging  +BM  No complaints or issues.    ROS:  Denies fevers, chills, chest pain, shortness of breath, abdominal pain, headaches, nausea/vomiting    Vital Signs Last 24 Hrs  T(C): 36.7 (16 Nov 2021 07:14), Max: 36.8 (15 Nov 2021 14:00)  T(F): 98.1 (16 Nov 2021 07:14), Max: 98.2 (15 Nov 2021 14:00)  HR: 58 (16 Nov 2021 07:14) (49 - 67)  BP: 103/57 (16 Nov 2021 07:14) (103/57 - 154/66)  BP(mean): --  RR: 14 (16 Nov 2021 07:14) (14 - 16)  SpO2: 98% (16 Nov 2021 07:14) (98% - 100%)    Physical Exam:  Gen - NAD, Comfortable  HEENT - NCAT, EOMI, MMM  Neck - Supple, No limited ROM  Pulm - CTAB, No wheeze, No rhonchi, No crackles  Cardiovascular - RRR, S1S2, No m/r/g  Abdomen - Soft, NT/ND, +BS  Extremities - No C/C/E, No calf tenderness    Neuro-  Cognitive - AAOx2-3  Motor -UE 5/5, LE 4/5     Sensory - Intact to LT  Reflexes - DTR Intact, No primitive reflex  Coordination - No dysmetria  Tone - normal  Psychiatric - Mood stable, Affect WNL  Skin: Sacral/left buttock sacral decubital ulcer with visible subcutaneous/dermis, dry b/l heels, permacath is c/d/i, LUE AVF site c/d/i sternal incision site is also c/d/i      LABS:  11-15    135  |  97  |  92<H>  ----------------------------<  95  5.1   |  26  |  8.64<H>    Ca    8.8      15 Nov 2021 15:00  Phos  6.2     11-15                          7.9    8.41  )-----------( 290      ( 15 Nov 2021 15:00 )             24.8     CAPILLARY BLOOD GLUCOSE      POCT Blood Glucose.: 103 mg/dL (16 Nov 2021 08:01)  POCT Blood Glucose.: 132 mg/dL (15 Nov 2021 20:51)  POCT Blood Glucose.: 95 mg/dL (15 Nov 2021 18:04)  POCT Blood Glucose.: 138 mg/dL (15 Nov 2021 11:51)      MEDICATIONS:  MEDICATIONS  (STANDING):  apixaban 2.5 milliGRAM(s) Oral two times a day  AQUAPHOR (petrolatum Ointment) 1 Application(s) Topical daily  aspirin  chewable 81 milliGRAM(s) Oral daily  dextrose 40% Gel 15 Gram(s) Oral once  dextrose 5%. 1000 milliLiter(s) (50 mL/Hr) IV Continuous <Continuous>  dextrose 5%. 1000 milliLiter(s) (100 mL/Hr) IV Continuous <Continuous>  dextrose 50% Injectable 12.5 Gram(s) IV Push once  dextrose 50% Injectable 25 Gram(s) IV Push once  dextrose 50% Injectable 25 Gram(s) IV Push once  epoetin vern-epbx (RETACRIT) Injectable 76449 Unit(s) SubCutaneous <User Schedule>  glucagon  Injectable 1 milliGRAM(s) IntraMuscular once  insulin glargine Injectable (LANTUS) 10 Unit(s) SubCutaneous at bedtime  insulin lispro (ADMELOG) corrective regimen sliding scale   SubCutaneous three times a day before meals  insulin lispro (ADMELOG) corrective regimen sliding scale   SubCutaneous at bedtime  pantoprazole    Tablet 40 milliGRAM(s) Oral before breakfast  senna 2 Tablet(s) Oral at bedtime  sevelamer carbonate 1600 milliGRAM(s) Oral three times a day with meals    MEDICATIONS  (PRN):  acetaminophen     Tablet .. 650 milliGRAM(s) Oral every 6 hours PRN Mild Pain (1 - 3), Moderate Pain (4 - 6)  melatonin 3 milliGRAM(s) Oral at bedtime PRN Insomnia  polyethylene glycol 3350 17 Gram(s) Oral two times a day PRN Constipation    Therapy  Engaged in therapy,  Ambulating with RWMARYSE motor strength improving

## 2021-11-16 NOTE — PROGRESS NOTE ADULT - ASSESSMENT
TOM PLEITEZ is a 61y with a history of DM2, ?CKD, HTN and HLD  who presented to [facility] on 10/6/21 with SOB, CP found to have multivessel CAD and JOSHUA on CKD, s/p CABGx2, MVR, and LAAL. Started on HD T/T/S, now on HD M/W/F s/p Permacath and LUE AVF. Hospital Course complicated by post-op hemorrhagic shock , received 4 PRBC, 1 PLT, 1000 FIEBA intraop and post op dual pressor and inotrope support w/ , Primacor, Levo, and Epi gtts.  Afib, PACs- now on amiodarone. Patient now admitted for a multidisciplinary rehab program. 21 @ 14:40    *Cardiology consult D/C amiodarone and daily EKGs.  *SLP: diet upgraded to pureed with thin liquids. Can upgrade diet to regular if patient's wife brings dentures and cleared by SLP   *DM: lispro decreased to 5/5/5 units pre meals  *Family said he did not need corrective lens prior.    #Debility  - Gait Instability, ADL impairments and Functional impairments: start Comprehensive Rehab Program of PT/OT/SLP  - 3 hours a day, 5 days a week  - Orthotics as needed   - also will benefit from cardiopulmonary conditioning.  - SLP for cognitive and memory deficits    #CAD and Afib s/p CABGx2, LAAL, MVR with Tachy-Seth Syndrome  - Most recent echo on 10/28 shows restoration of LVEF   - d/[place] amiodatone  - c/w eliquis 2.5mg bid  - c/w ASA 81 daily  - close f/u Medicine  - Cardiology consult appreciated: spoke w/ Dr. Stewart today -  rec d/c amiodarone   - DAILY EKGs per cardiology to monitor for tachy-seth syndrome.   - If tachy then speak w/ cardiology regarding restarting amiodarone.      #?CKD on HD - M/W/F  - retacrit 8000ui intraHD  - renal consult  - Permacath placed 21 and LUE AVF placed   - chlorhexidine to keep access sites clean    #DM- with hypoglycemia-revised insulin regime    - lantus 16 unit qhs  - decreased lispro 5/5/5    #Sleep  - melatonin PRN     #Skin  - Skin on admission: Sacral/left buttock sacral decubital ulcer with visible subcutaneous/dermis, dry b/l heels, permacath is c/d/i, LUE AVF site c/d/i, drain sites c/d/i; sternal incision site is c/d/i  - Pressure injury/Skin: OOB to Chair, PT/OT   - continue monitoring wound, incision and drain sites.  - Wound consult  - alyvene foam dressing and cavilon to wound    #Pain Mgmt   - Tylenol PRN    #GI/Bowel Mgmt   - Continent c/w Senna, Miralax    #/Bladder Mgmt --Voiding, PVRs low, urine quantity unclear if oligo/anuric    #FEN   - Diet - Pureed w/ Nectar  [Renal, DASH/TLC]    - per SLP diet upgraded to Pureed with thin liquids -  Diet upgraded yesterday evening as difficulty eating was due to not having dentures. Will keep on pureed food but thin liquids until patient's wife brings dentures.      #Precautions / PROPHYLAXIS:   - Falls, Cardiac, Sternal  - ortho: Weight bearing status: WBAT   - Lungs: Aspiration, Incentive Spirometer   - DVT: eliquis    IDT   SW: lives w/ spouse,w/ 8 steps in home, no hand rail. Wife works weekend but can help  Functional: eating mod I, upper body dressing set up, lbd mod/min A, bathing min a, toilet transfers CG/min A, toileting min A, short transfer min A; ambulates 40 ft RW mod A, transfers min A. Has mild expressive and receptive aphasia, anomia, possibly from underlying cognitive deficit.   SLP noted deficits with communication and executive function for which he is receiving  therapy  Barriers to d/c --deficits with communication/executive fxn, medical issues (low HR), suboptimal motor strength, poor balance  Goals--independence with transfers, ambulate without supervision, with gait assistance, cane  EDOD:  Tues w/ HC    Spoke w/ Wife Marcus [phone] on 21. -- provided update and inquired about reading glasses, which patient doesn't have a home.   TOM PLEITEZ is a 61y with a history of DM2, ?CKD, HTN and HLD  who presented to [facility] on 10/6/21 with SOB, CP found to have multivessel CAD and JOSHUA on CKD, s/p CABGx2, MVR, and LAAL. Started on HD T/T/S, now on HD M/W/F s/p Permacath and LUE AVF. Hospital Course complicated by post-op hemorrhagic shock , received 4 PRBC, 1 PLT, 1000 FIEBA intraop and post op dual pressor and inotrope support w/ , Primacor, Levo, and Epi gtts.  Afib, PACs- now on amiodarone. Patient now admitted for a multidisciplinary rehab program. 21 @ 14:40    *SLP: diet upgraded to pureed with thin liquids. Can upgrade diet to regular if patient's wife brings dentures and cleared by SLP   *DM: lispro decreased to 5/5/5 units pre meals    #Debility  - Gait Instability, ADL impairments and Functional impairments: start Comprehensive Rehab Program of PT/OT/SLP  - 3 hours a day, 5 days a week  - Orthotics as needed   - also will benefit from cardiopulmonary conditioning.  - SLP for cognitive and memory deficits    #CAD and Afib s/p CABGx2, LAAL, MVR with Tachy-Seth Syndrome  - Most recent echo on 10/28 shows restoration of LVEF   - d/[place] amiodatone  - c/w eliquis 2.5mg bid  - c/w ASA 81 daily  - close f/u Medicine  - Cardiologist Dr. Stewart  rec d/c amiodarone   - DAILY EKGs per cardiology to monitor for tachy-seth syndrome.   - If tachy then speak w/ cardiology regarding restarting amiodarone.      #?CKD on HD - M/W/F  - retacrit 8000ui intraHD, renal f/u  - Permacath placed 21 and LUE AVF placed   - chlorhexidine to keep access sites clean    #DM- with hypoglycemia-revised insulin regime    - lantus 16 unit qhs  - decreased lispro 5/5/5    #Sleep  - melatonin PRN     #Skin  - Skin on admission: Sacral/left buttock sacral decubital ulcer with visible subcutaneous/dermis, dry b/l heels, permacath is c/d/i, LUE AVF site c/d/i, drain sites c/d/i; sternal incision site is c/d/i  - Pressure injury/Skin: OOB to Chair, PT/OT   - continue monitoring wound, incision and drain sites.  - Wound consult  - alyvene foam dressing and cavilon to wound    #Pain Mgmt   - Tylenol PRN    #GI/Bowel Mgmt   - Continent c/w Senna, Miralax    #/Bladder Mgmt --Voiding, PVRs low, urine quantity unclear if oligo/anuric    #FEN   - Diet - Pureed w/ Nectar  [Renal, DASH/TLC]    - per SLP diet upgraded to Pureed with thin liquids -  Diet upgraded yesterday evening as difficulty eating was due to not having dentures. Will keep on pureed food but thin liquids until patient's wife brings dentures.    #Precautions / PROPHYLAXIS:   - Falls, Cardiac, Sternal  - ortho: Weight bearing status: WBAT   - Lungs: Aspiration, Incentive Spirometer   - DVT: eliquis    IDT   SW: lives w/ spouse,w/ 8 steps in home, no hand rail. Wife works weekend but can help  Functional: eating mod I, upper body dressing set up, lbd mod/min A, bathing min a, toilet transfers CG/min A, toileting min A, short transfer min A; ambulates 40 ft RW mod A, transfers min A. Has mild expressive and receptive aphasia, anomia, possibly from underlying cognitive deficit.   SLP noted deficits with communication and executive function for which he is receiving  therapy  Barriers to d/c --deficits with communication/executive fxn, medical issues (low HR), suboptimal motor strength, poor balance  Goals--independence with transfers, ambulate without supervision, with gait assistance, cane  EDOD:  Tues w/ HC    Spoke w/ Wife Marcus [phone] on 21. -- provided update and inquired about reading glasses, which patient doesn't have a home.

## 2021-11-16 NOTE — PROGRESS NOTE ADULT - SUBJECTIVE AND OBJECTIVE BOX
No distress    Vital Signs Last 24 Hrs  T(C): 36.7 (11-16-21 @ 07:14), Max: 36.7 (11-15-21 @ 20:11)  T(F): 98.1 (11-16-21 @ 07:14), Max: 98.1 (11-15-21 @ 20:11)  HR: 58 (11-16-21 @ 07:14) (58 - 67)  BP: 103/57 (11-16-21 @ 07:14) (103/57 - 154/66)  RR: 14 (11-16-21 @ 07:14) (14 - 16)  SpO2: 98% (11-16-21 @ 07:14) (98% - 100%)    s1s2  b/l air entry  soft  sm edema                                                          7.9    8.41  )-----------( 290      ( 15 Nov 2021 15:00 )             24.8     15 Nov 2021 15:00    135    |  97     |  92     ----------------------------<  95     5.1     |  26     |  8.64     Ca    8.8        15 Nov 2021 15:00  Phos  6.2       15 Nov 2021 15:00    acetaminophen     Tablet .. 650 milliGRAM(s) Oral every 6 hours PRN  apixaban 2.5 milliGRAM(s) Oral two times a day  AQUAPHOR (petrolatum Ointment) 1 Application(s) Topical daily  aspirin  chewable 81 milliGRAM(s) Oral daily  dextrose 40% Gel 15 Gram(s) Oral once  dextrose 5%. 1000 milliLiter(s) IV Continuous <Continuous>  dextrose 5%. 1000 milliLiter(s) IV Continuous <Continuous>  dextrose 50% Injectable 25 Gram(s) IV Push once  dextrose 50% Injectable 12.5 Gram(s) IV Push once  dextrose 50% Injectable 25 Gram(s) IV Push once  epoetin vern-epbx (RETACRIT) Injectable 55660 Unit(s) SubCutaneous <User Schedule>  glucagon  Injectable 1 milliGRAM(s) IntraMuscular once  insulin glargine Injectable (LANTUS) 10 Unit(s) SubCutaneous at bedtime  insulin lispro (ADMELOG) corrective regimen sliding scale   SubCutaneous at bedtime  insulin lispro (ADMELOG) corrective regimen sliding scale   SubCutaneous three times a day before meals  melatonin 3 milliGRAM(s) Oral at bedtime PRN  pantoprazole    Tablet 40 milliGRAM(s) Oral before breakfast  polyethylene glycol 3350 17 Gram(s) Oral two times a day PRN  senna 2 Tablet(s) Oral at bedtime  sevelamer carbonate 1600 milliGRAM(s) Oral three times a day with meals    A/P:    S/p CABG x 2, Mitral Valve Repair, and left atrial appendage ligation 10/21/21  Hospital course complicated by JOSHUA/CKD  Now on HD 3 x week  Renal diet  TMP as able  Epoetin w/each HD  Sevelamer for high Phos  Bld work w/each HD    [phone]

## 2021-11-16 NOTE — PROGRESS NOTE ADULT - SUBJECTIVE AND OBJECTIVE BOX
61 y/o M w/ hx of DM2, HTN and HLD, transfer from [facility] for NSTEMI, admitted w/ JOSHUA on CKD and hypertensive emergency, new start to HD (10/13). He underwent LHC showing Multivessel CAD. 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  s/p CABG x2 (10/21), MV repair, and left atrial ligation. Permacath placed 11/1/21 and LUE AV Fistula placed by vascular on 11/2/21. Her was noted to have PACs and Afib     seen at the bedside, no n/v, no sob, no chest pain    Vital Signs Last 24 Hrs  T(C): 36.7 (16 Nov 2021 07:14), Max: 36.8 (15 Nov 2021 14:00)  T(F): 98.1 (16 Nov 2021 07:14), Max: 98.2 (15 Nov 2021 14:00)  HR: 58 (16 Nov 2021 07:14) (49 - 67)  BP: 103/57 (16 Nov 2021 07:14) (103/57 - 154/66)  BP(mean): --  RR: 14 (16 Nov 2021 07:14) (14 - 16)  SpO2: 98% (16 Nov 2021 07:14) (98% - 100%)    GENERAL- NAD  EAR/NOSE/MOUTH/THROAT - no pharyngeal exudates, no oral lesions  MMM  EYES- JOSEPH, conjunctiva and Sclera clear  NECK- supple  RESPIRATORY-  clear to auscultation bilaterally, non laboured breathing  CARDIOVASCULAR - SIS2, IRIR  GI - soft NT BS present  EXTREMITIES- no pedal edema  NEUROLOGY- no gross focal deficits  SKIN- chest incision clean  PSYCHIATRY- AAO X 3                  7.9                  135  | 26   | 92           8.41  >-----------< 290     ------------------------< 95                    24.8                 5.1  | 97   | 8.64                                         Ca 8.8   Mg x     Ph 6.2      CAPILLARY BLOOD GLUCOSE      POCT Blood Glucose.: 119 mg/dL (16 Nov 2021 12:02)  POCT Blood Glucose.: 103 mg/dL (16 Nov 2021 08:01)  POCT Blood Glucose.: 132 mg/dL (15 Nov 2021 20:51)  POCT Blood Glucose.: 95 mg/dL (15 Nov 2021 18:04)        ECG reviewed and interpreted: sinus 61, unusual p wave pattern    MEDICATIONS  (STANDING):  apixaban 2.5 milliGRAM(s) Oral two times a day  AQUAPHOR (petrolatum Ointment) 1 Application(s) Topical daily  aspirin  chewable 81 milliGRAM(s) Oral daily  epoetin vern-epbx (RETACRIT) Injectable 90852 Unit(s) SubCutaneous <User Schedule>  glucagon  Injectable 1 milliGRAM(s) IntraMuscular once  insulin glargine Injectable (LANTUS) 10 Unit(s) SubCutaneous at bedtime  insulin lispro (ADMELOG) corrective regimen sliding scale   SubCutaneous three times a day before meals  insulin lispro (ADMELOG) corrective regimen sliding scale   SubCutaneous at bedtime  pantoprazole    Tablet 40 milliGRAM(s) Oral before breakfast  senna 2 Tablet(s) Oral at bedtime  sevelamer carbonate 1600 milliGRAM(s) Oral three times a day with meals    MEDICATIONS  (PRN):  acetaminophen     Tablet .. 650 milliGRAM(s) Oral every 6 hours PRN Mild Pain (1 - 3), Moderate Pain (4 - 6)  melatonin 3 milliGRAM(s) Oral at bedtime PRN Insomnia  polyethylene glycol 3350 17 Gram(s) Oral two times a day PRN Constipation

## 2021-11-16 NOTE — PROGRESS NOTE ADULT - ATTENDING COMMENTS
59 y/o M on acute rehab after CABG, new Dx, of Afib and ESRD on Hemodialysis,  old frontal infarct and left carotid stenosis, interval AMS requiring intubation x1 day during acute hosp care. Admitted to acute rehab 11/8//21    Making motor improvement  Alert and fully oriented  HR controlled 50, off amiodarone as recommended by cardiology on daily EKG  Blood sugar controlled    Tolerating HD as noted by Renal  ESRD on HD via Right chest permacath, left AVF  -Chest precautions    Continue PT/OT--muscle strengthening/stretching ROM, DME use  Estimated discharge date 11/23    Continue PT/OT  DM care  DVT ppx, GI protection    Seen with Dr Montez, Rehab Med Resident, stable to continue acute rehab.

## 2021-11-16 NOTE — PROGRESS NOTE ADULT - ASSESSMENT
61 y/o M w/ hx of DM2, HTN and HLD, transfer from [facility] for NSTEMI, admitted w/ JOSHUA on CKD and hypertensive emergency, new start to HD (10/13). He underwent LHC showing Multivessel CAD. 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  s/p CABG x2 (10/21), MV repair, and left atrial ligation. Permacath placed 11/1/21 and LUE AV Fistula placed by vascular on 11/2/21. Her was noted to have PACs and Afib - started on amiodarone and Eliquis  Now admitted to [facility] after for initiation of a multidisciplinary rehab program consisting focused on functional mobility, transfers and ADLs- pt/ot/dvt ppx    #NSTEMI 2/2 CAD s/p CABG (10/21)  #s/p MV repair  #HLD  - s/p LHC on 10/14 showing severe multivessel disease; now s/p CABG on 10/21. Echocardiogram reviewed w/ EF 75% (10/28)  - aspirin  - statin  - eliquis       # ESRD on HD (MW), due to diabetic nephropathy  # anemia of chronic dz 2/2 ESRD  -received blood transfusion at OSH.   -HD at [place] on discharge, renal f/u  - retacrit     # paroxysmal atrial fibrillation   # tachy blaine syndrome  -cont apixiban  -hold AV Christina agents due to bradycardia  -amiodarone - now held due to bradycardia.   - cardio consult noted  -can restart amiodarone if becomes tachycardic. Pt developed AF post operatively, so it is possible amio was only needed short term.  QTc prolongation  avoid QT prolonging agents, monitr on daily ECG.     # DM2  - noted hypoglycemia this am  -hgba1c 7%  - decrease Lantus to 10 11/16/21  - d/c pre meal 11/16/21  - c/w ISS, Accu-Cheks  - monitor     # subclinical hypothyroidism  - TSH mildly elevated 6.4, pt asymptomatic  - seen by Endo during admission. Outpt f/u.       # hx of severe tobacco use d/o  - smoked 3 ppd, recently quit a few months ago    # sacral wound--sacral stage 2 ulcer  - appreciate wound care consult.     dvt ppx: cont apixaban    will follow   d/w dr. patterson

## 2021-11-17 ENCOUNTER — TRANSCRIPTION ENCOUNTER (OUTPATIENT)
Age: 61
End: 2021-11-17

## 2021-11-17 LAB
GLUCOSE BLDC GLUCOMTR-MCNC: 104 MG/DL — HIGH (ref 70–99)
GLUCOSE BLDC GLUCOMTR-MCNC: 139 MG/DL — HIGH (ref 70–99)
GLUCOSE BLDC GLUCOMTR-MCNC: 169 MG/DL — HIGH (ref 70–99)
GLUCOSE BLDC GLUCOMTR-MCNC: 185 MG/DL — HIGH (ref 70–99)

## 2021-11-17 PROCEDURE — 99233 SBSQ HOSP IP/OBS HIGH 50: CPT

## 2021-11-17 PROCEDURE — 93010 ELECTROCARDIOGRAM REPORT: CPT

## 2021-11-17 RX ORDER — INSULIN GLARGINE 100 [IU]/ML
5 INJECTION, SOLUTION SUBCUTANEOUS AT BEDTIME
Refills: 0 | Status: DISCONTINUED | OUTPATIENT
Start: 2021-11-17 | End: 2021-11-27

## 2021-11-17 RX ORDER — PHENYLEPHRINE-SHARK LIVER OIL-MINERAL OIL-PETROLATUM RECTAL OINTMENT
1 OINTMENT (GRAM) RECTAL THREE TIMES A DAY
Refills: 0 | Status: DISCONTINUED | OUTPATIENT
Start: 2021-11-17 | End: 2021-11-27

## 2021-11-17 RX ADMIN — SEVELAMER CARBONATE 1600 MILLIGRAM(S): 2400 POWDER, FOR SUSPENSION ORAL at 12:14

## 2021-11-17 RX ADMIN — APIXABAN 2.5 MILLIGRAM(S): 2.5 TABLET, FILM COATED ORAL at 05:45

## 2021-11-17 RX ADMIN — Medication 81 MILLIGRAM(S): at 12:14

## 2021-11-17 RX ADMIN — APIXABAN 2.5 MILLIGRAM(S): 2.5 TABLET, FILM COATED ORAL at 17:17

## 2021-11-17 RX ADMIN — PANTOPRAZOLE SODIUM 40 MILLIGRAM(S): 20 TABLET, DELAYED RELEASE ORAL at 05:45

## 2021-11-17 RX ADMIN — SEVELAMER CARBONATE 1600 MILLIGRAM(S): 2400 POWDER, FOR SUSPENSION ORAL at 08:14

## 2021-11-17 RX ADMIN — INSULIN GLARGINE 5 UNIT(S): 100 INJECTION, SOLUTION SUBCUTANEOUS at 21:14

## 2021-11-17 RX ADMIN — Medication 1 APPLICATION(S): at 12:14

## 2021-11-17 RX ADMIN — SENNA PLUS 2 TABLET(S): 8.6 TABLET ORAL at 21:14

## 2021-11-17 RX ADMIN — SEVELAMER CARBONATE 1600 MILLIGRAM(S): 2400 POWDER, FOR SUSPENSION ORAL at 17:17

## 2021-11-17 RX ADMIN — Medication 3 MILLIGRAM(S): at 21:14

## 2021-11-17 RX ADMIN — Medication 2: at 17:16

## 2021-11-17 RX ADMIN — PHENYLEPHRINE-SHARK LIVER OIL-MINERAL OIL-PETROLATUM RECTAL OINTMENT 1 APPLICATION(S): at 23:05

## 2021-11-17 NOTE — PROGRESS NOTE ADULT - SUBJECTIVE AND OBJECTIVE BOX
HPI:  59 y/o M w/ hx of DM2, HTN and HLD initially presented as transfer from [facility] for chest pain concerning for NSTEMI and possible new CHF c/b JOSHUA likely on CKD, and hypertensive emergency on 10/6/21. Hospital course complicated by s/p RRT for CVA/TIA ruled out and worsening anemia requiring 1 PRBC. Pt S/p HD session today and underwent LHC showing Multivessel CAD. CT surgery consulted for CABG evaluation. Patient developed left sided facial droop on 10/7 and stroke code was called. 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 Patient started Hemodialysis on 10/13. On 10/21 s/p CABGx2, Mitral Valve Repair, and left atrial appendage ligation, was extubated next day. He developed SOB on 10/24 and requeired bipap,  but was eventually trasitioned back down to [place]. Permacath placed 11/1/21 and LUE AV Fistula placed by vascular on 11/2/21. Her was noted to have PACs and Afib - started on amiodarone. He will be getting HD at [place]. Cleared by vascular to restart eliquis 2.5mg bid. HD schedule changed from T/T/S to M/W/F.   Patient evaluated by PM&R PT/OT and recommended acute rehab. Patient admitted to [facility] on 11/8/21 for debility. (08 Nov 2021 14:09)    INTERVAL HPI/OVERNIGHT EVENTS  Chart Reviewed and patient seen/examined at bedside and observed during therapy  Patient more awake and alert, fully oriented  +BM today, making minimal urine  No complaints or issues.    ROS:  Denies fevers, chills, chest pain, shortness of breath, abdominal pain, headaches, nausea/vomiting    Vital Signs Last 24 Hrs  Vital Signs Last 24 Hrs  T(C): 36.7 (17 Nov 2021 08:19), Max: 36.7 (16 Nov 2021 21:08)  T(F): 98 (17 Nov 2021 08:19), Max: 98.1 (16 Nov 2021 21:08)  HR: 53 (17 Nov 2021 08:19) (53 - 53)  BP: 109/58 (17 Nov 2021 08:19) (109/58 - 125/80)  RR: 16 (17 Nov 2021 08:19) (16 - 16)  SpO2: 99% (17 Nov 2021 08:19) (94% - 99%)    Physical Exam:  Gen - NAD, Comfortable  HEENT - NCAT, EOMI, MMM  Neck - Supple, No limited ROM  Pulm - CTAB, No wheeze, No rhonchi, No crackles  Cardiovascular - RRR, S1S2, sternotomy incision in situ  Abdomen - Soft, NT/ND, +BS  Extremities - No edema or tenderness    Neuro-  Cognitive - AAOx2-3  Motor -UE 5/5, LE 4/5     Sensory - Intact to LT  Reflexes - DTR Intact, No primitive reflex  Coordination - No dysmetria  Tone - normal  Psychiatric - Mood stable, Affect WNL  Skin: Sacral/left buttock sacral decubital ulcer with visible subcutaneous/dermis, dry b/l heels, permacath is c/d/i, LUE AVF site c/d/i sternal incision site is also c/d/i      LABS:  11-15    135  |  97  |  92<H>  ----------------------------<  95  5.1   |  26  |  8.64<H>    Ca    8.8      15 Nov 2021 15:00  Phos  6.2     11-15                          7.9    8.41  )-----------( 290      ( 15 Nov 2021 15:00 )             24.8     CAPILLARY BLOOD GLUCOSE      POCT Blood Glucose.: 103 mg/dL (16 Nov 2021 08:01)  POCT Blood Glucose.: 132 mg/dL (15 Nov 2021 20:51)  POCT Blood Glucose.: 95 mg/dL (15 Nov 2021 18:04)  POCT Blood Glucose.: 138 mg/dL (15 Nov 2021 11:51)      MEDICATIONS  (STANDING):  apixaban 2.5 milliGRAM(s) Oral two times a day  AQUAPHOR (petrolatum Ointment) 1 Application(s) Topical daily  aspirin  chewable 81 milliGRAM(s) Oral daily  dextrose 40% Gel 15 Gram(s) Oral once  dextrose 5%. 1000 milliLiter(s) (50 mL/Hr) IV Continuous <Continuous>  dextrose 5%. 1000 milliLiter(s) (100 mL/Hr) IV Continuous <Continuous>  dextrose 50% Injectable 25 Gram(s) IV Push once  dextrose 50% Injectable 12.5 Gram(s) IV Push once  dextrose 50% Injectable 25 Gram(s) IV Push once  epoetin vern-epbx (RETACRIT) Injectable 95317 Unit(s) SubCutaneous <User Schedule>  glucagon  Injectable 1 milliGRAM(s) IntraMuscular once  insulin glargine Injectable (LANTUS) 5 Unit(s) SubCutaneous at bedtime  insulin lispro (ADMELOG) corrective regimen sliding scale   SubCutaneous at bedtime  insulin lispro (ADMELOG) corrective regimen sliding scale   SubCutaneous three times a day before meals  pantoprazole    Tablet 40 milliGRAM(s) Oral before breakfast  senna 2 Tablet(s) Oral at bedtime  sevelamer carbonate 1600 milliGRAM(s) Oral three times a day with meals    MEDICATIONS  (PRN):  acetaminophen     Tablet .. 650 milliGRAM(s) Oral every 6 hours PRN Mild Pain (1 - 3), Moderate Pain (4 - 6)  melatonin 3 milliGRAM(s) Oral at bedtime PRN Insomnia  polyethylene glycol 3350 17 Gram(s) Oral two times a day PRN Constipation      Therapy  Participating in therapy  Ambulating with RW,  Rt knee bucking and recurvatum noted, d/w therapist, will get Orthotic assessment

## 2021-11-17 NOTE — CHART NOTE - NSCHARTNOTEFT_GEN_A_CORE
Nutrition Follow Up Note  Hospital Course   (Per Electronic Medical Record)    Source:  Patient [X]  Speech Therapy [X]   Medical Record [X]      Diet:   Consistent Carbohydrate, DASH-TLC Renal Diet w/ Thin Liquids   Tolerates Diet Consistency Well - Discussed w/ Speech Therapy   No Chewing/Swallowing Difficulties  No Recent Nausea, Vomiting, Diarrhea or Constipation (as Per Patient)   Consumes % of Meals (as Per Documentation) - States Good PO Intake/Appetite   Recommend Initiate Nepro 8oz Daily (Provides 425kcal & 19grams of Protein)   Education Provided on Need for Supplementation     Enteral/Parenteral Nutrition: Not Applicable    Current Weight: 166.8lb on 11/13  Weight Fluctuates  Obtain Weights Daily     Pertinent Medications: MEDICATIONS  (STANDING):  apixaban 2.5 milliGRAM(s) Oral two times a day  AQUAPHOR (petrolatum Ointment) 1 Application(s) Topical daily  aspirin  chewable 81 milliGRAM(s) Oral daily  dextrose 40% Gel 15 Gram(s) Oral once  dextrose 5%. 1000 milliLiter(s) (50 mL/Hr) IV Continuous <Continuous>  dextrose 5%. 1000 milliLiter(s) (100 mL/Hr) IV Continuous <Continuous>  dextrose 50% Injectable 25 Gram(s) IV Push once  dextrose 50% Injectable 12.5 Gram(s) IV Push once  dextrose 50% Injectable 25 Gram(s) IV Push once  epoetin vern-epbx (RETACRIT) Injectable 22235 Unit(s) SubCutaneous <User Schedule>  glucagon  Injectable 1 milliGRAM(s) IntraMuscular once  insulin glargine Injectable (LANTUS) 5 Unit(s) SubCutaneous at bedtime  insulin lispro (ADMELOG) corrective regimen sliding scale   SubCutaneous at bedtime  insulin lispro (ADMELOG) corrective regimen sliding scale   SubCutaneous three times a day before meals  pantoprazole    Tablet 40 milliGRAM(s) Oral before breakfast  senna 2 Tablet(s) Oral at bedtime  sevelamer carbonate 1600 milliGRAM(s) Oral three times a day with meals    MEDICATIONS  (PRN):  acetaminophen     Tablet .. 650 milliGRAM(s) Oral every 6 hours PRN Mild Pain (1 - 3), Moderate Pain (4 - 6)  melatonin 3 milliGRAM(s) Oral at bedtime PRN Insomnia  polyethylene glycol 3350 17 Gram(s) Oral two times a day PRN Constipation    Pertinent Labs:  11-15 Na135 mmol/L Glu 95 mg/dL K+ 5.1 mmol/L Cr  8.64 mg/dL<H> BUN 92 mg/dL<H> 11-15 Phos 6.2 mg/dL<H> 11-12 Alb 2.4 g/dL<L>    Skin: Stage 2 Pressure Ulcer on Right Buttock   Multiple Surgical Incisions  (as Per Nursing Flow Sheet)     Edema: None Noted (as Per Documentation)     Last Bowel Movement: on 11/13    Estimated Needs:   [X] No Change Since Previous Assessment    Previous Nutrition Diagnosis:   Moderate Malnutrition   Increased Nutrient Needs - Calories & Protein     Nutrition Diagnosis is [X] Ongoing - Recommend Initiate Supplement & Education Provided on Need for Supplementation     New Nutrition Diagnosis: [X] Not Applicable    Interventions:   1. Education Provided on Need for Supplementation    2. Recommend Initiate Nepro 8oz Daily (Provides 425kcal & 19grams of Protein)   3. Recommend Continue Nutrition Plan of Care     Monitoring & Evaluation:   [X] Weights   [X] PO Intake   [X] Skin Integrity   [X] Follow Up (Per Protocol)  [X] Tolerance to Diet Prescription   [X] Other: Labs & POCT    Registered Dietitian/Nutritionist Remains Available.  Mason Glass RDN    Pager #[phone]  Phone# [phone] Nutrition Follow Up Note  Hospital Course   (Per Electronic Medical Record)    Source:  Patient [X]  Speech Therapy [X]   Medical Record [X]      Diet:   Consistent Carbohydrate, DASH-TLC Renal Puree (Dysphagia 1) Diet  w/ Thin Liquids   Tolerates Diet Consistency Well - Discussed w/ Speech Therapy   No Chewing/Swallowing Difficulties  No Recent Nausea, Vomiting, Diarrhea or Constipation (as Per Patient)   Consumes % of Meals (as Per Documentation) - States Good PO Intake/Appetite   Recommend Initiate Nepro 8oz Daily (Provides 425kcal & 19grams of Protein)   Education Provided on Need for Supplementation     Enteral/Parenteral Nutrition: Not Applicable    Current Weight: 166.8lb on 11/13  Weight Fluctuates  Obtain Weights Daily     Pertinent Medications: MEDICATIONS  (STANDING):  apixaban 2.5 milliGRAM(s) Oral two times a day  AQUAPHOR (petrolatum Ointment) 1 Application(s) Topical daily  aspirin  chewable 81 milliGRAM(s) Oral daily  dextrose 40% Gel 15 Gram(s) Oral once  dextrose 5%. 1000 milliLiter(s) (50 mL/Hr) IV Continuous <Continuous>  dextrose 5%. 1000 milliLiter(s) (100 mL/Hr) IV Continuous <Continuous>  dextrose 50% Injectable 25 Gram(s) IV Push once  dextrose 50% Injectable 12.5 Gram(s) IV Push once  dextrose 50% Injectable 25 Gram(s) IV Push once  epoetin vern-epbx (RETACRIT) Injectable 15146 Unit(s) SubCutaneous <User Schedule>  glucagon  Injectable 1 milliGRAM(s) IntraMuscular once  insulin glargine Injectable (LANTUS) 5 Unit(s) SubCutaneous at bedtime  insulin lispro (ADMELOG) corrective regimen sliding scale   SubCutaneous at bedtime  insulin lispro (ADMELOG) corrective regimen sliding scale   SubCutaneous three times a day before meals  pantoprazole    Tablet 40 milliGRAM(s) Oral before breakfast  senna 2 Tablet(s) Oral at bedtime  sevelamer carbonate 1600 milliGRAM(s) Oral three times a day with meals    MEDICATIONS  (PRN):  acetaminophen     Tablet .. 650 milliGRAM(s) Oral every 6 hours PRN Mild Pain (1 - 3), Moderate Pain (4 - 6)  melatonin 3 milliGRAM(s) Oral at bedtime PRN Insomnia  polyethylene glycol 3350 17 Gram(s) Oral two times a day PRN Constipation    Pertinent Labs:  11-15 Na135 mmol/L Glu 95 mg/dL K+ 5.1 mmol/L Cr  8.64 mg/dL<H> BUN 92 mg/dL<H> 11-15 Phos 6.2 mg/dL<H> 11-12 Alb 2.4 g/dL<L>    Skin: Stage 2 Pressure Ulcer on Right Buttock   Multiple Surgical Incisions  (as Per Nursing Flow Sheet)     Edema: None Noted (as Per Documentation)     Last Bowel Movement: on 11/13    Estimated Needs:   [X] No Change Since Previous Assessment    Previous Nutrition Diagnosis:   Moderate Malnutrition   Increased Nutrient Needs - Calories & Protein     Nutrition Diagnosis is [X] Ongoing - Recommend Initiate Supplement & Education Provided on Need for Supplementation     New Nutrition Diagnosis: [X] Not Applicable    Interventions:   1. Education Provided on Need for Supplementation    2. Recommend Initiate Nepro 8oz Daily (Provides 425kcal & 19grams of Protein)   3. Recommend Continue Nutrition Plan of Care     Monitoring & Evaluation:   [X] Weights   [X] PO Intake   [X] Skin Integrity   [X] Follow Up (Per Protocol)  [X] Tolerance to Diet Prescription   [X] Other: Labs & POCT    Registered Dietitian/Nutritionist Remains Available.  Mason Glass RDN    Pager #[phone]  Phone# [phone]

## 2021-11-17 NOTE — PROGRESS NOTE ADULT - SUBJECTIVE AND OBJECTIVE BOX
No distress    Vital Signs Last 24 Hrs  T(C): 36.3 (11-17-21 @ 19:42), Max: 36.7 (11-17-21 @ 08:19)  T(F): 97.3 (11-17-21 @ 19:42), Max: 98 (11-17-21 @ 08:19)  HR: 61 (11-17-21 @ 19:42) (53 - 61)  BP: 144/73 (11-17-21 @ 19:42) (109/58 - 144/73)  RR: 16 (11-17-21 @ 19:42) (16 - 16)  SpO2: 98% (11-17-21 @ 19:42) (98% - 99%)    s1s2  b/l air entry  soft  sm edema                                               acetaminophen     Tablet .. 650 milliGRAM(s) Oral every 6 hours PRN  apixaban 2.5 milliGRAM(s) Oral two times a day  AQUAPHOR (petrolatum Ointment) 1 Application(s) Topical daily  aspirin  chewable 81 milliGRAM(s) Oral daily  dextrose 40% Gel 15 Gram(s) Oral once  dextrose 5%. 1000 milliLiter(s) IV Continuous <Continuous>  dextrose 5%. 1000 milliLiter(s) IV Continuous <Continuous>  dextrose 50% Injectable 25 Gram(s) IV Push once  dextrose 50% Injectable 12.5 Gram(s) IV Push once  dextrose 50% Injectable 25 Gram(s) IV Push once  epoetin vern-epbx (RETACRIT) Injectable 05136 Unit(s) SubCutaneous <User Schedule>  glucagon  Injectable 1 milliGRAM(s) IntraMuscular once  hemorrhoidal Ointment 1 Application(s) Rectal three times a day PRN  insulin glargine Injectable (LANTUS) 5 Unit(s) SubCutaneous at bedtime  insulin lispro (ADMELOG) corrective regimen sliding scale   SubCutaneous at bedtime  insulin lispro (ADMELOG) corrective regimen sliding scale   SubCutaneous three times a day before meals  melatonin 3 milliGRAM(s) Oral at bedtime PRN  pantoprazole    Tablet 40 milliGRAM(s) Oral before breakfast  polyethylene glycol 3350 17 Gram(s) Oral two times a day PRN  senna 2 Tablet(s) Oral at bedtime  sevelamer carbonate 1600 milliGRAM(s) Oral three times a day with meals    A/P:    S/p CABG x 2, Mitral Valve Repair, and left atrial appendage ligation 10/21/21  Hospital course complicated by JOSHUA/CKD  Now on HD 3 x week  Renal diet  TMP as able  HD tomorrow  Epoetin w/each HD  Sevelamer for high Phos  Bld work w/each HD    [phone]

## 2021-11-17 NOTE — PROGRESS NOTE ADULT - ASSESSMENT
61 y/o M w/ hx of DM2, HTN and HLD, transfer from [facility] for NSTEMI, admitted w/ JOSHUA on CKD and hypertensive emergency, new start to HD (10/13). He underwent LHC showing Multivessel CAD. 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  s/p CABG x2 (10/21), MV repair, and left atrial ligation. Permacath placed 11/1/21 and LUE AV Fistula placed by vascular on 11/2/21. Her was noted to have PACs and Afib - started on amiodarone and Eliquis  Now admitted to [facility] after for initiation of a multidisciplinary rehab program consisting focused on functional mobility, transfers and ADLs- pt/ot/dvt ppx    #NSTEMI 2/2 CAD s/p CABG (10/21)  #s/p MV repair  #HLD  - s/p LHC on 10/14 showing severe multivessel disease; now s/p CABG on 10/21. Echocardiogram reviewed w/ EF 75% (10/28)  - aspirin  - statin  - eliquis       # ESRD on HD (MW), due to diabetic nephropathy  # anemia of chronic dz 2/2 ESRD  -received blood transfusion at OSH.   -HD at [place] on discharge, renal f/u  - retacrit     # paroxysmal atrial fibrillation   # tachy blaine syndrome  -cont apixiban  -hold AV Christina agents due to bradycardia  -amiodarone - now held due to bradycardia.   - cardio consult noted  -can restart amiodarone if becomes tachycardic. Pt developed AF post operatively, so it is possible amio was only needed short term.  QTc prolongation  avoid QT prolonging agents, monitr on daily ECG.     # DM2  - Accu-Cheks noted  -hgba1c 7%  - decrease Lantus to 5,  11/17/21  - d/c pre meal 11/16/21  - c/w ISS, Accu-Cheks  - monitor     # subclinical hypothyroidism  - TSH mildly elevated 6.4, pt asymptomatic  - seen by Endo during admission. Outpt f/u.       # hx of severe tobacco use d/o  - smoked 3 ppd, recently quit a few months ago    # sacral wound--sacral stage 2 ulcer  - appreciate wound care consult.     dvt ppx: cont apixaban    will follow   d/w dr. patterson

## 2021-11-17 NOTE — PROGRESS NOTE ADULT - ASSESSMENT
TOM PLEITEZ is a 61y with a history of DM2, ?CKD, HTN and HLD  who presented to [facility] on 10/6/21 with SOB, CP found to have multivessel CAD and JOSHUA on CKD, s/p CABGx2, MVR, and LAAL. Started on HD T/T/S, now on HD M/W/F s/p Permacath and LUE AVF. Hospital Course complicated by post-op hemorrhagic shock , received 4 PRBC, 1 PLT, 1000 FIEBA intraop and post op dual pressor and inotrope support w/ , Primacor, Levo, and Epi gtts.  Afib, PACs- now on amiodarone. Patient now admitted for a multidisciplinary rehab program. 21 @ 14:40    *Gradually advance diet  *Orthotic review for Rt knee instability  *DM: lispro decreased to 5/5/5 units pre meals  * Moderate protein-calorie malnutrition.    #Debility  - Gait Instability, ADL impairments and Functional impairments: start Comprehensive Rehab Program of PT/OT/SLP  - 3 hours a day, 5 days a week  - Orthotics as needed   - also will benefit from cardiopulmonary conditioning.  - SLP for cognitive and memory deficits    #CAD and Afib s/p CABGx2, LAAL, MVR with Tachy-Seth Syndrome  - Most recent echo on 10/28 shows restoration of LVEF   - d/africa amiodatone  - c/w eliquis 2.5mg bid  - c/w ASA 81 daily  - close f/u Medicine  - Cardiologist Dr. Stewart  rec d/c amiodarone   - DAILY EKGs per cardiology to monitor for tachy-seth syndrome.   - If tachy then speak w/ cardiology regarding restarting amiodarone.      #?CKD on HD - M/W/F  - retacrit 8000ui intraHD, renal f/u  - Permacath placed 21 and LUE AVF placed   - chlorhexidine to keep access sites clean    #DM- with hypoglycemia-revised insulin regime    - lantus 16 unit qhs  - decreased lispro /5/5  # Rt knee instability--orthotic assessment   #Sleep  - melatonin PRN     #Skin  - Skin on admission: Sacral/left buttock sacral decubital ulcer with visible subcutaneous/dermis, dry b/l heels, permacath is c/d/i, LUE AVF site c/d/i, drain sites c/d/i; sternal incision site is c/d/i  - Pressure injury/Skin: OOB to Chair, PT/OT   - continue monitoring wound, incision and drain sites.  - Wound consult  - alyvene foam dressing and cavilon to wound    # Malnutrition--Moderate protein-calorie malnutrition.  #Pain Mgmt  - Tylenol PRN    #GI/Bowel Mgmt   - Continent c/w Senna, Miralax    #/Bladder Mgmt --Voiding, PVRs low, urine quantity unclear if oligo/anuric    #FEN   - Diet - Pureed w/ Nectar  [Renal, DASH/TLC]    - per SLP diet upgraded to Pureed with thin liquids -  Diet upgraded yesterday evening as difficulty eating was due to not having dentures. Will keep on pureed food but thin liquids until patient's wife brings dentures.    #Precautions / PROPHYLAXIS:   - Falls, Cardiac, Sternal  - ortho: Weight bearing status: WBAT   - Lungs: Aspiration, Incentive Spirometer   - DVT: eliquis    IDT   SW: lives w/ spouse,w/ 8 steps in home, no hand rail. Wife works weekend but can help  Functional: eating mod I, upper body dressing set up, lbd mod/min A, bathing min a, toilet transfers CG/min A, toileting min A, short transfer min A; ambulates 40 ft RW mod A, transfers min A. Has mild expressive and receptive aphasia, anomia, possibly from underlying cognitive deficit.   SLP noted deficits with communication and executive function for which he is receiving  therapy  Barriers to d/c --deficits with communication/executive fxn, medical issues (low HR), suboptimal motor strength, poor balance ,Rt Knee instability  Goals--independence with transfers, ambulate without supervision, with gait assistance, cane  EDOD:  w/ HC    Spoke w/ Wife Marcus [phone] on 21. -- provided update and inquired about reading glasses, which patient doesn't have a home.    Summary  61 y/o M on acute rehab after CABG, new Dx, of Afib and ESRD on Hemodialysis,  old frontal infarct and left carotid stenosis, interval AMS requiring intubation x1 day during acute hosp care. Admitted to acute rehab 21    Sustained motor improvement, Rt knee instability, HR stable bradycardia, off amiodarone (d/africa due to severe bradycardia), Bld sugar being monitoring, Moderate protein-calorie malnutrition. on f/u with RD  Has communication and executive fxn deficits on f/u with speech  Continue PT/OT--muscle strengthening/stretching ROM, DME use  Estimated discharge date        stable to continue acute rehab.

## 2021-11-17 NOTE — PROGRESS NOTE ADULT - SUBJECTIVE AND OBJECTIVE BOX
61 y/o M w/ hx of DM2, HTN and HLD, transfer from [facility] for NSTEMI, admitted w/ JOSHUA on CKD and hypertensive emergency, new start to HD (10/13). He underwent LHC showing Multivessel CAD. 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  s/p CABG x2 (10/21), MV repair, and left atrial ligation. Permacath placed 11/1/21 and LUE AV Fistula placed by vascular on 11/2/21. Her was noted to have PACs and Afib     seen at the bedside, no n/v, no sob, no chest pain      Vital Signs Last 24 Hrs  T(C): 36.7 (17 Nov 2021 08:19), Max: 36.7 (16 Nov 2021 21:08)  T(F): 98 (17 Nov 2021 08:19), Max: 98.1 (16 Nov 2021 21:08)  HR: 53 (17 Nov 2021 08:19) (53 - 53)  BP: 109/58 (17 Nov 2021 08:19) (109/58 - 125/80)  BP(mean): --  RR: 16 (17 Nov 2021 08:19) (16 - 16)  SpO2: 99% (17 Nov 2021 08:19) (94% - 99%)    GENERAL- NAD  EAR/NOSE/MOUTH/THROAT - no pharyngeal exudates, no oral lesions  MMM  EYES- JOSEPH, conjunctiva and Sclera clear  NECK- supple  RESPIRATORY-  clear to auscultation bilaterally, non laboured breathing  CARDIOVASCULAR - SIS2, IRIR  GI - soft NT BS present  EXTREMITIES- no pedal edema  NEUROLOGY- no gross focal deficits  SKIN- chest incision clean  PSYCHIATRY- AAO X 3                  7.9                  135  | 26   | 92           8.41  >-----------< 290     ------------------------< 95                    24.8                 5.1  | 97   | 8.64                                         Ca 8.8   Mg x     Ph 6.2                    7.9                  135  | 26   | 92           8.41  >-----------< 290     ------------------------< 95                    24.8                 5.1  | 97   | 8.64                                         Ca 8.8   Mg x     Ph 6.2          CAPILLARY BLOOD GLUCOSE        ECG reviewed and interpreted: sinus 52, no new changes    MEDICATIONS  (STANDING):  apixaban 2.5 milliGRAM(s) Oral two times a day  AQUAPHOR (petrolatum Ointment) 1 Application(s) Topical daily  aspirin  chewable 81 milliGRAM(s) Oral daily  dextrose 40% Gel 15 Gram(s) Oral once  dextrose 5%. 1000 milliLiter(s) (50 mL/Hr) IV Continuous <Continuous>  dextrose 5%. 1000 milliLiter(s) (100 mL/Hr) IV Continuous <Continuous>  dextrose 50% Injectable 25 Gram(s) IV Push once  dextrose 50% Injectable 12.5 Gram(s) IV Push once  dextrose 50% Injectable 25 Gram(s) IV Push once  epoetin vern-epbx (RETACRIT) Injectable 83115 Unit(s) SubCutaneous <User Schedule>  glucagon  Injectable 1 milliGRAM(s) IntraMuscular once  insulin glargine Injectable (LANTUS) 5 Unit(s) SubCutaneous at bedtime  insulin lispro (ADMELOG) corrective regimen sliding scale   SubCutaneous at bedtime  insulin lispro (ADMELOG) corrective regimen sliding scale   SubCutaneous three times a day before meals  pantoprazole    Tablet 40 milliGRAM(s) Oral before breakfast  senna 2 Tablet(s) Oral at bedtime  sevelamer carbonate 1600 milliGRAM(s) Oral three times a day with meals    MEDICATIONS  (PRN):  acetaminophen     Tablet .. 650 milliGRAM(s) Oral every 6 hours PRN Mild Pain (1 - 3), Moderate Pain (4 - 6)  melatonin 3 milliGRAM(s) Oral at bedtime PRN Insomnia  polyethylene glycol 3350 17 Gram(s) Oral two times a day PRN Constipation

## 2021-11-18 LAB
ANION GAP SERPL CALC-SCNC: 11 MMOL/L — SIGNIFICANT CHANGE UP (ref 5–17)
BUN SERPL-MCNC: 89 MG/DL — HIGH (ref 7–23)
CALCIUM SERPL-MCNC: 8.7 MG/DL — SIGNIFICANT CHANGE UP (ref 8.4–10.5)
CHLORIDE SERPL-SCNC: 95 MMOL/L — LOW (ref 96–108)
CO2 SERPL-SCNC: 26 MMOL/L — SIGNIFICANT CHANGE UP (ref 22–31)
CREAT SERPL-MCNC: 8.78 MG/DL — HIGH (ref 0.5–1.3)
GLUCOSE BLDC GLUCOMTR-MCNC: 101 MG/DL — HIGH (ref 70–99)
GLUCOSE BLDC GLUCOMTR-MCNC: 117 MG/DL — HIGH (ref 70–99)
GLUCOSE BLDC GLUCOMTR-MCNC: 128 MG/DL — HIGH (ref 70–99)
GLUCOSE BLDC GLUCOMTR-MCNC: 194 MG/DL — HIGH (ref 70–99)
GLUCOSE SERPL-MCNC: 120 MG/DL — HIGH (ref 70–99)
HCT VFR BLD CALC: 25.1 % — LOW (ref 39–50)
HGB BLD-MCNC: 8 G/DL — LOW (ref 13–17)
MCHC RBC-ENTMCNC: 30.2 PG — SIGNIFICANT CHANGE UP (ref 27–34)
MCHC RBC-ENTMCNC: 31.9 GM/DL — LOW (ref 32–36)
MCV RBC AUTO: 94.7 FL — SIGNIFICANT CHANGE UP (ref 80–100)
NRBC # BLD: 0 /100 WBCS — SIGNIFICANT CHANGE UP (ref 0–0)
PHOSPHATE SERPL-MCNC: 5 MG/DL — HIGH (ref 2.5–4.5)
PLATELET # BLD AUTO: 257 K/UL — SIGNIFICANT CHANGE UP (ref 150–400)
POTASSIUM SERPL-MCNC: 5 MMOL/L — SIGNIFICANT CHANGE UP (ref 3.5–5.3)
POTASSIUM SERPL-SCNC: 5 MMOL/L — SIGNIFICANT CHANGE UP (ref 3.5–5.3)
RBC # BLD: 2.65 M/UL — LOW (ref 4.2–5.8)
RBC # FLD: 15.2 % — HIGH (ref 10.3–14.5)
SODIUM SERPL-SCNC: 132 MMOL/L — LOW (ref 135–145)
WBC # BLD: 9.08 K/UL — SIGNIFICANT CHANGE UP (ref 3.8–10.5)
WBC # FLD AUTO: 9.08 K/UL — SIGNIFICANT CHANGE UP (ref 3.8–10.5)

## 2021-11-18 PROCEDURE — 99233 SBSQ HOSP IP/OBS HIGH 50: CPT

## 2021-11-18 PROCEDURE — 93010 ELECTROCARDIOGRAM REPORT: CPT

## 2021-11-18 RX ORDER — ERYTHROPOIETIN 10000 [IU]/ML
10000 INJECTION, SOLUTION INTRAVENOUS; SUBCUTANEOUS ONCE
Refills: 0 | Status: COMPLETED | OUTPATIENT
Start: 2021-11-18 | End: 2021-11-18

## 2021-11-18 RX ORDER — ERYTHROPOIETIN 10000 [IU]/ML
10000 INJECTION, SOLUTION INTRAVENOUS; SUBCUTANEOUS
Refills: 0 | Status: DISCONTINUED | OUTPATIENT
Start: 2021-11-18 | End: 2021-11-27

## 2021-11-18 RX ADMIN — ERYTHROPOIETIN 10000 UNIT(S): 10000 INJECTION, SOLUTION INTRAVENOUS; SUBCUTANEOUS at 15:30

## 2021-11-18 RX ADMIN — Medication 3 MILLIGRAM(S): at 21:23

## 2021-11-18 RX ADMIN — SEVELAMER CARBONATE 1600 MILLIGRAM(S): 2400 POWDER, FOR SUSPENSION ORAL at 07:50

## 2021-11-18 RX ADMIN — Medication 81 MILLIGRAM(S): at 11:29

## 2021-11-18 RX ADMIN — PANTOPRAZOLE SODIUM 40 MILLIGRAM(S): 20 TABLET, DELAYED RELEASE ORAL at 06:17

## 2021-11-18 RX ADMIN — APIXABAN 2.5 MILLIGRAM(S): 2.5 TABLET, FILM COATED ORAL at 06:17

## 2021-11-18 RX ADMIN — SEVELAMER CARBONATE 1600 MILLIGRAM(S): 2400 POWDER, FOR SUSPENSION ORAL at 18:16

## 2021-11-18 RX ADMIN — APIXABAN 2.5 MILLIGRAM(S): 2.5 TABLET, FILM COATED ORAL at 18:16

## 2021-11-18 RX ADMIN — Medication 1 APPLICATION(S): at 11:29

## 2021-11-18 RX ADMIN — INSULIN GLARGINE 5 UNIT(S): 100 INJECTION, SOLUTION SUBCUTANEOUS at 21:23

## 2021-11-18 RX ADMIN — SEVELAMER CARBONATE 1600 MILLIGRAM(S): 2400 POWDER, FOR SUSPENSION ORAL at 11:30

## 2021-11-18 NOTE — PROGRESS NOTE ADULT - SUBJECTIVE AND OBJECTIVE BOX
Seen on HD    Vital Signs Last 24 Hrs  T(C): 36.4 (11-18-21 @ 17:56), Max: 37.1 (11-18-21 @ 14:20)  T(F): 97.5 (11-18-21 @ 17:56), Max: 98.8 (11-18-21 @ 14:20)  HR: 62 (11-18-21 @ 17:56) (47 - 62)  BP: 119/65 (11-18-21 @ 17:56) (119/65 - 155/71)  RR: 16 (11-18-21 @ 17:56) (14 - 16)  SpO2: 98% (11-18-21 @ 17:56) (98% - 100%)    s1s2  b/l air entry  soft  sm edema                                                                  8.0    9.08  )-----------( 257      ( 18 Nov 2021 14:20 )             25.1     18 Nov 2021 14:20    132    |  95     |  89     ----------------------------<  120    5.0     |  26     |  8.78     Ca    8.7        18 Nov 2021 14:20  Phos  5.0       18 Nov 2021 14:20    acetaminophen     Tablet .. 650 milliGRAM(s) Oral every 6 hours PRN  apixaban 2.5 milliGRAM(s) Oral two times a day  AQUAPHOR (petrolatum Ointment) 1 Application(s) Topical daily  aspirin  chewable 81 milliGRAM(s) Oral daily  dextrose 40% Gel 15 Gram(s) Oral once  dextrose 5%. 1000 milliLiter(s) IV Continuous <Continuous>  dextrose 5%. 1000 milliLiter(s) IV Continuous <Continuous>  dextrose 50% Injectable 25 Gram(s) IV Push once  dextrose 50% Injectable 12.5 Gram(s) IV Push once  dextrose 50% Injectable 25 Gram(s) IV Push once  epoetin vern-epbx (RETACRIT) Injectable 34334 Unit(s) SubCutaneous <User Schedule>  glucagon  Injectable 1 milliGRAM(s) IntraMuscular once  hemorrhoidal Ointment 1 Application(s) Rectal three times a day PRN  insulin glargine Injectable (LANTUS) 5 Unit(s) SubCutaneous at bedtime  insulin lispro (ADMELOG) corrective regimen sliding scale   SubCutaneous at bedtime  insulin lispro (ADMELOG) corrective regimen sliding scale   SubCutaneous three times a day before meals  melatonin 3 milliGRAM(s) Oral at bedtime PRN  pantoprazole    Tablet 40 milliGRAM(s) Oral before breakfast  polyethylene glycol 3350 17 Gram(s) Oral two times a day PRN  senna 2 Tablet(s) Oral at bedtime  sevelamer carbonate 1600 milliGRAM(s) Oral three times a day with meals    A/P:    S/p CABG x 2, Mitral Valve Repair, and left atrial appendage ligation 10/21/21  Hospital course complicated by JOSHUA/CKD  Now on HD 3 x week  Renal diet  TMP as able  HD TTS  Epoetin w/each HD  Sevelamer for high Phos  Bld work w/each HD    [phone]

## 2021-11-18 NOTE — PROGRESS NOTE ADULT - ASSESSMENT
61 y/o M w/ hx of DM2, HTN and HLD, transfer from [facility] for NSTEMI, admitted w/ JOSHUA on CKD and hypertensive emergency, new start to HD (10/13). He underwent LHC showing Multivessel CAD. 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  s/p CABG x2 (10/21), MV repair, and left atrial ligation. Permacath placed 11/1/21 and LUE AV Fistula placed by vascular on 11/2/21. Her was noted to have PACs and Afib - started on amiodarone and Eliquis  Now admitted to [facility] after for initiation of a multidisciplinary rehab program consisting focused on functional mobility, transfers and ADLs- pt/ot/dvt ppx    #NSTEMI 2/2 CAD s/p CABG (10/21)  #s/p MV repair  #HLD  - s/p LHC on 10/14 showing severe multivessel disease; now s/p CABG on 10/21. Echocardiogram reviewed w/ EF 75% (10/28)  - aspirin  - statin  - Eliquis       # ESRD on HD ([facility]), due to diabetic nephropathy  # anemia of chronic dz 2/2 ESRD  - received blood transfusion at OSH.   - HD at [facility] on discharge, renal f/u  - retacrit     # paroxysmal atrial fibrillation   # tachy blaine syndrome  -cont apixaban  -hold AV Christina agents due to bradycardia  -amiodarone - now held due to bradycardia.   -cardio consult noted  -can restart amiodarone if becomes tachycardic. Pt developed AF post operatively, so it is possible amio was only needed short term.  QTc prolongation  avoid QT prolonging agents, monitor on daily ECG.     # DM2  - Accu-Cheks noted  -hgba1c 7%  - decrease Lantus to 5,  11/17/21  - d/c pre meal 11/16/21  - c/w ISS, Accu-Cheks  - monitor     # subclinical hypothyroidism  - TSH mildly elevated 6.4, pt asymptomatic  - seen by Endo during admission. Outpt f/u.       # hx of severe tobacco use d/o  - smoked 3 ppd, recently quit a few months ago    # sacral wound--sacral stage 2 ulcer  - appreciate wound care consult.     dvt ppx: cont apixaban    will follow   d/w dr. patterson

## 2021-11-18 NOTE — PROGRESS NOTE ADULT - ATTENDING COMMENTS
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 Admitted to acute rehab 11/8//21    Sustained clinical stability except transient episodes of fatigue prior to HD probably due to uremia  No more bradycardia, HR controlled off amiodarone   Team discussion today--executive function deficits, poor safety awareness, absence of dedicated care giver  ambulating 95FT with RW  Negotiated 10 steps  Awaiting Orthotic review for knee instability R>L, has good ankle strength    Plan  --Moderate protein-calorie malnutrition. on f/u with RD  --Continue therapy to address functional and cognitive deficits  --Orthotics review for knee instability  Previously estimated discharge date 11/23 extended to 11/25 to give time for further functional gains and address care giver issues    Stable to continue acute rehab, seen with Dr Montez, Rehab Med Resident.

## 2021-11-18 NOTE — PROGRESS NOTE ADULT - SUBJECTIVE AND OBJECTIVE BOX
59 y/o M w/ hx of DM2, HTN and HLD, transfer from [facility] for NSTEMI, admitted w/ JOSHUA on CKD and hypertensive emergency, new start to HD (10/13). He underwent LHC showing Multivessel CAD. 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  s/p CABG x2 (10/21), MV repair, and left atrial ligation. Permacath placed 11/1/21 and LUE AV Fistula placed by vascular on 11/2/21. Her was noted to have PACs and Afib     seen at the bedside, no n/v, no sob, no chest pain    Vital Signs Last 24 Hrs  T(C): 36.7 (18 Nov 2021 07:31), Max: 36.7 (18 Nov 2021 07:31)  T(F): 98 (18 Nov 2021 07:31), Max: 98 (18 Nov 2021 07:31)  HR: 57 (18 Nov 2021 07:31) (57 - 61)  BP: 121/68 (18 Nov 2021 07:31) (121/68 - 144/73)  BP(mean): --  RR: 14 (18 Nov 2021 07:31) (14 - 16)  SpO2: 98% (18 Nov 2021 07:31) (98% - 98%)      GENERAL- NAD  EAR/NOSE/MOUTH/THROAT - no pharyngeal exudates, no oral lesions  MMM  EYES- JOSEPH, conjunctiva and Sclera clear  NECK- supple  RESPIRATORY-  clear to auscultation bilaterally, non laboured breathing  CARDIOVASCULAR - SIS2, rrr  GI - soft NT BS present  EXTREMITIES- no pedal edema  NEUROLOGY- no gross focal deficits  SKIN- chest incision clean  PSYCHIATRY- AAO X 3        ECG reviewed and interpreted: sinus 52     CAPILLARY BLOOD GLUCOSE      POCT Blood Glucose.: 101 mg/dL (18 Nov 2021 07:24)  POCT Blood Glucose.: 169 mg/dL (17 Nov 2021 21:12)  POCT Blood Glucose.: 185 mg/dL (17 Nov 2021 16:50)  POCT Blood Glucose.: 139 mg/dL (17 Nov 2021 11:46)    MEDICATIONS  (STANDING):  apixaban 2.5 milliGRAM(s) Oral two times a day  AQUAPHOR (petrolatum Ointment) 1 Application(s) Topical daily  aspirin  chewable 81 milliGRAM(s) Oral daily  epoetin vern-epbx (RETACRIT) Injectable 41728 Unit(s) SubCutaneous <User Schedule>  glucagon  Injectable 1 milliGRAM(s) IntraMuscular once  insulin glargine Injectable (LANTUS) 5 Unit(s) SubCutaneous at bedtime  insulin lispro (ADMELOG) corrective regimen sliding scale   SubCutaneous at bedtime  insulin lispro (ADMELOG) corrective regimen sliding scale   SubCutaneous three times a day before meals  pantoprazole    Tablet 40 milliGRAM(s) Oral before breakfast  senna 2 Tablet(s) Oral at bedtime  sevelamer carbonate 1600 milliGRAM(s) Oral three times a day with meals    MEDICATIONS  (PRN):  acetaminophen     Tablet .. 650 milliGRAM(s) Oral every 6 hours PRN Mild Pain (1 - 3), Moderate Pain (4 - 6)  hemorrhoidal Ointment 1 Application(s) Rectal three times a day PRN Hemorrhoids  melatonin 3 milliGRAM(s) Oral at bedtime PRN Insomnia  polyethylene glycol 3350 17 Gram(s) Oral two times a day PRN Constipation

## 2021-11-18 NOTE — PROGRESS NOTE ADULT - SUBJECTIVE AND OBJECTIVE BOX
HPI:  61 y/o M w/ hx of DM2, HTN and HLD initially presented as transfer from [facility] for chest pain concerning for NSTEMI and possible new CHF c/b JOSHUA likely on CKD, and hypertensive emergency on 10/6/21. Hospital course complicated by s/p RRT for CVA/TIA ruled out and worsening anemia requiring 1 PRBC. Pt S/p HD session today and underwent LHC showing Multivessel CAD. CT surgery consulted for CABG evaluation. Patient developed left sided facial droop on 10/7 and stroke code was called. 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 Patient started Hemodialysis on 10/13. On 10/21 s/p CABGx2, Mitral Valve Repair, and left atrial appendage ligation, was extubated next day. He developed SOB on 10/24 and requeired bipap,  but was eventually trasitioned back down to [place]. Permacath placed 11/1/21 and LUE AV Fistula placed by vascular on 11/2/21. Her was noted to have PACs and Afib - started on amiodarone. He will be getting HD at [place]. Cleared by vascular to restart eliquis 2.5mg bid. HD schedule changed from T/T/S to M/W/F.   Patient evaluated by PM&R PT/OT and recommended acute rehab. Patient admitted to [facility] on 11/8/21 for debility. (08 Nov 2021 14:09)      INTERVAL HPI/OVERNIGHT EVENTS:  Chart Reviewed and patient seen at bedside.  Patient doing well in therapy.  +BM, good appetite.  EKGs have been stable w/ improving HR  Orthotist to come today to eval recurvatum.  May benefit with more time.      ROS:  Denies fevers, chills, chest pain, shortness of breath, abdominal pain, headaches, nausea/vomiting    Vital Signs Last 24 Hrs  T(C): 36.7 (18 Nov 2021 07:31), Max: 36.7 (18 Nov 2021 07:31)  T(F): 98 (18 Nov 2021 07:31), Max: 98 (18 Nov 2021 07:31)  HR: 57 (18 Nov 2021 07:31) (57 - 61)  BP: 121/68 (18 Nov 2021 07:31) (121/68 - 144/73)  BP(mean): --  RR: 14 (18 Nov 2021 07:31) (14 - 16)  SpO2: 98% (18 Nov 2021 07:31) (98% - 98%)    Physical Exam:  Gen - NAD, Comfortable  HEENT - NCAT, EOMI, MMM  Neck - Supple, No limited ROM  Pulm - CTAB, No wheeze, No rhonchi, No crackles  Cardiovascular - RRR, S1S2, sternotomy incision in situ  Abdomen - Soft, NT/ND, +BS  Extremities - No edema or tenderness    Neuro-  Cognitive - AAOx2-3  Motor -UE 5/5, LE 4/5     Sensory - Intact to LT  Reflexes - DTR Intact, No primitive reflex  Coordination - No dysmetria  Tone - normal  Psychiatric - Mood stable, Affect WNL  Skin: Sacral/left buttock sacral decubital ulcer with visible subcutaneous/dermis, dry b/l heels, permacath is c/d/i, LUE AVF site c/d/i sternal incision site is also c/d/i      LABS:  11-15    135  |  97  |  92<H>  ----------------------------<  95  5.1   |  26  |  8.64<H>    Ca    8.8      15 Nov 2021 15:00                                                7.9    8.41  )-----------( 290      ( 15 Nov 2021 15:00 )             24.8     CAPILLARY BLOOD GLUCOSE      POCT Blood Glucose.: 101 mg/dL (18 Nov 2021 07:24)  POCT Blood Glucose.: 169 mg/dL (17 Nov 2021 21:12)  POCT Blood Glucose.: 185 mg/dL (17 Nov 2021 16:50)  POCT Blood Glucose.: 139 mg/dL (17 Nov 2021 11:46)      MEDICATIONS:  MEDICATIONS  (STANDING):  apixaban 2.5 milliGRAM(s) Oral two times a day  AQUAPHOR (petrolatum Ointment) 1 Application(s) Topical daily  aspirin  chewable 81 milliGRAM(s) Oral daily  dextrose 40% Gel 15 Gram(s) Oral once  dextrose 5%. 1000 milliLiter(s) (50 mL/Hr) IV Continuous <Continuous>  dextrose 5%. 1000 milliLiter(s) (100 mL/Hr) IV Continuous <Continuous>  dextrose 50% Injectable 25 Gram(s) IV Push once  dextrose 50% Injectable 12.5 Gram(s) IV Push once  dextrose 50% Injectable 25 Gram(s) IV Push once  epoetin vern-epbx (RETACRIT) Injectable 64292 Unit(s) SubCutaneous <User Schedule>  glucagon  Injectable 1 milliGRAM(s) IntraMuscular once  insulin glargine Injectable (LANTUS) 5 Unit(s) SubCutaneous at bedtime  insulin lispro (ADMELOG) corrective regimen sliding scale   SubCutaneous at bedtime  insulin lispro (ADMELOG) corrective regimen sliding scale   SubCutaneous three times a day before meals  pantoprazole    Tablet 40 milliGRAM(s) Oral before breakfast  senna 2 Tablet(s) Oral at bedtime  sevelamer carbonate 1600 milliGRAM(s) Oral three times a day with meals    MEDICATIONS  (PRN):  acetaminophen     Tablet .. 650 milliGRAM(s) Oral every 6 hours PRN Mild Pain (1 - 3), Moderate Pain (4 - 6)  hemorrhoidal Ointment 1 Application(s) Rectal three times a day PRN Hemorrhoids  melatonin 3 milliGRAM(s) Oral at bedtime PRN Insomnia  polyethylene glycol 3350 17 Gram(s) Oral two times a day PRN Constipation         HPI:  61 y/o M w/ hx of DM2, HTN and HLD initially presented as transfer from [facility] for chest pain concerning for NSTEMI and possible new CHF c/b JOSHUA likely on CKD, and hypertensive emergency on 10/6/21. Hospital course complicated by s/p RRT for CVA/TIA ruled out and worsening anemia requiring 1 PRBC. Pt S/p HD session today and underwent LHC showing Multivessel CAD. CT surgery consulted for CABG evaluation. Patient developed left sided facial droop on 10/7 and stroke code was called. 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 Patient started Hemodialysis on 10/13. On 10/21 s/p CABGx2, Mitral Valve Repair, and left atrial appendage ligation, was extubated next day. He developed SOB on 10/24 and requeired bipap,  but was eventually trasitioned back down to [place]. Permacath placed 11/1/21 and LUE AV Fistula placed by vascular on 11/2/21. Her was noted to have PACs and Afib - started on amiodarone. He will be getting HD at [place]. Cleared by vascular to restart eliquis 2.5mg bid. HD schedule changed from T/T/S to M/W/F.   Patient evaluated by PM&R PT/OT and recommended acute rehab. Patient admitted to [facility] on 11/8/21 for debility. (08 Nov 2021 14:09)      INTERVAL HPI/OVERNIGHT EVENTS:  Chart Reviewed and patient seen at bedside.  Patient doing well in therapy.  +BM, good appetite.  EKGs have been stable w/ improving HR  Orthotist to come today to eval recurvatum.  May benefit with more time.      ROS:  Denies fevers, chills, chest pain, shortness of breath, abdominal pain, headaches, nausea/vomiting    Vital Signs Last 24 Hrs  T(C): 36.7 (18 Nov 2021 07:31), Max: 36.7 (18 Nov 2021 07:31)  T(F): 98 (18 Nov 2021 07:31), Max: 98 (18 Nov 2021 07:31)  HR: 57 (18 Nov 2021 07:31) (57 - 61)  BP: 121/68 (18 Nov 2021 07:31) (121/68 - 144/73)  BP(mean): --  RR: 14 (18 Nov 2021 07:31) (14 - 16)  SpO2: 98% (18 Nov 2021 07:31) (98% - 98%)    Physical Exam:  Gen - NAD, Comfortable  HEENT - NCAT, EOMI, MMM  Neck - Supple, No limited ROM  Pulm - CTAB, No wheeze, No rhonchi, No crackles  Cardiovascular - RRR, S1S2, sternotomy incision in situ  Abdomen - Soft, NT/ND, +BS  Extremities - No edema or tenderness    Neuro-  Cognitive - AAOx2-3  Motor -UE 5/5, LE 4/5     Sensory - Intact to LT  Reflexes - DTR Intact, No primitive reflex  Coordination - No dysmetria  Tone - normal  Psychiatric - Mood stable, Affect WNL  Skin: Sacral/left buttock sacral decubital ulcer with visible subcutaneous/dermis, dry b/l heels, permacath is c/d/i, LUE AVF site c/d/i sternal incision site is also c/d/i      LABS:  11-15    135  |  97  |  92<H>  ----------------------------<  95  5.1   |  26  |  8.64<H>    Ca    8.8      15 Nov 2021 15:00                                      7.9    8.41  )-----------( 290      ( 15 Nov 2021 15:00 )             24.8     CAPILLARY BLOOD GLUCOSE      POCT Blood Glucose.: 101 mg/dL (18 Nov 2021 07:24)  POCT Blood Glucose.: 169 mg/dL (17 Nov 2021 21:12)  POCT Blood Glucose.: 185 mg/dL (17 Nov 2021 16:50)  POCT Blood Glucose.: 139 mg/dL (17 Nov 2021 11:46)      MEDICATIONS:  MEDICATIONS  (STANDING):  apixaban 2.5 milliGRAM(s) Oral two times a day  AQUAPHOR (petrolatum Ointment) 1 Application(s) Topical daily  aspirin  chewable 81 milliGRAM(s) Oral daily  dextrose 40% Gel 15 Gram(s) Oral once  dextrose 5%. 1000 milliLiter(s) (50 mL/Hr) IV Continuous <Continuous>  dextrose 5%. 1000 milliLiter(s) (100 mL/Hr) IV Continuous <Continuous>  dextrose 50% Injectable 25 Gram(s) IV Push once  dextrose 50% Injectable 12.5 Gram(s) IV Push once  dextrose 50% Injectable 25 Gram(s) IV Push once  epoetin vern-epbx (RETACRIT) Injectable 77727 Unit(s) SubCutaneous <User Schedule>  glucagon  Injectable 1 milliGRAM(s) IntraMuscular once  insulin glargine Injectable (LANTUS) 5 Unit(s) SubCutaneous at bedtime  insulin lispro (ADMELOG) corrective regimen sliding scale   SubCutaneous at bedtime  insulin lispro (ADMELOG) corrective regimen sliding scale   SubCutaneous three times a day before meals  pantoprazole    Tablet 40 milliGRAM(s) Oral before breakfast  senna 2 Tablet(s) Oral at bedtime  sevelamer carbonate 1600 milliGRAM(s) Oral three times a day with meals    MEDICATIONS  (PRN):  acetaminophen     Tablet .. 650 milliGRAM(s) Oral every 6 hours PRN Mild Pain (1 - 3), Moderate Pain (4 - 6)  hemorrhoidal Ointment 1 Application(s) Rectal three times a day PRN Hemorrhoids  melatonin 3 milliGRAM(s) Oral at bedtime PRN Insomnia  polyethylene glycol 3350 17 Gram(s) Oral two times a day PRN Constipation         HPI:  59 y/o M w/ hx of DM2, HTN and HLD initially presented as transfer from [facility] for chest pain concerning for NSTEMI and possible new CHF c/b JOSHUA likely on CKD, and hypertensive emergency on 10/6/21. Hospital course complicated by s/p RRT for CVA/TIA ruled out and worsening anemia requiring 1 PRBC. Pt S/p HD session today and underwent LHC showing Multivessel CAD. CT surgery consulted for CABG evaluation. Patient developed left sided facial droop on 10/7 and stroke code was called. 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 Patient started Hemodialysis on 10/13. On 10/21 s/p CABGx2, Mitral Valve Repair, and left atrial appendage ligation, was extubated next day. He developed SOB on 10/24 and requeired bipap,  but was eventually trasitioned back down to [place]. Permacath placed 11/1/21 and LUE AV Fistula placed by vascular on 11/2/21. Her was noted to have PACs and Afib - started on amiodarone. He will be getting HD at [place]. Cleared by vascular to restart eliquis 2.5mg bid. HD schedule changed from T/T/S to M/W/F.   Patient evaluated by PM&R PT/OT and recommended acute rehab. Patient admitted to [facility] on 11/8/21 for debility. (08 Nov 2021 14:09)      INTERVAL HPI/OVERNIGHT EVENTS:  Chart Reviewed and patient seen at bedside.  Patient doing well in therapy.  +BM, good appetite.  EKGs have been stable w/ improving HR  Orthotist to come today to eval recurvatum.  May benefit with more time.      ROS:  Denies fevers, chills, chest pain, shortness of breath, abdominal pain, headaches, nausea/vomiting    Vital Signs Last 24 Hrs  T(C): 36.7 (18 Nov 2021 07:31), Max: 36.7 (18 Nov 2021 07:31)  T(F): 98 (18 Nov 2021 07:31), Max: 98 (18 Nov 2021 07:31)  HR: 57 (18 Nov 2021 07:31) (57 - 61)  BP: 121/68 (18 Nov 2021 07:31) (121/68 - 144/73)  BP(mean): --  RR: 14 (18 Nov 2021 07:31) (14 - 16)  SpO2: 98% (18 Nov 2021 07:31) (98% - 98%)    Physical Exam:  Gen - NAD, Comfortable  HEENT - NCAT, EOMI, MMM  Neck - Supple, No limited ROM  Pulm - CTAB, No wheeze, No rhonchi, No crackles  Cardiovascular - RRR, S1S2, sternotomy incision in situ  Abdomen - Soft, NT/ND, +BS  Extremities - No edema or tenderness    Neuro-  Cognitive - AAOx2-3  Motor -UE 5/5, LE 4/5   ; right genu recurvatum, possible left.  Sensory - Intact to LT  Reflexes - DTR Intact, No primitive reflex  Coordination - No dysmetria  Tone - normal  Psychiatric - Mood stable, Affect WNL  Skin: Sacral/left buttock sacral decubital ulcer with visible subcutaneous/dermis, dry b/l heels, permacath is c/d/i, LUE AVF site c/d/i sternal incision site is also c/d/i      LABS:  11-15    135  |  97  |  92<H>  ----------------------------<  95  5.1   |  26  |  8.64<H>    Ca    8.8      15 Nov 2021 15:00                                      7.9    8.41  )-----------( 290      ( 15 Nov 2021 15:00 )             24.8     CAPILLARY BLOOD GLUCOSE      POCT Blood Glucose.: 101 mg/dL (18 Nov 2021 07:24)  POCT Blood Glucose.: 169 mg/dL (17 Nov 2021 21:12)  POCT Blood Glucose.: 185 mg/dL (17 Nov 2021 16:50)  POCT Blood Glucose.: 139 mg/dL (17 Nov 2021 11:46)      MEDICATIONS:  MEDICATIONS  (STANDING):  apixaban 2.5 milliGRAM(s) Oral two times a day  AQUAPHOR (petrolatum Ointment) 1 Application(s) Topical daily  aspirin  chewable 81 milliGRAM(s) Oral daily  dextrose 40% Gel 15 Gram(s) Oral once  dextrose 5%. 1000 milliLiter(s) (50 mL/Hr) IV Continuous <Continuous>  dextrose 5%. 1000 milliLiter(s) (100 mL/Hr) IV Continuous <Continuous>  dextrose 50% Injectable 25 Gram(s) IV Push once  dextrose 50% Injectable 12.5 Gram(s) IV Push once  dextrose 50% Injectable 25 Gram(s) IV Push once  epoetin vern-epbx (RETACRIT) Injectable 10199 Unit(s) SubCutaneous <User Schedule>  glucagon  Injectable 1 milliGRAM(s) IntraMuscular once  insulin glargine Injectable (LANTUS) 5 Unit(s) SubCutaneous at bedtime  insulin lispro (ADMELOG) corrective regimen sliding scale   SubCutaneous at bedtime  insulin lispro (ADMELOG) corrective regimen sliding scale   SubCutaneous three times a day before meals  pantoprazole    Tablet 40 milliGRAM(s) Oral before breakfast  senna 2 Tablet(s) Oral at bedtime  sevelamer carbonate 1600 milliGRAM(s) Oral three times a day with meals    MEDICATIONS  (PRN):  acetaminophen     Tablet .. 650 milliGRAM(s) Oral every 6 hours PRN Mild Pain (1 - 3), Moderate Pain (4 - 6)  hemorrhoidal Ointment 1 Application(s) Rectal three times a day PRN Hemorrhoids  melatonin 3 milliGRAM(s) Oral at bedtime PRN Insomnia  polyethylene glycol 3350 17 Gram(s) Oral two times a day PRN Constipation

## 2021-11-18 NOTE — PROGRESS NOTE ADULT - ASSESSMENT
TOM PLEITEZ is a 61y with a history of DM2, ?CKD, HTN and HLD  who presented to [facility] on 10/6/21 with SOB, CP found to have multivessel CAD and JOSHUA on CKD, s/p CABGx2, MVR, and LAAL. Started on HD T/T/S, now on HD M/W/F s/p Permacath and LUE AVF. Hospital Course complicated by post-op hemorrhagic shock , received 4 PRBC, 1 PLT, 1000 FIEBA intraop and post op dual pressor and inotrope support w/ , Primacor, Levo, and Epi gtts.  Afib, PACs- now on amiodarone. Patient now admitted for a multidisciplinary rehab program. 21 @ 14:40    *Gradually advance diet - wife has still not brought in dentures but patient says he prefers pureed diet  *Orthotic review for Rt knee instability  *DM: lispro decreased to 5/5/5 units pre meals  * Moderate protein-calorie malnutrition.    #Debility  - Gait Instability, ADL impairments and Functional impairments: start Comprehensive Rehab Program of PT/OT/SLP  - 3 hours a day, 5 days a week  - Orthotics eval today for right genu recurvatum and possible left.  - also will benefit from cardiopulmonary conditioning.  - SLP for cognitive and memory deficits    #CAD and Afib s/p CABGx2, LAAL, MVR with Tachy-Seth Syndrome  - Most recent echo on 10/28 shows restoration of LVEF   - d/[place] amiodatone  - c/w eliquis 2.5mg bid  - c/w ASA 81 daily  - close f/u Medicine  - Cardiologist recs appreciated: Dr. Stewart  rec d/c amiodarone   - Will discontinue daily EKGs after today  - If tachy then speak w/ cardiology regarding restarting amiodarone.      #?CKD on HD - M/W/F  - retacrit 8000ui intraHD, renal f/u  - Permacath placed 21 and LUE AVF placed   - chlorhexidine to keep access sites clean    #DM- with hypoglycemia-revised insulin regime    - lantus 16 unit qhs  - decreased lispro 5/5/5  # Rt knee instability--orthotic assessment   #Sleep  - melatonin PRN     #Skin  - Skin on admission: Sacral/left buttock sacral decubital ulcer with visible subcutaneous/dermis, dry b/l heels, permacath is c/d/i, LUE AVF site c/d/i, drain sites c/d/i; sternal incision site is c/d/i  - Pressure injury/Skin: OOB to Chair, PT/OT   - continue monitoring wound, incision and drain sites.  - Wound consult  - alyvene foam dressing and cavilon to wound    # Malnutrition--Moderate protein-calorie malnutrition.  #Pain Mgmt  - Tylenol PRN    #GI/Bowel Mgmt   - Continent c/w Senna, Miralax    #/Bladder Mgmt --Voiding, PVRs low, urine quantity unclear if oligo/anuric    #FEN   - Diet - Pureed w/ Nectar  [Renal, DASH/TLC]    - per SLP diet upgraded to Pureed with thin liquids -  wife has still not brought in dentures but patient says he prefers pureed diet      #Precautions / PROPHYLAXIS:   - Falls, Cardiac, Sternal  - ortho: Weight bearing status: WBAT   - Lungs: Aspiration, Incentive Spirometer   - DVT: eliquis    IDT   SW: lives w/ spouse,w/ 8 steps in home, no hand rail. Wife works weekend but can help  Functional: eating mod I, upper body dressing set up, lbd mod/min A, bathing min a, toilet transfers CG/min A, toileting min A, short transfer min A; ambulates 40 ft RW mod A, transfers min A. Has mild expressive and receptive aphasia, anomia, possibly from underlying cognitive deficit.   SLP noted deficits with communication and executive function for which he is receiving  therapy  Barriers to d/c --deficits with communication/executive fxn, medical issues (low HR), suboptimal motor strength, poor balance ,Rt Knee instability  Goals--independence with transfers, ambulate without supervision, with gait assistance, cane  EDOD:  [facility] Home w/ HC vs [facility]  Spoke w/ Wife Marcus [phone] on 21. -- provided update and inquired about reading glasses, which patient doesn't have a home.

## 2021-11-19 LAB
GLUCOSE BLDC GLUCOMTR-MCNC: 105 MG/DL — HIGH (ref 70–99)
GLUCOSE BLDC GLUCOMTR-MCNC: 116 MG/DL — HIGH (ref 70–99)
GLUCOSE BLDC GLUCOMTR-MCNC: 125 MG/DL — HIGH (ref 70–99)
GLUCOSE BLDC GLUCOMTR-MCNC: 131 MG/DL — HIGH (ref 70–99)

## 2021-11-19 PROCEDURE — 99233 SBSQ HOSP IP/OBS HIGH 50: CPT

## 2021-11-19 PROCEDURE — 96116 NUBHVL XM PHYS/QHP 1ST HR: CPT

## 2021-11-19 RX ORDER — TUBERCULIN PURIFIED PROTEIN DERIVATIVE 5 [IU]/.1ML
5 INJECTION, SOLUTION INTRADERMAL ONCE
Refills: 0 | Status: COMPLETED | OUTPATIENT
Start: 2021-11-19 | End: 2021-11-19

## 2021-11-19 RX ADMIN — PANTOPRAZOLE SODIUM 40 MILLIGRAM(S): 20 TABLET, DELAYED RELEASE ORAL at 06:04

## 2021-11-19 RX ADMIN — INSULIN GLARGINE 5 UNIT(S): 100 INJECTION, SOLUTION SUBCUTANEOUS at 21:55

## 2021-11-19 RX ADMIN — Medication 3 MILLIGRAM(S): at 21:54

## 2021-11-19 RX ADMIN — Medication 1 APPLICATION(S): at 11:56

## 2021-11-19 RX ADMIN — SEVELAMER CARBONATE 1600 MILLIGRAM(S): 2400 POWDER, FOR SUSPENSION ORAL at 17:47

## 2021-11-19 RX ADMIN — SEVELAMER CARBONATE 1600 MILLIGRAM(S): 2400 POWDER, FOR SUSPENSION ORAL at 11:56

## 2021-11-19 RX ADMIN — SEVELAMER CARBONATE 1600 MILLIGRAM(S): 2400 POWDER, FOR SUSPENSION ORAL at 07:49

## 2021-11-19 RX ADMIN — APIXABAN 2.5 MILLIGRAM(S): 2.5 TABLET, FILM COATED ORAL at 06:03

## 2021-11-19 RX ADMIN — SENNA PLUS 2 TABLET(S): 8.6 TABLET ORAL at 21:54

## 2021-11-19 RX ADMIN — Medication 81 MILLIGRAM(S): at 11:56

## 2021-11-19 RX ADMIN — APIXABAN 2.5 MILLIGRAM(S): 2.5 TABLET, FILM COATED ORAL at 17:47

## 2021-11-19 NOTE — PROGRESS NOTE ADULT - SUBJECTIVE AND OBJECTIVE BOX
No distress    Vital Signs Last 24 Hrs  T(C): 36.7 (11-19-21 @ 07:18), Max: 36.7 (11-19-21 @ 07:18)  T(F): 98 (11-19-21 @ 07:18), Max: 98 (11-19-21 @ 07:18)  HR: 54 (11-19-21 @ 07:18) (54 - 54)  BP: 117/56 (11-19-21 @ 07:18) (117/56 - 117/56)  RR: 16 (11-19-21 @ 07:18) (16 - 16)  SpO2: 98% (11-19-21 @ 07:18) (98% - 98%)    s1s2  b/l air entry  soft  sm edema                                                                           8.0    9.08  )-----------( 257      ( 18 Nov 2021 14:20 )             25.1     18 Nov 2021 14:20    132    |  95     |  89     ----------------------------<  120    5.0     |  26     |  8.78     Ca    8.7        18 Nov 2021 14:20  Phos  5.0       18 Nov 2021 14:20    acetaminophen     Tablet .. 650 milliGRAM(s) Oral every 6 hours PRN  apixaban 2.5 milliGRAM(s) Oral two times a day  AQUAPHOR (petrolatum Ointment) 1 Application(s) Topical daily  aspirin  chewable 81 milliGRAM(s) Oral daily  dextrose 40% Gel 15 Gram(s) Oral once  dextrose 5%. 1000 milliLiter(s) IV Continuous <Continuous>  dextrose 5%. 1000 milliLiter(s) IV Continuous <Continuous>  dextrose 50% Injectable 25 Gram(s) IV Push once  dextrose 50% Injectable 12.5 Gram(s) IV Push once  dextrose 50% Injectable 25 Gram(s) IV Push once  epoetin vern-epbx (RETACRIT) Injectable 43432 Unit(s) SubCutaneous <User Schedule>  glucagon  Injectable 1 milliGRAM(s) IntraMuscular once  hemorrhoidal Ointment 1 Application(s) Rectal three times a day PRN  insulin glargine Injectable (LANTUS) 5 Unit(s) SubCutaneous at bedtime  insulin lispro (ADMELOG) corrective regimen sliding scale   SubCutaneous at bedtime  insulin lispro (ADMELOG) corrective regimen sliding scale   SubCutaneous three times a day before meals  melatonin 3 milliGRAM(s) Oral at bedtime PRN  pantoprazole    Tablet 40 milliGRAM(s) Oral before breakfast  polyethylene glycol 3350 17 Gram(s) Oral two times a day PRN  senna 2 Tablet(s) Oral at bedtime  sevelamer carbonate 1600 milliGRAM(s) Oral three times a day with meals    A/P:    S/p CABG x 2, Mitral Valve Repair, and left atrial appendage ligation 10/21/21  Hospital course complicated by JOSHUA/CKD  Now on HD 3 x week  Renal diet  TMP as able  HD TTS  Epoetin w/each HD  Sevelamer for high Phos  Bld work w/each HD    [phone]

## 2021-11-19 NOTE — PROGRESS NOTE ADULT - ASSESSMENT
TOM PLEITEZ is a 61y with a history of DM2, ?CKD, HTN and HLD  who presented to [facility] on 10/6/21 with SOB, CP found to have multivessel CAD and JOSHUA on CKD, s/p CABGx2, MVR, and LAAL. Started on HD T/T/S, now on HD M/W/F s/p Permacath and LUE AVF. Hospital Course complicated by post-op hemorrhagic shock , received 4 PRBC, 1 PLT, 1000 FIEBA intraop and post op dual pressor and inotrope support w/ , Primacor, Levo, and Epi gtts.  Afib, PACs- now on amiodarone. Patient now admitted for a multidisciplinary rehab program. 21 @ 14:40    *Gradually advance diet - wife has still not brought in dentures but patient says he prefers pureed diet  *Orthotic review for Rt knee instability  *DM: lispro decreased to 5/5/5 units pre meals  *     #Debility post ESRD on HD, Afib. CABG  - Gait Instability, ADL impairments and Functional impairments: start Comprehensive Rehab Program of PT/OT/SLP  - 3 hours a day, 5 days a week  - Orthotics eval today for right genu recurvatum and possible left.  - also will benefit from cardiopulmonary conditioning.  - SLP for cognitive and memory deficits    #CAD and Afib s/p CABGx2, LAAL, MVR with Tachy-Seth Syndrome  - Most recent echo on 10/28 shows restoration of LVEF   - d/africa amiodatone  - c/w eliquis 2.5mg bid  - c/w ASA 81 daily  - close f/u Medicine  - Cardiologist recs appreciated: Dr. Stewart  rec d/c amiodarone   - EKGs unremarkable, d/africa, recommence if symptomatic  - If tachy then speak w/ cardiology regarding restarting amiodarone.      #?CKD on HD - M/W/F  - retacrit 8000ui intraHD, renal f/u  - Permacath placed 21 and LUE AVF placed   - chlorhexidine to keep access sites clean    #DM- with hypoglycemia-revised insulin regime    - lantus 16 unit qhs  - decreased lispro 5/5/5  # Rt knee instability--orthotic assessment   #Sleep  - melatonin PRN     #Skin  - Skin on admission: Sacral/left buttock sacral decubital ulcer with visible subcutaneous/dermis, dry b/l heels, permacath is c/d/i, LUE AVF site c/d/i, drain sites c/d/i; sternal incision site is c/d/i  - Pressure injury/Skin: OOB to Chair, PT/OT   - continue monitoring wound, incision and drain sites.  - Wound consult  - alyvene foam dressing and cavilon to wound    # Malnutrition--Moderate protein-calorie malnutrition.  #Pain Mgmt  - Tylenol PRN    #GI/Bowel Mgmt   - Continent c/w Senna, Miralax    #/Bladder Mgmt --Voiding, PVRs low, urine quantity unclear if oligo/anuric    #FEN   - Diet - Pureed w/ Nectar  [Renal, DASH/TLC]    - per SLP diet upgraded to Pureed with thin liquids -  wife has still not brought in dentures but patient says he prefers pureed diet      #Precautions / PROPHYLAXIS:   - Falls, Cardiac, Sternal  - ortho: Weight bearing status: WBAT   - Lungs: Aspiration, Incentive Spirometer   - DVT: eliquis    IDT   SW: lives w/ spouse,w/ 8 steps in home, no hand rail. Wife works weekend but can help  Functional: eating mod I, upper body dressing set up, lbd mod/min A, bathing min a, toilet transfers CG/min A, toileting min A, short transfer min A; ambulates 40 ft RW mod A, transfers min A. Has mild expressive and receptive aphasia, anomia, possibly from underlying cognitive deficit.   SLP noted deficits with communication and executive function for which he is receiving  therapy  Barriers to d/c --deficits with communication/executive fxn, medical issues (low HR), suboptimal motor strength, poor balance ,Rt Knee instability  Goals--independence with transfers, ambulate without supervision, with gait assistance, cane  EDOD:  Home w/ HC vs RICARDO  Spoke w/ Wife Marcus [phone] on 21. -- provided update and inquired about reading glasses, which patient doesn't have a home.    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 Admitted to acute rehab 21    Functional improvement noted, SLP addressing cognitive/executive function deficits/impulsivity  Awaiting orthotics review  Family declined RICARDO placement  continue therapy, home dc with services  Moderate protein-calorie malnutrition. on f/u with RD  Continue therapy to address functional and cognitive deficits  Estimated discharge date  to give time for further functional gains and address care giver issues    Stable to continue acute rehab, treatment

## 2021-11-19 NOTE — PROGRESS NOTE ADULT - ASSESSMENT
59 y/o M w/ hx of DM2, HTN and HLD, transfer from [facility] for NSTEMI, admitted w/ JOSHUA on CKD and hypertensive emergency, new start to HD (10/13). He underwent LHC showing Multivessel CAD. 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  s/p CABG x2 (10/21), MV repair, and left atrial ligation. Permacath placed 11/1/21 and LUE AV Fistula placed by vascular on 11/2/21. Her was noted to have PACs and Afib - started on amiodarone and Eliquis  Now admitted to [facility] after for initiation of a multidisciplinary rehab program consisting focused on functional mobility, transfers and ADLs- pt/ot/dvt ppx    #NSTEMI 2/2 CAD s/p CABG (10/21)  #s/p MV repair  #HLD  - s/p LHC on 10/14 showing severe multivessel disease; now s/p CABG on 10/21. Echocardiogram reviewed w/ EF 75% (10/28)  - aspirin  - statin  - Eliquis       # ESRD on HD (MW), due to diabetic nephropathy  # anemia of chronic dz 2/2 ESRD  - received blood transfusion at OSH.   - HD at [place] on discharge, renal f/u  - retacrit     # paroxysmal atrial fibrillation   # tachy blaine syndrome  -cont apixaban  -hold AV Christina agents due to bradycardia  -amiodarone - now held due to bradycardia.   -cardio consult noted  -can restart amiodarone if becomes tachycardic. Pt developed AF post operatively, so it is possible amio was only needed short term.  QTc prolongation  avoid QT prolonging agents, monitor on daily ECG.     # DM2  - Accu-Cheks noted  -hgba1c 7%  - c/w  Lantus to 5  - d/c pre meal 11/16/21  - c/w ISS, Accu-Cheks  - monitor     # subclinical hypothyroidism  - TSH mildly elevated 6.4, pt asymptomatic  - seen by Endo during admission. Outpt f/u.       # hx of severe tobacco use d/o  - smoked 3 ppd, recently quit a few months ago    # sacral wound--sacral stage 2 ulcer  - appreciate wound care consult.     dvt ppx: cont apixaban    will follow   d/w dr. patterson

## 2021-11-19 NOTE — PROGRESS NOTE ADULT - SUBJECTIVE AND OBJECTIVE BOX
61 y/o M w/ hx of DM2, HTN and HLD, transfer from [facility] for NSTEMI, admitted w/ JOSHUA on CKD and hypertensive emergency, new start to HD (10/13). He underwent LHC showing Multivessel CAD. 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  s/p CABG x2 (10/21), MV repair, and left atrial ligation. Permacath placed 11/1/21 and LUE AV Fistula placed by vascular on 11/2/21. Her was noted to have PACs and Afib     seen at the bedside, no n/v, no sob, no chest pain, no overnight events    Vital Signs Last 24 Hrs  T(C): 36.7 (19 Nov 2021 07:18), Max: 37.1 (18 Nov 2021 14:20)  T(F): 98 (19 Nov 2021 07:18), Max: 98.8 (18 Nov 2021 14:20)  HR: 54 (19 Nov 2021 07:18) (47 - 62)  BP: 117/56 (19 Nov 2021 07:18) (117/56 - 155/71)  BP(mean): --  RR: 16 (19 Nov 2021 07:18) (16 - 16)  SpO2: 98% (19 Nov 2021 07:18) (98% - 100%)    GENERAL- NAD  EAR/NOSE/MOUTH/THROAT - no pharyngeal exudates, no oral lesions  MMM  EYES- JOSEPH, conjunctiva and Sclera clear  NECK- supple  RESPIRATORY-  clear to auscultation bilaterally, non laboured breathing  CARDIOVASCULAR - SIS2, rrr  GI - soft NT BS present  EXTREMITIES- no pedal edema  NEUROLOGY- no gross focal deficits  SKIN- chest incision clean  PSYCHIATRY- AAO X 3                  8.0                  132  | 26   | 89           9.08  >-----------< 257     ------------------------< 120                   25.1                 5.0  | 95   | 8.78                                         Ca 8.7   Mg x     Ph 5.0      CAPILLARY BLOOD GLUCOSE      POCT Blood Glucose.: 116 mg/dL (19 Nov 2021 11:55)  POCT Blood Glucose.: 105 mg/dL (19 Nov 2021 07:48)  POCT Blood Glucose.: 194 mg/dL (18 Nov 2021 21:22)  POCT Blood Glucose.: 117 mg/dL (18 Nov 2021 18:14)      MEDICATIONS  (STANDING):  apixaban 2.5 milliGRAM(s) Oral two times a day  AQUAPHOR (petrolatum Ointment) 1 Application(s) Topical daily  aspirin  chewable 81 milliGRAM(s) Oral daily  epoetin vern-epbx (RETACRIT) Injectable 06088 Unit(s) SubCutaneous <User Schedule>  glucagon  Injectable 1 milliGRAM(s) IntraMuscular once  insulin glargine Injectable (LANTUS) 5 Unit(s) SubCutaneous at bedtime  insulin lispro (ADMELOG) corrective regimen sliding scale   SubCutaneous at bedtime  insulin lispro (ADMELOG) corrective regimen sliding scale   SubCutaneous three times a day before meals  pantoprazole    Tablet 40 milliGRAM(s) Oral before breakfast  PPD  5 Tuberculin Unit(s) Injectable 5 Unit(s) IntraDermal once  senna 2 Tablet(s) Oral at bedtime  sevelamer carbonate 1600 milliGRAM(s) Oral three times a day with meals    MEDICATIONS  (PRN):  acetaminophen     Tablet .. 650 milliGRAM(s) Oral every 6 hours PRN Mild Pain (1 - 3), Moderate Pain (4 - 6)  hemorrhoidal Ointment 1 Application(s) Rectal three times a day PRN Hemorrhoids  melatonin 3 milliGRAM(s) Oral at bedtime PRN Insomnia  polyethylene glycol 3350 17 Gram(s) Oral two times a day PRN Constipation

## 2021-11-19 NOTE — PROGRESS NOTE ADULT - SUBJECTIVE AND OBJECTIVE BOX
HPI:  61 y/o M w/ hx of DM2, HTN and HLD initially presented as transfer from [facility] for chest pain concerning for NSTEMI and possible new CHF c/b JOSHUA likely on CKD, and hypertensive emergency on 10/6/21. Hospital course complicated by s/p RRT for CVA/TIA ruled out and worsening anemia requiring 1 PRBC. Pt S/p HD session today and underwent LHC showing Multivessel CAD. CT surgery consulted for CABG evaluation. Patient developed left sided facial droop on 10/7 and stroke code was called. 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 Patient started Hemodialysis on 10/13. On 10/21 s/p CABGx2, Mitral Valve Repair, and left atrial appendage ligation, was extubated next day. He developed SOB on 10/24 and requeired bipap,  but was eventually trasitioned back down to [place]. Permacath placed 11/1/21 and LUE AV Fistula placed by vascular on 11/2/21. Her was noted to have PACs and Afib - started on amiodarone. He will be getting HD at [place]. Cleared by vascular to restart eliquis 2.5mg bid. HD schedule changed from T/T/S to M/W/F.   Patient evaluated by PM&R PT/OT and recommended acute rehab. Patient admitted to [facility] on 11/8/21 for debility. (08 Nov 2021 14:09)      INTERVAL HPI/OVERNIGHT EVENTS:  Chart Reviewed and patient seen/examined at bedside.  Patient doing well in therapy. significant functional improvement, knee bucking is less prominent  Seen negotiating stairs during therapy with assistance/hand rails  +BM, good appetite.  Serial EKGs for monitoring of HR (bradycardia) unremarkable, amiodarone on hold as per cardiology recs  Orthotist  eval for knee  recurvatum. awaited  SW reports that family does not want RICARDO option, wife wants to take time off work and care for patient    ROS:  Denies fevers, chills, chest pain, shortness of breath, abdominal pain, headaches, nausea/vomiting,      Vital Signs Last 24 Hrs  T(C): 36.7 (19 Nov 2021 07:18), Max: 37.1 (18 Nov 2021 14:20)  T(F): 98 (19 Nov 2021 07:18), Max: 98.8 (18 Nov 2021 14:20)  HR: 54 (19 Nov 2021 07:18) (47 - 62)  BP: 117/56 (19 Nov 2021 07:18) (117/56 - 155/71)  RR: 16 (19 Nov 2021 07:18) (16 - 16)  SpO2: 98% (19 Nov 2021 07:18) (98% - 100%)      Physical Exam:  Gen - NAD, Comfortable  HEENT - NCAT, EOMI, MMM  Neck - Supple, No limited ROM  Pulm - CTAB, No wheeze, No rhonchi, No crackles  Cardiovascular - RRR, S1S2, sternotomy incision in situ  Abdomen - Soft, NT/ND, +BS  Extremities - No edema or tenderness Left arm AV fistula    Neuro-  Cognitive - AAOx2-3  Motor -UE 5/5, LE 4/5   right genu recurvatum, possible left.  Sensory - Intact to LT  Reflexes - DTR Intact, No primitive reflex  Coordination - No dysmetria  Tone - normal b/l  Psychiatric - Mood stable, Affect WNL  Skin: Sacral/left buttock sacral decubital ulcer with visible subcutaneous/dermis, dry b/l heels, permacath is c/d/i, LUE AVF site c/d/i sternal incision site is also c/d/i      LABS:                    8.0    9.08  )-----------( 257      ( 18 Nov 2021 14:20 )             25.1     11-18    132<L>  |  95<L>  |  89<H>  ----------------------------<  120<H>  5.0   |  26  |  8.78<H>    Ca    8.7      18 Nov 2021 14:20  Phos  5.0     11-18    MEDICATIONS  (STANDING):  apixaban 2.5 milliGRAM(s) Oral two times a day  AQUAPHOR (petrolatum Ointment) 1 Application(s) Topical daily  aspirin  chewable 81 milliGRAM(s) Oral daily  dextrose 40% Gel 15 Gram(s) Oral once  dextrose 5%. 1000 milliLiter(s) (50 mL/Hr) IV Continuous <Continuous>  dextrose 5%. 1000 milliLiter(s) (100 mL/Hr) IV Continuous <Continuous>  dextrose 50% Injectable 25 Gram(s) IV Push once  dextrose 50% Injectable 12.5 Gram(s) IV Push once  dextrose 50% Injectable 25 Gram(s) IV Push once  epoetin vern-epbx (RETACRIT) Injectable 29700 Unit(s) SubCutaneous <User Schedule>  glucagon  Injectable 1 milliGRAM(s) IntraMuscular once  insulin glargine Injectable (LANTUS) 5 Unit(s) SubCutaneous at bedtime  insulin lispro (ADMELOG) corrective regimen sliding scale   SubCutaneous at bedtime  insulin lispro (ADMELOG) corrective regimen sliding scale   SubCutaneous three times a day before meals  pantoprazole    Tablet 40 milliGRAM(s) Oral before breakfast  PPD  5 Tuberculin Unit(s) Injectable 5 Unit(s) IntraDermal once  senna 2 Tablet(s) Oral at bedtime  sevelamer carbonate 1600 milliGRAM(s) Oral three times a day with meals    MEDICATIONS  (PRN):  acetaminophen     Tablet .. 650 milliGRAM(s) Oral every 6 hours PRN Mild Pain (1 - 3), Moderate Pain (4 - 6)  hemorrhoidal Ointment 1 Application(s) Rectal three times a day PRN Hemorrhoids  melatonin 3 milliGRAM(s) Oral at bedtime PRN Insomnia  polyethylene glycol 3350 17 Gram(s) Oral two times a day PRN Constipation    Therapy  Observed in therapy and d/w PT, making functional progress, compared to yesterday, taking cues, negotiating stairs with assistance, knee instability is less prominent

## 2021-11-19 NOTE — CONSULT NOTE ADULT - SUBJECTIVE AND OBJECTIVE BOX
Pt is a 61 y/o right-handed male with PMHx of  DM2, HTN and HLD initially presented as transfer from [facility] for chest pain concerning for NSTEMI and possible new CHF c/b JOSHUA likely on CKD, and hypertensive emergency on 10/6/21. Hospital course complicated by s/p RRT for CVA/TIA ruled out and worsening anemia requiring 1 PRBC. Pt S/p HD session today and underwent LHC showing Multivessel CAD. CT surgery consulted for CABG evaluation. Pt developed left sided facial droop on 10/7 and stroke code was called. He was found to have old frontal infarct and left carotid stenosis, but does not explain acute neurological deficit. Pt started Hemodialysis on 10/13. On 10/21 s/p CABGx2, Mitral Valve Repair, and left atrial appendage ligation, was extubated next day. He developed SOB on 10/24 and required bipap,  but was eventually transitioned back down to [place]. Permacath placed 11/1/21 and LUE AV Fistula placed by vascular on 11/2/21. Her was noted to have PACs and Afib - started on amiodarone. He will be getting HD at [place]. Cleared by vascular to restart eliquis 2.5mg bid. HD schedule changed from T/T/S to M/W/F. Pt evaluated by PM&R PT/OT and recommended acute rehab. Patient admitted to [facility] on 11/8/21 for debility. PMHx: As noted above. Current meds: Please see list in Pt’s chart. Social Hx: Pt is , has no children. 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 Pt lacks hx of mental illness; he quit smoking 3 months ago, used to smoke 1/2 pack of cigarettes per day. Pt follows the [employer]. Pt enjoys playing and watching cricket games.  Findings: Pt was seen for an initial assessment of his cognitive and emotional functioning. The Modified MMSE (3MS) was administered; Pt’s results (69/100) were in the Moderate to Severe Impairment range. His scores in mood measures suggested minimal levels of anxiety and depression (GAD7 = 0/21; PHQ9 = 1/27). Pt was alert, parlty Ox3 (he was disoriented to the season, exact date, county and city), and cooperative. Attn/Conc: Simple auditory attention - intact.  Concentration/Working memory for reversed counting and spelling – moderately impaired (2/7, unable to spell backwards in spite of being able to correctly spell the target word). Language: Pt’s speech was of normal volume, pitch and pace. Naming - intact. Sentence repetition - mildly impaired. Auditory Comprehension - intact. Reading - intact. Writing - intact. Memory: Encoding of 3 words was intact (3/3); short- and long-delayed verbal recall – moderately impaired (1/3, improving to 3/3 with logical-semantic and saturatedcueing) LTM was mildly impaired for [place] presidents (3/4, improving to 4/4 with cueing). Visual memory – mildly impaired. Visuospatial: Visuomotor integration – mildly impaired for copy of a 2D figure, sloppiness and mild distortion were noted. Executive Functions: Motor Planning - intact. Organizational skills - mildly impaired. Abstract reasoning - significantly impaired. Initiation/Phonemic Fluency - moderately impaired. Verbal problem solving – mildly impaired. Emotional functioning: Affect - constricted but responsive to humor. Mood - euthymic ("good"); Pt did not reported experiencing any emotional symptoms, and on mood measures he only reported occasional fatigue.  Thought processes were goal-directed. No abnormal thought contents were observed.  Pt denied any AH/VH. Pt also denied SI/HI/I/P. Insight - WFL. Judgment - fair.

## 2021-11-19 NOTE — CONSULT NOTE ADULT - ASSESSMENT
Assessment: Pt presents with moderate to severe cognitive deficits (major neurocognitive disorder likely due to CVA). Pt exhibited difficulties in concentration/working memory, short-term recall of verbal information, visuospatial skills, as well as aspects of language (fluency, repetition) and executive functions (i.e., organizational skills, abstract reasoning, initiation, and problem solving). P's affect is somewhat constricted but responsive to humor during the evaluation, and he only complained of fatigue at this time. FIM scores: Social Interaction 5; Problem Solving 4; Memory 4.  Plan: Individual supportive psychotherapy to monitor cognition, affect/mood, and behavior if necessary, Pt appears to be coping well at this time. Cognitive remediation during speech therapy session is recommended. Participation in recreation/art therapy in order to have pleasure and mastery experiences and regain/reestablish a sense of routine.  Time spent with Pt, 47 minutes.

## 2021-11-20 LAB
ANION GAP SERPL CALC-SCNC: 8 MMOL/L — SIGNIFICANT CHANGE UP (ref 5–17)
BUN SERPL-MCNC: 61 MG/DL — HIGH (ref 7–23)
CALCIUM SERPL-MCNC: 8.8 MG/DL — SIGNIFICANT CHANGE UP (ref 8.4–10.5)
CHLORIDE SERPL-SCNC: 97 MMOL/L — SIGNIFICANT CHANGE UP (ref 96–108)
CO2 SERPL-SCNC: 31 MMOL/L — SIGNIFICANT CHANGE UP (ref 22–31)
CREAT SERPL-MCNC: 7.44 MG/DL — HIGH (ref 0.5–1.3)
GLUCOSE BLDC GLUCOMTR-MCNC: 101 MG/DL — HIGH (ref 70–99)
GLUCOSE BLDC GLUCOMTR-MCNC: 101 MG/DL — HIGH (ref 70–99)
GLUCOSE BLDC GLUCOMTR-MCNC: 153 MG/DL — HIGH (ref 70–99)
GLUCOSE BLDC GLUCOMTR-MCNC: 172 MG/DL — HIGH (ref 70–99)
GLUCOSE SERPL-MCNC: 120 MG/DL — HIGH (ref 70–99)
HCT VFR BLD CALC: 24.8 % — LOW (ref 39–50)
HGB BLD-MCNC: 7.9 G/DL — LOW (ref 13–17)
MCHC RBC-ENTMCNC: 30.5 PG — SIGNIFICANT CHANGE UP (ref 27–34)
MCHC RBC-ENTMCNC: 31.9 GM/DL — LOW (ref 32–36)
MCV RBC AUTO: 95.8 FL — SIGNIFICANT CHANGE UP (ref 80–100)
NRBC # BLD: 0 /100 WBCS — SIGNIFICANT CHANGE UP (ref 0–0)
PHOSPHATE SERPL-MCNC: 4.4 MG/DL — SIGNIFICANT CHANGE UP (ref 2.5–4.5)
PLATELET # BLD AUTO: 206 K/UL — SIGNIFICANT CHANGE UP (ref 150–400)
POTASSIUM SERPL-MCNC: 4.8 MMOL/L — SIGNIFICANT CHANGE UP (ref 3.5–5.3)
POTASSIUM SERPL-SCNC: 4.8 MMOL/L — SIGNIFICANT CHANGE UP (ref 3.5–5.3)
RBC # BLD: 2.59 M/UL — LOW (ref 4.2–5.8)
RBC # FLD: 15.2 % — HIGH (ref 10.3–14.5)
SODIUM SERPL-SCNC: 136 MMOL/L — SIGNIFICANT CHANGE UP (ref 135–145)
WBC # BLD: 7.3 K/UL — SIGNIFICANT CHANGE UP (ref 3.8–10.5)
WBC # FLD AUTO: 7.3 K/UL — SIGNIFICANT CHANGE UP (ref 3.8–10.5)

## 2021-11-20 PROCEDURE — 99232 SBSQ HOSP IP/OBS MODERATE 35: CPT

## 2021-11-20 RX ORDER — ATORVASTATIN CALCIUM 80 MG/1
80 TABLET, FILM COATED ORAL AT BEDTIME
Refills: 0 | Status: DISCONTINUED | OUTPATIENT
Start: 2021-11-20 | End: 2021-11-27

## 2021-11-20 RX ADMIN — SEVELAMER CARBONATE 1600 MILLIGRAM(S): 2400 POWDER, FOR SUSPENSION ORAL at 18:19

## 2021-11-20 RX ADMIN — ERYTHROPOIETIN 10000 UNIT(S): 10000 INJECTION, SOLUTION INTRAVENOUS; SUBCUTANEOUS at 17:30

## 2021-11-20 RX ADMIN — Medication 81 MILLIGRAM(S): at 11:19

## 2021-11-20 RX ADMIN — PANTOPRAZOLE SODIUM 40 MILLIGRAM(S): 20 TABLET, DELAYED RELEASE ORAL at 05:47

## 2021-11-20 RX ADMIN — INSULIN GLARGINE 5 UNIT(S): 100 INJECTION, SOLUTION SUBCUTANEOUS at 21:17

## 2021-11-20 RX ADMIN — Medication 1 APPLICATION(S): at 11:20

## 2021-11-20 RX ADMIN — SENNA PLUS 2 TABLET(S): 8.6 TABLET ORAL at 21:17

## 2021-11-20 RX ADMIN — ATORVASTATIN CALCIUM 80 MILLIGRAM(S): 80 TABLET, FILM COATED ORAL at 21:16

## 2021-11-20 RX ADMIN — APIXABAN 2.5 MILLIGRAM(S): 2.5 TABLET, FILM COATED ORAL at 18:19

## 2021-11-20 RX ADMIN — APIXABAN 2.5 MILLIGRAM(S): 2.5 TABLET, FILM COATED ORAL at 05:47

## 2021-11-20 RX ADMIN — Medication 2: at 11:18

## 2021-11-20 RX ADMIN — SEVELAMER CARBONATE 1600 MILLIGRAM(S): 2400 POWDER, FOR SUSPENSION ORAL at 09:05

## 2021-11-20 NOTE — PROGRESS NOTE ADULT - SUBJECTIVE AND OBJECTIVE BOX
S/p stable HD    Vital Signs Last 24 Hrs  T(C): 36.7 (11-20-21 @ 15:06), Max: 36.7 (11-20-21 @ 08:34)  T(F): 98 (11-20-21 @ 15:06), Max: 98 (11-20-21 @ 08:34)  HR: 46 (11-20-21 @ 15:06) (46 - 60)  BP: 156/61 (11-20-21 @ 15:06) (107/52 - 156/61)  RR: 15 (11-20-21 @ 15:06) (15 - 16)  SpO2: 99% (11-20-21 @ 15:06) (98% - 99%)    s1s2  b/l air entry  soft  sm edema                                                                                   7.9    7.30  )-----------( 206      ( 20 Nov 2021 15:10 )             24.8     20 Nov 2021 15:10    136    |  97     |  61     ----------------------------<  120    4.8     |  31     |  7.44     Ca    8.8        20 Nov 2021 15:10  Phos  4.4       20 Nov 2021 15:10    acetaminophen     Tablet .. 650 milliGRAM(s) Oral every 6 hours PRN  apixaban 2.5 milliGRAM(s) Oral two times a day  AQUAPHOR (petrolatum Ointment) 1 Application(s) Topical daily  aspirin  chewable 81 milliGRAM(s) Oral daily  atorvastatin 80 milliGRAM(s) Oral at bedtime  dextrose 40% Gel 15 Gram(s) Oral once  dextrose 5%. 1000 milliLiter(s) IV Continuous <Continuous>  dextrose 5%. 1000 milliLiter(s) IV Continuous <Continuous>  dextrose 50% Injectable 25 Gram(s) IV Push once  dextrose 50% Injectable 12.5 Gram(s) IV Push once  dextrose 50% Injectable 25 Gram(s) IV Push once  epoetin vern-epbx (RETACRIT) Injectable 70192 Unit(s) SubCutaneous <User Schedule>  glucagon  Injectable 1 milliGRAM(s) IntraMuscular once  hemorrhoidal Ointment 1 Application(s) Rectal three times a day PRN  insulin glargine Injectable (LANTUS) 5 Unit(s) SubCutaneous at bedtime  insulin lispro (ADMELOG) corrective regimen sliding scale   SubCutaneous at bedtime  insulin lispro (ADMELOG) corrective regimen sliding scale   SubCutaneous three times a day before meals  melatonin 3 milliGRAM(s) Oral at bedtime PRN  pantoprazole    Tablet 40 milliGRAM(s) Oral before breakfast  polyethylene glycol 3350 17 Gram(s) Oral two times a day PRN  senna 2 Tablet(s) Oral at bedtime  sevelamer carbonate 1600 milliGRAM(s) Oral three times a day with meals    A/P:    S/p CABG x 2, Mitral Valve Repair, and left atrial appendage ligation 10/21/21  Hospital course complicated by JOSHUA/CKD  Now on HD 3 x week  Renal diet  TMP 2kg w/HD today  HD TTS  Epoetin w/each HD  Sevelamer for high Phos  Bld work w/each HD  Rehab    [phone]

## 2021-11-20 NOTE — PROGRESS NOTE ADULT - SUBJECTIVE AND OBJECTIVE BOX
HPI:  61 y/o M w/ hx of DM2, HTN and HLD initially presented as transfer from [facility] for chest pain concerning for NSTEMI and possible new CHF c/b JOSHUA likely on CKD, and hypertensive emergency on 10/6/21. Hospital course complicated by s/p RRT for CVA/TIA ruled out and worsening anemia requiring 1 PRBC. Pt S/p HD session today and underwent LHC showing Multivessel CAD. CT surgery consulted for CABG evaluation. Patient developed left sided facial droop on 10/7 and stroke code was called. 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 Patient started Hemodialysis on 10/13. On 10/21 s/p CABGx2, Mitral Valve Repair, and left atrial appendage ligation, was extubated next day. He developed SOB on 10/24 and requeired bipap,  but was eventually trasitioned back down to [place]. Permacath placed 11/1/21 and LUE AV Fistula placed by vascular on 11/2/21. Her was noted to have PACs and Afib - started on amiodarone. He will be getting HD at [place]. Cleared by vascular to restart eliquis 2.5mg bid. HD schedule changed from T/T/S to M/W/F.   Patient evaluated by PM&R PT/OT and recommended acute rehab. Patient admitted to [facility] on 11/8/21 for debility. (08 Nov 2021 14:09)      Subjective    no new complaints      PAST MEDICAL & SURGICAL HISTORY:  Hypertension    Hyperlipidemia    Diabetes mellitus    No pertinent past surgical history        MedsMEDICATIONS  (STANDING):  apixaban 2.5 milliGRAM(s) Oral two times a day  AQUAPHOR (petrolatum Ointment) 1 Application(s) Topical daily  aspirin  chewable 81 milliGRAM(s) Oral daily  dextrose 40% Gel 15 Gram(s) Oral once  dextrose 5%. 1000 milliLiter(s) (50 mL/Hr) IV Continuous <Continuous>  dextrose 5%. 1000 milliLiter(s) (100 mL/Hr) IV Continuous <Continuous>  dextrose 50% Injectable 25 Gram(s) IV Push once  dextrose 50% Injectable 12.5 Gram(s) IV Push once  dextrose 50% Injectable 25 Gram(s) IV Push once  epoetin vern-epbx (RETACRIT) Injectable 21285 Unit(s) SubCutaneous <User Schedule>  glucagon  Injectable 1 milliGRAM(s) IntraMuscular once  insulin glargine Injectable (LANTUS) 5 Unit(s) SubCutaneous at bedtime  insulin lispro (ADMELOG) corrective regimen sliding scale   SubCutaneous at bedtime  insulin lispro (ADMELOG) corrective regimen sliding scale   SubCutaneous three times a day before meals  pantoprazole    Tablet 40 milliGRAM(s) Oral before breakfast  senna 2 Tablet(s) Oral at bedtime  sevelamer carbonate 1600 milliGRAM(s) Oral three times a day with meals    MEDICATIONS  (PRN):  acetaminophen     Tablet .. 650 milliGRAM(s) Oral every 6 hours PRN Mild Pain (1 - 3), Moderate Pain (4 - 6)  hemorrhoidal Ointment 1 Application(s) Rectal three times a day PRN Hemorrhoids  melatonin 3 milliGRAM(s) Oral at bedtime PRN Insomnia  polyethylene glycol 3350 17 Gram(s) Oral two times a day PRN Constipation      Vital Signs Last 24 Hrs  T(C): 36.7 (20 Nov 2021 08:34), Max: 36.7 (20 Nov 2021 08:34)  T(F): 98 (20 Nov 2021 08:34), Max: 98 (20 Nov 2021 08:34)  HR: 60 (20 Nov 2021 08:34) (56 - 60)  BP: 107/52 (20 Nov 2021 08:34) (107/52 - 146/66)  BP(mean): --  RR: 15 (20 Nov 2021 08:34) (15 - 16)  SpO2: 98% (20 Nov 2021 08:34) (98% - 98%)  I&O's Summary    19 Nov 2021 07:01  -  20 Nov 2021 07:00  --------------------------------------------------------  IN: 0 mL / OUT: 500 mL / NET: -500 mL        PHYSICAL EXAM:  GENERAL: NAD  NECK: Supple  NERVOUS SYSTEM:  awake and alert  HEART: S1s2 NL , RRR  CHEST/LUNG: Clear to percussion bilaterally  ABDOMEN: Soft, Nontender, Nondistended; Bowel sounds present  EXTREMITIES:  No edema      LABS:              RVP:          Tox:           CAPILLARY BLOOD GLUCOSE      POCT Blood Glucose.: 153 mg/dL (20 Nov 2021 11:16)  POCT Blood Glucose.: 101 mg/dL (20 Nov 2021 07:20)  POCT Blood Glucose.: 131 mg/dL (19 Nov 2021 21:51)  POCT Blood Glucose.: 125 mg/dL (19 Nov 2021 16:49)      Imaging Personally Reviewed:  [ ] YES  [ ] NO        Care Discussed with Consultants/Other Providers [ x] YES  [ ] NO

## 2021-11-20 NOTE — PROGRESS NOTE ADULT - ASSESSMENT
Deconditioning  PT/OT per rehab    s/p CABG/ MV repair, PAF  ASA, Eliquis  nor on BB sec to bradycardia  Resume home lipitor    HTN  Monitor off of med    ESRD on HD  HD per renal    Anemia  Retacrit    DM2, a1c 7  Lantus, Lispro

## 2021-11-20 NOTE — PROGRESS NOTE ADULT - SUBJECTIVE AND OBJECTIVE BOX
cc: Patient admitted to rehab following CABG, MV replacement ( T) and ANGELITA ligation           TODAY'S SUBJECTIVE & REVIEW OF SYMPTOMS  Patient seen at bedside  Seated in chair and states that he feels well and slept ok   Reports some stomach pain without nausea  Had BM 11/20     Denies HA/CP/palpitations  Denies any numbness      PHYSICAL EXAM    Vital Signs Last 24 Hrs  T(C): 36.7 (20 Nov 2021 15:06), Max: 36.7 (20 Nov 2021 08:34)  T(F): 98 (20 Nov 2021 15:06), Max: 98 (20 Nov 2021 08:34)  HR: 46 (20 Nov 2021 15:06) (46 - 60)  BP: 156/61 (20 Nov 2021 15:06) (107/52 - 156/61)  BP(mean): --  RR: 15 (20 Nov 2021 15:06) (15 - 16)  SpO2: 99% (20 Nov 2021 15:06) (98% - 99%)    Constitutional - NAD, Comfortable  Chest - Clear  Cardiovascular - S1S2  Abdomen -, Soft, Mildly distended  Extremities - No C/C/E, No calf tenderness   Skin: sternal incision clean, drain sites - no draiange  Right chest permacath        RECENT LABS/IMAGING                        7.9    7.30  )-----------( 206      ( 20 Nov 2021 15:10 )             24.8     11-20    136  |  97  |  61<H>  ----------------------------<  120<H>  4.8   |  31  |  7.44<H>    Ca    8.8      20 Nov 2021 15:10  Phos  4.4     11-20          MEDICATIONS  (STANDING):  apixaban 2.5 milliGRAM(s) Oral two times a day  AQUAPHOR (petrolatum Ointment) 1 Application(s) Topical daily  aspirin  chewable 81 milliGRAM(s) Oral daily  atorvastatin 80 milliGRAM(s) Oral at bedtime  dextrose 40% Gel 15 Gram(s) Oral once  dextrose 5%. 1000 milliLiter(s) (50 mL/Hr) IV Continuous <Continuous>  dextrose 5%. 1000 milliLiter(s) (100 mL/Hr) IV Continuous <Continuous>  dextrose 50% Injectable 25 Gram(s) IV Push once  dextrose 50% Injectable 12.5 Gram(s) IV Push once  dextrose 50% Injectable 25 Gram(s) IV Push once  epoetin vern-epbx (RETACRIT) Injectable 52309 Unit(s) SubCutaneous <User Schedule>  glucagon  Injectable 1 milliGRAM(s) IntraMuscular once  insulin glargine Injectable (LANTUS) 5 Unit(s) SubCutaneous at bedtime  insulin lispro (ADMELOG) corrective regimen sliding scale   SubCutaneous at bedtime  insulin lispro (ADMELOG) corrective regimen sliding scale   SubCutaneous three times a day before meals  pantoprazole    Tablet 40 milliGRAM(s) Oral before breakfast  senna 2 Tablet(s) Oral at bedtime  sevelamer carbonate 1600 milliGRAM(s) Oral three times a day with meals    MEDICATIONS  (PRN):  acetaminophen     Tablet .. 650 milliGRAM(s) Oral every 6 hours PRN Mild Pain (1 - 3), Moderate Pain (4 - 6)  hemorrhoidal Ointment 1 Application(s) Rectal three times a day PRN Hemorrhoids  melatonin 3 milliGRAM(s) Oral at bedtime PRN Insomnia  polyethylene glycol 3350 17 Gram(s) Oral two times a day PRN Constipation    CAPILLARY BLOOD GLUCOSE      POCT Blood Glucose.: 153 mg/dL (20 Nov 2021 11:16)  POCT Blood Glucose.: 101 mg/dL (20 Nov 2021 07:20)  POCT Blood Glucose.: 131 mg/dL (19 Nov 2021 21:51)

## 2021-11-20 NOTE — PROGRESS NOTE ADULT - ASSESSMENT
61 year old male with NSTEMI- CAD, s/p CABG X2, MV replacement ( T) and ANGELITA ligation    Patient appears comfortable this am and denies any symptoms    Continue rehab program as tolerated    H/o afib:on apixaban, Rate control medications on hold due to bradycardia    DVT prophylaxis: on apixaban 2.5mg bid     Continue ASA, apixaban, ( ? statins)    DM-2: SSI, on lantus,     JOSHUA on CKD - on HD  Renal fu noted.      Anemia: multifactorial in setting of Renal disease , on epogen

## 2021-11-21 LAB
GLUCOSE BLDC GLUCOMTR-MCNC: 121 MG/DL — HIGH (ref 70–99)
GLUCOSE BLDC GLUCOMTR-MCNC: 131 MG/DL — HIGH (ref 70–99)
GLUCOSE BLDC GLUCOMTR-MCNC: 146 MG/DL — HIGH (ref 70–99)
GLUCOSE BLDC GLUCOMTR-MCNC: 160 MG/DL — HIGH (ref 70–99)
GLUCOSE BLDC GLUCOMTR-MCNC: 187 MG/DL — HIGH (ref 70–99)
SARS-COV-2 RNA SPEC QL NAA+PROBE: SIGNIFICANT CHANGE UP

## 2021-11-21 PROCEDURE — 99232 SBSQ HOSP IP/OBS MODERATE 35: CPT

## 2021-11-21 RX ADMIN — SEVELAMER CARBONATE 1600 MILLIGRAM(S): 2400 POWDER, FOR SUSPENSION ORAL at 17:14

## 2021-11-21 RX ADMIN — APIXABAN 2.5 MILLIGRAM(S): 2.5 TABLET, FILM COATED ORAL at 17:14

## 2021-11-21 RX ADMIN — Medication 2: at 17:16

## 2021-11-21 RX ADMIN — Medication 81 MILLIGRAM(S): at 12:08

## 2021-11-21 RX ADMIN — INSULIN GLARGINE 5 UNIT(S): 100 INJECTION, SOLUTION SUBCUTANEOUS at 21:19

## 2021-11-21 RX ADMIN — ATORVASTATIN CALCIUM 80 MILLIGRAM(S): 80 TABLET, FILM COATED ORAL at 21:19

## 2021-11-21 RX ADMIN — SENNA PLUS 2 TABLET(S): 8.6 TABLET ORAL at 21:19

## 2021-11-21 RX ADMIN — PANTOPRAZOLE SODIUM 40 MILLIGRAM(S): 20 TABLET, DELAYED RELEASE ORAL at 05:31

## 2021-11-21 RX ADMIN — APIXABAN 2.5 MILLIGRAM(S): 2.5 TABLET, FILM COATED ORAL at 05:31

## 2021-11-21 RX ADMIN — SEVELAMER CARBONATE 1600 MILLIGRAM(S): 2400 POWDER, FOR SUSPENSION ORAL at 12:08

## 2021-11-21 RX ADMIN — SEVELAMER CARBONATE 1600 MILLIGRAM(S): 2400 POWDER, FOR SUSPENSION ORAL at 08:13

## 2021-11-21 NOTE — PROGRESS NOTE ADULT - ASSESSMENT
Deconditioning  PT/OT per rehab    s/p CABG/ MV repair, PAF  ASA, Eliquis  nor on BB sec to bradycardia  Resumed home lipitor    HTN  Monitor off of med    ESRD on HD  HD per renal    Anemia  Retacrit    DM2, a1c 7  Lantus, Lispro

## 2021-11-21 NOTE — PROGRESS NOTE ADULT - SUBJECTIVE AND OBJECTIVE BOX
HPI:  59 y/o M w/ hx of DM2, HTN and HLD initially presented as transfer from [facility] for chest pain concerning for NSTEMI and possible new CHF c/b JOSHUA likely on CKD, and hypertensive emergency on 10/6/21. Hospital course complicated by s/p RRT for CVA/TIA ruled out and worsening anemia requiring 1 PRBC. Pt S/p HD session today and underwent LHC showing Multivessel CAD. CT surgery consulted for CABG evaluation. Patient developed left sided facial droop on 10/7 and stroke code was called. 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 Patient started Hemodialysis on 10/13. On 10/21 s/p CABGx2, Mitral Valve Repair, and left atrial appendage ligation, was extubated next day. He developed SOB on 10/24 and requeired bipap,  but was eventually trasitioned back down to [place]. Permacath placed 11/1/21 and LUE AV Fistula placed by vascular on 11/2/21. Her was noted to have PACs and Afib - started on amiodarone. He will be getting HD at [place]. Cleared by vascular to restart eliquis 2.5mg bid. HD schedule changed from T/T/S to M/W/F.   Patient evaluated by PM&R PT/OT and recommended acute rehab. Patient admitted to [facility] on 11/8/21 for debility. (08 Nov 2021 14:09)      Subjective    No new complaints.         PAST MEDICAL & SURGICAL HISTORY:  Hypertension    Hyperlipidemia    Diabetes mellitus    No pertinent past surgical history        MedsMEDICATIONS  (STANDING):  apixaban 2.5 milliGRAM(s) Oral two times a day  AQUAPHOR (petrolatum Ointment) 1 Application(s) Topical daily  aspirin  chewable 81 milliGRAM(s) Oral daily  atorvastatin 80 milliGRAM(s) Oral at bedtime  dextrose 40% Gel 15 Gram(s) Oral once  dextrose 5%. 1000 milliLiter(s) (50 mL/Hr) IV Continuous <Continuous>  dextrose 5%. 1000 milliLiter(s) (100 mL/Hr) IV Continuous <Continuous>  dextrose 50% Injectable 12.5 Gram(s) IV Push once  dextrose 50% Injectable 25 Gram(s) IV Push once  dextrose 50% Injectable 25 Gram(s) IV Push once  epoetin vern-epbx (RETACRIT) Injectable 20433 Unit(s) SubCutaneous <User Schedule>  glucagon  Injectable 1 milliGRAM(s) IntraMuscular once  insulin glargine Injectable (LANTUS) 5 Unit(s) SubCutaneous at bedtime  insulin lispro (ADMELOG) corrective regimen sliding scale   SubCutaneous at bedtime  insulin lispro (ADMELOG) corrective regimen sliding scale   SubCutaneous three times a day before meals  pantoprazole    Tablet 40 milliGRAM(s) Oral before breakfast  senna 2 Tablet(s) Oral at bedtime  sevelamer carbonate 1600 milliGRAM(s) Oral three times a day with meals    MEDICATIONS  (PRN):  acetaminophen     Tablet .. 650 milliGRAM(s) Oral every 6 hours PRN Mild Pain (1 - 3), Moderate Pain (4 - 6)  hemorrhoidal Ointment 1 Application(s) Rectal three times a day PRN Hemorrhoids  melatonin 3 milliGRAM(s) Oral at bedtime PRN Insomnia  polyethylene glycol 3350 17 Gram(s) Oral two times a day PRN Constipation      Vital Signs Last 24 Hrs  T(C): 36.4 (21 Nov 2021 07:11), Max: 36.7 (20 Nov 2021 15:06)  T(F): 97.6 (21 Nov 2021 07:11), Max: 98 (20 Nov 2021 15:06)  HR: 56 (21 Nov 2021 07:11) (46 - 62)  BP: 129/54 (21 Nov 2021 07:11) (125/58 - 156/61)  BP(mean): --  RR: 14 (21 Nov 2021 07:11) (14 - 17)  SpO2: 100% (21 Nov 2021 07:11) (99% - 100%)  I&O's Summary    20 Nov 2021 07:01  -  21 Nov 2021 07:00  --------------------------------------------------------  IN: 800 mL / OUT: 2800 mL / NET: -2000 mL        PHYSICAL EXAM:  GENERAL: NAD  NECK: Supple  NERVOUS SYSTEM:  awake and alert  HEART: S1s2 NL , RRR  CHEST/LUNG: Clear to percussion bilaterally  ABDOMEN: Soft, Nontender, Nondistended; Bowel sounds present  EXTREMITIES:  No edema      LABS:(11-20 @ 15:10)                      7.9  7.30 )-----------( 206                 24.8    Neutrophils = -- (--%)  Lymphocytes = -- (--%)  Eosinophils = -- (--%)  Basophils = -- (--%)  Monocytes = -- (--%)  Bands = --%    11-20    136  |  97  |  61<H>  ----------------------------<  120<H>  4.8   |  31  |  7.44<H>    Ca    8.8      20 Nov 2021 15:10  Phos  4.4     11-20            RVP:          Tox:           CAPILLARY BLOOD GLUCOSE      POCT Blood Glucose.: 131 mg/dL (21 Nov 2021 12:04)  POCT Blood Glucose.: 121 mg/dL (21 Nov 2021 07:58)  POCT Blood Glucose.: 172 mg/dL (20 Nov 2021 21:16)  POCT Blood Glucose.: 101 mg/dL (20 Nov 2021 18:13)      Imaging Personally Reviewed:  [ ] YES  [ ] NO        Care Discussed with Consultants/Other Providers [ x] YES  [ ] NO

## 2021-11-22 LAB
ANION GAP SERPL CALC-SCNC: 9 MMOL/L — SIGNIFICANT CHANGE UP (ref 5–17)
BUN SERPL-MCNC: 57 MG/DL — HIGH (ref 7–23)
CALCIUM SERPL-MCNC: 8.8 MG/DL — SIGNIFICANT CHANGE UP (ref 8.4–10.5)
CHLORIDE SERPL-SCNC: 92 MMOL/L — LOW (ref 96–108)
CO2 SERPL-SCNC: 30 MMOL/L — SIGNIFICANT CHANGE UP (ref 22–31)
CREAT SERPL-MCNC: 7.02 MG/DL — HIGH (ref 0.5–1.3)
GLUCOSE BLDC GLUCOMTR-MCNC: 116 MG/DL — HIGH (ref 70–99)
GLUCOSE BLDC GLUCOMTR-MCNC: 117 MG/DL — HIGH (ref 70–99)
GLUCOSE BLDC GLUCOMTR-MCNC: 167 MG/DL — HIGH (ref 70–99)
GLUCOSE BLDC GLUCOMTR-MCNC: 99 MG/DL — SIGNIFICANT CHANGE UP (ref 70–99)
GLUCOSE SERPL-MCNC: 107 MG/DL — HIGH (ref 70–99)
HCT VFR BLD CALC: 25.1 % — LOW (ref 39–50)
HGB BLD-MCNC: 8 G/DL — LOW (ref 13–17)
MCHC RBC-ENTMCNC: 29.9 PG — SIGNIFICANT CHANGE UP (ref 27–34)
MCHC RBC-ENTMCNC: 31.9 GM/DL — LOW (ref 32–36)
MCV RBC AUTO: 93.7 FL — SIGNIFICANT CHANGE UP (ref 80–100)
NRBC # BLD: 0 /100 WBCS — SIGNIFICANT CHANGE UP (ref 0–0)
PLATELET # BLD AUTO: 198 K/UL — SIGNIFICANT CHANGE UP (ref 150–400)
POTASSIUM SERPL-MCNC: 4.7 MMOL/L — SIGNIFICANT CHANGE UP (ref 3.5–5.3)
POTASSIUM SERPL-SCNC: 4.7 MMOL/L — SIGNIFICANT CHANGE UP (ref 3.5–5.3)
RBC # BLD: 2.68 M/UL — LOW (ref 4.2–5.8)
RBC # FLD: 14.8 % — HIGH (ref 10.3–14.5)
SODIUM SERPL-SCNC: 131 MMOL/L — LOW (ref 135–145)
WBC # BLD: 8.28 K/UL — SIGNIFICANT CHANGE UP (ref 3.8–10.5)
WBC # FLD AUTO: 8.28 K/UL — SIGNIFICANT CHANGE UP (ref 3.8–10.5)

## 2021-11-22 PROCEDURE — 99232 SBSQ HOSP IP/OBS MODERATE 35: CPT

## 2021-11-22 RX ADMIN — SEVELAMER CARBONATE 1600 MILLIGRAM(S): 2400 POWDER, FOR SUSPENSION ORAL at 11:28

## 2021-11-22 RX ADMIN — Medication 1 APPLICATION(S): at 11:28

## 2021-11-22 RX ADMIN — PANTOPRAZOLE SODIUM 40 MILLIGRAM(S): 20 TABLET, DELAYED RELEASE ORAL at 05:22

## 2021-11-22 RX ADMIN — SENNA PLUS 2 TABLET(S): 8.6 TABLET ORAL at 21:03

## 2021-11-22 RX ADMIN — SEVELAMER CARBONATE 1600 MILLIGRAM(S): 2400 POWDER, FOR SUSPENSION ORAL at 17:41

## 2021-11-22 RX ADMIN — APIXABAN 2.5 MILLIGRAM(S): 2.5 TABLET, FILM COATED ORAL at 17:41

## 2021-11-22 RX ADMIN — APIXABAN 2.5 MILLIGRAM(S): 2.5 TABLET, FILM COATED ORAL at 05:22

## 2021-11-22 RX ADMIN — INSULIN GLARGINE 5 UNIT(S): 100 INJECTION, SOLUTION SUBCUTANEOUS at 21:03

## 2021-11-22 RX ADMIN — Medication 81 MILLIGRAM(S): at 11:28

## 2021-11-22 RX ADMIN — Medication 3 MILLIGRAM(S): at 21:03

## 2021-11-22 RX ADMIN — SEVELAMER CARBONATE 1600 MILLIGRAM(S): 2400 POWDER, FOR SUSPENSION ORAL at 08:03

## 2021-11-22 RX ADMIN — ATORVASTATIN CALCIUM 80 MILLIGRAM(S): 80 TABLET, FILM COATED ORAL at 21:03

## 2021-11-22 NOTE — PROGRESS NOTE ADULT - SUBJECTIVE AND OBJECTIVE BOX
Patient is a 61y old  Male who presents with a chief complaint of Debility (21 Nov 2021 13:59)      SUBJECTIVE / OVERNIGHT EVENTS:  Pt seen and examined at bedside. No acute events overnight.  Pt denies cp, palpitations, sob, abd pain, N/V, fever, chills.    ROS:  All other review of systems negative    Allergies    No Known Allergies    Intolerances        MEDICATIONS  (STANDING):  apixaban 2.5 milliGRAM(s) Oral two times a day  AQUAPHOR (petrolatum Ointment) 1 Application(s) Topical daily  aspirin  chewable 81 milliGRAM(s) Oral daily  atorvastatin 80 milliGRAM(s) Oral at bedtime  dextrose 40% Gel 15 Gram(s) Oral once  dextrose 5%. 1000 milliLiter(s) (50 mL/Hr) IV Continuous <Continuous>  dextrose 5%. 1000 milliLiter(s) (100 mL/Hr) IV Continuous <Continuous>  dextrose 50% Injectable 25 Gram(s) IV Push once  dextrose 50% Injectable 12.5 Gram(s) IV Push once  dextrose 50% Injectable 25 Gram(s) IV Push once  epoetin vern-epbx (RETACRIT) Injectable 95160 Unit(s) SubCutaneous <User Schedule>  glucagon  Injectable 1 milliGRAM(s) IntraMuscular once  insulin glargine Injectable (LANTUS) 5 Unit(s) SubCutaneous at bedtime  insulin lispro (ADMELOG) corrective regimen sliding scale   SubCutaneous at bedtime  insulin lispro (ADMELOG) corrective regimen sliding scale   SubCutaneous three times a day before meals  pantoprazole    Tablet 40 milliGRAM(s) Oral before breakfast  senna 2 Tablet(s) Oral at bedtime  sevelamer carbonate 1600 milliGRAM(s) Oral three times a day with meals    MEDICATIONS  (PRN):  acetaminophen     Tablet .. 650 milliGRAM(s) Oral every 6 hours PRN Mild Pain (1 - 3), Moderate Pain (4 - 6)  hemorrhoidal Ointment 1 Application(s) Rectal three times a day PRN Hemorrhoids  melatonin 3 milliGRAM(s) Oral at bedtime PRN Insomnia  polyethylene glycol 3350 17 Gram(s) Oral two times a day PRN Constipation      Vital Signs Last 24 Hrs  T(C): 37 (22 Nov 2021 07:14), Max: 37.2 (21 Nov 2021 20:49)  T(F): 98.6 (22 Nov 2021 07:14), Max: 98.9 (21 Nov 2021 20:49)  HR: 49 (22 Nov 2021 07:14) (49 - 56)  BP: 137/79 (22 Nov 2021 07:14) (135/74 - 137/79)  BP(mean): --  RR: 14 (22 Nov 2021 07:14) (14 - 15)  SpO2: 100% (22 Nov 2021 07:14) (100% - 100%)  CAPILLARY BLOOD GLUCOSE      POCT Blood Glucose.: 116 mg/dL (22 Nov 2021 11:26)  POCT Blood Glucose.: 117 mg/dL (22 Nov 2021 08:02)  POCT Blood Glucose.: 160 mg/dL (21 Nov 2021 21:18)  POCT Blood Glucose.: 146 mg/dL (21 Nov 2021 20:17)  POCT Blood Glucose.: 187 mg/dL (21 Nov 2021 16:50)  POCT Blood Glucose.: 131 mg/dL (21 Nov 2021 12:04)    I&O's Summary      PHYSICAL EXAM:  GENERAL: NAD, well-developed  CHEST/LUNG: Clear to auscultation bilaterally; No wheeze, nonlabored breathing  HEART: Regular rate and rhythm; No murmurs, rubs, or gallops  ABDOMEN: Soft, Nontender, Nondistended; Bowel sounds present  EXTREMITIES:  2+ Peripheral Pulses, No clubbing, cyanosis, or edema  NEUROLOGY: AAOx3, non-focal  PSYCH: calm, appropriate mood  SKIN: S/p sternotomy incision c/d/i, Right chest port    LABS:                        7.9    7.30  )-----------( 206      ( 20 Nov 2021 15:10 )             24.8     11-20    136  |  97  |  61<H>  ----------------------------<  120<H>  4.8   |  31  |  7.44<H>    Ca    8.8      20 Nov 2021 15:10  Phos  4.4     11-20                RADIOLOGY & ADDITIONAL TESTS:  Results Reviewed:   Imaging Personally Reviewed:  Electrocardiogram Personally Reviewed:    COORDINATION OF CARE:  Care Discussed with Consultants/Other Providers [Y/N]:  Prior or Outpatient Records Reviewed [Y/N]:

## 2021-11-22 NOTE — PROGRESS NOTE ADULT - ASSESSMENT
TOM PLEITEZ is a 61y with a history of DM2, ?CKD, HTN and HLD  who presented to [facility] on 10/6/21 with SOB, CP found to have multivessel CAD and JOSHUA on CKD, s/p CABGx2, MVR, and LAAL. Started on HD T/T/S, now on HD M/W/F s/p Permacath and LUE AVF. Hospital Course complicated by post-op hemorrhagic shock , received 4 PRBC, 1 PLT, 1000 FIEBA intraop and post op dual pressor and inotrope support w/ , Primacor, Levo, and Epi gtts.  Afib, PACs- now on amiodarone. Patient now admitted for a multidisciplinary rehab program. 21 @ 14:40    *Gradually advance diet - wife has still not brought in dentures but patient says he prefers pureed diet  *DM: lispro decreased to 5/5/5 units pre meals  *Renal: HD w/ [facility] pending more paperwork and Gold Quantiferon.    #Debility post ESRD on HD, Afib. CABG  - Gait Instability, ADL impairments and Functional impairments: continue Comprehensive Rehab Program of PT/OT/SLP  - 3 hours a day, 5 days a week  - Orthotics eval for right genu recurvatum and possible left appreciated - patient will follow up with orthotist outpatient pending insurance auth for bracing.  - continue cardiopulmonary conditioning.  - SLP for cognitive and memory deficits    #CAD and Afib s/p CABGx2, LAAL, MVR with Tachy-Seth Syndrome  - Most recent echo on 10/28 shows restoration of LVEF   - d/[place] amiodatone  - c/w eliquis 2.5mg bid  - c/w ASA 81 daily  - close f/u Medicine  - Cardiologist recs appreciated: Dr. Stewart  rec d/c amiodarone   - EKGs unremarkable, d/[place], recommence if symptomatic  - If tachy then speak w/ cardiology regarding restarting amiodarone.      #?CKD on HD - M/W/F  - retacrit 8000ui intraHD, renal f/u  - Permacath placed 21 and LUE AVF placed   - chlorhexidine to keep access sites clean    #DM- with hypoglycemia-revised insulin regime    - lantus 16 unit qhs  - decreased lispro   # Rt knee instability--orthotic assessment   #Sleep  - melatonin PRN     #Skin  - Skin on admission: Sacral/left buttock sacral decubital ulcer with visible subcutaneous/dermis, dry b/l heels, permacath is c/d/i, LUE AVF site c/d/i, drain sites c/d/i; sternal incision site is c/d/i  - Pressure injury/Skin: OOB to Chair, PT/OT   - continue monitoring wound, incision and drain sites.  - Wound consult  - alyvene foam dressing and cavilon to wound    # Malnutrition--Moderate protein-calorie malnutrition.  #Pain Mgmt  - Tylenol PRN    #GI/Bowel Mgmt   - Continent c/w Senna, Miralax    #/Bladder Mgmt --Voiding, PVRs low, urine quantity unclear if oligo/anuric    #FEN   - Diet - Pureed w/ Nectar  [Renal, DASH/TLC]    - per SLP diet upgraded to Pureed with thin liquids -  wife has still not brought in dentures but patient says he prefers pureed diet      #Precautions / PROPHYLAXIS:   - Falls, Cardiac, Sternal  - ortho: Weight bearing status: WBAT   - Lungs: Aspiration, Incentive Spirometer   - DVT: eliquis    IDT   SW: lives w/ spouse,w/ 8 steps in home, no hand rail. Wife works weekend but can help  Functional: eating mod I, upper body dressing set up, lbd mod/min A, bathing min a, toilet transfers CG/min A, toileting min A, short transfer min A; ambulates 40 ft RW mod A, transfers min A. Has mild expressive and receptive aphasia, anomia, possibly from underlying cognitive deficit.   SLP noted deficits with communication and executive function for which he is receiving  therapy  Barriers to d/c --deficits with communication/executive fxn, medical issues (low HR), suboptimal motor strength, poor balance ,Rt Knee instability  Goals--independence with transfers, ambulate without supervision, with gait assistance, cane  EDOD:  TH Home w/ HC vs RICARDO  Spoke w/ Wife Marcus [phone] on 21. -- provided update and inquired about reading glasses, which patient doesn't have a home.     TOM PLEITEZ is a 61y with a history of DM2, ?CKD, HTN and HLD  who presented to [facility] on 10/6/21 with SOB, CP found to have multivessel CAD and JOSHUA on CKD, s/p CABGx2, MVR, and LAAL. Started on HD T/T/S, now on HD M/W/F s/p Permacath and LUE AVF. Hospital Course complicated by post-op hemorrhagic shock , received 4 PRBC, 1 PLT, 1000 FIEBA intraop and post op dual pressor and inotrope support w/ , Primacor, Levo, and Epi gtts.  Afib, PACs- now on amiodarone. Patient now admitted for a multidisciplinary rehab program. 21 @ 14:40    *DM: lispro decreased to 5/5/5 units pre meals  *Renal: HD w/ [facility] pending more paperwork and Gold Quantiferon.    #Debility post ESRD on HD, Afib. CABG  - Gait Instability, ADL impairments and Functional impairments: continue Comprehensive Rehab Program of PT/OT/SLP  - 3 hours a day, 5 days a week  - Orthotics eval for right genu recurvatum and possible left appreciated - patient will follow up with orthotist outpatient pending insurance auth for bracing.  - continue cardiopulmonary conditioning.  - SLP for cognitive and memory deficits    #CAD and Afib s/p CABGx2, LAAL, MVR with Tachy-Seth Syndrome  - Most recent echo on 10/28 shows restoration of LVEF   - d/[place] amiodatone  - c/w eliquis 2.5mg bid  - c/w ASA 81 daily  - close f/u Medicine  - Cardiologist recs appreciated: Dr. Stewart  rec d/c amiodarone   - EKGs unremarkable, d/[place], recommence if symptomatic  - If tachy then speak w/ cardiology regarding restarting amiodarone.      #?CKD on HD - M/W/F  - retacrit 8000ui intraHD, renal f/u  - Permacath placed 21 and LUE AVF placed   - chlorhexidine to keep access sites clean    #DM- with hypoglycemia-revised insulin regime    - lantus 16 unit qhs  - decreased lispro 5/5/5  # Rt knee instability--orthotic assessment   #Sleep  - melatonin PRN     #Skin  - Skin on admission: Sacral/left buttock sacral decubital ulcer with visible subcutaneous/dermis, dry b/l heels, permacath is c/d/i, LUE AVF site c/d/i, drain sites c/d/i; sternal incision site is c/d/i  - Pressure injury/Skin: OOB to Chair, PT/OT   - continue monitoring wound, incision and drain sites.  - Wound consult  - alyvene foam dressing and cavilon to wound    # Malnutrition--Moderate protein-calorie malnutrition.  #Pain Mgmt  - Tylenol PRN    #GI/Bowel Mgmt   - Continent c/w Senna, Miralax    #/Bladder Mgmt --Voiding, PVRs low, urine quantity unclear if oligo/anuric    #FEN   - Diet - Pureed w/ Nectar  [Renal, DASH/TLC]    - per SLP diet upgraded to Pureed with thin liquids -  wife has still not brought in dentures but patient says he prefers pureed diet      #Precautions / PROPHYLAXIS:   - Falls, Cardiac, Sternal  - ortho: Weight bearing status: WBAT   - Lungs: Aspiration, Incentive Spirometer   - DVT: eliquis    IDT   SW: lives w/ spouse,w/ 8 steps in home, no hand rail. Wife works weekend but can help  Functional: eating mod I, upper body dressing set up, lbd mod/min A, bathing min a, toilet transfers CG/min A, toileting min A, short transfer min A; ambulates 40 ft RW mod A, transfers min A. Has mild expressive and receptive aphasia, anomia, possibly from underlying cognitive deficit.   SLP noted deficits with communication and executive function for which he is receiving  therapy  Barriers to d/c --deficits with communication/executive fxn, medical issues (low HR), suboptimal motor strength, poor balance ,Rt Knee instability  Goals--independence with transfers, ambulate without supervision, with gait assistance, cane  EDOD:  Home w/ HC vs RICARDO  Spoke w/ Wife Marcus [phone] on 21. -- provided update and inquired about reading glasses, which patient doesn't have a home.

## 2021-11-22 NOTE — PROGRESS NOTE ADULT - SUBJECTIVE AND OBJECTIVE BOX
HPI:  61 y/o M w/ hx of DM2, HTN and HLD initially presented as transfer from [facility] for chest pain concerning for NSTEMI and possible new CHF c/b JOSHUA likely on CKD, and hypertensive emergency on 10/6/21. Hospital course complicated by s/p RRT for CVA/TIA ruled out and worsening anemia requiring 1 PRBC. Pt S/p HD session today and underwent LHC showing Multivessel CAD. CT surgery consulted for CABG evaluation. Patient developed left sided facial droop on 10/7 and stroke code was called. 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 Patient started Hemodialysis on 10/13. On 10/21 s/p CABGx2, Mitral Valve Repair, and left atrial appendage ligation, was extubated next day. He developed SOB on 10/24 and requeired bipap,  but was eventually trasitioned back down to [place]. Permacath placed 11/1/21 and LUE AV Fistula placed by vascular on 11/2/21. Her was noted to have PACs and Afib - started on amiodarone. He will be getting HD at [place]. Cleared by vascular to restart eliquis 2.5mg bid. HD schedule changed from T/T/S to M/W/F.   Patient evaluated by PM&R PT/OT and recommended acute rehab. Patient admitted to [facility] on 11/8/21 for debility. (08 Nov 2021 14:09)      INTERVAL HPI/OVERNIGHT EVENTS:  Chart Reviewed and patient seen at bedside.  Patient without any complaints or issues.  HD at [facility] requires more paperwork and wants either PPD or Quant. Gold test - however patient had BCG vaccine, so will just order Quant. Gold with dialysis labs.  +BM    ROS:  Denies fevers, chills, chest pain, shortness of breath, abdominal pain, headaches, nausea/vomiting    Vital Signs Last 24 Hrs  T(C): 37 (22 Nov 2021 07:14), Max: 37.2 (21 Nov 2021 20:49)  T(F): 98.6 (22 Nov 2021 07:14), Max: 98.9 (21 Nov 2021 20:49)  HR: 49 (22 Nov 2021 07:14) (49 - 56)  BP: 137/79 (22 Nov 2021 07:14) (135/74 - 137/79)  BP(mean): --  RR: 14 (22 Nov 2021 07:14) (14 - 15)  SpO2: 100% (22 Nov 2021 07:14) (100% - 100%)    Physical Exam:  Gen - NAD, Comfortable  HEENT - NCAT, EOMI, MMM  Neck - Supple, No limited ROM  Pulm - CTAB, No wheeze, No rhonchi, No crackles  Cardiovascular - RRR, S1S2, sternotomy incision in situ  Abdomen - Soft, NT/ND, +BS  Extremities - No edema or tenderness Left arm AV fistula    Neuro-  Cognitive - AAOx2-3  Motor -UE 5/5, LE 4/5   right genu recurvatum, possible left.  Sensory - Intact to LT  Reflexes - DTR Intact, No primitive reflex  Coordination - No dysmetria  Tone - normal b/l  Psychiatric - Mood stable, Affect WNL  Skin: Sacral/left buttock sacral decubital ulcer with visible subcutaneous/dermis, dry b/l heels, permacath is c/d/i, LUE AVF site c/d/i sternal incision site is also c/d/i        LABS:  11-20    136  |  97  |  61<H>  ----------------------------<  120<H>  4.8   |  31  |  7.44<H>    Ca    8.8      20 Nov 2021 15:10  Phos  4.4     11-20                                                7.9    7.30  )-----------( 206      ( 20 Nov 2021 15:10 )             24.8     CAPILLARY BLOOD GLUCOSE      POCT Blood Glucose.: 116 mg/dL (22 Nov 2021 11:26)  POCT Blood Glucose.: 117 mg/dL (22 Nov 2021 08:02)  POCT Blood Glucose.: 160 mg/dL (21 Nov 2021 21:18)  POCT Blood Glucose.: 146 mg/dL (21 Nov 2021 20:17)  POCT Blood Glucose.: 187 mg/dL (21 Nov 2021 16:50)      MEDICATIONS:  MEDICATIONS  (STANDING):  apixaban 2.5 milliGRAM(s) Oral two times a day  AQUAPHOR (petrolatum Ointment) 1 Application(s) Topical daily  aspirin  chewable 81 milliGRAM(s) Oral daily  atorvastatin 80 milliGRAM(s) Oral at bedtime  dextrose 40% Gel 15 Gram(s) Oral once  dextrose 5%. 1000 milliLiter(s) (50 mL/Hr) IV Continuous <Continuous>  dextrose 5%. 1000 milliLiter(s) (100 mL/Hr) IV Continuous <Continuous>  dextrose 50% Injectable 25 Gram(s) IV Push once  dextrose 50% Injectable 12.5 Gram(s) IV Push once  dextrose 50% Injectable 25 Gram(s) IV Push once  epoetin vern-epbx (RETACRIT) Injectable 47725 Unit(s) SubCutaneous <User Schedule>  glucagon  Injectable 1 milliGRAM(s) IntraMuscular once  insulin glargine Injectable (LANTUS) 5 Unit(s) SubCutaneous at bedtime  insulin lispro (ADMELOG) corrective regimen sliding scale   SubCutaneous at bedtime  insulin lispro (ADMELOG) corrective regimen sliding scale   SubCutaneous three times a day before meals  pantoprazole    Tablet 40 milliGRAM(s) Oral before breakfast  senna 2 Tablet(s) Oral at bedtime  sevelamer carbonate 1600 milliGRAM(s) Oral three times a day with meals    MEDICATIONS  (PRN):  acetaminophen     Tablet .. 650 milliGRAM(s) Oral every 6 hours PRN Mild Pain (1 - 3), Moderate Pain (4 - 6)  hemorrhoidal Ointment 1 Application(s) Rectal three times a day PRN Hemorrhoids  melatonin 3 milliGRAM(s) Oral at bedtime PRN Insomnia  polyethylene glycol 3350 17 Gram(s) Oral two times a day PRN Constipation         HPI:  61 y/o M w/ hx of DM2, HTN and HLD initially presented as transfer from [facility] for chest pain concerning for NSTEMI and possible new CHF c/b JOSHUA likely on CKD, and hypertensive emergency on 10/6/21. Hospital course complicated by s/p RRT for CVA/TIA ruled out and worsening anemia requiring 1 PRBC. Pt S/p HD session today and underwent LHC showing Multivessel CAD. CT surgery consulted for CABG evaluation. Patient developed left sided facial droop on 10/7 and stroke code was called. 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 Patient started Hemodialysis on 10/13. On 10/21 s/p CABGx2, Mitral Valve Repair, and left atrial appendage ligation, was extubated next day. He developed SOB on 10/24 and requeired bipap,  but was eventually trasitioned back down to [place]. Permacath placed 11/1/21 and LUE AV Fistula placed by vascular on 11/2/21. Her was noted to have PACs and Afib - started on amiodarone. He will be getting HD at [place]. Cleared by vascular to restart eliquis 2.5mg bid. HD schedule changed from T/T/S to M/W/F.   Patient evaluated by PM&R PT/OT and recommended acute rehab. Patient admitted to [facility] on 11/8/21 for debility. (08 Nov 2021 14:09)    INTERVAL HPI/OVERNIGHT EVENTS.  Chart Reviewed and patient seen at bedside.  Patient without any complaints or issues.  HD at [facility] requires more paperwork and wants either PPD or Quant. Gold test - however patient had BCG vaccine, so will just order Quant. Gold with dialysis labs.  +BM  Making functional gains, knee instability is less prominent as seen during therapy, except one episode during therapy today  Exam afterward unremarkable  Vitals normal  Patient eager to be d/c home, but hemodialysis placement is pending  Quantiferon test ordered as part of post d/c hemodialysis placement requirement to r/o Tuberculosis    ROS:  Denies fevers, chills, chest pain, shortness of breath, abdominal pain, headaches, nausea/vomiting    Vital Signs Last 24 Hrs  T(C): 37 (22 Nov 2021 07:14), Max: 37.2 (21 Nov 2021 20:49)  T(F): 98.6 (22 Nov 2021 07:14), Max: 98.9 (21 Nov 2021 20:49)  HR: 49 (22 Nov 2021 07:14) (49 - 56)  BP: 137/79 (22 Nov 2021 07:14) (135/74 - 137/79)  BP(mean): --  RR: 14 (22 Nov 2021 07:14) (14 - 15)  SpO2: 100% (22 Nov 2021 07:14) (100% - 100%)    Physical Exam:  Gen - NAD, Comfortable  HEENT - NCAT, EOMI, MMM  Neck - Supple, No limited ROM  Pulm - CTAB, No wheeze, No rhonchi, No crackles  Cardiovascular - RRR, S1S2, sternotomy incision in situ  Abdomen - Soft, NT/ND, +BS  Extremities - No edema or tenderness Left arm AV fistula    Neuro-  Cognitive - AAOx2-3  Motor -UE 5/5, LE 4/5   right genu recurvatum, possible left.  Sensory - Intact to LT  Reflexes - DTR Intact, No primitive reflex  Coordination - No dysmetria  Tone - normal b/l  Psychiatric - Mood stable, Affect WNL  Skin: Sacral/left buttock sacral decubital ulcer with visible subcutaneous/dermis, dry b/l heels, permacath is c/d/i, LUE AVF site c/d/i sternal incision site is also c/d/i    LABS:  11-20    136  |  97  |  61<H>  ----------------------------<  120<H>  4.8   |  31  |  7.44<H>    Ca    8.8      20 Nov 2021 15:10  Phos  4.4     11-20                   7.9    7.30  )-----------( 206      ( 20 Nov 2021 15:10 )             24.8     CAPILLARY BLOOD GLUCOSE    POCT Blood Glucose.: 116 mg/dL (22 Nov 2021 11:26)  POCT Blood Glucose.: 117 mg/dL (22 Nov 2021 08:02)  POCT Blood Glucose.: 160 mg/dL (21 Nov 2021 21:18)  POCT Blood Glucose.: 146 mg/dL (21 Nov 2021 20:17)  POCT Blood Glucose.: 187 mg/dL (21 Nov 2021 16:50)    MEDICATIONS:  MEDICATIONS  (STANDING):  apixaban 2.5 milliGRAM(s) Oral two times a day  AQUAPHOR (petrolatum Ointment) 1 Application(s) Topical daily  aspirin  chewable 81 milliGRAM(s) Oral daily  atorvastatin 80 milliGRAM(s) Oral at bedtime  dextrose 40% Gel 15 Gram(s) Oral once  dextrose 5%. 1000 milliLiter(s) (50 mL/Hr) IV Continuous <Continuous>  dextrose 5%. 1000 milliLiter(s) (100 mL/Hr) IV Continuous <Continuous>  dextrose 50% Injectable 25 Gram(s) IV Push once  dextrose 50% Injectable 12.5 Gram(s) IV Push once  dextrose 50% Injectable 25 Gram(s) IV Push once  epoetin vern-epbx (RETACRIT) Injectable 19853 Unit(s) SubCutaneous <User Schedule>  glucagon  Injectable 1 milliGRAM(s) IntraMuscular once  insulin glargine Injectable (LANTUS) 5 Unit(s) SubCutaneous at bedtime  insulin lispro (ADMELOG) corrective regimen sliding scale   SubCutaneous at bedtime  insulin lispro (ADMELOG) corrective regimen sliding scale   SubCutaneous three times a day before meals  pantoprazole    Tablet 40 milliGRAM(s) Oral before breakfast  senna 2 Tablet(s) Oral at bedtime  sevelamer carbonate 1600 milliGRAM(s) Oral three times a day with meals    MEDICATIONS  (PRN):  acetaminophen     Tablet .. 650 milliGRAM(s) Oral every 6 hours PRN Mild Pain (1 - 3), Moderate Pain (4 - 6)  hemorrhoidal Ointment 1 Application(s) Rectal three times a day PRN Hemorrhoids  melatonin 3 milliGRAM(s) Oral at bedtime PRN Insomnia  polyethylene glycol 3350 17 Gram(s) Oral two times a day PRN Constipation    Therapy  Engaging, motivated, ambulating with Walker >50ft, knee instability is less frequent

## 2021-11-22 NOTE — PROGRESS NOTE ADULT - SUBJECTIVE AND OBJECTIVE BOX
Seen on  HD    Vital Signs Last 24 Hrs  T(C): 36.6 (11-22-21 @ 17:00), Max: 37.2 (11-21-21 @ 20:49)  T(F): 97.9 (11-22-21 @ 17:00), Max: 98.9 (11-21-21 @ 20:49)  HR: 93 (11-22-21 @ 17:00) (43 - 93)  BP: 124/73 (11-22-21 @ 17:00) (124/73 - 137/79)  RR: 16 (11-22-21 @ 17:00) (14 - 16)  SpO2: 99% (11-22-21 @ 17:00) (98% - 100%)    s1s2  b/l air entry  soft  sm edema                                                                                            8.0    8.28  )-----------( 198      ( 22 Nov 2021 15:00 )             25.1     22 Nov 2021 15:00    131    |  92     |  57     ----------------------------<  107    4.7     |  30     |  7.02     Ca    8.8        22 Nov 2021 15:00    acetaminophen     Tablet .. 650 milliGRAM(s) Oral every 6 hours PRN  apixaban 2.5 milliGRAM(s) Oral two times a day  AQUAPHOR (petrolatum Ointment) 1 Application(s) Topical daily  aspirin  chewable 81 milliGRAM(s) Oral daily  atorvastatin 80 milliGRAM(s) Oral at bedtime  dextrose 40% Gel 15 Gram(s) Oral once  dextrose 5%. 1000 milliLiter(s) IV Continuous <Continuous>  dextrose 5%. 1000 milliLiter(s) IV Continuous <Continuous>  dextrose 50% Injectable 25 Gram(s) IV Push once  dextrose 50% Injectable 12.5 Gram(s) IV Push once  dextrose 50% Injectable 25 Gram(s) IV Push once  epoetin vern-epbx (RETACRIT) Injectable 94062 Unit(s) SubCutaneous <User Schedule>  glucagon  Injectable 1 milliGRAM(s) IntraMuscular once  hemorrhoidal Ointment 1 Application(s) Rectal three times a day PRN  insulin glargine Injectable (LANTUS) 5 Unit(s) SubCutaneous at bedtime  insulin lispro (ADMELOG) corrective regimen sliding scale   SubCutaneous at bedtime  insulin lispro (ADMELOG) corrective regimen sliding scale   SubCutaneous three times a day before meals  melatonin 3 milliGRAM(s) Oral at bedtime PRN  pantoprazole    Tablet 40 milliGRAM(s) Oral before breakfast  polyethylene glycol 3350 17 Gram(s) Oral two times a day PRN  senna 2 Tablet(s) Oral at bedtime  sevelamer carbonate 1600 milliGRAM(s) Oral three times a day with meals    A/P:    S/p CABG x 2, Mitral Valve Repair, and left atrial appendage ligation 10/21/21  Hospital course complicated by JOSHUA/CKD  Now on HD 3 x week  Renal diet  TMP 2.5 kg w/HD today  Epoetin w/each HD  Sevelamer for high Phos  Bld work w/each HD  Rehab    [phone]

## 2021-11-22 NOTE — PROGRESS NOTE ADULT - ASSESSMENT
59 y/o M w/ hx of DM2, HTN and HLD, transfer from [facility] for NSTEMI, admitted w/ JOSHUA on CKD and hypertensive emergency, new start to HD (10/13). He underwent LHC showing Multivessel CAD. 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  s/p CABG x2 (10/21), MV repair, and left atrial ligation. Permacath placed 11/1/21 and LUE AV Fistula placed by vascular on 11/2/21. Her was noted to have PACs and Afib - started on amiodarone and Eliquis  Now admitted to [facility] after for initiation of a multidisciplinary rehab program consisting focused on functional mobility, transfers and ADLs- pt/ot/dvt ppx    #NSTEMI   #CAD   -S/p CABG (10/21)  -s/p MV repair  -Continue aspirin/Statin    #ESRD   -HD (MWF)    #Anemia of chronic  -Epoetin w/each HD  -Goal Hb to maintain >8    #paroxysmal atrial fibrillation   #tachy blaine syndrome  -cont apixaban  -hold AV Christina agents due to bradycardia  -amiodarone - now held due to bradycardia.   -cardio consult noted  -can restart amiodarone if becomes tachycardic. Pt developed AF post operatively, so it is possible amio was only needed short term.  QTc prolongation  avoid QT prolonging agents, monitor on daily ECG.     #DM2  -hgba1c 7%  -c/w Lantus 5unit qhs  -c/w ISS, Accu-Cheks    #subclinical hypothyroidism  -TSH mildly elevated 6.4, pt asymptomatic  -seen by Endo during admission. Outpt follow up

## 2021-11-22 NOTE — PROGRESS NOTE ADULT - ATTENDING COMMENTS
59y/o M on acute rehab treatment for debility s/p CABG, ESRD on HD and A fib    Functionally improved, working on knee instability, balance and executive fxn deficits  Awaiting effective placement at a hemodialysis facility prior to discharge  Continue routine care    Stable to continue acute rehab treatment    Seen with Dr Montez Rehab Med Resident

## 2021-11-23 ENCOUNTER — TRANSCRIPTION ENCOUNTER (OUTPATIENT)
Age: 61
End: 2021-11-23

## 2021-11-23 LAB
GLUCOSE BLDC GLUCOMTR-MCNC: 111 MG/DL — HIGH (ref 70–99)
GLUCOSE BLDC GLUCOMTR-MCNC: 221 MG/DL — HIGH (ref 70–99)

## 2021-11-23 PROCEDURE — 99232 SBSQ HOSP IP/OBS MODERATE 35: CPT

## 2021-11-23 RX ORDER — ERYTHROPOIETIN 10000 [IU]/ML
10000 INJECTION, SOLUTION INTRAVENOUS; SUBCUTANEOUS ONCE
Refills: 0 | Status: COMPLETED | OUTPATIENT
Start: 2021-11-24 | End: 2021-11-24

## 2021-11-23 RX ORDER — INSULIN LISPRO 100/ML
VIAL (ML) SUBCUTANEOUS
Refills: 0 | Status: DISCONTINUED | OUTPATIENT
Start: 2021-11-23 | End: 2021-11-27

## 2021-11-23 RX ADMIN — SEVELAMER CARBONATE 1600 MILLIGRAM(S): 2400 POWDER, FOR SUSPENSION ORAL at 17:13

## 2021-11-23 RX ADMIN — SEVELAMER CARBONATE 1600 MILLIGRAM(S): 2400 POWDER, FOR SUSPENSION ORAL at 07:30

## 2021-11-23 RX ADMIN — ATORVASTATIN CALCIUM 80 MILLIGRAM(S): 80 TABLET, FILM COATED ORAL at 21:07

## 2021-11-23 RX ADMIN — APIXABAN 2.5 MILLIGRAM(S): 2.5 TABLET, FILM COATED ORAL at 05:47

## 2021-11-23 RX ADMIN — SENNA PLUS 2 TABLET(S): 8.6 TABLET ORAL at 21:07

## 2021-11-23 RX ADMIN — Medication 3 MILLIGRAM(S): at 21:07

## 2021-11-23 RX ADMIN — INSULIN GLARGINE 5 UNIT(S): 100 INJECTION, SOLUTION SUBCUTANEOUS at 21:07

## 2021-11-23 RX ADMIN — Medication 1 APPLICATION(S): at 12:23

## 2021-11-23 RX ADMIN — SEVELAMER CARBONATE 1600 MILLIGRAM(S): 2400 POWDER, FOR SUSPENSION ORAL at 12:22

## 2021-11-23 RX ADMIN — Medication 81 MILLIGRAM(S): at 12:22

## 2021-11-23 RX ADMIN — PANTOPRAZOLE SODIUM 40 MILLIGRAM(S): 20 TABLET, DELAYED RELEASE ORAL at 05:47

## 2021-11-23 RX ADMIN — APIXABAN 2.5 MILLIGRAM(S): 2.5 TABLET, FILM COATED ORAL at 17:14

## 2021-11-23 NOTE — DISCHARGE NOTE PROVIDER - DETAILS OF MALNUTRITION DIAGNOSIS/DIAGNOSES
This patient has been assessed with a concern for Malnutrition and was treated during this hospitalization for the following Nutrition diagnosis/diagnoses:     -  11/09/2021: Moderate protein-calorie malnutrition

## 2021-11-23 NOTE — PROGRESS NOTE ADULT - ASSESSMENT
59 y/o M w/ hx of DM2, HTN and HLD, transfer from [facility] for NSTEMI, admitted w/ JOSHUA on CKD and hypertensive emergency, new start to HD (10/13). He underwent LHC showing Multivessel CAD. 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  s/p CABG x2 (10/21), MV repair, and left atrial ligation. Permacath placed 11/1/21 and LUE AV Fistula placed by vascular on 11/2/21. Her was noted to have PACs and Afib - started on amiodarone and Eliquis  Now admitted to [facility] after for initiation of a multidisciplinary rehab program consisting focused on functional mobility, transfers and ADLs- pt/ot/dvt ppx    #NSTEMI   #CAD   -S/p CABG (10/21)  -s/p MV repair  -Continue aspirin/Statin    #ESRD   -HD (MWF)    #Anemia of chronic  -Epoetin w/each HD  -Goal Hb to maintain >8    #paroxysmal atrial fibrillation   #tachy blaine syndrome  -cont apixaban  -hold AV Christina agents due to bradycardia  -amiodarone - now held due to bradycardia.   -cardio consult noted  -can restart amiodarone if becomes tachycardic. Pt developed AF post operatively, so it is possible amio was only needed short term.  -avoid QT prolonging agents    #DM2  -hgba1c 7%  -c/w Lantus 5unit qhs  -c/w ISS, Accu-Cheks    #subclinical hypothyroidism  -TSH mildly elevated 6.4, pt asymptomatic  -seen by Endo during admission. Outpt follow up

## 2021-11-23 NOTE — DISCHARGE NOTE PROVIDER - CARE PROVIDERS DIRECT ADDRESSES
,[email or web address],DirectAddress_Unknown

## 2021-11-23 NOTE — DISCHARGE NOTE PROVIDER - NSDCMRMEDTOKEN_GEN_ALL_CORE_FT
amiodarone 200 mg oral tablet: 1 tab(s) orally every 12 hours  apixaban 2.5 mg oral tablet: 1 tab(s) orally 2 times a day  aspirin 81 mg oral tablet, chewable: 1 tab(s) orally once a day  epoetin vern: 8000 unit(s) intravenously 3 times a week  insulin glargine: 16 unit(s) subcutaneous once a day (at bedtime)  insulin lispro 100 units/mL injectable solution: 10 unit(s) subcutaneous 3 times a day (before meals)  knee instability bilateral: bilateral knee instability    assess for othotic device  pantoprazole 40 mg oral delayed release tablet: 1 tab(s) orally 2 times a day  polyethylene glycol 3350 oral powder for reconstitution: 17 gram(s) orally once a day  Right knee orthotic evaluation: Dx Right knee flexion/recurvartum and left knee  recurvatum    Referral for Orthotic evaluation      senna oral tablet: 2 tab(s) orally once a day (at bedtime)  sevelamer carbonate 800 mg oral tablet: 1 tab(s) orally 3 times a day   acetaminophen 325 mg oral tablet: 2 tab(s) orally every 6 hours, As needed, Mild Pain (1 - 3), Moderate Pain (4 - 6)  apixaban 2.5 mg oral tablet: 1 tab(s) orally 2 times a day  aspirin 81 mg oral tablet, chewable: 1 tab(s) orally once a day  atorvastatin 80 mg oral tablet: 1 tab(s) orally once a day (at bedtime)  knee instability bilateral: bilateral knee instability    assess for othotic device  melatonin 3 mg oral tablet: 1 tab(s) orally once a day (at bedtime), As needed, Insomnia  metFORMIN 1000 mg oral tablet: 1 tab(s) orally 2 times a day   pantoprazole 40 mg oral delayed release tablet: 1 tab(s) orally once a day (before a meal)  polyethylene glycol 3350 oral powder for reconstitution: 17 gram(s) orally 2 times a day, As needed, Constipation  Right knee orthotic evaluation: Dx Right knee flexion/recurvartum and left knee  recurvatum    Referral for Orthotic evaluation      Semglee (Prefilled Pen) 100 units/mL subcutaneous solution: 5 unit(s) subcutaneous once a day (at bedtime)   senna oral tablet: 2 tab(s) orally once a day (at bedtime)  sevelamer carbonate 800 mg oral tablet: 2 tab(s) orally 3 times a day (with meals)

## 2021-11-23 NOTE — DISCHARGE NOTE PROVIDER - HOSPITAL COURSE
HPI:  59 y/o M w/ hx of DM2, HTN and HLD initially presented as transfer from [facility] for chest pain concerning for NSTEMI and possible new CHF c/b JOSHUA likely on CKD, and hypertensive emergency on 10/6/21. Hospital course complicated by s/p RRT for CVA/TIA ruled out and worsening anemia requiring 1 PRBC. Pt S/p HD session today and underwent LHC showing Multivessel CAD. CT surgery consulted for CABG evaluation. Patient developed left sided facial droop on 10/7 and stroke code was called. 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 Patient started Hemodialysis on 10/13. On 10/21 s/p CABGx2, Mitral Valve Repair, and left atrial appendage ligation, was extubated next day. He developed SOB on 10/24 and requeired bipap,  but was eventually trasitioned back down to [place]. Permacath placed 11/1/21 and LUE AV Fistula placed by vascular on 11/2/21. Her was noted to have PACs and Afib - started on amiodarone. He will be getting HD at [place]. Cleared by vascular to restart eliquis 2.5mg bid. HD schedule changed from T/T/S to M/W/F.   Patient evaluated by PM&R PT/OT and recommended acute rehab. Patient admitted to [facility] on 11/8/21 for debility. (08 Nov 2021 14:09)      Patient was evaluated by PM&R and therapy for gait/ADL impairments and recommended acute rehabilitation. Patient was medically optimized for discharge to [facility] on 11/8/21.    Rehab course significant for genu recruvatum. Patient was evaluated by orthotist for bracing for knee bilaterally. Patient's course was also signficcant for Tachy-Seth syndrome, but in rehav he was bradycardic, asymtpomatic otherwise. Cardiology recommended daily EKGs and downtitration of amiodarone. Given that patient was nno longer tachycardic, amiodarone was ultimately discontinued. He is to get HD outpatient at [place] M/W/F.    All other medical co-morbidities were stable. Patient tolerated course of inpatient PT/OT/SLP rehab with significant improvements and met rehab goals prior to discharge. Patient was medically cleared on 11/27/21 for discharge to home with home care and Hemodialysis M/W/F at [place].

## 2021-11-23 NOTE — CHART NOTE - NSCHARTNOTEFT_GEN_A_CORE
Nutrition Follow Up Note  Hospital Course   (Per Electronic Medical Record)    Source:  Patient [X]  Medical Record [X]      Diet:   Consistent Carbohydrate Renal DASH-TLC Puree (Dysphagia 1) Diet w/ Thin Liquids  Tolerates Diet Consistency Well  No Chewing/Swallowing Difficulties  No Recent Nausea, Vomiting, Diarrhea or Constipation (as Per Patient)  Consumes % of Meals (as Per Documentation) - States Good PO Intake/Appetite   on Nepro 8oz Daily (Provides 425kcal & 19grams of Protein) - Patient Takes Nutrition Supplement Well  Education Provided on Blood Glucose Management     Enteral/Parenteral Nutrition: Not Applicable    Current Weight: lb on 11/23  Obtain Weights Daily  Weight Fluctuates     Pertinent Medications: MEDICATIONS  (STANDING):  apixaban 2.5 milliGRAM(s) Oral two times a day  AQUAPHOR (petrolatum Ointment) 1 Application(s) Topical daily  aspirin  chewable 81 milliGRAM(s) Oral daily  atorvastatin 80 milliGRAM(s) Oral at bedtime  dextrose 40% Gel 15 Gram(s) Oral once  dextrose 5%. 1000 milliLiter(s) (50 mL/Hr) IV Continuous <Continuous>  dextrose 5%. 1000 milliLiter(s) (100 mL/Hr) IV Continuous <Continuous>  dextrose 50% Injectable 25 Gram(s) IV Push once  dextrose 50% Injectable 12.5 Gram(s) IV Push once  dextrose 50% Injectable 25 Gram(s) IV Push once  epoetin vern-epbx (RETACRIT) Injectable 47836 Unit(s) SubCutaneous <User Schedule>  glucagon  Injectable 1 milliGRAM(s) IntraMuscular once  insulin glargine Injectable (LANTUS) 5 Unit(s) SubCutaneous at bedtime  insulin lispro (ADMELOG) corrective regimen sliding scale   SubCutaneous at bedtime  insulin lispro (ADMELOG) corrective regimen sliding scale   SubCutaneous three times a day before meals  pantoprazole    Tablet 40 milliGRAM(s) Oral before breakfast  senna 2 Tablet(s) Oral at bedtime  sevelamer carbonate 1600 milliGRAM(s) Oral three times a day with meals    MEDICATIONS  (PRN):  acetaminophen     Tablet .. 650 milliGRAM(s) Oral every 6 hours PRN Mild Pain (1 - 3), Moderate Pain (4 - 6)  hemorrhoidal Ointment 1 Application(s) Rectal three times a day PRN Hemorrhoids  melatonin 3 milliGRAM(s) Oral at bedtime PRN Insomnia  polyethylene glycol 3350 17 Gram(s) Oral two times a day PRN Constipation    Pertinent Labs:  11-22 Na131 mmol/L<L> Glu 107 mg/dL<H> K+ 4.7 mmol/L Cr  7.02 mg/dL<H> BUN 57 mg/dL<H> 11-20 Phos 4.4 mg/dL    POCT (over Last 3 Days) - Ranging from     Skin: Stage 2 Pressure Ulcer on Left Buttock   Multiple Surgical Incisions  (as Per Nursing Flow Sheet)     Edema: None Noted (as Per Documentation)     Last Bowel Movement: on 11/20    Estimated Needs:   [X] No Change Since Previous Assessment    Previous Nutrition Diagnosis:   Moderate Malnutrition  Increased Nutrient Needs - Calories & Protein     Nutrition Diagnosis is [X] Ongoing - Continues on Nutrition Supplement & Patient Takes Nutrition Supplement     New Nutrition Diagnosis: [X] Not Applicable    Interventions:   1. Education Provided on Blood Glucose Management   2. Recommend Continue Nutrition Plan of Care     Monitoring & Evaluation:   [X] Weights   [X] PO Intake   [X] Skin Integrity   [X] Follow Up (Per Protocol)  [X] Tolerance to Diet Prescription   [X] Other: Labs & POCT    Registered Dietitian/Nutritionist Remains Available.  Mason Glass RDN    Pager #[phone]  Phone# [phone]

## 2021-11-23 NOTE — PROGRESS NOTE ADULT - SUBJECTIVE AND OBJECTIVE BOX
HPI:  59 y/o M w/ hx of DM2, HTN and HLD initially presented as transfer from [facility] for chest pain concerning for NSTEMI and possible new CHF c/b JOSHUA likely on CKD, and hypertensive emergency on 10/6/21. Hospital course complicated by s/p RRT for CVA/TIA ruled out and worsening anemia requiring 1 PRBC. Pt S/p HD session today and underwent LHC showing Multivessel CAD. CT surgery consulted for CABG evaluation. Patient developed left sided facial droop on 10/7 and stroke code was called. 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 Patient started Hemodialysis on 10/13. On 10/21 s/p CABGx2, Mitral Valve Repair, and left atrial appendage ligation, was extubated next day. He developed SOB on 10/24 and requeired bipap,  but was eventually trasitioned back down to [place]. Permacath placed 11/1/21 and LUE AV Fistula placed by vascular on 11/2/21. Her was noted to have PACs and Afib - started on amiodarone. He will be getting HD at [place]. Cleared by vascular to restart eliquis 2.5mg bid. HD schedule changed from T/T/S to M/W/F.   Patient evaluated by PM&R PT/OT and recommended acute rehab. Patient admitted to [facility] on 11/8/21 for debility. (08 Nov 2021 14:09)    INTERVAL HPI/OVERNIGHT EVENTS.  Chart Reviewed and patient seen/examined at bedside.  Patient without any complaints or issues.  HD uneventful, awaiting placement to an outpatient HD facility  +BM  Making functional gains, knee instability is less prominent   Vitals normal  Patient eager to be d/c home,   Await qunatiferon test result as requirement for HD placement    ROS:  Denies fevers, chills, chest pain, shortness of breath, abdominal pain, headaches, nausea/vomiting    Vital Signs Last 24 Hrs  Vital Signs Last 24 Hrs  T(C): 36.6 (23 Nov 2021 07:36), Max: 36.6 (22 Nov 2021 17:00)  T(F): 97.9 (23 Nov 2021 07:36), Max: 97.9 (22 Nov 2021 17:00)  HR: 55 (23 Nov 2021 07:36) (55 - 93)  BP: 160/81 (23 Nov 2021 07:36) (124/73 - 160/81)  RR: 17 (23 Nov 2021 07:36) (16 - 17)  SpO2: 100% (23 Nov 2021 07:36) (99% - 100%)    Physical Exam:  Gen - NAD, Comfortable  HEENT - no asymmetry  Neck - Supple, No limited ROM  Pulm - CTAB, No wheeze, No rhonchi, No crackles  Cardiovascular - irregular (intermittently) S1S2, sternotomy incision in situ  Abdomen - Soft, NT/ND, +BS  Extremities - No edema or tenderness Left arm AV fistula    Neuro-  Cognitive - AAOx2-3  Motor -UE 5/5, LE 4/5   right genu recurvatum, less frequent   Sensory - Intact to LT  Reflexes - DTR Intact, No primitive reflex  Coordination - No dysmetria  Tone - normal b/l  Psychiatric - Mood stable, Affect WNL  Skin: Sacral/left buttock sacral decubital ulcer with visible subcutaneous/dermis, dry b/l heels, permacath is c/d/i, LUE AVF site c/d/i sternal incision site is also c/d/i    LABS:                    8.0    8.28  )-----------( 198      ( 22 Nov 2021 15:00 )             25.1     11-22    131<L>  |  92<L>  |  57<H>  ----------------------------<  107<H>  4.7   |  30  |  7.02<H>    Ca    8.8      22 Nov 2021 15:00    MEDICATIONS  (STANDING):  apixaban 2.5 milliGRAM(s) Oral two times a day  AQUAPHOR (petrolatum Ointment) 1 Application(s) Topical daily  aspirin  chewable 81 milliGRAM(s) Oral daily  atorvastatin 80 milliGRAM(s) Oral at bedtime  dextrose 40% Gel 15 Gram(s) Oral once  dextrose 5%. 1000 milliLiter(s) (50 mL/Hr) IV Continuous <Continuous>  dextrose 5%. 1000 milliLiter(s) (100 mL/Hr) IV Continuous <Continuous>  dextrose 50% Injectable 25 Gram(s) IV Push once  dextrose 50% Injectable 12.5 Gram(s) IV Push once  dextrose 50% Injectable 25 Gram(s) IV Push once  epoetin vern-epbx (RETACRIT) Injectable 67786 Unit(s) SubCutaneous <User Schedule>  glucagon  Injectable 1 milliGRAM(s) IntraMuscular once  insulin glargine Injectable (LANTUS) 5 Unit(s) SubCutaneous at bedtime  insulin lispro (ADMELOG) corrective regimen sliding scale   SubCutaneous at bedtime  insulin lispro (ADMELOG) corrective regimen sliding scale   SubCutaneous before breakfast  pantoprazole    Tablet 40 milliGRAM(s) Oral before breakfast  senna 2 Tablet(s) Oral at bedtime  sevelamer carbonate 1600 milliGRAM(s) Oral three times a day with meals    MEDICATIONS  (PRN):  acetaminophen     Tablet .. 650 milliGRAM(s) Oral every 6 hours PRN Mild Pain (1 - 3), Moderate Pain (4 - 6)  hemorrhoidal Ointment 1 Application(s) Rectal three times a day PRN Hemorrhoids  melatonin 3 milliGRAM(s) Oral at bedtime PRN Insomnia  polyethylene glycol 3350 17 Gram(s) Oral two times a day PRN Constipation    Therapy  Ambulating increasing distance, knee instability improving,   awaiting out patient hemodialysis placement  working on SLP for executive function deficits   dependent (less than 25% patients effort)

## 2021-11-23 NOTE — DISCHARGE NOTE PROVIDER - NSDCFUADDAPPT_GEN_ALL_CORE_FT
Please follow up at CT Surgery office as an outpatient for your suture to be removed.  If you have any chest pain or shortness of breath, please go to the Emergency room immediately.

## 2021-11-23 NOTE — PROGRESS NOTE ADULT - ASSESSMENT
TOM PLEITEZ is a 61y with a history of DM2, ?CKD, HTN and HLD  who presented to [facility] on 10/6/21 with SOB, CP found to have multivessel CAD and JOSHUA on CKD, s/p CABGx2, MVR, and LAAL. Started on HD T/T/S, now on HD M/W/F s/p Permacath and LUE AVF. Hospital Course complicated by post-op hemorrhagic shock , received 4 PRBC, 1 PLT, 1000 FIEBA intraop and post op dual pressor and inotrope support w/ , Primacor, Levo, and Epi gtts.  Afib, PACs- now on amiodarone. Patient now admitted for a multidisciplinary rehab program. 21 @ 14:40    *DM: lispro decreased to 5/5/5 units pre meals  *Renal: HD w/ [facility] pending more paperwork and Gold Quantiferon. awaiting placement    #Debility post ESRD on HD, Afib. CABG  - Gait Instability, ADL impairments and Functional impairments: continue Comprehensive Rehab Program of PT/OT/SLP  - 3 hours a day, 5 days a week  - Orthotics eval for right genu recurvatum and possible left appreciated - patient will follow up with orthotist outpatient pending insurance auth for bracing.  - continue cardiopulmonary conditioning.  - SLP for cognitive and memory deficits    #CAD and Afib s/p CABGx2, LAAL, MVR with Tachy-Seth Syndrome  - Most recent echo on 10/28 shows restoration of LVEF   - d/[place] amiodatone  - c/w eliquis 2.5mg bid  - c/w ASA 81 daily  - close f/u Medicine  - Cardiologist recs appreciated: Dr. Stewart  rec d/c amiodarone   - EKGs unremarkable, d/[place], recommence if symptomatic  - If tachy then speak w/ cardiology regarding restarting amiodarone.      #?CKD on HD - M/W/F  - retacrit 8000ui intraHD, renal f/u  - Permacath placed 21 and LUE AVF placed   - chlorhexidine to keep access sites clean    #DM- with hypoglycemia-revised insulin regime    - lantus 16 unit qhs  - decreased lispro //5  # Rt knee instability--orthotic assessment   #Sleep  - melatonin PRN     #Skin  - Skin on admission: Sacral/left buttock sacral decubital ulcer with visible subcutaneous/dermis, dry b/l heels, permacath is c/d/i, LUE AVF site c/d/i, drain sites c/d/i; sternal incision site is c/d/i  - Pressure injury/Skin: OOB to Chair, PT/OT   - continue monitoring wound, incision and drain sites.  - Wound consult  - alyvene foam dressing and cavilon to wound    # Malnutrition--Moderate protein-calorie malnutrition.  #Pain Mgmt  - Tylenol PRN    #GI/Bowel Mgmt   - Continent c/w Senna, Miralax    #/Bladder Mgmt --Voiding, PVRs low, urine quantity unclear if oligo/anuric    #FEN   - Diet - Pureed w/ Nectar  [Renal, DASH/TLC]    - per SLP diet upgraded to Pureed with thin liquids -  wife has still not brought in dentures but patient says he prefers pureed diet    #Precautions / PROPHYLAXIS:   - Falls, Cardiac, Sternal  - ortho: Weight bearing status: WBAT   - Lungs: Aspiration, Incentive Spirometer   - DVT: eliquis    IDT   SW: lives w/ spouse,w/8 steps in home, no hand rail. Wife works weekend but can help  Functional: eating mod I, upper body dressing set up, lbd mod/min A, bathing min a, toilet transfers CG/min A, toileting min A, short transfer min A; ambulates 40 ft RW mod A, transfers min A. Has mild expressive and receptive aphasia, anomia, possibly from underlying cognitive deficit.   SLP noted deficits with communication and executive function for which he is receiving  therapy  Barriers to d/c --deficits with communication/executive fxn, medical issues (low HR), suboptimal motor strength, poor balance ,Rt Knee instability  Goals--independence with transfers, ambulate without supervision, with gait assistance, cane  EDOD:  Home pending HD placement  Spoke w/ Wife Marcus [phone] on 21. -- provided update and inquired about reading glasses, which patient doesn't have a home.    Summary  61 y/o M on acute rehab treatment for debility s/p CABG, ESRD on HD and A fib    Sustained functional improvement  Functionally improved, working on knee instability, balance and executive fxn deficits  Tolerating pureed diet  Vitals stable, no chest pain  Awaiting placement at a hemodialysis facility prior to discharge  Continue routine care    Stable to continue acute rehab treatment

## 2021-11-23 NOTE — PROGRESS NOTE ADULT - SUBJECTIVE AND OBJECTIVE BOX
No distress    Vital Signs Last 24 Hrs  T(C): 37.1 (11-23-21 @ 19:55), Max: 37.1 (11-23-21 @ 19:55)  T(F): 98.7 (11-23-21 @ 19:55), Max: 98.7 (11-23-21 @ 19:55)  HR: 54 (11-23-21 @ 19:55) (54 - 55)  BP: 154/78 (11-23-21 @ 19:55) (154/78 - 160/81)  RR: 16 (11-23-21 @ 19:55) (16 - 17)  SpO2: 100% (11-23-21 @ 19:55) (100% - 100%)    s1s2  b/l air entry  soft  sm edema                                                                                                    8.0    8.28  )-----------( 198      ( 22 Nov 2021 15:00 )             25.1     22 Nov 2021 15:00    131    |  92     |  57     ----------------------------<  107    4.7     |  30     |  7.02     Ca    8.8        22 Nov 2021 15:00    acetaminophen     Tablet .. 650 milliGRAM(s) Oral every 6 hours PRN  apixaban 2.5 milliGRAM(s) Oral two times a day  AQUAPHOR (petrolatum Ointment) 1 Application(s) Topical daily  aspirin  chewable 81 milliGRAM(s) Oral daily  atorvastatin 80 milliGRAM(s) Oral at bedtime  dextrose 40% Gel 15 Gram(s) Oral once  dextrose 5%. 1000 milliLiter(s) IV Continuous <Continuous>  dextrose 5%. 1000 milliLiter(s) IV Continuous <Continuous>  dextrose 50% Injectable 25 Gram(s) IV Push once  dextrose 50% Injectable 12.5 Gram(s) IV Push once  dextrose 50% Injectable 25 Gram(s) IV Push once  epoetin vern-epbx (RETACRIT) Injectable 35517 Unit(s) SubCutaneous <User Schedule>  glucagon  Injectable 1 milliGRAM(s) IntraMuscular once  hemorrhoidal Ointment 1 Application(s) Rectal three times a day PRN  insulin glargine Injectable (LANTUS) 5 Unit(s) SubCutaneous at bedtime  insulin lispro (ADMELOG) corrective regimen sliding scale   SubCutaneous at bedtime  insulin lispro (ADMELOG) corrective regimen sliding scale   SubCutaneous before breakfast  melatonin 3 milliGRAM(s) Oral at bedtime PRN  pantoprazole    Tablet 40 milliGRAM(s) Oral before breakfast  polyethylene glycol 3350 17 Gram(s) Oral two times a day PRN  senna 2 Tablet(s) Oral at bedtime  sevelamer carbonate 1600 milliGRAM(s) Oral three times a day with meals    A/P:    S/p CABG x 2, Mitral Valve Repair, and left atrial appendage ligation 10/21/21  Hospital course complicated by JOSHUA/CKD  Now on HD 3 x week  Renal diet  HD tomorrow then Sat (holiday schedule)  Epoetin w/each HD  Sevelamer for high Phos  Bld work w/each HD  Rehab    [phone]

## 2021-11-23 NOTE — DISCHARGE NOTE PROVIDER - PROVIDER TOKENS
PROVIDER:[TOKEN:[[id number]],FOLLOWUP:[1 week]],PROVIDER:[TOKEN:[[id number]],FOLLOWUP:[1 week]],FREE:[LAST:[cardiology],FIRST:[attending.],PHONE:[(   )    -],FAX:[(   )    -]],FREE:[LAST:[primary care],FIRST:[doctor],PHONE:[(   )    -],FAX:[(   )    -],FOLLOWUP:[2 weeks]]

## 2021-11-23 NOTE — DISCHARGE NOTE PROVIDER - CARE PROVIDER_API CALL
Francis Xiong)  Surgery; Thoracic and Cardiac Surgery  [address] [place] [address]  Phone: [phone]  Fax: [phone]  Follow Up Time: 1 week    Michell Pinedo)  Surgery  [address] [place] [address], [address]  [place] [address]  Phone: [phone]  Fax: [phone]  Follow Up Time: 1 week    cardiology, attending.  Phone: (   )    -  Fax: (   )    -  Follow Up Time:     primary care, doctor  Phone: (   )    -  Fax: (   )    -  Follow Up Time: 2 weeks

## 2021-11-23 NOTE — DISCHARGE NOTE PROVIDER - NSDCCPCAREPLAN_GEN_ALL_CORE_FT
PRINCIPAL DISCHARGE DIAGNOSIS  Diagnosis: Debility  Assessment and Plan of Treatment: You had debility from your long hospital stay for your CABGx2, MVR, and LAAL. You were admitted to [facility] for weakness. You finished full course of acute rehab. Please follow up with your PCP and Cardiologist and Cardiothoracic surgeon after discharge.      SECONDARY DISCHARGE DIAGNOSES  Diagnosis: S/P CABG x 2  Assessment and Plan of Treatment: You had CABGx2, MVR, and LAAL. Please follow up with your cardiologist and PCP. after discharge. Take your medications as prescribed.    Diagnosis: ESRD on hemodialysis  Assessment and Plan of Treatment: You are new to HD. You will get HD at [place] Monday/W4ed/ Friday. Please f/u with your nephrologist and vascular surgeon regarding your AV fistula. HD via PICC in meanwhile.

## 2021-11-24 ENCOUNTER — TRANSCRIPTION ENCOUNTER (OUTPATIENT)
Age: 61
End: 2021-11-24

## 2021-11-24 LAB
ANION GAP SERPL CALC-SCNC: 10 MMOL/L — SIGNIFICANT CHANGE UP (ref 5–17)
BUN SERPL-MCNC: 62 MG/DL — HIGH (ref 7–23)
CALCIUM SERPL-MCNC: 9 MG/DL — SIGNIFICANT CHANGE UP (ref 8.4–10.5)
CHLORIDE SERPL-SCNC: 96 MMOL/L — SIGNIFICANT CHANGE UP (ref 96–108)
CO2 SERPL-SCNC: 28 MMOL/L — SIGNIFICANT CHANGE UP (ref 22–31)
CREAT SERPL-MCNC: 7.15 MG/DL — HIGH (ref 0.5–1.3)
GAMMA INTERFERON BACKGROUND BLD IA-ACNC: 0.02 IU/ML — SIGNIFICANT CHANGE UP
GLUCOSE BLDC GLUCOMTR-MCNC: 250 MG/DL — HIGH (ref 70–99)
GLUCOSE SERPL-MCNC: 182 MG/DL — HIGH (ref 70–99)
HCT VFR BLD CALC: 25.4 % — LOW (ref 39–50)
HGB BLD-MCNC: 8.2 G/DL — LOW (ref 13–17)
M TB IFN-G BLD-IMP: NEGATIVE — SIGNIFICANT CHANGE UP
M TB IFN-G CD4+ BCKGRND COR BLD-ACNC: 0 IU/ML — SIGNIFICANT CHANGE UP
M TB IFN-G CD4+CD8+ BCKGRND COR BLD-ACNC: 0 IU/ML — SIGNIFICANT CHANGE UP
MCHC RBC-ENTMCNC: 30.4 PG — SIGNIFICANT CHANGE UP (ref 27–34)
MCHC RBC-ENTMCNC: 32.3 GM/DL — SIGNIFICANT CHANGE UP (ref 32–36)
MCV RBC AUTO: 94.1 FL — SIGNIFICANT CHANGE UP (ref 80–100)
NRBC # BLD: 0 /100 WBCS — SIGNIFICANT CHANGE UP (ref 0–0)
PHOSPHATE SERPL-MCNC: 4 MG/DL — SIGNIFICANT CHANGE UP (ref 2.5–4.5)
PLATELET # BLD AUTO: 193 K/UL — SIGNIFICANT CHANGE UP (ref 150–400)
POTASSIUM SERPL-MCNC: 4.5 MMOL/L — SIGNIFICANT CHANGE UP (ref 3.5–5.3)
POTASSIUM SERPL-SCNC: 4.5 MMOL/L — SIGNIFICANT CHANGE UP (ref 3.5–5.3)
QUANT TB PLUS MITOGEN MINUS NIL: 3.07 IU/ML — SIGNIFICANT CHANGE UP
RBC # BLD: 2.7 M/UL — LOW (ref 4.2–5.8)
RBC # FLD: 15.3 % — HIGH (ref 10.3–14.5)
SODIUM SERPL-SCNC: 134 MMOL/L — LOW (ref 135–145)
WBC # BLD: 7.42 K/UL — SIGNIFICANT CHANGE UP (ref 3.8–10.5)
WBC # FLD AUTO: 7.42 K/UL — SIGNIFICANT CHANGE UP (ref 3.8–10.5)

## 2021-11-24 PROCEDURE — 99232 SBSQ HOSP IP/OBS MODERATE 35: CPT

## 2021-11-24 PROCEDURE — 99233 SBSQ HOSP IP/OBS HIGH 50: CPT

## 2021-11-24 RX ADMIN — APIXABAN 2.5 MILLIGRAM(S): 2.5 TABLET, FILM COATED ORAL at 05:26

## 2021-11-24 RX ADMIN — SEVELAMER CARBONATE 1600 MILLIGRAM(S): 2400 POWDER, FOR SUSPENSION ORAL at 12:11

## 2021-11-24 RX ADMIN — SEVELAMER CARBONATE 1600 MILLIGRAM(S): 2400 POWDER, FOR SUSPENSION ORAL at 20:15

## 2021-11-24 RX ADMIN — ATORVASTATIN CALCIUM 80 MILLIGRAM(S): 80 TABLET, FILM COATED ORAL at 21:37

## 2021-11-24 RX ADMIN — Medication 1 APPLICATION(S): at 14:44

## 2021-11-24 RX ADMIN — Medication 81 MILLIGRAM(S): at 12:11

## 2021-11-24 RX ADMIN — SEVELAMER CARBONATE 1600 MILLIGRAM(S): 2400 POWDER, FOR SUSPENSION ORAL at 07:57

## 2021-11-24 RX ADMIN — PANTOPRAZOLE SODIUM 40 MILLIGRAM(S): 20 TABLET, DELAYED RELEASE ORAL at 05:26

## 2021-11-24 RX ADMIN — APIXABAN 2.5 MILLIGRAM(S): 2.5 TABLET, FILM COATED ORAL at 20:15

## 2021-11-24 RX ADMIN — SENNA PLUS 2 TABLET(S): 8.6 TABLET ORAL at 21:37

## 2021-11-24 RX ADMIN — INSULIN GLARGINE 5 UNIT(S): 100 INJECTION, SOLUTION SUBCUTANEOUS at 21:38

## 2021-11-24 RX ADMIN — ERYTHROPOIETIN 10000 UNIT(S): 10000 INJECTION, SOLUTION INTRAVENOUS; SUBCUTANEOUS at 18:37

## 2021-11-24 NOTE — PROGRESS NOTE ADULT - ASSESSMENT
TOM PLEITEZ is a 61y with a history of DM2, ?CKD, HTN and HLD  who presented to [facility] on 10/6/21 with SOB, CP found to have multivessel CAD and JOSHUA on CKD, s/p CABGx2, MVR, and LAAL. Started on HD T/T/S, now on HD M/W/F s/p Permacath and LUE AVF. Hospital Course complicated by post-op hemorrhagic shock , received 4 PRBC, 1 PLT, 1000 FIEBA intraop and post op dual pressor and inotrope support w/ , Primacor, Levo, and Epi gtts.  Afib, PACs- now on amiodarone. Patient now admitted for a multidisciplinary rehab program. 21 @ 14:40    *DM: lispro decreased to 5/5/5 units pre meals  *Renal: HD w/ [facility] pending approval  *Team meeting -- est dc  or     #Debility post ESRD on HD, Afib. CABG  - Gait Instability, ADL impairments and Functional impairments: continue Comprehensive Rehab Program of PT/OT/SLP  - 3 hours a day, 5 days a week  - Orthotics eval for right genu recurvatum and possible left appreciated - patient will follow up with orthotist outpatient pending insurance auth for bracing.  - continue cardiopulmonary conditioning.  - SLP for cognitive and memory deficits    #CAD and Afib s/p CABGx2, LAAL, MVR with Tachy-Seth Syndrome  - Most recent echo on 10/28 shows restoration of LVEF   - d/[place] amiodatone  - c/w eliquis 2.5mg bid  - c/w ASA 81 daily  - close f/u Medicine  - Cardiologist recs appreciated: Dr. Stewart  rec d/c amiodarone   - EKGs unremarkable, d/af[place], recommence if symptomatic  - If tachy then speak w/ cardiology regarding restarting amiodarone.      # ESRD on HD - M/W/F  - retacrit 8000ui intraHD, renal f/u  - Permacath placed 21 and LUE AVF placed   - chlorhexidine to keep access sites clean    #DM- with hypoglycemia-revised insulin regime    - lantus 16 unit qhs  - decreased lispro 5/5/5  # Rt knee instability--orthotic assessment   #Sleep  - melatonin PRN     #Skin  - Skin on admission: Sacral/left buttock sacral decubital ulcer with visible subcutaneous/dermis, dry b/l heels, permacath is c/d/i, LUE AVF site c/d/i, drain sites c/d/i; sternal incision site is c/d/i  - Pressure injury/Skin: OOB to Chair, PT/OT   - continue monitoring wound, incision and drain sites.  - Wound consult  - alyvene foam dressing and cavilon to wound    # Malnutrition--Moderate protein-calorie malnutrition.  #Pain Mgmt  - Tylenol PRN    #GI/Bowel Mgmt   - Continent c/w Senna, Miralax    #/Bladder Mgmt --Voiding, PVRs low, urine quantity unclear if oligo/anuric    #FEN   - Diet - Pureed w/ Nectar  [Renal, DASH/TLC]    - per SLP diet upgraded to Pureed with thin liquids -  wife has still not brought in dentures but patient says he prefers pureed diet    #Precautions / PROPHYLAXIS:   - Falls, Cardiac, Sternal  - ortho: Weight bearing status: WBAT   - Lungs: Aspiration, Incentive Spirometer   - DVT: eliquis      IDT--Team conference-  Making functional progress, ambulating increasing distance  OT--Upper body dressing with supervision  PT--Knee bucking occuring intermittently during therapy  Barriers--HD placement, knee instability  Est dc date  or  depending on HD placement    Marcus [phone]     Summary  61 y/o M on acute rehab treatment for debility s/p CABG, ESRD on HD and A fib    Sustained functional improvement,  Barriers to d/c- knee instability, balance and executive fxn deficits  Tolerating pureed diet  Vitals stable, no chest pain  Awaiting placement at a hemodialysis facility prior to discharge  est dc  or     Stable to continue acute rehab treatment

## 2021-11-24 NOTE — PROGRESS NOTE ADULT - ASSESSMENT
59 y/o M w/ hx of DM2, HTN and HLD, transfer from [facility] for NSTEMI, admitted w/ JOSHUA on CKD and hypertensive emergency, new start to HD (10/13). He underwent LHC showing Multivessel CAD. 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  s/p CABG x2 (10/21), MV repair, and left atrial ligation. Permacath placed 11/1/21 and LUE AV Fistula placed by vascular on 11/2/21. Her was noted to have PACs and Afib - started on amiodarone and Eliquis  Now admitted to [facility] after for initiation of a multidisciplinary rehab program consisting focused on functional mobility, transfers and ADLs- pt/ot/dvt ppx    #NSTEMI   #CAD   -S/p CABG (10/21)  -s/p MV repair  -Continue aspirin/Statin    #ESRD   -HD (MWF)    #Anemia of chronic  -Epoetin w/each HD  -Goal Hb to maintain >8    #paroxysmal atrial fibrillation   #tachy blaine syndrome  -cont apixaban  -hold AV Christina agents due to bradycardia  -amiodarone - now held due to bradycardia.   -cardio consult noted  -can restart amiodarone if becomes tachycardic. Pt developed AF post operatively, so it is possible amio was only needed short term.  -avoid QT prolonging agents    #DM2  -hgba1c 7%  -c/w Lantus 5unit qhs  -c/w ISS, Accu-Cheks    #subclinical hypothyroidism  -TSH mildly elevated 6.4, pt asymptomatic  -seen by Endo during admission. Outpt follow up

## 2021-11-24 NOTE — PROGRESS NOTE ADULT - SUBJECTIVE AND OBJECTIVE BOX
Patient is a 61y old  Male who presents with a chief complaint of Debility, s/p CABG (23 Nov 2021 22:57)    Patient seen and examined at bedside. No acute overnight events. Denies chest pain, shortness of breath, nausea, vomiting.     ALLERGIES:  No Known Allergies    MEDICATIONS  (STANDING):  apixaban 2.5 milliGRAM(s) Oral two times a day  AQUAPHOR (petrolatum Ointment) 1 Application(s) Topical daily  aspirin  chewable 81 milliGRAM(s) Oral daily  atorvastatin 80 milliGRAM(s) Oral at bedtime  dextrose 40% Gel 15 Gram(s) Oral once  dextrose 5%. 1000 milliLiter(s) (50 mL/Hr) IV Continuous <Continuous>  dextrose 5%. 1000 milliLiter(s) (100 mL/Hr) IV Continuous <Continuous>  dextrose 50% Injectable 25 Gram(s) IV Push once  dextrose 50% Injectable 12.5 Gram(s) IV Push once  dextrose 50% Injectable 25 Gram(s) IV Push once  epoetin vern-epbx (RETACRIT) Injectable 26800 Unit(s) SubCutaneous <User Schedule>  epoetin vern-epbx (RETACRIT) Injectable 58056 Unit(s) IV Push once  glucagon  Injectable 1 milliGRAM(s) IntraMuscular once  insulin glargine Injectable (LANTUS) 5 Unit(s) SubCutaneous at bedtime  insulin lispro (ADMELOG) corrective regimen sliding scale   SubCutaneous at bedtime  insulin lispro (ADMELOG) corrective regimen sliding scale   SubCutaneous before breakfast  pantoprazole    Tablet 40 milliGRAM(s) Oral before breakfast  senna 2 Tablet(s) Oral at bedtime  sevelamer carbonate 1600 milliGRAM(s) Oral three times a day with meals    MEDICATIONS  (PRN):  acetaminophen     Tablet .. 650 milliGRAM(s) Oral every 6 hours PRN Mild Pain (1 - 3), Moderate Pain (4 - 6)  hemorrhoidal Ointment 1 Application(s) Rectal three times a day PRN Hemorrhoids  melatonin 3 milliGRAM(s) Oral at bedtime PRN Insomnia  polyethylene glycol 3350 17 Gram(s) Oral two times a day PRN Constipation    Vital Signs Last 24 Hrs  T(F): 98.1 (24 Nov 2021 08:10), Max: 98.7 (23 Nov 2021 19:55)  HR: 82 (24 Nov 2021 08:10) (54 - 82)  BP: 146/78 (24 Nov 2021 08:10) (146/78 - 154/78)  RR: 16 (24 Nov 2021 08:10) (16 - 16)  SpO2: 99% (24 Nov 2021 08:10) (99% - 100%)  I&O's Summary    PHYSICAL EXAM:  General: NAD, A/O x 3  ENT: MMM, no tonsilar exudate  Neck: Supple, No JVD  Lungs: Clear to auscultation bilaterally, no wheezes. Good air entry bilaterally   Cardio: RRR, S1/S2, No murmurs. sternotomy scar c/d/i' right upper chest wall catheter in place  Abdomen: Soft, Nontender, Nondistended; Bowel sounds present  Extremities: No calf tenderness, No pitting edema. GYPSY CHAVEZF in wrap    LABS:                        8.0    8.28  )-----------( 198      ( 22 Nov 2021 15:00 )             25.1       11-22    131  |  92  |  57  ----------------------------<  107  4.7   |  30  |  7.02    Ca    8.8      22 Nov 2021 15:00    eGFR if Non African American: 8 mL/min/1.73M2 (11-22-21 @ 15:00)  eGFR if : 9 mL/min/1.73M2 (11-22-21 @ 15:00)    POCT Blood Glucose.: 221 mg/dL (23 Nov 2021 21:03)    COVID-19 PCR: NotDetec (11-21-21 @ 05:30)  COVID-19 PCR: NotDetec (11-15-21 @ 06:49)  COVID-19 PCR: NotDetec (11-08-21 @ 18:30)  COVID-19 PCR: NotDetec (11-07-21 @ 05:39)  COVID-19 PCR: NotDetec (11-05-21 @ 08:07)    RADIOLOGY & ADDITIONAL TESTS:     Care Discussed with Consultants/Other Providers:

## 2021-11-24 NOTE — PROGRESS NOTE ADULT - SUBJECTIVE AND OBJECTIVE BOX
HPI:  59 y/o M w/ hx of DM2, HTN and HLD initially presented as transfer from [facility] for chest pain concerning for NSTEMI and possible new CHF c/b JOSHUA likely on CKD, and hypertensive emergency on 10/6/21. Hospital course complicated by s/p RRT for CVA/TIA ruled out and worsening anemia requiring 1 PRBC. Pt S/p HD session today and underwent LHC showing Multivessel CAD. CT surgery consulted for CABG evaluation. Patient developed left sided facial droop on 10/7 and stroke code was called. 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 Patient started Hemodialysis on 10/13. On 10/21 s/p CABGx2, Mitral Valve Repair, and left atrial appendage ligation, was extubated next day. He developed SOB on 10/24 and requeired bipap,  but was eventually trasitioned back down to [place]. Permacath placed 11/1/21 and LUE AV Fistula placed by vascular on 11/2/21. Her was noted to have PACs and Afib - started on amiodarone. He will be getting HD at [place]. Cleared by vascular to restart eliquis 2.5mg bid. HD schedule changed from T/T/S to M/W/F.   Patient evaluated by PM&R PT/OT and recommended acute rehab. Patient admitted to [facility] on 11/8/21 for debility. (08 Nov 2021 14:09)    INTERVAL HPI/OVERNIGHT EVENTS.  Chart Reviewed and patient seen/examined at bedside.  Patient without any complaints or issues.  Engaging in therapy, knee instability intermittently apparent  Anxious for home discharge  HD uneventful, awaiting placement to an outpatient HD facility  +BM  Vitals normal  Team conference-11/24--making progress, but knee instability and executive fxn deficits remain main impediments  Family had previously declined [facility] placement  Wife agreed to make time off work to care for patient and ensure safety    ROS:  Denies fevers, chills, chest pain, shortness of breath, abdominal pain, headaches, nausea/vomiting    Vital Signs Last 24 Hrs  Vital Signs Last 24 Hrs  T(C): 36.6 (23 Nov 2021 07:36), Max: 36.6 (22 Nov 2021 17:00)  T(F): 97.9 (23 Nov 2021 07:36), Max: 97.9 (22 Nov 2021 17:00)  HR: 55 (23 Nov 2021 07:36) (55 - 93)  BP: 160/81 (23 Nov 2021 07:36) (124/73 - 160/81)  RR: 17 (23 Nov 2021 07:36) (16 - 17)  SpO2: 100% (23 Nov 2021 07:36) (99% - 100%)    Physical Exam:  Gen - NAD, Comfortable  HEENT - no asymmetry  Neck - Supple, No limited ROM  Pulm - CTAB, No wheeze, No rhonchi, No crackles  Cardiovascular - irregular (intermittently) S1S2, sternotomy incision in situ  Abdomen - Soft, NT/ND, +BS  Extremities - No edema or tenderness Left arm AV fistula    Neuro-  Cognitive - AAOx2-3  Motor -UE 5/5, LE 4/5   right genu recurvatum, less frequent   Sensory - Intact to LT  Reflexes - DTR Intact, No primitive reflex  Coordination - No dysmetria  Tone - normal b/l  Psychiatric - Mood stable, Affect WNL  Skin: Sacral/left buttock sacral decubital ulcer with visible subcutaneous/dermis, dry b/l heels, permacath is c/d/i, LUE AVF site c/d/i sternal incision site is also c/d/i    LABS:                    8.0    8.28  )-----------( 198      ( 22 Nov 2021 15:00 )             25.1     11-22    131<L>  |  92<L>  |  57<H>  ----------------------------<  107<H>  4.7   |  30  |  7.02<H>    Ca    8.8      22 Nov 2021 15:00    MEDICATIONS  (STANDING):  apixaban 2.5 milliGRAM(s) Oral two times a day  AQUAPHOR (petrolatum Ointment) 1 Application(s) Topical daily  aspirin  chewable 81 milliGRAM(s) Oral daily  atorvastatin 80 milliGRAM(s) Oral at bedtime  dextrose 40% Gel 15 Gram(s) Oral once  dextrose 5%. 1000 milliLiter(s) (50 mL/Hr) IV Continuous <Continuous>  dextrose 5%. 1000 milliLiter(s) (100 mL/Hr) IV Continuous <Continuous>  dextrose 50% Injectable 25 Gram(s) IV Push once  dextrose 50% Injectable 12.5 Gram(s) IV Push once  dextrose 50% Injectable 25 Gram(s) IV Push once  epoetin vern-epbx (RETACRIT) Injectable 21936 Unit(s) SubCutaneous <User Schedule>  glucagon  Injectable 1 milliGRAM(s) IntraMuscular once  insulin glargine Injectable (LANTUS) 5 Unit(s) SubCutaneous at bedtime  insulin lispro (ADMELOG) corrective regimen sliding scale   SubCutaneous at bedtime  insulin lispro (ADMELOG) corrective regimen sliding scale   SubCutaneous before breakfast  pantoprazole    Tablet 40 milliGRAM(s) Oral before breakfast  senna 2 Tablet(s) Oral at bedtime  sevelamer carbonate 1600 milliGRAM(s) Oral three times a day with meals    MEDICATIONS  (PRN):  acetaminophen     Tablet .. 650 milliGRAM(s) Oral every 6 hours PRN Mild Pain (1 - 3), Moderate Pain (4 - 6)  hemorrhoidal Ointment 1 Application(s) Rectal three times a day PRN Hemorrhoids  melatonin 3 milliGRAM(s) Oral at bedtime PRN Insomnia  polyethylene glycol 3350 17 Gram(s) Oral two times a day PRN Constipation    Therapy  Ambulating increasing distance, knee instability improving,   awaiting out patient hemodialysis placement  working on SLP for executive function deficits

## 2021-11-24 NOTE — PROGRESS NOTE ADULT - SUBJECTIVE AND OBJECTIVE BOX
[facility] NEPHROLOGY SERVICES, [facility]  NEPHROLOGY AND HYPERTENSION  [address]  [address]  [place] [address]  [phone]    MD KATHRYN BERMUDEZ MD ANDREY GONCHARUK, MD MADHU KORRAPATI, MD YELENA ROSENBERG, MD BINNY KOSHY, MD CHRISTOPHER CAPUTO, MD EDWARD BOVER, MD          Patient events noted    MEDICATIONS  (STANDING):  apixaban 2.5 milliGRAM(s) Oral two times a day  AQUAPHOR (petrolatum Ointment) 1 Application(s) Topical daily  aspirin  chewable 81 milliGRAM(s) Oral daily  atorvastatin 80 milliGRAM(s) Oral at bedtime  dextrose 40% Gel 15 Gram(s) Oral once  dextrose 5%. 1000 milliLiter(s) (50 mL/Hr) IV Continuous <Continuous>  dextrose 5%. 1000 milliLiter(s) (100 mL/Hr) IV Continuous <Continuous>  dextrose 50% Injectable 25 Gram(s) IV Push once  dextrose 50% Injectable 12.5 Gram(s) IV Push once  dextrose 50% Injectable 25 Gram(s) IV Push once  epoetin vern-epbx (RETACRIT) Injectable 54542 Unit(s) SubCutaneous <User Schedule>  glucagon  Injectable 1 milliGRAM(s) IntraMuscular once  insulin glargine Injectable (LANTUS) 5 Unit(s) SubCutaneous at bedtime  insulin lispro (ADMELOG) corrective regimen sliding scale   SubCutaneous at bedtime  insulin lispro (ADMELOG) corrective regimen sliding scale   SubCutaneous before breakfast  pantoprazole    Tablet 40 milliGRAM(s) Oral before breakfast  senna 2 Tablet(s) Oral at bedtime  sevelamer carbonate 1600 milliGRAM(s) Oral three times a day with meals    MEDICATIONS  (PRN):  acetaminophen     Tablet .. 650 milliGRAM(s) Oral every 6 hours PRN Mild Pain (1 - 3), Moderate Pain (4 - 6)  hemorrhoidal Ointment 1 Application(s) Rectal three times a day PRN Hemorrhoids  melatonin 3 milliGRAM(s) Oral at bedtime PRN Insomnia  polyethylene glycol 3350 17 Gram(s) Oral two times a day PRN Constipation      11-24-21 @ 07:01  -  11-24-21 @ 21:45  --------------------------------------------------------  IN: 800 mL / OUT: 3000 mL / NET: -2200 mL      PHYSICAL EXAM:      T(C): 36.7 (11-24-21 @ 20:21), Max: 36.9 (11-24-21 @ 16:05)  HR: 67 (11-24-21 @ 20:21) (52 - 82)  BP: 155/75 (11-24-21 @ 20:21) (128/76 - 155/75)  RR: 16 (11-24-21 @ 20:21) (16 - 16)  SpO2: 100% (11-24-21 @ 20:21) (99% - 100%)  Wt(kg): --  Lungs clear  Heart S1S2  Abd soft NT ND  Extremities:   tr edema                                    8.2    7.42  )-----------( 193      ( 24 Nov 2021 16:15 )             25.4     11-24    134<L>  |  96  |  62<H>  ----------------------------<  182<H>  4.5   |  28  |  7.15<H>    Ca    9.0      24 Nov 2021 16:15  Phos  4.0     11-24          Creatinine Trend: 7.15<--, 7.02<--, 7.44<--, 8.78<--, 8.64<--, 8.00<--      Assessment   ESRD maintenance    Plan:  HD for today  UF as tolerated  Will follow.      Aden Lindsey MD

## 2021-11-25 LAB
GLUCOSE BLDC GLUCOMTR-MCNC: 118 MG/DL — HIGH (ref 70–99)
GLUCOSE BLDC GLUCOMTR-MCNC: 179 MG/DL — HIGH (ref 70–99)
GLUCOSE BLDC GLUCOMTR-MCNC: 248 MG/DL — HIGH (ref 70–99)

## 2021-11-25 PROCEDURE — 99231 SBSQ HOSP IP/OBS SF/LOW 25: CPT

## 2021-11-25 PROCEDURE — 99232 SBSQ HOSP IP/OBS MODERATE 35: CPT

## 2021-11-25 RX ADMIN — SENNA PLUS 2 TABLET(S): 8.6 TABLET ORAL at 21:30

## 2021-11-25 RX ADMIN — PANTOPRAZOLE SODIUM 40 MILLIGRAM(S): 20 TABLET, DELAYED RELEASE ORAL at 05:31

## 2021-11-25 RX ADMIN — ATORVASTATIN CALCIUM 80 MILLIGRAM(S): 80 TABLET, FILM COATED ORAL at 21:30

## 2021-11-25 RX ADMIN — SEVELAMER CARBONATE 1600 MILLIGRAM(S): 2400 POWDER, FOR SUSPENSION ORAL at 17:18

## 2021-11-25 RX ADMIN — Medication 3 MILLIGRAM(S): at 00:28

## 2021-11-25 RX ADMIN — APIXABAN 2.5 MILLIGRAM(S): 2.5 TABLET, FILM COATED ORAL at 05:31

## 2021-11-25 RX ADMIN — SEVELAMER CARBONATE 1600 MILLIGRAM(S): 2400 POWDER, FOR SUSPENSION ORAL at 07:59

## 2021-11-25 RX ADMIN — INSULIN GLARGINE 5 UNIT(S): 100 INJECTION, SOLUTION SUBCUTANEOUS at 21:30

## 2021-11-25 RX ADMIN — SEVELAMER CARBONATE 1600 MILLIGRAM(S): 2400 POWDER, FOR SUSPENSION ORAL at 12:14

## 2021-11-25 RX ADMIN — APIXABAN 2.5 MILLIGRAM(S): 2.5 TABLET, FILM COATED ORAL at 17:18

## 2021-11-25 RX ADMIN — Medication 81 MILLIGRAM(S): at 12:15

## 2021-11-25 NOTE — PROGRESS NOTE ADULT - SUBJECTIVE AND OBJECTIVE BOX
Patient is a 61y old  Male who presents with a chief complaint of Debility (24 Nov 2021 21:45)    Patient seen and examined at bedside. No acute overnight events.  yesterday evening.     ALLERGIES:  No Known Allergies    MEDICATIONS  (STANDING):  apixaban 2.5 milliGRAM(s) Oral two times a day  AQUAPHOR (petrolatum Ointment) 1 Application(s) Topical daily  aspirin  chewable 81 milliGRAM(s) Oral daily  atorvastatin 80 milliGRAM(s) Oral at bedtime  dextrose 40% Gel 15 Gram(s) Oral once  dextrose 5%. 1000 milliLiter(s) (50 mL/Hr) IV Continuous <Continuous>  dextrose 5%. 1000 milliLiter(s) (100 mL/Hr) IV Continuous <Continuous>  dextrose 50% Injectable 25 Gram(s) IV Push once  dextrose 50% Injectable 12.5 Gram(s) IV Push once  dextrose 50% Injectable 25 Gram(s) IV Push once  epoetin vern-epbx (RETACRIT) Injectable 20141 Unit(s) SubCutaneous <User Schedule>  glucagon  Injectable 1 milliGRAM(s) IntraMuscular once  insulin glargine Injectable (LANTUS) 5 Unit(s) SubCutaneous at bedtime  insulin lispro (ADMELOG) corrective regimen sliding scale   SubCutaneous at bedtime  insulin lispro (ADMELOG) corrective regimen sliding scale   SubCutaneous before breakfast  pantoprazole    Tablet 40 milliGRAM(s) Oral before breakfast  senna 2 Tablet(s) Oral at bedtime  sevelamer carbonate 1600 milliGRAM(s) Oral three times a day with meals    MEDICATIONS  (PRN):  acetaminophen     Tablet .. 650 milliGRAM(s) Oral every 6 hours PRN Mild Pain (1 - 3), Moderate Pain (4 - 6)  hemorrhoidal Ointment 1 Application(s) Rectal three times a day PRN Hemorrhoids  melatonin 3 milliGRAM(s) Oral at bedtime PRN Insomnia  polyethylene glycol 3350 17 Gram(s) Oral two times a day PRN Constipation    Vital Signs Last 24 Hrs  T(F): 98 (25 Nov 2021 07:52), Max: 98.4 (24 Nov 2021 16:05)  HR: 53 (25 Nov 2021 07:52) (52 - 67)  BP: 128/67 (25 Nov 2021 07:52) (128/67 - 155/75)  RR: 16 (25 Nov 2021 07:52) (16 - 16)  SpO2: 100% (25 Nov 2021 07:52) (99% - 100%)  I&O's Summary    24 Nov 2021 07:01  -  25 Nov 2021 07:00  --------------------------------------------------------  IN: 800 mL / OUT: 3000 mL / NET: -2200 mL    PHYSICAL EXAM:  General: NAD, Awake, alert  ENT: MMM, no tonsilar exudate  Neck: Supple, No JVD  Lungs: Clear to auscultation bilaterally, no wheezes. Good air entry bilaterally   Cardio: RRR, S1/S2, No murmurs. sternotomy scar c/d/i' right upper chest wall catheter in place  Abdomen: Soft, Nontender, Nondistended; Bowel sounds present  Extremities: No calf tenderness, No pitting edema. LUE AVF in wrap    LABS:                        8.2    7.42  )-----------( 193      ( 24 Nov 2021 16:15 )             25.4       11-24    134  |  96  |  62  ----------------------------<  182  4.5   |  28  |  7.15    Ca    9.0      24 Nov 2021 16:15  Phos  4.0     11-24    eGFR if Non African American: 8 mL/min/1.73M2 (11-24-21 @ 16:15)  eGFR if : 9 mL/min/1.73M2 (11-24-21 @ 16:15)    POCT Blood Glucose.: 118 mg/dL (25 Nov 2021 07:54)  POCT Blood Glucose.: 250 mg/dL (24 Nov 2021 21:36)    COVID-19 PCR: NotDetec (11-21-21 @ 05:30)  COVID-19 PCR: NotDetec (11-15-21 @ 06:49)  COVID-19 PCR: NotDetec (11-08-21 @ 18:30)  COVID-19 PCR: NotDetec (11-07-21 @ 05:39)  COVID-19 PCR: NotDetec (11-05-21 @ 08:07)    RADIOLOGY & ADDITIONAL TESTS:     Care Discussed with Consultants/Other Providers:

## 2021-11-25 NOTE — PROGRESS NOTE ADULT - ASSESSMENT
59 y/o M w/ hx of DM2, HTN and HLD, transfer from [facility] for NSTEMI, admitted w/ JOSHUA on CKD and hypertensive emergency, new start to HD (10/13). He underwent LHC showing Multivessel CAD. 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  s/p CABG x2 (10/21), MV repair, and left atrial ligation. Permacath placed 11/1/21 and LUE AV Fistula placed by vascular on 11/2/21. Her was noted to have PACs and Afib - started on amiodarone and Eliquis  Now admitted to [facility] after for initiation of a multidisciplinary rehab program consisting focused on functional mobility, transfers and ADLs- pt/ot/dvt ppx    #NSTEMI   #CAD   -S/p CABG (10/21)  -s/p MV repair  -Continue aspirin/Statin    #ESRD   -HD (MWF)    #Anemia of chronic  -Epoetin w/each HD  -Goal Hb to maintain >8    #paroxysmal atrial fibrillation   #tachy blaine syndrome  -cont apixaban  -hold AV Christina agents due to bradycardia  -amiodarone - now held due to bradycardia.   -cardio consult noted  -can restart amiodarone if becomes tachycardic. Pt developed AF post operatively, so it is possible amio was only needed short term.  -avoid QT prolonging agents    #DM2  -hgba1c 7%  -c/w Lantus 5unit qhs  -c/w ISS, Accu-Cheks    #subclinical hypothyroidism  -TSH mildly elevated 6.4, pt asymptomatic  -seen by Endo during admission. Outpt follow up

## 2021-11-25 NOTE — PROGRESS NOTE ADULT - SUBJECTIVE AND OBJECTIVE BOX
Cc: Gait dysfunction    HPI: Patient with no new medical issues today.  Pain controlled,  Tolerating oral diet, had normal bowel movement  ROS no chest pain, no N/V, no Fevers/Chills.   Awaiting out patient hemodialysis placement  Has been tolerating rehabilitation program.    MEDICATIONS  (STANDING):  apixaban 2.5 milliGRAM(s) Oral two times a day  AQUAPHOR (petrolatum Ointment) 1 Application(s) Topical daily  aspirin  chewable 81 milliGRAM(s) Oral daily  atorvastatin 80 milliGRAM(s) Oral at bedtime  dextrose 40% Gel 15 Gram(s) Oral once  dextrose 5%. 1000 milliLiter(s) (50 mL/Hr) IV Continuous <Continuous>  dextrose 5%. 1000 milliLiter(s) (100 mL/Hr) IV Continuous <Continuous>  dextrose 50% Injectable 25 Gram(s) IV Push once  dextrose 50% Injectable 12.5 Gram(s) IV Push once  dextrose 50% Injectable 25 Gram(s) IV Push once  epoetin vern-epbx (RETACRIT) Injectable 33890 Unit(s) SubCutaneous <User Schedule>  glucagon  Injectable 1 milliGRAM(s) IntraMuscular once  insulin glargine Injectable (LANTUS) 5 Unit(s) SubCutaneous at bedtime  insulin lispro (ADMELOG) corrective regimen sliding scale   SubCutaneous at bedtime  insulin lispro (ADMELOG) corrective regimen sliding scale   SubCutaneous before breakfast  pantoprazole    Tablet 40 milliGRAM(s) Oral before breakfast  senna 2 Tablet(s) Oral at bedtime  sevelamer carbonate 1600 milliGRAM(s) Oral three times a day with meals    MEDICATIONS  (PRN):  acetaminophen     Tablet .. 650 milliGRAM(s) Oral every 6 hours PRN Mild Pain (1 - 3), Moderate Pain (4 - 6)  hemorrhoidal Ointment 1 Application(s) Rectal three times a day PRN Hemorrhoids  melatonin 3 milliGRAM(s) Oral at bedtime PRN Insomnia  polyethylene glycol 3350 17 Gram(s) Oral two times a day PRN Constipation      Vital Signs Last 24 Hrs  T(C): 36.7 (25 Nov 2021 07:52), Max: 36.7 (24 Nov 2021 19:05)  T(F): 98 (25 Nov 2021 07:52), Max: 98.1 (24 Nov 2021 20:21)  HR: 53 (25 Nov 2021 07:52) (53 - 67)  BP: 128/67 (25 Nov 2021 07:52) (128/67 - 155/75)  BP(mean): --  RR: 16 (25 Nov 2021 07:52) (16 - 16)  SpO2: 100% (25 Nov 2021 07:52) (99% - 100%)    In NAD  HEENT- EOMI  Heart- RRR, S1S2  Lungs- CTA bl.  Abd- + BS, NT  Ext- No calf tenderness  Left arm AVF    Neuro- AAO X 3  MMT 5/5 UE  4/5 LE                    8.2    7.42  )-----------( 193      ( 24 Nov 2021 16:15 )             25.4     11-24    134<L>  |  96  |  62<H>  ----------------------------<  182<H>  4.5   |  28  |  7.15<H>    Ca    9.0      24 Nov 2021 16:15  Phos  4.0     11-24    POCT Blood Glucose.: 248 mg/dL (25 Nov 2021 17:15)  POCT Blood Glucose.: 118 mg/dL (25 Nov 2021 07:54)  POCT Blood Glucose.: 250 mg/dL (24 Nov 2021 21:36)    Imp: Patient with diagnosis of CAD s/p CABG, ESRD on Hemodialysis, admitted for comprehensive acute rehabilitation.    Plan:  - Continue therapies PT/OT, SLP for executive function deficits  - DVT prophylaxis-lovenox  - Skin- Sternal wound healed, Lt arm AVF  - Continue current medications;   - Patient is stable to continue current rehabilitation program.

## 2021-11-26 ENCOUNTER — TRANSCRIPTION ENCOUNTER (OUTPATIENT)
Age: 61
End: 2021-11-26

## 2021-11-26 LAB
GLUCOSE BLDC GLUCOMTR-MCNC: 124 MG/DL — HIGH (ref 70–99)
GLUCOSE BLDC GLUCOMTR-MCNC: 193 MG/DL — HIGH (ref 70–99)
SARS-COV-2 RNA SPEC QL NAA+PROBE: SIGNIFICANT CHANGE UP

## 2021-11-26 PROCEDURE — 99232 SBSQ HOSP IP/OBS MODERATE 35: CPT

## 2021-11-26 PROCEDURE — 99233 SBSQ HOSP IP/OBS HIGH 50: CPT

## 2021-11-26 RX ORDER — ATORVASTATIN CALCIUM 80 MG/1
1 TABLET, FILM COATED ORAL
Qty: 30 | Refills: 0
Start: 2021-11-26 | End: 2021-12-25

## 2021-11-26 RX ORDER — INSULIN GLARGINE 100 [IU]/ML
5 INJECTION, SOLUTION SUBCUTANEOUS
Qty: 150 | Refills: 0
Start: 2021-11-26 | End: 2021-12-25

## 2021-11-26 RX ORDER — SENNA PLUS 8.6 MG/1
2 TABLET ORAL
Qty: 60 | Refills: 0
Start: 2021-11-26 | End: 2021-12-25

## 2021-11-26 RX ORDER — APIXABAN 2.5 MG/1
1 TABLET, FILM COATED ORAL
Qty: 60 | Refills: 0
Start: 2021-11-26 | End: 2021-12-25

## 2021-11-26 RX ORDER — LANOLIN ALCOHOL/MO/W.PET/CERES
1 CREAM (GRAM) TOPICAL
Qty: 30 | Refills: 0
Start: 2021-11-26 | End: 2021-12-25

## 2021-11-26 RX ORDER — PANTOPRAZOLE SODIUM 20 MG/1
1 TABLET, DELAYED RELEASE ORAL
Qty: 30 | Refills: 0
Start: 2021-11-26 | End: 2021-12-25

## 2021-11-26 RX ORDER — ACETAMINOPHEN 500 MG
2 TABLET ORAL
Qty: 240 | Refills: 0
Start: 2021-11-26 | End: 2021-12-25

## 2021-11-26 RX ORDER — METFORMIN HYDROCHLORIDE 850 MG/1
1 TABLET ORAL
Qty: 60 | Refills: 0
Start: 2021-11-26 | End: 2021-12-25

## 2021-11-26 RX ORDER — SEVELAMER CARBONATE 2400 MG/1
2 POWDER, FOR SUSPENSION ORAL
Qty: 180 | Refills: 0
Start: 2021-11-26 | End: 2021-12-25

## 2021-11-26 RX ORDER — POLYETHYLENE GLYCOL 3350 17 G/17G
17 POWDER, FOR SOLUTION ORAL
Qty: 1020 | Refills: 0
Start: 2021-11-26 | End: 2021-12-25

## 2021-11-26 RX ORDER — ASPIRIN/CALCIUM CARB/MAGNESIUM 324 MG
1 TABLET ORAL
Qty: 30 | Refills: 0
Start: 2021-11-26 | End: 2021-12-25

## 2021-11-26 RX ADMIN — SENNA PLUS 2 TABLET(S): 8.6 TABLET ORAL at 23:59

## 2021-11-26 RX ADMIN — PANTOPRAZOLE SODIUM 40 MILLIGRAM(S): 20 TABLET, DELAYED RELEASE ORAL at 06:19

## 2021-11-26 RX ADMIN — APIXABAN 2.5 MILLIGRAM(S): 2.5 TABLET, FILM COATED ORAL at 06:19

## 2021-11-26 RX ADMIN — SEVELAMER CARBONATE 1600 MILLIGRAM(S): 2400 POWDER, FOR SUSPENSION ORAL at 08:12

## 2021-11-26 RX ADMIN — SEVELAMER CARBONATE 1600 MILLIGRAM(S): 2400 POWDER, FOR SUSPENSION ORAL at 12:02

## 2021-11-26 RX ADMIN — Medication 1 APPLICATION(S): at 12:03

## 2021-11-26 RX ADMIN — Medication 81 MILLIGRAM(S): at 12:02

## 2021-11-26 RX ADMIN — APIXABAN 2.5 MILLIGRAM(S): 2.5 TABLET, FILM COATED ORAL at 17:54

## 2021-11-26 RX ADMIN — SEVELAMER CARBONATE 1600 MILLIGRAM(S): 2400 POWDER, FOR SUSPENSION ORAL at 17:54

## 2021-11-26 RX ADMIN — INSULIN GLARGINE 5 UNIT(S): 100 INJECTION, SOLUTION SUBCUTANEOUS at 23:59

## 2021-11-26 NOTE — PROGRESS NOTE ADULT - ASSESSMENT
TOM PLEITEZ is a 61y with a history of DM2, ?CKD, HTN and HLD  who presented to [facility] on 10/[facility]/21 with SOB, CP found to have multivessel CAD and JOSHUA on CKD, s/p CABGx2, MVR, and LAAL. Started on HD T/T/S, now on HD M/W/F s/p Permacath and LUE AVF. Hospital Course complicated by post-op hemorrhagic shock , received 4 PRBC, 1 PLT, 1000 FIEBA intraop and post op dual pressor and inotrope support w/ , Primacor, Levo, and Epi gtts.  Afib, PACs- now on amiodarone. Patient now admitted for a multidisciplinary rehab program. 21 @ 14:40    *DM: lispro decreased to 5/5/5 units pre meals  *Renal: HD w/ [place] on monday  *Team meeting --  Dc SAT   *LLE Suture: reached out to Dr. Xiong regarding suture. Awaiting response.    #Debility post ESRD on HD, Afib. CABG  - Gait Instability, ADL impairments and Functional impairments: continue Comprehensive Rehab Program of PT/OT/SLP  - 3 hours a day, 5 days a week  - Orthotics eval for right genu recurvatum and possible left appreciated - patient will follow up with orthotist outpatient pending insurance auth for bracing.  - continue cardiopulmonary conditioning.  - SLP for cognitive and memory deficits    #CAD and Afib s/p CABGx2, LAAL, MVR with Tachy-Seth Syndrome  - Most recent echo on 10/28 shows restoration of LVEF   - d/[place] amiodatone  - c/w eliquis 2.5mg bid  - c/w ASA 81 daily  - close f/u Medicine  - Cardiologist recs appreciated: Dr. Stewart  rec d/c amiodarone   - EKGs unremarkable, d/africa, recommence if symptomatic  - If tachy then speak w/ cardiology regarding restarting amiodarone.      # ESRD on HD - M/W/F  - retacrit 8000ui intraHD, renal f/u  - Permacath placed 21 and LUE AVF placed   - chlorhexidine to keep access sites clean    #DM- with hypoglycemia-revised insulin regime    - lantus 16 unit qhs  - decreased lispro   # Rt knee instability--orthotic assessment   #Sleep  - melatonin PRN     #Skin  - Skin on admission: Sacral/left buttock sacral decubital ulcer with visible subcutaneous/dermis, dry b/l heels, permacath is c/d/i, LUE AVF site c/d/i, drain sites c/d/i; sternal incision site is c/d/i  - Pressure injury/Skin: OOB to Chair, PT/OT   - continue monitoring wound, incision and drain sites.  - Wound consult  - alyvene foam dressing and cavilon to wound    # Malnutrition--Moderate protein-calorie malnutrition.  #Pain Mgmt  - Tylenol PRN    #GI/Bowel Mgmt   - Continent c/w Senna, Miralax    #/Bladder Mgmt --Voiding, PVRs low, urine quantity unclear if oligo/anuric    #FEN   - Diet - Pureed w/ Nectar  [Renal, DASH/TLC]    - per SLP diet upgraded to Pureed with thin liquids -  wife has still not brought in dentures but patient says he prefers pureed diet    #Precautions / PROPHYLAXIS:   - Falls, Cardiac, Sternal  - ortho: Weight bearing status: WBAT   - Lungs: Aspiration, Incentive Spirometer   - DVT: eliquis      IDT--Team conference-  Making functional progress, ambulating increasing distance  OT--Upper body dressing with supervision  PT--Knee bucking occuring intermittently during therapy  Barriers--HD placement, knee instability  Est dc date  or  depending on HD placement    Marcus [phone]

## 2021-11-26 NOTE — PROGRESS NOTE ADULT - ATTENDING COMMENTS
59 y/o M on acute rehab treatment, Dx CAD S/P CABG, also new dx  ESRD on HD, A fib  Functionally improved, knee instability improving  Clinically stable  Await Cardiothoracic surgery response to suture on left knee surgical area (harvest site)  Awaiting HD placement and home d/c tomorrow 11/27 61 y/o M on acute rehab treatment, Dx CAD S/P CABG, also new dx  ESRD on HD, A fib  Functionally improved, knee instability improving  Clinically stable  I discussed with SW, regarding update on out patient hemodialysis placement  COVID test was noted as a requirement and this was ordered    D/W NANCY logistics for safe discharge time, and timing of HD,   Spent time explaining all these to patient, as he anxiously awaits discharge   Await Cardiothoracic surgery response to suture on left knee surgical area (harvest site)  Awaiting HD placement and home d/c tomorrow 11/27

## 2021-11-26 NOTE — PROGRESS NOTE ADULT - ASSESSMENT
61 y/o M w/ hx of DM2, HTN and HLD, transfer from [facility] for NSTEMI, admitted w/ JOSHUA on CKD and hypertensive emergency, new start to HD (10/13). He underwent LHC showing Multivessel CAD. 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  s/p CABG x2 (10/21), MV repair, and left atrial ligation. Permacath placed 11/1/21 and LUE AV Fistula placed by vascular on 11/2/21. Her was noted to have PACs and Afib - started on amiodarone and Eliquis  Now admitted to [facility] after for initiation of a multidisciplinary rehab program consisting focused on functional mobility, transfers and ADLs- pt/ot/dvt ppx    #NSTEMI   #CAD   -S/p CABG (10/21)  -s/p MV repair  -Continue aspirin/Statin    #ESRD   -HD (MWF)    #Anemia of chronic  -Epoetin w/each HD  -Goal Hb to maintain >8    #paroxysmal atrial fibrillation   #tachy blaine syndrome  -cont apixaban  -hold AV Christina agents due to bradycardia  -amiodarone - now held due to bradycardia.   -cardio consult noted  -can restart amiodarone if becomes tachycardic. Pt developed AF post operatively, so it is possible amio was only needed short term.  -avoid QT prolonging agents    #DM2  -hgba1c 7%  -c/w Lantus 5unit qhs  -c/w ISS, Accu-Cheks    #subclinical hypothyroidism  -TSH mildly elevated 6.4, pt asymptomatic  -seen by Endo during admission. Outpt follow up

## 2021-11-26 NOTE — DISCHARGE NOTE NURSING/CASE MANAGEMENT/SOCIAL WORK - PATIENT PORTAL LINK FT
You can access the FollowMyHealth Patient Portal offered by [facility] by registering at the following website: [email or web address]. By joining [employer]’s FollowMyHealth portal, you will also be able to view your health information using other applications (apps) compatible with our system.

## 2021-11-26 NOTE — PROGRESS NOTE ADULT - SUBJECTIVE AND OBJECTIVE BOX
EXAM DESCRIPTION:  RAD - Chest Single View - 12/7/2020 10:41 am

 

CLINICAL HISTORY:  COUGH

Chest pain.

 

COMPARISON:  CHEST PA AND LAT 2 VIEW dated 7/28/2014

 

FINDINGS:  Portable technique limits examination quality.

 

Extensive bilateral pulmonary opacities are present most compatible with viral pneumonia. The heart i
s normal in size. No displaced fractures.

 

IMPRESSION:  Extensive bilateral viral pneumonia pattern. HPI:  61 y/o M w/ hx of DM2, HTN and HLD initially presented as transfer from [facility] for chest pain concerning for NSTEMI and possible new CHF c/b JOSHUA likely on CKD, and hypertensive emergency on 10/6/21. Hospital course complicated by s/p RRT for CVA/TIA ruled out and worsening anemia requiring 1 PRBC. Pt S/p HD session today and underwent LHC showing Multivessel CAD. CT surgery consulted for CABG evaluation. Patient developed left sided facial droop on 10/7 and stroke code was called. 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 Patient started Hemodialysis on 10/13. On 10/21 s/p CABGx2, Mitral Valve Repair, and left atrial appendage ligation, was extubated next day. He developed SOB on 10/24 and requeired bipap,  but was eventually trasitioned back down to [place]. Permacath placed 11/1/21 and LUE AV Fistula placed by vascular on 11/2/21. Her was noted to have PACs and Afib - started on amiodarone. He will be getting HD at [place]. Cleared by vascular to restart eliquis 2.5mg bid. HD schedule changed from T/T/S to M/W/F.   Patient evaluated by PM&R PT/OT and recommended acute rehab. Patient admitted to [facility] on 11/8/21 for debility. (08 Nov 2021 14:09)      INTERVAL HPI/OVERNIGHT EVENTS:  Chart Reviewed and patient seen at bedside.  Patient with no complaints or issues.  Left knee suture not dissolved.        ROS:  Denies fevers, chills, chest pain, shortness of breath, abdominal pain, headaches, nausea/vomiting    Vital Signs Last 24 Hrs  T(C): 36.7 (26 Nov 2021 07:52), Max: 36.9 (25 Nov 2021 21:20)  T(F): 98 (26 Nov 2021 07:52), Max: 98.4 (25 Nov 2021 21:20)  HR: 53 (26 Nov 2021 07:52) (53 - 58)  BP: 123/72 (26 Nov 2021 07:52) (123/72 - 163/89)  BP(mean): --  RR: 16 (26 Nov 2021 07:52) (16 - 16)  SpO2: 100% (26 Nov 2021 07:52) (100% - 100%)    Physical Exam:      LABS:  11-24    134<L>  |  96  |  62<H>  ----------------------------<  182<H>  4.5   |  28  |  7.15<H>    Ca    9.0      24 Nov 2021 16:15  Phos  4.0     11-24                                                8.2    7.42  )-----------( 193      ( 24 Nov 2021 16:15 )             25.4     CAPILLARY BLOOD GLUCOSE      POCT Blood Glucose.: 124 mg/dL (26 Nov 2021 07:59)  POCT Blood Glucose.: 179 mg/dL (25 Nov 2021 21:26)  POCT Blood Glucose.: 248 mg/dL (25 Nov 2021 17:15)      MEDICATIONS:  MEDICATIONS  (STANDING):  apixaban 2.5 milliGRAM(s) Oral two times a day  AQUAPHOR (petrolatum Ointment) 1 Application(s) Topical daily  aspirin  chewable 81 milliGRAM(s) Oral daily  atorvastatin 80 milliGRAM(s) Oral at bedtime  dextrose 40% Gel 15 Gram(s) Oral once  dextrose 5%. 1000 milliLiter(s) (50 mL/Hr) IV Continuous <Continuous>  dextrose 5%. 1000 milliLiter(s) (100 mL/Hr) IV Continuous <Continuous>  dextrose 50% Injectable 25 Gram(s) IV Push once  dextrose 50% Injectable 12.5 Gram(s) IV Push once  dextrose 50% Injectable 25 Gram(s) IV Push once  epoetin vern-epbx (RETACRIT) Injectable 21248 Unit(s) SubCutaneous <User Schedule>  glucagon  Injectable 1 milliGRAM(s) IntraMuscular once  insulin glargine Injectable (LANTUS) 5 Unit(s) SubCutaneous at bedtime  insulin lispro (ADMELOG) corrective regimen sliding scale   SubCutaneous at bedtime  insulin lispro (ADMELOG) corrective regimen sliding scale   SubCutaneous before breakfast  pantoprazole    Tablet 40 milliGRAM(s) Oral before breakfast  senna 2 Tablet(s) Oral at bedtime  sevelamer carbonate 1600 milliGRAM(s) Oral three times a day with meals    MEDICATIONS  (PRN):  acetaminophen     Tablet .. 650 milliGRAM(s) Oral every 6 hours PRN Mild Pain (1 - 3), Moderate Pain (4 - 6)  hemorrhoidal Ointment 1 Application(s) Rectal three times a day PRN Hemorrhoids  melatonin 3 milliGRAM(s) Oral at bedtime PRN Insomnia  polyethylene glycol 3350 17 Gram(s) Oral two times a day PRN Constipation         HPI:  61 y/o M w/ hx of DM2, HTN and HLD initially presented as transfer from [facility] for chest pain concerning for NSTEMI and possible new CHF c/b JOSHUA likely on CKD, and hypertensive emergency on 10/6/21. Hospital course complicated by s/p RRT for CVA/TIA ruled out and worsening anemia requiring 1 PRBC. Pt S/p HD session today and underwent LHC showing Multivessel CAD. CT surgery consulted for CABG evaluation. Patient developed left sided facial droop on 10/7 and stroke code was called. 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 Patient started Hemodialysis on 10/13. On 10/21 s/p CABGx2, Mitral Valve Repair, and left atrial appendage ligation, was extubated next day. He developed SOB on 10/24 and requeired bipap,  but was eventually trasitioned back down to [place]. Permacath placed 11/1/21 and LUE AV Fistula placed by vascular on 11/2/21. Her was noted to have PACs and Afib - started on amiodarone. He will be getting HD at [place]. Cleared by vascular to restart eliquis 2.5mg bid. HD schedule changed from T/T/S to M/W/F.   Patient evaluated by PM&R PT/OT and recommended acute rehab. Patient admitted to [facility] on 11/8/21 for debility. (08 Nov 2021 14:09)      INTERVAL HPI/OVERNIGHT EVENTS:  Chart Reviewed and patient seen at bedside.  Patient with no complaints or issues.  Left saphenous vein graft suture not dissolved.  Otherwise no complaints or issues.    ROS:  Denies fevers, chills, chest pain, shortness of breath, abdominal pain, headaches, nausea/vomiting    Vital Signs Last 24 Hrs  T(C): 36.7 (26 Nov 2021 07:52), Max: 36.9 (25 Nov 2021 21:20)  T(F): 98 (26 Nov 2021 07:52), Max: 98.4 (25 Nov 2021 21:20)  HR: 53 (26 Nov 2021 07:52) (53 - 58)  BP: 123/72 (26 Nov 2021 07:52) (123/72 - 163/89)  BP(mean): --  RR: 16 (26 Nov 2021 07:52) (16 - 16)  SpO2: 100% (26 Nov 2021 07:52) (100% - 100%)    Physical Exam:    Gen - NAD, Comfortable  HEENT - no asymmetry  Neck - Supple, No limited ROM  Pulm - CTAB, No wheeze, No rhonchi, No crackles  Cardiovascular - irregular (intermittently) S1S2, sternotomy incision in situ  Abdomen - Soft, NT/ND, +BS  Extremities - No edema or tenderness Left arm AV fistula    Neuro-  Cognitive - AAOx2-3  Motor -UE 5/5, LE 4/5   right genu recurvatum, less frequent   Sensory - Intact to LT  Reflexes - DTR Intact, No primitive reflex  Coordination - No dysmetria  Tone - normal b/l  Psychiatric - Mood stable, Affect WNL  Skin: Sacral/left buttock sacral decubital ulcer with visible subcutaneous/dermis, dry b/l heels, permacath is c/d/i, LUE AVF site c/d/i sternal incision site is also c/d/i    LABS:  11-24    134<L>  |  96  |  62<H>  ----------------------------<  182<H>  4.5   |  28  |  7.15<H>    Ca    9.0      24 Nov 2021 16:15  Phos  4.0     11-24                                                8.2    7.42  )-----------( 193      ( 24 Nov 2021 16:15 )             25.4     CAPILLARY BLOOD GLUCOSE      POCT Blood Glucose.: 124 mg/dL (26 Nov 2021 07:59)  POCT Blood Glucose.: 179 mg/dL (25 Nov 2021 21:26)  POCT Blood Glucose.: 248 mg/dL (25 Nov 2021 17:15)      MEDICATIONS:  MEDICATIONS  (STANDING):  apixaban 2.5 milliGRAM(s) Oral two times a day  AQUAPHOR (petrolatum Ointment) 1 Application(s) Topical daily  aspirin  chewable 81 milliGRAM(s) Oral daily  atorvastatin 80 milliGRAM(s) Oral at bedtime  dextrose 40% Gel 15 Gram(s) Oral once  dextrose 5%. 1000 milliLiter(s) (50 mL/Hr) IV Continuous <Continuous>  dextrose 5%. 1000 milliLiter(s) (100 mL/Hr) IV Continuous <Continuous>  dextrose 50% Injectable 25 Gram(s) IV Push once  dextrose 50% Injectable 12.5 Gram(s) IV Push once  dextrose 50% Injectable 25 Gram(s) IV Push once  epoetin vern-epbx (RETACRIT) Injectable 35446 Unit(s) SubCutaneous <User Schedule>  glucagon  Injectable 1 milliGRAM(s) IntraMuscular once  insulin glargine Injectable (LANTUS) 5 Unit(s) SubCutaneous at bedtime  insulin lispro (ADMELOG) corrective regimen sliding scale   SubCutaneous at bedtime  insulin lispro (ADMELOG) corrective regimen sliding scale   SubCutaneous before breakfast  pantoprazole    Tablet 40 milliGRAM(s) Oral before breakfast  senna 2 Tablet(s) Oral at bedtime  sevelamer carbonate 1600 milliGRAM(s) Oral three times a day with meals    MEDICATIONS  (PRN):  acetaminophen     Tablet .. 650 milliGRAM(s) Oral every 6 hours PRN Mild Pain (1 - 3), Moderate Pain (4 - 6)  hemorrhoidal Ointment 1 Application(s) Rectal three times a day PRN Hemorrhoids  melatonin 3 milliGRAM(s) Oral at bedtime PRN Insomnia  polyethylene glycol 3350 17 Gram(s) Oral two times a day PRN Constipation         HPI:  59 y/o M w/ hx of DM2, HTN and HLD initially presented as transfer from [facility] for chest pain concerning for NSTEMI and possible new CHF c/b JOSHUA likely on CKD, and hypertensive emergency on 10/6/21. Hospital course complicated by s/p RRT for CVA/TIA ruled out and worsening anemia requiring 1 PRBC. Pt S/p HD session today and underwent LHC showing Multivessel CAD. CT surgery consulted for CABG evaluation. Patient developed left sided facial droop on 10/7 and stroke code was called. 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 Patient started Hemodialysis on 10/13. On 10/21 s/p CABGx2, Mitral Valve Repair, and left atrial appendage ligation, was extubated next day. He developed SOB on 10/24 and requeired bipap,  but was eventually trasitioned back down to [place]. Permacath placed 11/1/21 and LUE AV Fistula placed by vascular on 11/2/21. Her was noted to have PACs and Afib - started on amiodarone. He will be getting HD at [place]. Cleared by vascular to restart eliquis 2.5mg bid. HD schedule changed from T/T/S to M/W/F.   Patient evaluated by PM&R PT/OT and recommended acute rehab. Patient admitted to [facility] on 11/8/21 for debility. (08 Nov 2021 14:09)      INTERVAL HPI/OVERNIGHT EVENTS:  Chart Reviewed and patient seen at bedside.  Patient with no complaints or issues.  Left saphenous vein graft suture not dissolved.  Otherwise no complaints or issues.    ROS:  Denies fevers, chills, chest pain, shortness of breath, abdominal pain, headaches, nausea/vomiting    Vital Signs Last 24 Hrs  T(C): 36.7 (26 Nov 2021 07:52), Max: 36.9 (25 Nov 2021 21:20)  T(F): 98 (26 Nov 2021 07:52), Max: 98.4 (25 Nov 2021 21:20)  HR: 53 (26 Nov 2021 07:52) (53 - 58)  BP: 123/72 (26 Nov 2021 07:52) (123/72 - 163/89)  BP(mean): --  RR: 16 (26 Nov 2021 07:52) (16 - 16)  SpO2: 100% (26 Nov 2021 07:52) (100% - 100%)    Physical Exam:    Gen - NAD, Comfortable  HEENT - no asymmetry  Neck - Supple, No limited ROM  Pulm - CTAB, No wheeze, No rhonchi, No crackles  Cardiovascular - irregular (intermittently) S1S2, sternotomy incision in situ  Abdomen - Soft, NT/ND, +BS  Extremities - No edema or tenderness Left arm AV fistula    Neuro-  Cognitive - AAOx2-3  Motor -UE 5/5, LE 4/5   right genu recurvatum, less frequent   Sensory - Intact to LT  Reflexes - DTR Intact, No primitive reflex  Coordination - No dysmetria  Tone - normal b/l  Psychiatric - Mood stable, Affect WNL  Skin: Sacral/left buttock sacral decubital ulcer with visible subcutaneous/dermis, dry b/l heels, permacath is c/d/i, LUE AVF site c/d/i sternal incision site is also c/d/i    LABS:  11-24    134<L>  |  96  |  62<H>  ----------------------------<  182<H>  4.5   |  28  |  7.15<H>    Ca    9.0      24 Nov 2021 16:15  Phos  4.0     11-24                                                8.2    7.42  )-----------( 193      ( 24 Nov 2021 16:15 )             25.4     CAPILLARY BLOOD GLUCOSE      POCT Blood Glucose.: 124 mg/dL (26 Nov 2021 07:59)  POCT Blood Glucose.: 179 mg/dL (25 Nov 2021 21:26)  POCT Blood Glucose.: 248 mg/dL (25 Nov 2021 17:15)      MEDICATIONS:  MEDICATIONS  (STANDING):  apixaban 2.5 milliGRAM(s) Oral two times a day  AQUAPHOR (petrolatum Ointment) 1 Application(s) Topical daily  aspirin  chewable 81 milliGRAM(s) Oral daily  atorvastatin 80 milliGRAM(s) Oral at bedtime  dextrose 40% Gel 15 Gram(s) Oral once  dextrose 5%. 1000 milliLiter(s) (50 mL/Hr) IV Continuous <Continuous>  dextrose 5%. 1000 milliLiter(s) (100 mL/Hr) IV Continuous <Continuous>  dextrose 50% Injectable 25 Gram(s) IV Push once  dextrose 50% Injectable 12.5 Gram(s) IV Push once  dextrose 50% Injectable 25 Gram(s) IV Push once  epoetin vern-epbx (RETACRIT) Injectable 86055 Unit(s) SubCutaneous <User Schedule>  glucagon  Injectable 1 milliGRAM(s) IntraMuscular once  insulin glargine Injectable (LANTUS) 5 Unit(s) SubCutaneous at bedtime  insulin lispro (ADMELOG) corrective regimen sliding scale   SubCutaneous at bedtime  insulin lispro (ADMELOG) corrective regimen sliding scale   SubCutaneous before breakfast  pantoprazole    Tablet 40 milliGRAM(s) Oral before breakfast  senna 2 Tablet(s) Oral at bedtime  sevelamer carbonate 1600 milliGRAM(s) Oral three times a day with meals    MEDICATIONS  (PRN):  acetaminophen     Tablet .. 650 milliGRAM(s) Oral every 6 hours PRN Mild Pain (1 - 3), Moderate Pain (4 - 6)  hemorrhoidal Ointment 1 Application(s) Rectal three times a day PRN Hemorrhoids  melatonin 3 milliGRAM(s) Oral at bedtime PRN Insomnia  polyethylene glycol 3350 17 Gram(s) Oral two times a day PRN Constipation    Therapy--Sustained progress, increased LE motor function  Knee instability is improving   HPI:  61 y/o M w/ hx of DM2, HTN and HLD initially presented as transfer from [facility] for chest pain concerning for NSTEMI and possible new CHF c/b JOSHUA likely on CKD, and hypertensive emergency on 10/6/21. Hospital course complicated by s/p RRT for CVA/TIA ruled out and worsening anemia requiring 1 PRBC. Pt S/p HD session today and underwent LHC showing Multivessel CAD. CT surgery consulted for CABG evaluation. Patient developed left sided facial droop on 10/7 and stroke code was called. 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 Patient started Hemodialysis on 10/13. On 10/21 s/p CABGx2, Mitral Valve Repair, and left atrial appendage ligation, was extubated next day. He developed SOB on 10/24 and requeired bipap,  but was eventually trasitioned back down to [place]. Permacath placed 11/1/21 and LUE AV Fistula placed by vascular on 11/2/21. Her was noted to have PACs and Afib - started on amiodarone. He will be getting HD at [place]. Cleared by vascular to restart eliquis 2.5mg bid. HD schedule changed from T/T/S to M/W/F.   Patient evaluated by PM&R PT/OT and recommended acute rehab. Patient admitted to [facility] on 11/8/21 for debility. (08 Nov 2021 14:09)      INTERVAL HPI/OVERNIGHT EVENTS:  Chart Reviewed and patient seen at bedside.  Patient with no complaints or issues.  Left saphenous vein graft suture not dissolved.  We reached out to the heart failure team and the cardiothoracic surgeon to determine their plan for the undissolved suture at saphenous vein graft site  Otherwise no complaints or issues.    ROS:  Denies fevers, chills, chest pain, shortness of breath, abdominal pain, headaches, nausea/vomiting    Vital Signs Last 24 Hrs  T(C): 36.7 (26 Nov 2021 07:52), Max: 36.9 (25 Nov 2021 21:20)  T(F): 98 (26 Nov 2021 07:52), Max: 98.4 (25 Nov 2021 21:20)  HR: 53 (26 Nov 2021 07:52) (53 - 58)  BP: 123/72 (26 Nov 2021 07:52) (123/72 - 163/89)  BP(mean): --  RR: 16 (26 Nov 2021 07:52) (16 - 16)  SpO2: 100% (26 Nov 2021 07:52) (100% - 100%)    Physical Exam:    Gen - NAD, Comfortable  HEENT - no asymmetry  Neck - Supple, No limited ROM  Pulm - CTAB, No wheeze, No rhonchi, No crackles  Cardiovascular - irregular (intermittently) S1S2, sternotomy incision in situ  Abdomen - Soft, NT/ND, +BS  Extremities - No edema or tenderness Left arm AV fistula    Neuro-  Cognitive - AAOx2-3  Motor -UE 5/5, LE 4/5   right genu recurvatum, less frequent   Sensory - Intact to LT  Reflexes - DTR Intact, No primitive reflex  Coordination - No dysmetria  Tone - normal b/l  Psychiatric - Mood stable, Affect WNL  Skin: Sacral/left buttock sacral decubital ulcer with visible subcutaneous/dermis, dry b/l heels, permacath is c/d/i, LUE AVF site c/d/i sternal incision site is also c/d/i    LABS:  11-24    134<L>  |  96  |  62<H>  ----------------------------<  182<H>  4.5   |  28  |  7.15<H>    Ca    9.0      24 Nov 2021 16:15  Phos  4.0     11-24                                                8.2    7.42  )-----------( 193      ( 24 Nov 2021 16:15 )             25.4     CAPILLARY BLOOD GLUCOSE      POCT Blood Glucose.: 124 mg/dL (26 Nov 2021 07:59)  POCT Blood Glucose.: 179 mg/dL (25 Nov 2021 21:26)  POCT Blood Glucose.: 248 mg/dL (25 Nov 2021 17:15)      MEDICATIONS:  MEDICATIONS  (STANDING):  apixaban 2.5 milliGRAM(s) Oral two times a day  AQUAPHOR (petrolatum Ointment) 1 Application(s) Topical daily  aspirin  chewable 81 milliGRAM(s) Oral daily  atorvastatin 80 milliGRAM(s) Oral at bedtime  dextrose 40% Gel 15 Gram(s) Oral once  dextrose 5%. 1000 milliLiter(s) (50 mL/Hr) IV Continuous <Continuous>  dextrose 5%. 1000 milliLiter(s) (100 mL/Hr) IV Continuous <Continuous>  dextrose 50% Injectable 25 Gram(s) IV Push once  dextrose 50% Injectable 12.5 Gram(s) IV Push once  dextrose 50% Injectable 25 Gram(s) IV Push once  epoetin vern-epbx (RETACRIT) Injectable 71861 Unit(s) SubCutaneous <User Schedule>  glucagon  Injectable 1 milliGRAM(s) IntraMuscular once  insulin glargine Injectable (LANTUS) 5 Unit(s) SubCutaneous at bedtime  insulin lispro (ADMELOG) corrective regimen sliding scale   SubCutaneous at bedtime  insulin lispro (ADMELOG) corrective regimen sliding scale   SubCutaneous before breakfast  pantoprazole    Tablet 40 milliGRAM(s) Oral before breakfast  senna 2 Tablet(s) Oral at bedtime  sevelamer carbonate 1600 milliGRAM(s) Oral three times a day with meals    MEDICATIONS  (PRN):  acetaminophen     Tablet .. 650 milliGRAM(s) Oral every 6 hours PRN Mild Pain (1 - 3), Moderate Pain (4 - 6)  hemorrhoidal Ointment 1 Application(s) Rectal three times a day PRN Hemorrhoids  melatonin 3 milliGRAM(s) Oral at bedtime PRN Insomnia  polyethylene glycol 3350 17 Gram(s) Oral two times a day PRN Constipation    Therapy--Sustained progress, increased LE motor function  Knee instability is improving

## 2021-11-26 NOTE — PROGRESS NOTE ADULT - SUBJECTIVE AND OBJECTIVE BOX
Patient is a 61y old  Male who presents with a chief complaint of Debility due to CABG and ESRD on Hemodialysis (25 Nov 2021 18:23)    Patient seen and examined at bedside. No acute overnight events. Denies pain/discomfort. Asking to go home.    ALLERGIES:  No Known Allergies    MEDICATIONS  (STANDING):  apixaban 2.5 milliGRAM(s) Oral two times a day  AQUAPHOR (petrolatum Ointment) 1 Application(s) Topical daily  aspirin  chewable 81 milliGRAM(s) Oral daily  atorvastatin 80 milliGRAM(s) Oral at bedtime  dextrose 40% Gel 15 Gram(s) Oral once  dextrose 5%. 1000 milliLiter(s) (50 mL/Hr) IV Continuous <Continuous>  dextrose 5%. 1000 milliLiter(s) (100 mL/Hr) IV Continuous <Continuous>  dextrose 50% Injectable 25 Gram(s) IV Push once  dextrose 50% Injectable 12.5 Gram(s) IV Push once  dextrose 50% Injectable 25 Gram(s) IV Push once  epoetin vern-epbx (RETACRIT) Injectable 84748 Unit(s) SubCutaneous <User Schedule>  glucagon  Injectable 1 milliGRAM(s) IntraMuscular once  insulin glargine Injectable (LANTUS) 5 Unit(s) SubCutaneous at bedtime  insulin lispro (ADMELOG) corrective regimen sliding scale   SubCutaneous at bedtime  insulin lispro (ADMELOG) corrective regimen sliding scale   SubCutaneous before breakfast  pantoprazole    Tablet 40 milliGRAM(s) Oral before breakfast  senna 2 Tablet(s) Oral at bedtime  sevelamer carbonate 1600 milliGRAM(s) Oral three times a day with meals    MEDICATIONS  (PRN):  acetaminophen     Tablet .. 650 milliGRAM(s) Oral every 6 hours PRN Mild Pain (1 - 3), Moderate Pain (4 - 6)  hemorrhoidal Ointment 1 Application(s) Rectal three times a day PRN Hemorrhoids  melatonin 3 milliGRAM(s) Oral at bedtime PRN Insomnia  polyethylene glycol 3350 17 Gram(s) Oral two times a day PRN Constipation    Vital Signs Last 24 Hrs  T(F): 98 (26 Nov 2021 07:52), Max: 98.4 (25 Nov 2021 21:20)  HR: 53 (26 Nov 2021 07:52) (53 - 58)  BP: 123/72 (26 Nov 2021 07:52) (123/72 - 163/89)  RR: 16 (26 Nov 2021 07:52) (16 - 16)  SpO2: 100% (26 Nov 2021 07:52) (100% - 100%)  I&O's Summary    25 Nov 2021 07:01  -  26 Nov 2021 07:00  --------------------------------------------------------  IN: 0 mL / OUT: 202 mL / NET: -202 mL    PHYSICAL EXAM:  General: NAD, Awake, alert  ENT: MMM, no tonsilar exudate  Neck: Supple, No JVD  Lungs: Clear to auscultation bilaterally, no wheezes. Good air entry bilaterally   Cardio: RRR, S1/S2, No murmurs. sternotomy scar c/d/i' right upper chest wall catheter in place  Abdomen: Soft, Nontender, Nondistended; Bowel sounds present  Extremities: No calf tenderness, No pitting edema. LUE AVF in wrap    LABS:                        8.2    7.42  )-----------( 193      ( 24 Nov 2021 16:15 )             25.4       11-24    134  |  96  |  62  ----------------------------<  182  4.5   |  28  |  7.15    Ca    9.0      24 Nov 2021 16:15  Phos  4.0     11-24     eGFR if Non African American: 8 mL/min/1.73M2 (11-24-21 @ 16:15)  eGFR if : 9 mL/min/1.73M2 (11-24-21 @ 16:15)    POCT Blood Glucose.: 179 mg/dL (25 Nov 2021 21:26)  POCT Blood Glucose.: 248 mg/dL (25 Nov 2021 17:15)    COVID-19 PCR: NotDetec (11-21-21 @ 05:30)  COVID-19 PCR: NotDetec (11-15-21 @ 06:49)  COVID-19 PCR: NotDetec (11-08-21 @ 18:30)  COVID-19 PCR: NotDetec (11-07-21 @ 05:39)  COVID-19 PCR: NotDetec (11-05-21 @ 08:07)    RADIOLOGY & ADDITIONAL TESTS:     Care Discussed with Consultants/Other Providers:

## 2021-11-26 NOTE — DISCHARGE NOTE NURSING/CASE MANAGEMENT/SOCIAL WORK - NSDCCRTYPESERV_GEN_ALL_CORE_FT
Dialysis on Mon, Wed, Fri. First session is Monday 11/29/21 at 9:00 am. Sessions will be at 6 am on Wednesday 12/1, Fri 12/3, moving forward Dialysis on Mon, Wed, Fri. First session is Monday 11/29/21 at 9:00 am. Sessions will be at 6 am on Wednesday 12/1, Fri 12/3, moving forward. Bring medication list, insurance card, ID, social security  number, and blanket to first session. Dialysis on Mon, Wed, Fri. First session is Monday 11/29/21 at 9:00 am. Sessions will be at 6 am on Wednesday 12/1, Fri 12/3, moving forward. Bring medication list, insurance card, ID, social security number, and blanket to first session.

## 2021-11-27 VITALS
OXYGEN SATURATION: 100 % | HEART RATE: 91 BPM | RESPIRATION RATE: 14 BRPM | DIASTOLIC BLOOD PRESSURE: 86 MMHG | TEMPERATURE: 97 F | SYSTOLIC BLOOD PRESSURE: 131 MMHG

## 2021-11-27 LAB — GLUCOSE BLDC GLUCOMTR-MCNC: 153 MG/DL — HIGH (ref 70–99)

## 2021-11-27 PROCEDURE — 93005 ELECTROCARDIOGRAM TRACING: CPT

## 2021-11-27 PROCEDURE — 86803 HEPATITIS C AB TEST: CPT

## 2021-11-27 PROCEDURE — U0003: CPT

## 2021-11-27 PROCEDURE — 86769 SARS-COV-2 COVID-19 ANTIBODY: CPT

## 2021-11-27 PROCEDURE — 71045 X-RAY EXAM CHEST 1 VIEW: CPT

## 2021-11-27 PROCEDURE — 92523 SPEECH SOUND LANG COMPREHEN: CPT

## 2021-11-27 PROCEDURE — 80076 HEPATIC FUNCTION PANEL: CPT

## 2021-11-27 PROCEDURE — 92507 TX SP LANG VOICE COMM INDIV: CPT

## 2021-11-27 PROCEDURE — 80053 COMPREHEN METABOLIC PANEL: CPT

## 2021-11-27 PROCEDURE — 85025 COMPLETE CBC W/AUTO DIFF WBC: CPT

## 2021-11-27 PROCEDURE — U0005: CPT

## 2021-11-27 PROCEDURE — 99261: CPT

## 2021-11-27 PROCEDURE — 82962 GLUCOSE BLOOD TEST: CPT

## 2021-11-27 PROCEDURE — 84100 ASSAY OF PHOSPHORUS: CPT

## 2021-11-27 PROCEDURE — 92610 EVALUATE SWALLOWING FUNCTION: CPT

## 2021-11-27 PROCEDURE — 97112 NEUROMUSCULAR REEDUCATION: CPT

## 2021-11-27 PROCEDURE — 97110 THERAPEUTIC EXERCISES: CPT

## 2021-11-27 PROCEDURE — 85027 COMPLETE CBC AUTOMATED: CPT

## 2021-11-27 PROCEDURE — 87340 HEPATITIS B SURFACE AG IA: CPT

## 2021-11-27 PROCEDURE — 36415 COLL VENOUS BLD VENIPUNCTURE: CPT

## 2021-11-27 PROCEDURE — 80048 BASIC METABOLIC PNL TOTAL CA: CPT

## 2021-11-27 PROCEDURE — 97535 SELF CARE MNGMENT TRAINING: CPT

## 2021-11-27 PROCEDURE — 80069 RENAL FUNCTION PANEL: CPT

## 2021-11-27 PROCEDURE — 97530 THERAPEUTIC ACTIVITIES: CPT

## 2021-11-27 PROCEDURE — 92526 ORAL FUNCTION THERAPY: CPT

## 2021-11-27 PROCEDURE — 84443 ASSAY THYROID STIM HORMONE: CPT

## 2021-11-27 PROCEDURE — 97116 GAIT TRAINING THERAPY: CPT

## 2021-11-27 PROCEDURE — 86480 TB TEST CELL IMMUN MEASURE: CPT

## 2021-11-27 PROCEDURE — 99239 HOSP IP/OBS DSCHRG MGMT >30: CPT

## 2021-11-27 PROCEDURE — 97163 PT EVAL HIGH COMPLEX 45 MIN: CPT

## 2021-11-27 PROCEDURE — 86706 HEP B SURFACE ANTIBODY: CPT

## 2021-11-27 PROCEDURE — 97167 OT EVAL HIGH COMPLEX 60 MIN: CPT

## 2021-11-27 PROCEDURE — 97129 THER IVNTJ 1ST 15 MIN: CPT

## 2021-11-27 RX ADMIN — APIXABAN 2.5 MILLIGRAM(S): 2.5 TABLET, FILM COATED ORAL at 05:13

## 2021-11-27 RX ADMIN — ATORVASTATIN CALCIUM 80 MILLIGRAM(S): 80 TABLET, FILM COATED ORAL at 00:00

## 2021-11-27 RX ADMIN — PANTOPRAZOLE SODIUM 40 MILLIGRAM(S): 20 TABLET, DELAYED RELEASE ORAL at 05:13

## 2021-11-27 RX ADMIN — SEVELAMER CARBONATE 1600 MILLIGRAM(S): 2400 POWDER, FOR SUSPENSION ORAL at 08:02

## 2021-11-27 RX ADMIN — Medication 2: at 08:02

## 2021-11-27 NOTE — PROGRESS NOTE ADULT - PROVIDER SPECIALTY LIST ADULT
Hospitalist
Nephrology
Rehab Medicine
Rehab Medicine
Cardiology
Hospitalist
Hospitalist
Nephrology
Rehab Medicine
Hospitalist
Nephrology
Rehab Medicine
Hospitalist
Rehab Medicine

## 2021-11-27 NOTE — PROGRESS NOTE ADULT - NUTRITIONAL ASSESSMENT
This patient has been assessed with a concern for Malnutrition and has been determined to have a diagnosis/diagnoses of Moderate protein-calorie malnutrition.    This patient is being managed with:   Diet Consistent Carbohydrate/No Snacks-  DASH/TLC {Sodium & Cholesterol Restricted} (DASH)  For patients receiving Renal Replacement - No Protein Restr No Conc K No Conc Phos Low Sodium (RENAL)  Entered: Nov 8 2021  5:01PM    The following pending diet order is being considered for treatment of Moderate protein-calorie malnutrition:  Diet Consistent Carbohydrate Renal/No Snacks-  Supplement Feeding Modality:  Oral  Nepro Cans or Servings Per Day:  1       Frequency:  Daily  Entered: Nov 9 2021 12:56PM  
This patient has been assessed with a concern for Malnutrition and has been determined to have a diagnosis/diagnoses of Moderate protein-calorie malnutrition.    This patient is being managed with:   Diet Pureed-  Consistent Carbohydrate {No Snacks}  DASH/TLC {Sodium & Cholesterol Restricted}  For patients receiving Renal Replacement - No Protein Restr No Conc K No Conc Phos Low Sodium  Entered: Nov 11 2021  2:03PM    
This patient has been assessed with a concern for Malnutrition and has been determined to have a diagnosis/diagnoses of Moderate protein-calorie malnutrition.    This patient is being managed with:   Diet Pureed-  Consistent Carbohydrate {No Snacks}  DASH/TLC {Sodium & Cholesterol Restricted}  For patients receiving Renal Replacement - No Protein Restr No Conc K No Conc Phos Low Sodium  Supplement Feeding Modality:  Oral  Nepro Cans or Servings Per Day:  1       Frequency:  Daily  Entered: Nov 17 2021 11:04AM    
This patient has been assessed with a concern for Malnutrition and has been determined to have a diagnosis/diagnoses of Moderate protein-calorie malnutrition.    This patient is being managed with:   Diet Pureed-  Consistent Carbohydrate {No Snacks}  DASH/TLC {Sodium & Cholesterol Restricted}  For patients receiving Renal Replacement - No Protein Restr No Conc K No Conc Phos Low Sodium  Supplement Feeding Modality:  Oral  Nepro Cans or Servings Per Day:  1       Frequency:  Daily  Entered: Nov 17 2021 11:04AM    The following pending diet order is being considered for treatment of Moderate protein-calorie malnutrition:  Diet Minced and Moist-  Consistent Carbohydrate {No Snacks}  DASH/TLC {Sodium & Cholesterol Restricted}  For patients receiving Renal Replacement - No Protein Restr No Conc K No Conc Phos Low Sodium  Supplement Feeding Modality:  Oral  Nepro Cans or Servings Per Day:  1       Frequency:  Daily  Entered: Nov 24 2021  1:00PM  
This patient has been assessed with a concern for Malnutrition and has been determined to have a diagnosis/diagnoses of Moderate protein-calorie malnutrition.    This patient is being managed with:   Diet Minced and Moist-  Consistent Carbohydrate {No Snacks}  DASH/TLC {Sodium & Cholesterol Restricted}  For patients receiving Renal Replacement - No Protein Restr No Conc K No Conc Phos Low Sodium  Supplement Feeding Modality:  Oral  Nepro Cans or Servings Per Day:  1       Frequency:  Daily  Entered: Nov 24 2021  1:00PM    
This patient has been assessed with a concern for Malnutrition and has been determined to have a diagnosis/diagnoses of Moderate protein-calorie malnutrition.    This patient is being managed with:   Diet Pureed-  Consistent Carbohydrate {No Snacks}  DASH/TLC {Sodium & Cholesterol Restricted}  For patients receiving Renal Replacement - No Protein Restr No Conc K No Conc Phos Low Sodium  Entered: Nov 11 2021  2:03PM    
This patient has been assessed with a concern for Malnutrition and has been determined to have a diagnosis/diagnoses of Moderate protein-calorie malnutrition.    This patient is being managed with:   Diet Pureed-  Consistent Carbohydrate {No Snacks}  DASH/TLC {Sodium & Cholesterol Restricted}  For patients receiving Renal Replacement - No Protein Restr No Conc K No Conc Phos Low Sodium  Supplement Feeding Modality:  Oral  Nepro Cans or Servings Per Day:  1       Frequency:  Daily  Entered: Nov 17 2021 11:04AM    
This patient has been assessed with a concern for Malnutrition and has been determined to have a diagnosis/diagnoses of Moderate protein-calorie malnutrition.    This patient is being managed with:   Diet Pureed-  Consistent Carbohydrate {No Snacks}  DASH/TLC {Sodium & Cholesterol Restricted}  For patients receiving Renal Replacement - No Protein Restr No Conc K No Conc Phos Low Sodium  Supplement Feeding Modality:  Oral  Nepro Cans or Servings Per Day:  1       Frequency:  Daily  Entered: Nov 17 2021 11:04AM    
This patient has been assessed with a concern for Malnutrition and has been determined to have a diagnosis/diagnoses of Moderate protein-calorie malnutrition.    This patient is being managed with:   Diet Pureed-  Consistent Carbohydrate {No Snacks}  DASH/TLC {Sodium & Cholesterol Restricted}  For patients receiving Renal Replacement - No Protein Restr No Conc K No Conc Phos Low Sodium  Supplement Feeding Modality:  Oral  Nepro Cans or Servings Per Day:  1       Frequency:  Daily  Entered: Nov 17 2021 11:04AM    The following pending diet order is being considered for treatment of Moderate protein-calorie malnutrition:  Diet Minced and Moist-  Consistent Carbohydrate {No Snacks}  DASH/TLC {Sodium & Cholesterol Restricted}  For patients receiving Renal Replacement - No Protein Restr No Conc K No Conc Phos Low Sodium  Supplement Feeding Modality:  Oral  Nepro Cans or Servings Per Day:  1       Frequency:  Daily  Entered: Nov 24 2021  1:00PM  
This patient has been assessed with a concern for Malnutrition and has been determined to have a diagnosis/diagnoses of Moderate protein-calorie malnutrition.    This patient is being managed with:   Diet Pureed-  DASH/TLC {Sodium & Cholesterol Restricted}  Mildly Thick Liquids (MILDTHICKLIQS)  For patients receiving Renal Replacement - No Protein Restr No Conc K No Conc Phos Low Sodium  Entered: Nov 10 2021  2:52PM    The following pending diet order is being considered for treatment of Moderate protein-calorie malnutrition:  Diet Consistent Carbohydrate Renal/No Snacks-  Supplement Feeding Modality:  Oral  Nepro Cans or Servings Per Day:  1       Frequency:  Daily  Entered: Nov 9 2021 12:56PM  
This patient has been assessed with a concern for Malnutrition and has been determined to have a diagnosis/diagnoses of Moderate protein-calorie malnutrition.    This patient is being managed with:   Diet Consistent Carbohydrate/No Snacks-  DASH/TLC {Sodium & Cholesterol Restricted} (DASH)  For patients receiving Renal Replacement - No Protein Restr No Conc K No Conc Phos Low Sodium (RENAL)  Entered: Nov 8 2021  5:01PM    The following pending diet order is being considered for treatment of Moderate protein-calorie malnutrition:  Diet Consistent Carbohydrate Renal/No Snacks-  Supplement Feeding Modality:  Oral  Nepro Cans or Servings Per Day:  1       Frequency:  Daily  Entered: Nov 9 2021 12:56PM  
This patient has been assessed with a concern for Malnutrition and has been determined to have a diagnosis/diagnoses of Moderate protein-calorie malnutrition.    This patient is being managed with:   Diet Pureed-  Consistent Carbohydrate {No Snacks}  DASH/TLC {Sodium & Cholesterol Restricted}  For patients receiving Renal Replacement - No Protein Restr No Conc K No Conc Phos Low Sodium  Entered: Nov 11 2021  2:03PM    
This patient has been assessed with a concern for Malnutrition and has been determined to have a diagnosis/diagnoses of Moderate protein-calorie malnutrition.    This patient is being managed with:   Diet Pureed-  Consistent Carbohydrate {No Snacks}  DASH/TLC {Sodium & Cholesterol Restricted}  For patients receiving Renal Replacement - No Protein Restr No Conc K No Conc Phos Low Sodium  Supplement Feeding Modality:  Oral  Nepro Cans or Servings Per Day:  1       Frequency:  Daily  Entered: Nov 17 2021 11:04AM    
This patient has been assessed with a concern for Malnutrition and has been determined to have a diagnosis/diagnoses of Moderate protein-calorie malnutrition.    This patient is being managed with:   Diet Pureed-  Consistent Carbohydrate {No Snacks}  DASH/TLC {Sodium & Cholesterol Restricted}  For patients receiving Renal Replacement - No Protein Restr No Conc K No Conc Phos Low Sodium  Supplement Feeding Modality:  Oral  Nepro Cans or Servings Per Day:  1       Frequency:  Daily  Entered: Nov 17 2021 11:04AM    
This patient has been assessed with a concern for Malnutrition and has been determined to have a diagnosis/diagnoses of Moderate protein-calorie malnutrition.    This patient is being managed with:   Diet Pureed-  Consistent Carbohydrate {No Snacks}  DASH/TLC {Sodium & Cholesterol Restricted}  For patients receiving Renal Replacement - No Protein Restr No Conc K No Conc Phos Low Sodium  Supplement Feeding Modality:  Oral  Nepro Cans or Servings Per Day:  1       Frequency:  Daily  Entered: Nov 17 2021 11:04AM    Diet Pureed-  Consistent Carbohydrate {No Snacks}  DASH/TLC {Sodium & Cholesterol Restricted}  For patients receiving Renal Replacement - No Protein Restr No Conc K No Conc Phos Low Sodium  Entered: Nov 11 2021  2:03PM    The following pending diet order is being considered for treatment of Moderate protein-calorie malnutrition:null
This patient has been assessed with a concern for Malnutrition and has been determined to have a diagnosis/diagnoses of Moderate protein-calorie malnutrition.    This patient is being managed with:   Diet Pureed-  Consistent Carbohydrate {No Snacks}  DASH/TLC {Sodium & Cholesterol Restricted}  For patients receiving Renal Replacement - No Protein Restr No Conc K No Conc Phos Low Sodium  Supplement Feeding Modality:  Oral  Nepro Cans or Servings Per Day:  1       Frequency:  Daily  Entered: Nov 17 2021 11:04AM    The following pending diet order is being considered for treatment of Moderate protein-calorie malnutrition:  Diet Minced and Moist-  Consistent Carbohydrate {No Snacks}  DASH/TLC {Sodium & Cholesterol Restricted}  For patients receiving Renal Replacement - No Protein Restr No Conc K No Conc Phos Low Sodium  Supplement Feeding Modality:  Oral  Nepro Cans or Servings Per Day:  1       Frequency:  Daily  Entered: Nov 24 2021  1:00PM  
This patient has been assessed with a concern for Malnutrition and has been determined to have a diagnosis/diagnoses of Moderate protein-calorie malnutrition.    This patient is being managed with:   Diet Pureed-  DASH/TLC {Sodium & Cholesterol Restricted}  Mildly Thick Liquids (MILDTHICKLIQS)  For patients receiving Renal Replacement - No Protein Restr No Conc K No Conc Phos Low Sodium  Entered: Nov 10 2021  2:52PM    The following pending diet order is being considered for treatment of Moderate protein-calorie malnutrition:  Diet Consistent Carbohydrate Renal/No Snacks-  Supplement Feeding Modality:  Oral  Nepro Cans or Servings Per Day:  1       Frequency:  Daily  Entered: Nov 9 2021 12:56PM    This patient has been assessed with a concern for Malnutrition and has been determined to have a diagnosis/diagnoses of Moderate protein-calorie malnutrition.    This patient is being managed with:   Diet Pureed-  DASH/TLC {Sodium & Cholesterol Restricted}  Mildly Thick Liquids (MILDTHICKLIQS)  For patients receiving Renal Replacement - No Protein Restr No Conc K No Conc Phos Low Sodium  Entered: Nov 10 2021  2:52PM    The following pending diet order is being considered for treatment of Moderate protein-calorie malnutrition:  Diet Consistent Carbohydrate Renal/No Snacks-  Supplement Feeding Modality:  Oral  Nepro Cans or Servings Per Day:  1       Frequency:  Daily  Entered: Nov 9 2021 12:56PM

## 2021-11-27 NOTE — PROGRESS NOTE ADULT - SUBJECTIVE AND OBJECTIVE BOX
Patient is a 61y old  Male who presents with a chief complaint of Debility s/p CABG, ESRD on HD (26 Nov 2021 13:19)    Patient seen in follow up for ESRD on Hd       PAST MEDICAL HISTORY:  Hypertension    Hyperlipidemia    Diabetes mellitus      MEDICATIONS  (STANDING):  apixaban 2.5 milliGRAM(s) Oral two times a day  AQUAPHOR (petrolatum Ointment) 1 Application(s) Topical daily  aspirin  chewable 81 milliGRAM(s) Oral daily  atorvastatin 80 milliGRAM(s) Oral at bedtime  dextrose 40% Gel 15 Gram(s) Oral once  dextrose 5%. 1000 milliLiter(s) (50 mL/Hr) IV Continuous <Continuous>  dextrose 5%. 1000 milliLiter(s) (100 mL/Hr) IV Continuous <Continuous>  dextrose 50% Injectable 25 Gram(s) IV Push once  dextrose 50% Injectable 12.5 Gram(s) IV Push once  dextrose 50% Injectable 25 Gram(s) IV Push once  epoetin vern-epbx (RETACRIT) Injectable 66989 Unit(s) SubCutaneous <User Schedule>  glucagon  Injectable 1 milliGRAM(s) IntraMuscular once  insulin glargine Injectable (LANTUS) 5 Unit(s) SubCutaneous at bedtime  insulin lispro (ADMELOG) corrective regimen sliding scale   SubCutaneous at bedtime  insulin lispro (ADMELOG) corrective regimen sliding scale   SubCutaneous before breakfast  pantoprazole    Tablet 40 milliGRAM(s) Oral before breakfast  senna 2 Tablet(s) Oral at bedtime  sevelamer carbonate 1600 milliGRAM(s) Oral three times a day with meals    MEDICATIONS  (PRN):  acetaminophen     Tablet .. 650 milliGRAM(s) Oral every 6 hours PRN Mild Pain (1 - 3), Moderate Pain (4 - 6)  hemorrhoidal Ointment 1 Application(s) Rectal three times a day PRN Hemorrhoids  melatonin 3 milliGRAM(s) Oral at bedtime PRN Insomnia  polyethylene glycol 3350 17 Gram(s) Oral two times a day PRN Constipation    T(C): 36.3 (11-27-21 @ 11:44), Max: 36.9 (11-27-21 @ 08:11)  HR: 91 (11-27-21 @ 11:44) (51 - 94)  BP: 131/86 (11-27-21 @ 11:44) (116/64 - 151/69)  RR: 14 (11-27-21 @ 11:44) (14 - 16)  SpO2: 100% (11-27-21 @ 11:44) (99% - 100%)  Wt(kg): --  I&O's Detail    27 Nov 2021 07:01  -  27 Nov 2021 13:09  --------------------------------------------------------  IN:  Total IN: 0 mL    OUT:    Other (mL): 1500 mL  Total OUT: 1500 mL    Total NET: -1500 mL          PHYSICAL EXAM:  General: No distress, dialysis catheter  Respiratory: b/l air entry  Cardiovascular: S1 S2  Gastrointestinal: soft  Extremities:  no edema                      Sodium, Serum: 134 (11-24 @ 16:15)    Creatinine, Serum: 7.15 (11-24 @ 16:15)    Potassium, Serum: 4.5 (11-24 @ 16:15)    Hemoglobin: 8.2 (11-24 @ 16:15)

## 2021-11-27 NOTE — PROGRESS NOTE ADULT - SUBJECTIVE AND OBJECTIVE BOX
Cc: Gait dysfunction    HPI: Patient with no new medical issues today.  Reports no pain, had bowel movement today  Tolerating regular diet  ROS: No chest pain, no N/V, no Fevers/Chills.   Had HD today, uneventful and due next HD session on Mon 11/29  Nephrology note reviewed  Tolerated rehabilitation program, safety issues with knee instability and executive fxn deficits noted    MEDICATIONS  (STANDING):  apixaban 2.5 milliGRAM(s) Oral two times a day  AQUAPHOR (petrolatum Ointment) 1 Application(s) Topical daily  aspirin  chewable 81 milliGRAM(s) Oral daily  atorvastatin 80 milliGRAM(s) Oral at bedtime  dextrose 40% Gel 15 Gram(s) Oral once  dextrose 5%. 1000 milliLiter(s) (50 mL/Hr) IV Continuous <Continuous>  dextrose 5%. 1000 milliLiter(s) (100 mL/Hr) IV Continuous <Continuous>  dextrose 50% Injectable 12.5 Gram(s) IV Push once  dextrose 50% Injectable 25 Gram(s) IV Push once  dextrose 50% Injectable 25 Gram(s) IV Push once  epoetin vern-epbx (RETACRIT) Injectable 16091 Unit(s) SubCutaneous <User Schedule>  glucagon  Injectable 1 milliGRAM(s) IntraMuscular once  insulin glargine Injectable (LANTUS) 5 Unit(s) SubCutaneous at bedtime  insulin lispro (ADMELOG) corrective regimen sliding scale   SubCutaneous at bedtime  insulin lispro (ADMELOG) corrective regimen sliding scale   SubCutaneous before breakfast  pantoprazole    Tablet 40 milliGRAM(s) Oral before breakfast  senna 2 Tablet(s) Oral at bedtime  sevelamer carbonate 1600 milliGRAM(s) Oral three times a day with meals    MEDICATIONS  (PRN):  acetaminophen     Tablet .. 650 milliGRAM(s) Oral every 6 hours PRN Mild Pain (1 - 3), Moderate Pain (4 - 6)  hemorrhoidal Ointment 1 Application(s) Rectal three times a day PRN Hemorrhoids  melatonin 3 milliGRAM(s) Oral at bedtime PRN Insomnia  polyethylene glycol 3350 17 Gram(s) Oral two times a day PRN Constipation      Vital Signs Last 24 Hrs  T(C): 36.3 (27 Nov 2021 11:44), Max: 36.9 (27 Nov 2021 08:11)  T(F): 97.4 (27 Nov 2021 11:44), Max: 98.4 (27 Nov 2021 08:11)  HR: 91 (27 Nov 2021 11:44) (51 - 94)  BP: 131/86 (27 Nov 2021 11:44) (116/64 - 151/69)  BP(mean): --  RR: 14 (27 Nov 2021 11:44) (14 - 16)  SpO2: 100% (27 Nov 2021 11:44) (99% - 100%)    In NAD  HEENT- symmetrical  Heart-, S1S2 sternotomy scar in situ  Lungs- CTA bl.  Abd- + BS, NT  Ext- No calf tenderness    Neuro- Alert and fully oriented    CAPILLARY BLOOD GLUCOSE    POCT Blood Glucose.: 153 mg/dL (27 Nov 2021 07:54)  POCT Blood Glucose.: 193 mg/dL (26 Nov 2021 23:57)      Imp: Patient with diagnosis of  CAD s/p CABG   admitted for comprehensive acute rehabilitation.  Plan:  - D/C home today after HD as agreed with family  --Team preferred DC home a day after HD, but during review today, patient insisted on going home post HD today    PHONE CALL TO FAMILY TO UPDATE POST DC after HD  Spoke with patient's wife Ms Anny Mattson, she stated that patient returned home post HD as planned  I discussed all discharge plans including f/u with cardiology, hearth failure team, renal team/hemodialysis, speech and PT/OT f/u, precautions for knee instability  She understood all  She stated that patient had blood stain in his stool today, denies any dizziness or fatigue.  I inquired if patient has a PCP and she confirmed that patient has one and they can arrange a visit any time  I told her to arrange a PCP review, and if further bleeding or sherie blood, she should take patient to the nearest emergency room for review. She agreed to follow through as discussed  I will send am email to the heart failure team (healthy heart) to inform them that patient has been discharged

## 2021-11-27 NOTE — PROGRESS NOTE ADULT - ASSESSMENT
ESRD on HD    HD today. D/c planning post dialysis. To continue HD as out pt. Next HD on monday as outpt.

## 2021-11-28 ENCOUNTER — TRANSCRIPTION ENCOUNTER (OUTPATIENT)
Age: 61
End: 2021-11-28

## 2021-11-29 ENCOUNTER — TRANSCRIPTION ENCOUNTER (OUTPATIENT)
Age: 61
End: 2021-11-29

## 2021-11-30 ENCOUNTER — APPOINTMENT (OUTPATIENT)
Dept: CARE COORDINATION | Facility: HOME HEALTH | Age: 61
End: 2021-11-30
Payer: MEDICAID

## 2021-11-30 DIAGNOSIS — N18.9 CHRONIC KIDNEY DISEASE, UNSPECIFIED: ICD-10-CM

## 2021-11-30 DIAGNOSIS — I65.22 OCCLUSION AND STENOSIS OF LEFT CAROTID ARTERY: ICD-10-CM

## 2021-11-30 DIAGNOSIS — E78.5 HYPERLIPIDEMIA, UNSPECIFIED: ICD-10-CM

## 2021-11-30 DIAGNOSIS — Z86.79 PERSONAL HISTORY OF OTHER DISEASES OF THE CIRCULATORY SYSTEM: ICD-10-CM

## 2021-11-30 PROCEDURE — 99024 POSTOP FOLLOW-UP VISIT: CPT

## 2021-11-30 RX ORDER — INSULIN GLARGINE 100 [IU]/ML
100 INJECTION, SOLUTION SUBCUTANEOUS
Refills: 0 | Status: ACTIVE | COMMUNITY

## 2021-11-30 RX ORDER — APIXABAN 2.5 MG/1
2.5 TABLET, FILM COATED ORAL
Qty: 60 | Refills: 0 | Status: ACTIVE | COMMUNITY
Start: 1900-01-01 | End: 1900-01-01

## 2021-11-30 RX ORDER — SEVELAMER CARBONATE 800 MG/1
800 TABLET, FILM COATED ORAL
Qty: 180 | Refills: 0 | Status: ACTIVE | COMMUNITY
Start: 1900-01-01 | End: 1900-01-01

## 2021-11-30 RX ORDER — PANTOPRAZOLE 40 MG/1
40 TABLET, DELAYED RELEASE ORAL
Qty: 30 | Refills: 0 | Status: ACTIVE | COMMUNITY
Start: 1900-01-01 | End: 1900-01-01

## 2021-11-30 RX ORDER — SENNOSIDES 8.6 MG TABLETS 8.6 MG/1
8.6 TABLET ORAL
Qty: 60 | Refills: 1 | Status: ACTIVE | COMMUNITY
Start: 1900-01-01 | End: 1900-01-01

## 2021-11-30 RX ORDER — ATORVASTATIN CALCIUM 80 MG/1
80 TABLET, FILM COATED ORAL
Refills: 0 | Status: ACTIVE | COMMUNITY

## 2021-11-30 RX ORDER — LORATADINE 10 MG
17 TABLET,DISINTEGRATING ORAL
Refills: 0 | Status: ACTIVE | COMMUNITY

## 2021-11-30 NOTE — REVIEW OF SYSTEMS
[Feeling Poorly] : not feeling poorly [Heart Rate Is Slow] : the heart rate was not slow [Heart Rate Is Fast] : the heart rate was not fast [Chest Pain] : no chest pain [Palpitations] : no palpitations [Lower Ext Edema] : no extremity edema [Shortness Of Breath] : no shortness of breath [Wheezing] : no wheezing [Cough] : no cough [SOB on Exertion] : no shortness of breath during exertion [Abdominal Pain] : no abdominal pain [Constipation] : no constipation [Diarrhea] : no diarrhea

## 2021-11-30 NOTE — HISTORY OF PRESENT ILLNESS
[FreeTextEntry1] : This pt. is enrolled in the [facility] ([facility]) program through [employer]. Home visit made to Mr. PLEITEZ, a pt. of [facility] s/p CABGX2, MVR & ANGELITA ligation on 10/21/21. Discharged to [facility] on 11/8. Discharged home with HD on 11/27. Pt. getting HD M/W/F.\par \par Pt. seen for post-hospitalization transitional care management and post-surgical follow-up. Arrived to pt. home. Pt. appears well, in no distress, moving without compromise. \par \par Pt missing several medications from D/C list - to be ordered & addressed.\par  Pt. currently reports FSBGs between +. \par \par Denies fever, SOB, CP or palpitations.

## 2021-11-30 NOTE — ASSESSMENT
[FreeTextEntry1] : Pt. is a 61 year old male with PMHx of CAD, HLD, DM and JOSHUA on CKD who is s/p CABG x2, MVR and ANGELITA ligation with [facility] on 10/21/21. Postop course was significant for JOSHUA on CKD requiring permcath placement on 11/1 and left arm AV fistula placement by vascular on 11/2/21. Pt. started on Amiodarone for postop Afib. Pt. discharged to acute rehab at [place] for conditioning on 11/8. Pt. discharged home on 11/27 with HD M/W/F.\par \par \par

## 2021-11-30 NOTE — PHYSICAL EXAM
[Sclera] : the sclera and conjunctiva were normal [Neck Appearance] : the appearance of the neck was normal [Respiration, Rhythm And Depth] : normal respiratory rhythm and effort [Exaggerated Use Of Accessory Muscles For Inspiration] : no accessory muscle use [Auscultation Breath Sounds / Voice Sounds] : lungs were clear to auscultation bilaterally [Apical Impulse] : the apical impulse was normal [Heart Sounds] : normal S1 and S2 [1+] : left 1+ [Bowel Sounds] : normal bowel sounds [Abdomen Tenderness] : non-tender [] : no hepato-splenomegaly [Abnormal Walk] : normal gait [Oriented To Time, Place, And Person] : oriented to person, place, and time [Affect] : the affect was normal [FreeTextEntry1] : Bruit auscultated over left arm AV graft

## 2021-12-06 ENCOUNTER — APPOINTMENT (OUTPATIENT)
Dept: VASCULAR SURGERY | Facility: CLINIC | Age: 61
End: 2021-12-06

## 2021-12-08 ENCOUNTER — TRANSCRIPTION ENCOUNTER (OUTPATIENT)
Age: 61
End: 2021-12-08

## 2021-12-08 ENCOUNTER — NON-APPOINTMENT (OUTPATIENT)
Age: 61
End: 2021-12-08

## 2021-12-13 ENCOUNTER — APPOINTMENT (OUTPATIENT)
Dept: CARDIOTHORACIC SURGERY | Facility: CLINIC | Age: 61
End: 2021-12-13

## 2021-12-14 ENCOUNTER — NON-APPOINTMENT (OUTPATIENT)
Age: 61
End: 2021-12-14

## 2021-12-14 ENCOUNTER — APPOINTMENT (OUTPATIENT)
Dept: ENDOVASCULAR SURGERY | Facility: CLINIC | Age: 61
End: 2021-12-14
Payer: MEDICAID

## 2021-12-14 PROCEDURE — 99214 OFFICE O/P EST MOD 30 MIN: CPT

## 2021-12-14 NOTE — REASON FOR VISIT
[Other ___] : a [unfilled] visit for [Non-Maturing Fistula] : non-maturing fistula [Follow-Up Visit] : a follow-up visit for [Fistula Creation] : fistula creation

## 2021-12-16 ENCOUNTER — APPOINTMENT (OUTPATIENT)
Dept: CARDIOTHORACIC SURGERY | Facility: CLINIC | Age: 61
End: 2021-12-16
Payer: MEDICAID

## 2021-12-16 VITALS
HEART RATE: 87 BPM | BODY MASS INDEX: 29.99 KG/M2 | WEIGHT: 180 LBS | RESPIRATION RATE: 16 BRPM | SYSTOLIC BLOOD PRESSURE: 137 MMHG | OXYGEN SATURATION: 99 % | HEIGHT: 65 IN | DIASTOLIC BLOOD PRESSURE: 77 MMHG | TEMPERATURE: 98 F

## 2021-12-16 DIAGNOSIS — Z09 ENCOUNTER FOR FOLLOW-UP EXAMINATION AFTER COMPLETED TREATMENT FOR CONDITIONS OTHER THAN MALIGNANT NEOPLASM: ICD-10-CM

## 2021-12-16 PROCEDURE — 99024 POSTOP FOLLOW-UP VISIT: CPT

## 2021-12-16 RX ORDER — MELATONIN 3 MG
3 CAPSULE ORAL
Refills: 0 | Status: ACTIVE | COMMUNITY

## 2021-12-17 NOTE — PAST MEDICAL HISTORY
[Increasing age ( >40 years old)] : Increasing age ( >40 years old) [No therapy indicated for cases scheduled for less than one hour] : No therapy indicated for cases scheduled for less than one hour. [FreeTextEntry1] : Malignant Hyperthermia Screening Tool and Risk of Bleeding Assessment\par \par Mr. TOM PLEITEZ denies family history of unexpected death following Anesthesia or Exercise.\par Denies Family history of Malignant Hyperthermia, Muscle or Neuromuscular disorder and High Temperature following exercise.\par \par Mr. TOM PLEITEZ denies history of Muscle Spasm, Dark or Chocolate - Colored urine and Unanticipated fever immediately following anesthesia or serious exercise. \par Mr. PLEITEZ also denies bleeding tendencies/ Risks of Bleeding.\par

## 2021-12-17 NOTE — HISTORY OF PRESENT ILLNESS
[address] [facility] 
 
 
 [facility] [address] [facility] 
[phone] Patient: Katherin Long MRN: [id number] :1966 Visit Information Date & Time Provider Department Dept. Phone Encounter #  
 4/10/2018  2:30 PM TSS [facility] [phone] [id number] Upcoming Health Maintenance Date Due HEMOGLOBIN A1C Q6M 1966 LIPID PANEL Q1 1966 FOOT EXAM Q1 10/13/1978 MICROALBUMIN Q1 10/13/1978 EYE EXAM RETINAL OR DILATED Q1 10/13/1978 Pneumococcal 19-64 Medium Risk (1 of 1 - PPSV23) 10/13/1987 DTaP/Tdap/Td series (1 - Tdap) 10/13/1989 Influenza Age 5 to Adult 2017 Allergies as of 4/10/2018  Review Complete On: 2018 By: Apple Cedeño No Known Allergies Current Immunizations  Never Reviewed No immunizations on file. Not reviewed this visit Vitals Height(growth percentile) Weight(growth percentile) BMI Smoking Status 5' 9\" (1.753 m) 324 lb (147 kg) 47.85 kg/m2 Never Smoker BMI and BSA Data Body Mass Index Body Surface Area  
 47.85 kg/m 2 2.68 m 2 Preferred Pharmacy Pharmacy Name Phone [address] [facility], [address] [phone] Your Updated Medication List  
  
   
This list is accurate as of 4/10/18  2:55 PM.  Always use your most recent med list.  
  
  
  
  
 diclofenac potassium 25 mg capsule Commonly known as:  [employer] Take 25 mg by mouth. DIOVAN 80 mg tablet Generic drug:  valsartan Take  by mouth daily. DULoxetine 60 mg capsule Commonly known as:  CYMBALTA Take 1 Cap by mouth daily. Take with 30 mg dose for a total of 90 mg  
  
 * gabapentin 600 mg tablet Commonly known as:  NEURONTIN Take 1,200 mg by mouth two (2) times a day. Indications: NEUROPATHIC PAIN  
  
 * gabapentin 600 mg tablet Commonly known as:  NEURONTIN  
 Take 600 mg by mouth daily (with lunch). GLUCOPHAGE 500 mg tablet Generic drug:  metFORMIN Take  by mouth two (2) times daily (with meals). IMURAN 50 mg tablet Generic drug:  azaTHIOprine Take 50 mg by mouth daily. LIPITOR 20 mg tablet Generic drug:  atorvastatin Take  by mouth daily. Omeprazole delayed release 20 mg tablet Commonly known as:  PRILOSEC D/R Take 20 mg by mouth daily. OXcarbaxepine 600 mg tablet Commonly known as:  TRILEPTAL Take 1,200 mg by mouth two (2) times a day. risperiDONE 4 mg tablet Commonly known as:  RisperDAL Take 4 mg by mouth nightly. Indications: MIXED BIPOLAR I DISORDER  
  
 SEROquel 300 mg tablet Generic drug:  QUEtiapine Take 300 mg by mouth nightly. Indications: BIPOLAR DISORDER IN REMISSION  
  
 testosterone cypionate 200 mg/mL injection Commonly known as:  DEPOTESTOTERONE CYPIONATE  
by IntraMUSCular route once. traZODone 50 mg tablet Commonly known as:  Jake Barge Take 1 Tab by mouth nightly as needed for Sleep. WELLBUTRIN  mg XL tablet Generic drug:  buPROPion XL Take 300 mg by mouth every morning. * Notice: This list has 2 medication(s) that are the same as other medications prescribed for you. Read the directions carefully, and ask your doctor or other care provider to review them with you. To-Do List   
 06/15/2018 3:30 PM  
  Appointment with Aldo Ybarra MD at [address] [place] [address] ([phone]) Patient Instructions Goals: 1. Decrease eating speed. You can do this by putting your utensil down between bites, using smaller utensils like baby or cocktail spoons, and chewing each bite thoroughly (25-30 chews/bite). Try to take at least 20 minutes to eat a meal, preferably work up to 25-30 minutes. 2. Focusing on protein should also help with fullness. 3. Try chocolate protein shake as your creamer. [] : left basilic fistula 4. Start chair exercises 2-3 days per week. Do these in the afternoon before dinner- right after work. 5. Follow a very low carbohydrate diet with an emphasis on protein and decreased carbohydrate. Introducing [address]! [address] introduces [employer] patient portal. Now you can access parts of your medical record, email your doctor's office, and request medication refills online. 1. In your internet browser, go to [email or web address] 2. Click on the First Time User? Click Here link in the Sign In box. You will see the New Member Sign Up page. 3. Enter your [employer] Access Code exactly as it appears below. You will not need to use this code after youve completed the sign-up process. If you do not sign up before the expiration date, you must request a new code. · [employer] Access Code: [email or web address] Expires: 4/11/2018  4:05 PM 
 
4. Enter the last four digits of your Social Security Number (xxxx) and Date of Birth (mm/dd/yyyy) as indicated and click Submit. You will be taken to the next sign-up page. 5. Create a [employer] ID. This will be your [employer] login ID and cannot be changed, so think of one that is secure and easy to remember. 6. Create a [employer] password. You can change your password at any time. 7. Enter your Password Reset Question and Answer. This can be used at a later time if you forget your password. 8. Enter your e-mail address. You will receive e-mail notification when new information is available in [address] 9. Click Sign Up. You can now view and download portions of your medical record. 10. Click the Download Summary menu link to download a portable copy of your medical information. If you have questions, please visit the Frequently Asked Questions section of the [employer] website. Remember, [employer] is NOT to be used for urgent needs. For medical emergencies, dial 911. Now available from your iPhone and Android! [FreeTextEntry1] : 1st stage 11/2/2021 Dr. Pinedo Please provide this summary of care documentation to your next provider. Your primary care clinician is listed as Ady France. If you have any questions after today's visit, please call [phone]. [FreeTextEntry2] : 11/2/2021 [FreeTextEntry4] : yesterday [FreeTextEntry6] : Dr. Walker

## 2021-12-17 NOTE — ASSESSMENT
[FreeTextEntry1] : referred from dialysis for non maturing fistula-plan for fistulogram and possible intervention

## 2021-12-17 NOTE — HISTORY OF PRESENT ILLNESS
[] : left basilic fistula [FreeTextEntry1] : 1st stage 11/2/2021 Dr. Pinedo [FreeTextEntry2] : 11/2/2021 [FreeTextEntry4] : yesterday [FreeTextEntry6] : Dr. Walker

## 2022-01-21 ENCOUNTER — OUTPATIENT (OUTPATIENT)
Dept: OUTPATIENT SERVICES | Facility: HOSPITAL | Age: 62
LOS: 1 days | End: 2022-01-21
Payer: MEDICARE

## 2022-01-21 VITALS
RESPIRATION RATE: 16 BRPM | WEIGHT: 173.94 LBS | TEMPERATURE: 98 F | DIASTOLIC BLOOD PRESSURE: 67 MMHG | SYSTOLIC BLOOD PRESSURE: 118 MMHG | HEART RATE: 72 BPM | HEIGHT: 62 IN | OXYGEN SATURATION: 99 %

## 2022-01-21 DIAGNOSIS — I10 ESSENTIAL (PRIMARY) HYPERTENSION: ICD-10-CM

## 2022-01-21 DIAGNOSIS — Z95.1 PRESENCE OF AORTOCORONARY BYPASS GRAFT: Chronic | ICD-10-CM

## 2022-01-21 DIAGNOSIS — Z29.9 ENCOUNTER FOR PROPHYLACTIC MEASURES, UNSPECIFIED: ICD-10-CM

## 2022-01-21 DIAGNOSIS — G47.33 OBSTRUCTIVE SLEEP APNEA (ADULT) (PEDIATRIC): ICD-10-CM

## 2022-01-21 DIAGNOSIS — N18.9 CHRONIC KIDNEY DISEASE, UNSPECIFIED: ICD-10-CM

## 2022-01-21 DIAGNOSIS — E78.5 HYPERLIPIDEMIA, UNSPECIFIED: ICD-10-CM

## 2022-01-21 DIAGNOSIS — Z01.818 ENCOUNTER FOR OTHER PREPROCEDURAL EXAMINATION: ICD-10-CM

## 2022-01-21 DIAGNOSIS — I25.10 ATHEROSCLEROTIC HEART DISEASE OF NATIVE CORONARY ARTERY WITHOUT ANGINA PECTORIS: ICD-10-CM

## 2022-01-21 DIAGNOSIS — E11.9 TYPE 2 DIABETES MELLITUS WITHOUT COMPLICATIONS: ICD-10-CM

## 2022-01-21 DIAGNOSIS — Z78.9 OTHER SPECIFIED HEALTH STATUS: Chronic | ICD-10-CM

## 2022-01-21 PROCEDURE — G0463: CPT

## 2022-01-21 PROCEDURE — 87641 MR-STAPH DNA AMP PROBE: CPT

## 2022-01-21 PROCEDURE — 87640 STAPH A DNA AMP PROBE: CPT

## 2022-01-21 NOTE — H&P PST ADULT - PROBLEM SELECTOR PLAN 7
S/P CABG, no stent  Patient has been taking eliquis since after surgery, as per family  He is advised to follow his doctor's instruction regarding eliquis

## 2022-01-21 NOTE — H&P PST ADULT - ASSESSMENT
60 yo male is scheduled for : Left Radiocephalic Arteriovenous Fistula  Ligation of Basilic Arteriovenous Fistula, on 2/3/22

## 2022-01-21 NOTE — H&P PST ADULT - NSICDXPASTMEDICALHX_GEN_ALL_CORE_FT
PAST MEDICAL HISTORY:  CAD (coronary artery disease)     Diabetes mellitus     End stage renal disease     Hyperlipidemia     Hypertension     Stroke

## 2022-01-21 NOTE — H&P PST ADULT - HISTORY OF PRESENT ILLNESS
60 yo male with history of ESRD (HD M/W/F), DM, HLD, HTN, CAD, CVA, reports the above.   He is scheduled for : Left Radiocephalic Arteriovenous Fistula  Ligation of Basilic Arteriovenous Fistula, on 2/3/22 62 yo male with history of ESRD (HD M/W/F), DM, HLD, HTN, CABG,, reports the above.   He is scheduled for : Left Radiocephalic Arteriovenous Fistula  Ligation of Basilic Arteriovenous Fistula, on 2/3/22

## 2022-01-21 NOTE — H&P PST ADULT - PROBLEM SELECTOR PLAN 3
Instruction regarding chlorhexidine soap use is given to patient.   Patient verbalized understanding.  Literature also given      Patient and wife are also told if swab result is positive, he will be called to  mupirocin ointment from the pharmacy

## 2022-01-21 NOTE — H&P PST ADULT - NSICDXPASTSURGICALHX_GEN_ALL_CORE_FT
PAST SURGICAL HISTORY:  S/P CABG (coronary artery bypass graft)      PAST SURGICAL HISTORY:  S/P CABG (coronary artery bypass graft) , NO STENT

## 2022-01-22 LAB
MRSA PCR RESULT.: SIGNIFICANT CHANGE UP
S AUREUS DNA NOSE QL NAA+PROBE: SIGNIFICANT CHANGE UP

## 2022-02-03 ENCOUNTER — APPOINTMENT (OUTPATIENT)
Dept: VASCULAR SURGERY | Facility: HOSPITAL | Age: 62
End: 2022-02-03

## 2022-02-18 ENCOUNTER — APPOINTMENT (OUTPATIENT)
Dept: VASCULAR SURGERY | Facility: CLINIC | Age: 62
End: 2022-02-18

## 2022-03-11 ENCOUNTER — APPOINTMENT (OUTPATIENT)
Dept: VASCULAR SURGERY | Facility: CLINIC | Age: 62
End: 2022-03-11
Payer: MEDICARE

## 2022-03-11 VITALS
BODY MASS INDEX: 29.99 KG/M2 | WEIGHT: 180 LBS | HEART RATE: 74 BPM | DIASTOLIC BLOOD PRESSURE: 65 MMHG | SYSTOLIC BLOOD PRESSURE: 110 MMHG | HEIGHT: 65 IN

## 2022-03-11 PROBLEM — N18.6 END STAGE RENAL DISEASE: Chronic | Status: ACTIVE | Noted: 2022-01-21

## 2022-03-11 PROBLEM — I25.10 ATHEROSCLEROTIC HEART DISEASE OF NATIVE CORONARY ARTERY WITHOUT ANGINA PECTORIS: Chronic | Status: ACTIVE | Noted: 2022-01-21

## 2022-03-11 PROCEDURE — 99214 OFFICE O/P EST MOD 30 MIN: CPT

## 2022-03-11 PROCEDURE — 93986 DUP-SCAN HEMO COMPL UNI STD: CPT

## 2022-03-11 NOTE — HISTORY OF PRESENT ILLNESS
[FreeTextEntry1] : Concerned about catheter infection.  No complaint of pain.  No fevers.  No drainage.\par \par Patient is awaiting clearance for creation of left radiocephalic fistula.

## 2022-03-11 NOTE — ASSESSMENT
[FreeTextEntry1] : No evidence of infection of the right IJ tunneled catheter.  Continue use of catheter.  I spoke to the primary team.  We will schedule the patient for left radiocephalic fistula.  We will do this as soon as possible so we can get the catheter out.

## 2022-03-21 ENCOUNTER — OUTPATIENT (OUTPATIENT)
Dept: OUTPATIENT SERVICES | Facility: HOSPITAL | Age: 62
LOS: 1 days | End: 2022-03-21
Payer: MEDICARE

## 2022-03-21 VITALS
HEART RATE: 56 BPM | OXYGEN SATURATION: 98 % | DIASTOLIC BLOOD PRESSURE: 83 MMHG | HEIGHT: 65 IN | WEIGHT: 185.19 LBS | RESPIRATION RATE: 18 BRPM | SYSTOLIC BLOOD PRESSURE: 122 MMHG | TEMPERATURE: 99 F

## 2022-03-21 DIAGNOSIS — E11.9 TYPE 2 DIABETES MELLITUS WITHOUT COMPLICATIONS: ICD-10-CM

## 2022-03-21 DIAGNOSIS — N18.6 END STAGE RENAL DISEASE: ICD-10-CM

## 2022-03-21 DIAGNOSIS — Z91.89 OTHER SPECIFIED PERSONAL RISK FACTORS, NOT ELSEWHERE CLASSIFIED: ICD-10-CM

## 2022-03-21 DIAGNOSIS — I10 ESSENTIAL (PRIMARY) HYPERTENSION: ICD-10-CM

## 2022-03-21 DIAGNOSIS — E78.5 HYPERLIPIDEMIA, UNSPECIFIED: ICD-10-CM

## 2022-03-21 DIAGNOSIS — Z95.1 PRESENCE OF AORTOCORONARY BYPASS GRAFT: Chronic | ICD-10-CM

## 2022-03-21 DIAGNOSIS — Z01.818 ENCOUNTER FOR OTHER PREPROCEDURAL EXAMINATION: ICD-10-CM

## 2022-03-21 DIAGNOSIS — I25.10 ATHEROSCLEROTIC HEART DISEASE OF NATIVE CORONARY ARTERY WITHOUT ANGINA PECTORIS: ICD-10-CM

## 2022-03-21 LAB — SARS-COV-2 RNA SPEC QL NAA+PROBE: SIGNIFICANT CHANGE UP

## 2022-03-21 PROCEDURE — 87640 STAPH A DNA AMP PROBE: CPT

## 2022-03-21 PROCEDURE — 87641 MR-STAPH DNA AMP PROBE: CPT

## 2022-03-21 PROCEDURE — G0463: CPT

## 2022-03-21 PROCEDURE — 87635 SARS-COV-2 COVID-19 AMP PRB: CPT

## 2022-03-21 RX ORDER — INSULIN GLARGINE 100 [IU]/ML
30 INJECTION, SOLUTION SUBCUTANEOUS
Qty: 0 | Refills: 0 | DISCHARGE

## 2022-03-21 NOTE — H&P PST ADULT - OPHTHALMOLOGIC
Rx Refill Note  Requested Prescriptions     Pending Prescriptions Disp Refills   • zolpidem (AMBIEN) 10 MG tablet [Pharmacy Med Name: ZOLPIDEM TARTRATE 10 MG TABLET] 15 tablet 0     Sig: TAKE ONE TABLET BY MOUTH ONCE NIGHTLY AS NEEDED FOR SLEEP      Last office visit with prescribing clinician: 1/21/2021      Next office visit with prescribing clinician: 1/26/2022            Fransisca Garcia MA  01/12/22, 09:06 EST   details…

## 2022-03-21 NOTE — H&P PST ADULT - NSICDXPASTSURGICALHX_GEN_ALL_CORE_FT
PAST SURGICAL HISTORY:  S/P CABG (coronary artery bypass graft) , NO STENT     PAST SURGICAL HISTORY:  S/P CABG (coronary artery bypass graft) Mitral valve replacement on 10/21/2021     PAST SURGICAL HISTORY:  AV (arteriovenous fistula) h/o creation (failed)    S/P CABG (coronary artery bypass graft) Mitral valve replacement on 10/21/2021

## 2022-03-21 NOTE — H&P PST ADULT - PROBLEM SELECTOR PLAN 2
Pt with h/o CAD, CABG X2, Mitral valve replacement on 10/21/2021. Optimized for surgery by cardiology. As per pt and wife, pt was not told by PCP when to stop Eliquis and Aspirin. Left message for cardiologist regarding Eliquis/aspirin. Instructed to continue aspirin and to take it the morning of surgery. Discussed with Dr Myers from anesthesia. Pt with h/o CAD, CABG X2, Mitral valve replacement on 10/21/2021. Optimized for surgery by cardiology. As per pt and wife, pt was not told by PCP when to stop Eliquis and Aspirin. Left message for cardiologist regarding Eliquis/aspirin. Instructed to continue aspirin and to take it the morning of surgery. Discussed with Dr Myers from anesthesia.  addendum: pt's wife notified to stop Eliquis 1 day before surgery.

## 2022-03-21 NOTE — H&P PST ADULT - NSICDXPASTMEDICALHX_GEN_ALL_CORE_FT
PAST MEDICAL HISTORY:  CAD (coronary artery disease)     Diabetes mellitus     End stage renal disease     Hyperlipidemia     Hypertension     Stroke right hemiplegia

## 2022-03-21 NOTE — H&P PST ADULT - HISTORY OF PRESENT ILLNESS
This is [age] yr old male with PMH of HTN, Diabetes, Diabetic neuropathy, Hypothyroidism, HLD, CAD, CABG on 11/CKD presents to PST for evaluation of left radiocephalic arteriovenous fistula creation . Pt is being dialyzed presently via right chest Perm Catheter 3 times a week on Mondays, Wednesdays and Fridays  This is a 61 yr old British male with PMH of HTN, Diabetes, HLD, CAD, CABG x2, Mitral valve replacement on 10/21/2021, CVA with right hemiplegia, CKD who presents to PST with c/o decreased renal function. Pt is being dialyzed presently via right chest Perm Catheter 3 times a week on Mondays, Wednesdays and Fridays since 10/13/2021. Pt is scheduled for left radiocephalic arteriovenous fistula creation on 3/24/2022. This is a 61 yr old [place]ym[place] male with PMH of HTN, Diabetes, HLD, CAD, CABG x2, Mitral valve replacement on 10/21/2021, CVA with right hemiplegia, CKD who presents to PST with c/o decreased renal function. Underwent left AV fistula creation that failed. Pt is being dialyzed presently via right chest Perm Catheter 3 times a week on Mondays, Wednesdays and Fridays since 10/13/2021. Pt is scheduled for left radiocephalic arteriovenous fistula creation on 3/24/2022.

## 2022-03-21 NOTE — H&P PST ADULT - PROBLEM SELECTOR PLAN 1
Pt is scheduled for left radiocephalic arteriovenous fistula creation on 3/24/2022. As per pt and wife, pt is optimized by PCP and cardiology for surgery. COVID-19 test done in PST during visit.  Preoperative instructions discussed with pt and wife, and given to pt.   Instructed pt to notify security when he arrives in the lobby of the hospital that he is here for surgery, that he will need someone to come to the hospital to pick him up after surgery, not to eat or drink anything after midnight the night before the surgery, to avoid NSAIDs such as Ibuprofen, Motrin, Aleve, Advil, naproxen before surgery, to take Tylenol if needed for pain, to report if he has been exposed to anyone with any contagious diseases including Covid-19 or if he is exhibiting any symptoms of COVID-19.   Instructed about use of Chlorhexidine 4% soap for 3 days before surgery including the morning of the surgery to prevent infection. Verbalized understanding of instructions given.

## 2022-03-21 NOTE — H&P PST ADULT - PROBLEM SELECTOR PLAN 3
Instructed to continue antihypertensive meds and take with sips of water on day of surgery.  Follow-up with PCP postop for management.

## 2022-03-21 NOTE — H&P PST ADULT - PROBLEM SELECTOR PLAN 4
Pt on Metformin and Insulin gargline. Instructed to continue antidiabetic meds and to hold Metformin on day of surgery, take insulin the night before surgery. Perioperative glucose monitoring and coverage as needed. Follow-up with PCP for diabetic management.

## 2022-03-21 NOTE — H&P PST ADULT - ASSESSMENT
This is a 61 yr old Iranian male with PMH of HTN, Diabetes, HLD, CAD, CABG x2, Mitral valve replacement on 10/21/2021, CVA with right hemiplegia, CKD who presents to [facility] with ESRD. Pt is being dialyzed presently via right chest Perm Catheter 3 times a week on Mondays, Wednesdays and Fridays since 10/13/2021. Pt is scheduled for left radiocephalic arteriovenous fistula creation on 3/24/2022.  ABDULLAHI stop bang score 4. Pt denies history of ABDULLAHI, never did sleep study, is at intermediate risk for sleep apnea and at risk for pulmonary complications.

## 2022-03-22 LAB
MRSA PCR RESULT.: SIGNIFICANT CHANGE UP
S AUREUS DNA NOSE QL NAA+PROBE: SIGNIFICANT CHANGE UP

## 2022-03-23 ENCOUNTER — TRANSCRIPTION ENCOUNTER (OUTPATIENT)
Age: 62
End: 2022-03-23

## 2022-03-23 DIAGNOSIS — I77.0 ARTERIOVENOUS FISTULA, ACQUIRED: Chronic | ICD-10-CM

## 2022-03-24 ENCOUNTER — OUTPATIENT (OUTPATIENT)
Dept: OUTPATIENT SERVICES | Facility: HOSPITAL | Age: 62
LOS: 1 days | End: 2022-03-24
Payer: MEDICARE

## 2022-03-24 ENCOUNTER — TRANSCRIPTION ENCOUNTER (OUTPATIENT)
Age: 62
End: 2022-03-24

## 2022-03-24 ENCOUNTER — APPOINTMENT (OUTPATIENT)
Dept: VASCULAR SURGERY | Facility: HOSPITAL | Age: 62
End: 2022-03-24
Payer: MEDICARE

## 2022-03-24 VITALS
OXYGEN SATURATION: 96 % | SYSTOLIC BLOOD PRESSURE: 145 MMHG | TEMPERATURE: 98 F | HEART RATE: 49 BPM | RESPIRATION RATE: 16 BRPM | DIASTOLIC BLOOD PRESSURE: 67 MMHG

## 2022-03-24 VITALS
WEIGHT: 184.97 LBS | DIASTOLIC BLOOD PRESSURE: 54 MMHG | SYSTOLIC BLOOD PRESSURE: 135 MMHG | HEIGHT: 65 IN | HEART RATE: 46 BPM | OXYGEN SATURATION: 96 % | RESPIRATION RATE: 18 BRPM | TEMPERATURE: 98 F

## 2022-03-24 DIAGNOSIS — Z01.818 ENCOUNTER FOR OTHER PREPROCEDURAL EXAMINATION: ICD-10-CM

## 2022-03-24 DIAGNOSIS — N18.9 CHRONIC KIDNEY DISEASE, UNSPECIFIED: ICD-10-CM

## 2022-03-24 DIAGNOSIS — Z95.1 PRESENCE OF AORTOCORONARY BYPASS GRAFT: Chronic | ICD-10-CM

## 2022-03-24 DIAGNOSIS — I77.0 ARTERIOVENOUS FISTULA, ACQUIRED: Chronic | ICD-10-CM

## 2022-03-24 DIAGNOSIS — N18.6 END STAGE RENAL DISEASE: ICD-10-CM

## 2022-03-24 LAB
ANION GAP SERPL CALC-SCNC: 8 MMOL/L — SIGNIFICANT CHANGE UP (ref 5–17)
BUN SERPL-MCNC: 45 MG/DL — HIGH (ref 7–18)
CALCIUM SERPL-MCNC: 8.9 MG/DL — SIGNIFICANT CHANGE UP (ref 8.4–10.5)
CHLORIDE SERPL-SCNC: 102 MMOL/L — SIGNIFICANT CHANGE UP (ref 96–108)
CO2 SERPL-SCNC: 30 MMOL/L — SIGNIFICANT CHANGE UP (ref 22–31)
CREAT SERPL-MCNC: 6.81 MG/DL — HIGH (ref 0.5–1.3)
EGFR: 9 ML/MIN/1.73M2 — LOW
GLUCOSE BLDC GLUCOMTR-MCNC: 100 MG/DL — HIGH (ref 70–99)
GLUCOSE BLDC GLUCOMTR-MCNC: 127 MG/DL — HIGH (ref 70–99)
GLUCOSE SERPL-MCNC: 128 MG/DL — HIGH (ref 70–99)
POTASSIUM SERPL-MCNC: 4.6 MMOL/L — SIGNIFICANT CHANGE UP (ref 3.5–5.3)
POTASSIUM SERPL-SCNC: 4.6 MMOL/L — SIGNIFICANT CHANGE UP (ref 3.5–5.3)
SODIUM SERPL-SCNC: 140 MMOL/L — SIGNIFICANT CHANGE UP (ref 135–145)

## 2022-03-24 PROCEDURE — 36415 COLL VENOUS BLD VENIPUNCTURE: CPT

## 2022-03-24 PROCEDURE — 80048 BASIC METABOLIC PNL TOTAL CA: CPT

## 2022-03-24 PROCEDURE — 36820 AV FUSION/FOREARM VEIN: CPT | Mod: LT

## 2022-03-24 PROCEDURE — 35321 RECHANNELING OF ARTERY: CPT | Mod: LT

## 2022-03-24 PROCEDURE — C1889: CPT

## 2022-03-24 PROCEDURE — 82962 GLUCOSE BLOOD TEST: CPT

## 2022-03-24 PROCEDURE — 94640 AIRWAY INHALATION TREATMENT: CPT

## 2022-03-24 PROCEDURE — 36821 AV FUSION DIRECT ANY SITE: CPT

## 2022-03-24 DEVICE — CLIP APPLIER COVIDIEN SURGICLIP III 9" SM: Type: IMPLANTABLE DEVICE | Site: LEFT | Status: FUNCTIONAL

## 2022-03-24 DEVICE — CLIP APPLIER COVIDIEN SURGICLIP II 9.75" MEDIUM: Type: IMPLANTABLE DEVICE | Site: LEFT | Status: FUNCTIONAL

## 2022-03-24 DEVICE — SPONGE HSTAT GELFOAM 12X7MM: Type: IMPLANTABLE DEVICE | Site: LEFT | Status: FUNCTIONAL

## 2022-03-24 RX ORDER — OXYCODONE HYDROCHLORIDE 5 MG/1
1 TABLET ORAL
Qty: 10 | Refills: 0
Start: 2022-03-24

## 2022-03-24 RX ORDER — ALBUTEROL 90 UG/1
2.5 AEROSOL, METERED ORAL ONCE
Refills: 0 | Status: COMPLETED | OUTPATIENT
Start: 2022-03-24 | End: 2022-03-24

## 2022-03-24 RX ORDER — OXYCODONE HYDROCHLORIDE 5 MG/1
5 TABLET ORAL ONCE
Refills: 0 | Status: DISCONTINUED | OUTPATIENT
Start: 2022-03-24 | End: 2022-03-24

## 2022-03-24 RX ORDER — CHLORHEXIDINE GLUCONATE 213 G/1000ML
1 SOLUTION TOPICAL ONCE
Refills: 0 | Status: COMPLETED | OUTPATIENT
Start: 2022-03-24 | End: 2022-03-24

## 2022-03-24 RX ADMIN — ALBUTEROL 2.5 MILLIGRAM(S): 90 AEROSOL, METERED ORAL at 14:32

## 2022-03-24 RX ADMIN — CHLORHEXIDINE GLUCONATE 1 APPLICATION(S): 213 SOLUTION TOPICAL at 12:28

## 2022-03-24 NOTE — ASU DISCHARGE PLAN (ADULT/PEDIATRIC) - NS MD DC FALL RISK RISK
For information on Fall & Injury Prevention, visit: [email or web address] OR  [email or web address] OR  https://www.cdc.gov/steadi/patient.html

## 2022-03-24 NOTE — ASU PATIENT PROFILE, ADULT - FALL HARM RISK - RISK INTERVENTIONS
Assistance OOB with selected safe patient handling equipment/Assistance with ambulation/Communicate Fall Risk and Risk Factors to all staff, patient, and family/Discuss with provider need for PT consult/Monitor gait and stability/Provide patient with walking aids - walker, cane, crutches/Reinforce activity limits and safety measures with patient and family/Use of alarms - bed, chair and/or voice tab/Visual Cue: Yellow wristband/Bed in lowest position, wheels locked, appropriate side rails in place/Call bell, personal items and telephone in reach/Instruct patient to call for assistance before getting out of bed or chair/Non-slip footwear when patient is out of bed/[place] to call system/Physically safe environment - no spills, clutter or unnecessary equipment/Purposeful Proactive Rounding/Room/bathroom lighting operational, light cord in reach

## 2022-03-24 NOTE — ASU DISCHARGE PLAN (ADULT/PEDIATRIC) - ASU DC SPECIAL INSTRUCTIONSFT
You had a left upper extremity AV fistula creation performed today. There is a dressing over your surgical site, dressing is to remain in place for 48 hours. After 48 hours you may remove the dressing, and may shower at this point. Allow warm soapy water to run over the wound and pat dry. Do not rub or scratch the wound.     You may resume your normal diet.    Take over the counter Tylenol every six hours for mild to moderate pain.   Oxycodone 5mg every 4-6 hours as needed for severe and breakthrough pain.    Follow-up in clinic in two weeks.

## 2022-03-24 NOTE — ASU PATIENT PROFILE, ADULT - NSICDXPASTSURGICALHX_GEN_ALL_CORE_FT
PAST SURGICAL HISTORY:  AV (arteriovenous fistula) h/o creation (failed)    S/P CABG (coronary artery bypass graft) Mitral valve replacement on 10/21/2021

## 2022-03-24 NOTE — ASU DISCHARGE PLAN (ADULT/PEDIATRIC) - CARE PROVIDER_API CALL
Thai Olmedo)  Vascular Surgery  [address] [facility], [address]  [place] [address]  Phone: [phone]  Fax: [phone]  Established Patient  Follow Up Time: 2 weeks

## 2022-03-24 NOTE — ASU PREOP CHECKLIST - SELECT TESTS ORDERED
BMP/CBC/EKG/CXR/Results in MD note/POCT Blood Glucose/COVID-19 PT, PTT and INR lab results not noted in chart. As per Dr Olmedo, pt may proceed with surgery without lab work for PT, PTT and INR. No signs of bleeding noted./BMP/CBC/EKG/CXR/Results in MD note/POCT Blood Glucose/COVID-19

## 2022-03-25 NOTE — H&P PST ADULT - SCARS
chest/location Oral Minoxidil Counseling- I discussed with the patient the risks of oral minoxidil including but not limited to shortness of breath, swelling of the feet or ankles, dizziness, lightheadedness, unwanted hair growth and allergic reaction.  The patient verbalized understanding of the proper use and possible adverse effects of oral minoxidil.  All of the patient's questions and concerns were addressed.

## 2022-04-08 ENCOUNTER — APPOINTMENT (OUTPATIENT)
Dept: VASCULAR SURGERY | Facility: CLINIC | Age: 62
End: 2022-04-08
Payer: MEDICARE

## 2022-04-08 ENCOUNTER — APPOINTMENT (OUTPATIENT)
Dept: VASCULAR SURGERY | Facility: CLINIC | Age: 62
End: 2022-04-08
Payer: MEDICAID

## 2022-04-08 VITALS
DIASTOLIC BLOOD PRESSURE: 70 MMHG | WEIGHT: 180 LBS | HEART RATE: 51 BPM | OXYGEN SATURATION: 98 % | BODY MASS INDEX: 29.99 KG/M2 | HEIGHT: 65 IN | SYSTOLIC BLOOD PRESSURE: 147 MMHG

## 2022-04-08 PROBLEM — I63.9 CEREBRAL INFARCTION, UNSPECIFIED: Chronic | Status: ACTIVE | Noted: 2022-01-21

## 2022-04-08 PROCEDURE — 99024 POSTOP FOLLOW-UP VISIT: CPT

## 2022-04-08 PROCEDURE — 93990 DOPPLER FLOW TESTING: CPT

## 2022-04-08 NOTE — REASON FOR VISIT
[de-identified] : left upper extremity radial cephalic avf  [de-identified] : 3/24/22 [de-identified] : pt without any complaints \par currently on hd via permcath

## 2022-04-08 NOTE — REASON FOR VISIT
[de-identified] : left upper extremity radial cephalic avf  [de-identified] : 3/24/22 [de-identified] : pt without any complaints \par currently on hd via permcath

## 2022-04-08 NOTE — PHYSICAL EXAM
[Alert] : alert [Calm] : calm [JVD] : no jugular venous distention  [de-identified] : appears well  [de-identified] : incision healed nicely, sutures in place no erythema or discharge, no palpable thrill

## 2022-04-08 NOTE — PHYSICAL EXAM
[Alert] : alert [Calm] : calm [JVD] : no jugular venous distention  [de-identified] : appears well  [de-identified] : incision healed nicely, sutures in place no erythema or discharge, no palpable thrill

## 2022-04-11 DIAGNOSIS — Z98.890 OTHER SPECIFIED POSTPROCEDURAL STATES: ICD-10-CM

## 2022-04-11 NOTE — PROCEDURE
[FreeTextEntry1] : left fistulogram, angioplasty [Resume diet] : resume diet [Site check for bleeding/hematoma/thrill/bruit] : Site check for bleeding/hematoma/thrill/bruit [Vital signs on admission the q 15 mins x2] : Vital signs on admission the q 15 mins x2

## 2022-04-11 NOTE — REASON FOR VISIT
[Other ___] : a [unfilled] visit for [Non-Maturing Fistula] : non-maturing fistula [FreeTextEntry2] : immature fistula

## 2022-04-11 NOTE — PAST MEDICAL HISTORY
[Increasing age ( >40 years old)] : Increasing age ( >40 years old) [Altered mobility] : Altered mobility [No therapy indicated for cases scheduled for less than one hour] : No therapy indicated for cases scheduled for less than one hour. [FreeTextEntry1] : \par \par Malignant Hyperthermis (MH) Screening Tool and Risk of Bleeding Assessement\par Mr. TOM PLEITEZ  denies family history of unexpected death following Anesthesia or Exercise.\par Denies Family history of Malignant Hyperthermia, Muscle or Neuromuscular disorder and High Temperature following exercise.\par \par Mr. TOM PLEITEZ denies history of Muscle Spasm, Dark or Chocolate - Colored urine and Unanticipated fever immediately following anesthesia or serious exercise. \par Mr. PLEITEZ  also denies bleeding tendencies/ Risks of Bleeding\par

## 2022-04-11 NOTE — HISTORY OF PRESENT ILLNESS
[] : left radiocephalic fistula [FreeTextEntry6] : Dr Steward [FreeTextEntry1] : covid test - last 3-21\par covid vaccination\par accompanied by\par alert and oriented\par no reported falls\par meds

## 2022-04-12 ENCOUNTER — APPOINTMENT (OUTPATIENT)
Dept: ENDOVASCULAR SURGERY | Facility: CLINIC | Age: 62
End: 2022-04-12

## 2022-04-26 ENCOUNTER — APPOINTMENT (OUTPATIENT)
Dept: ENDOVASCULAR SURGERY | Facility: CLINIC | Age: 62
End: 2022-04-26

## 2022-05-06 ENCOUNTER — RESULT REVIEW (OUTPATIENT)
Age: 62
End: 2022-05-06

## 2022-05-06 ENCOUNTER — APPOINTMENT (OUTPATIENT)
Dept: ENDOVASCULAR SURGERY | Facility: CLINIC | Age: 62
End: 2022-05-06
Payer: MEDICARE

## 2022-05-06 VITALS
SYSTOLIC BLOOD PRESSURE: 132 MMHG | DIASTOLIC BLOOD PRESSURE: 65 MMHG | RESPIRATION RATE: 16 BRPM | BODY MASS INDEX: 29.99 KG/M2 | TEMPERATURE: 97.9 F | HEART RATE: 95 BPM | HEIGHT: 65 IN | WEIGHT: 180 LBS | OXYGEN SATURATION: 96 %

## 2022-05-06 PROCEDURE — 36909Z: CUSTOM | Mod: 58

## 2022-05-06 PROCEDURE — 36902Z: CUSTOM | Mod: 59,58

## 2022-05-06 NOTE — PROCEDURE
[FSBS] : FSBS [Resume diet] : resume diet [Site check for bleeding/hematoma/thrill/bruit] : Site check for bleeding/hematoma/thrill/bruit [Vital signs on admission the q 15 mins x2] : Vital signs on admission the q 15 mins x2 [FreeTextEntry1] : left arm fistula fistulogram/angioplasty

## 2022-05-06 NOTE — PAST MEDICAL HISTORY
[Increasing age ( >40 years old)] : Increasing age ( >40 years old) [Altered mobility] : Altered mobility [No therapy indicated for cases scheduled for less than one hour] : No therapy indicated for cases scheduled for less than one hour. [FreeTextEntry1] : Malignant Hyperthermia Screening Tool and Risk of Bleeding Assessment \par \par DEENDIAL DEONAUTH  denies family of unexpected death following Anesthesia or Exercise.\par Denies family history of Malignant Hyperthermia, Muscle or Neuromuscular disorder and High Temperature following exercise. \par \par DEENDIAL DEONAUTH Patient denies history of Muscle Spasm, Dark or Chocolate- Colored and unanticipated fever immediately following anesthesia or serious exercise. \par \par DEENDIAL DEONAUTH Patient also denies bleeding tendencies/risks of bleeding.\par

## 2022-05-06 NOTE — HISTORY OF PRESENT ILLNESS
[] : right internal jugular tunneled catheter [FreeTextEntry1] : 3/24/2022 Dr. Olmedo [FreeTextEntry2] : 10/2021 [FreeTextEntry4] : this morning [FreeTextEntry5] : 5pm yesterday [FreeTextEntry6] : Dr. Steward

## 2022-05-27 PROBLEM — T82.898A INADEQUATE FLOW OF DIALYSIS ARTERIOVENOUS FISTULA: Status: ACTIVE | Noted: 2021-12-13

## 2022-05-31 ENCOUNTER — APPOINTMENT (OUTPATIENT)
Dept: ENDOVASCULAR SURGERY | Facility: CLINIC | Age: 62
End: 2022-05-31
Payer: MEDICARE

## 2022-05-31 DIAGNOSIS — T82.898A OTHER SPECIFIED COMPLICATION OF VASCULAR PROSTHETIC DEVICES, IMPLANTS AND GRAFTS, INITIAL ENCOUNTER: ICD-10-CM

## 2022-06-14 ENCOUNTER — RESULT REVIEW (OUTPATIENT)
Age: 62
End: 2022-06-14

## 2022-06-14 ENCOUNTER — APPOINTMENT (OUTPATIENT)
Dept: ENDOVASCULAR SURGERY | Facility: CLINIC | Age: 62
End: 2022-06-14
Payer: MEDICARE

## 2022-06-14 VITALS
SYSTOLIC BLOOD PRESSURE: 199 MMHG | WEIGHT: 185 LBS | BODY MASS INDEX: 30.82 KG/M2 | OXYGEN SATURATION: 97 % | HEIGHT: 65 IN | HEART RATE: 51 BPM | DIASTOLIC BLOOD PRESSURE: 75 MMHG | TEMPERATURE: 97.9 F | RESPIRATION RATE: 18 BRPM

## 2022-06-14 PROCEDURE — 36215Z: CUSTOM | Mod: 59,58

## 2022-06-14 PROCEDURE — 36903Z: CUSTOM | Mod: 58

## 2022-06-14 NOTE — PAST MEDICAL HISTORY
[FreeTextEntry1] : Malignant Hyperthermia Screening Tool and Risk of Bleeding Assessment \par \par DEENDIAL DEONAUTH  denies family of unexpected death following Anesthesia or Exercise.\par Denies family history of Malignant Hyperthermia, Muscle or Neuromuscular disorder and High Temperature following exercise. \par \par DEENDIAL DEONAUTH Patient denies history of Muscle Spasm, Dark or Chocolate- Colored and unanticipated fever immediately following anesthesia or serious exercise. \par \par DEENDIAL DEONAUTH Patient also denies bleeding tendencies/risks of bleeding.\par

## 2022-06-14 NOTE — HISTORY OF PRESENT ILLNESS
[FreeTextEntry1] : 3/24/2022 Dr. Olmedo [FreeTextEntry2] : 10/2021 [FreeTextEntry4] : yesterday [FreeTextEntry5] : yesterday [FreeTextEntry6] :

## 2022-08-05 ENCOUNTER — APPOINTMENT (OUTPATIENT)
Dept: VASCULAR SURGERY | Facility: CLINIC | Age: 62
End: 2022-08-05

## 2022-08-07 NOTE — DISCUSSION/SUMMARY
[FreeTextEntry1] : 62 yo male with a history of esrd on hd via permcath s/p left upper extremity radial cephalic avf.  sutures were removed, insision healing well, no palpable thrill on exam \par 
[FreeTextEntry1] : 62 yo male with a history of esrd on hd via permcath s/p left upper extremity radial cephalic avf.  sutures were removed, insision healing well, no palpable thrill on exam \par 
07-Aug-2022 10:15

## 2022-08-19 ENCOUNTER — APPOINTMENT (OUTPATIENT)
Dept: VASCULAR SURGERY | Facility: CLINIC | Age: 62
End: 2022-08-19

## 2022-09-07 NOTE — REASON FOR VISIT
[Other ___] : a [unfilled] visit for [Inadequate Flow within AVF] : inadequate flow within AVF [Spouse] : spouse

## 2022-09-08 ENCOUNTER — APPOINTMENT (OUTPATIENT)
Dept: ENDOVASCULAR SURGERY | Facility: CLINIC | Age: 62
End: 2022-09-08

## 2022-09-08 ENCOUNTER — RESULT REVIEW (OUTPATIENT)
Age: 62
End: 2022-09-08

## 2022-09-08 VITALS
TEMPERATURE: 98 F | HEART RATE: 67 BPM | OXYGEN SATURATION: 96 % | WEIGHT: 170 LBS | BODY MASS INDEX: 28.32 KG/M2 | SYSTOLIC BLOOD PRESSURE: 181 MMHG | DIASTOLIC BLOOD PRESSURE: 80 MMHG | RESPIRATION RATE: 18 BRPM | HEIGHT: 65 IN

## 2022-09-08 PROCEDURE — 36902Z: CUSTOM | Mod: 59

## 2022-09-08 PROCEDURE — 36909Z: CUSTOM

## 2022-09-08 RX ORDER — ASPIRIN ENTERIC COATED TABLETS 81 MG 81 MG/1
81 TABLET, DELAYED RELEASE ORAL DAILY
Qty: 30 | Refills: 0 | Status: DISCONTINUED | COMMUNITY
End: 2022-09-08

## 2022-09-08 NOTE — PROCEDURE
[FSBS] : FSBS [Resume diet] : resume diet [Site check for bleeding/hematoma/thrill/bruit] : Site check for bleeding/hematoma/thrill/bruit [Vital signs on admission the q 15 mins x2] : Vital signs on admission the q 15 mins x2 [FreeTextEntry1] : left arm fistula fistulogram/angioplasty/coil

## 2022-09-08 NOTE — PAST MEDICAL HISTORY
[Increasing age ( >40 years old)] : Increasing age ( >40 years old) [Altered mobility] : Altered mobility [No therapy indicated for cases scheduled for less than one hour] : No therapy indicated for cases scheduled for less than one hour. [FreeTextEntry1] : Malignant Hyperthermia Screening Tool and Risk of Bleeding Assessment \par DEENDIAL DEONAUTH  denies family of unexpected death following Anesthesia or Exercise.\par Denies family history of Malignant Hyperthermia, Muscle or Neuromuscular disorder and High Temperature following exercise. \par \par DEENDIAL DEONAUTH Patient denies history of Muscle Spasm, Dark or Chocolate- Colored and unanticipated fever immediately following anesthesia or serious exercise. \par \par DEENDIAL DEONAUTH Patient also denies bleeding tendencies/risks of bleeding.\par

## 2022-09-08 NOTE — HISTORY OF PRESENT ILLNESS
[] : right internal jugular tunneled catheter [FreeTextEntry1] : 3/24/2022 Dr. Olmedo [FreeTextEntry2] : 10/2021 [FreeTextEntry4] : yesterday via tunneled catheter [FreeTextEntry5] : yesterday 6pm  [FreeTextEntry6] : Dr. Walker

## 2022-09-09 NOTE — PRE PROCEDURE NOTE - PROCEDURE SERVICE
OUTPATIENT PROGRESS NOTE    CHIEF COMPLAINT:  Chief Complaint   Patient presents with   • Office Visit       HPI:  The patient presented to the office for a 6 month diabetic return. New floaters with left eye; noticed either  or Monday. No additional visual concerns. Patient defers refraction check. Denies any flashes of lightning. No other visual or ocular complaints.     The patient has Type II Diabetes.  Last blood sugar was 250 last night.  Age diagnosed: around her 30s    Hemoglobin A1C (%)   Date Value   2022 7.6 (H)     Patient gave us verbal permission to discuss their medical condition in the presence of the following individual(s) in the room: n/a    ROS:  1. Do you have pain?  no  2. Do you have fever, chills, sweats or weight change? no  3. Do you have headaches or seizures? no  4. Do you have ringing in your ear/s or hearing loss? no  5. Do you have chest pain, palpitations or heart murmur? no  6. Do you have shortness of breath or wheezing? no  7. Do you have abdominal pain, nausea, vomiting, diarrhea or constipation? no  8. Do you have pain, frequent or difficulty with urination? no  9. Do you have joint or back pain? no  10. Do you have weakness, numbness or tingling? no  11. Do you have skin changes such as a rash? no  12. Do you experience easy bruising or bleeding? no  13. Are you depressed? no      PAST MEDICAL HISTORY:  Past Medical History:   Diagnosis Date   • Anemia    • Diabetes mellitus (CMS/HCC)        SURGICAL HISTORY:  Past Surgical History:   Procedure Laterality Date   • Ankle fracture surgery Right 16    ORIF trimalleolar fracture, Dr. Samuels   •  section, classic     • Extracapsular cataract removal w insert io lens prosth wo ecp Left 2017    Cataract Removal Lens Implant   • Extracapsular cataract removal w insert io lens prosth wo ecp Right 2017    Cataract Removal Lens Implant   • Eye surgery  2017    bilateral cataract extraction with IOLs    • Fracture surgery Left     arm       FAMILY HISTORY:  Family History   Problem Relation Age of Onset   • Celiac Disease Mother    • Heart disease Mother    • Diabetes Father    • Congestive Heart Failure Father    • Hypertension Father    • Diabetes Brother    • Diabetes Brother    • Psychiatric Son         nonverbal autistic   • Diabetes Son    • Other Son         ITP       SOCIAL HISTORY:  Social History     Socioeconomic History   • Marital status: /Civil Union     Spouse name: Not on file   • Number of children: Not on file   • Years of education: Not on file   • Highest education level: Not on file   Occupational History   • Not on file   Tobacco Use   • Smoking status: Former Smoker     Quit date: 1976     Years since quittin.7   • Smokeless tobacco: Never Used   Vaping Use   • Vaping Use: never used   Substance and Sexual Activity   • Alcohol use: No     Alcohol/week: 0.0 standard drinks   • Drug use: Yes     Types: Marijuana     Comment: nightly    • Sexual activity: Never   Other Topics Concern   • Not on file   Social History Narrative   • Not on file     Social Determinants of Health     Financial Resource Strain: Not on file   Food Insecurity: Not on file   Transportation Needs: Not on file   Physical Activity: Not on file   Stress: Not on file   Social Connections: Not on file   Intimate Partner Violence: Not on file     I reviewed testing done by technician found in [place].    OCULAR EXAMINATION:  Base Eye Exam     Visual Acuity (Snellen - Linear)       Right Left    Dist sc 20/25 -1 20/80 -1    Dist ph sc No Improvement 20/40 -1          Tonometry (Applanation, 11:14 AM)       Right Left    Pressure 16 15          Pupils       Pupils Dark Light Shape React APD    Right PERRL 4 3 Round Brisk Negative    Left PERRL 4 3 Round Brisk Negative          Visual Fields       Left Right     Full Full          Extraocular Movement       Right Left     Full Full          Neuro/Psych     Oriented  Vascular and Interventional Radiology x3: Yes    Mood/Affect: Normal          Dilation     Both eyes: 2.5% Phenylephrine / 1% Tropicamide @ 11:14 AM            Additional Notes    Angles open both eyes     Slit Lamp and Fundus Exam     External Exam       Right Left    External Normal Normal          Slit Lamp Exam       Right Left    Lids/Lashes Normal papilloma left lower eyelid    Conjunctiva/Sclera Clear Clear    Cornea Clear Clear    Anterior Chamber Deep and quiet Deep and quiet    Iris Round and regular Round and regular    Lens Posterior chamber intraocular lens with 1+ Posterior capsular opacification and displaced minimally superior temporally Posterior chamber intraocular lens clear and centered    Vitreous Clear Posterior vitreous detachment          Fundus Exam       Right Left    Disc Flat, pink with a healthy rim Flat, pink with a healthy rim    C/D Ratio 0.6 0.55    Macula Healthy with sharp foveal reflex Healthy with sharp foveal reflex    Vessels Healthy with normal Artery-Vein ratio Healthy with normal Artery-Vein ratio    Periphery rare dot blot hemes scattered scattered dot blot hemes              ASSESSMENT:  1. Mild nonproliferative diabetic retinopathy of both eyes without macular edema associated with type 2 diabetes mellitus (CMS/HCC)    2. Pseudophakia of both eyes    3. PVD (posterior vitreous detachment), left eye    4. Amblyopia of left eye          PLAN:       1.  Stable condition. Continue to follow up with Dr. Anaya and continue to take Humalog and Insulin as directed by your physician. Patient is recommended to return in 6 months for repeat dilation.  2.  Stable condition. Monitor.  3.  Stable condition. Discussed the nature of the condition. Call the clinic if any bright flashes of light, increased floaters or curtain of vision loss.  4.  Stable condition. Monitor.     The patient was dilated today. She knows that the dilation will cause light sensitivity and blurry near vision for the next 4-6 hours. Disposable  sunglasses were provided.    FOLLOW UP:  Return in about 6 months (around 3/9/2023) for repeat dilated exam.    FINAL GLASSES PRESCRIPTION:

## 2022-09-27 ENCOUNTER — APPOINTMENT (OUTPATIENT)
Dept: TRANSPLANT | Facility: CLINIC | Age: 62
End: 2022-09-27

## 2022-09-27 ENCOUNTER — APPOINTMENT (OUTPATIENT)
Dept: NEPHROLOGY | Facility: CLINIC | Age: 62
End: 2022-09-27

## 2022-09-27 ENCOUNTER — NON-APPOINTMENT (OUTPATIENT)
Age: 62
End: 2022-09-27

## 2022-09-27 ENCOUNTER — RESULT REVIEW (OUTPATIENT)
Age: 62
End: 2022-09-27

## 2022-09-27 VITALS
WEIGHT: 182 LBS | HEART RATE: 56 BPM | BODY MASS INDEX: 30.32 KG/M2 | SYSTOLIC BLOOD PRESSURE: 159 MMHG | HEIGHT: 65 IN | OXYGEN SATURATION: 97 % | TEMPERATURE: 97.7 F | DIASTOLIC BLOOD PRESSURE: 65 MMHG | RESPIRATION RATE: 20 BRPM

## 2022-09-27 DIAGNOSIS — N18.6 END STAGE RENAL DISEASE: ICD-10-CM

## 2022-09-27 DIAGNOSIS — Z99.2 END STAGE RENAL DISEASE: ICD-10-CM

## 2022-09-27 PROCEDURE — 99072 ADDL SUPL MATRL&STAF TM PHE: CPT

## 2022-09-27 PROCEDURE — 99204 OFFICE O/P NEW MOD 45 MIN: CPT

## 2022-09-27 RX ORDER — METFORMIN HYDROCHLORIDE 1000 MG/1
1000 TABLET, COATED ORAL
Refills: 0 | Status: DISCONTINUED | COMMUNITY
End: 2022-09-27

## 2022-09-27 RX ORDER — ACETAMINOPHEN 325 MG/1
325 TABLET ORAL EVERY 6 HOURS
Qty: 240 | Refills: 0 | Status: DISCONTINUED | COMMUNITY
End: 2022-09-27

## 2022-09-27 NOTE — PHYSICAL EXAM
[General Appearance - Alert] : alert [General Appearance - In No Acute Distress] : in no acute distress [General Appearance - Well Nourished] : well nourished [General Appearance - Well Developed] : well developed [Sclera] : the sclera and conjunctiva were normal [Extraocular Movements] : extraocular movements were intact [Outer Ear] : the ears and nose were normal in appearance [Hearing Threshold Finger Rub Not [place]] : hearing was normal [Examination Of The Oral Cavity] : the lips and gums were normal [Neck Appearance] : the appearance of the neck was normal [Neck Cervical Mass (___cm)] : no neck mass was observed [Respiration, Rhythm And Depth] : normal respiratory rhythm and effort [Exaggerated Use Of Accessory Muscles For Inspiration] : no accessory muscle use [Auscultation Breath Sounds / Voice Sounds] : lungs were clear to auscultation bilaterally [Heart Rate And Rhythm] : heart rate was normal and rhythm regular [Heart Sounds] : normal S1 and S2 [Murmurs] : no murmurs [Edema] : there was no peripheral edema [FreeTextEntry1] : diminished pedal pulses.  [Bowel Sounds] : normal bowel sounds [Abdomen Soft] : soft [Abdomen Tenderness] : non-tender [Cervical Lymph Nodes Enlarged Posterior Bilaterally] : posterior cervical [Cervical Lymph Nodes Enlarged Anterior Bilaterally] : anterior cervical [Supraclavicular Lymph Nodes Enlarged Bilaterally] : supraclavicular [Abnormal Walk] : normal gait [Nail Clubbing] : no clubbing  or cyanosis of the fingernails [Musculoskeletal - Swelling] : no joint swelling seen [Skin Color & Pigmentation] : normal skin color and pigmentation [Skin Turgor] : normal skin turgor [] : no rash [Cranial Nerves] : cranial nerves 2-12 were intact [No Focal Deficits] : no focal deficits [Oriented To Time, Place, And Person] : oriented to person, place, and time [Impaired Insight] : insight and judgment were intact [Affect] : the affect was normal

## 2022-09-27 NOTE — REASON FOR VISIT
[Initial] : an initial visit for [End-Stage Renal Disease] : end-stage renal disease [Spouse] : spouse

## 2022-09-27 NOTE — HISTORY OF PRESENT ILLNESS
[Diabetes Mellitus] : Diabetes Mellitus [Not Working] : Not working [70: Cares for self; unalbe to carry on normal activity or do active work.] : 70: Cares for self; unable to carry on normal activity or do active work. [TextBox_42] : Mr. Mattson is a 61 y.o male with ESRD since Oct 2021 who presents for kidney transplantation.  Pts nephrologist is Matthew Esposito.  \par His kidney failed after CABG (CABG x 2 ( LIMA to LAD, SVG to posterior descending branch of RCA) , Mitral Valve Replacement using 31 mm porcine St.Abhishek Epic valve , and left atrial appendage ligation) October 2021.  he has dialysis via right IJ catheter.  has had problems with fistula placement.  \par Pt also has a history of CVA in 2021.  Occurred around the same time as CABG and has mild residual right sided weakness.  \par makes normal amount of urine\par no biopsy of kidney\par no UTI, kidney stones, hematuria\par Patient has had diabetes for many years.  He has been on insulin for about 3 years.  He takes Lantus 10 units.  \par \par He is on eliquis 2.5mg bid - unclear reason why.  \par He can walk a few blocks and can climb stairs. \par \par no covid infection, vaccinated\par \par colonoscopy within last 2 years\par \par SHx - cabg, and left forearm AVF nil else\par \par FHx - father cva, denies cancer or kidney issues, has 2 brothers and 3 sisters.  One sister has HTN and CAD.   \par \par SHx\par lives with wife at home, no kids, wife works at medical records\par lived in [place] 25 years, prev from [facility]\par smoker, 1 cig/day, committed to stopping\par minimal etoh\par no other drugs\par \par

## 2022-09-27 NOTE — HISTORY OF PRESENT ILLNESS
[Diabetes Mellitus] : Diabetes Mellitus [Previous Kidney Transplant] : no previous kidney transplant [Cardiac History] : cardiac history [Blood Transfusion] : no prior blood transfusion [Hx of DVT/Thrombosis/Miscarriage] : no history of dvt/thrombosis/miscarriage [Diabetes] : diabetes [Not Working] : Not working [TextBox_16] : Oct 2021 [TextBox_24] : ~5y [TextBox_28] : ~5 years [TextBox_39] : 2019 [FreeTextEntry3] : prev worked as  [de-identified] : 62yo man with esrd for kidney transplant evaluation, kidney failed post CABG Oct 2021\par IHD via right subclavian catheter, left forearm fistula constructed\par makes normal amoutns of urine\par no biopsy of kidney\par no UTI, kidney stones, hematuria\par \par PMHx\par cardiac - CABG (CABG x 2 ( LIMA to LAD, SVG to posterior descending branch of RCA) , Mitral Valve Replacement using 31 mm porcine St.Abhishek Epic valve , and left atrial appendage ligation). On eliquis 2.5mg bid\par \par cva 2021 after CABG, no residual deficits\par \par denies respiratory, GI, thyroid, seizures, psychiatric issues\par denies TB, hepatitis, HIV\par no covid infection, vaccinated\par \par colonoscopy within last 2 years\par \par SHx - cabg, and left forearm AVF nil else\par \par medicatsions reviewed\par \par allergies none\par \par FHx - father cva, denies cancer or kidney issues\par \par SHx\par lives with wife at home, no kids\par wife works at medical [employer]\par lived in [place] 25 years, prev from [facility]\par smoker, 1 cig/day, commited to stopping\par minimal etoh\par no other drugs\par \par walks 2 blocks\par minimal exercise at home\par \par

## 2022-09-27 NOTE — CONSULT LETTER
[Dear  ___] : Dear  [unfilled], [Consult Letter:] : I had the pleasure of evaluating your patient, [unfilled]. [Please see my note below.] : Please see my note below. [Consult Closing:] : Thank you very much for allowing me to participate in the care of this patient.  If you have any questions, please do not hesitate to contact me. [Sincerely,] : Sincerely, [FreeTextEntry3] : Lucas Ahmadi, DO

## 2022-09-27 NOTE — ASSESSMENT
[Fair candidate] : a fair candidate. We should proceed with our protocol for evaluation for kidney transplantation. [FreeTextEntry1] : cardiac eval and clearance\par ct chest abdo pelvis with iv contrast\par advised for smoking cessation\par

## 2022-09-27 NOTE — PLAN
[FreeTextEntry1] : 1.  ESRD - Mr. Mattson is a reasonable candidate for kidney transplantation.  He does not have any potential kidney donors.  \par 2.  CAD - Pt is s/p CABG - will needs stress and echo. \par 3.  CVA - post CABG.  Check CT head for baseline. \par 4.  Cancer screening - obtain pts colonoscopy, check PSA\par 5.  Imaging - CT chest a/p as pt is a current smoker.\par 6.  smoking history - strongly advised he stops smoking.  Pt is motivated and does not smoke much at this time. \par \par I have personally discussed the risks and benefits of transplantation and patient attended transplant education class where the following was disclosed:\par  \par Reviewed factors affecting survival and morbidity while on dialysis, the transplant wait list and reviewed rossy-operative and long-term risk factors affecting outcome in kidney transplantation.  \par  \par One year SRTR outcomes for national and [facility] were discussed in regards to patient survival and graft survival after transplantation.  \par  \par Details of transplant surgery, including complications were discussed.\par Immunosuppression and complications including infection including life threatening sepsis and opportunistic infections, malignancy and new onset diabetes were discussed.  \par  \par Benefits of live donor transplantation as well as variability in wait times across regions and multiple listing were discussed. \par KDPI >85% and PHS high risk criteria donors were discussed. \par HCV kidney transplantation was discussed.\par  \par Will proceed with completing/ updating work up and listing for transplant/ live donor transplant once work up is reviewed and found to be acceptable by multidisciplinary listing committee.\par \par

## 2022-09-28 ENCOUNTER — NON-APPOINTMENT (OUTPATIENT)
Age: 62
End: 2022-09-28

## 2022-09-28 LAB
ABO + RH PNL BLD: NORMAL
ABO + RH PNL BLD: NORMAL
ALBUMIN SERPL ELPH-MCNC: 5 G/DL
ALP BLD-CCNC: 107 U/L
ALT SERPL-CCNC: 23 U/L
ANION GAP SERPL CALC-SCNC: 18 MMOL/L
APPEARANCE: CLEAR
AST SERPL-CCNC: 21 U/L
BACTERIA: NEGATIVE
BASOPHILS # BLD AUTO: 0.14 K/UL
BASOPHILS NFR BLD AUTO: 1.5 %
BILIRUB SERPL-MCNC: 0.5 MG/DL
BILIRUBIN URINE: NEGATIVE
BLOOD URINE: ABNORMAL
BUN SERPL-MCNC: 59 MG/DL
C PEPTIDE SERPL-MCNC: 13.1 NG/ML
CALCIUM SERPL-MCNC: 9.8 MG/DL
CHLORIDE SERPL-SCNC: 98 MMOL/L
CHOLEST SERPL-MCNC: 180 MG/DL
CMV IGG SERPL QL: >10 U/ML
CMV IGG SERPL-IMP: POSITIVE
CO2 SERPL-SCNC: 25 MMOL/L
COLOR: YELLOW
COVID-19 SPIKE DOMAIN ANTIBODY INTERPRETATION: POSITIVE
CREAT SERPL-MCNC: 7.35 MG/DL
CREAT SPEC-SCNC: 110 MG/DL
CREAT/PROT UR: 16.5 RATIO
EBV DNA SERPL NAA+PROBE-ACNC: NOT DETECTED IU/ML
EBV EA AB SER IA-ACNC: 103 U/ML
EBV EA AB TITR SER IF: POSITIVE
EBV EA IGG SER QL IA: 27.7 U/ML
EBV EA IGG SER-ACNC: POSITIVE
EBV EA IGM SER IA-ACNC: NEGATIVE
EBV PATRN SPEC IB-IMP: NORMAL
EBV VCA IGG SER IA-ACNC: 225 U/ML
EBV VCA IGM SER QL IA: <10 U/ML
EBVPCR LOG: NOT DETECTED LOG10IU/ML
EGFR: 8 ML/MIN/1.73M2
EOSINOPHIL # BLD AUTO: 0.8 K/UL
EOSINOPHIL NFR BLD AUTO: 8.8 %
EPSTEIN-BARR VIRUS CAPSID ANTIGEN IGG: POSITIVE
ESTIMATED AVERAGE GLUCOSE: 94 MG/DL
GLUCOSE QUALITATIVE U: ABNORMAL
GLUCOSE SERPL-MCNC: 92 MG/DL
HAV IGM SER QL: NONREACTIVE
HBA1C MFR BLD HPLC: 4.9 %
HBV CORE IGG+IGM SER QL: NONREACTIVE
HBV SURFACE AB SER QL: REACTIVE
HBV SURFACE AB SERPL IA-ACNC: 449.3 MIU/ML
HBV SURFACE AG SER QL: NONREACTIVE
HCG SERPL-MCNC: 1 MIU/ML
HCT VFR BLD CALC: 32.6 %
HCV AB SER QL: NONREACTIVE
HCV S/CO RATIO: 0.26 S/CO
HDLC SERPL-MCNC: 44 MG/DL
HGB BLD-MCNC: 10 G/DL
HIV1+2 AB SPEC QL IA.RAPID: NONREACTIVE
HSV 1+2 IGG SER IA-IMP: POSITIVE
HSV 1+2 IGG SER IA-IMP: POSITIVE
HSV1 IGG SER QL: 60.7 INDEX
HSV2 IGG SER QL: 1.11 INDEX
HYALINE CASTS: 2 /LPF
IMM GRANULOCYTES NFR BLD AUTO: 0.4 %
KETONES URINE: NEGATIVE
LDLC SERPL CALC-MCNC: 119 MG/DL
LEUKOCYTE ESTERASE URINE: NEGATIVE
LYMPHOCYTES # BLD AUTO: 1.97 K/UL
LYMPHOCYTES NFR BLD AUTO: 21.8 %
MAGNESIUM SERPL-MCNC: 2.6 MG/DL
MAN DIFF?: NORMAL
MCHC RBC-ENTMCNC: 30.7 GM/DL
MCHC RBC-ENTMCNC: 32.9 PG
MCV RBC AUTO: 107.2 FL
MICROSCOPIC-UA: NORMAL
MONOCYTES # BLD AUTO: 0.82 K/UL
MONOCYTES NFR BLD AUTO: 9.1 %
NEUTROPHILS # BLD AUTO: 5.28 K/UL
NEUTROPHILS NFR BLD AUTO: 58.4 %
NITRITE URINE: NEGATIVE
NONHDLC SERPL-MCNC: 136 MG/DL
PH URINE: 7.5
PHOSPHATE SERPL-MCNC: 6.2 MG/DL
PLATELET # BLD AUTO: 171 K/UL
POTASSIUM SERPL-SCNC: 5.7 MMOL/L
PROT SERPL-MCNC: 7.9 G/DL
PROT UR-MCNC: 1805 MG/DL
PROTEIN URINE: ABNORMAL
PSA SERPL-MCNC: 1.05 NG/ML
RBC # BLD: 3.04 M/UL
RBC # FLD: 13.2 %
RED BLOOD CELLS URINE: 3 /HPF
RUBV IGG FLD-ACNC: 3.1 INDEX
RUBV IGG SER-IMP: POSITIVE
SARS-COV-2 AB SERPL IA-ACNC: >250 U/ML
SODIUM SERPL-SCNC: 142 MMOL/L
SPECIFIC GRAVITY URINE: 1.02
SQUAMOUS EPITHELIAL CELLS: 4 /HPF
T GONDII AB SER-IMP: POSITIVE
T GONDII IGG SER QL: 177 IU/ML
T PALLIDUM AB SER QL IA: NEGATIVE
TRIGL SERPL-MCNC: 87 MG/DL
URATE SERPL-MCNC: 5.7 MG/DL
UROBILINOGEN URINE: NORMAL
VZV AB TITR SER: NEGATIVE
VZV IGG SER IF-ACNC: 15.3 INDEX
WBC # FLD AUTO: 9.05 K/UL
WHITE BLOOD CELLS URINE: 9 /HPF

## 2022-09-29 LAB
M TB IFN-G BLD-IMP: NEGATIVE
QUANTIFERON TB PLUS MITOGEN MINUS NIL: 2.85 IU/ML
QUANTIFERON TB PLUS NIL: 0.02 IU/ML
QUANTIFERON TB PLUS TB1 MINUS NIL: 0 IU/ML
QUANTIFERON TB PLUS TB2 MINUS NIL: 0 IU/ML

## 2022-10-13 ENCOUNTER — NON-APPOINTMENT (OUTPATIENT)
Age: 62
End: 2022-10-13

## 2022-10-13 ENCOUNTER — APPOINTMENT (OUTPATIENT)
Dept: CARDIOLOGY | Facility: CLINIC | Age: 62
End: 2022-10-13

## 2022-10-13 VITALS
DIASTOLIC BLOOD PRESSURE: 70 MMHG | OXYGEN SATURATION: 98 % | HEART RATE: 69 BPM | SYSTOLIC BLOOD PRESSURE: 174 MMHG | WEIGHT: 174 LBS | BODY MASS INDEX: 28.96 KG/M2

## 2022-10-13 DIAGNOSIS — E11.9 TYPE 2 DIABETES MELLITUS W/OUT COMPLICATIONS: ICD-10-CM

## 2022-10-13 DIAGNOSIS — I10 ESSENTIAL (PRIMARY) HYPERTENSION: ICD-10-CM

## 2022-10-13 DIAGNOSIS — Z95.1 PRESENCE OF AORTOCORONARY BYPASS GRAFT: ICD-10-CM

## 2022-10-13 DIAGNOSIS — I25.10 ATHEROSCLEROTIC HEART DISEASE OF NATIVE CORONARY ARTERY W/OUT ANGINA PECTORIS: ICD-10-CM

## 2022-10-13 PROCEDURE — 99072 ADDL SUPL MATRL&STAF TM PHE: CPT

## 2022-10-13 PROCEDURE — 93000 ELECTROCARDIOGRAM COMPLETE: CPT | Mod: NC

## 2022-10-13 PROCEDURE — 99215 OFFICE O/P EST HI 40 MIN: CPT

## 2022-10-13 RX ORDER — CARVEDILOL 6.25 MG/1
6.25 TABLET, FILM COATED ORAL
Qty: 180 | Refills: 1 | Status: ACTIVE | COMMUNITY
Start: 2022-10-13 | End: 1900-01-01

## 2022-10-18 ENCOUNTER — OUTPATIENT (OUTPATIENT)
Dept: OUTPATIENT SERVICES | Facility: HOSPITAL | Age: 62
LOS: 1 days | End: 2022-10-18
Payer: COMMERCIAL

## 2022-10-18 ENCOUNTER — APPOINTMENT (OUTPATIENT)
Dept: CT IMAGING | Facility: IMAGING CENTER | Age: 62
End: 2022-10-18

## 2022-10-18 DIAGNOSIS — Z95.1 PRESENCE OF AORTOCORONARY BYPASS GRAFT: Chronic | ICD-10-CM

## 2022-10-18 DIAGNOSIS — Z01.818 ENCOUNTER FOR OTHER PREPROCEDURAL EXAMINATION: ICD-10-CM

## 2022-10-18 DIAGNOSIS — I77.0 ARTERIOVENOUS FISTULA, ACQUIRED: Chronic | ICD-10-CM

## 2022-10-18 PROBLEM — E11.9 DIABETES MELLITUS: Status: ACTIVE | Noted: 2021-11-30

## 2022-10-18 PROBLEM — I10 HTN (HYPERTENSION): Status: ACTIVE | Noted: 2021-11-30

## 2022-10-18 PROCEDURE — 71260 CT THORAX DX C+: CPT | Mod: 26

## 2022-10-18 PROCEDURE — 71260 CT THORAX DX C+: CPT

## 2022-10-18 PROCEDURE — 74174 CTA ABD&PLVS W/CONTRAST: CPT | Mod: 26

## 2022-10-18 PROCEDURE — 74174 CTA ABD&PLVS W/CONTRAST: CPT

## 2022-10-18 RX ORDER — AMLODIPINE BESYLATE 10 MG/1
10 TABLET ORAL
Qty: 90 | Refills: 2 | Status: ACTIVE | COMMUNITY
Start: 2022-10-18

## 2022-10-18 NOTE — CARDIOLOGY SUMMARY
[de-identified] : 10/13/2022. Normal sinus rhythm at 71 BPM. Normal axis. Normal intervals and morphologies. Normal R-wave progression. [de-identified] : TTE. 10/19/2021. \par PROCEDURE: Transthoracic echocardiogram with 2-D, M-Mode\par and complete spectral and color flow Doppler. Verbal\par consent was obtained for injection of  Ultrasonic Enhancing\par Agent following a discussion of risks and benefits.\par Following intravenous injection of Ultrasonic Enhancing\par Agent , harmonic imaging was performed.\par INDICATION: Cardiac murmur, unspecified (R01.1)\par ------------------------------------------------------------------------\par Dimensions:    Normal Values:\par LA:     3.9    2.0 - 4.0 cm\par Ao:     3.1    2.0 - 3.8 cm\par SEPTUM: 1.0    0.6 - 1.2 cm\par PWT:    0.9    0.6 - 1.1 cm\par LVIDd:  6.1    3.0 - 5.6 cm\par LVIDs:  4.3    1.8 - 4.0 cm\par Derived variables:\par LVMI: 138 g/m2\par RWT: 0.32\par Fractional short: 29 %\par EF (Visual Estimate):  %\par EF (Brand Rule): 75 %Doppler Peak Velocity (m/sec):\par MV=2.0 AoV=2.1\par ------------------------------------------------------------------------\par Observations:\par Mitral Valve: Bioprosthetic mitral valve replacement. No\par mitral valve regurgitation seen. Peak mitral valve gradient\par equals 17 mm Hg, mean transmitral valve gradient equals 4-5\par mm Hg, which is probably normal in the setting of a\par bioprosthetic mitral valve replacement. (HRabout 50s bpm)\par Aortic Valve/Aorta: Aortic valve not well visualized;\par appears to be a calcified trileaflet valve with normal\par opening. Peak transaortic valve gradient equals 19 mm Hg,\par mean transaortic valve gradient equals 9 mm Hg, aortic\par valve velocity time integral equals 42 cm. No aortic valve\par regurgitation seen. Peak left ventricular outflow tract\par gradient equals 12 mm Hg, mean gradient is equal to 5 mm\par Hg, LVOT velocity time integral equals 30 cm.\par Aortic Root: 3.1 cm.\par LVOT diameter: 1.4 cm.\par Left Atrium: Moderately dilated left atrium.  LA volume\par index = 45 cc/m2.\par Left Ventricle: Endocardial visualization enhanced with\par intravenous injection of Ultrasonic Enhancing Agent\par (Definity). Hyperdynamic left ventricular systolic\par function. Septal motion consistent with cardiac surgery.\par Eccentric left ventricular hypertrophy (dilated left\par ventricle with normal relative wall thickness). Moderate\par diastolic dysfunction (Stage II).\par Right Heart: Normal right atrium. Normal right ventricular\par size and function. Tricuspid valve not well visualized,\par probably normal. Minimal tricuspid regurgitation. Pulmonic\par valve not well visualized. Minimal pulmonic regurgitation.\par Pericardium/Pleura: Minimal pericardial effusion.\par Hemodynamic: Estimated right atrial pressure is 8 mm Hg.\par Unable to estimate RVSP due to incomplete TR spectral\par doppler signal. \par ------------------------------------------------------------------------\par Conclusions: \par 1. Bioprosthetic mitral valve replacement. No mitral valve\par regurgitation seen. Peak mitral valve gradient equals 17 mm\par Hg, mean transmitral valve gradient equals 4-5 mm Hg, which\par is probably normal in the setting of a bioprosthetic mitral\par valve replacement. (HRabout 50s bpm)\par 2. Aortic valve not well visualized; appears to be a\par calcified trileaflet valve with normal opening. No aortic\par valve regurgitation seen.\par 3. Endocardial visualization enhanced with intravenous\par injection of Ultrasonic Enhancing Agent (Definity).\par Hyperdynamic left ventricular systolic function.\par 4. Normal right ventricular size and function.\par 5. Unable to estimate RVSP due to incomplete TR spectral\par doppler signal. \par 6. Minimal pericardial effusion.\par *** Compared with echocardiogram of 10/21/2021, patient is\par s/p Bioprosthertic MVR. \par ------------------------------------------------------------------------\par Confirmed on  10/29/2021 - 15:21:42 by kemar Cintron M.D.\par

## 2022-10-18 NOTE — PHYSICAL EXAM

## 2022-10-18 NOTE — HISTORY OF PRESENT ILLNESS
[FreeTextEntry1] : Chriss Mattson presents today for preoperative cardiovascular evaluation prior to possible kidney transplant. \par He is a 61 year old man [place] man living in the [place] for 25 years with known cardiac risk factors of poorly controlled chronic hypertension, coronary artery disease s/p CABG (10/2021, 2 vessel bypass, mitral valve repair and left atrial appendage ligation) complicated by stroke, atrial fibrillation, insulin dependent diabetes mellitus (A1c 4.9%), being a former smoker (0.5 PPD x "many years", quit 3 days ago), and end stage renal disease on hemodialysis (since 10/2021, MWF at [facility]).\par Today he reports feeling well overall. He does not exercise. He is seen today using a walker for gait stabilization following a CVA (?) During his hospitalization a stroke code was called for left sided facial droop and he was found to have old frontal infarct and left carotid stenosis which did not explain acute neurological deficit. 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 \par

## 2022-10-18 NOTE — DISCUSSION/SUMMARY
[EKG obtained to assist in diagnosis and management of assessed problem(s)] : EKG obtained to assist in diagnosis and management of assessed problem(s) [FreeTextEntry1] : Mr. Mattson presents today for preoperative cardiovascular evaluation prior to possible kidney transplant with known extensive cardiac history and risk factors as above.\par \par For a patient s/p CABG he will continue high intensity statin therapy.\par He will clarify the dose of Eliquis with his primary care physician. If indicated, would increase dose to 5 mg BID (age < 80, weight > 60 kg, creatinine 7.35 mg/dL).\par Continue amlodipine at 10 mg daily. Will initiate carvedilol 6.25 mg BID for patient with a history of HFrEF.\par \par He will schedule an echocardiogram for structural heart evaluation. \par A nuclear stress test will evaluate for any evidence of inducible ischemia. He will require a pharmacologic exam due to chronic balance issues and right sided weakness. \par \par Follow up pending results.

## 2022-10-25 ENCOUNTER — APPOINTMENT (OUTPATIENT)
Dept: ENDOVASCULAR SURGERY | Facility: CLINIC | Age: 62
End: 2022-10-25

## 2022-10-25 PROCEDURE — 36589 REMOVAL TUNNELED CV CATH: CPT

## 2022-10-31 NOTE — STUDENT SIGN OFF DOCUMENT - DOCUMENTS STUDENTS ARE SIGNED OFF ON
Plan of Care Nsaids Pregnancy And Lactation Text: These medications are considered safe up to 30 weeks gestation. It is excreted in breast milk.

## 2022-11-10 ENCOUNTER — APPOINTMENT (OUTPATIENT)
Dept: CARDIOLOGY | Facility: CLINIC | Age: 62
End: 2022-11-10

## 2022-11-10 PROCEDURE — 78452 HT MUSCLE IMAGE SPECT MULT: CPT

## 2022-11-10 PROCEDURE — 93306 TTE W/DOPPLER COMPLETE: CPT

## 2022-11-10 PROCEDURE — 93015 CV STRESS TEST SUPVJ I&R: CPT

## 2022-11-10 PROCEDURE — 99072 ADDL SUPL MATRL&STAF TM PHE: CPT

## 2022-11-10 PROCEDURE — A9500: CPT

## 2022-11-17 ENCOUNTER — NON-APPOINTMENT (OUTPATIENT)
Age: 62
End: 2022-11-17

## 2022-12-02 ENCOUNTER — NON-APPOINTMENT (OUTPATIENT)
Age: 62
End: 2022-12-02

## 2022-12-30 ENCOUNTER — NON-APPOINTMENT (OUTPATIENT)
Age: 62
End: 2022-12-30

## 2023-01-04 NOTE — PATIENT PROFILE ADULT - NSPROPTRIGHTCAREGIVER_GEN_A_NUR
----- Message from Early GERARDO Jackson - CNP sent at 1/4/2023  8:07 AM EST -----  Let pt know his thyroid nodule is stable in size, it has been previously aspirated and found to be a colloid nodule, non cancerous, recommend 3 yr f/u no

## 2023-01-06 ENCOUNTER — APPOINTMENT (OUTPATIENT)
Dept: VASCULAR SURGERY | Facility: CLINIC | Age: 63
End: 2023-01-06

## 2023-02-08 ENCOUNTER — NON-APPOINTMENT (OUTPATIENT)
Age: 63
End: 2023-02-08

## 2023-02-09 ENCOUNTER — NON-APPOINTMENT (OUTPATIENT)
Age: 63
End: 2023-02-09

## 2023-02-10 ENCOUNTER — APPOINTMENT (OUTPATIENT)
Dept: VASCULAR SURGERY | Facility: CLINIC | Age: 63
End: 2023-02-10

## 2023-03-03 ENCOUNTER — APPOINTMENT (OUTPATIENT)
Dept: VASCULAR SURGERY | Facility: CLINIC | Age: 63
End: 2023-03-03
Payer: MEDICARE

## 2023-03-03 VITALS
TEMPERATURE: 96.8 F | DIASTOLIC BLOOD PRESSURE: 70 MMHG | OXYGEN SATURATION: 95 % | HEIGHT: 65 IN | HEART RATE: 61 BPM | BODY MASS INDEX: 29.66 KG/M2 | WEIGHT: 178 LBS | SYSTOLIC BLOOD PRESSURE: 159 MMHG

## 2023-03-03 DIAGNOSIS — I65.23 OCCLUSION AND STENOSIS OF BILATERAL CAROTID ARTERIES: ICD-10-CM

## 2023-03-03 PROCEDURE — 99214 OFFICE O/P EST MOD 30 MIN: CPT

## 2023-03-03 PROCEDURE — 93880 EXTRACRANIAL BILAT STUDY: CPT

## 2023-03-03 NOTE — ASSESSMENT
[FreeTextEntry1] : Patient with renal failure on dialysis with a left radiocephalic fistula.  Patient with moderate to severe left carotid stenosis which remains asymptomatic.\par \par Continue antiplatelet therapy with Eliquis, aspirin, and atorvastatin.\par \par Plan for left carotid endarterectomy and patch angioplasty.

## 2023-03-03 NOTE — PHYSICAL EXAM
[Normal Breath Sounds] : Normal breath sounds [Normal Heart Sounds] : normal heart sounds [2+] : left 2+ [Right Carotid Bruit] : right carotid bruit heard [Left Carotid Bruit] : left carotid bruit heard [Skin Ulcer] : no ulcer [Oriented to Person] : oriented to person [Oriented to Place] : oriented to place

## 2023-03-03 NOTE — HISTORY OF PRESENT ILLNESS
[FreeTextEntry1] : Patient is a 62-year-old with renal failure on dialysis who is being evaluated to be placed on transplant list.  During the work-up the patient was noted to have a severe carotid stenosis.\par \par No history of CVA or TIA.

## 2023-03-17 ENCOUNTER — OUTPATIENT (OUTPATIENT)
Dept: OUTPATIENT SERVICES | Facility: HOSPITAL | Age: 63
LOS: 1 days | End: 2023-03-17
Payer: MEDICARE

## 2023-03-17 VITALS
HEIGHT: 65 IN | DIASTOLIC BLOOD PRESSURE: 67 MMHG | SYSTOLIC BLOOD PRESSURE: 157 MMHG | RESPIRATION RATE: 17 BRPM | OXYGEN SATURATION: 98 % | WEIGHT: 177.91 LBS | HEART RATE: 64 BPM | TEMPERATURE: 99 F

## 2023-03-17 DIAGNOSIS — E11.9 TYPE 2 DIABETES MELLITUS WITHOUT COMPLICATIONS: ICD-10-CM

## 2023-03-17 DIAGNOSIS — Z01.818 ENCOUNTER FOR OTHER PREPROCEDURAL EXAMINATION: ICD-10-CM

## 2023-03-17 DIAGNOSIS — I65.23 OCCLUSION AND STENOSIS OF BILATERAL CAROTID ARTERIES: ICD-10-CM

## 2023-03-17 DIAGNOSIS — N18.6 END STAGE RENAL DISEASE: ICD-10-CM

## 2023-03-17 DIAGNOSIS — E78.5 HYPERLIPIDEMIA, UNSPECIFIED: ICD-10-CM

## 2023-03-17 DIAGNOSIS — Z95.1 PRESENCE OF AORTOCORONARY BYPASS GRAFT: ICD-10-CM

## 2023-03-17 DIAGNOSIS — Z95.1 PRESENCE OF AORTOCORONARY BYPASS GRAFT: Chronic | ICD-10-CM

## 2023-03-17 DIAGNOSIS — I10 ESSENTIAL (PRIMARY) HYPERTENSION: ICD-10-CM

## 2023-03-17 DIAGNOSIS — I77.0 ARTERIOVENOUS FISTULA, ACQUIRED: Chronic | ICD-10-CM

## 2023-03-17 LAB — BLD GP AB SCN SERPL QL: SIGNIFICANT CHANGE UP

## 2023-03-17 PROCEDURE — 71046 X-RAY EXAM CHEST 2 VIEWS: CPT | Mod: 26

## 2023-03-17 PROCEDURE — G0463: CPT

## 2023-03-17 PROCEDURE — 71046 X-RAY EXAM CHEST 2 VIEWS: CPT

## 2023-03-17 RX ORDER — LABETALOL HCL 100 MG
1 TABLET ORAL
Qty: 0 | Refills: 0 | DISCHARGE

## 2023-03-17 RX ORDER — SODIUM CHLORIDE 9 MG/ML
3 INJECTION INTRAMUSCULAR; INTRAVENOUS; SUBCUTANEOUS EVERY 8 HOURS
Refills: 0 | Status: DISCONTINUED | OUTPATIENT
Start: 2023-03-23 | End: 2023-03-24

## 2023-03-17 RX ORDER — LOSARTAN/HYDROCHLOROTHIAZIDE 100MG-25MG
1 TABLET ORAL
Qty: 0 | Refills: 0 | DISCHARGE

## 2023-03-17 NOTE — H&P PST ADULT - ASSESSMENT
62 yr old male accompanied by wife, PMH HTN, HDL, diabetes on insulin , CAD had MI in 10/5/2021 ,stroke s/p CABG mitral valve replacement 10/21/2021. Pt is on Eliquis and low dose ASA. Pt on Hemodialysis via left lower arm AV fistula (positive bruit and thrill ) Pt attends [employer] (M ,W, F) Pt presents in pst with occlusion & stenosis bilateral carotid arteries. Pt seen by medical and cardiac for clearance to do Covid testing on Sunday. Spoke with Thai Daniel to hold Eliquis 48 hours before surgery but stay on low dose ASA. Pt scheduled for left carotid endarterectomy with patch angioplasty and EEG monitoring on 3/23/2023.

## 2023-03-17 NOTE — H&P PST ADULT - NSICDXPASTMEDICALHX_GEN_ALL_CORE_FT
PAST MEDICAL HISTORY:  CAD (coronary artery disease)     Diabetes mellitus     End stage renal disease     Hyperlipidemia     Hypertension     Occlusion and stenosis of bilateral carotid arteries     Stroke right hemiplegia

## 2023-03-17 NOTE — H&P PST ADULT - HISTORY OF PRESENT ILLNESS
62 yr old male accompanied by wife, PMH HTN, HDL, diabetes on insulin , CAD had MI in 10/5/2021 ,stroke s/p CABG mitral valve replacement 10/21/2021. Pt is on Eliquis and low dose ASA. Pt on Hemodialysis via left lower arm AV fistula (positive bruit and thrill ) Pt attends [employer] (M ,W, F) Pt presents in pst with occlusion & stenosis bilateral carotid arteries. Pt seen by medical and cardiac for clearance to do Covid testing on Sunday. Spoke with Thai Daniel to hold Eliquis 48 hours before surgery but stay on low dose ASA. Pt scheduled for left carotid endarterectomy with patch angioplasty and EEG monitoring on 3/23/2023.

## 2023-03-17 NOTE — H&P PST ADULT - GENITOURINARY COMMENTS
not examined left lower arm AV fistula for hemodialysis ESRD on hemodialysis via left av ( positive bruit and thrill)

## 2023-03-17 NOTE — H&P PST ADULT - PROBLEM SELECTOR PLAN 5
Pt had MI, Stroke than had CABG with mitral valve replacement on 10/2021. Pt told to remain on low dose ASA but stop the Eliquis 48 hours prior to surgery. Pt and wife informed and written on pre op instructions.

## 2023-03-17 NOTE — H&P PST ADULT - PROBLEM SELECTOR PLAN 3
Scheduled for left carotid endarterectomy with patch angioplasty and EEG monitoring.    Pt instructed to be NPO the night before surgery and morning of except for PO meds to be taken with sip of water. Provided with chlorhexidene 4% solution to wash for 3 days including the morning of surgery. Written instruction provided and reviewed with wife and pt. Tylenol only to be used if needed .    Stop Bang score= 3 Pt is intermediate risk for ABDULLAHI.

## 2023-03-19 LAB — SARS-COV-2 N GENE NPH QL NAA+PROBE: NOT DETECTED

## 2023-03-22 ENCOUNTER — TRANSCRIPTION ENCOUNTER (OUTPATIENT)
Age: 63
End: 2023-03-22

## 2023-03-23 ENCOUNTER — RESULT REVIEW (OUTPATIENT)
Age: 63
End: 2023-03-23

## 2023-03-23 ENCOUNTER — APPOINTMENT (OUTPATIENT)
Dept: VASCULAR SURGERY | Facility: HOSPITAL | Age: 63
End: 2023-03-23
Payer: MEDICARE

## 2023-03-23 ENCOUNTER — INPATIENT (INPATIENT)
Facility: HOSPITAL | Age: 63
LOS: 3 days | Discharge: HOME | DRG: 37 | End: 2023-03-27
Attending: INTERNAL MEDICINE | Admitting: INTERNAL MEDICINE
Payer: MEDICARE

## 2023-03-23 VITALS
TEMPERATURE: 98 F | HEIGHT: 65 IN | OXYGEN SATURATION: 96 % | HEART RATE: 76 BPM | DIASTOLIC BLOOD PRESSURE: 98 MMHG | SYSTOLIC BLOOD PRESSURE: 197 MMHG | WEIGHT: 177.91 LBS | RESPIRATION RATE: 16 BRPM

## 2023-03-23 DIAGNOSIS — I77.0 ARTERIOVENOUS FISTULA, ACQUIRED: Chronic | ICD-10-CM

## 2023-03-23 DIAGNOSIS — I65.23 OCCLUSION AND STENOSIS OF BILATERAL CAROTID ARTERIES: ICD-10-CM

## 2023-03-23 DIAGNOSIS — Z95.1 PRESENCE OF AORTOCORONARY BYPASS GRAFT: Chronic | ICD-10-CM

## 2023-03-23 LAB
ALBUMIN SERPL ELPH-MCNC: 2.6 G/DL — LOW (ref 3.5–5)
ALP SERPL-CCNC: 63 U/L — SIGNIFICANT CHANGE UP (ref 40–120)
ALT FLD-CCNC: 14 U/L DA — SIGNIFICANT CHANGE UP (ref 10–60)
ANION GAP SERPL CALC-SCNC: 7 MMOL/L — SIGNIFICANT CHANGE UP (ref 5–17)
AST SERPL-CCNC: 15 U/L — SIGNIFICANT CHANGE UP (ref 10–40)
BASOPHILS # BLD AUTO: 0.04 K/UL — SIGNIFICANT CHANGE UP (ref 0–0.2)
BASOPHILS NFR BLD AUTO: 0.5 % — SIGNIFICANT CHANGE UP (ref 0–2)
BILIRUB SERPL-MCNC: 0.7 MG/DL — SIGNIFICANT CHANGE UP (ref 0.2–1.2)
BLD GP AB SCN SERPL QL: SIGNIFICANT CHANGE UP
BUN SERPL-MCNC: 45 MG/DL — HIGH (ref 7–18)
CALCIUM SERPL-MCNC: 8.3 MG/DL — LOW (ref 8.4–10.5)
CHLORIDE SERPL-SCNC: 102 MMOL/L — SIGNIFICANT CHANGE UP (ref 96–108)
CO2 SERPL-SCNC: 30 MMOL/L — SIGNIFICANT CHANGE UP (ref 22–31)
CREAT SERPL-MCNC: 6.23 MG/DL — HIGH (ref 0.5–1.3)
EGFR: 10 ML/MIN/1.73M2 — LOW
EOSINOPHIL # BLD AUTO: 1.09 K/UL — HIGH (ref 0–0.5)
EOSINOPHIL NFR BLD AUTO: 13.5 % — HIGH (ref 0–6)
GLUCOSE BLDC GLUCOMTR-MCNC: 100 MG/DL — HIGH (ref 70–99)
GLUCOSE SERPL-MCNC: 86 MG/DL — SIGNIFICANT CHANGE UP (ref 70–99)
HCT VFR BLD CALC: 26 % — LOW (ref 39–50)
HGB BLD-MCNC: 8.5 G/DL — LOW (ref 13–17)
IMM GRANULOCYTES NFR BLD AUTO: 0.4 % — SIGNIFICANT CHANGE UP (ref 0–0.9)
LYMPHOCYTES # BLD AUTO: 2.77 K/UL — SIGNIFICANT CHANGE UP (ref 1–3.3)
LYMPHOCYTES # BLD AUTO: 34.2 % — SIGNIFICANT CHANGE UP (ref 13–44)
MAGNESIUM SERPL-MCNC: 2.3 MG/DL — SIGNIFICANT CHANGE UP (ref 1.6–2.6)
MCHC RBC-ENTMCNC: 32.7 GM/DL — SIGNIFICANT CHANGE UP (ref 32–36)
MCHC RBC-ENTMCNC: 34.1 PG — HIGH (ref 27–34)
MCV RBC AUTO: 104.4 FL — HIGH (ref 80–100)
MONOCYTES # BLD AUTO: 0.93 K/UL — HIGH (ref 0–0.9)
MONOCYTES NFR BLD AUTO: 11.5 % — SIGNIFICANT CHANGE UP (ref 2–14)
NEUTROPHILS # BLD AUTO: 3.24 K/UL — SIGNIFICANT CHANGE UP (ref 1.8–7.4)
NEUTROPHILS NFR BLD AUTO: 39.9 % — LOW (ref 43–77)
NRBC # BLD: 0 /100 WBCS — SIGNIFICANT CHANGE UP (ref 0–0)
PHOSPHATE SERPL-MCNC: 4.8 MG/DL — HIGH (ref 2.5–4.5)
PLATELET # BLD AUTO: 56 K/UL — LOW (ref 150–400)
POTASSIUM SERPL-MCNC: 4.2 MMOL/L — SIGNIFICANT CHANGE UP (ref 3.5–5.3)
POTASSIUM SERPL-MCNC: 4.7 MMOL/L — SIGNIFICANT CHANGE UP (ref 3.5–5.3)
POTASSIUM SERPL-SCNC: 4.2 MMOL/L — SIGNIFICANT CHANGE UP (ref 3.5–5.3)
POTASSIUM SERPL-SCNC: 4.7 MMOL/L — SIGNIFICANT CHANGE UP (ref 3.5–5.3)
PROT SERPL-MCNC: 6.4 G/DL — SIGNIFICANT CHANGE UP (ref 6–8.3)
RBC # BLD: 2.49 M/UL — LOW (ref 4.2–5.8)
RBC # FLD: 13.6 % — SIGNIFICANT CHANGE UP (ref 10.3–14.5)
SODIUM SERPL-SCNC: 139 MMOL/L — SIGNIFICANT CHANGE UP (ref 135–145)
WBC # BLD: 8.1 K/UL — SIGNIFICANT CHANGE UP (ref 3.8–10.5)
WBC # FLD AUTO: 8.1 K/UL — SIGNIFICANT CHANGE UP (ref 3.8–10.5)

## 2023-03-23 PROCEDURE — 35301 RECHANNELING OF ARTERY: CPT | Mod: LT

## 2023-03-23 PROCEDURE — 88311 DECALCIFY TISSUE: CPT | Mod: 26

## 2023-03-23 PROCEDURE — 88305 TISSUE EXAM BY PATHOLOGIST: CPT | Mod: 26

## 2023-03-23 PROCEDURE — 93010 ELECTROCARDIOGRAM REPORT: CPT

## 2023-03-23 PROCEDURE — 88304 TISSUE EXAM BY PATHOLOGIST: CPT | Mod: 26

## 2023-03-23 DEVICE — CLIP APPLIER COVIDIEN SURGICLIP II 9.75" MEDIUM: Type: IMPLANTABLE DEVICE | Status: FUNCTIONAL

## 2023-03-23 DEVICE — CLIP APPLIER COVIDIEN SURGICLIP III 9" SM: Type: IMPLANTABLE DEVICE | Status: FUNCTIONAL

## 2023-03-23 DEVICE — SURGICEL POWDER 3 GRAMS: Type: IMPLANTABLE DEVICE | Status: FUNCTIONAL

## 2023-03-23 DEVICE — SURGICEL 4 X 8": Type: IMPLANTABLE DEVICE | Status: FUNCTIONAL

## 2023-03-23 DEVICE — SURGIFOAM PAD 8CM X 12.5CM X 2MM (100C): Type: IMPLANTABLE DEVICE | Status: FUNCTIONAL

## 2023-03-23 DEVICE — PATCH CAROTID UT 8X75MM: Type: IMPLANTABLE DEVICE | Status: FUNCTIONAL

## 2023-03-23 DEVICE — ARISTA 3GR: Type: IMPLANTABLE DEVICE | Status: FUNCTIONAL

## 2023-03-23 RX ORDER — INSULIN LISPRO 100/ML
VIAL (ML) SUBCUTANEOUS EVERY 6 HOURS
Refills: 0 | Status: ACTIVE | OUTPATIENT
Start: 2023-03-23 | End: 2024-02-19

## 2023-03-23 RX ORDER — DESMOPRESSIN ACETATE 0.1 MG/1
30 TABLET ORAL ONCE
Refills: 0 | Status: DISCONTINUED | OUTPATIENT
Start: 2023-03-23 | End: 2023-03-23

## 2023-03-23 RX ORDER — DEXTROSE 50 % IN WATER 50 %
25 SYRINGE (ML) INTRAVENOUS ONCE
Refills: 0 | Status: DISCONTINUED | OUTPATIENT
Start: 2023-03-23 | End: 2023-03-23

## 2023-03-23 RX ORDER — ASPIRIN/CALCIUM CARB/MAGNESIUM 324 MG
81 TABLET ORAL DAILY
Refills: 0 | Status: DISCONTINUED | OUTPATIENT
Start: 2023-03-23 | End: 2023-03-27

## 2023-03-23 RX ORDER — CHLORHEXIDINE GLUCONATE 213 G/1000ML
1 SOLUTION TOPICAL
Refills: 0 | Status: DISCONTINUED | OUTPATIENT
Start: 2023-03-23 | End: 2023-03-24

## 2023-03-23 RX ORDER — SODIUM CHLORIDE 9 MG/ML
250 INJECTION INTRAMUSCULAR; INTRAVENOUS; SUBCUTANEOUS ONCE
Refills: 0 | Status: COMPLETED | OUTPATIENT
Start: 2023-03-23 | End: 2023-03-23

## 2023-03-23 RX ORDER — SODIUM CHLORIDE 9 MG/ML
1000 INJECTION, SOLUTION INTRAVENOUS
Refills: 0 | Status: DISCONTINUED | OUTPATIENT
Start: 2023-03-23 | End: 2023-03-24

## 2023-03-23 RX ORDER — INSULIN GLARGINE 100 [IU]/ML
5 INJECTION, SOLUTION SUBCUTANEOUS
Qty: 0 | Refills: 0 | DISCHARGE

## 2023-03-23 RX ORDER — ACETAMINOPHEN 500 MG
1000 TABLET ORAL ONCE
Refills: 0 | Status: COMPLETED | OUTPATIENT
Start: 2023-03-23 | End: 2023-03-23

## 2023-03-23 RX ORDER — ASPIRIN/CALCIUM CARB/MAGNESIUM 324 MG
1 TABLET ORAL
Qty: 0 | Refills: 0 | DISCHARGE

## 2023-03-23 RX ORDER — HYDROMORPHONE HYDROCHLORIDE 2 MG/ML
0.5 INJECTION INTRAMUSCULAR; INTRAVENOUS; SUBCUTANEOUS
Refills: 0 | Status: DISCONTINUED | OUTPATIENT
Start: 2023-03-23 | End: 2023-03-27

## 2023-03-23 RX ORDER — SODIUM CHLORIDE 9 MG/ML
1000 INJECTION, SOLUTION INTRAVENOUS
Refills: 0 | Status: DISCONTINUED | OUTPATIENT
Start: 2023-03-23 | End: 2023-03-23

## 2023-03-23 RX ORDER — AMLODIPINE BESYLATE 2.5 MG/1
1 TABLET ORAL
Qty: 0 | Refills: 0 | DISCHARGE

## 2023-03-23 RX ORDER — PANTOPRAZOLE SODIUM 20 MG/1
40 TABLET, DELAYED RELEASE ORAL DAILY
Refills: 0 | Status: DISCONTINUED | OUTPATIENT
Start: 2023-03-23 | End: 2023-03-24

## 2023-03-23 RX ORDER — CHLORHEXIDINE GLUCONATE 213 G/1000ML
1 SOLUTION TOPICAL ONCE
Refills: 0 | Status: COMPLETED | OUTPATIENT
Start: 2023-03-23 | End: 2023-03-23

## 2023-03-23 RX ORDER — HYDRALAZINE HCL 50 MG
5 TABLET ORAL ONCE
Refills: 0 | Status: COMPLETED | OUTPATIENT
Start: 2023-03-23 | End: 2023-03-23

## 2023-03-23 RX ADMIN — SODIUM CHLORIDE 3 MILLILITER(S): 9 INJECTION INTRAMUSCULAR; INTRAVENOUS; SUBCUTANEOUS at 13:00

## 2023-03-23 RX ADMIN — SODIUM CHLORIDE 75 MILLILITER(S): 9 INJECTION, SOLUTION INTRAVENOUS at 11:33

## 2023-03-23 RX ADMIN — Medication 1000 MILLIGRAM(S): at 12:00

## 2023-03-23 RX ADMIN — Medication 81 MILLIGRAM(S): at 11:33

## 2023-03-23 RX ADMIN — Medication 400 MILLIGRAM(S): at 11:33

## 2023-03-23 RX ADMIN — CHLORHEXIDINE GLUCONATE 1 APPLICATION(S): 213 SOLUTION TOPICAL at 06:51

## 2023-03-23 RX ADMIN — SODIUM CHLORIDE 3 MILLILITER(S): 9 INJECTION INTRAMUSCULAR; INTRAVENOUS; SUBCUTANEOUS at 21:01

## 2023-03-23 RX ADMIN — Medication 1 DROP(S): at 22:12

## 2023-03-23 RX ADMIN — SODIUM CHLORIDE 250 MILLILITER(S): 9 INJECTION INTRAMUSCULAR; INTRAVENOUS; SUBCUTANEOUS at 13:18

## 2023-03-23 NOTE — CHART NOTE - NSCHARTNOTEFT_GEN_A_CORE
Pt POD 0 s/p L CEA  comfortable recovering in ICU    ICU Vital Signs Last 24 Hrs  T(C): 36.2 (23 Mar 2023 20:00), Max: 36.8 (23 Mar 2023 11:03)  T(F): 97.2 (23 Mar 2023 20:00), Max: 98.3 (23 Mar 2023 11:03)  HR: 61 (23 Mar 2023 22:00) (40 - 82)  BP: 149/57 (23 Mar 2023 22:00) (106/44 - 197/98)  BP(mean): 81 (23 Mar 2023 22:00) (59 - 83)  ABP: 162/55 (23 Mar 2023 22:00) (101/42 - 168/60)  ABP(mean): 90 (23 Mar 2023 22:00) (61 - 96)  RR: 15 (23 Mar 2023 22:00) (13 - 26)  SpO2: 96% (23 Mar 2023 22:00) (92% - 100%)    O2 Parameters below as of 23 Mar 2023 19:00  Patient On (Oxygen Delivery Method): room air        L neck dressing CDI, no hematoma  good b/l  strength  tongue midline  smile symmetric    stable post-op

## 2023-03-23 NOTE — H&P ADULT - HISTORY OF PRESENT ILLNESS
Patient is a 61 yo M, lives with family, from home, with med hx significant for HTN, HLD, DM, CAD s/p CABG x 2 2021, stroke s/p CABG (no residual weakness), hx of Mitral valve replacement, ESRD (MWF at [place], last dialysis 3/22, LUE AV Fistula), Left carotid stenosis s/p Carotid endarterectomy with repair using patch graft today 3/23 admitted to ICU for close post op monitoring with frequent neurochecks. EBL minimal, pt was given 300cc bolus, 1 dose Ancef, has 1 drain. Goal SBP during post op stay 120-160s, with Right radial A line. Patient currently feeling comfortable, no chest pain, SOB at this time.

## 2023-03-23 NOTE — H&P ADULT - ATTENDING COMMENTS
62 yr old male PMH HTN, HDL, diabetes on insulin , CAD had MI in 10/5/2021 ,stroke s/p CABG mitral valve replacement 10/21/2021. Admitted to the ICU post OP after left carotid endarterectomy.     Assessment:  1. Left carotid stenosis  2. Sinus bradycardia   3. HTN  4. HLD   5. DM type 2   6. CAD     Plan:  - Admit to ICU for post op and airway monitoring  - Monitor hemodynamics   - Avoid SBP > 160   - NPO until cleared by surgery   - Close neurovascular monitoring  - Pain control  - Monitor drain output  - Glucose monitoring with coverage for hyperglycemia  - Restart oral meds once able to tolerate PO  - Nephrology consult for HD  - Hold AC until cleared by surgery  - Non chemical DVT prophylaxis

## 2023-03-23 NOTE — PATIENT PROFILE ADULT - FALL HARM RISK - HARM RISK INTERVENTIONS

## 2023-03-23 NOTE — ASU PREOP CHECKLIST - SELECT TESTS ORDERED
T&s and Potassium sent stat to the lab by BLANCHE Beatty(as per np order)/Type and Screen/Results in MD note/POCT Blood Glucose/COVID-19

## 2023-03-23 NOTE — BRIEF OPERATIVE NOTE - NSICDXBRIEFPROCEDURE_GEN_ALL_CORE_FT
PROCEDURES:  Carotid endarterectomy with repair using patch graft 23-Mar-2023 11:01:14  Cheryl Nolan

## 2023-03-23 NOTE — H&P ADULT - NSHPPHYSICALEXAM_GEN_ALL_CORE
PHYSICAL EXAM:  GENERAL: NAD, well-developed  HEAD:  Atraumatic, Normocephalic  EYES: EOMI, PERRLA, conjunctiva and sclera clear  NECK: Supple, No JVD  CHEST/LUNG: Clear to auscultation bilaterally; subcutaneous drain left chest+  HEART: Regular rate and rhythm; s1+ s2+  ABDOMEN: Soft, Nontender, Nondistended; Bowel sounds present  EXTREMITIES:, No clubbing, cyanosis, or edema  NEUROLOGY: AAOx3 non-focal  SKIN: No rashes or lesions PHYSICAL EXAM:  GENERAL: NAD, well-developed  HEAD:  Atraumatic, Normocephalic  EYES: EOMI, PERRLA, conjunctiva and sclera clear  NECK: Supple, No JVD  CHEST/LUNG: Clear to auscultation bilaterally; subcutaneous drain left chest+  HEART: Regular rate and rhythm; s1+ s2+  ABDOMEN: Soft, Nontender, Nondistended; Bowel sounds present  EXTREMITIES:, No clubbing, cyanosis, or edema. left lower arm fistula+  NEUROLOGY: AAOx3 non-focal  SKIN: No rashes or lesions

## 2023-03-23 NOTE — BRIEF OPERATIVE NOTE - OPERATION/FINDINGS
Left carotid endarterectomy with patch repair and intraop neuro monitoring. Small black mass noted adjacent to carotid, resected and sent for pathology. Motor/sensation intact in 4 extremities post op.

## 2023-03-23 NOTE — CONSULT NOTE ADULT - SUBJECTIVE AND OBJECTIVE BOX
Chief complain/HPI  Patient is a 61 yo M, lives with family, from home, with med hx significant for HTN, HLD, DM, CAD s/p CABG x 2 2021, stroke s/p CABG (no residual weakness), hx of Mitral valve replacement, ESRD (MWF at [place], last dialysis 3/22, LUE AV Fistula), Left carotid stenosis s/p Carotid endarterectomy with repair using patch graft today 3/23 admitted to ICU for close post op monitoring with frequent neurochecks. EBL minimal, pt was given 300cc bolus, 1 dose Ancef, has 1 drain. Goal SBP during post op stay 120-160s, with Right radial A line. Patient currently feeling comfortable, no chest pain, SOB at this time.    * Patient Currently Takes Medications as of 23-Mar-2023 06:38 documented in Structured Notes  · 	pantoprazole 40 mg oral delayed release tablet: Last Dose Taken: 21-Mar-2023 , 1 tab(s) orally once a day (before a meal)  · 	apixaban 2.5 mg oral tablet: Last Dose Taken: 21-Mar-2023 , 1 tab(s) orally 2 times a day  · 	amLODIPine 10 mg oral tablet: Last Dose Taken: 21-Mar-2023 , 1 tab(s) orally once a day  · 	Aspirin Enteric Coated 81 mg oral delayed release tablet: Last Dose Taken: 21-Mar-2023 , 1 tab(s) orally once a day  · 	insulin glargine 100 units/mL subcutaneous solution: Last Dose Taken: 20-Mar-2023 , 5 unit(s) subcutaneous once a day (at bedtime)  · 	Eliquis 2.5 mg oral tablet: Last Dose Taken: 21-Mar-2023 , 1 tab(s) orally once a day  · 	carvedilol 6.25 mg oral tablet: Last Dose Taken: 21-Mar-2023 , 1 tab(s) orally 2 times a day  · 	Triphrocaps oral capsule: Last Dose Taken: 21-Mar-2023 , 1 cap(s) orally once a day    PAST MEDICAL & SURGICAL HISTORY:  Hypertension      Hyperlipidemia      Diabetes mellitus      End stage renal disease      CAD (coronary artery disease)      Stroke  right hemiplegia      Occlusion and stenosis of bilateral carotid arteries      S/P CABG (coronary artery bypass graft)  Mitral valve replacement on 10/21/2021      AV (arteriovenous fistula)  h/o creation (failed)          Home Medications Reviewed    Hospital Medications:   MEDICATIONS  (STANDING):  aspirin  chewable 81 milliGRAM(s) Oral daily  chlorhexidine 2% Cloths 1 Application(s) Topical <User Schedule>  insulin lispro (ADMELOG) corrective regimen sliding scale   SubCutaneous every 6 hours  pantoprazole  Injectable 40 milliGRAM(s) IV Push daily  sodium chloride 0.9% lock flush 3 milliLiter(s) IV Push every 8 hours    MEDICATIONS  (PRN):  HYDROmorphone  Injectable 0.5 milliGRAM(s) IV Push every 10 minutes PRN Moderate Pain (4 - 6)      Allergies    No Known Allergies    Intolerances                              8.5    8.10  )-----------( 56       ( 23 Mar 2023 12:18 )             26.0     03-23    139  |  102  |  45<H>  ----------------------------<  86  4.2   |  30  |  6.23<H>    Ca    8.3<L>      23 Mar 2023 12:18  Phos  4.8     03-23  Mg     2.3     03-23    TPro  6.4  /  Alb  2.6<L>  /  TBili  0.7  /  DBili  x   /  AST  15  /  ALT  14  /  AlkPhos  63  03-23              RADIOLOGY & ADDITIONAL STUDIES:    SOCIAL HISTORY: Denies ETOh,Smoking,     FAMILY HISTORY:  Family history of CVA (Father)        REVIEW OF SYSTEMS:  CONSTITUTIONAL: No malaise, No fatigue, No fevers or chills, well developed, no diaphoresis  EYES/ENT: No visual changes;  No vertigo or throat pain   NECK: No pain or stiffness  RESPIRATORY: No cough, wheezing, hemoptysis; No shortness of breath  CARDIOVASCULAR: No chest pain or palpitations. No edema  GASTROINTESTINAL: No abdominal or epigastric pain. No nausea, vomiting, or hematemesis; No diarrhea or constipation. No melena or hematochezia.  GENITOURINARY: No dysuria, frequency, foamy urine, urinary urgency, incontinence or hematuria  NEUROLOGICAL: No numbness or weakness, No tremor  SKIN: No itching, burning, rashes, or lesions   VASCULAR: No claudication  Musculoskeletal: no arthralgia, no myalgia  All other review of systems is negative unless indicated above.    VITALS:  Vital Signs Last 24 Hrs  T(C): 36.6 (23 Mar 2023 15:29), Max: 36.8 (23 Mar 2023 11:03)  T(F): 97.9 (23 Mar 2023 15:29), Max: 98.3 (23 Mar 2023 11:03)  HR: 43 (23 Mar 2023 14:00) (43 - 76)  BP: 114/48 (23 Mar 2023 14:00) (106/44 - 197/98)  BP(mean): 63 (23 Mar 2023 14:00) (59 - 68)  RR: 15 (23 Mar 2023 14:00) (15 - 26)  SpO2: 99% (23 Mar 2023 14:00) (96% - 100%)    Parameters below as of 23 Mar 2023 11:03  Patient On (Oxygen Delivery Method): nasal cannula  O2 Flow (L/min): 2      03-23 @ 07:01  -  03-23 @ 16:26  --------------------------------------------------------  IN: 325 mL / OUT: 0 mL / NET: 325 mL      Height (cm): 165.1 (03-23 @ 06:52)  Weight (kg): 80.7 (03-23 @ 06:52)  BMI (kg/m2): 29.6 (03-23 @ 06:52)  BSA (m2): 1.88 (03-23 @ 06:52)    PHYSICAL EXAM:  Constitutional: NAD  HEENT: anicteric sclera, oropharynx clear, MMM  Neck: No JVD  Respiratory: good air entrance B/L, no wheezes, rales or rhonchi  Cardiovascular: S1, S2, RRR, no pericardial rub, no murmur  Gastrointestinal: BS+, soft, no tenderness, no distension, no bruit  Pelvis: bladder non-distended, no CVA tenderness  Extremities: No cyanosis or clubbing. No peripheral edema  Neurological: A/O x 3, no focal deficits  Psychiatric: Normal mood, normal affect  : No CVA tenderness. No soliz.   Skin: No rashes  Vascular: all pulses present  Access:                     Chief complain/HPI  Patient is a 63 yo M, lives with family, from home, with med hx significant for HTN, HLD, DM, CAD s/p CABG x 2 2021, stroke s/p CABG (no residual weakness), hx of Mitral valve replacement, ESRD (MWF at [place], last dialysis 3/22, LUE AV Fistula), Left carotid stenosis s/p Carotid endarterectomy with repair using patch graft today 3/23 admitted to ICU for close post op monitoring with frequent neurochecks. EBL minimal, pt was given 300cc bolus, 1 dose Ancef, has 1 drain. Goal SBP during post op stay 120-160s, with Right radial A line. Patient currently feeling comfortable, no chest pain, SOB at this time.    * Patient Currently Takes Medications as of 23-Mar-2023 06:38 documented in Structured Notes  · 	pantoprazole 40 mg oral delayed release tablet: Last Dose Taken: 21-Mar-2023 , 1 tab(s) orally once a day (before a meal)  · 	apixaban 2.5 mg oral tablet: Last Dose Taken: 21-Mar-2023 , 1 tab(s) orally 2 times a day  · 	amLODIPine 10 mg oral tablet: Last Dose Taken: 21-Mar-2023 , 1 tab(s) orally once a day  · 	Aspirin Enteric Coated 81 mg oral delayed release tablet: Last Dose Taken: 21-Mar-2023 , 1 tab(s) orally once a day  · 	insulin glargine 100 units/mL subcutaneous solution: Last Dose Taken: 20-Mar-2023 , 5 unit(s) subcutaneous once a day (at bedtime)  · 	Eliquis 2.5 mg oral tablet: Last Dose Taken: 21-Mar-2023 , 1 tab(s) orally once a day  · 	carvedilol 6.25 mg oral tablet: Last Dose Taken: 21-Mar-2023 , 1 tab(s) orally 2 times a day  · 	Triphrocaps oral capsule: Last Dose Taken: 21-Mar-2023 , 1 cap(s) orally once a day    PAST MEDICAL & SURGICAL HISTORY:  Hypertension      Hyperlipidemia      Diabetes mellitus      End stage renal disease      CAD (coronary artery disease)      Stroke  right hemiplegia      Occlusion and stenosis of bilateral carotid arteries      S/P CABG (coronary artery bypass graft)  Mitral valve replacement on 10/21/2021      AV (arteriovenous fistula)  h/o creation (failed)    Memory loss      current smoker 2 cigarettes a day.        Hospital Medications:   MEDICATIONS  (STANDING):  aspirin  chewable 81 milliGRAM(s) Oral daily  chlorhexidine 2% Cloths 1 Application(s) Topical <User Schedule>  insulin lispro (ADMELOG) corrective regimen sliding scale   SubCutaneous every 6 hours  pantoprazole  Injectable 40 milliGRAM(s) IV Push daily  sodium chloride 0.9% lock flush 3 milliLiter(s) IV Push every 8 hours    MEDICATIONS  (PRN):  HYDROmorphone  Injectable 0.5 milliGRAM(s) IV Push every 10 minutes PRN Moderate Pain (4 - 6)      Allergies    No Known Allergies    Intolerances                              8.5    8.10  )-----------( 56       ( 23 Mar 2023 12:18 )             26.0     03-23    139  |  102  |  45<H>  ----------------------------<  86  4.2   |  30  |  6.23<H>    Ca    8.3<L>      23 Mar 2023 12:18  Phos  4.8     03-23  Mg     2.3     03-23    TPro  6.4  /  Alb  2.6<L>  /  TBili  0.7  /  DBili  x   /  AST  15  /  ALT  14  /  AlkPhos  63  03-23              RADIOLOGY & ADDITIONAL STUDIES:    SOCIAL HISTORY: Denies ETOh,Smoking,     FAMILY HISTORY:  Family history of CVA (Father)        REVIEW OF SYSTEMS:  CONSTITUTIONAL: No malaise, No fatigue, No fevers or chills, well developed, no diaphoresis    RESPIRATORY: No cough, wheezing, hemoptysis; No shortness of breath  CARDIOVASCULAR: No chest pain or palpitations. No edema  GASTROINTESTINAL: No abdominal or epigastric pain. No nausea, vomiting, or hematemesis; No diarrhea or constipation. No melena or hematochezia.  GENITOURINARY: No dysuria, frequency, foamy urine, urinary urgency, incontinence or hematuria      VITALS:  Vital Signs Last 24 Hrs  T(C): 36.6 (23 Mar 2023 15:29), Max: 36.8 (23 Mar 2023 11:03)  T(F): 97.9 (23 Mar 2023 15:29), Max: 98.3 (23 Mar 2023 11:03)  HR: 43 (23 Mar 2023 14:00) (43 - 76)  BP: 114/48 (23 Mar 2023 14:00) (106/44 - 197/98)  BP(mean): 63 (23 Mar 2023 14:00) (59 - 68)  RR: 15 (23 Mar 2023 14:00) (15 - 26)  SpO2: 99% (23 Mar 2023 14:00) (96% - 100%)    Parameters below as of 23 Mar 2023 11:03  Patient On (Oxygen Delivery Method): nasal cannula  O2 Flow (L/min): 2      03-23 @ 07:01  -  03-23 @ 16:26  --------------------------------------------------------  IN: 325 mL / OUT: 0 mL / NET: 325 mL      Height (cm): 165.1 (03-23 @ 06:52)  Weight (kg): 80.7 (03-23 @ 06:52)  BMI (kg/m2): 29.6 (03-23 @ 06:52)  BSA (m2): 1.88 (03-23 @ 06:52)    PHYSICAL EXAM:  Constitutional: NAD  HEENT: anicteric sclera, oropharynx clear, MMM  Neck: No JVD  Respiratory:  air entrance B/L, no wheezes, rales or rhonchi  Cardiovascular: S1, S2, RRR, no pericardial rub, no murmur  Gastrointestinal: BS+, soft, no tenderness, no distension, no bruit  Pelvis: bladder non-distended, no CVA tenderness  Extremities: No cyanosis or clubbing. No peripheral edema  Neurological: not oriented to time    Access :left AVF with bruit and thrill

## 2023-03-23 NOTE — H&P ADULT - ASSESSMENT
Patient is a 61 yo M, lives with family, from home, with med hx significant for HTN, HLD, DM, CAD s/p CABG x 2 2021, stroke s/p CABG (no residual weakness), hx of Mitral valve replacement, ESRD (MWF at [place], last dialysis 3/22, LUE AV Fistula), Left carotid stenosis s/p Carotid endarterectomy with repair using patch graft today 3/23 admitted to ICU for close post op monitoring with frequent neurochecks.     ASSESSMENT and PLAN  - Left carotid stenosis s/p Carotid endarterectomy    - ESRD   - CAD s/p CABG  - HTN  - HLD  - hx of Mitral valve replacement      Neuro:  #Left carotid stenosis s/p Carotid endarterectomy  Neurochecks Q1    Cardiovascular:    Pulmonary:     Infectious Diseases:    Gastrointestinal:    Renal:  #ESRD  Pt has dialysis sessions at [facility], last dialysis yesterday 3/23, patient not fluid overloaded currently  received 300cc bolus intra op  Dr. Brush consulted    Endo:    Heme/onc:     Skin/ catheter:     Prophylaxis:     Goals of Care:     Dispo:   Patient is a 63 yo M, lives with family, from home, with med hx significant for HTN, HLD, DM, CAD s/p CABG x 2 2021, stroke s/p CABG (no residual weakness), hx of Mitral valve replacement, ESRD (MWF at [place], last dialysis 3/22, LUE AV Fistula), Left carotid stenosis s/p Carotid endarterectomy with repair using patch graft today 3/23 admitted to ICU for close post op monitoring with frequent neurochecks.     ASSESSMENT and PLAN  - Left carotid stenosis s/p Carotid endarterectomy    - ESRD   - CAD s/p CABG  - HTN  - HLD  - hx of Mitral valve replacement      Neuro:  #Left carotid stenosis s/p Carotid endarterectomy  Neurochecks Q1 while in ICU  SQ drain placed in left upper chest    Cardiovascular:  #CAD s/p CABG  c/w ASA    Pulmonary:     Infectious Diseases:    Gastrointestinal:    Renal:  #ESRD  Pt has dialysis sessions at [facility], last dialysis yesterday 3/23, patient not fluid overloaded currently  received 300cc bolus intra op  Dr. Brush consulted    Endo:    Heme/onc:     Skin/ catheter:     Prophylaxis:     Goals of Care:     Dispo:   Patient is a 63 yo M, lives with family, from home, with med hx significant for HTN, HLD, DM, CAD s/p CABG x 2 2021, stroke s/p CABG (no residual weakness), hx of Mitral valve replacement, ESRD (MWF at [place], last dialysis 3/22, LUE AV Fistula), Left carotid stenosis s/p Carotid endarterectomy with repair using patch graft today 3/23 admitted to ICU for close post op monitoring with frequent neurochecks.     ASSESSMENT and PLAN  - Left carotid stenosis s/p Carotid endarterectomy    - ESRD   - CAD s/p CABG  - HTN  - HLD  - hx of Mitral valve replacement  - DM    Neuro:  #Left carotid stenosis s/p Carotid endarterectomy  Neurochecks Q1 while in ICU  SQ drain placed in left upper chest    Cardiovascular:  #CAD s/p CABG  c/w ASA, not on statin  Hold coreg due to borderline BP    #mitral valve replacement  Will start Eliquis 2.5 mg after 24 hrs post op    Pulmonary:   No acute issues    Infectious Diseases:  No acute issues    Gastrointestinal:  No acute issues, NPO except meds for now    Renal:  #ESRD  Pt has dialysis sessions at [place], last dialysis yesterday 3/23, patient not fluid overloaded currently  received 300cc bolus intra op  Dr. Brush Nephro consulted    Endo:  #DM  no recent A1c, takes 5 units Lantus at home  Will start Insulin q6 hrs while NPO    Heme/onc:   No acute issues    Skin/ catheter: Right radial Irma    Prophylaxis: SCDs post op today, Protonix    Goals of Care:     Dispo:   Patient is a 61 yo M, lives with family, from home, with med hx significant for HTN, HLD, DM, CAD s/p CABG x 2 2021, stroke s/p CABG (no residual weakness), hx of Mitral valve replacement, ESRD (MWF at [place], last dialysis 3/22, LUE AV Fistula), Left carotid stenosis s/p Carotid endarterectomy with repair using patch graft today 3/23 admitted to ICU for close post op monitoring with frequent neurochecks.     ASSESSMENT and PLAN  - Left carotid stenosis s/p Carotid endarterectomy    - ESRD   - CAD s/p CABG  - HTN  - HLD  - hx of Mitral valve replacement  - DM    Neuro:  #Left carotid stenosis s/p Carotid endarterectomy  Neurochecks Q1 while in ICU  SQ drain placed in left upper chest    Cardiovascular:  #CAD s/p CABG  c/w ASA, not on statin  Hold coreg due to borderline BP    #mitral valve replacement  Will start Eliquis 2.5 mg after 24 hrs post op    Pulmonary:   No acute issues    Infectious Diseases:  No acute issues    Gastrointestinal:  No acute issues, NPO except meds for now    Renal:  #ESRD  Pt has dialysis sessions at [place], last dialysis yesterday 3/23, patient not fluid overloaded currently  received 300cc bolus intra op  Dr. Brush Nephro consulted    Endo:  #DM  no recent A1c, takes 5 units Lantus at home  Will start Insulin q6 hrs while NPO    Heme/onc:   No acute issues    Skin/ catheter: Right radial Tyrone    Prophylaxis: SCDs post op today, Protonix   Patient is a 63 yo M, lives with family, from home, with med hx significant for HTN, HLD, DM, CAD s/p CABG x 2 2021, stroke s/p CABG (no residual weakness), hx of Mitral valve replacement, ESRD (MWF at [place], last dialysis 3/22, LUE AV Fistula), Left carotid stenosis s/p Carotid endarterectomy with repair using patch graft today 3/23 admitted to ICU for close post op monitoring with frequent neurochecks.     ASSESSMENT and PLAN  - Left carotid stenosis s/p Carotid endarterectomy    - ESRD   - CAD s/p CABG  - HTN  - HLD  - hx of Mitral valve replacement  - DM    Neuro:  #Left carotid stenosis s/p Carotid endarterectomy  Neurochecks Q1 while in ICU  SQ drain placed in left upper chest    Cardiovascular:  #CAD s/p CABG  c/w ASA, not on statin  Hold coreg due to borderline BP    #mitral valve replacement  Will start Eliquis 2.5 mg after 24 hrs post op    #HTN  hold coreg and amlodipine given borderline BP    Pulmonary:   No acute issues    Infectious Diseases:  No acute issues    Gastrointestinal:  No acute issues, NPO except meds for now    Renal:  #ESRD  Pt has dialysis sessions at [place], last dialysis yesterday 3/23, patient not fluid overloaded currently  received 300cc bolus intra op, 250cc in ICU today, will monitor BP  Dr. Brush Nephro consulted    Endo:  #DM  no recent A1c, takes 5 units Lantus at home  Will start Insulin q6 hrs while NPO    Heme/onc:   No acute issues    Skin/ catheter: Right radial Shauna    Prophylaxis: SCDs post op today, Protonix

## 2023-03-24 ENCOUNTER — TRANSCRIPTION ENCOUNTER (OUTPATIENT)
Age: 63
End: 2023-03-24

## 2023-03-24 LAB
ALBUMIN SERPL ELPH-MCNC: 2.9 G/DL — LOW (ref 3.5–5)
ALP SERPL-CCNC: 75 U/L — SIGNIFICANT CHANGE UP (ref 40–120)
ALT FLD-CCNC: 11 U/L DA — SIGNIFICANT CHANGE UP (ref 10–60)
ANION GAP SERPL CALC-SCNC: 10 MMOL/L — SIGNIFICANT CHANGE UP (ref 5–17)
AST SERPL-CCNC: 16 U/L — SIGNIFICANT CHANGE UP (ref 10–40)
BILIRUB SERPL-MCNC: 0.5 MG/DL — SIGNIFICANT CHANGE UP (ref 0.2–1.2)
BUN SERPL-MCNC: 50 MG/DL — HIGH (ref 7–18)
CALCIUM SERPL-MCNC: 8.4 MG/DL — SIGNIFICANT CHANGE UP (ref 8.4–10.5)
CHLORIDE SERPL-SCNC: 98 MMOL/L — SIGNIFICANT CHANGE UP (ref 96–108)
CO2 SERPL-SCNC: 26 MMOL/L — SIGNIFICANT CHANGE UP (ref 22–31)
CREAT SERPL-MCNC: 7.21 MG/DL — HIGH (ref 0.5–1.3)
EGFR: 8 ML/MIN/1.73M2 — LOW
FOLATE SERPL-MCNC: 16.2 NG/ML — SIGNIFICANT CHANGE UP
GLUCOSE BLDC GLUCOMTR-MCNC: 154 MG/DL — HIGH (ref 70–99)
GLUCOSE SERPL-MCNC: 97 MG/DL — SIGNIFICANT CHANGE UP (ref 70–99)
HBV CORE AB SER-ACNC: SIGNIFICANT CHANGE UP
HBV SURFACE AB SER-ACNC: REACTIVE
HBV SURFACE AG SER-ACNC: SIGNIFICANT CHANGE UP
HCT VFR BLD CALC: 28.3 % — LOW (ref 39–50)
HCV AB S/CO SERPL IA: 0.3 S/CO — SIGNIFICANT CHANGE UP (ref 0–0.99)
HCV AB SERPL-IMP: SIGNIFICANT CHANGE UP
HGB BLD-MCNC: 9.1 G/DL — LOW (ref 13–17)
MAGNESIUM SERPL-MCNC: 2.4 MG/DL — SIGNIFICANT CHANGE UP (ref 1.6–2.6)
MCHC RBC-ENTMCNC: 32.2 GM/DL — SIGNIFICANT CHANGE UP (ref 32–36)
MCHC RBC-ENTMCNC: 33.5 PG — SIGNIFICANT CHANGE UP (ref 27–34)
MCV RBC AUTO: 104 FL — HIGH (ref 80–100)
MRSA PCR RESULT.: SIGNIFICANT CHANGE UP
NRBC # BLD: 0 /100 WBCS — SIGNIFICANT CHANGE UP (ref 0–0)
PHOSPHATE SERPL-MCNC: 5.8 MG/DL — HIGH (ref 2.5–4.5)
PLATELET # BLD AUTO: 65 K/UL — LOW (ref 150–400)
POTASSIUM SERPL-MCNC: 4.9 MMOL/L — SIGNIFICANT CHANGE UP (ref 3.5–5.3)
POTASSIUM SERPL-SCNC: 4.9 MMOL/L — SIGNIFICANT CHANGE UP (ref 3.5–5.3)
PROT SERPL-MCNC: 6.9 G/DL — SIGNIFICANT CHANGE UP (ref 6–8.3)
RBC # BLD: 2.72 M/UL — LOW (ref 4.2–5.8)
RBC # FLD: 13.5 % — SIGNIFICANT CHANGE UP (ref 10.3–14.5)
S AUREUS DNA NOSE QL NAA+PROBE: SIGNIFICANT CHANGE UP
SODIUM SERPL-SCNC: 134 MMOL/L — LOW (ref 135–145)
VIT B12 SERPL-MCNC: 775 PG/ML — SIGNIFICANT CHANGE UP (ref 232–1245)
WBC # BLD: 6.87 K/UL — SIGNIFICANT CHANGE UP (ref 3.8–10.5)
WBC # FLD AUTO: 6.87 K/UL — SIGNIFICANT CHANGE UP (ref 3.8–10.5)

## 2023-03-24 PROCEDURE — 76536 US EXAM OF HEAD AND NECK: CPT | Mod: 26

## 2023-03-24 RX ORDER — PANTOPRAZOLE SODIUM 20 MG/1
40 TABLET, DELAYED RELEASE ORAL
Refills: 0 | Status: DISCONTINUED | OUTPATIENT
Start: 2023-03-24 | End: 2023-03-27

## 2023-03-24 RX ORDER — HYDRALAZINE HCL 50 MG
5 TABLET ORAL ONCE
Refills: 0 | Status: COMPLETED | OUTPATIENT
Start: 2023-03-24 | End: 2023-03-24

## 2023-03-24 RX ORDER — ERYTHROPOIETIN 10000 [IU]/ML
4000 INJECTION, SOLUTION INTRAVENOUS; SUBCUTANEOUS
Refills: 0 | Status: DISCONTINUED | OUTPATIENT
Start: 2023-03-24 | End: 2023-03-27

## 2023-03-24 RX ORDER — AMLODIPINE BESYLATE 2.5 MG/1
10 TABLET ORAL DAILY
Refills: 0 | Status: DISCONTINUED | OUTPATIENT
Start: 2023-03-24 | End: 2023-03-27

## 2023-03-24 RX ADMIN — Medication 1 DROP(S): at 05:53

## 2023-03-24 RX ADMIN — Medication 5 MILLIGRAM(S): at 03:27

## 2023-03-24 RX ADMIN — AMLODIPINE BESYLATE 10 MILLIGRAM(S): 2.5 TABLET ORAL at 10:05

## 2023-03-24 RX ADMIN — PANTOPRAZOLE SODIUM 40 MILLIGRAM(S): 20 TABLET, DELAYED RELEASE ORAL at 11:23

## 2023-03-24 RX ADMIN — ERYTHROPOIETIN 4000 UNIT(S): 10000 INJECTION, SOLUTION INTRAVENOUS; SUBCUTANEOUS at 16:16

## 2023-03-24 RX ADMIN — Medication 5 MILLIGRAM(S): at 06:55

## 2023-03-24 RX ADMIN — Medication 5 MILLIGRAM(S): at 00:04

## 2023-03-24 RX ADMIN — Medication 5 MILLIGRAM(S): at 11:21

## 2023-03-24 RX ADMIN — Medication 81 MILLIGRAM(S): at 11:23

## 2023-03-24 RX ADMIN — SODIUM CHLORIDE 3 MILLILITER(S): 9 INJECTION INTRAMUSCULAR; INTRAVENOUS; SUBCUTANEOUS at 05:37

## 2023-03-24 RX ADMIN — CHLORHEXIDINE GLUCONATE 1 APPLICATION(S): 213 SOLUTION TOPICAL at 05:52

## 2023-03-24 RX ADMIN — SODIUM CHLORIDE 75 MILLILITER(S): 9 INJECTION, SOLUTION INTRAVENOUS at 00:48

## 2023-03-24 NOTE — DISCHARGE NOTE PROVIDER - PROVIDER TOKENS
PROVIDER:[TOKEN:[[id number]],FOLLOWUP:[1 week]],PROVIDER:[TOKEN:[[id number]]] PROVIDER:[TOKEN:[[id number]],FOLLOWUP:[1 week]],PROVIDER:[TOKEN:[[id number]]],PROVIDER:[TOKEN:[[id number]],FOLLOWUP:[1 week]]

## 2023-03-24 NOTE — DISCHARGE NOTE PROVIDER - CARE PROVIDERS DIRECT ADDRESSES
,DirectAddress_Unknown,[email or web address] ,DirectAddress_Unknown,[email or web address],[email or web address]

## 2023-03-24 NOTE — PROGRESS NOTE ADULT - SUBJECTIVE AND OBJECTIVE BOX
SURGERY DAILY PROGRESS NOTE:     SUBJECTIVE/24hr Events:     Patient seen and examined on am rounds. Pt feels well. Pain well controlled. SBP 170s-180s overn require hydralizine pushes x3. Now improved.  Denies chest pain, shortness of breath, nausea, vomiting, fever chills, weakness/numbness or paresthesias, or vision changes     OBJECTIVE:    MEDICATIONS  (STANDING):  artificial  tears Solution 1 Drop(s) Both EYES three times a day  aspirin  chewable 81 milliGRAM(s) Oral daily  chlorhexidine 2% Cloths 1 Application(s) Topical <User Schedule>  epoetin vern-epbx (RETACRIT) Injectable 4000 Unit(s) IV Push <User Schedule>  insulin lispro (ADMELOG) corrective regimen sliding scale   SubCutaneous every 6 hours  pantoprazole  Injectable 40 milliGRAM(s) IV Push daily  sodium chloride 0.9% lock flush 3 milliLiter(s) IV Push every 8 hours    MEDICATIONS  (PRN):  HYDROmorphone  Injectable 0.5 milliGRAM(s) IV Push every 10 minutes PRN Moderate Pain (4 - 6)      Vital Signs Last 24 Hrs  T(C): 36.3 (24 Mar 2023 08:00), Max: 36.8 (23 Mar 2023 11:03)  T(F): 97.4 (24 Mar 2023 08:00), Max: 98.3 (23 Mar 2023 11:03)  HR: 65 (24 Mar 2023 08:00) (40 - 82)  BP: 149/65 (24 Mar 2023 08:00) (106/44 - 169/62)  BP(mean): 83 (24 Mar 2023 08:00) (59 - 91)  RR: 16 (24 Mar 2023 08:00) (13 - 26)  SpO2: 100% (24 Mar 2023 08:00) (91% - 100%)    Parameters below as of 24 Mar 2023 04:00  Patient On (Oxygen Delivery Method): room air          I&O's Detail    23 Mar 2023 07:01  -  24 Mar 2023 07:00  --------------------------------------------------------  IN:    dextrose 5%: 600 mL    IV PiggyBack: 250 mL    Lactated Ringers: 75 mL    Oral Fluid: 35 mL  Total IN: 960 mL    OUT:    Bulb (mL): 65 mL  Total OUT: 65 mL    Total NET: 895 mL            Daily     Daily Weight in k.1 (24 Mar 2023 08:00)          LABS:                        9.1    6.87  )-----------( 65       ( 24 Mar 2023 04:07 )             28.3     03-24    134<L>  |  98  |  50<H>  ----------------------------<  97  4.9   |  26  |  7.21<H>    Ca    8.4      24 Mar 2023 04:07  Phos  5.8     -  Mg     2.4     -    TPro  6.9  /  Alb  2.9<L>  /  TBili  0.5  /  DBili  x   /  AST  16  /  ALT  11  /  AlkPhos  75  -24        PHYSICAL EXAM:  Constitutional: NAD  ENMT: normal facies, symmetric  Neck: Left neck dressing c/d/i. No evidence of hematoma. Drain serosanguinous.   Respiratory: Normal respiratory effort   Extremities: DIRK, sensation grossly intact bilaterally. CN II-XII grossly intact.   Psychiatric: oriented x 3; appropriate        - Pt to follow up outpatient with Dr Mendoza regarding left neck mass

## 2023-03-24 NOTE — PROGRESS NOTE ADULT - SUBJECTIVE AND OBJECTIVE BOX
Problem List:  ESRD due to diabetes and Hypertension  Post left CEA.  Post cervica node  removal during yesterday's procedure, sent to pathology.  Thrombocytopenia.  Double coronary artery bypass grafting utilizing the left internal mammary artery to left anterior descending with a reversed saphenous vein graft to the posterior descending branch of the right coronary artery.  Exclusion of left atrial appendage with an AtriClip.  Chordal sparing mitral valve replacement utilizing a 31-mm porcine [employer] Epic valve.          PAST MEDICAL & SURGICAL HISTORY:  Hypertension      Hyperlipidemia      Diabetes mellitus      End stage renal disease      CAD (coronary artery disease)      Stroke  right hemiplegia      Occlusion and stenosis of bilateral carotid arteries      S/P CABG (coronary artery bypass graft)  Mitral valve replacement on 10/21/2021      AV (arteriovenous fistula)  h/o creation (failed)          No Known Allergies      MEDICATIONS  (STANDING):  amLODIPine   Tablet 10 milliGRAM(s) Oral daily  artificial  tears Solution 1 Drop(s) Both EYES three times a day  aspirin  chewable 81 milliGRAM(s) Oral daily  chlorhexidine 2% Cloths 1 Application(s) Topical <User Schedule>  epoetin vern-epbx (RETACRIT) Injectable 4000 Unit(s) IV Push <User Schedule>  insulin lispro (ADMELOG) corrective regimen sliding scale   SubCutaneous every 6 hours  pantoprazole  Injectable 40 milliGRAM(s) IV Push daily  sodium chloride 0.9% lock flush 3 milliLiter(s) IV Push every 8 hours    MEDICATIONS  (PRN):  HYDROmorphone  Injectable 0.5 milliGRAM(s) IV Push every 10 minutes PRN Moderate Pain (4 - 6)                            9.1    6.87  )-----------( 65       ( 24 Mar 2023 04:07 )             28.3     03-24    134<L>  |  98  |  50<H>  ----------------------------<  97  4.9   |  26  |  7.21<H>    Ca    8.4      24 Mar 2023 04:07  Phos  5.8     03-24  Mg     2.4     03-24    TPro  6.9  /  Alb  2.9<L>  /  TBili  0.5  /  DBili  x   /  AST  16  /  ALT  11  /  AlkPhos  75  03-24            REVIEW OF SYSTEMS:  General: no fever no chills, no weight loss.    RESPIRATORY: No cough, wheezing, hemoptysis; No shortness of breath  CARDIOVASCULAR: No chest pain or palpitations. No Edema  GASTROINTESTINAL: No abdominal or epigastric pain. No nausea, vomiting. No diarrhea or constipation. No melena.  GENITOURINARY: No dysuria, frequency, foamy urine, urinary urgency, incontinence or hematuria  NEUROLOGICAL: No numbness or weakness, no tremor , no dizziness.   Muscle skeletal : no joint pain and no swelling of joints and limbs.          VITALS:  T(F): 97.4 (03-24-23 @ 08:00), Max: 98.3 (03-23-23 @ 11:03)  HR: 74 (03-24-23 @ 10:00)  BP: 164/68 (03-24-23 @ 10:00)  RR: 18 (03-24-23 @ 10:00)  SpO2: 98% (03-24-23 @ 10:00)  Wt(kg): --    03-23 @ 07:01  -  03-24 @ 07:00  --------------------------------------------------------  IN: 960 mL / OUT: 65 mL / NET: 895 mL        PHYSICAL EXAM:  Constitutional: well developed, no diaphoresis, no distress.  Neck: No JVD, no carotid bruit, supple, no adenopathy  Respiratory: air entrance B/L, no wheezes, rales or rhonchi  Cardiovascular: S1, S2, RRR, no pericardial rub, no murmur  Abdomen: BS+, soft, no tenderness, no bruit  Pelvis: bladder nondistended  Extremities: No cyanosis or clubbing. No peripheral edema.     Vascular Access: left AVF with bruit and thrill

## 2023-03-24 NOTE — DISCHARGE NOTE PROVIDER - NSDCMRMEDTOKEN_GEN_ALL_CORE_FT
amLODIPine 10 mg oral tablet: 1 tab(s) orally once a day  apixaban 2.5 mg oral tablet: 1 tab(s) orally 2 times a day  Aspirin Enteric Coated 81 mg oral delayed release tablet: 1 tab(s) orally once a day  carvedilol 6.25 mg oral tablet: 1 tab(s) orally 2 times a day  Eliquis 2.5 mg oral tablet: 1 tab(s) orally once a day  insulin glargine 100 units/mL subcutaneous solution: 5 unit(s) subcutaneous once a day (at bedtime)  pantoprazole 40 mg oral delayed release tablet: 1 tab(s) orally once a day (before a meal)  Triphrocaps oral capsule: 1 cap(s) orally once a day   amLODIPine 10 mg oral tablet: 1 tab(s) orally once a day  apixaban 2.5 mg oral tablet: 1 tab(s) orally 2 times a day  Aspirin Enteric Coated 81 mg oral delayed release tablet: 1 tab(s) orally once a day  Eliquis 2.5 mg oral tablet: 1 tab(s) orally once a day  epoetin vern: 4,000 unit(s) intravenous 3 times a week on dialysis Days  hydrALAZINE 50 mg oral tablet: 1 tab(s) orally every 8 hours  insulin glargine 100 units/mL subcutaneous solution: 5 unit(s) subcutaneous once a day (at bedtime)  metoprolol tartrate 25 mg oral tablet: 1 tab(s) orally 2 times a day  pantoprazole 40 mg oral delayed release tablet: 1 tab(s) orally once a day (before a meal)  sevelamer carbonate 800 mg oral tablet: 1 tab(s) orally 3 times a day (with meals)  Triphrocaps oral capsule: 1 cap(s) orally once a day   amLODIPine 10 mg oral tablet: 1 tab(s) orally once a day  apixaban 2.5 mg oral tablet: 1 tab(s) orally 2 times a day  Aspirin Enteric Coated 81 mg oral delayed release tablet: 1 tab(s) orally once a day  epoetin vern: 4,000 unit(s) intravenous 3 times a week on dialysis Days  hydrALAZINE 50 mg oral tablet: 1 tab(s) orally every 8 hours  insulin glargine 100 units/mL subcutaneous solution: 5 unit(s) subcutaneous once a day (at bedtime)  metoprolol tartrate 25 mg oral tablet: 1 tab(s) orally 2 times a day  pantoprazole 40 mg oral delayed release tablet: 1 tab(s) orally once a day (before a meal)  sevelamer carbonate 800 mg oral tablet: 1 tab(s) orally 3 times a day (with meals)  Triphrocaps oral capsule: 1 cap(s) orally once a day

## 2023-03-24 NOTE — DISCHARGE NOTE PROVIDER - NSDCCPTREATMENT_GEN_ALL_CORE_FT
PRINCIPAL PROCEDURE  Procedure: Left carotid endarterectomy  Findings and Treatment:       SECONDARY PROCEDURE  Procedure: Endarterectomy of carotid artery with patch graft  Findings and Treatment:

## 2023-03-24 NOTE — DISCHARGE NOTE PROVIDER - NSDCCPCAREPLAN_GEN_ALL_CORE_FT
PRINCIPAL DISCHARGE DIAGNOSIS  Diagnosis: Carotid stenosis  Assessment and Plan of Treatment:        PRINCIPAL DISCHARGE DIAGNOSIS  Diagnosis: Carotid stenosis  Assessment and Plan of Treatment: You had left carotid stenosis, s/p L. CEA w/ repair using patch graft 3/24      SECONDARY DISCHARGE DIAGNOSES  Diagnosis: HTN (hypertension)  Assessment and Plan of Treatment: Continue norvasc and Hydralazine  Low salt diet  Activity as tolerated.  Take all medication as prescribed.  Follow up with your medical doctor for routine blood pressure monitoring at your next visit.  Notify your doctor if you have any of the following symptoms:   Dizziness, Lightheadedness, Blurry vision, Headache, Chest pain, Shortness of breath      Diagnosis: ESRD on dialysis  Assessment and Plan of Treatment: Continiue dialysis on M,W,F as recommended     PRINCIPAL DISCHARGE DIAGNOSIS  Diagnosis: Carotid stenosis  Assessment and Plan of Treatment: You had left carotid stenosis, s/p L. CEA w/ repair using patch graft 3/24      SECONDARY DISCHARGE DIAGNOSES  Diagnosis: HTN (hypertension)  Assessment and Plan of Treatment: Continue Amlodipine,  Hydralazine, and Metoprololl  Low salt diet  Activity as tolerated.  Take all medication as prescribed.  Follow up with your medical doctor for routine blood pressure monitoring at your next visit.  Notify your doctor if you have any of the following symptoms:   Dizziness, Lightheadedness, Blurry vision, Headache, Chest pain, Shortness of breath      Diagnosis: DM (diabetes mellitus)  Assessment and Plan of Treatment: Please continue to take your    Diagnosis: ESRD on dialysis  Assessment and Plan of Treatment: Continiue dialysis on M,W,F as recommended. Please continue to follow up with Nephrologist outpatient for continued management .    Diagnosis: Lymphadenopathy  Assessment and Plan of Treatment: During your carotid surgery you were found to have a neck mass and a piece was sent out for pathology. You will need to follow up outpatient with the surgeon to have cat scan of neck.    Diagnosis: Paroxysmal atrial fibrillation  Assessment and Plan of Treatment: You can continue to take your Metoprolo and your Eliquis. Please follow up with with PCP for continued management.

## 2023-03-24 NOTE — PROGRESS NOTE ADULT - ASSESSMENT
Patient is a 63 yo M, lives with family, from home, with med hx significant for HTN, HLD, DM, CAD s/p CABG x 2 2021, stroke s/p CABG (no residual weakness), hx of Mitral valve replacement, ESRD (MWF at [place], last dialysis 3/22, LUE AV Fistula), Left carotid stenosis s/p Carotid endarterectomy with repair using patch graft today 3/23 admitted to ICU for close post op monitoring with frequent neurochecks.     ASSESSMENT and PLAN  - Left carotid stenosis s/p Carotid endarterectomy    - ESRD   - CAD s/p CABG  - HTN  - HLD  - hx of Mitral valve replacement  - DM    Neuro:  #Left carotid stenosis s/p Carotid endarterectomy  Neurochecks Q1 while in ICU  SQ drain placed in left upper chest    Cardiovascular:  #CAD s/p CABG  c/w ASA, not on statin  Hold coreg due to borderline BP    #mitral valve replacement  Will start Eliquis 2.5 mg after 24 hrs post op    #HTN  hold coreg and amlodipine given borderline BP    Pulmonary:   No acute issues    Infectious Diseases:  No acute issues    Gastrointestinal:  No acute issues, NPO except meds for now    Renal:  #ESRD  Pt has dialysis sessions at [facility], last dialysis yesterday 3/23, patient not fluid overloaded currently  received 300cc bolus intra op, 250cc in ICU today, will monitor BP  Dr. Brush Nephro consulted    Endo:  #DM  no recent A1c, takes 5 units Lantus at home  Will start Insulin q6 hrs while NPO    Heme/onc:   No acute issues    Skin/ catheter: Right radial [place]    Prophylaxis: SCDs post op today, Protonix   Patient is a 61 yo M, lives with family, from home, with med hx significant for HTN, HLD, DM, CAD s/p CABG x 2 2021, stroke s/p CABG (no residual weakness), hx of Mitral valve replacement, ESRD (MWF at [place], last dialysis 3/22, LUE AV Fistula), Left carotid stenosis s/p Carotid endarterectomy with repair using patch graft today 3/23 admitted to ICU for close post op monitoring with frequent neurochecks.     ASSESSMENT and PLAN  - Left carotid stenosis s/p Carotid endarterectomy    - ESRD   - CAD s/p CABG  - HTN  - HLD  - hx of Mitral valve replacement  - DM  - PAF  - Tachybrady syndrome  - thrombocytopenia    Neuro:  #Left carotid stenosis s/p Carotid endarterectomy  Neurochecks Q1 while in ICU  SQ drain placed in left upper chest    Cardiovascular:  #CAD s/p CABG  c/w ASA, statin and coreg due to borderline BP    #mitral valve replacement  c/w coreg, hold eliquis for thrombocytopenia    #tachybrady syndrome  - pt prev on amiodarone per EMR  - Coreg held for blaine  - Cardio Nabatian consulted     #HTN  c/w coreg and amlodipine given borderline BP    Pulmonary:   No acute issues    Infectious Diseases:  No acute issues    Gastrointestinal:  No acute issues  started on PO diet     Renal:  #ESRD  Pt has dialysis sessions at [place], last dialysis 3/23, patient not fluid overloaded currently  received 300cc bolus intra op, 250cc in ICU today, will monitor BP  Dr. Gonsalo Terry following    Endo:  #DM  no recent A1c, takes 5 units Lantus at home  Will start Insulin q6 hrs while NPO    Heme/onc:   #left neck mass  -noted during surgery, biopsied and sent to pathology  -Verbal report of "oncologic papillary growth" was given  -f/u biopsy result and consider heme/onc consult     #thrombocytopenia  -plan as above     Skin/ catheter: Right radial [place] removed, +permacath    Prophylaxis: SCDs post op today, Protonix

## 2023-03-24 NOTE — DISCHARGE NOTE PROVIDER - CARE PROVIDER_API CALL
Aura Mendoza)  Surgery  [address] [facility] [address]  [place] [address]  Phone: [phone]  Fax: [phone]  Follow Up Time: 1 week    Thai Olmedo)  Vascular Surgery  [address], [address]  [place] [address]  Phone: [phone]  Fax: [phone]  Follow Up Time:    Aura Mendoza)  Surgery  [address] [facility] [address]  [place] [address]  Phone: [phone]  Fax: [phone]  Follow Up Time: 1 week    Thai Olmedo)  Vascular Surgery  [address] [facility], [address]  [place] [address]  Phone: [phone]  Fax: [phone]  Follow Up Time:     Marisel Boucher)  Internal Medicine  [address] [place] [address]  Phone: [phone]  Fax: [phone]  Follow Up Time: 1 week

## 2023-03-24 NOTE — CONSULT NOTE ADULT - SUBJECTIVE AND OBJECTIVE BOX
CHIEF COMPLAINT:Patient is a 62y old  Male who presents with a chief complaint of Post carotid endarterectomy.      HPI:  Patient is a 63 yo M, lives with family, from home, with med hx significant for HTN, HLD, DM, CAD s/p CABG x 2 2021, stroke s/p CABG (no residual weakness), hx of Mitral valve replacement, ANGELITA ligation, ESRD (MWF at [place], last dialysis 3/22, LUE AV Fistula), Left carotid stenosis s/p Carotid endarterectomy with repair using patch graft  3/23 admitted to ICU for close post op monitoring with frequent neurochecks. EBL minimal, pt was given 300cc bolus, 1 dose Ancef, has 1 drain. Goal SBP during post op stay 120-160s, with Right radial A line. Patient currently feeling comfortable, no chest pain, SOB at this time. (23 Mar 2023 11:52)      PAST MEDICAL & SURGICAL HISTORY:  Hypertension      Hyperlipidemia      Diabetes mellitus      End stage renal disease      CAD (coronary artery disease)      Stroke  right hemiplegia      Occlusion and stenosis of bilateral carotid arteries      S/P CABG (coronary artery bypass graft)  Mitral valve replacement on 10/21/2021      AV (arteriovenous fistula)  h/o creation (failed)          MEDICATIONS  (STANDING):  amLODIPine   Tablet 10 milliGRAM(s) Oral daily  artificial  tears Solution 1 Drop(s) Both EYES three times a day  aspirin  chewable 81 milliGRAM(s) Oral daily  epoetin vern-epbx (RETACRIT) Injectable 4000 Unit(s) IV Push <User Schedule>  insulin lispro (ADMELOG) corrective regimen sliding scale   SubCutaneous every 6 hours  pantoprazole    Tablet 40 milliGRAM(s) Oral before breakfast    MEDICATIONS  (PRN):  HYDROmorphone  Injectable 0.5 milliGRAM(s) IV Push every 10 minutes PRN Moderate Pain (4 - 6)      FAMILY HISTORY:  Family history of CVA (Father)        SOCIAL HISTORY:    [x ] Non-smoker    [ x] Alcohol-denies    Allergies    No Known Allergies    Intolerances    	    REVIEW OF SYSTEMS:  CONSTITUTIONAL: No fever, weight loss, or fatigue  EYES: No eye pain, visual disturbances, or discharge  ENT:  No difficulty hearing, tinnitus, vertigo; No sinus or throat pain  NECK: No pain or stiffness  RESPIRATORY: No cough, wheezing, chills or hemoptysis; No Shortness of Breath  CARDIOVASCULAR: No chest pain, palpitations, passing out, dizziness, or leg swelling  GASTROINTESTINAL: No abdominal or epigastric pain. No nausea, vomiting, or hematemesis; No diarrhea or constipation. No melena or hematochezia.  GENITOURINARY: No dysuria, frequency, hematuria, or incontinence  NEUROLOGICAL: No headaches, memory loss, loss of strength, numbness, or tremors  SKIN: No itching, burning, rashes, or lesions   LYMPH Nodes: No enlarged glands  ENDOCRINE: No heat or cold intolerance; No hair loss  MUSCULOSKELETAL: No joint pain or swelling; No muscle, back, or extremity pain  PSYCHIATRIC: No depression, anxiety, mood swings, or difficulty sleeping  HEME/LYMPH: No easy bruising, or bleeding gums  ALLERGY AND IMMUNOLOGIC: No hives or eczema	      PHYSICAL EXAM:  T(C): 37.1 (03-24-23 @ 13:12), Max: 37.1 (03-24-23 @ 13:12)  HR: 80 (03-24-23 @ 13:12) (40 - 86)  BP: 155/72 (03-24-23 @ 13:12) (115/48 - 171/70)  RR: 21 (03-24-23 @ 13:12) (13 - 25)  SpO2: 99% (03-24-23 @ 13:12) (91% - 100%)  Wt(kg): --  I&O's Summary    23 Mar 2023 07:01  -  24 Mar 2023 07:00  --------------------------------------------------------  IN: 960 mL / OUT: 65 mL / NET: 895 mL        Appearance: Normal	  HEENT:   Normal oral mucosa, PERRL, EOMI	  Lymphatic: No lymphadenopathy  Cardiovascular: Normal S1 S2, No JVD, No murmurs, No edema  Respiratory: Lungs clear to auscultation	  Psychiatry: A & O x 3, Mood & affect appropriate  Gastrointestinal:  Soft, Non-tender, + BS	  Skin: No rashes, No ecchymoses, No cyanosis	  Neurologic: Non-focal  Extremities: Normal range of motion, No clubbing, cyanosis or edema  Vascular: Peripheral pulses palpable 2+ bilaterally    	    ECG:  sinus bradycardia with prolonged qt	  	  	  LABS:	 	                       9.1    6.87  )-----------( 65       ( 24 Mar 2023 04:07 )             28.3     03-24    134<L>  |  98  |  50<H>  ----------------------------<  97  4.9   |  26  |  7.21<H>    Ca    8.4      24 Mar 2023 04:07  Phos  5.8     03-24  Mg     2.4     03-24    TPro  6.9  /  Alb  2.9<L>  /  TBili  0.5  /  DBili  x   /  AST  16  /  ALT  11  /  AlkPhos  75  03-24    < from: TTE with Doppler (w/Cont) (10.28.21 @ 18:37) >  Observations:  Mitral Valve: Bioprosthetic mitral valve replacement. No  mitral valve regurgitation seen. Peak mitral valve gradient  equals 17 mm Hg, mean transmitral valve gradient equals 4-5  mm Hg, which is probably normal in the setting of a  bioprosthetic mitral valve replacement. (HRabout 50s bpm)  Aortic Valve/Aorta: Aortic valve not well visualized;  appears to be a calcified trileaflet valve with normal  opening. Peak transaortic valve gradient equals 19 mm Hg,  mean transaortic valve gradient equals 9 mm Hg, aortic  valve velocity time integral equals 42 cm. No aortic valve  regurgitation seen. Peak left ventricular outflow tract  gradient equals 12 mm Hg, mean gradient is equal to 5 mm  Hg, LVOT velocity time integral equals 30 cm.  Aortic Root: 3.1 cm.  LVOT diameter: 1.4 cm.  Left Atrium: Moderately dilated left atrium.  LA volume  index = 45 cc/m2.  Left Ventricle: Endocardial visualization enhanced with  intravenous injection of Ultrasonic Enhancing Agent  (Definity). Hyperdynamic left ventricular systolic  function. Septal motion consistent with cardiac surgery.  Eccentric left ventricular hypertrophy (dilated left  ventricle with normal relative wall thickness). Moderate  diastolic dysfunction (Stage II).    < end of copied text >

## 2023-03-24 NOTE — DISCHARGE NOTE PROVIDER - NSDCFUADDINST_GEN_ALL_CORE_FT
PAIN: You may continue to take Acetaminophen (Tylenol) over the counter for pain. You may take oxycodone for severe pain, as prescribed.   WOUND CARE:  You may remove top dressing and shower, do not remove steri strips, allow them to fall off naturally. Allow warm soapy water to run down the wound in the shower. You do not need to scrub the area. Pat dry.   BATHING: Please do not soak or submerge the wound in water (bath, swimming) until cleared by surgeon.   ACTIVITY: No heavy lifting, straining, or vigorous activity until your follow-up appointment    NOTIFY US IF: You have any bleeding that does not stop, any pus draining from wound(s), any fever (over 100.4 F) or chills, persistent nausea/vomiting, persistent diarrhea, or if pain is not controlled on discharge pain medications.  FOLLOW-UP: Please call the office and make an appointment to follow up with Dr. Olmedo as scheduled.  Please follow up with your primary care physician regarding your hospitalization.      *** Please follow up with Dr. Mendoza regarding left neck mass noted during you operation***

## 2023-03-24 NOTE — PROGRESS NOTE ADULT - ASSESSMENT
61 yo M hx significant for HTN, HLD, DM, CAD s/p CABG x 2 2021, stroke s/p CABG (no residual weakness), hx of Mitral valve replacement, ESRD, Left carotid stenosis now POD1 s/p L. CEA w/ repair using patch graft.     - Mointor SBP (goal 100-160)  - Continue ASA, plan to resume home Eliquis on POD  - Diet as tolerated  - Pain control   - Plan for HD today   - Possible dc later today   61 yo M hx significant for HTN, HLD, DM, CAD s/p CABG x 2 2021, stroke s/p CABG (no residual weakness), hx of Mitral valve replacement, ESRD, Left carotid stenosis now POD1 s/p L. CEA w/ repair using patch graft.     - Mointor SBP (goal 100-160)  - Continue ASA, plan to resume home Eliquis on POD2  - Diet as tolerated  - Pain control   - Plan for HD today

## 2023-03-24 NOTE — CHART NOTE - NSCHARTNOTEFT_GEN_A_CORE
In the OR a left neck mass noted and sent to pathology. Verbal report of "oncologic papillary growth" was given. Dr. Mendoza made aware of case, due to concern for thyroid malignancy.   Findings discussed with patient, and pt recommended to follow up outpatient for additional work up and management. In the OR a left neck mass noted and sent to pathology. Verbal report of "oncologic papillary growth" was given. Dr. Mendoza made aware of case, due to concern for thyroid malignancy.   Findings discussed with patient, and pt recommended to follow up outpatient for additional work up and management.    If patient remains admitted, please obtain thyroid US

## 2023-03-24 NOTE — CHART NOTE - NSCHARTNOTEFT_GEN_A_CORE
62 year old male w/ PMHx HTN, HLD, DM, CAD s/p CABG x 2 2021, stroke s/p CABG (no residual weakness), hx of Mitral valve replacement, ESRD (MWF at [place], last dialysis 3/22, LUE AV Fistula), Left carotid stenosis s/p carotid endarterectomy with repair using patch graft on 3/23. In the OR a left neck mass was noted and sent to pathology. Verbal report of "oncologic papillary growth" was given. Dr. Mendoza made aware of case, due to concern for thyroid malignancy. Findings discussed with patient, and pt recommended to follow up outpatient for additional work up and management. Pt admitted to ICU for close post op monitoring with frequent neuro-checks and SBP goal of 100-160. Diet advanced home restarted meds. Dr. Brush Nephro consulted and pt was dialyzed on 3/24. Cardio Dr. Lozano was consulted for tachy-blaine/PAF (pt previously on amiodarone). Pt started on aspirin per vascular recommendations but eliquis held due to thrombocytopenia.      Patient is stable for downgrade to floor under care of Dr. Gilman for further management , covering NP ----- was informed. Family notified.    Primary team to follow:  - f/u biopsy results  - c/w HD per nephorlogy  - f/u cardio recs  - DC planning 62 year old male w/ PMHx HTN, HLD, DM, CAD s/p CABG x 2 2021, stroke s/p CABG (no residual weakness), hx of Mitral valve replacement, ESRD (MWF at [place], last dialysis 3/22, LUE AV Fistula), Left carotid stenosis s/p carotid endarterectomy with repair using patch graft on 3/23. In the OR a left neck mass was noted and sent to pathology. Verbal report of "oncologic papillary growth" was given. Dr. Mendoza made aware of case, due to concern for thyroid malignancy. Findings discussed with patient, and pt recommended to follow up outpatient for additional work up and management. Pt admitted to ICU for close post op monitoring with frequent neuro-checks and SBP goal of 100-160. Diet advanced, amlodipine restarted. Dr. Brush Nephro consulted and pt was dialyzed on 3/24. Cardio Dr. Lozano was consulted for tachy-blaine/PAF (pt previously on amiodarone). Pt started on aspirin per vascular recommendations but eliquis held due to thrombocytopenia.      Patient is stable for downgrade to floor under care of Dr. Gilman for further management , covering NP ----- was informed. Family notified.    Primary team to follow:  - restart home meds as tolerated  - f/u biopsy results  - c/w HD per nephorlogy  - f/u cardio recs  - DC planning 62 year old male w/ PMHx HTN, HLD, DM, CAD s/p CABG x 2 2021, stroke s/p CABG (no residual weakness), hx of Mitral valve replacement, ESRD (MWF at [place], last dialysis 3/22, LUE AV Fistula), Left carotid stenosis s/p carotid endarterectomy with repair using patch graft on 3/23. In the OR a left neck mass was noted and sent to pathology. Verbal report of "oncologic papillary growth" was given. Dr. Mendoza made aware of case, due to concern for thyroid malignancy. Findings discussed with patient, and pt recommended to follow up outpatient for additional work up and management. Pt admitted to ICU for close post op monitoring with frequent neuro-checks and SBP goal of 100-160. Diet advanced, amlodipine restarted. Dr. Brush Nephro consulted and pt was dialyzed on 3/24. Cardio Dr. Lozano was consulted for tachy-blaine/PAF (pt previously on amiodarone). Pt started on aspirin per vascular recommendations but eliquis held due to thrombocytopenia.      Patient is stable for downgrade to floor under care of Dr. Gilman for further management , covering NP ----- was informed. Family notified.    Primary team to follow:  - restart home meds as tolerated  - consider restarting eliquis if cleared by vasc and cardio  - f/u biopsy results  - c/w HD per nephorlogy  - DC planning 62 year old male w/ PMHx HTN, HLD, DM, CAD (s/p CABG x 2 2021), CVA, hx of Mitral valve replacement, ESRD (MWF at [place], last dialysis 3/22, LUE AV Fistula), Left carotid stenosis s/p carotid endarterectomy with repair using patch graft on 3/23. In the OR a left neck mass was noted and sent to pathology. Verbal report of "oncologic papillary growth" was given. Dr. Mendoza made aware of case, due to concern for thyroid malignancy. Findings discussed with patient, and pt recommended to follow up outpatient for additional work up and management. Pt admitted to ICU for close post op monitoring with frequent neuro-checks and SBP goal of 100-160. Diet advanced, amlodipine restarted. Dr. Brush Nephro consulted and pt was dialyzed on 3/24. Cardio Dr. Lozano was consulted for tachy-blaine/PAF (pt previously on amiodarone). Pt started on aspirin per vascular recommendations but eliquis held due to thrombocytopenia.      Patient is stable for downgrade to floor under care of Dr. Gilman for further management , covering NP Tessy was informed. Family notified.    Primary team to follow:  - restart home meds as tolerated  - consider restarting eliquis if cleared by vasc and cardio  - f/u biopsy results  - c/w HD per nephorlogy  - DC planning 62 year old male w/ PMHx HTN, HLD, DM, CAD (s/p CABG x 2 2021), CVA, hx of Mitral valve replacement, ESRD (MWF at [place], last dialysis 3/22, LUE AV Fistula), Left carotid stenosis s/p carotid endarterectomy with repair using patch graft on 3/23. In the OR a left neck mass was noted and sent to pathology. Verbal report of "oncologic papillary growth" was given. Dr. Mendoza made aware of case, due to concern for thyroid malignancy. Findings discussed with patient, and pt recommended to follow up outpatient for additional work up and management. Pt admitted to ICU for close post op monitoring with frequent neuro-checks and SBP goal of 100-160. Diet advanced, amlodipine restarted. Nephro Dr. Brush consulted and pt was dialyzed on 3/24. Cardio Dr. Lozano was consulted for tachy-blaine/PAF (pt previously on amiodarone and currently on coreg). Pt started on aspirin per vascular recommendations but eliquis held due to thrombocytopenia.      Patient is stable for downgrade to floor under care of Dr. Gilman for further management , covering NP Tessy was informed. Family notified.    Primary team to follow:  - restart home meds as tolerated  - consider restarting eliquis if cleared by vasc and cardio  - f/u biopsy results  - c/w HD per nephrology  - DC planning

## 2023-03-24 NOTE — PROGRESS NOTE ADULT - ASSESSMENT
ESRD due to diabetes and hypertension.  Follow with dialysis today, dialysis days are MWF.  Diet 2 gm and low PO4.   Thrombocytopenia, drop in platelets in the last month. Follow up node pathology, follow up Folate and vitamine b12  MCV increased , possible increased reticulocytes versus Vitamin b12 and folate deficiency. Dr GIBBONS Hematology Oncology to follow.  History of AF on Eliquis , follow with surgery and cardiology  AC  History of CAD , s/p CABGx2, LAAL, MVR  MVR with bioprosthetic valve. Ligation of atrial appendage.     Anemia due to ESRD to follow with [facility].    Hypertension stable.

## 2023-03-24 NOTE — PROGRESS NOTE ADULT - ATTENDING COMMENTS
62 yr old male PMH HTN, HDL, diabetes on insulin , CAD had MI in 10/5/2021 ,stroke s/p CABG mitral valve replacement 10/21/2021. Admitted to the ICU post OP after left carotid endarterectomy.     Assessment:  1. Left carotid stenosis  2. Sinus bradycardia   3. HTN  4. HLD   5. DM type 2   6. CAD   7. Thrombocytopenia    Plan:  - Monitor hemodynamics   - Oral antihypertensives   - Started oral diet   - Close neurovascular monitoring  - Pain control  - Monitor drain output  - Glucose monitoring with coverage for hyperglycemia  - Hematology consult for thrombocytopenia  - Nephrology consult for HD  - Hold AC until cleared by surgery  - F/u pathology report for neck mass  - Non chemical DVT prophylaxis  - Transfer to medical service

## 2023-03-24 NOTE — DISCHARGE NOTE PROVIDER - NSDCFUSCHEDAPPT_GEN_ALL_CORE_FT
Thai Olmedo Physician [facility]  VASCULAR [address]  Scheduled Appointment: 04/03/2023

## 2023-03-24 NOTE — PROGRESS NOTE ADULT - SUBJECTIVE AND OBJECTIVE BOX
INTERVAL HPI/OVERNIGHT EVENTS: ***    PRESSORS: [ ] YES [ ] NO  WHICH:    MEDICATIONS:     Antimicrobial:    Cardiovascular:    Pulmonary:    Hematalogic:  aspirin  chewable 81 milliGRAM(s) Oral daily    Other:  artificial  tears Solution 1 Drop(s) Both EYES three times a day  chlorhexidine 2% Cloths 1 Application(s) Topical <User Schedule>  dextrose 5%. 1000 milliLiter(s) IV Continuous <Continuous>  HYDROmorphone  Injectable 0.5 milliGRAM(s) IV Push every 10 minutes PRN  insulin lispro (ADMELOG) corrective regimen sliding scale   SubCutaneous every 6 hours  pantoprazole  Injectable 40 milliGRAM(s) IV Push daily  sodium chloride 0.9% lock flush 3 milliLiter(s) IV Push every 8 hours      Drug Dosing Weight  Height (cm): 165.1 (23 Mar 2023 06:52)  Weight (kg): 80.7 (23 Mar 2023 06:52)  BMI (kg/m2): 29.6 (23 Mar 2023 06:52)  BSA (m2): 1.88 (23 Mar 2023 06:52)    INTUBATED: [ ] YES [ ] NO   DATE:     CENTRAL LINE: [ ] YES [ ] NO  LOCATION:       JEAN: [ ] YES [ ] NO        A-LINE:  [ ] YES [ ] NO  LOCATION:       ICU Vital Signs Last 24 Hrs  T(C): 36.3 (24 Mar 2023 04:00), Max: 36.8 (23 Mar 2023 11:03)  T(F): 97.3 (24 Mar 2023 04:00), Max: 98.3 (23 Mar 2023 11:03)  HR: 66 (24 Mar 2023 07:30) (40 - 82)  BP: 156/57 (24 Mar 2023 04:00) (106/44 - 169/62)  BP(mean): 81 (24 Mar 2023 04:00) (59 - 91)  ABP: 141/48 (24 Mar 2023 07:30) (101/42 - 179/62)  ABP(mean): 79 (24 Mar 2023 07:30) (61 - 103)  RR: 17 (24 Mar 2023 07:30) (13 - 26)  SpO2: 100% (24 Mar 2023 07:30) (91% - 100%)    O2 Parameters below as of 24 Mar 2023 04:00  Patient On (Oxygen Delivery Method): room air                  03-23 @ 07:01  -  03-24 @ 07:00  --------------------------------------------------------  IN: 960 mL / OUT: 65 mL / NET: 895 mL            REVIEW OF SYSTEMS:    CONSTITUTIONAL: No fever, chills, weight loss, or fatigue  RESPIRATORY: No cough, wheezing, or hemoptysis; No shortness of breath  CARDIOVASCULAR: No chest pain, palpitations, dizziness, or leg swelling  GASTROINTESTINAL: No abdominal pain. No nausea, vomiting, or hematemesis; No diarrhea or constipation. No melena or hematochezia.  GENITOURINARY: No dysuria, hematuria, or urinary frequency  NEUROLOGICAL: No headaches, memory loss, loss of strength, numbness, or tremors  MSK: No muscle or joint pain  SKIN: No itching, burning, rashes, or lesions      PHYSICAL EXAM:    GENERAL: NAD, well-groomed, well-developed  HEAD:  Atraumatic, Normocephalic  EYES: EOMI, PERRLA, conjunctiva and sclera clear  ENMT: No tonsillar erythema, exudates, or enlargement; Moist mucous membranes, Good dentition, No lesions  NECK: Supple, normal appearance, No JVD; Normal thyroid; Trachea midline  NERVOUS SYSTEM:  Alert & Oriented X3, Good concentration; Motor Strength 5/5 B/L upper and lower extremities; DTRs 2+ intact and symmetric  CHEST/LUNG: No chest deformity; Normal percussion bilaterally; No rales, rhonchi, wheezing   HEART: Regular rate and rhythm; Clear S1 and S2, No murmurs, rubs, or gallops  ABDOMEN: Soft, Nontender, Nondistended; Bowel sounds present  EXTREMITIES:  2+ Peripheral Pulses, No clubbing, cyanosis, or edema  LYMPH: No lymphadenopathy noted  SKIN: No rashes or lesions; Good capillary refill      LABS:  CBC Full  -  ( 24 Mar 2023 04:07 )  WBC Count : 6.87 K/uL  RBC Count : 2.72 M/uL  Hemoglobin : 9.1 g/dL  Hematocrit : 28.3 %  Platelet Count - Automated : 65 K/uL  Mean Cell Volume : 104.0 fl  Mean Cell Hemoglobin : 33.5 pg  Mean Cell Hemoglobin Concentration : 32.2 gm/dL  Auto Neutrophil # : x  Auto Lymphocyte # : x  Auto Monocyte # : x  Auto Eosinophil # : x  Auto Basophil # : x  Auto Neutrophil % : x  Auto Lymphocyte % : x  Auto Monocyte % : x  Auto Eosinophil % : x  Auto Basophil % : x    03-24    134<L>  |  98  |  50<H>  ----------------------------<  97  4.9   |  26  |  7.21<H>    Ca    8.4      24 Mar 2023 04:07  Phos  5.8     03-24  Mg     2.4     03-24    TPro  6.9  /  Alb  2.9<L>  /  TBili  0.5  /  DBili  x   /  AST  16  /  ALT  11  /  AlkPhos  75  03-24            RADIOLOGY & ADDITIONAL STUDIES REVIEWED:  ***    [ ]GOALS OF CARE DISCUSSION WITH PATIENT/FAMILY/PROXY:   INTERVAL HPI/OVERNIGHT EVENTS: Pt hypertensive overnight and recieved hydralazine pushes to stay within acceptable SBP range. Pt feels well with no acute complaints this AM. Diet advanced. Plan to downgrade to surgery floor    PRESSORS: [ ] YES [x ] NO  WHICH:    MEDICATIONS:     Antimicrobial:    Cardiovascular:    Pulmonary:    Hematalogic:  aspirin  chewable 81 milliGRAM(s) Oral daily    Other:  artificial  tears Solution 1 Drop(s) Both EYES three times a day  chlorhexidine 2% Cloths 1 Application(s) Topical <User Schedule>  dextrose 5%. 1000 milliLiter(s) IV Continuous <Continuous>  HYDROmorphone  Injectable 0.5 milliGRAM(s) IV Push every 10 minutes PRN  insulin lispro (ADMELOG) corrective regimen sliding scale   SubCutaneous every 6 hours  pantoprazole  Injectable 40 milliGRAM(s) IV Push daily  sodium chloride 0.9% lock flush 3 milliLiter(s) IV Push every 8 hours      Drug Dosing Weight  Height (cm): 165.1 (23 Mar 2023 06:52)  Weight (kg): 80.7 (23 Mar 2023 06:52)  BMI (kg/m2): 29.6 (23 Mar 2023 06:52)  BSA (m2): 1.88 (23 Mar 2023 06:52)    INTUBATED: [ ] YES [x ] NO   DATE:     CENTRAL LINE: [ ] YES [x ] NO  LOCATION:       JEAN: [ ] YES [x ] NO        A-LINE:  [ ] YES [ x] NO  LOCATION:       ICU Vital Signs Last 24 Hrs  T(C): 36.3 (24 Mar 2023 04:00), Max: 36.8 (23 Mar 2023 11:03)  T(F): 97.3 (24 Mar 2023 04:00), Max: 98.3 (23 Mar 2023 11:03)  HR: 66 (24 Mar 2023 07:30) (40 - 82)  BP: 156/57 (24 Mar 2023 04:00) (106/44 - 169/62)  BP(mean): 81 (24 Mar 2023 04:00) (59 - 91)  ABP: 141/48 (24 Mar 2023 07:30) (101/42 - 179/62)  ABP(mean): 79 (24 Mar 2023 07:30) (61 - 103)  RR: 17 (24 Mar 2023 07:30) (13 - 26)  SpO2: 100% (24 Mar 2023 07:30) (91% - 100%)    O2 Parameters below as of 24 Mar 2023 04:00  Patient On (Oxygen Delivery Method): room air        03-23 @ 07:01  -  03-24 @ 07:00  --------------------------------------------------------  IN: 960 mL / OUT: 65 mL / NET: 895 mL      REVIEW OF SYSTEMS:    CONSTITUTIONAL: No fever, chills, weight loss, or fatigue  RESPIRATORY: No cough, wheezing, or hemoptysis; No shortness of breath  CARDIOVASCULAR: No chest pain, palpitations, dizziness, or leg swelling  GASTROINTESTINAL: No abdominal pain. No nausea, vomiting, or hematemesis; No diarrhea or constipation. No melena or hematochezia.  GENITOURINARY: No dysuria, hematuria, or urinary frequency  NEUROLOGICAL: No headaches, memory loss, loss of strength, numbness, or tremors  MSK: No muscle or joint pain  SKIN: No itching, burning, rashes, or lesions      PHYSICAL EXAM:    GENERAL: Male sitting up in bed, NAD  HEAD:  Atraumatic, Normocephalic  EYES: EOMI, PERRLA, conjunctiva and sclera clear  ENMT: No tonsillar erythema, exudates, or enlargement; Moist mucous membranes, +absent teeth,  NECK: Supple, No JVD; Normal thyroid; Trachea midline, +drain on L side neck  NERVOUS SYSTEM:  Alert & Oriented X2, no sensorimotor deficits noted  CHEST/LUNG: eqaul breath sounds b/l, No rales, rhonchi, wheezing, + ITA chest permacath   HEART: +irregular, Clear S1 and S2, No murmurs, rubs, or gallops  ABDOMEN: Soft, Nontender, Nondistended; Bowel sounds present  EXTREMITIES:  2+ Peripheral Pulses, No clubbing, cyanosis, or edema  LYMPH: No lymphadenopathy noted  SKIN: No rashes      LABS:  CBC Full  -  ( 24 Mar 2023 04:07 )  WBC Count : 6.87 K/uL  RBC Count : 2.72 M/uL  Hemoglobin : 9.1 g/dL  Hematocrit : 28.3 %  Platelet Count - Automated : 65 K/uL  Mean Cell Volume : 104.0 fl  Mean Cell Hemoglobin : 33.5 pg  Mean Cell Hemoglobin Concentration : 32.2 gm/dL  Auto Neutrophil # : x  Auto Lymphocyte # : x  Auto Monocyte # : x  Auto Eosinophil # : x  Auto Basophil # : x  Auto Neutrophil % : x  Auto Lymphocyte % : x  Auto Monocyte % : x  Auto Eosinophil % : x  Auto Basophil % : x    03-24    134<L>  |  98  |  50<H>  ----------------------------<  97  4.9   |  26  |  7.21<H>    Ca    8.4      24 Mar 2023 04:07  Phos  5.8     03-24  Mg     2.4     03-24    TPro  6.9  /  Alb  2.9<L>  /  TBili  0.5  /  DBili  x   /  AST  16  /  ALT  11  /  AlkPhos  75  03-24            RADIOLOGY & ADDITIONAL STUDIES REVIEWED:  ***    [ ]GOALS OF CARE DISCUSSION WITH PATIENT/FAMILY/PROXY:

## 2023-03-24 NOTE — DISCHARGE NOTE PROVIDER - HOSPITAL COURSE
61 yo M hx of HTN, HLD, DM, CAD s/p CABG x 2 2021, stroke s/p CABG (no residual weakness), hx of Mitral valve replacement, ESRD (MWF at [place], last dialysis 3/22, LUE AV Fistula), Left carotid stenosis s/p carotid endarterectomy with repair using patch graft on 3/23. Pt admitted to ICU for close post op monitoring with frequent neurochecs and SBP goal of 100-160.    61 yo M hx of HTN, HLD, DM, CAD s/p CABG x 2 2021, stroke s/p CABG (no residual weakness), hx of Mitral valve replacement, ESRD (MWF at [place], last dialysis 3/22, LUE AV Fistula), Left carotid stenosis s/p carotid endarterectomy with repair using patch graft on 3/23. Pt admitted to ICU for close post op monitoring with frequent neurochecks and SBP goal of 100-160.   PER DAUGHTER BLACK - PATIENT SHOULD BE DISCHARGED ON PO FUROSEMIDE 80 MG DAILY ON NON-DIALYSIS DAYS ( TUE,THUR,SATURDAY AND SUNDAY)    63 yo M hx of HTN, HLD, DM, CAD s/p CABG x 2 2021, stroke s/p CABG (no residual weakness), hx of Mitral valve replacement, ESRD (MWF at [place], last dialysis 3/22, LUE AV Fistula), Left carotid stenosis s/p carotid endarterectomy with repair using patch graft on 3/23. Pt admitted to ICU for close post op monitoring with frequent neurochecks and SBP goal of 100-160.    62 year old male w/ PMHx HTN, HLD, DM, CAD (s/p CABG x 2 2021), CVA, hx of Mitral valve replacement, ESRD (MWF at [place], last dialysis 3/22, LUE AV Fistula), Left carotid stenosis s/p carotid endarterectomy with repair using patch graft on 3/23. In the OR a left neck mass was noted and sent to pathology. Verbal report of "oncologic papillary growth" was given. Dr. Mendoza made aware of case, due to concern for thyroid malignancy. Findings discussed with patient, and pt recommended to follow up outpatient for additional work up and management. Pt admitted to ICU for close post op monitoring with frequent neuro-checks and SBP goal of 100-160. Diet advanced, amlodipine restarted. Nephro Dr. Brush consulted and pt was dialyzed on 3/24. Cardio Dr. Lozano was consulted for tachy-blaine/PAF (pt previously on amiodarone and currently on coreg). Pt started on aspirin per vascular recommendations but eliquis held due to thrombocytopenia. Patient's BP noted to be elevated , Hydralazine was added to the anti hypertensive regimen     Dr Lorenzana from Hem/ Onc consulted for thrombocytopenia and follow up on pathology results. No acute intervention recommended for low platelets and will continue to closely monitor    Given patient's improved clinical status and current hemodynamic stability, decision was made to discharge.  Please refer to patient's complete medical chart with documents for a full hospital course, for this is only a brief summary.     62 year old male w/ PMHx HTN, HLD, DM, CAD (s/p CABG x 2 2021), CVA, hx of Mitral valve replacement, ESRD (MWF at [place], last dialysis 3/22, LUE AV Fistula), Left carotid stenosis s/p carotid endarterectomy with repair using patch graft on 3/23. In the OR a left neck mass was noted and sent to pathology. Verbal report of "oncologic papillary growth" was given. Dr. Mendoza made aware of case, due to concern for thyroid malignancy. Findings discussed with patient, and pt recommended to follow up outpatient for additional work up and management. Pt admitted to ICU for close post op monitoring with frequent neuro-checks and SBP goal of 100-160. Diet advanced, amlodipine restarted. Nephro Dr. Brush consulted and pt was dialyzed on 3/24. Cardio Dr. Lozano was consulted for tachy-blaine/PAF (pt previously on amiodarone and currently on coreg). Pt started on aspirin per vascular recommendations, can resume Eliquis on d/c.  Patient's BP noted to be elevated , Hydralazine was added to the anti hypertensive regimen     Dr Lorenzana from Hem/ Onc consulted for thrombocytopenia and follow up on pathology results. No acute intervention recommended for low platelets , which are now improving likely due to surgery per Heme/Onc.     Given patient's improved clinical status and current hemodynamic stability, decision was made to discharge.  Inna discussed with attending.  Please refer to patient's complete medical chart with documents for a full hospital course, for this is only a brief summary.

## 2023-03-25 DIAGNOSIS — R06.02 SHORTNESS OF BREATH: ICD-10-CM

## 2023-03-25 LAB
ANION GAP SERPL CALC-SCNC: 7 MMOL/L — SIGNIFICANT CHANGE UP (ref 5–17)
BUN SERPL-MCNC: 45 MG/DL — HIGH (ref 7–18)
CALCIUM SERPL-MCNC: 8.9 MG/DL — SIGNIFICANT CHANGE UP (ref 8.4–10.5)
CHLORIDE SERPL-SCNC: 99 MMOL/L — SIGNIFICANT CHANGE UP (ref 96–108)
CO2 SERPL-SCNC: 29 MMOL/L — SIGNIFICANT CHANGE UP (ref 22–31)
CREAT SERPL-MCNC: 6.17 MG/DL — HIGH (ref 0.5–1.3)
EGFR: 10 ML/MIN/1.73M2 — LOW
GLUCOSE BLDC GLUCOMTR-MCNC: 101 MG/DL — HIGH (ref 70–99)
GLUCOSE BLDC GLUCOMTR-MCNC: 102 MG/DL — HIGH (ref 70–99)
GLUCOSE BLDC GLUCOMTR-MCNC: 111 MG/DL — HIGH (ref 70–99)
GLUCOSE BLDC GLUCOMTR-MCNC: 123 MG/DL — HIGH (ref 70–99)
GLUCOSE BLDC GLUCOMTR-MCNC: 128 MG/DL — HIGH (ref 70–99)
GLUCOSE BLDC GLUCOMTR-MCNC: 152 MG/DL — HIGH (ref 70–99)
GLUCOSE SERPL-MCNC: 119 MG/DL — HIGH (ref 70–99)
HCT VFR BLD CALC: 29.2 % — LOW (ref 39–50)
HGB BLD-MCNC: 9.4 G/DL — LOW (ref 13–17)
MAGNESIUM SERPL-MCNC: 2.5 MG/DL — SIGNIFICANT CHANGE UP (ref 1.6–2.6)
MCHC RBC-ENTMCNC: 32.2 GM/DL — SIGNIFICANT CHANGE UP (ref 32–36)
MCHC RBC-ENTMCNC: 33.8 PG — SIGNIFICANT CHANGE UP (ref 27–34)
MCV RBC AUTO: 105 FL — HIGH (ref 80–100)
NRBC # BLD: 0 /100 WBCS — SIGNIFICANT CHANGE UP (ref 0–0)
PHOSPHATE SERPL-MCNC: 5.2 MG/DL — HIGH (ref 2.5–4.5)
PLATELET # BLD AUTO: 85 K/UL — LOW (ref 150–400)
POTASSIUM SERPL-MCNC: 4.3 MMOL/L — SIGNIFICANT CHANGE UP (ref 3.5–5.3)
POTASSIUM SERPL-SCNC: 4.3 MMOL/L — SIGNIFICANT CHANGE UP (ref 3.5–5.3)
RBC # BLD: 2.78 M/UL — LOW (ref 4.2–5.8)
RBC # FLD: 13.3 % — SIGNIFICANT CHANGE UP (ref 10.3–14.5)
SODIUM SERPL-SCNC: 135 MMOL/L — SIGNIFICANT CHANGE UP (ref 135–145)
TSH SERPL-MCNC: 2.16 UU/ML — SIGNIFICANT CHANGE UP (ref 0.34–4.82)
WBC # BLD: 7.69 K/UL — SIGNIFICANT CHANGE UP (ref 3.8–10.5)
WBC # FLD AUTO: 7.69 K/UL — SIGNIFICANT CHANGE UP (ref 3.8–10.5)

## 2023-03-25 RX ORDER — INSULIN LISPRO 100/ML
VIAL (ML) SUBCUTANEOUS
Refills: 0 | Status: DISCONTINUED | OUTPATIENT
Start: 2023-03-25 | End: 2023-03-27

## 2023-03-25 RX ADMIN — Medication 1: at 12:56

## 2023-03-25 RX ADMIN — Medication 81 MILLIGRAM(S): at 12:31

## 2023-03-25 RX ADMIN — Medication 0: at 06:00

## 2023-03-25 RX ADMIN — AMLODIPINE BESYLATE 10 MILLIGRAM(S): 2.5 TABLET ORAL at 06:00

## 2023-03-25 RX ADMIN — Medication 1 DROP(S): at 06:00

## 2023-03-25 RX ADMIN — PANTOPRAZOLE SODIUM 40 MILLIGRAM(S): 20 TABLET, DELAYED RELEASE ORAL at 12:59

## 2023-03-25 RX ADMIN — Medication 1 DROP(S): at 15:11

## 2023-03-25 RX ADMIN — Medication 1 DROP(S): at 21:05

## 2023-03-25 NOTE — PROGRESS NOTE ADULT - ASSESSMENT
61 yo M, lives with family, from home, with med hx significant for HTN, HLD, DM, CAD s/p CABG x 2 2021, stroke s/p CABG (no residual weakness), hx of Mitral valve replacement, ANGELITA ligation, ESRD (MWF at [place], last dialysis 3/22, LUE AV Fistula), Left carotid stenosis s/p Carotid endarterectomy with repair using patch graft  3/23.  1.PT.  2.Thrombocytopenia-Heme f/u.  3.ESRD-HD as per renal.  4.HTN-norvasc.  5.DM-Insulin.  6.PAF-NOAC on hold, on no av blocking agents hx of bradycardia.Cont asa for now.  7.PPI.

## 2023-03-25 NOTE — CONSULT NOTE ADULT - SUBJECTIVE AND OBJECTIVE BOX
[  ] STAT REQUEST              [ X ] ROUTINE REQUEST    Patient is a 62 year old male with anemia. GI consulted to evaluate.         HPI:  Patient is a 61 yo M, lives with family, from home, with med hx significant for HTN, HLD, DM, CAD s/p CABG x 2 2021, stroke s/p CABG (no residual weakness), hx of Mitral valve replacement, ESRD (MWF at [place], last dialysis 3/22, LUE AV Fistula), Left carotid stenosis s/p Carotid endarterectomy with repair using patch graft today 3/23 admitted to ICU for close post op monitoring with frequent neurochecks. EBL minimal, pt was given 300cc bolus, 1 dose Ancef, has 1 drain. Goal SBP during post op stay 120-160s, with Right radial A line. Patient currently feeling comfortable, no chest pain, SOB at this time. (23 Mar 2023 11:52)      PAIN MANAGEMENT:  Pain Scale:                 /10  Pain Location:      Prior Colonoscopy:    PAST MEDICAL HISTORY  Hypertension    Hyperlipidemia    Diabetes mellitus    End stage renal disease    CAD (coronary artery disease)    S/P CABG (coronary artery bypass graft)    Stroke    Occlusion and stenosis of bilateral carotid arteries        PAST SURGICAL HISTORY  No pertinent past surgical history    S/P CABG (coronary artery bypass graft)    AV (arteriovenous fistula)        Allergies    No Known Allergies    Intolerances        HOME MEDICATIONS    MEDICATIONS  (STANDING):  amLODIPine   Tablet 10 milliGRAM(s) Oral daily  artificial  tears Solution 1 Drop(s) Both EYES three times a day  aspirin  chewable 81 milliGRAM(s) Oral daily  epoetin vern-epbx (RETACRIT) Injectable 4000 Unit(s) IV Push <User Schedule>  insulin lispro (ADMELOG) corrective regimen sliding scale   SubCutaneous every 6 hours  pantoprazole    Tablet 40 milliGRAM(s) Oral before breakfast    MEDICATIONS  (PRN):  HYDROmorphone  Injectable 0.5 milliGRAM(s) IV Push every 10 minutes PRN Moderate Pain (4 - 6)      SOCIAL HISTORY  Advanced Directives:       [  ] Full Code       [  ] DNR  Marital Status:         [  ] M      [  ] S      [  ] D       [  ] W  Children:       [  ] Yes      [  ] No  Occupation:        [  ] Employed       [  ] Unemployed       [  ] Retired  Diet:       [  ] Regular       [  ] PEG feeding          [  ] NG tube feeding  Drug Use:           [  ] Patient denied          [  ] Yes  Alcohol:           [  ] No             [  ] Yes (socially)         [  ] Yes (chronic)  Tobacco:           [  ] Yes           [  ] No    FAMILY HISTORY  [  ] Heart Disease            [  ] Diabetes             [  ] HTN             [  ] Colon Cancer             [  ] Stomach Cancer              [  ] Pancreatic Cancer    VITAL SIGNS   Vital Signs Last 24 Hrs  T(C): 37.1 (25 Mar 2023 13:05), Max: 37.4 (24 Mar 2023 20:45)  T(F): 98.7 (25 Mar 2023 13:05), Max: 99.3 (24 Mar 2023 20:45)  HR: 69 (25 Mar 2023 13:05) (69 - 75)  BP: 144/71 (25 Mar 2023 13:05) (144/71 - 166/56)     RR: 18 (25 Mar 2023 13:05) (18 - 20)  SpO2: 94% (25 Mar 2023 13:05) (94% - 97%)    Parameters below as of 25 Mar 2023 13:05  Patient On (Oxygen Delivery Method): nasal cannula  O2 Flow (L/min): 2        CBC Full  -  ( 25 Mar 2023 06:26 )  WBC Count : 7.69 K/uL  RBC Count : 2.78 M/uL  Hemoglobin : 9.4 g/dL  Hematocrit : 29.2 %  Platelet Count - Automated : 85 K/uL  Mean Cell Volume : 105.0 fl  Mean Cell Hemoglobin : 33.8 pg  Mean Cell Hemoglobin Concentration : 32.2 gm/dL  Auto Neutrophil # : x  Auto Lymphocyte # : x  Auto Monocyte # : x  Auto Eosinophil # : x  Auto Basophil # : x  Auto Neutrophil % : x  Auto Lymphocyte % : x  Auto Monocyte % : x  Auto Eosinophil % : x  Auto Basophil % : x      03-25    135  |  99  |  45<H>  ----------------------------<  119<H>  4.3   |  29  |  6.17<H>    Ca    8.9      25 Mar 2023 06:26  Phos  5.2     03-25  Mg     2.5     03-25    TPro  6.9  /  Alb  2.9<L>  /  TBili  0.5  /  DBili  x   /  AST  16  /  ALT  11  /  AlkPhos  75  03-24          ALT/SGPT --  Albumin, Serum --  Alkaline Phosphatase --  AST/SGOT --  Bilirubin Direct --  Bilirubin Total --  Indirect Bilirubin --  Hepatitis A Total --  Hepatitis B Core Antibody Nonreact  Hepatitis B Surface Antibody Reactive  Hepatitis B Surface Antigen Nonreact  Hepatitis C Virus Interpretation Nonreact  Hepatitis C Virus Genotype --  ALT/SGPT 11  Albumin, Serum 2.9  Alkaline Phosphatase 75  AST/SGOT 16  Bilirubin Direct --  Bilirubin Total 0.5  Indirect Bilirubin --  Hepatitis A Total --  Hepatitis B Core Antibody --  Hepatitis B Surface Antibody --  Hepatitis B Surface Antigen --  Hepatitis C Virus Interpretation --  Hepatitis C Virus Genotype --  ALT/SGPT 14  Albumin, Serum 2.6  Alkaline Phosphatase 63  AST/SGOT 15  Bilirubin Direct --  Bilirubin Total 0.7  Indirect Bilirubin --  Hepatitis A Total --  Hepatitis B Core Antibody --  Hepatitis B Surface Antibody --  Hepatitis B Surface Antigen --  Hepatitis C Virus Interpretation --  Hepatitis C Virus Genotype --              RADIOLOGY/IMAGING

## 2023-03-25 NOTE — PROGRESS NOTE ADULT - SUBJECTIVE AND OBJECTIVE BOX
Problem List:  ESRD  MVR  PAF  Thrombocytopenia  Post Lt CEA    PAST MEDICAL & SURGICAL HISTORY:  Hypertension      Hyperlipidemia      Diabetes mellitus      End stage renal disease      CAD (coronary artery disease)      Stroke  right hemiplegia      Occlusion and stenosis of bilateral carotid arteries      S/P CABG (coronary artery bypass graft)  Mitral valve replacement on 10/21/2021      AV (arteriovenous fistula)  h/o creation (failed)          No Known Allergies      MEDICATIONS  (STANDING):  amLODIPine   Tablet 10 milliGRAM(s) Oral daily  artificial  tears Solution 1 Drop(s) Both EYES three times a day  aspirin  chewable 81 milliGRAM(s) Oral daily  epoetin vern-epbx (RETACRIT) Injectable 4000 Unit(s) IV Push <User Schedule>  insulin lispro (ADMELOG) corrective regimen sliding scale   SubCutaneous every 6 hours  pantoprazole    Tablet 40 milliGRAM(s) Oral before breakfast    MEDICATIONS  (PRN):  HYDROmorphone  Injectable 0.5 milliGRAM(s) IV Push every 10 minutes PRN Moderate Pain (4 - 6)                            9.4    7.69  )-----------( 85       ( 25 Mar 2023 06:26 )             29.2     03-25    135  |  99  |  45<H>  ----------------------------<  119<H>  4.3   |  29  |  6.17<H>    Ca    8.9      25 Mar 2023 06:26  Phos  5.2     03-25  Mg     2.5     03-25    TPro  6.9  /  Alb  2.9<L>  /  TBili  0.5  /  DBili  x   /  AST  16  /  ALT  11  /  AlkPhos  75  03-24            REVIEW OF SYSTEMS:  General: no fever no chills, no weight loss.  EYES/ENT: No visual changes;  No vertigo, no headache.  NECK: No pain or stiffness  RESPIRATORY: No cough, wheezing, hemoptysis; No shortness of breath  CARDIOVASCULAR: No chest pain or palpitations. No Edema  GASTROINTESTINAL: No abdominal or epigastric pain. No nausea, vomiting. No diarrhea or constipation. No melena.  GENITOURINARY: No dysuria, frequency, foamy urine, urinary urgency, incontinence or hematuria  NEUROLOGICAL: No numbness or weakness, no tremor , no dizziness.   Muscle skeletal : no joint pain and no swelling of joints and limbs.  SKIN: No itching, burning, rashes.        VITALS:  T(F): 98.8 (03-25-23 @ 05:01), Max: 99.3 (03-24-23 @ 20:45)  HR: 72 (03-25-23 @ 05:01)  BP: 166/56 (03-25-23 @ 05:01)  RR: 18 (03-25-23 @ 05:01)  SpO2: 97% (03-25-23 @ 05:01)  Wt(kg): --      PHYSICAL EXAM:  Constitutional: well developed, no diaphoresis, no distress.  Neck: No JVD, no carotid bruit, supple, no adenopathy  Respiratory: air entrance B/L, no wheezes, rales or rhonchi  Cardiovascular: S1, S2, RRR, no pericardial rub, no murmur  Abdomen: BS+, soft, no tenderness, no bruit  Pelvis: bladder nondistended  Extremities: No cyanosis or clubbing. No peripheral edema.     Vascular Access: left AVF with bruit and thrill  Oriented to place but not to time

## 2023-03-25 NOTE — PROGRESS NOTE ADULT - ASSESSMENT
ESRD - last dialysis yesterday. Had 2 hours dialysis due to in house directions . No need for dialysis today.  Monitor lab in am  Patient is stable , K is stable and no CHF  Post CEA stable , H/H stable  Thrombocytopenia improved  Follow up need to resume AC - Eliquis  Follow up pathology report

## 2023-03-25 NOTE — CONSULT NOTE ADULT - SUBJECTIVE AND OBJECTIVE BOX
PULMONARY CONSULT NOTE      TOM PLEITEZ  MRN-[id number]    History of Present Illness:  Reason for Admission: Post carotid endarterectomy  History of Present Illness:   Patient is a 61 yo M, lives with family, from home, with med hx significant for HTN, HLD, DM, CAD s/p CABG x 2 2021, stroke s/p CABG (no residual weakness), hx of Mitral valve replacement, ESRD (MWF at [place], last dialysis 3/22, LUE AV Fistula), Left carotid stenosis s/p Carotid endarterectomy with repair using patch graft today 3/23 admitted to ICU for close post op monitoring with frequent neurochecks. EBL minimal, pt was given 300cc bolus, 1 dose Ancef, has 1 drain. Goal SBP during post op stay 120-160s, with Right radial A line. Patient currently feeling comfortable, no chest pain, SOB at this time.        HISTORY OF PRESENT ILLNESS: As Above. Awake, alert, laying in bed in NAD    MEDICATIONS  (STANDING):  amLODIPine   Tablet 10 milliGRAM(s) Oral daily  artificial  tears Solution 1 Drop(s) Both EYES three times a day  aspirin  chewable 81 milliGRAM(s) Oral daily  epoetin vern-epbx (RETACRIT) Injectable 4000 Unit(s) IV Push <User Schedule>  insulin lispro (ADMELOG) corrective regimen sliding scale   SubCutaneous every 6 hours  pantoprazole    Tablet 40 milliGRAM(s) Oral before breakfast      MEDICATIONS  (PRN):  HYDROmorphone  Injectable 0.5 milliGRAM(s) IV Push every 10 minutes PRN Moderate Pain (4 - 6)      Allergies    No Known Allergies    Intolerances        PAST MEDICAL & SURGICAL HISTORY:  Hypertension      Hyperlipidemia      Diabetes mellitus      End stage renal disease      CAD (coronary artery disease)      Stroke  right hemiplegia      Occlusion and stenosis of bilateral carotid arteries      S/P CABG (coronary artery bypass graft)  Mitral valve replacement on 10/21/2021      AV (arteriovenous fistula)  h/o creation (failed)          FAMILY HISTORY:  Family history of CVA (Father)        SOCIAL HISTORY  Smoking History:     REVIEW OF SYSTEMS:    CONSTITUTIONAL:  No fevers, chills, sweats    HEENT:  Eyes:  No diplopia or blurred vision. ENT:  No earache, sore throat or runny nose.    CARDIOVASCULAR:  No pressure, squeezing, tightness, or heaviness about the chest; no palpitations.    RESPIRATORY:  Per HPI    GASTROINTESTINAL:  No abdominal pain, nausea, vomiting or diarrhea.    GENITOURINARY:  No dysuria, frequency or urgency.    NEUROLOGIC:  No paresthesias, fasciculations, seizures or weakness.    PSYCHIATRIC:  No disorder of thought or mood.    Vital Signs Last 24 Hrs  T(C): 37.4 (24 Mar 2023 20:45), Max: 37.4 (24 Mar 2023 20:45)  T(F): 99.3 (24 Mar 2023 20:45), Max: 99.3 (24 Mar 2023 20:45)  HR: 75 (24 Mar 2023 20:45) (65 - 86)  BP: 149/65 (24 Mar 2023 20:45) (124/66 - 171/70)  BP(mean): 88 (24 Mar 2023 12:00) (88 - 97)  RR: 20 (24 Mar 2023 20:45) (16 - 25)  SpO2: 94% (24 Mar 2023 20:45) (94% - 99%)    Parameters below as of 24 Mar 2023 20:45  Patient On (Oxygen Delivery Method): nasal cannula  O2 Flow (L/min): 3    I&O's Detail      PHYSICAL EXAMINATION:    GENERAL: The patient is a well-developed, well-nourished _____in no apparent distress.     HEENT: Head is normocephalic and atraumatic. Extraocular muscles are intact. Mucous membranes are moist.     NECK: Supple.     LUNGS: Clear to auscultation without wheezing, rales, or rhonchi. Respirations unlabored    HEART: Regular rate and rhythm without murmur.    ABDOMEN: Soft, nontender, and nondistended.  No hepatosplenomegaly is noted.    EXTREMITIES: Without any cyanosis, clubbing, rash, lesions or edema.    NEUROLOGIC: Grossly intact.      LABS:                        9.4    7.69  )-----------( 85       ( 25 Mar 2023 06:26 )             29.2     03-25    135  |  99  |  45<H>  ----------------------------<  119<H>  4.3   |  29  |  6.17<H>    Ca    8.9      25 Mar 2023 06:26  Phos  5.2     03-25  Mg     2.5     03-25    TPro  6.9  /  Alb  2.9<L>  /  TBili  0.5  /  DBili  x   /  AST  16  /  ALT  11  /  AlkPhos  75  03-24                        MICROBIOLOGY:    RADIOLOGY & ADDITIONAL STUDIES:    CXR:  < from: Xray Chest 2 Views PA/Lat (03.17.23 @ 16:49) >  IMPRESSION:  No acute findings and no change, small left pleural effusion and scarring   in the left lung again evident    --- End of Report ---    < end of copied text >    Ct scan chest;    ekg;    echo:

## 2023-03-25 NOTE — CONSULT NOTE ADULT - PROBLEM SELECTOR RECOMMENDATION 2
likely fluid overload vs underlying bronchospasm  oxygen supp prn  monitor oxygen sat  Bronchodilators prn  PFTs as OP

## 2023-03-25 NOTE — PROGRESS NOTE ADULT - SUBJECTIVE AND OBJECTIVE BOX
Patient is a 62y old  Male who presents with a chief complaint of Post carotid endarterectomy (25 Mar 2023 12:29)    pt seen in icu [  ], reg med floor [   ], bed [  ], chair at bedside [   ], a+o x3 [  ], lethargic [  ],  nad [  ]    soliz [  ], ngt [  ], peg [  ], et tube [  ], cent line [  ], picc line [  ]        Allergies    No Known Allergies        Vitals    T(F): 99.3 (03-24-23 @ 20:45), Max: 99.3 (03-24-23 @ 20:45)  HR: 75 (03-24-23 @ 20:45) (75 - 82)  BP: 149/65 (03-24-23 @ 20:45) (124/66 - 155/72)  RR: 20 (03-24-23 @ 20:45) (20 - 21)  SpO2: 94% (03-24-23 @ 20:45) (94% - 99%)  Wt(kg): --  CAPILLARY BLOOD GLUCOSE      POCT Blood Glucose.: 123 mg/dL (25 Mar 2023 11:47)      Labs                          9.4    7.69  )-----------( 85       ( 25 Mar 2023 06:26 )             29.2       03-25    135  |  99  |  45<H>  ----------------------------<  119<H>  4.3   |  29  |  6.17<H>    Ca    8.9      25 Mar 2023 06:26  Phos  5.2     03-25  Mg     2.5     03-25    TPro  6.9  /  Alb  2.9<L>  /  TBili  0.5  /  DBili  x   /  AST  16  /  ALT  11  /  AlkPhos  75  03-24                Radiology Results      Meds    MEDICATIONS  (STANDING):  amLODIPine   Tablet 10 milliGRAM(s) Oral daily  artificial  tears Solution 1 Drop(s) Both EYES three times a day  aspirin  chewable 81 milliGRAM(s) Oral daily  epoetin vern-epbx (RETACRIT) Injectable 4000 Unit(s) IV Push <User Schedule>  insulin lispro (ADMELOG) corrective regimen sliding scale   SubCutaneous every 6 hours  pantoprazole    Tablet 40 milliGRAM(s) Oral before breakfast      MEDICATIONS  (PRN):  HYDROmorphone  Injectable 0.5 milliGRAM(s) IV Push every 10 minutes PRN Moderate Pain (4 - 6)      Physical Exam    Neuro :  no focal deficits  Respiratory: CTA B/L  CV: RRR, S1S2, no murmurs,   Abdominal: Soft, NT, ND +BS,  Extremities: No edema, + peripheral pulses    ASSESSMENT    Occlusion and stenosis of carotid arteries of both sides    Hypertension    Hyperlipidemia    Diabetes mellitus    End stage renal disease    CAD (coronary artery disease)    S/P CABG (coronary artery bypass graft)    Stroke    Occlusion and stenosis of bilateral carotid arteries    No pertinent past surgical history    S/P CABG (coronary artery bypass graft)    AV (arteriovenous fistula)        PLAN     63 yo M, lives with family, from home, with med hx significant for HTN, HLD, DM, CAD s/p CABG x 2 2021, stroke s/p CABG (no residual weakness), hx of Mitral valve replacement, ESRD (MWF at [place], last dialysis 3/22, LUE AV Fistula), Left carotid stenosis s/p Carotid endarterectomy with repair using patch graft today 3/23 admitted to ICU for close post op monitoring with frequent neurochecks. EBL minimal, pt was given 300cc bolus, 1 dose Ancef, has 1 drain. Goal SBP during post op stay 120-160s, with Right radial A line. Patient currently feeling comfortable, no chest pain, SOB at this time.    Review of Systems:  Review of Systems: REVIEW OF SYSTEMS:    CONSTITUTIONAL: No weakness, fevers or chills  EYES/ENT: No visual changes;  No vertigo or throat pain   NECK: No pain or stiffness  RESPIRATORY: No cough, wheezing, hemoptysis; No shortness of breath  CARDIOVASCULAR: No chest pain or palpitations  GASTROINTESTINAL: No abdominal or epigastric pain. No nausea, vomiting, or hematemesis; No diarrhea or constipation. No melena or hematochezia.  GENITOURINARY: No dysuria, frequency or hematuria  NEUROLOGICAL: No numbness or weakness  SKIN: No itching, burning, rashes, or lesions   All other review of systems is negative unless indicated above.      pt seen in icu [  ], reg med floor [ x  ], bed [ x ], chair at bedside [   ], a+o x3 [ x ], lethargic [  ],  nad [ x ]        Allergies    No Known Allergies        Vitals    T(F): 99.3 (03-24-23 @ 20:45), Max: 99.3 (03-24-23 @ 20:45)  HR: 75 (03-24-23 @ 20:45) (75 - 82)  BP: 149/65 (03-24-23 @ 20:45) (124/66 - 155/72)  RR: 20 (03-24-23 @ 20:45) (20 - 21)  SpO2: 94% (03-24-23 @ 20:45) (94% - 99%)  Wt(kg): --  CAPILLARY BLOOD GLUCOSE      POCT Blood Glucose.: 123 mg/dL (25 Mar 2023 11:47)      Labs                          9.4    7.69  )-----------( 85       ( 25 Mar 2023 06:26 )             29.2       03-25    135  |  99  |  45<H>  ----------------------------<  119<H>  4.3   |  29  |  6.17<H>    Ca    8.9      25 Mar 2023 06:26  Phos  5.2     03-25  Mg     2.5     03-25    TPro  6.9  /  Alb  2.9<L>  /  TBili  0.5  /  DBili  x   /  AST  16  /  ALT  11  /  AlkPhos  75  03-24                Radiology Results      Meds    MEDICATIONS  (STANDING):  amLODIPine   Tablet 10 milliGRAM(s) Oral daily  artificial  tears Solution 1 Drop(s) Both EYES three times a day  aspirin  chewable 81 milliGRAM(s) Oral daily  epoetin vern-epbx (RETACRIT) Injectable 4000 Unit(s) IV Push <User Schedule>  insulin lispro (ADMELOG) corrective regimen sliding scale   SubCutaneous every 6 hours  pantoprazole    Tablet 40 milliGRAM(s) Oral before breakfast      MEDICATIONS  (PRN):  HYDROmorphone  Injectable 0.5 milliGRAM(s) IV Push every 10 minutes PRN Moderate Pain (4 - 6)      Physical Exam    Neuro :  no focal deficits  Respiratory: CTA B/L  CV: RRR, S1S2, no murmurs,   Abdominal: Soft, NT, ND +BS,  Extremities: No edema, + peripheral pulses    ASSESSMENT    Left carotid stenosis s/p Carotid endarterectomy    anemia   h/o CAD s/p CABG x 2 2021,   cva (no residual weakness),   hx of Mitral valve replacement,   ESRD (MWF  Hypertension  Hyperlipidemia  Diabetes mellitus  Occlusion and stenosis of bilateral carotid arteries        PLAN    Carotid endarterectomy with repair using patch graft 23-Mar-2023   s/p Small black mass noted adjacent to carotid, resected and sent for pathology.  pt s/p ICU for post op and airway monitoring now downgraded to reg floor  vascular f/u   Mointor SBP (goal 100-160)  Continue ASA, plan to resume home Eliquis on POD2  renal f/u   dialysis days are MWF.  s/p 2 hrs hd yesterday  Diet 2 gm and low PO4.   cardio f/u noted  NOAC on hold,   pt on no av blocking agents 2nd to bradycardia   cont aspirin   heme onc cons   plt improving  f/u patho resected neck mass   pt with sob pos 2nd underlying bronchospasm   pulm cons noted  oxygen supp prn  monitor oxygen sat  Bronchodilators prn  PFTs as OP.  gi cons   cont current meds

## 2023-03-26 LAB
ANION GAP SERPL CALC-SCNC: 8 MMOL/L — SIGNIFICANT CHANGE UP (ref 5–17)
BUN SERPL-MCNC: 70 MG/DL — HIGH (ref 7–18)
CALCIUM SERPL-MCNC: 8.9 MG/DL — SIGNIFICANT CHANGE UP (ref 8.4–10.5)
CHLORIDE SERPL-SCNC: 97 MMOL/L — SIGNIFICANT CHANGE UP (ref 96–108)
CO2 SERPL-SCNC: 27 MMOL/L — SIGNIFICANT CHANGE UP (ref 22–31)
CREAT SERPL-MCNC: 8.11 MG/DL — HIGH (ref 0.5–1.3)
EGFR: 7 ML/MIN/1.73M2 — LOW
GLUCOSE BLDC GLUCOMTR-MCNC: 105 MG/DL — HIGH (ref 70–99)
GLUCOSE BLDC GLUCOMTR-MCNC: 113 MG/DL — HIGH (ref 70–99)
GLUCOSE BLDC GLUCOMTR-MCNC: 129 MG/DL — HIGH (ref 70–99)
GLUCOSE BLDC GLUCOMTR-MCNC: 99 MG/DL — SIGNIFICANT CHANGE UP (ref 70–99)
GLUCOSE SERPL-MCNC: 149 MG/DL — HIGH (ref 70–99)
HCT VFR BLD CALC: 29.3 % — LOW (ref 39–50)
HGB BLD-MCNC: 9.3 G/DL — LOW (ref 13–17)
MAGNESIUM SERPL-MCNC: 2.5 MG/DL — SIGNIFICANT CHANGE UP (ref 1.6–2.6)
MCHC RBC-ENTMCNC: 31.7 GM/DL — LOW (ref 32–36)
MCHC RBC-ENTMCNC: 32.7 PG — SIGNIFICANT CHANGE UP (ref 27–34)
MCV RBC AUTO: 103.2 FL — HIGH (ref 80–100)
NRBC # BLD: 0 /100 WBCS — SIGNIFICANT CHANGE UP (ref 0–0)
PHOSPHATE SERPL-MCNC: 6.5 MG/DL — HIGH (ref 2.5–4.5)
PLATELET # BLD AUTO: 104 K/UL — LOW (ref 150–400)
POTASSIUM SERPL-MCNC: 4.6 MMOL/L — SIGNIFICANT CHANGE UP (ref 3.5–5.3)
POTASSIUM SERPL-SCNC: 4.6 MMOL/L — SIGNIFICANT CHANGE UP (ref 3.5–5.3)
RBC # BLD: 2.84 M/UL — LOW (ref 4.2–5.8)
RBC # FLD: 13.3 % — SIGNIFICANT CHANGE UP (ref 10.3–14.5)
SODIUM SERPL-SCNC: 132 MMOL/L — LOW (ref 135–145)
WBC # BLD: 7.28 K/UL — SIGNIFICANT CHANGE UP (ref 3.8–10.5)
WBC # FLD AUTO: 7.28 K/UL — SIGNIFICANT CHANGE UP (ref 3.8–10.5)

## 2023-03-26 RX ORDER — HYDRALAZINE HCL 50 MG
5 TABLET ORAL ONCE
Refills: 0 | Status: COMPLETED | OUTPATIENT
Start: 2023-03-26 | End: 2023-03-26

## 2023-03-26 RX ORDER — HYDRALAZINE HCL 50 MG
25 TABLET ORAL EVERY 8 HOURS
Refills: 0 | Status: DISCONTINUED | OUTPATIENT
Start: 2023-03-26 | End: 2023-03-27

## 2023-03-26 RX ORDER — SEVELAMER CARBONATE 2400 MG/1
800 POWDER, FOR SUSPENSION ORAL
Refills: 0 | Status: DISCONTINUED | OUTPATIENT
Start: 2023-03-26 | End: 2023-03-27

## 2023-03-26 RX ADMIN — SEVELAMER CARBONATE 800 MILLIGRAM(S): 2400 POWDER, FOR SUSPENSION ORAL at 13:38

## 2023-03-26 RX ADMIN — Medication 81 MILLIGRAM(S): at 12:35

## 2023-03-26 RX ADMIN — PANTOPRAZOLE SODIUM 40 MILLIGRAM(S): 20 TABLET, DELAYED RELEASE ORAL at 06:00

## 2023-03-26 RX ADMIN — Medication 1 DROP(S): at 21:03

## 2023-03-26 RX ADMIN — Medication 5 MILLIGRAM(S): at 23:28

## 2023-03-26 RX ADMIN — Medication 1 DROP(S): at 06:00

## 2023-03-26 RX ADMIN — SEVELAMER CARBONATE 800 MILLIGRAM(S): 2400 POWDER, FOR SUSPENSION ORAL at 17:14

## 2023-03-26 RX ADMIN — AMLODIPINE BESYLATE 10 MILLIGRAM(S): 2.5 TABLET ORAL at 06:00

## 2023-03-26 RX ADMIN — Medication 1 DROP(S): at 13:38

## 2023-03-26 RX ADMIN — Medication 25 MILLIGRAM(S): at 14:27

## 2023-03-26 RX ADMIN — Medication 25 MILLIGRAM(S): at 21:03

## 2023-03-26 NOTE — CONSULT NOTE ADULT - ASSESSMENT
63 yo M, lives with family, from home, with med hx significant for HTN, HLD, DM, CAD s/p CABG x 2 2021, stroke s/p CABG (no residual weakness), hx of Mitral valve replacement, ANGELITA ligation, ESRD (MWF at [place], last dialysis 3/22, LUE AV Fistula), Left carotid stenosis s/p Carotid endarterectomy with repair using patch graft  3/23.  1.Transfer to floor.  2.Thrombocytopenia-Heme eval.  3.ESRD-HD as per renal.  4.HTN-norvasc.  5.DM-Insulin.  6.PAF-NOAC on hold, on no av blocking agents hx of bradycardia.  7.PPI.
Pt was seen on 3/24/23.  62 year old male with HTN, DM, ESRD on HD was admitted for carotid endarterecomy.  His platelet was in normal range with occasional drop to 100.  The platelet decreased to 56 after surgery.  A ?pathological lymph node was found near carotid artery.    1. immediate post-op  thrombocytopenia  usually due to hemodilution from IVF given in surgery  consumption of platelet due to surgery also occurs  HIT is less likely especially in this pt, he has been getting heparin in HD for long time  the platelet count usually recover in 3-4 days.  TMA is also less likely.  there is no evidence of hemolysis    2. lymphadenopathy,  will f/u on path  will do CT neck and chest after recovery
ESRD due to diabetes and hypertension.  Follow with dialysis in AM.  Post  Endarterectomy  - Left, stable  History of AF on Eliquis , follow with surgery , start of AC  History of CAD , s/p CABGx2, LAAL, MVR  MVR with bioprosthetic valve.    Anemia due to ESRD to follow with [facility].  Thrombocytopenia , drop from [id number],  follow lab in am    Hypertension stable.

## 2023-03-26 NOTE — CONSULT NOTE ADULT - SUBJECTIVE AND OBJECTIVE BOX
Patient is a 62y old  Male who presents with a chief complaint of Post carotid endarterectomy (26 Mar 2023 10:45)      HPI:  Patient is a 63 yo M, lives with family, from home, with med hx significant for HTN, HLD, DM, CAD s/p CABG x 2 2021, stroke s/p CABG (no residual weakness), hx of Mitral valve replacement, ESRD (MWF at [facility], last dialysis 3/22, LUE AV Fistula), Left carotid stenosis s/p Carotid endarterectomy with repair using patch graft today 3/23 admitted to ICU for close post op monitoring with frequent neurochecks. EBL minimal, pt was given 300cc bolus, 1 dose Ancef, has 1 drain. Goal SBP during post op stay 120-160s, with Right radial A line. Patient currently feeling comfortable, no chest pain, SOB at this time. (23 Mar 2023 11:52)       ROS:  Negative except for:    PAST MEDICAL & SURGICAL HISTORY:  Hypertension      Hyperlipidemia      Diabetes mellitus      End stage renal disease      CAD (coronary artery disease)      Stroke  right hemiplegia      Occlusion and stenosis of bilateral carotid arteries      S/P CABG (coronary artery bypass graft)  Mitral valve replacement on 10/21/2021      AV (arteriovenous fistula)  h/o creation (failed)          SOCIAL HISTORY:    FAMILY HISTORY:  Family history of CVA (Father)        MEDICATIONS  (STANDING):  amLODIPine   Tablet 10 milliGRAM(s) Oral daily  artificial  tears Solution 1 Drop(s) Both EYES three times a day  aspirin  chewable 81 milliGRAM(s) Oral daily  epoetin vern-epbx (RETACRIT) Injectable 4000 Unit(s) IV Push <User Schedule>  insulin lispro (ADMELOG) corrective regimen sliding scale   SubCutaneous every 6 hours  insulin lispro (ADMELOG) corrective regimen sliding scale   SubCutaneous three times a day before meals  pantoprazole    Tablet 40 milliGRAM(s) Oral before breakfast  sevelamer carbonate 800 milliGRAM(s) Oral three times a day with meals    MEDICATIONS  (PRN):  HYDROmorphone  Injectable 0.5 milliGRAM(s) IV Push every 10 minutes PRN Moderate Pain (4 - 6)      Allergies    No Known Allergies    Intolerances        Vital Signs Last 24 Hrs  T(C): 36.6 (26 Mar 2023 05:04), Max: 37.2 (25 Mar 2023 20:36)  T(F): 97.9 (26 Mar 2023 05:04), Max: 99 (25 Mar 2023 20:36)  HR: 85 (26 Mar 2023 08:48) (69 - 87)  BP: 178/68 (26 Mar 2023 08:48) (144/71 - 182/74)  BP(mean): --  RR: 18 (26 Mar 2023 05:04) (18 - 19)  SpO2: 92% (26 Mar 2023 05:04) (92% - 94%)    Parameters below as of 26 Mar 2023 05:04  Patient On (Oxygen Delivery Method): nasal cannula  O2 Flow (L/min): 2      PHYSICAL EXAM  General: adult in NAD  HEENT: clear oropharynx, anicteric sclera, pink conjunctiva  Neck: supple  CV: normal S1/S2 with no murmur rubs or gallops  Lungs: positive air movement b/l ant lungs,clear to auscultation, no wheezes, no rales  Abdomen: soft non-tender non-distended, no hepatosplenomegaly  Ext: no clubbing cyanosis or edema  Skin: no rashes and no petechiae  Neuro: alert and oriented X 4, no focal deficits      LABS:                          9.3    7.28  )-----------( 104      ( 26 Mar 2023 06:20 )             29.3         Mean Cell Volume : 103.2 fl  Mean Cell Hemoglobin : 32.7 pg  Mean Cell Hemoglobin Concentration : 31.7 gm/dL  Auto Neutrophil # : x  Auto Lymphocyte # : x  Auto Monocyte # : x  Auto Eosinophil # : x  Auto Basophil # : x  Auto Neutrophil % : x  Auto Lymphocyte % : x  Auto Monocyte % : x  Auto Eosinophil % : x  Auto Basophil % : x      Serial CBC's  03-26 @ 06:20  Hct-29.3 / Hgb-9.3 / Plat-104 / RBC-2.84 / WBC-7.28  Serial CBC's  03-25 @ 06:26  Hct-29.2 / Hgb-9.4 / Plat-85 / RBC-2.78 / WBC-7.69  Serial CBC's  03-24 @ 04:07  Hct-28.3 / Hgb-9.1 / Plat-65 / RBC-2.72 / WBC-6.87  Serial CBC's  03-23 @ 12:18  Hct-26.0 / Hgb-8.5 / Plat-56 / RBC-2.49 / WBC-8.10      03-26    132<L>  |  97  |  70<H>  ----------------------------<  149<H>  4.6   |  27  |  8.11<H>    Ca    8.9      26 Mar 2023 06:20  Phos  6.5     03-26  Mg     2.5     03-26            Folate, Serum: 16.2 ng/mL (03-24 @ 13:14)  Vitamin B12, Serum: 775 pg/mL (03-24 @ 13:14)              BLOOD SMEAR INTERPRETATION:       RADIOLOGY & ADDITIONAL STUDIES:

## 2023-03-26 NOTE — PROGRESS NOTE ADULT - ASSESSMENT
ESRD - last dialysis 2 days ago. Had 2 hours dialysis due to in house directions . No need for dialysis today.  Monitor lab in am  Renal bone disesae , increased PO4 start binders  Patient is stable , K is stable and no CHF  Post CEA stable , H/H stable  Thrombocytopenia improved  Follow up need to resume AC - Eliquis  Follow up pathology report

## 2023-03-26 NOTE — PROGRESS NOTE ADULT - ASSESSMENT
61 yo M, lives with family, from home, with med hx significant for HTN, HLD, DM, CAD s/p CABG x 2 2021, stroke s/p CABG (no residual weakness), hx of Mitral valve replacement, ANGELITA ligation, ESRD (MWF at [place], last dialysis 3/22, LUE AV Fistula), Left carotid stenosis s/p Carotid endarterectomy with repair using patch graft  3/23.  1.PT.  2.Thrombocytopenia-Heme f/u.  3.ESRD-HD as per renal.  4.HTN-norvasc,add hydralazine 25mg q8h.  5.DM-Insulin.  6.PAF-NOAC on hold, on no av blocking agents hx of bradycardia.Cont asa for now.  7.PPI.

## 2023-03-26 NOTE — PROGRESS NOTE ADULT - SUBJECTIVE AND OBJECTIVE BOX
Problem List:  ESRD  Post CEA LEFT  PAST MEDICAL & SURGICAL HISTORY:  Hypertension      Hyperlipidemia      Diabetes mellitus      End stage renal disease      CAD (coronary artery disease)      Stroke  right hemiplegia      Occlusion and stenosis of bilateral carotid arteries      S/P CABG (coronary artery bypass graft)  Mitral valve replacement on 10/21/2021      AV (arteriovenous fistula)  h/o creation (failed)          No Known Allergies      MEDICATIONS  (STANDING):  amLODIPine   Tablet 10 milliGRAM(s) Oral daily  artificial  tears Solution 1 Drop(s) Both EYES three times a day  aspirin  chewable 81 milliGRAM(s) Oral daily  epoetin vern-epbx (RETACRIT) Injectable 4000 Unit(s) IV Push <User Schedule>  insulin lispro (ADMELOG) corrective regimen sliding scale   SubCutaneous every 6 hours  insulin lispro (ADMELOG) corrective regimen sliding scale   SubCutaneous three times a day before meals  pantoprazole    Tablet 40 milliGRAM(s) Oral before breakfast    MEDICATIONS  (PRN):  HYDROmorphone  Injectable 0.5 milliGRAM(s) IV Push every 10 minutes PRN Moderate Pain (4 - 6)                            9.3    7.28  )-----------( 104      ( 26 Mar 2023 06:20 )             29.3     03-26    132<L>  |  97  |  70<H>  ----------------------------<  149<H>  4.6   |  27  |  8.11<H>    Ca    8.9      26 Mar 2023 06:20  Phos  6.5     03-26  Mg     2.5     03-26              REVIEW OF SYSTEMS:  General: no fever no chills, no weight loss.    RESPIRATORY: No cough, wheezing, hemoptysis; No shortness of breath  CARDIOVASCULAR: No chest pain or palpitations. No Edema  GASTROINTESTINAL: No abdominal or epigastric pain. No nausea, vomiting. No diarrhea or constipation. No melena.  GENITOURINARY: No dysuria, frequency, foamy urine, urinary urgency, incontinence or hematuria  NEUROLOGICAL: No numbness or weakness, no tremor , no dizziness.   Muscle skeletal : no joint pain and no swelling of joints and limbs.  SKIN: No itching, burning, rashes.        VITALS:  T(F): 97.9 (03-26-23 @ 05:04), Max: 99 (03-25-23 @ 20:36)  HR: 85 (03-26-23 @ 08:48)  BP: 178/68 (03-26-23 @ 08:48)  RR: 18 (03-26-23 @ 05:04)  SpO2: 92% (03-26-23 @ 05:04)  Wt(kg): --      PHYSICAL EXAM:  Constitutional: well developed, no diaphoresis, no distress.  Neck: No JVD, no carotid bruit, supple, no adenopathy  Respiratory:  air entrance B/L, no wheezes, rales or rhonchi  Cardiovascular: S1, S2, RRR, no pericardial rub, systolic  murmur 2/6 LSB.  Abdomen: BS+, soft, no tenderness, no bruit  Pelvis: bladder nondistended  Extremities: No cyanosis or clubbing. No peripheral edema.   Pulses: All present  Neurological: A/O x 3, no focal deficits  Psychiatric: Normal mood, normal affect  Skin: No rashes  Vascular Access: Left AVF with bruit and thrill

## 2023-03-26 NOTE — PROGRESS NOTE ADULT - ASSESSMENT
63 yo M hx significant for HTN, HLD, DM, CAD s/p CABG x 2 2021, stroke s/p CABG (no residual weakness), hx of Mitral valve replacement, ESRD, Left carotid stenosis, s/p L. CEA w/ repair using patch graft 3/24     - Mointor SBP (goal 100-160)  - Continue ASA, resume home Eliquis  - Diet as tolerated  - Pain control   - HD as per renal

## 2023-03-26 NOTE — PROGRESS NOTE ADULT - SUBJECTIVE AND OBJECTIVE BOX
Patient is a 62y old  Male who presents with a chief complaint of Post carotid endarterectomy (26 Mar 2023 06:25)  Awake, alert, comfortable in bed in NAD. Doing well on RA    INTERVAL HPI/OVERNIGHT EVENTS:      VITAL SIGNS:  T(F): 97.9 (03-26-23 @ 05:04)  HR: 85 (03-26-23 @ 08:48)  BP: 178/68 (03-26-23 @ 08:48)  RR: 18 (03-26-23 @ 05:04)  SpO2: 92% (03-26-23 @ 05:04)  Wt(kg): --  I&O's Detail          REVIEW OF SYSTEMS:    CONSTITUTIONAL:  No fevers, chills, sweats    HEENT:  Eyes:  No diplopia or blurred vision. ENT:  No earache, sore throat or runny nose.    CARDIOVASCULAR:  No pressure, squeezing, tightness, or heaviness about the chest; no palpitations.    RESPIRATORY:  Per HPI    GASTROINTESTINAL:  No abdominal pain, nausea, vomiting or diarrhea.    GENITOURINARY:  No dysuria, frequency or urgency.    NEUROLOGIC:  No paresthesias, fasciculations, seizures or weakness.    PSYCHIATRIC:  No disorder of thought or mood.      PHYSICAL EXAM:    Constitutional: Well developed and nourished  Eyes:Perrla  ENMT: normal  Neck:supple  Respiratory: good air entry  Cardiovascular: S1 S2 regular  Gastrointestinal: Soft, Non tender  Extremities: No edema  Vascular:normal  Neurological:Awake, alert,Ox3  Musculoskeletal:Normal      MEDICATIONS  (STANDING):  amLODIPine   Tablet 10 milliGRAM(s) Oral daily  artificial  tears Solution 1 Drop(s) Both EYES three times a day  aspirin  chewable 81 milliGRAM(s) Oral daily  epoetin vern-epbx (RETACRIT) Injectable 4000 Unit(s) IV Push <User Schedule>  insulin lispro (ADMELOG) corrective regimen sliding scale   SubCutaneous every 6 hours  insulin lispro (ADMELOG) corrective regimen sliding scale   SubCutaneous three times a day before meals  pantoprazole    Tablet 40 milliGRAM(s) Oral before breakfast    MEDICATIONS  (PRN):  HYDROmorphone  Injectable 0.5 milliGRAM(s) IV Push every 10 minutes PRN Moderate Pain (4 - 6)      Allergies    No Known Allergies    Intolerances        LABS:                        9.3    7.28  )-----------( 104      ( 26 Mar 2023 06:20 )             29.3     03-26    132<L>  |  97  |  70<H>  ----------------------------<  149<H>  4.6   |  27  |  8.11<H>    Ca    8.9      26 Mar 2023 06:20  Phos  6.5     03-26  Mg     2.5     03-26                CAPILLARY BLOOD GLUCOSE      POCT Blood Glucose.: 99 mg/dL (26 Mar 2023 07:49)  POCT Blood Glucose.: 128 mg/dL (25 Mar 2023 23:51)  POCT Blood Glucose.: 101 mg/dL (25 Mar 2023 21:01)  POCT Blood Glucose.: 111 mg/dL (25 Mar 2023 16:38)  POCT Blood Glucose.: 123 mg/dL (25 Mar 2023 11:47)        RADIOLOGY & ADDITIONAL TESTS:    CXR:  < from: Xray Chest 2 Views PA/Lat (03.17.23 @ 16:49) >  IMPRESSION:  No acute findings and no change, small left pleural effusion and scarring   in the left lung again evident    < end of copied text >    Ct scan chest:    ekg;    echo:

## 2023-03-26 NOTE — CHART NOTE - NSCHARTNOTEFT_GEN_A_CORE
Chart/Event Note: Notified by RN that the above patient had asymptomatic hypertension, with blood pressure at BP: 188/72 (03-26-23 @ 22:54) (161/64 - 198/76)  BP(mean): 103 (03-26-23 @ 22:54) (103 - 106) .    HPI: 62 year old male w/ PMHx HTN, HLD, DM, CAD (s/p CABG x 2 2021), CVA, hx of Mitral valve replacement, ESRD (MWF at [place], last dialysis 3/22, LUE AV Fistula), Left carotid stenosis s/p carotid endarterectomy with repair using patch graft on 3/23.  Pt admitted to ICU for close post op monitoring with frequent neuro-checks and SBP goal of 100-160.     Patient with elevated blood pressure -  Patient alert at baseline mentation in no acute distress. Patient has no active complaints at this time. No syncope, dizziness, weakness, headaches, vision changes, new focal/lateralizing neurologic deficits, chest pain, palpitations, shortness of breath, abdominal pain, nausea, or vomiting.  Patient with history of HTN home meds on hold. Patient's other vitals are stable.    VITAL SIGNS:   T(C): 37.3 (03-26-23 @ 21:31), Max: 37.3 (03-26-23 @ 21:08)  HR: 87 (03-26-23 @ 22:54) (80 - 87)  BP: 188/72 (03-26-23 @ 22:54) (161/64 - 198/76)  RR: 20 (03-26-23 @ 21:31) (18 - 20)  SpO2: 97% (03-26-23 @ 21:31) (92% - 97%)                        9.3    7.28  )-----------( 104      ( 26 Mar 2023 06:20 )             29.3      PROBLEM:  DX:  Hypertension (Asymptomatic Hypertension) SBP goal of 100-160.   1) Hydralazine 5 mg IVP x 1 dose ordered   2) Resume home medications  3) Repeat blood pressure in one hour post intervention.   4) Primary team to follow up in AM

## 2023-03-26 NOTE — PROGRESS NOTE ADULT - SUBJECTIVE AND OBJECTIVE BOX
INTERVAL HPI/OVERNIGHT EVENTS:    Pt seen and examined at bedside. Offers no acute complaints at this time.     Vital Signs Last 24 Hrs  T(C): 36.6 (26 Mar 2023 05:04), Max: 37.2 (25 Mar 2023 20:36)  T(F): 97.9 (26 Mar 2023 05:04), Max: 99 (25 Mar 2023 20:36)  HR: 85 (26 Mar 2023 08:48) (69 - 87)  BP: 178/68 (26 Mar 2023 08:48) (144/71 - 182/74)  BP(mean): --  RR: 18 (26 Mar 2023 05:04) (18 - 19)  SpO2: 92% (26 Mar 2023 05:04) (92% - 94%)    Parameters below as of 26 Mar 2023 05:04  Patient On (Oxygen Delivery Method): nasal cannula  O2 Flow (L/min): 2    I&O's Detail    pantoprazole    Tablet 40 milliGRAM(s) Oral before breakfast  sevelamer carbonate 800 milliGRAM(s) Oral three times a day with meals      Physical Exam  General: AAOx3, No acute distress  ENMT: normal facies, symmetric  Neck: Left neck dressing c/d/i. No evidence of hematoma  Pulmonary: Respirations non labored   Extremities: DIRK, sensation grossly intact bilaterally. CN II-XII grossly intact.       Labs:                        9.3    7.28  )-----------( 104      ( 26 Mar 2023 06:20 )             29.3     03-26    132<L>  |  97  |  70<H>  ----------------------------<  149<H>  4.6   |  27  |  8.11<H>    Ca    8.9      26 Mar 2023 06:20  Phos  6.5     03-26  Mg     2.5     03-26

## 2023-03-26 NOTE — PROGRESS NOTE ADULT - SUBJECTIVE AND OBJECTIVE BOX
CHIEF COMPLAINT:Patient is a 62y old  Male who presents with a chief complaint of Post carotid endarterectomy.Pt appears comfortable.    	  REVIEW OF SYSTEMS:  CONSTITUTIONAL: No fever, weight loss, or fatigue  EYES: No eye pain, visual disturbances, or discharge  ENT:  No difficulty hearing, tinnitus, vertigo; No sinus or throat pain  NECK: No pain or stiffness  RESPIRATORY: No cough, wheezing, chills or hemoptysis; No Shortness of Breath  CARDIOVASCULAR: No chest pain, palpitations, passing out, dizziness, or leg swelling  GASTROINTESTINAL: No abdominal or epigastric pain. No nausea, vomiting, or hematemesis; No diarrhea or constipation. No melena or hematochezia.  GENITOURINARY: No dysuria, frequency, hematuria, or incontinence  NEUROLOGICAL: No headaches, memory loss, loss of strength, numbness, or tremors  SKIN: No itching, burning, rashes, or lesions   LYMPH Nodes: No enlarged glands  ENDOCRINE: No heat or cold intolerance; No hair loss  MUSCULOSKELETAL: No joint pain or swelling; No muscle, back, or extremity pain  PSYCHIATRIC: No depression, anxiety, mood swings, or difficulty sleeping  HEME/LYMPH: No easy bruising, or bleeding gums  ALLERGY AND IMMUNOLOGIC: No hives or eczema	      PHYSICAL EXAM:  T(C): 36.6 (03-26-23 @ 05:04), Max: 37.2 (03-25-23 @ 20:36)  HR: 85 (03-26-23 @ 08:48) (69 - 87)  BP: 178/68 (03-26-23 @ 08:48) (144/71 - 182/74)  RR: 18 (03-26-23 @ 05:04) (18 - 19)  SpO2: 92% (03-26-23 @ 05:04) (92% - 94%)        Appearance: Normal	  HEENT:   Normal oral mucosa, PERRL, EOMI	  Lymphatic: No lymphadenopathy  Cardiovascular: Normal S1 S2, No JVD, No murmurs, No edema  Respiratory: Lungs clear to auscultation	  Psychiatry: A & O x 3, Mood & affect appropriate  Gastrointestinal:  Soft, Non-tender, + BS	  Skin: No rashes, No ecchymoses, No cyanosis	  Neurologic: Non-focal  Extremities: Normal range of motion, No clubbing, cyanosis or edema  Vascular: Peripheral pulses palpable 2+ bilaterally    MEDICATIONS  (STANDING):  amLODIPine   Tablet 10 milliGRAM(s) Oral daily  artificial  tears Solution 1 Drop(s) Both EYES three times a day  aspirin  chewable 81 milliGRAM(s) Oral daily  epoetin vern-epbx (RETACRIT) Injectable 4000 Unit(s) IV Push <User Schedule>  insulin lispro (ADMELOG) corrective regimen sliding scale   SubCutaneous every 6 hours  insulin lispro (ADMELOG) corrective regimen sliding scale   SubCutaneous three times a day before meals  pantoprazole    Tablet 40 milliGRAM(s) Oral before breakfast  sevelamer carbonate 800 milliGRAM(s) Oral three times a day with meals        	  LABS:	 	                            9.3    7.28  )-----------( 104      ( 26 Mar 2023 06:20 )             29.3     03-26    132<L>  |  97  |  70<H>  ----------------------------<  149<H>  4.6   |  27  |  8.11<H>    Ca    8.9      26 Mar 2023 06:20  Phos  6.5     03-26  Mg     2.5     03-26      TSH: Thyroid Stimulating Hormone, Serum: 2.16 uU/mL (03-25 @ 06:26)

## 2023-03-26 NOTE — CONSULT NOTE ADULT - REASON FOR ADMISSION
Post carotid endarterectomy

## 2023-03-26 NOTE — PROGRESS NOTE ADULT - SUBJECTIVE AND OBJECTIVE BOX
Patient is a 62y old  Male who presents with a chief complaint of Post carotid endarterectomy (25 Mar 2023 17:29)    pt seen in icu [  ], reg med floor [ x  ], bed [ x ], chair at bedside [   ], a+o x3 [ x ], lethargic [  ],    nad [ x ]        Allergies    No Known Allergies        Vitals    T(F): 97.9 (03-26-23 @ 05:04), Max: 99 (03-25-23 @ 20:36)  HR: 87 (03-26-23 @ 05:04) (69 - 87)  BP: 182/74 (03-26-23 @ 05:04) (144/71 - 182/74)  RR: 18 (03-26-23 @ 05:04) (18 - 19)  SpO2: 92% (03-26-23 @ 05:04) (92% - 94%)  Wt(kg): --  CAPILLARY BLOOD GLUCOSE      POCT Blood Glucose.: 128 mg/dL (25 Mar 2023 23:51)      Labs                          9.4    7.69  )-----------( 85       ( 25 Mar 2023 06:26 )             29.2       03-25    135  |  99  |  45<H>  ----------------------------<  119<H>  4.3   |  29  |  6.17<H>    Ca    8.9      25 Mar 2023 06:26  Phos  5.2     03-25  Mg     2.5     03-25                  Radiology Results      Meds    MEDICATIONS  (STANDING):  amLODIPine   Tablet 10 milliGRAM(s) Oral daily  artificial  tears Solution 1 Drop(s) Both EYES three times a day  aspirin  chewable 81 milliGRAM(s) Oral daily  epoetin vern-epbx (RETACRIT) Injectable 4000 Unit(s) IV Push <User Schedule>  insulin lispro (ADMELOG) corrective regimen sliding scale   SubCutaneous every 6 hours  insulin lispro (ADMELOG) corrective regimen sliding scale   SubCutaneous three times a day before meals  pantoprazole    Tablet 40 milliGRAM(s) Oral before breakfast      MEDICATIONS  (PRN):  HYDROmorphone  Injectable 0.5 milliGRAM(s) IV Push every 10 minutes PRN Moderate Pain (4 - 6)      Physical Exam    Neuro :  no focal deficits  Respiratory: CTA B/L  CV: RRR, S1S2, no murmurs,   Abdominal: Soft, NT, ND +BS,  Extremities: No edema, + peripheral pulses    ASSESSMENT    Left carotid stenosis s/p Carotid endarterectomy    anemia   h/o CAD s/p CABG x 2 2021,   cva (no residual weakness),   hx of Mitral valve replacement,   ESRD (MWF  Hypertension  Hyperlipidemia  Diabetes mellitus  Occlusion and stenosis of bilateral carotid arteries        PLAN    Carotid endarterectomy with repair using patch graft 23-Mar-2023   s/p Small black mass noted adjacent to carotid, resected and sent for pathology.  pt s/p ICU for post op and airway monitoring now downgraded to reg floor  vascular f/u   Mointor SBP (goal 100-160)  Continue ASA, plan to resume home Eliquis on POD2  renal f/u   dialysis days are MWF.  s/p 2 hrs hd yesterday  Diet 2 gm and low PO4.   cardio f/u noted  NOAC on hold,   pt on no av blocking agents 2nd to bradycardia   cont aspirin   heme onc cons   plt improving  f/u patho resected neck mass   pt with sob pos 2nd underlying bronchospasm   pulm cons noted  oxygen supp prn  monitor oxygen sat  Bronchodilators prn  PFTs as OP.  gi cons   cont current meds         Patient is a 62y old  Male who presents with a chief complaint of Post carotid endarterectomy (25 Mar 2023 17:29)    pt seen in icu [  ], reg med floor [ x  ], bed [ x ], chair at bedside [   ], a+o x3 [ x ], lethargic [  ],    nad [ x ]        Allergies    No Known Allergies        Vitals    T(F): 97.9 (03-26-23 @ 05:04), Max: 99 (03-25-23 @ 20:36)  HR: 87 (03-26-23 @ 05:04) (69 - 87)  BP: 182/74 (03-26-23 @ 05:04) (144/71 - 182/74)  RR: 18 (03-26-23 @ 05:04) (18 - 19)  SpO2: 92% (03-26-23 @ 05:04) (92% - 94%)  Wt(kg): --  CAPILLARY BLOOD GLUCOSE      POCT Blood Glucose.: 128 mg/dL (25 Mar 2023 23:51)      Labs                          9.4    7.69  )-----------( 85       ( 25 Mar 2023 06:26 )             29.2       03-25    135  |  99  |  45<H>  ----------------------------<  119<H>  4.3   |  29  |  6.17<H>    Ca    8.9      25 Mar 2023 06:26  Phos  5.2     03-25  Mg     2.5     03-25                  Radiology Results      Meds    MEDICATIONS  (STANDING):  amLODIPine   Tablet 10 milliGRAM(s) Oral daily  artificial  tears Solution 1 Drop(s) Both EYES three times a day  aspirin  chewable 81 milliGRAM(s) Oral daily  epoetin vern-epbx (RETACRIT) Injectable 4000 Unit(s) IV Push <User Schedule>  insulin lispro (ADMELOG) corrective regimen sliding scale   SubCutaneous every 6 hours  insulin lispro (ADMELOG) corrective regimen sliding scale   SubCutaneous three times a day before meals  pantoprazole    Tablet 40 milliGRAM(s) Oral before breakfast      MEDICATIONS  (PRN):  HYDROmorphone  Injectable 0.5 milliGRAM(s) IV Push every 10 minutes PRN Moderate Pain (4 - 6)      Physical Exam    Neuro :  no focal deficits  Respiratory: CTA B/L  CV: RRR, S1S2, no murmurs,   Abdominal: Soft, NT, ND +BS,  Extremities: No edema, + peripheral pulses        ASSESSMENT    Left carotid stenosis s/p Carotid endarterectomy    anemia   h/o CAD s/p CABG x 2 2021,   cva (no residual weakness),   hx of Mitral valve replacement,   ESRD (MWF  Hypertension  Hyperlipidemia  Diabetes mellitus  Occlusion and stenosis of bilateral carotid arteries        PLAN    Carotid endarterectomy with repair using patch graft 23-Mar-2023   s/p Small black mass noted adjacent to carotid, resected and sent for pathology.  pt s/p ICU for post op and airway monitoring now downgraded to reg floor  vascular f/u   Mointor SBP (goal 100-160)  Continue ASA, plan to resume home Eliquis on POD2  renal f/u   dialysis days are MWF.  Patient is stable , K is stable and no CHF  Post CEA stable , H/H stable  Thrombocytopenia improved  Follow up need to resume AC - Eliquis  cardio f/u noted  NOAC on hold,   pt on no av blocking agents 2nd to bradycardia   cont aspirin   heme onc cons   plt improving  f/u patho resected neck mass   pt with sob pos 2nd underlying bronchospasm   pulm cons noted  oxygen supp prn  monitor oxygen sat  Bronchodilators prn  PFTs as OP.   oob to chair  cont current meds

## 2023-03-27 ENCOUNTER — TRANSCRIPTION ENCOUNTER (OUTPATIENT)
Age: 63
End: 2023-03-27

## 2023-03-27 VITALS
RESPIRATION RATE: 16 BRPM | SYSTOLIC BLOOD PRESSURE: 140 MMHG | DIASTOLIC BLOOD PRESSURE: 75 MMHG | HEART RATE: 98 BPM | TEMPERATURE: 99 F | OXYGEN SATURATION: 96 % | WEIGHT: 181.22 LBS

## 2023-03-27 LAB
ALBUMIN SERPL ELPH-MCNC: 3.2 G/DL — LOW (ref 3.5–5)
ALP SERPL-CCNC: 80 U/L — SIGNIFICANT CHANGE UP (ref 40–120)
ALT FLD-CCNC: <6 U/L DA — LOW (ref 10–60)
ANION GAP SERPL CALC-SCNC: 13 MMOL/L — SIGNIFICANT CHANGE UP (ref 5–17)
AST SERPL-CCNC: 13 U/L — SIGNIFICANT CHANGE UP (ref 10–40)
BASOPHILS # BLD AUTO: 0.03 K/UL — SIGNIFICANT CHANGE UP (ref 0–0.2)
BASOPHILS NFR BLD AUTO: 0.4 % — SIGNIFICANT CHANGE UP (ref 0–2)
BILIRUB SERPL-MCNC: 0.7 MG/DL — SIGNIFICANT CHANGE UP (ref 0.2–1.2)
BUN SERPL-MCNC: 93 MG/DL — HIGH (ref 7–18)
CALCIUM SERPL-MCNC: 9.2 MG/DL — SIGNIFICANT CHANGE UP (ref 8.4–10.5)
CHLORIDE SERPL-SCNC: 99 MMOL/L — SIGNIFICANT CHANGE UP (ref 96–108)
CO2 SERPL-SCNC: 23 MMOL/L — SIGNIFICANT CHANGE UP (ref 22–31)
CREAT SERPL-MCNC: 10.1 MG/DL — HIGH (ref 0.5–1.3)
EGFR: 5 ML/MIN/1.73M2 — LOW
EOSINOPHIL # BLD AUTO: 0.6 K/UL — HIGH (ref 0–0.5)
EOSINOPHIL NFR BLD AUTO: 7.3 % — HIGH (ref 0–6)
GLUCOSE BLDC GLUCOMTR-MCNC: 112 MG/DL — HIGH (ref 70–99)
GLUCOSE BLDC GLUCOMTR-MCNC: 146 MG/DL — HIGH (ref 70–99)
GLUCOSE SERPL-MCNC: 145 MG/DL — HIGH (ref 70–99)
HCT VFR BLD CALC: 29.3 % — LOW (ref 39–50)
HGB BLD-MCNC: 9.5 G/DL — LOW (ref 13–17)
IMM GRANULOCYTES NFR BLD AUTO: 0.2 % — SIGNIFICANT CHANGE UP (ref 0–0.9)
LYMPHOCYTES # BLD AUTO: 1.11 K/UL — SIGNIFICANT CHANGE UP (ref 1–3.3)
LYMPHOCYTES # BLD AUTO: 13.5 % — SIGNIFICANT CHANGE UP (ref 13–44)
MAGNESIUM SERPL-MCNC: 2.5 MG/DL — SIGNIFICANT CHANGE UP (ref 1.6–2.6)
MCHC RBC-ENTMCNC: 32.4 GM/DL — SIGNIFICANT CHANGE UP (ref 32–36)
MCHC RBC-ENTMCNC: 33.3 PG — SIGNIFICANT CHANGE UP (ref 27–34)
MCV RBC AUTO: 102.8 FL — HIGH (ref 80–100)
MONOCYTES # BLD AUTO: 0.91 K/UL — HIGH (ref 0–0.9)
MONOCYTES NFR BLD AUTO: 11.1 % — SIGNIFICANT CHANGE UP (ref 2–14)
NEUTROPHILS # BLD AUTO: 5.53 K/UL — SIGNIFICANT CHANGE UP (ref 1.8–7.4)
NEUTROPHILS NFR BLD AUTO: 67.5 % — SIGNIFICANT CHANGE UP (ref 43–77)
NRBC # BLD: 0 /100 WBCS — SIGNIFICANT CHANGE UP (ref 0–0)
PHOSPHATE SERPL-MCNC: 6.9 MG/DL — HIGH (ref 2.5–4.5)
PLATELET # BLD AUTO: 154 K/UL — SIGNIFICANT CHANGE UP (ref 150–400)
POTASSIUM SERPL-MCNC: 4.8 MMOL/L — SIGNIFICANT CHANGE UP (ref 3.5–5.3)
POTASSIUM SERPL-SCNC: 4.8 MMOL/L — SIGNIFICANT CHANGE UP (ref 3.5–5.3)
PROT SERPL-MCNC: 8 G/DL — SIGNIFICANT CHANGE UP (ref 6–8.3)
RBC # BLD: 2.85 M/UL — LOW (ref 4.2–5.8)
RBC # FLD: 13.4 % — SIGNIFICANT CHANGE UP (ref 10.3–14.5)
SODIUM SERPL-SCNC: 135 MMOL/L — SIGNIFICANT CHANGE UP (ref 135–145)
WBC # BLD: 8.2 K/UL — SIGNIFICANT CHANGE UP (ref 3.8–10.5)
WBC # FLD AUTO: 8.2 K/UL — SIGNIFICANT CHANGE UP (ref 3.8–10.5)

## 2023-03-27 PROCEDURE — 99261: CPT

## 2023-03-27 PROCEDURE — 87640 STAPH A DNA AMP PROBE: CPT

## 2023-03-27 PROCEDURE — 86923 COMPATIBILITY TEST ELECTRIC: CPT

## 2023-03-27 PROCEDURE — 82607 VITAMIN B-12: CPT

## 2023-03-27 PROCEDURE — 36415 COLL VENOUS BLD VENIPUNCTURE: CPT

## 2023-03-27 PROCEDURE — 86704 HEP B CORE ANTIBODY TOTAL: CPT

## 2023-03-27 PROCEDURE — 87340 HEPATITIS B SURFACE AG IA: CPT

## 2023-03-27 PROCEDURE — 84132 ASSAY OF SERUM POTASSIUM: CPT

## 2023-03-27 PROCEDURE — 86900 BLOOD TYPING SEROLOGIC ABO: CPT

## 2023-03-27 PROCEDURE — 88305 TISSUE EXAM BY PATHOLOGIST: CPT

## 2023-03-27 PROCEDURE — 82746 ASSAY OF FOLIC ACID SERUM: CPT

## 2023-03-27 PROCEDURE — 84443 ASSAY THYROID STIM HORMONE: CPT

## 2023-03-27 PROCEDURE — 82962 GLUCOSE BLOOD TEST: CPT

## 2023-03-27 PROCEDURE — 80048 BASIC METABOLIC PNL TOTAL CA: CPT

## 2023-03-27 PROCEDURE — 88311 DECALCIFY TISSUE: CPT

## 2023-03-27 PROCEDURE — 93005 ELECTROCARDIOGRAM TRACING: CPT

## 2023-03-27 PROCEDURE — 88304 TISSUE EXAM BY PATHOLOGIST: CPT

## 2023-03-27 PROCEDURE — 80053 COMPREHEN METABOLIC PANEL: CPT

## 2023-03-27 PROCEDURE — C1768: CPT

## 2023-03-27 PROCEDURE — 86803 HEPATITIS C AB TEST: CPT

## 2023-03-27 PROCEDURE — 85025 COMPLETE CBC W/AUTO DIFF WBC: CPT

## 2023-03-27 PROCEDURE — 84100 ASSAY OF PHOSPHORUS: CPT

## 2023-03-27 PROCEDURE — 86901 BLOOD TYPING SEROLOGIC RH(D): CPT

## 2023-03-27 PROCEDURE — 85027 COMPLETE CBC AUTOMATED: CPT

## 2023-03-27 PROCEDURE — 86706 HEP B SURFACE ANTIBODY: CPT

## 2023-03-27 PROCEDURE — 83735 ASSAY OF MAGNESIUM: CPT

## 2023-03-27 PROCEDURE — 76536 US EXAM OF HEAD AND NECK: CPT

## 2023-03-27 PROCEDURE — 86850 RBC ANTIBODY SCREEN: CPT

## 2023-03-27 PROCEDURE — 87641 MR-STAPH DNA AMP PROBE: CPT

## 2023-03-27 PROCEDURE — C1889: CPT

## 2023-03-27 RX ORDER — SEVELAMER CARBONATE 2400 MG/1
1 POWDER, FOR SUSPENSION ORAL
Qty: 0 | Refills: 0 | DISCHARGE
Start: 2023-03-27

## 2023-03-27 RX ORDER — HYDRALAZINE HCL 50 MG
50 TABLET ORAL EVERY 8 HOURS
Refills: 0 | Status: DISCONTINUED | OUTPATIENT
Start: 2023-03-27 | End: 2023-03-27

## 2023-03-27 RX ORDER — ERYTHROPOIETIN 10000 [IU]/ML
4000 INJECTION, SOLUTION INTRAVENOUS; SUBCUTANEOUS
Qty: 0 | Refills: 0 | DISCHARGE
Start: 2023-03-27

## 2023-03-27 RX ORDER — APIXABAN 2.5 MG/1
1 TABLET, FILM COATED ORAL
Qty: 0 | Refills: 0 | DISCHARGE

## 2023-03-27 RX ORDER — HYDRALAZINE HCL 50 MG
1 TABLET ORAL
Qty: 90 | Refills: 0
Start: 2023-03-27 | End: 2023-04-25

## 2023-03-27 RX ORDER — CARVEDILOL PHOSPHATE 80 MG/1
1 CAPSULE, EXTENDED RELEASE ORAL
Qty: 0 | Refills: 0 | DISCHARGE

## 2023-03-27 RX ORDER — METOPROLOL TARTRATE 50 MG
1 TABLET ORAL
Qty: 60 | Refills: 0
Start: 2023-03-27 | End: 2023-04-25

## 2023-03-27 RX ORDER — METOPROLOL TARTRATE 50 MG
25 TABLET ORAL
Refills: 0 | Status: DISCONTINUED | OUTPATIENT
Start: 2023-03-27 | End: 2023-03-27

## 2023-03-27 RX ADMIN — ERYTHROPOIETIN 4000 UNIT(S): 10000 INJECTION, SOLUTION INTRAVENOUS; SUBCUTANEOUS at 14:41

## 2023-03-27 RX ADMIN — Medication 1 DROP(S): at 05:34

## 2023-03-27 RX ADMIN — SEVELAMER CARBONATE 800 MILLIGRAM(S): 2400 POWDER, FOR SUSPENSION ORAL at 18:03

## 2023-03-27 RX ADMIN — PANTOPRAZOLE SODIUM 40 MILLIGRAM(S): 20 TABLET, DELAYED RELEASE ORAL at 05:34

## 2023-03-27 RX ADMIN — Medication 25 MILLIGRAM(S): at 18:02

## 2023-03-27 RX ADMIN — SEVELAMER CARBONATE 800 MILLIGRAM(S): 2400 POWDER, FOR SUSPENSION ORAL at 08:51

## 2023-03-27 RX ADMIN — AMLODIPINE BESYLATE 10 MILLIGRAM(S): 2.5 TABLET ORAL at 05:34

## 2023-03-27 RX ADMIN — Medication 25 MILLIGRAM(S): at 05:34

## 2023-03-27 NOTE — PROGRESS NOTE ADULT - PROBLEM SELECTOR PROBLEM 1
Occlusion and stenosis of bilateral carotid arteries
Occlusion and stenosis of bilateral carotid arteries

## 2023-03-27 NOTE — PROGRESS NOTE ADULT - SUBJECTIVE AND OBJECTIVE BOX
Patient is a 62y old  Male who presents with a chief complaint of Post carotid endarterectomy (26 Mar 2023 12:11)    pt seen in icu [  ], reg med floor [ x  ], bed [ x ], chair at bedside [   ], a+o x3 [ x ], lethargic [  ],    nad [ x ]        Allergies    No Known Allergies        Vitals    T(F): 97.9 (03-27-23 @ 04:50), Max: 99.1 (03-26-23 @ 21:08)  HR: 96 (03-27-23 @ 04:50) (80 - 96)  BP: 194/77 (03-27-23 @ 04:50) (161/64 - 198/76)  RR: 18 (03-27-23 @ 04:50) (18 - 20)  SpO2: 96% (03-27-23 @ 04:50) (94% - 97%)  Wt(kg): --  CAPILLARY BLOOD GLUCOSE      POCT Blood Glucose.: 113 mg/dL (26 Mar 2023 22:20)      Labs                          9.3    7.28  )-----------( 104      ( 26 Mar 2023 06:20 )             29.3       03-26    132<L>  |  97  |  70<H>  ----------------------------<  149<H>  4.6   |  27  |  8.11<H>    Ca    8.9      26 Mar 2023 06:20  Phos  6.5     03-26  Mg     2.5     03-26                  Radiology Results      Meds    MEDICATIONS  (STANDING):  amLODIPine   Tablet 10 milliGRAM(s) Oral daily  artificial  tears Solution 1 Drop(s) Both EYES three times a day  aspirin  chewable 81 milliGRAM(s) Oral daily  epoetin vern-epbx (RETACRIT) Injectable 4000 Unit(s) IV Push <User Schedule>  hydrALAZINE 25 milliGRAM(s) Oral every 8 hours  insulin lispro (ADMELOG) corrective regimen sliding scale   SubCutaneous three times a day before meals  pantoprazole    Tablet 40 milliGRAM(s) Oral before breakfast  sevelamer carbonate 800 milliGRAM(s) Oral three times a day with meals      MEDICATIONS  (PRN):  HYDROmorphone  Injectable 0.5 milliGRAM(s) IV Push every 10 minutes PRN Moderate Pain (4 - 6)      Physical Exam    Neuro :  no focal deficits  Respiratory: CTA B/L  CV: RRR, S1S2, no murmurs,   Abdominal: Soft, NT, ND +BS,  Extremities: No edema, + peripheral pulses        ASSESSMENT    Left carotid stenosis s/p Carotid endarterectomy    anemia   h/o CAD s/p CABG x 2 2021,   cva (no residual weakness),   hx of Mitral valve replacement,   ESRD (MWF  Hypertension  Hyperlipidemia  Diabetes mellitus  Occlusion and stenosis of bilateral carotid arteries        PLAN    Carotid endarterectomy with repair using patch graft 23-Mar-2023   s/p Small black mass noted adjacent to carotid, resected and sent for pathology.  pt s/p ICU for post op and airway monitoring now downgraded to reg floor  vascular f/u   Mointor SBP (goal 100-160)  Continue ASA, plan to resume home Eliquis on POD2  renal f/u   dialysis days are MWF.  Patient is stable , K is stable and no CHF  Post CEA stable , H/H stable  Thrombocytopenia improved  Follow up need to resume AC - Eliquis  cardio f/u noted  NOAC on hold,   pt on no av blocking agents 2nd to bradycardia   cont aspirin   heme onc cons   plt improving  f/u patho resected neck mass   pt with sob pos 2nd underlying bronchospasm   pulm cons noted  oxygen supp prn  monitor oxygen sat  Bronchodilators prn  PFTs as OP.   oob to chair  cont current meds         Patient is a 62y old  Male who presents with a chief complaint of Post carotid endarterectomy (26 Mar 2023 12:11)    pt seen in icu [  ], reg med floor [ x  ], bed [ x ], chair at bedside [   ], a+o x3 [ x ], lethargic [  ],    nad [ x ]        Allergies    No Known Allergies        Vitals    T(F): 97.9 (03-27-23 @ 04:50), Max: 99.1 (03-26-23 @ 21:08)  HR: 96 (03-27-23 @ 04:50) (80 - 96)  BP: 194/77 (03-27-23 @ 04:50) (161/64 - 198/76)  RR: 18 (03-27-23 @ 04:50) (18 - 20)  SpO2: 96% (03-27-23 @ 04:50) (94% - 97%)  Wt(kg): --  CAPILLARY BLOOD GLUCOSE      POCT Blood Glucose.: 113 mg/dL (26 Mar 2023 22:20)      Labs                          9.3    7.28  )-----------( 104      ( 26 Mar 2023 06:20 )             29.3       03-26    132<L>  |  97  |  70<H>  ----------------------------<  149<H>  4.6   |  27  |  8.11<H>    Ca    8.9      26 Mar 2023 06:20  Phos  6.5     03-26  Mg     2.5     03-26        Radiology Results      Meds    MEDICATIONS  (STANDING):  amLODIPine   Tablet 10 milliGRAM(s) Oral daily  artificial  tears Solution 1 Drop(s) Both EYES three times a day  aspirin  chewable 81 milliGRAM(s) Oral daily  epoetin vern-epbx (RETACRIT) Injectable 4000 Unit(s) IV Push <User Schedule>  hydrALAZINE 25 milliGRAM(s) Oral every 8 hours  insulin lispro (ADMELOG) corrective regimen sliding scale   SubCutaneous three times a day before meals  pantoprazole    Tablet 40 milliGRAM(s) Oral before breakfast  sevelamer carbonate 800 milliGRAM(s) Oral three times a day with meals      MEDICATIONS  (PRN):  HYDROmorphone  Injectable 0.5 milliGRAM(s) IV Push every 10 minutes PRN Moderate Pain (4 - 6)      Physical Exam    Neuro :  no focal deficits  Respiratory: CTA B/L  CV: RRR, S1S2, no murmurs,   Abdominal: Soft, NT, ND +BS,  Extremities: No edema, + peripheral pulses        ASSESSMENT    Left carotid stenosis s/p Carotid endarterectomy    anemia   h/o CAD s/p CABG x 2 2021,   cva (no residual weakness),   hx of Mitral valve replacement,   ESRD (MWF  Hypertension  Hyperlipidemia  Diabetes mellitus  Occlusion and stenosis of bilateral carotid arteries        PLAN    Carotid endarterectomy with repair using patch graft 23-Mar-2023   s/p Small black mass noted adjacent to carotid, resected and sent for pathology.  pt s/p ICU for post op and airway monitoring now downgraded to reg floor  vascular f/u   Mointor SBP (goal 100-160)  Continue ASA,   Continue ASA, resume home Eliquis  renal f/u   dialysis days are MWF.  Patient is stable , K is stable and no CHF  Post CEA stable , H/H stable  Thrombocytopenia improved  Follow up need to resume AC - Eliquis  cardio f/u    resume NOAC once cleared by vascular,   pt on no av blocking agents 2nd to bradycardia   add hydralazine 25mg q8h.  cont amlodipine 10 mg daily  cont aspirin   heme onc cons noted  plt improving  usually due to hemodilution from IVF given in surgery  consumption of platelet due to surgery also occurs  HIT is less likely especially in this pt, he has been getting heparin in HD for long time  the platelet count usually recover in 3-4 days.  TMA is also less likely.  there is no evidence of hemolysis  f/u patho resected neck mass   will do CT neck and chest after recovery  pt with sob pos 2nd underlying bronchospasm   pulm cons noted  oxygen supp prn  monitor oxygen sat  Bronchodilators prn  PFTs as OP.   oob to chair  cont current meds   d/c plan if stable post hd today

## 2023-03-27 NOTE — DISCHARGE NOTE NURSING/CASE MANAGEMENT/SOCIAL WORK - NSDCPEFALRISK_GEN_ALL_CORE
For information on Fall & Injury Prevention, visit: [email or web address] OR  [email or web address] OR  https://www.cdc.gov/steadi/patient.html

## 2023-03-27 NOTE — PROGRESS NOTE ADULT - PROVIDER SPECIALTY LIST ADULT
Internal Medicine
Internal Medicine
Nephrology
Cardiology
Vascular Surgery
Cardiology
Cardiology
Critical Care
Heme/Onc
Internal Medicine
Vascular Surgery
Nephrology
Vascular Surgery
Pulmonology
Pulmonology

## 2023-03-27 NOTE — PROGRESS NOTE ADULT - SUBJECTIVE AND OBJECTIVE BOX
Pt seen, feeling well, no complaints.     MEDICATIONS  (STANDING):  amLODIPine   Tablet 10 milliGRAM(s) Oral daily  artificial  tears Solution 1 Drop(s) Both EYES three times a day  aspirin  chewable 81 milliGRAM(s) Oral daily  epoetin vern-epbx (RETACRIT) Injectable 4000 Unit(s) IV Push <User Schedule>  hydrALAZINE 50 milliGRAM(s) Oral every 8 hours  insulin lispro (ADMELOG) corrective regimen sliding scale   SubCutaneous three times a day before meals  metoprolol tartrate 25 milliGRAM(s) Oral two times a day  pantoprazole    Tablet 40 milliGRAM(s) Oral before breakfast  sevelamer carbonate 800 milliGRAM(s) Oral three times a day with meals    MEDICATIONS  (PRN):  HYDROmorphone  Injectable 0.5 milliGRAM(s) IV Push every 10 minutes PRN Moderate Pain (4 - 6)      ROS  No fever, sweats, chills  No epistaxis, HA, sore throat  No CP, SOB, cough, sputum  No n/v/d, abd pain, melena, hematochezia  No edema  No rash  No anxiety  No back pain, joint pain  No bleeding, bruising  No dysuria, hematuria    Vital Signs Last 24 Hrs  T(C): 37 (27 Mar 2023 14:55), Max: 37.3 (27 Mar 2023 11:24)  T(F): 98.6 (27 Mar 2023 14:55), Max: 99.2 (27 Mar 2023 11:24)  HR: 98 (27 Mar 2023 14:55) (87 - 98)  BP: 140/75 (27 Mar 2023 14:55) (140/75 - 194/77)  BP(mean): 102 (27 Mar 2023 06:53) (98 - 103)  RR: 16 (27 Mar 2023 14:55) (16 - 18)  SpO2: 96% (27 Mar 2023 14:55) (96% - 96%)    Parameters below as of 27 Mar 2023 14:55  Patient On (Oxygen Delivery Method): room air        PE  NAD  Awake, alert  Anicteric  limited 2/2 covid pandemic                          9.5    8.20  )-----------( 154      ( 27 Mar 2023 11:20 )             29.3       03-27    135  |  99  |  93<H>  ----------------------------<  145<H>  4.8   |  23  |  10.10<H>    Ca    9.2      27 Mar 2023 11:20  Phos  6.9     03-27  Mg     2.5     03-27    TPro  8.0  /  Alb  3.2<L>  /  TBili  0.7  /  DBili  x   /  AST  13  /  ALT  <6<L>  /  AlkPhos  80  03-27

## 2023-03-27 NOTE — PROGRESS NOTE ADULT - PROBLEM SELECTOR PLAN 2
R/o fluid overload vs underlying bronchospasm  oxygen supp prn  monitor oxygen sat  Bronchodilators prn  PFTs as OP
R/o fluid overload vs underlying bronchospasm  oxygen supp prn  monitor oxygen sat  Bronchodilators prn  pFTs as OP

## 2023-03-27 NOTE — DISCHARGE NOTE NURSING/CASE MANAGEMENT/SOCIAL WORK - PATIENT PORTAL LINK FT
You can access the FollowMyHealth Patient Portal offered by [facility] by registering at the following website: [email or web address]. By joining [facility] FollowMyHealth portal, you will also be able to view your health information using other applications (apps) compatible with our system.

## 2023-03-27 NOTE — PROGRESS NOTE ADULT - REASON FOR ADMISSION
Post carotid endarterectomy

## 2023-03-27 NOTE — PROGRESS NOTE ADULT - SUBJECTIVE AND OBJECTIVE BOX
Patient is a 62y old  Male who presents with a chief complaint of Post carotid endarterectomy (27 Mar 2023 06:12)  Awake, alert, laying in bed in NAD. Clinically stable    INTERVAL HPI/OVERNIGHT EVENTS:      VITAL SIGNS:  T(F): 97.9 (03-27-23 @ 04:50)  HR: 96 (03-27-23 @ 04:50)  BP: 187/73 (03-27-23 @ 06:53)  RR: 18 (03-27-23 @ 04:50)  SpO2: 96% (03-27-23 @ 04:50)  Wt(kg): --  I&O's Detail          REVIEW OF SYSTEMS:    CONSTITUTIONAL:  No fevers, chills, sweats    HEENT:  Eyes:  No diplopia or blurred vision. ENT:  No earache, sore throat or runny nose.    CARDIOVASCULAR:  No pressure, squeezing, tightness, or heaviness about the chest; no palpitations.    RESPIRATORY:  Per HPI    GASTROINTESTINAL:  No abdominal pain, nausea, vomiting or diarrhea.    GENITOURINARY:  No dysuria, frequency or urgency.    NEUROLOGIC:  No paresthesias, fasciculations, seizures or weakness.    PSYCHIATRIC:  No disorder of thought or mood.      PHYSICAL EXAM:    Constitutional: Well developed and nourished  Eyes:Perrla  ENMT: normal  Neck:supple  Respiratory: good air entry  Cardiovascular: S1 S2 regular  Gastrointestinal: Soft, Non tender  Extremities: No edema  Vascular:normal  Neurological:Awake, alert,Ox3  Musculoskeletal:Normal      MEDICATIONS  (STANDING):  amLODIPine   Tablet 10 milliGRAM(s) Oral daily  artificial  tears Solution 1 Drop(s) Both EYES three times a day  aspirin  chewable 81 milliGRAM(s) Oral daily  epoetin vern-epbx (RETACRIT) Injectable 4000 Unit(s) IV Push <User Schedule>  hydrALAZINE 25 milliGRAM(s) Oral every 8 hours  insulin lispro (ADMELOG) corrective regimen sliding scale   SubCutaneous three times a day before meals  pantoprazole    Tablet 40 milliGRAM(s) Oral before breakfast  sevelamer carbonate 800 milliGRAM(s) Oral three times a day with meals    MEDICATIONS  (PRN):  HYDROmorphone  Injectable 0.5 milliGRAM(s) IV Push every 10 minutes PRN Moderate Pain (4 - 6)      Allergies    No Known Allergies    Intolerances        LABS:                        9.3    7.28  )-----------( 104      ( 26 Mar 2023 06:20 )             29.3     03-26    132<L>  |  97  |  70<H>  ----------------------------<  149<H>  4.6   |  27  |  8.11<H>    Ca    8.9      26 Mar 2023 06:20  Phos  6.5     03-26  Mg     2.5     03-26                CAPILLARY BLOOD GLUCOSE      POCT Blood Glucose.: 112 mg/dL (27 Mar 2023 07:55)  POCT Blood Glucose.: 113 mg/dL (26 Mar 2023 22:20)  POCT Blood Glucose.: 105 mg/dL (26 Mar 2023 16:47)  POCT Blood Glucose.: 129 mg/dL (26 Mar 2023 11:26)        RADIOLOGY & ADDITIONAL TESTS:    CXR:    Ct scan chest:    ekg;    echo:

## 2023-03-27 NOTE — DISCHARGE NOTE NURSING/CASE MANAGEMENT/SOCIAL WORK - NSDCPEEMAIL_GEN_ALL_CORE
[facility] for Tobacco Control email [email or web address]

## 2023-03-27 NOTE — DISCHARGE NOTE NURSING/CASE MANAGEMENT/SOCIAL WORK - NSDCPEWEB_GEN_ALL_CORE
[facility] for Tobacco Control website --- [email or web address] website --- [email or web address]

## 2023-03-27 NOTE — PROGRESS NOTE ADULT - SUBJECTIVE AND OBJECTIVE BOX
INTERVAL HPI/OVERNIGHT EVENTS:    Pt seen and examined at bedside. Offers no acute complaints at this time.     Vital Signs Last 24 Hrs  T(C): 36.6 (27 Mar 2023 04:50), Max: 37.3 (26 Mar 2023 21:08)  T(F): 97.9 (27 Mar 2023 04:50), Max: 99.1 (26 Mar 2023 21:08)  HR: 96 (27 Mar 2023 04:50) (80 - 96)  BP: 187/73 (27 Mar 2023 06:53) (161/64 - 198/76)  BP(mean): 102 (27 Mar 2023 06:53) (98 - 106)  RR: 18 (27 Mar 2023 04:50) (18 - 20)  SpO2: 96% (27 Mar 2023 04:50) (94% - 97%)    Parameters below as of 27 Mar 2023 04:50  Patient On (Oxygen Delivery Method): room air      I&O's Detail    pantoprazole    Tablet 40 milliGRAM(s) Oral before breakfast  sevelamer carbonate 800 milliGRAM(s) Oral three times a day with meals      Physical Exam  General: AAOx3, No acute distress  ENMT: normal facies, symmetric  Neck: Left neck steri strips in place, c/d/i. No evidence of hematoma  Pulmonary: Respirations non labored   Extremities: DIRK, sensation grossly intact bilaterally. CN II-XII grossly intact.       Labs:                        9.3    7.28  )-----------( 104      ( 26 Mar 2023 06:20 )             29.3     03-26    132<L>  |  97  |  70<H>  ----------------------------<  149<H>  4.6   |  27  |  8.11<H>    Ca    8.9      26 Mar 2023 06:20  Phos  6.5     03-26  Mg     2.5     03-26

## 2023-03-27 NOTE — PROGRESS NOTE ADULT - PROBLEM SELECTOR PROBLEM 6
10/15/2020    2006  Sagar Pinto  [address] [address]    To Whom It May Concern:    This is to certify that Sagar Pinto was evaluated in the Urgent Care on 10/15/2020 and please excuse patient from gym class from October 16, 2020 thru October 23, 2020.  If patient is feeling better he can return to gym class.          Jose Isidro MD    [facility] [address] [facility] [address] [facility]  [address] [facility] [address] [facility] [address]  [phone]    
S/P CABG (coronary artery bypass graft)
S/P CABG (coronary artery bypass graft)

## 2023-03-27 NOTE — PROGRESS NOTE ADULT - ASSESSMENT
ESRD , dialysis today with UF 2.5 kg  Increased BP in the weekend , r/o hypervolemia , follow with UF 2.5 kg in dialysis, monitor BP in dialysis. Placed on Hydralazine 50 mg and follow BP post dialysis  Thrombocytopenia improved now 154, 000.  PAF - Eliquis as per cardiology.  Renal bone disease and hyperphosphatemia on Sevelamer  Left cervical adenopathy follow with pathology result.

## 2023-03-27 NOTE — PROGRESS NOTE ADULT - ASSESSMENT
62 year old male with HTN, DM, ESRD on HD was admitted for carotid endarterecomy.  His platelet was in normal range with occasional drop to 100.  The platelet decreased to 56 after surgery.  A ?pathological lymph node was found near carotid artery.    immediate post-op  thrombocytopenia  usually due to hemodilution from IVF given in surgery  consumption of platelet due to surgery also occurs  HIT is less likely especially in this pt, he has been getting heparin in HD for long time  the platelet count usually recover in 3-4 days, nml today  TMA is also less likely.  there is no evidence of hemolysis  monitor for now  B12, folate nml    anemia -- hgb stable, monitor    2. lymphadenopathy,  will f/u on path  will do CT neck and chest after recovery, can be done as outpt    d/w pt, will follow.

## 2023-03-27 NOTE — PROGRESS NOTE ADULT - SUBJECTIVE AND OBJECTIVE BOX
CHIEF COMPLAINT:Patient is a 62y old  Male who presents with a chief complaint of Post carotid endarterectomy.Pt appears comfortable.    	  REVIEW OF SYSTEMS:  CONSTITUTIONAL: No fever, weight loss, or fatigue  EYES: No eye pain, visual disturbances, or discharge  ENT:  No difficulty hearing, tinnitus, vertigo; No sinus or throat pain  NECK: No pain or stiffness  RESPIRATORY: No cough, wheezing, chills or hemoptysis; No Shortness of Breath  CARDIOVASCULAR: No chest pain, palpitations, passing out, dizziness, or leg swelling  GASTROINTESTINAL: No abdominal or epigastric pain. No nausea, vomiting, or hematemesis; No diarrhea or constipation. No melena or hematochezia.  GENITOURINARY: No dysuria, frequency, hematuria, or incontinence  NEUROLOGICAL: No headaches, memory loss, loss of strength, numbness, or tremors  SKIN: No itching, burning, rashes, or lesions   LYMPH Nodes: No enlarged glands  ENDOCRINE: No heat or cold intolerance; No hair loss  MUSCULOSKELETAL: No joint pain or swelling; No muscle, back, or extremity pain  PSYCHIATRIC: No depression, anxiety, mood swings, or difficulty sleeping  HEME/LYMPH: No easy bruising, or bleeding gums  ALLERGY AND IMMUNOLOGIC: No hives or eczema	    PHYSICAL EXAM:  T(C): 36.6 (03-27-23 @ 04:50), Max: 37.3 (03-26-23 @ 21:08)  HR: 96 (03-27-23 @ 04:50) (80 - 96)  BP: 187/73 (03-27-23 @ 06:53) (161/64 - 198/76)  RR: 18 (03-27-23 @ 04:50) (18 - 20)  SpO2: 96% (03-27-23 @ 04:50) (94% - 97%)  Wt(kg): --  I&O's Summary      Appearance: Normal	  HEENT:   Normal oral mucosa, PERRL, EOMI	  Lymphatic: No lymphadenopathy  Cardiovascular: Normal S1 S2, No JVD, No murmurs, No edema  Respiratory: Lungs clear to auscultation	  Psychiatry: A & O x 3, Mood & affect appropriate  Gastrointestinal:  Soft, Non-tender, + BS	  Skin: No rashes, No ecchymoses, No cyanosis	  Neurologic: Non-focal  Extremities: Normal range of motion, No clubbing, cyanosis or edema  Vascular: Peripheral pulses palpable 2+ bilaterally    MEDICATIONS  (STANDING):  amLODIPine   Tablet 10 milliGRAM(s) Oral daily  artificial  tears Solution 1 Drop(s) Both EYES three times a day  aspirin  chewable 81 milliGRAM(s) Oral daily  epoetin vern-epbx (RETACRIT) Injectable 4000 Unit(s) IV Push <User Schedule>  hydrALAZINE 50 milliGRAM(s) Oral every 8 hours  insulin lispro (ADMELOG) corrective regimen sliding scale   SubCutaneous three times a day before meals  pantoprazole    Tablet 40 milliGRAM(s) Oral before breakfast  sevelamer carbonate 800 milliGRAM(s) Oral three times a day with meals    	  	  LABS:	 	                       9.3    7.28  )-----------( 104      ( 26 Mar 2023 06:20 )             29.3     03-26    132<L>  |  97  |  70<H>  ----------------------------<  149<H>  4.6   |  27  |  8.11<H>    Ca    8.9      26 Mar 2023 06:20  Phos  6.5     03-26  Mg     2.5     03-26        TSH: Thyroid Stimulating Hormone, Serum: 2.16 uU/mL (03-25 @ 06:26)

## 2023-03-27 NOTE — PROGRESS NOTE ADULT - SUBJECTIVE AND OBJECTIVE BOX
Problem List:  ESRD due to diabetes and HTN  Post CEA 4 days ago  PAF  MVR  CVA    PAST MEDICAL & SURGICAL HISTORY:  Hypertension      Hyperlipidemia      Diabetes mellitus      End stage renal disease      CAD (coronary artery disease)      Stroke  right hemiplegia      Occlusion and stenosis of bilateral carotid arteries      S/P CABG (coronary artery bypass graft)  Mitral valve replacement on 10/21/2021      AV (arteriovenous fistula)  h/o creation (failed)          No Known Allergies      MEDICATIONS  (STANDING):  amLODIPine   Tablet 10 milliGRAM(s) Oral daily  artificial  tears Solution 1 Drop(s) Both EYES three times a day  aspirin  chewable 81 milliGRAM(s) Oral daily  epoetin vern-epbx (RETACRIT) Injectable 4000 Unit(s) IV Push <User Schedule>  hydrALAZINE 50 milliGRAM(s) Oral every 8 hours  insulin lispro (ADMELOG) corrective regimen sliding scale   SubCutaneous three times a day before meals  metoprolol tartrate 25 milliGRAM(s) Oral two times a day  pantoprazole    Tablet 40 milliGRAM(s) Oral before breakfast  sevelamer carbonate 800 milliGRAM(s) Oral three times a day with meals    MEDICATIONS  (PRN):  HYDROmorphone  Injectable 0.5 milliGRAM(s) IV Push every 10 minutes PRN Moderate Pain (4 - 6)                            9.5    8.20  )-----------( 154      ( 27 Mar 2023 11:20 )             29.3     03-27    135  |  99  |  93<H>  ----------------------------<  145<H>  4.8   |  23  |  10.10<H>    Ca    9.2      27 Mar 2023 11:20  Phos  6.9     03-27  Mg     2.5     03-27    TPro  8.0  /  Alb  3.2<L>  /  TBili  0.7  /  DBili  x   /  AST  13  /  ALT  <6<L>  /  AlkPhos  80  03-27            REVIEW OF SYSTEMS:  General: no fever no chills, no weight loss.  EYES/ENT: No visual changes;  No vertigo, no headache.    RESPIRATORY: No cough, wheezing, hemoptysis; No shortness of breath  CARDIOVASCULAR: No chest pain or palpitations. No Edema  GASTROINTESTINAL: No abdominal or epigastric pain. No nausea, vomiting. No diarrhea or constipation. No melena.  GENITOURINARY: No dysuria, frequency, foamy urine, urinary urgency, incontinence or hematuria  NEUROLOGICAL: No numbness or weakness, no tremor , no dizziness.           VITALS:  T(F): 99.2 (03-27-23 @ 11:24), Max: 99.2 (03-27-23 @ 11:24)  HR: 93 (03-27-23 @ 11:24)  BP: 192/89 (03-27-23 @ 11:24)  RR: 18 (03-27-23 @ 04:50)  SpO2: 96% (03-27-23 @ 04:50)  Wt(kg): --      PHYSICAL EXAM:  Constitutional: well developed, no diaphoresis, no distress.  Neck: No JVD, no carotid bruit, supple, no adenopathy  Respiratory:  air entrance B/L, no wheezes, rales or rhonchi  Cardiovascular: S1, S2, RRR, no pericardial rub, systolic  murmur 2/6 LSB  Abdomen: BS+, soft, no tenderness, no bruit  Pelvis: bladder nondistended  Extremities: No cyanosis or clubbing. No peripheral edema.   Pulses: All present    Vascular Access: Left AVF with bruit and thrill

## 2023-03-27 NOTE — PROGRESS NOTE ADULT - ASSESSMENT
61 yo M, lives with family, from home, with med hx significant for HTN, HLD, DM, CAD s/p CABG x 2 2021, stroke s/p CABG (no residual weakness), hx of Mitral valve replacement, ANGELITA ligation, ESRD (MWF at [place], last dialysis 3/22, LUE AV Fistula), Left carotid stenosis s/p Carotid endarterectomy with repair using patch graft  3/23.  1.PT.  2.Thrombocytopenia-Heme f/u.  3.ESRD-HD as per renal.  4.HTN-norvasc,inc hydralazine 50mg q8h.  5.DM-Insulin.  6.PAF-NOAC on hold, asa,add low dose b blocker.  7.PPI.

## 2023-03-31 PROBLEM — I65.23 OCCLUSION AND STENOSIS OF BILATERAL CAROTID ARTERIES: Chronic | Status: ACTIVE | Noted: 2023-03-17

## 2023-04-03 ENCOUNTER — APPOINTMENT (OUTPATIENT)
Dept: VASCULAR SURGERY | Facility: CLINIC | Age: 63
End: 2023-04-03
Payer: MEDICARE

## 2023-04-03 VITALS
DIASTOLIC BLOOD PRESSURE: 82 MMHG | WEIGHT: 178 LBS | HEART RATE: 94 BPM | BODY MASS INDEX: 29.66 KG/M2 | SYSTOLIC BLOOD PRESSURE: 157 MMHG | HEIGHT: 65 IN

## 2023-04-03 PROCEDURE — 99024 POSTOP FOLLOW-UP VISIT: CPT

## 2023-04-03 NOTE — REASON FOR VISIT
[de-identified] : Status post left carotid endarterectomy.  Patient did well.  Neurologically intact.  Incisions clean.  Follow-up in 2 weeks for carotid duplex.

## 2023-04-05 LAB — SURGICAL PATHOLOGY STUDY: SIGNIFICANT CHANGE UP

## 2023-04-07 DIAGNOSIS — Z01.818 ENCOUNTER FOR OTHER PREPROCEDURAL EXAMINATION: ICD-10-CM

## 2023-04-17 NOTE — HISTORY OF PRESENT ILLNESS
[de-identified] : TOM PLEITEZ is a 62 year old male  PMHx HTN, HLD, DM, CAD (s/p CABG x 2 2021), CVA, hx of Mitral valve replacement, ESRD (MWF at [place], , LUE AV Fistula) who presents in the office for post hospitalization visit. He underwent a thyroid Ultrasound Limited visualization of the left thyroid gland. If indicated short-term interval follow-up thyroid ultrasound may be performed. There is 0.8 x 0.4 cm anechoic lesion abutting the posterior aspect of the right thyroid upper pole.

## 2023-04-17 NOTE — DATA REVIEWED
[FreeTextEntry1] : ACC: [id number] EXAM: US THYROID ONLY ORDERED BY: RAJAN HIRSCH\par \par PROCEDURE DATE: 03/24/2023\par \par INTERPRETATION: CLINICAL INFORMATION: Evaluate for thyroid cancer.\par \par COMPARISON: Chest CT dated 10/18/2022\par \par TECHNIQUE: Sonography of the thyroid.\par \par FINDINGS:\par Right Lobe: 3.3 cm x 2.9 cm x 2.1 cm. Normal in size and echogenicity.\par \par There is 0.8 x 0.4 cm anechoic lesion abutting the posterior aspect of the right thyroid upper pole. This may represent exophytic thyroid cyst or parathyroid adenoma. Correlation with serum calcium and parathyroid hormone levels is recommended.\par \par Left Lobe: 2.0 cm x 1.1 cm x 1.5 cm. The gland is normal in size. It is not well visualized. No definite lesions are identified.\par \par Isthmus: 1 mm.\par \par IMPRESSION:\par \par Limited visualization of the left thyroid gland. If indicated short-term interval follow-up thyroid ultrasound may be performed.\par \par There is 0.8 x 0.4 cm anechoic lesion abutting the posterior aspect of the right thyroid upper pole. This may represent exophytic thyroid cyst or parathyroid adenoma. Correlation with serum calcium and parathyroid hormone levels is recommended.\par \par \par --- End of Report ---\par \par \par NATTY BURGOS MD; Attending Radiologist\par This document has been electronically signed. Mar 25 2023 12:26PM

## 2023-04-19 ENCOUNTER — APPOINTMENT (OUTPATIENT)
Dept: SURGERY | Facility: CLINIC | Age: 63
End: 2023-04-19

## 2023-04-25 ENCOUNTER — APPOINTMENT (OUTPATIENT)
Dept: TRANSPLANT | Facility: CLINIC | Age: 63
End: 2023-04-25

## 2023-04-28 ENCOUNTER — APPOINTMENT (OUTPATIENT)
Dept: VASCULAR SURGERY | Facility: CLINIC | Age: 63
End: 2023-04-28
Payer: MEDICARE

## 2023-04-28 ENCOUNTER — APPOINTMENT (OUTPATIENT)
Dept: TRANSPLANT | Facility: CLINIC | Age: 63
End: 2023-04-28

## 2023-04-28 VITALS
HEART RATE: 45 BPM | DIASTOLIC BLOOD PRESSURE: 65 MMHG | HEIGHT: 65 IN | BODY MASS INDEX: 29.66 KG/M2 | WEIGHT: 178 LBS | SYSTOLIC BLOOD PRESSURE: 167 MMHG

## 2023-04-28 PROCEDURE — 99024 POSTOP FOLLOW-UP VISIT: CPT

## 2023-04-28 PROCEDURE — 93990 DOPPLER FLOW TESTING: CPT

## 2023-04-28 PROCEDURE — 93880 EXTRACRANIAL BILAT STUDY: CPT

## 2023-04-28 NOTE — REASON FOR VISIT
[de-identified] : Status post left carotid endarterectomy.  Incision is clean.  Neurologically intact.  Widely patent CEA.\par \par Follow-up in 6 months.\par \par Patient with renal failure on dialysis.  Left radiocephalic fistula is patent with no significant stenosis.\par \par Patient is cleared from a vascular surgery standpoint to proceed with transplant

## 2023-05-12 ENCOUNTER — NON-APPOINTMENT (OUTPATIENT)
Age: 63
End: 2023-05-12

## 2023-05-15 NOTE — STROKE CODE NOTE - STROKE CODE IN HOUSE NOTIFICATION
07-Oct-2021 11:14 Humira Counseling:  I discussed with the patient the risks of adalimumab including but not limited to myelosuppression, immunosuppression, autoimmune hepatitis, demyelinating diseases, lymphoma, and serious infections.  The patient understands that monitoring is required including a PPD at baseline and must alert us or the primary physician if symptoms of infection or other concerning signs are noted.

## 2023-05-17 NOTE — ED CLERICAL - DIVISION
[facility]... Detail Level: Detailed Patient Specific Counseling (Will Not Stick From Patient To Patient): I discussed the nature of moderately atypical nevi and treatment options (excision vs observation)  with the patient today. Current studies demonstrate that moderately atypical nevi do not turn into melanoma but there is variability when it comes to histologic slide interpretation and one can not be certain 100% about their long term biologic behavior.   Patient and parent  wish to proceed with excision. \\n\\nWe discussed fusiform excision (likely spread out scar given the location) versus round excision with partial closure and granulation. They prefer the latter.

## 2023-08-04 ENCOUNTER — APPOINTMENT (OUTPATIENT)
Dept: VASCULAR SURGERY | Facility: CLINIC | Age: 63
End: 2023-08-04

## 2023-10-09 NOTE — PROGRESS NOTE ADULT - SUBJECTIVE AND OBJECTIVE BOX
Chief complaint    Patient is a 60y old  Male who presents with a chief complaint of NSTEMI (23 Oct 2021 11:16)   Review of systems  Patient in bed, appears comfortable.    Labs and Fingersticks  CAPILLARY BLOOD GLUCOSE      POCT Blood Glucose.: 255 mg/dL (23 Oct 2021 11:17)  POCT Blood Glucose.: 147 mg/dL (23 Oct 2021 07:58)  POCT Blood Glucose.: 150 mg/dL (23 Oct 2021 06:47)  POCT Blood Glucose.: 147 mg/dL (23 Oct 2021 06:17)  POCT Blood Glucose.: 96 mg/dL (23 Oct 2021 05:24)  POCT Blood Glucose.: 114 mg/dL (23 Oct 2021 03:54)  POCT Blood Glucose.: 108 mg/dL (23 Oct 2021 03:02)  POCT Blood Glucose.: 94 mg/dL (23 Oct 2021 02:04)  POCT Blood Glucose.: 111 mg/dL (23 Oct 2021 00:53)  POCT Blood Glucose.: 123 mg/dL (22 Oct 2021 23:56)  POCT Blood Glucose.: 139 mg/dL (22 Oct 2021 22:59)  POCT Blood Glucose.: 152 mg/dL (22 Oct 2021 21:56)  POCT Blood Glucose.: 170 mg/dL (22 Oct 2021 20:54)  POCT Blood Glucose.: 137 mg/dL (22 Oct 2021 20:09)  POCT Blood Glucose.: 107 mg/dL (22 Oct 2021 18:37)  POCT Blood Glucose.: 98 mg/dL (22 Oct 2021 17:55)  POCT Blood Glucose.: 107 mg/dL (22 Oct 2021 16:53)  POCT Blood Glucose.: 132 mg/dL (22 Oct 2021 15:56)  POCT Blood Glucose.: 340 mg/dL (22 Oct 2021 15:06)  POCT Blood Glucose.: 202 mg/dL (22 Oct 2021 12:20)      Anion Gap, Serum: 13 (10-23 @ 00:21)  Anion Gap, Serum: 13 (10-22 @ 03:38)  Anion Gap, Serum: 12 (10-21 @ 17:22)      Calcium, Total Serum: 8.7 (10-23 @ 00:21)  Calcium, Total Serum: 7.8 *L* (10-22 @ 03:38)  Calcium, Total Serum: 7.2 *L* (10-21 @ 17:22)  Albumin, Serum: 3.2 *L* (10-23 @ 00:21)  Albumin, Serum: 2.7 *L* (10-22 @ 03:38)  Albumin, Serum: 2.1 *L* (10-21 @ 17:22)    Alanine Aminotransferase (ALT/SGPT): 22 (10-23 @ 00:21)  Alanine Aminotransferase (ALT/SGPT): 14 (10-22 @ 03:38)  Alanine Aminotransferase (ALT/SGPT): 12 (10-21 @ 17:22)  Alkaline Phosphatase, Serum: 60 (10-23 @ 00:21)  Alkaline Phosphatase, Serum: 46 (10-22 @ 03:38)  Alkaline Phosphatase, Serum: 51 (10-21 @ 17:22)  Aspartate Aminotransferase (AST/SGOT): 88 *H* (10-23 @ 00:21)  Aspartate Aminotransferase (AST/SGOT): 71 *H* (10-22 @ 03:38)  Aspartate Aminotransferase (AST/SGOT): 63 *H* (10-21 @ 17:22)        10-23    136  |  100  |  24<H>  ----------------------------<  126<H>  4.2   |  23  |  2.68<H>    Ca    8.7      23 Oct 2021 00:21  Phos  4.4     10-23  Mg     2.3     10-23    TPro  5.2<L>  /  Alb  3.2<L>  /  TBili  0.4  /  DBili  x   /  AST  88<H>  /  ALT  22  /  AlkPhos  60  10-23                        9.0    13.07 )-----------( 92       ( 23 Oct 2021 00:21 )             27.5     Medications  MEDICATIONS  (STANDING):  acetaminophen   Tablet .. 650 milliGRAM(s) Oral every 6 hours  ascorbic acid 500 milliGRAM(s) Oral two times a day  aspirin enteric coated 325 milliGRAM(s) Oral daily  atorvastatin 80 milliGRAM(s) Oral at bedtime  bisacodyl Suppository 10 milliGRAM(s) Rectal once  chlorhexidine 2% Cloths 1 Application(s) Topical daily  dextrose 40% Gel 15 Gram(s) Oral once  dextrose 50% Injectable 50 milliLiter(s) IV Push every 15 minutes  dextrose 50% Injectable 25 milliLiter(s) IV Push every 15 minutes  dextrose 50% Injectable 25 Gram(s) IV Push once  dextrose 50% Injectable 12.5 Gram(s) IV Push once  dextrose 50% Injectable 25 Gram(s) IV Push once  DOBUTamine Infusion 2 MICROgram(s)/kG/Min (4.81 mL/Hr) IV Continuous <Continuous>  glucagon  Injectable 1 milliGRAM(s) IntraMuscular once  heparin   Injectable 5000 Unit(s) SubCutaneous every 8 hours  insulin glargine Injectable (LANTUS) 6 Unit(s) SubCutaneous at bedtime  insulin lispro (ADMELOG) corrective regimen sliding scale   SubCutaneous Before meals and at bedtime  pantoprazole    Tablet 40 milliGRAM(s) Oral before breakfast  polyethylene glycol 3350 17 Gram(s) Oral daily  polyethylene glycol 3350 17 Gram(s) Oral daily  senna 2 Tablet(s) Oral at bedtime  sodium chloride 0.9%. 1000 milliLiter(s) (10 mL/Hr) IV Continuous <Continuous>      Physical Exam  General: Patient comfortable in bed  Vital Signs Last 12 Hrs  T(F): 98.2 (10-23-21 @ 08:00), Max: 99 (10-23-21 @ 04:00)  HR: 80 (10-23-21 @ 11:00) (65 - 90)  BP: 128/73 (10-23-21 @ 11:00) (116/62 - 148/70)  BP(mean): 93 (10-23-21 @ 11:00) (84 - 93)  RR: 26 (10-23-21 @ 11:00) (16 - 26)  SpO2: 94% (10-23-21 @ 11:00) (94% - 99%)  Neck: No palpable thyroid nodules.  CVS: S1S2, No murmurs  Respiratory: No wheezing, no crepitations  GI: Abdomen soft, bowel sounds positive  Musculoskeletal:  edema lower extremities.     Diagnostics    Free Thyroxine, Serum: AM Sched. Collection: 21-Oct-2021 06:00 (10-20 @ 11:51)  Thyroid Stimulating Hormone, Serum: AM Sched. Collection: 21-Oct-2021 06:00 (10-20 @ 11:51)         yes

## 2024-01-08 NOTE — PRE-OP CHECKLIST - TEMPERATURE IN CELSIUS (DEGREES C)
Pts stated he saw his hematologist and advised to start with IVIG.    Call pt to discuss and advise.   37.3

## 2024-02-09 NOTE — H&P PST ADULT - BP NONINVASIVE MEAN (MM HG)
Requested Prescriptions     Pending Prescriptions Disp Refills    metoprolol succinate (TOPROL XL) 50 MG extended release tablet 90 tablet 2     Sig: Take 1 tablet by mouth daily    omeprazole (PRILOSEC) 20 MG delayed release capsule 90 capsule 2     Sig: Take 1 capsule by mouth Daily TAKE 1 CAPSULE BY MOUTH DAILY    triamterene-hydroCHLOROthiazide (MAXZIDE-25) 37.5-25 MG per tablet 90 tablet 2     Sig: Take 1 tablet by mouth daily    sildenafil (VIAGRA) 100 MG tablet 30 tablet 0     Sig: Take 1 tablet by mouth as needed for Erectile Dysfunction     Dose verified and to [facility].  
84

## 2024-02-16 NOTE — PRE-OP CHECKLIST - TEMPERATURE IN FAHRENHEIT (DEGREES F)
Psychoeducation Group Documentation    PATIENT'S NAME: Deena Palomo  MRN:   [id number]  :   2008  ACCT. NUMBER: [id number]  DATE OF SERVICE: 24  START TIME:  8:30 AM  END TIME:  9:30 AM  FACILITATOR(S): Cara Horton Patrick W  TOPIC: Child/Adol Psych Education  Number of patients attending the group:  7  Group Length:  1 Hours  Interactive Complexity: No    Summary of Group / Topics Discussed:    Effective Group Participation: Description and therapeutic purpose: The set of skills and ideas from Effective Group Participation will prepare group members to support a safe and respectful atmosphere for self expression and increase the group member s ability to comprehend presented therapeutic instruction and psychoeducation.  Consensus Building: Description and therapeutic purpose:  Through an informal game or activity to  introduce the group to different meanings of the concept of fairness and of the importance of mutual support and positive regard for group functioning.  The staff will introduce the concepts to the group and lead the group in participating in game play like  Whoonu ,  Cranium ,  [place]  and  Apples to Apples. .    This care was under the supervision of Alexi Smart M.D. , Medical Director.        Group Attendance:  Attended group session    Patient's response to the group topic/interactions:  cooperative with task    Patient appeared to be Actively participating, Attentive, and Engaged.         Client specific details:  see above.       99.2

## 2024-03-22 NOTE — ASU DISCHARGE PLAN (ADULT/PEDIATRIC) - RETURN TO WORK/SCHOOL
Addended by: NICK BELL on: 3/22/2024 11:49 AM     Modules accepted: Orders     After 24 hours and as tolerated/Yes

## 2024-03-22 NOTE — ED PROVIDER NOTE - IV ALTEPLASE DOOR HIDDEN
Audiology Evaluation Completed. Please refer SCANNED AUDIOGRAM and/or TYMPANOGRAM for BACKGROUND, RESULTS, RECOMMENDATIONS.      Domonique SERNA, [facility]  Audiologist #[id number]       show

## 2024-08-30 NOTE — DISCHARGE NOTE NURSING/CASE MANAGEMENT/SOCIAL WORK - HISTORY OF COVID-19 VACCINATION
Spoke with patient today regarding surgery scheduling      Went over details/instructions.    Surgery Letter sent via  given in clinic.  (Please see LETTERS TAB in chart to retrieve a copy of this letter)     Yes

## 2024-09-05 NOTE — PROGRESS NOTE ADULT - ATTENDING COMMENTS
Imaging Studies/Medications transferred from outside hospital for further cardiac evaluation, noncompliant with dm, htn  episode of bradycardia (HR 30s), then became altered. RRT and code stroke 10/7  #  vicki (atn) on ckd  ?stage V  cr uptrending egfr 9  pt is volume overloaded despite bumex ?UO  plan for HD pre cath to optimize volume status, electrolytes, and uremic platelet dysfxn  place shiley  plan condom cath for UO   #CKD- prot/cr 8.7, low alb 2.6, serologic workup pending    renal sono for kidney size ok   likely dm nephropathy however need rto rule out etiology of ckd  #wife and patient understands risk of CARY with cardiac cath, including dialysis  #volume overload/edema-   cont diuresis  #metabolic acidosis- cont bicarb tabs; increased to 1300mg po tid; monitor serum bicarb trend  #hyperkalemia- low k renal diet; monitor trend; stable  #anemia- workup; check iron studies/ferritin  #BP control  #check serum phos  #check hbsag, pla2r, hiv, for ckd workup/proteinuria transferred from outside hospital for further cardiac evaluation, noncompliant with dm, htn  episode of bradycardia (HR 30s), then became altered. RRT and code stroke 10/7  #  vicki (atn/dio) on ckd  ?stage V  cr uptrending egfr 9  pt is volume overloaded despite bumex ?UO  plan for HD pre cath to optimize volume status, electrolytes, and uremic platelet dysfxn  place shiley  plan condom cath for UO   #CKD- prot/cr 8.7, low alb 2.6, serologic workup pending    renal sono for kidney size ok   likely dm nephropathy however need rto rule out etiology of ckd  #wife and patient understands risk of DIO with cardiac cath, including dialysis  #volume overload/edema-   cont diuresis  #metabolic acidosis- cont bicarb tabs; increased to 1300mg po tid; monitor serum bicarb trend  #hyperkalemia- low k renal diet; monitor trend; stable  #anemia- workup; check iron studies/ferritin  #BP control  #check serum phos  #check hbsag, pla2r, hiv, for ckd workup/proteinuria  #needs cardiac cath  d/w Dr Ashford

## 2024-10-18 NOTE — H&P PST ADULT - PROBLEM SELECTOR PROBLEM 6
You can access the FollowMyHealth Patient Portal offered by [facility] by registering at the following website: [email or web address]. By joining [employer]’s FollowMyHealth portal, you will also be able to view your health information using other applications (apps) compatible with our system.
HLD (hyperlipidemia)

## 2024-12-16 NOTE — DIETITIAN INITIAL EVALUATION ADULT. - HEIGHT FOR BMI (CENTIMETERS)
Patient walked to room 2 for left foot onset 2 days ago, patient was experiencing pain about a month ago and he said he was good/ pain free for about 2 weeks ago and now its back.  
165.1

## 2024-12-27 NOTE — H&P PST ADULT - PROBLEM SELECTOR PLAN 4
[At ___ Weeks Gestation] : at [unfilled] weeks gestation [ Section] : by  section Patient states BP is usually controlled with current medication  Continue amlodipine  Maintain follow up appointment with PCP

## 2025-02-13 NOTE — PROGRESS NOTE ADULT - ATTENDING COMMENTS
can be closed.       Outcomes:  ACP discussion completed and New Advance Directive completed      Follow-up Plan:   *This note routed to one or more involved healthcare providers.  CLOSING REFERRAL.              JOSHUA on CKD, in the setting of hypervolemia, long standing DM.   Was initiated on HD for optimization prior to CABG.   Plan for HD today after placement of new dialysis catheter as catheter was not functioning well during HD yesterday.   Monitor BMP.   Recommend IR consult for tunneled dialysis catheter placement and vascular surgery consult for AVF creation prior to discharge.

## 2025-02-14 NOTE — PROCEDURE NOTE - NSTIMEOUT_GEN_A_CORE
Patient's first and last name, , procedure, and correct site confirmed prior to the start of procedure.
14-Feb-2025

## 2025-03-07 NOTE — PATIENT PROFILE ADULT - NSPROEXTENSIONSOFSELF_GEN_A_NUR
Continue Regimen: Tretinoin 0.1% QPM, winlevi [facility] Render In Strict Bullet Format?: No Detail Level: Zone none

## 2025-04-14 NOTE — H&P PST ADULT - NSANTHBPHIGHRD_ENT_A_CORE
PAST MEDICAL HISTORY:  Afib     CAD (coronary artery disease)     CHF (congestive heart failure)     Chronic obstructive pulmonary disease (COPD)     CVA (cerebral vascular accident)     Diabetes     Dyslipidemia     GERD (gastroesophageal reflux disease)     HTN (hypertension)     Stented coronary artery      Yes

## 2025-04-23 NOTE — PRE-OP CHECKLIST - HAND OFF
Presents to ED with c/o right arm pain that started yesterday and is worse today. Denies injury. Alert and oriented. Respirations easy and unlabored.   
yes
yes

## 2025-07-06 NOTE — PROGRESS NOTE ADULT - PROBLEM SELECTOR PLAN 3
acute CHF with dyspnea likely related to NSTEMI, Elevated BNP 32459, fluid overloaded on exam with sob  - c/w IV bumex 4mg q12  - started on coreg 6.125mg bid  - TTE - TTE EF 45%: mild-moderate LVSF  - strict I/O, daily weights  - monitor bmp, replete lytes as needed  -c/w NC, wean as tolerated 06-Jul-2025 21:59

## (undated) DEVICE — DRSG TEGADERM 4X4.75"

## (undated) DEVICE — SYR LUER LOK 20CC

## (undated) DEVICE — PACK AV FISTULA

## (undated) DEVICE — DRAIN RESERVOIR FOR JACKSON PRATT 100CC CARDINAL

## (undated) DEVICE — SOL IRR POUR NS 0.9% 1500ML

## (undated) DEVICE — COVER PROBE S POCKET

## (undated) DEVICE — DRAIN JACKSON PRATT 10MM FLAT FULL NO TROCAR

## (undated) DEVICE — SUT MONOSOF 4-0 18" P-12

## (undated) DEVICE — VESSEL LOOP ASPEN MAXI YELLOW

## (undated) DEVICE — DRSG 4X4

## (undated) DEVICE — SUT SURGIPRO II 6-0 30" CV-1 DA

## (undated) DEVICE — NDL HYPO SAFE 18G X 1.5"

## (undated) DEVICE — SOL IRR POUR H2O 1500ML

## (undated) DEVICE — DRSG TRACH DRAINAGE 4X4

## (undated) DEVICE — SUT SOFSILK 2-0 18" TIES

## (undated) DEVICE — DRSG CURITY GAUZE SPONGE 4 X 4" 12-PLY

## (undated) DEVICE — FOR-ESU VALLEYLAB T7E14999DX: Type: DURABLE MEDICAL EQUIPMENT

## (undated) DEVICE — CLAMP BULLDOG MIDI STRAIGHT (YELLOW) DISP

## (undated) DEVICE — FOR-ESU VALLEYLAB T7E15008DX: Type: DURABLE MEDICAL EQUIPMENT

## (undated) DEVICE — SUT SOFSILK 3-0 18" TIES

## (undated) DEVICE — DRSG KLING 6"

## (undated) DEVICE — ELCTR GROUNDING PAD ADULT COVIDIEN

## (undated) DEVICE — WRAP COMPRESSION CALF MED

## (undated) DEVICE — WARMING BLANKET LOWER ADULT

## (undated) DEVICE — SUT SOFSILK 4-0 18" TIES

## (undated) DEVICE — SPONGE LAP PAD W RING 18X18"

## (undated) DEVICE — SUT POLYSORB 3-0 30" V-20 UNDYED

## (undated) DEVICE — DRSG KERLIX ROLL 4.5"

## (undated) DEVICE — DRAPE THYROID 77" X 123"

## (undated) DEVICE — SYR LUER LOK 5CC

## (undated) DEVICE — VENODYNE/SCD SLEEVE CALF MEDIUM

## (undated) DEVICE — DRAIN JACKSON PRATT 7MM FLAT FULL NO TROCAR

## (undated) DEVICE — SPONGE ENDO PEANUT 5MM

## (undated) DEVICE — LUBRICATING JELLY ONESHOT 1.25OZ

## (undated) DEVICE — FOLEY TRAY 16FR SURESTEP LTX BG

## (undated) DEVICE — STAPLER SKIN VISI-STAT 35 WIDE

## (undated) DEVICE — SUT BIOSYN 4-0 18" P-12

## (undated) DEVICE — DRSG STERISTRIPS 0.5X4"